# Patient Record
Sex: FEMALE | Race: WHITE | NOT HISPANIC OR LATINO | Employment: FULL TIME | URBAN - METROPOLITAN AREA
[De-identification: names, ages, dates, MRNs, and addresses within clinical notes are randomized per-mention and may not be internally consistent; named-entity substitution may affect disease eponyms.]

---

## 2017-05-06 ENCOUNTER — OFFICE VISIT (OUTPATIENT)
Dept: URGENT CARE | Facility: CLINIC | Age: 42
End: 2017-05-06
Payer: COMMERCIAL

## 2017-05-06 ENCOUNTER — LAB REQUISITION (OUTPATIENT)
Dept: LAB | Facility: HOSPITAL | Age: 42
End: 2017-05-06
Payer: COMMERCIAL

## 2017-05-06 ENCOUNTER — GENERIC CONVERSION - ENCOUNTER (OUTPATIENT)
Dept: OTHER | Facility: OTHER | Age: 42
End: 2017-05-06

## 2017-05-06 ENCOUNTER — TRANSCRIBE ORDERS (OUTPATIENT)
Dept: URGENT CARE | Facility: CLINIC | Age: 42
End: 2017-05-06

## 2017-05-06 ENCOUNTER — APPOINTMENT (OUTPATIENT)
Dept: LAB | Facility: CLINIC | Age: 42
End: 2017-05-06
Payer: COMMERCIAL

## 2017-05-06 ENCOUNTER — HOSPITAL ENCOUNTER (OUTPATIENT)
Dept: RADIOLOGY | Facility: CLINIC | Age: 42
Discharge: HOME | End: 2017-05-06
Admitting: EMERGENCY MEDICINE
Payer: COMMERCIAL

## 2017-05-06 DIAGNOSIS — R35.0 FREQUENCY OF MICTURITION: ICD-10-CM

## 2017-05-06 DIAGNOSIS — R05.9 COUGH: ICD-10-CM

## 2017-05-06 PROCEDURE — 87186 SC STD MICRODIL/AGAR DIL: CPT | Performed by: EMERGENCY MEDICINE

## 2017-05-06 PROCEDURE — 99213 OFFICE O/P EST LOW 20 MIN: CPT

## 2017-05-06 PROCEDURE — 81002 URINALYSIS NONAUTO W/O SCOPE: CPT

## 2017-05-06 PROCEDURE — 71020 HB CHEST X-RAY 2VW FRONTAL&LATL: CPT

## 2017-05-06 PROCEDURE — 87077 CULTURE AEROBIC IDENTIFY: CPT | Performed by: EMERGENCY MEDICINE

## 2017-05-06 PROCEDURE — 87086 URINE CULTURE/COLONY COUNT: CPT | Performed by: EMERGENCY MEDICINE

## 2017-05-08 LAB — BACTERIA UR CULT: NORMAL

## 2017-05-18 ENCOUNTER — OFFICE VISIT (OUTPATIENT)
Dept: URGENT CARE | Facility: CLINIC | Age: 42
End: 2017-05-18
Payer: COMMERCIAL

## 2017-05-18 PROCEDURE — 99213 OFFICE O/P EST LOW 20 MIN: CPT

## 2017-11-07 ENCOUNTER — TRANSCRIBE ORDERS (OUTPATIENT)
Dept: URGENT CARE | Facility: CLINIC | Age: 42
End: 2017-11-07

## 2017-11-07 ENCOUNTER — APPOINTMENT (OUTPATIENT)
Dept: URGENT CARE | Facility: CLINIC | Age: 42
End: 2017-11-07
Payer: COMMERCIAL

## 2017-11-07 ENCOUNTER — OFFICE VISIT (OUTPATIENT)
Dept: URGENT CARE | Facility: CLINIC | Age: 42
End: 2017-11-07
Payer: COMMERCIAL

## 2017-11-07 DIAGNOSIS — N39.0 URINARY TRACT INFECTION: ICD-10-CM

## 2017-11-07 PROCEDURE — 99204 OFFICE O/P NEW MOD 45 MIN: CPT

## 2017-11-07 PROCEDURE — 82948 REAGENT STRIP/BLOOD GLUCOSE: CPT

## 2017-11-07 PROCEDURE — 81002 URINALYSIS NONAUTO W/O SCOPE: CPT

## 2017-11-08 ENCOUNTER — LAB REQUISITION (OUTPATIENT)
Dept: LAB | Facility: HOSPITAL | Age: 42
End: 2017-11-08
Payer: COMMERCIAL

## 2017-11-08 DIAGNOSIS — N39.0 URINARY TRACT INFECTION: ICD-10-CM

## 2017-11-08 LAB — GLUCOSE SERPL-MCNC: 72 MG/DL (ref 65–140)

## 2017-11-08 PROCEDURE — 87186 SC STD MICRODIL/AGAR DIL: CPT | Performed by: PHYSICIAN ASSISTANT

## 2017-11-08 PROCEDURE — 87077 CULTURE AEROBIC IDENTIFY: CPT | Performed by: PHYSICIAN ASSISTANT

## 2017-11-08 PROCEDURE — 87086 URINE CULTURE/COLONY COUNT: CPT | Performed by: PHYSICIAN ASSISTANT

## 2017-11-08 NOTE — PROGRESS NOTES
Assessment  1  Acute bronchitis (466 0) (J20 9)   2  UTI (urinary tract infection) (599 0) (N39 0)    Plan  UTI (urinary tract infection)    · LevoFLOXacin 500 MG Oral Tablet (Levaquin); TAKE 1 TABLET DAILY UNTIL  FINISHED    Discussion/Summary  Discussion Summary:   Blood sugar normal right now in the office  Recommend patient follow up with PCP for further workup of possible hypoglycemia and with her high blood pressure  Start Levaquin and take as directed  Will treat bronchitis and UTI  Urine will be sent for culture  Medication Side Effects Reviewed: Possible side effects of new medications were reviewed with the patient/guardian today  Understands and agrees with treatment plan: The treatment plan was reviewed with the patient/guardian  The patient/guardian understands and agrees with the treatment plan   Follow Up Instructions: Follow Up with your Primary Care Provider in 7 days  If your symptoms worsen, go to the nearest [place] [address] Emergency Department  Chief Complaint  1  Cough  Chief Complaint Free Text Note Form: Pt c/o a productive cough and congestion  She also reports her urine is foul smelling  History of Present Illness  HPI: 68-year-old female here with productive cough and congestion  Patient states that she has had symptoms for the last Two weeks  2 days ago started with foul-smelling urine and is concerned she may have a UTI  Denies any dysuria, urinary frequency  Patient is concerned about her blood sugar  States that she feels weak at times and shaky  When this occurs she eats something it improves her symptoms  Cough: DANUTA BATRES presents with complaints of cough  Associated symptoms include wheezing,-- runny nose-- and-- stuffy nose, but-- no chills-- and-- no fever  Review of Systems  Focused-Female:   Constitutional: no fever,-- not feeling poorly,-- no chills-- and-- not feeling tired  ENT: nasal discharge     Cardiovascular: no complaints of slow or fast heart rate, no chest pain, no palpitations, no leg claudication or lower extremity edema  Respiratory: cough-- and-- wheezing, but-- no shortness of breath  Gastrointestinal: no complaints of abdominal pain, no constipation, no nausea or diarrhea, no vomiting, no bloody stools  Genitourinary: as noted in HPI,-- no dysuria,-- no pelvic pain-- and-- no incontinence  ROS Reviewed:   ROS reviewed  Active Problems  1  Acute bronchitis (466 0) (J20 9)   2  Acute sinusitis (461 9) (J01 90)   3  Cellulitis of foot, right (682 7) (L03 115)   4  Cough (786 2) (R05)   5  Depression (311) (F32 9)   6  Urinary frequency (788 41) (R35 0)   7  UTI (urinary tract infection) (599 0) (N39 0)   8  Wound of skin (782 9) (R23 8)    Past Medical History  1  No pertinent past medical history   2  History of No pertinent past surgical history   3  History of Viral URI with cough (465 9) (J06 9,B97 89)  Active Problems And Past Medical History Reviewed: The active problems and past medical history were reviewed and updated today  Family History  Family History Reviewed: The family history was reviewed and updated today  Social History   · Current every day smoker (305 1) (F17 200)  Social History Reviewed: The social history was reviewed and updated today  The social history was reviewed and is unchanged  Surgical History  1  History of Cholecystectomy   2  Denied: History Of Prior Surgery  Surgical History Reviewed: The surgical history was reviewed and updated today  Current Meds   1  Gabapentin 100 MG TABS; Therapy: (0472 51 11 42) to Recorded   2  LaMICtal TBDP; Therapy: (Recorded:68Tcv9864) to Recorded   3  Ventolin  (90 Base) MCG/ACT Inhalation Aerosol Solution; INHALE 2 PUFFS   EVERY 4 HOURS AS NEEDED FOR COUGH AND WHEEZE;   Therapy: 56LJY5844 to (Last BN:75JVF3423)  Requested for: 08TID2031 Ordered   4  Wellbutrin TABS;    Therapy: (Recorded:48Cqm5946) to Recorded  Medication List Reviewed: The medication list was reviewed and updated today  Allergies  1  No Known Drug Allergies    Vitals  Signs   Recorded: 05FOP9546 03:00PM   Temperature: 97 4 F  Heart Rate: 88  Respiration: 20  Systolic: 746  Diastolic: 85  Height: 5 ft 5 in  Weight: 145 lb 4 51 oz  BMI Calculated: 24 18  BSA Calculated: 1 73  O2 Saturation: 100    Physical Exam    Constitutional   General appearance: No acute distress, well appearing and well nourished  Ears, Nose, Mouth, and Throat   External inspection of ears and nose: Normal     Otoscopic examination: Tympanic membranes translucent with normal light reflex  Canals patent without erythema  Nasal mucosa, septum, and turbinates: Normal without edema or erythema  Oropharynx: Normal with no erythema, edema, exudate or lesions  Pulmonary   Respiratory effort: No increased work of breathing or signs of respiratory distress  Auscultation of lungs: Abnormal   Auscultation of the lungs revealed diffuse wheezing  Cardiovascular   Auscultation of heart: Normal rate and rhythm, normal S1 and S2, without murmurs  Abdomen   Abdomen: Non-tender, no masses         Signatures   Electronically signed by : Paulo Shepard, [facility]; Nov 7 2017  3:29PM EST                       (Author)    Electronically signed by : MICHAEL Sher ; Nov 7 2017  3:57PM EST                       (Co-author)

## 2017-11-10 LAB — BACTERIA UR CULT: ABNORMAL

## 2017-12-15 ENCOUNTER — TRANSCRIBE ORDERS (OUTPATIENT)
Dept: LAB | Facility: CLINIC | Age: 42
End: 2017-12-15

## 2018-01-15 NOTE — RESULT NOTES
Verified Results  Urine Dip Non-Automated- POC 65UMR9975 03:27PM [place]     Test Name Result Flag Reference   Color Yellow     Clarity Cloudy     Leukocytes moderate     Nitrite positive     Blood moderate     Bilirubin small     Urobilinogen 0 2     Protein 30     Ph 6 0     Specific Gravity 1 03     Ketone negative     Glucose negative     Color Yellow     Clarity Cloudy     Leukocytes moderate     Nitrite positive     Blood moderate     Bilirubin small     Urobilinogen 0 2     Protein 30     Ph 6 0     Specific Gravity 1 03     Ketone negative     Glucose negative

## 2018-03-22 ENCOUNTER — OFFICE VISIT (OUTPATIENT)
Dept: URGENT CARE | Facility: CLINIC | Age: 43
End: 2018-03-22
Payer: COMMERCIAL

## 2018-03-22 ENCOUNTER — APPOINTMENT (OUTPATIENT)
Dept: RADIOLOGY | Facility: CLINIC | Age: 43
End: 2018-03-22
Payer: COMMERCIAL

## 2018-03-22 VITALS
HEART RATE: 86 BPM | SYSTOLIC BLOOD PRESSURE: 163 MMHG | TEMPERATURE: 97.4 F | DIASTOLIC BLOOD PRESSURE: 75 MMHG | OXYGEN SATURATION: 99 %

## 2018-03-22 DIAGNOSIS — M54.6 THORACIC BACK PAIN, UNSPECIFIED BACK PAIN LATERALITY, UNSPECIFIED CHRONICITY: ICD-10-CM

## 2018-03-22 DIAGNOSIS — M54.6 THORACIC BACK PAIN, UNSPECIFIED BACK PAIN LATERALITY, UNSPECIFIED CHRONICITY: Primary | ICD-10-CM

## 2018-03-22 PROCEDURE — S9088 SERVICES PROVIDED IN URGENT: HCPCS | Performed by: NURSE PRACTITIONER

## 2018-03-22 PROCEDURE — 99203 OFFICE O/P NEW LOW 30 MIN: CPT | Performed by: NURSE PRACTITIONER

## 2018-03-22 RX ORDER — BUPROPION HYDROCHLORIDE 100 MG/1
TABLET ORAL
COMMUNITY
End: 2019-05-14

## 2018-03-22 NOTE — PATIENT INSTRUCTIONS
Concussion   WHAT YOU NEED TO KNOW:   A concussion is a mild brain injury  It is usually caused by a bump or blow to the head from a fall, a motor vehicle crash, or a sports injury  Sometimes being shaken forcefully may cause a concussion  DISCHARGE INSTRUCTIONS:   Have someone else call 911 for the following:   · Someone tries to wake you and cannot do so  · You have a seizure, increasing confusion, or a change in personality  · Your speech becomes slurred, or you have new vision problems  Return to the emergency department if:   · You have a severe headache that does not go away  · You have arm or leg weakness, numbness, or new problems with coordination  · You have blood or clear fluid coming out of the ears or nose  Contact your healthcare provider if:   · You have nausea or are vomiting  · You feel more sleepy than usual     · Your symptoms get worse  · Your symptoms last longer than 6 weeks after the injury  · You have questions or concerns about your condition or care  Medicines:   · Acetaminophen  helps to decrease pain  It is available without a doctor's order  Ask how much to take and how often to take it  Follow directions  Acetaminophen can cause liver damage if not taken correctly  · NSAIDs , such as ibuprofen, help decrease swelling and pain  NSAIDs can cause stomach bleeding or kidney problems in certain people  If you take blood thinner medicine, always ask your healthcare provider if NSAIDs are safe for you  Always read the medicine label and follow directions  · Take your medicine as directed  Contact your healthcare provider if you think your medicine is not helping or if you have side effects  Tell him or her if you are allergic to any medicine  Keep a list of the medicines, vitamins, and herbs you take  Include the amounts, and when and why you take them  Bring the list or the pill bottles to follow-up visits   Carry your medicine list with you in case of an emergency  Follow up with your healthcare provider as directed:  Write down your questions so you remember to ask them during your visits  Self-care:   · Rest  from physical and mental activities as directed  Mental activities are those that require thinking, concentration, and attention  You will need to rest until your symptoms are gone  Rest will allow you to recover from your concussion  Ask your healthcare provider when you can return to work and other daily activities  · Have someone stay with you for the first 24 hours after your injury  Your healthcare provider should be contacted if your symptoms get worse, or you develop new symptoms  · Do not participate in sports and physical activities until your healthcare provider says it is okay  They could make your symptoms worse or lead to another concussion  Your healthcare provider will tell you when it is okay for you to return to sports or physical activities  Prevent another concussion:   · Wear protective sports equipment that fit properly  Helmets help decrease your risk of a serious brain injury  Talk to your healthcare provider about ways you can decrease your risk for a concussion if you play sports  · Wear your seat belt  every time you travel  This helps to decrease your risk of a head injury if you are in a car accident  © 2017 [facility] Information is for End User's use only and may not be sold, redistributed or otherwise used for commercial purposes  All illustrations and images included in CareNotes® are the copyrighted property of [email or web address] , [employer]  or [employer]  The above information is an  only  It is not intended as medical advice for individual conditions or treatments  Talk to your doctor, nurse or pharmacist before following any medical regimen to see if it is safe and effective for you

## 2018-03-22 NOTE — PROGRESS NOTES
[address] [facility]        NAME: Sedrick Aranda is a 38 y o  female  : 1975    MRN: [id number]  DATE: 2018  TIME: 5:09 PM    Assessment and Plan   Thoracic back pain, unspecified back pain laterality, unspecified chronicity [M54 6]  1  Thoracic back pain, unspecified back pain laterality, unspecified chronicity  XR spine thoracic 3 vw         Patient Instructions       Follow up with PCP in 3-5 days  Proceed to  ER if symptoms worsen  Chief Complaint     Chief Complaint   Patient presents with    Back Pain     slipped on ice yesterday, upper shoulder area, and head  Did not lose consciousness         History of Present Illness       Back Pain   The current episode started yesterday (She slipped and fell on ice  she landed on her upper back  and head  )  The problem is unchanged  The pain is present in the thoracic spine  The pain does not radiate  The pain is at a severity of 7/10  The pain is moderate  The symptoms are aggravated by bending, position and twisting  Associated symptoms include headaches  Pertinent negatives include no weakness  Headache    The current episode started yesterday (during the incident described above  )  The problem occurs constantly  The problem has been unchanged  The pain is located in the occipital region  The pain quality is not similar to prior headaches  The quality of the pain is described as aching  Associated symptoms include back pain, dizziness (mild) and a loss of balance (mild)  Pertinent negatives include no blurred vision, hearing loss, photophobia, scalp tenderness, tinnitus, visual change, vomiting or weakness  Associated symptoms comments: States that she "feels out of it" today          Review of Systems   Review of Systems   Constitutional: Negative  HENT: Negative  Negative for hearing loss and tinnitus  Eyes: Negative for blurred vision and photophobia  Respiratory: Negative  Cardiovascular: Negative      Gastrointestinal: Negative for vomiting  Genitourinary: Negative  Musculoskeletal: Positive for back pain  Neurological: Positive for dizziness (mild), headaches and loss of balance (mild)  Negative for syncope, speech difficulty and weakness  Psychiatric/Behavioral: Positive for decreased concentration  Negative for confusion  Current Medications       Current Outpatient Prescriptions:     buPROPion (WELLBUTRIN) 100 mg tablet, Take by mouth, Disp: , Rfl:     GABAPENTIN PO, Take by mouth, Disp: , Rfl:     lamoTRIgine (LaMICtal) 2 MG CHEW, Chew, Disp: , Rfl:     Current Allergies     Allergies as of 03/22/2018    (No Known Allergies)            The following portions of the patient's history were reviewed and updated as appropriate: allergies, current medications, past family history, past medical history, past social history, past surgical history and problem list      No past medical history on file  No past surgical history on file  No family history on file  Medications have been verified  Objective   /75   Pulse 86   Temp (!) 97 4 °F (36 3 °C)   SpO2 99%        Physical Exam     Physical Exam   Constitutional: She is oriented to person, place, and time  She appears well-developed and well-nourished  No distress  HENT:   Head: Normocephalic and atraumatic  Right Ear: External ear normal    Left Ear: External ear normal    Nose: Nose normal    Mouth/Throat: Oropharynx is clear and moist  No oropharyngeal exudate  Eyes: Conjunctivae and EOM are normal  Pupils are equal, round, and reactive to light  Neck: Normal range of motion  Neck supple  Cardiovascular: Normal rate, regular rhythm and normal heart sounds  Pulmonary/Chest: Effort normal and breath sounds normal    Abdominal: Soft  Bowel sounds are normal    Musculoskeletal:        Right shoulder: She exhibits tenderness (form T2-T6) and spasm  Abnormal pulses: upper thoracic paraspinal spasms     Neurological: She is alert and oriented to person, place, and time  She has normal reflexes  She displays normal reflexes  No cranial nerve deficit  She exhibits normal muscle tone  Coordination normal    She is AAO x 3, but her responses were slow  Skin: Skin is warm and dry  No rash noted  She is not diaphoretic  Psychiatric: She has a normal mood and affect  Nursing note and vitals reviewed  I advised her to go to ER for further evaluation and for possible CT of head  She was reluctant, but agreeable

## 2018-04-09 ENCOUNTER — EVALUATION (OUTPATIENT)
Dept: PHYSICAL THERAPY | Facility: CLINIC | Age: 43
End: 2018-04-09
Payer: COMMERCIAL

## 2018-04-09 DIAGNOSIS — M54.6 PAIN IN THORACIC SPINE: Primary | ICD-10-CM

## 2018-04-09 PROCEDURE — 97014 ELECTRIC STIMULATION THERAPY: CPT | Performed by: PHYSICAL THERAPIST

## 2018-04-09 PROCEDURE — G8991 OTHER PT/OT GOAL STATUS: HCPCS | Performed by: PHYSICAL THERAPIST

## 2018-04-09 PROCEDURE — G8990 OTHER PT/OT CURRENT STATUS: HCPCS | Performed by: PHYSICAL THERAPIST

## 2018-04-09 PROCEDURE — 97162 PT EVAL MOD COMPLEX 30 MIN: CPT | Performed by: PHYSICAL THERAPIST

## 2018-04-09 NOTE — LETTER
2018    Daniel Jenkins DO  [address] [facility] [address]    Patient: Hilda Casper   YOB: 1975   Date of Visit: 2018     Encounter Diagnosis     ICD-10-CM    1  Pain in thoracic spine M54 6        Dear Dr Annabelle Carroll:    Please review the attached Plan of Care from [place] ([facility]) recent visit  Please verify that you agree therapy should continue by signing the attached document and sending it back to our office  If you have any questions or concerns, please don't hesitate to call  Sincerely,    Mariya Hughes      Referring Provider:      I certify that I have read the below Plan of Care and certify the need for these services furnished under this plan of treatment while under my care  Daniel Jenkins DO  [address] [facility] [address]  VIA Facsimile: [phone]          PT Evaluation     Today's date: 2018  Patient name: Hilda Casper  : 1975  MRN: [id number]  Referring provider: Sonny Byrd DO  Dx:   Encounter Diagnosis     ICD-10-CM    1  Pain in thoracic spine M54 6                   Assessment  Impairments: abnormal muscle tone, abnormal or restricted ROM, activity intolerance, impaired physical strength, lacks appropriate home exercise program, pain with function and scapular dyskinesis    Assessment details: Hilda Casper is a 38 y o  female presenting to outpatient physical therapy with diagnosis of pain in thoracic spine  Patient presents with pain, reduced range of motion, reduced strength, reduced postural awareness, subjective reports of increased pain, and reduced functional activity tolerance  Patient to benefit from skilled outpatient physical therapy to reduce pain, maximize pain free range of motion, increase strength and stability, and improve functional mobility/functional activity in order to maximize return to prior level of function with reduced limitations   She was encouraged to continue with follow up with pain management  Thank you for your referral     Understanding of Dx/Px/POC: good   Prognosis: fair  Prognosis details: Chronic pain, chronic poor posture, chronic thoracic kyphosis    Goals  STGs to be achieved in 4 weeks:  1  Pt to demonstrate reduced subjective pain rating "at worst" by at least 2-3 points from Initial Eval in order to allow for reduced pain with ADLs and improved functional activity tolerance  2  Pt to demonstrate increased AROM of bilateral UEs by at least 5-10 degrees in order to allow for greater ease and independence with ADLs and functional mobility  3  Pt to demonstrate full PROM of cervical spine in order to maximize joint mobility and function and allow for progression of exercise program and achievement of goals  4  Pt to demonstrate increased MMT of bilateral UEs and cervical spine by at least 1/2-1 grade in order to improve safety and stability with ADLs and functional mobility  LTGs to be achieved in 6-8 weeks:  1  Pt will be I with HEP in order to continue to improve quality of life and independence and reduce risk for re-injury  2  Pt to demonstrate return to ADLs and IADLs without limitations or restrictions  3  Pt to demonstrate improved function as noted by achieving or exceeding predicted score on FOTO outcomes assessment tool  Plan  Patient would benefit from: skilled PT  Planned modality interventions: TENS, ultrasound and unattended electrical stimulation  Planned therapy interventions: manual therapy, neuromuscular re-education, home exercise program and therapeutic exercise  Frequency: 2x week  Duration in weeks: 4  Treatment plan discussed with: patient        Subjective Evaluation    History of Present Illness  Date of onset: 3/21/2018  Mechanism of injury: trauma  Mechanism of injury: Patient slipped and fell down a hill on ice/snow  She had the impact on the upper thoracic region and then the head    She was able to get up with assist from her daughter, and went to Urgent Care  She was sent to the ED instead because of hitting her head  She was given a muscle relaxer and NSAID  She saw her PCP last week and was given another course of muscle relaxer and that didn't work  She called her PCP to let he know that her meds weren't working and PCP opted to refer for OPPT and pain management  Presents today for ongoing pain  Not a recurrent problem   Quality of life: good    Pain  Current pain ratin  At best pain ratin  At worst pain ratin  Quality: Constant pain  Aggravating factors: sitting  Progression: worsening    Social Support    Employment status: working  Hand dominance: right      Diagnostic Tests  X-ray: normal  Patient Goals  Patient goals for therapy: decreased pain, increased motion, increased strength, independence with ADLs/IADLs, return to sport/leisure activities and return to work          Objective     Special Questions  Positive for disturbed sleep  Negative for dizziness, faints, headaches and nausea/motion sickness    Static Posture     Shoulders  Rounded  Thoracic Spine  Hyperkyphosis  Rib Cage  Elevated  Postural Observations  Seated posture: poor  Standing posture: poor  Correction of posture: makes symptoms worse    Additional Postural Observation Details  Kyphosis, chronic, rigid  Chronic scoliosis    Tenderness   Cervical Spine   Tenderness in the spinous process, left scapula, left transverse process, left ribs, right scapula, right transverse process and right ribs  No tenderness in the sternum  Additional Tenderness Details  TTP from UT, levator, through medial scapular border  TTP along lateral scap border      TTP through bilateral T/S and cervical PVMs  TTP through bilateral anterior triangles  Increased cervical-thoracic kyphosis    History of cervical spine surgery    Neurological Testing     Sensation   Cervical/Thoracic   Left   Intact: light touch    Right Intact: light touch    Active Range of Motion     Additional Active Range of Motion Details  Cervical ROM limited in rotation bilaterally 30%  Cervical flexion WFL, but patient reports extreme pain  Cervical extension WFL, but patient reports extreme pain  Cervical side bending limited 50% with patient reporting extreme pain    Unable to reverse  Thoracic kyphosis actively  Near neutral in supine/prone positioning  Strength/Myotome Testing     Left Shoulder     Planes of Motion   Flexion: 3   Extension: 3   Abduction: 3   Adduction: 3   External rotation at 0°:  3   Internal rotation at 0°:  3     Right Shoulder     Planes of Motion   Flexion: 3   Extension: 3   Abduction: 3   Adduction: 3   External rotation at 0°:  3   Internal rotation at 0°:  3     Left Elbow   Flexion: 3+  Extension: 3+    Right Elbow   Flexion: 3+  Extension: 3+    Left Wrist/Hand   Wrist extension: 4-  Wrist flexion: 4-    Right Wrist/Hand   Wrist extension: 4-  Wrist flexion: 4-    Additional Strength Details  Patient demonstrates full AROM    Reports extreme pain with MMT, unable to maintain against resistance      Flowsheet Rows    Flowsheet Row Most Recent Value   PT/OT G-Codes   FOTO information reviewed  Yes   Assessment Type  Evaluation   G code set  Other PT/OT Primary   Other PT Primary Current Status ()  CL   Other PT Primary Goal Status ()  CK          Precautions: Chronic pain  Re-eval:  5/8/18  Daily Treatment Diary     Manual          upcoming                                       Gentle SOR, STM    Date upcoming       UT stretch  *      Levator stretch  *      Cervical AROM   *      Chin tucks  *      Scap retraction  *      Shoulder raises with cervical stabilization        B ER with cervical stabilization     *   Cervical isometrics         Scap clocks    *     MTP/LTP   *     Bent over rows        Pec Stretch  90 degrees 45/90 degrees 45/90/120 degrees    Scalene Stretch   *     Wall push ups Modalities         Estim 15'       Thermals 15'                 Estim: 15 mins IFC to thoracic spine with MH  Patient positioned for comfort in prone with pillow under abdomen  Skin intact pre and post application with no immediate adverse effects noted

## 2018-04-09 NOTE — PROGRESS NOTES
PT Evaluation     Today's date: 2018  Patient name: Azam Gutierrez  : 1975  MRN: [id number]  Referring provider: Sunil Conroy DO  Dx:   Encounter Diagnosis     ICD-10-CM    1  Pain in thoracic spine M54 6                   Assessment  Impairments: abnormal muscle tone, abnormal or restricted ROM, activity intolerance, impaired physical strength, lacks appropriate home exercise program, pain with function and scapular dyskinesis    Assessment details: Azam Gutierrez is a 38 y o  female presenting to outpatient physical therapy with diagnosis of pain in thoracic spine  Patient presents with pain, reduced range of motion, reduced strength, reduced postural awareness, subjective reports of increased pain, and reduced functional activity tolerance  Patient to benefit from skilled outpatient physical therapy to reduce pain, maximize pain free range of motion, increase strength and stability, and improve functional mobility/functional activity in order to maximize return to prior level of function with reduced limitations  She was encouraged to continue with follow up with pain management  Thank you for your referral     Understanding of Dx/Px/POC: good   Prognosis: fair  Prognosis details: Chronic pain, chronic poor posture, chronic thoracic kyphosis    Goals  STGs to be achieved in 4 weeks:  1  Pt to demonstrate reduced subjective pain rating "at worst" by at least 2-3 points from Initial Eval in order to allow for reduced pain with ADLs and improved functional activity tolerance  2  Pt to demonstrate increased AROM of bilateral UEs by at least 5-10 degrees in order to allow for greater ease and independence with ADLs and functional mobility  3  Pt to demonstrate full PROM of cervical spine in order to maximize joint mobility and function and allow for progression of exercise program and achievement of goals     4  Pt to demonstrate increased MMT of bilateral UEs and cervical spine by at least 1/2-1 grade in order to improve safety and stability with ADLs and functional mobility  LTGs to be achieved in 6-8 weeks:  1  Pt will be I with HEP in order to continue to improve quality of life and independence and reduce risk for re-injury  2  Pt to demonstrate return to ADLs and IADLs without limitations or restrictions  3  Pt to demonstrate improved function as noted by achieving or exceeding predicted score on FOTO outcomes assessment tool  Plan  Patient would benefit from: skilled PT  Planned modality interventions: TENS, ultrasound and unattended electrical stimulation  Planned therapy interventions: manual therapy, neuromuscular re-education, home exercise program and therapeutic exercise  Frequency: 2x week  Duration in weeks: 4  Treatment plan discussed with: patient        Subjective Evaluation    History of Present Illness  Date of onset: 3/21/2018  Mechanism of injury: trauma  Mechanism of injury: Patient slipped and fell down a hill on ice/snow  She had the impact on the upper thoracic region and then the head  She was able to get up with assist from her daughter, and went to Urgent Care  She was sent to the ED instead because of hitting her head  She was given a muscle relaxer and NSAID  She saw her PCP last week and was given another course of muscle relaxer and that didn't work  She called her PCP to let he know that her meds weren't working and PCP opted to refer for OPPT and pain management  Presents today for ongoing pain  Not a recurrent problem   Quality of life: good    Pain  Current pain ratin  At best pain ratin  At worst pain ratin  Quality: Constant pain    Aggravating factors: sitting  Progression: worsening    Social Support    Employment status: working  Hand dominance: right      Diagnostic Tests  X-ray: normal  Patient Goals  Patient goals for therapy: decreased pain, increased motion, increased strength, independence with ADLs/IADLs, return to sport/leisure activities and return to work          Objective     Special Questions  Positive for disturbed sleep  Negative for dizziness, faints, headaches and nausea/motion sickness    Static Posture     Shoulders  Rounded  Thoracic Spine  Hyperkyphosis  Rib Cage  Elevated  Postural Observations  Seated posture: poor  Standing posture: poor  Correction of posture: makes symptoms worse    Additional Postural Observation Details  Kyphosis, chronic, rigid  Chronic scoliosis    Tenderness   Cervical Spine   Tenderness in the spinous process, left scapula, left transverse process, left ribs, right scapula, right transverse process and right ribs  No tenderness in the sternum  Additional Tenderness Details  TTP from UT, levator, through medial scapular border  TTP along lateral scap border  TTP through bilateral T/S and cervical PVMs  TTP through bilateral anterior triangles  Increased cervical-thoracic kyphosis    History of cervical spine surgery    Neurological Testing     Sensation   Cervical/Thoracic   Left   Intact: light touch    Right   Intact: light touch    Active Range of Motion     Additional Active Range of Motion Details  Cervical ROM limited in rotation bilaterally 30%  Cervical flexion WFL, but patient reports extreme pain  Cervical extension WFL, but patient reports extreme pain  Cervical side bending limited 50% with patient reporting extreme pain    Unable to reverse  Thoracic kyphosis actively  Near neutral in supine/prone positioning      Strength/Myotome Testing     Left Shoulder     Planes of Motion   Flexion: 3   Extension: 3   Abduction: 3   Adduction: 3   External rotation at 0°: 3   Internal rotation at 0°: 3     Right Shoulder     Planes of Motion   Flexion: 3   Extension: 3   Abduction: 3   Adduction: 3   External rotation at 0°: 3   Internal rotation at 0°: 3     Left Elbow   Flexion: 3+  Extension: 3+    Right Elbow   Flexion: 3+  Extension: 3+    Left Wrist/Hand Wrist extension: 4-  Wrist flexion: 4-    Right Wrist/Hand   Wrist extension: 4-  Wrist flexion: 4-    Additional Strength Details  Patient demonstrates full AROM  Reports extreme pain with MMT, unable to maintain against resistance      Flowsheet Rows    Flowsheet Row Most Recent Value   PT/OT G-Codes   FOTO information reviewed  Yes   Assessment Type  Evaluation   G code set  Other PT/OT Primary   Other PT Primary Current Status ()  CL   Other PT Primary Goal Status ()  CK          Precautions: Chronic pain  Re-eval:  5/8/18  Daily Treatment Diary     Manual          upcoming                                       Gentle SOR, STM    Date upcoming       UT stretch  *      Levator stretch  *      Cervical AROM   *      Chin tucks  *      Scap retraction  *      Shoulder raises with cervical stabilization        B ER with cervical stabilization     *   Cervical isometrics         Scap clocks    *     MTP/LTP   *     Bent over rows        Pec Stretch  90 degrees 45/90 degrees 45/90/120 degrees    Scalene Stretch   *     Wall push ups                Modalities         Estim 15'       Thermals 15'                 Estim: 15 mins IFC to thoracic spine with   Patient positioned for comfort in prone with pillow under abdomen  Skin intact pre and post application with no immediate adverse effects noted

## 2018-04-11 ENCOUNTER — APPOINTMENT (OUTPATIENT)
Dept: PHYSICAL THERAPY | Facility: CLINIC | Age: 43
End: 2018-04-11
Payer: COMMERCIAL

## 2018-04-11 ENCOUNTER — TRANSCRIBE ORDERS (OUTPATIENT)
Dept: PHYSICAL THERAPY | Facility: CLINIC | Age: 43
End: 2018-04-11

## 2018-04-11 DIAGNOSIS — G89.29 CHRONIC BILATERAL LOW BACK PAIN WITHOUT SCIATICA: Primary | ICD-10-CM

## 2018-04-11 DIAGNOSIS — M54.50 CHRONIC BILATERAL LOW BACK PAIN WITHOUT SCIATICA: Primary | ICD-10-CM

## 2018-04-16 ENCOUNTER — APPOINTMENT (OUTPATIENT)
Dept: PHYSICAL THERAPY | Facility: CLINIC | Age: 43
End: 2018-04-16
Payer: COMMERCIAL

## 2018-04-16 ENCOUNTER — OFFICE VISIT (OUTPATIENT)
Dept: PHYSICAL THERAPY | Facility: CLINIC | Age: 43
End: 2018-04-16
Payer: COMMERCIAL

## 2018-04-16 DIAGNOSIS — M54.6 PAIN IN THORACIC SPINE: Primary | ICD-10-CM

## 2018-04-16 LAB
ALBUMIN SERPL BCP-MCNC: 4.6 G/DL (ref 3.5–5.7)
ALP SERPL-CCNC: 74 IU/L (ref 40–150)
ALT SERPL W P-5'-P-CCNC: 22 IU/L (ref 0–50)
ANION GAP SERPL CALCULATED.3IONS-SCNC: 12.7 MM/L
AST SERPL W P-5'-P-CCNC: 19 U/L (ref 7–26)
BASOPHILS # BLD AUTO: 0 X3/UL (ref 0–0.3)
BASOPHILS # BLD AUTO: 0.5 % (ref 0–2)
BILIRUB SERPL-MCNC: 0.4 MG/DL (ref 0.3–1)
BILIRUBIN DIRECT (HISTORICAL): 0.1 MG/DL (ref 0–0.2)
BUN SERPL-MCNC: 10 MG/DL (ref 7–25)
CALCIUM SERPL-MCNC: 9.1 MG/DL (ref 8.6–10.5)
CHLORIDE SERPL-SCNC: 101 MM/L (ref 98–107)
CHOLEST SERPL-MCNC: 208 MG/DL (ref 0–200)
CO2 SERPL-SCNC: 28 MM/L (ref 21–31)
CREAT SERPL-MCNC: 0.79 MG/DL (ref 0.6–1.2)
DEPRECATED RDW RBC AUTO: 12.4 % (ref 11.5–14.5)
EGFR (HISTORICAL): > 60 GFR
EGFR AFRICAN AMERICAN (HISTORICAL): > 60 GFR
EOSINOPHIL # BLD AUTO: 0.3 X3/UL (ref 0–0.5)
EOSINOPHIL NFR BLD AUTO: 4.2 % (ref 0–5)
GLUCOSE (HISTORICAL): 87 MG/DL (ref 65–99)
HCT VFR BLD AUTO: 38.6 % (ref 37–47)
HDLC SERPL-MCNC: 63 MG/DL (ref 40–60)
HGB BLD-MCNC: 12.8 G/DL (ref 12–16)
LDLC SERPL CALC-MCNC: 115.6 MG/DL (ref 75–193)
LYMPHOCYTES # BLD AUTO: 1.9 X3/UL (ref 1.2–4.2)
LYMPHOCYTES NFR BLD AUTO: 23.5 % (ref 20.5–51.1)
MCH RBC QN AUTO: 32.5 PG (ref 26–34)
MCHC RBC AUTO-ENTMCNC: 33.1 G/DL (ref 31–36)
MCV RBC AUTO: 98.3 FL (ref 81–99)
MONOCYTES # BLD AUTO: 0.7 X3/UL (ref 0–1)
MONOCYTES NFR BLD AUTO: 8.1 % (ref 1.7–12)
NEUTROPHILS # BLD AUTO: 5.3 X3/UL (ref 1.4–6.5)
NEUTS SEG NFR BLD AUTO: 63.7 % (ref 42.2–75.2)
OSMOLALITY, SERUM (HISTORICAL): 274 MOSM (ref 262–291)
PLATELET # BLD AUTO: 263 X3/UL (ref 130–400)
PMV BLD AUTO: 8.1 FL (ref 8.6–11.7)
POTASSIUM SERPL-SCNC: 3.7 MM/L (ref 3.5–5.5)
RBC # BLD AUTO: 3.92 X6/UL (ref 3.9–5.2)
SODIUM SERPL-SCNC: 138 MM/L (ref 134–143)
T4 FREE SERPL-MCNC: 0.61 NG/DL (ref 0.6–1.7)
TOTAL PROTEIN (HISTORICAL): 6.9 G/DL (ref 6.4–8.9)
TRIGL SERPL-MCNC: 145 MG/DL (ref 44–166)
TSH SERPL DL<=0.05 MIU/L-ACNC: 2.17 UIU/M (ref 0.45–5.33)
VLDL CHOLESTEROL (HISTORICAL): 29 MG/DL (ref 5–51)
WBC # BLD AUTO: 8.3 X3/UL (ref 4.8–10.8)

## 2018-04-16 PROCEDURE — 97110 THERAPEUTIC EXERCISES: CPT

## 2018-04-16 PROCEDURE — 97014 ELECTRIC STIMULATION THERAPY: CPT

## 2018-04-16 NOTE — PROGRESS NOTES
Daily Note     Today's date: 2018  Patient name: Kat Schneider  : 1975  MRN: [id number]  Referring provider: Swati Pisano DO  Dx:   Encounter Diagnosis     ICD-10-CM    1  Pain in thoracic spine M54 6        Start Time: 1805  Stop Time: 1900  Total time in clinic (min): 55 minutes  Precautions: Chronic pain  Re-eval:  18    Re-eval Date: 18    Date        Visit Count  2      FOTO        Pain In  8/10      Pain Out  10/10          Subjective: "I am sore "      Objective: See treatment diary below      Assessment: Tolerated treatment fair  Patient initiated ex program this date  Pt guarded with c-spine motion 2* pain into scapula region  Edema noted t-spine region  Pt noted increased pain at end of session  Anticipate progress ex next session if no exacerbation noted next visit  Plan: Continue per plan of care  Progress treatment as tolerated  Daily Treatment Diary     Manual          upcoming                                       Gentle SOR, STM    Date upcoming       UT stretch  3x20" ea      Levator stretch  3x20"      Cervical AROM   10x5"      Chin tucks  10x5"      Scap retraction  10x5"      Shoulder raises with cervical stabilization        B ER with cervical stabilization     *   Cervical isometrics   10x5"      Scap clocks    *     MTP/LTP   *     Bent over rows        Pec Stretch  90 degrees  3x20"  45/90 degrees 45/90/120 degrees    Scalene Stretch   *     Wall push ups        UBE   Retro 5'              Modalities         Estim 15' 15'      Thermals 15' 15'                Estim: 15 mins IFC to thoracic spine with   Patient positioned for comfort in prone with pillow under abdomen  Skin intact pre and post application with no immediate adverse effects noted

## 2018-04-17 LAB
EST. AVERAGE GLUCOSE BLD GHB EST-MCNC: 101 MG/DL
HBA1C MFR BLD HPLC: 5.2 % (ref 4–6.2)

## 2018-04-18 ENCOUNTER — OFFICE VISIT (OUTPATIENT)
Dept: PHYSICAL THERAPY | Facility: CLINIC | Age: 43
End: 2018-04-18
Payer: COMMERCIAL

## 2018-04-18 DIAGNOSIS — M54.6 PAIN IN THORACIC SPINE: Primary | ICD-10-CM

## 2018-04-18 PROCEDURE — 97014 ELECTRIC STIMULATION THERAPY: CPT

## 2018-04-18 PROCEDURE — 97110 THERAPEUTIC EXERCISES: CPT

## 2018-04-18 PROCEDURE — 97140 MANUAL THERAPY 1/> REGIONS: CPT

## 2018-04-18 NOTE — PROGRESS NOTES
Daily Note     Today's date: 2018  Patient name: Cristy Auguste  : 1975  MRN: [id number]  Referring provider: Reji Sevilla DO  Dx: No diagnosis found  Precautions: Chronic pain  Re-eval:  18    Re-eval Date: 18    Date  18     Visit Count  2 3     FOTO        Pain In  8/10 9/10     Pain Out  10/10 9/10         Subjective: I am in a lot of pain  Any activity increase pain between the shoulder blades  Estim feels good  I am worse with pain at the end of my day  Taking motrin for pain  Ran out of mm relaxer medication  Currently dont have a f/u with Md, no apt was made    Objective: See treatment diary below      Assessment: Tolerated treatment fair with pt educated submax to exacerbate sx's  Pt indicated increase pain with exercise after completion but not during  Patient would benefit from continued PT to improve ROM, strength/scap stability and reduce pain/soft tissue impairments as able  Plan: Continue per plan of care  Manual    10'       upcoming                                       Gentle STM to pt tolerance BL upper TS sitting  SOR - upcoming    Date upcoming       UT stretch  3x20" ea 3x20" ea sub max     Levator stretch  3x20" 3x20" ea   Sub max     Cervical AROM   10x5" 10x,5"     Chin tucks  10x5" 10x 5"     Scap retraction  10x5" 10x 5"     Shoulder raises with cervical stabilization        B ER with cervical stabilization     *   Cervical isometrics   10x5" Held pain     Scap clocks    *     MTP/LTP   *     Bent over rows        Pec Stretch  90 degrees  3x20"  45/90 NV degrees 45/90/120 degrees    Scalene Stretch   *     Wall push ups        UBE   Retro 5' resume             Modalities    13'     Estim 15' 15'      Thermals 15' 15'                Estim:  IFC to thoracic spine with   Patient positioned for comfort in supine with roll under knees   Skin intact pre and post application with no immediate adverse effects noted

## 2018-04-19 ENCOUNTER — OFFICE VISIT (OUTPATIENT)
Dept: PHYSICAL THERAPY | Facility: CLINIC | Age: 43
End: 2018-04-19
Payer: COMMERCIAL

## 2018-04-23 ENCOUNTER — OFFICE VISIT (OUTPATIENT)
Dept: PHYSICAL THERAPY | Facility: CLINIC | Age: 43
End: 2018-04-23
Payer: COMMERCIAL

## 2018-04-23 DIAGNOSIS — M54.6 PAIN IN THORACIC SPINE: Primary | ICD-10-CM

## 2018-04-23 PROCEDURE — 97140 MANUAL THERAPY 1/> REGIONS: CPT | Performed by: PHYSICAL MEDICINE & REHABILITATION

## 2018-04-23 PROCEDURE — 97110 THERAPEUTIC EXERCISES: CPT | Performed by: PHYSICAL MEDICINE & REHABILITATION

## 2018-04-23 PROCEDURE — 97014 ELECTRIC STIMULATION THERAPY: CPT | Performed by: PHYSICAL MEDICINE & REHABILITATION

## 2018-04-23 NOTE — PROGRESS NOTES
Daily Note     Today's date: 2018  Patient name: Stephon Vásquez  : 1975  MRN: [id number]  Referring provider: Antoinette Vickers DO  Dx:   Encounter Diagnosis     ICD-10-CM    1  Pain in thoracic spine M54 6                 Precautions: Chronic pain  Re-eval:  18    Re-eval Date: 18    Date  18    Visit Count  2 3 4    FOTO        Pain In  8/10 9/10 9/10    Pain Out  10/10 9/10 8/10      Subjective: Pt notes cont elevated central upper back pain  Notes no improvement to date  No new sx/complaints  Apologizes for being 10' late for appointment as she had to get gas  Objective: See treatment diary below      Assessment: Tolerated treatment fair  Demonstrates limited tolerance for exercise with increase in upper back pain with limited activity  Tolerated MT fair with slight reduced pain/sx; no adverse reaction to tx noted  Reduced mobility noted mid-lower t-spine  Slight reduced pain with MHP/ES  No complaints end of tx  Patient demonstrated fatigue post treatment      Plan: Continue per plan of care  Progress treatment as tolerated         Manual    10'  15'     upcoming                                       Gentle STM to pt tolerance BL upper TS sitting-NP  SOR - upcoming  Gentle grades 1-2 PA mobs t-spine pt prone to tolerance   Gentle cross hand traction t-spine and STM B t-spine pvms in prone to tolerance     Date upcoming       UT stretch  3x20" ea 3x20" ea sub max 3x20" ea sub max    Levator stretch  3x20" 3x20" ea   Sub max 3x20" ea sub max    Cervical AROM   10x5" 10x,5" 10x/10"ea    Chin tucks  10x5" 10x 5" 15x/5"    Scap retraction  10x5" 10x 5" 15x/5"    Shoulder raises with cervical stabilization        B ER with cervical stabilization     *   Cervical isometrics   10x5" Held pain     Scap clocks    *     MTP/LTP   *     Bent over rows        Pec Stretch  90 degrees  3x20"  45/90 NV degrees 90* only  3x20"    Scalene Stretch   *     Wall push ups        UBE Retro 5' resume Fwd/retro  5' ea seated            Modalities    15'     Estim 15' 15'  10'    Thermals 15' 15'                Estim:  IFC to thoracic spine with MH  Patient positioned for comfort in prone with roll under ankles   Skin intact pre and post application with no immediate adverse effects noted

## 2018-04-24 LAB — LITHIUM LEVEL (HISTORICAL): 0.8 MM/L (ref 1–1.2)

## 2018-04-25 ENCOUNTER — OFFICE VISIT (OUTPATIENT)
Dept: PHYSICAL THERAPY | Facility: CLINIC | Age: 43
End: 2018-04-25
Payer: COMMERCIAL

## 2018-04-25 DIAGNOSIS — M54.6 PAIN IN THORACIC SPINE: Primary | ICD-10-CM

## 2018-04-25 LAB
BUN SERPL-MCNC: 13 MG/DL (ref 7–25)
CREAT SERPL-MCNC: 0.95 MG/DL (ref 0.6–1.2)
EGFR (HISTORICAL): > 60 GFR
EGFR AFRICAN AMERICAN (HISTORICAL): > 60 GFR

## 2018-04-25 PROCEDURE — 97140 MANUAL THERAPY 1/> REGIONS: CPT

## 2018-04-25 PROCEDURE — 97110 THERAPEUTIC EXERCISES: CPT

## 2018-04-25 PROCEDURE — 97014 ELECTRIC STIMULATION THERAPY: CPT

## 2018-04-25 NOTE — PROGRESS NOTES
Daily Note     Today's date: 2018  Patient name: Hilda Casper  : 1975  MRN: [id number]  Referring provider: Sonny Byrd DO  Dx:   Encounter Diagnosis     ICD-10-CM    1  Pain in thoracic spine M54 6                 Precautions: Chronic pain  Re-eval:  18    Re-eval Date: 18    Date  18   Visit Count  2 3 4 5   FOTO        Pain In  8/10 9/10 9/10 8/10   Pain Out  10/10 9/10 8/10 7/10       Subjective: I didn't work as much today so pain isnt as bad today  The estim and MT feels ok but the exercises are painful      Objective: See treatment diary below      Assessment: Tolerated treatment fair with pt indicating benefit with MT and modalities  Reported pain/increase discomfort with exercise with pt encourage to complete submax  Demonstrates soft tissue restrictions BL trapezius mm  Patient would benefit from continued PT to improve overall strength/cervical stability reduce soft tissue impairments  Plan: Continue per plan of care        Manual    10'  15' 15'   Gentle STM to pt tolerance BL upper TS prone  SOR   Gentle grades 1-2 PA mobs t-spine pt prone to tolerance  - np  Gentle cross hand traction t-spine and STM B t-spine pvms in prone to tolerance     Date upcoming       UT stretch  3x20" ea 3x20" ea sub max 3x20" ea sub max review   Levator stretch  3x20" 3x20" ea   Sub max 3x20" ea sub max review   Cervical AROM   10x5" 10x,5" 10x/10"ea 10x 10"   submax   Chin tucks  10x5" 10x 5" 15x/5" 15x 5"   Scap retraction  10x5" 10x 5" 15x/5" 15x 5"   Shoulder raises with cervical stabilization        B ER with cervical stabilization     10x 5"   Cervical isometrics   10x5" Held pain     Scap clocks    *     MTP/LTP   *     Bent over rows        Pec Stretch  90 degrees  3x20"  45/90 NV degrees 90* only  3x20" 90* only  3x20" submax   Scalene Stretch   *     Wall push ups        UBE   Retro 5' resume Fwd/retro  5' ea seated Nustep L1 5'           Modalities 15'  13'   Estim 15' 15'  10'    Thermals 13' 15'                Estim:  IFC to thoracic spine with MH  Patient positioned for comfort in prone with roll under ankles   Skin intact pre and post application with no immediate adverse effects noted

## 2018-04-30 ENCOUNTER — OFFICE VISIT (OUTPATIENT)
Dept: PHYSICAL THERAPY | Facility: CLINIC | Age: 43
End: 2018-04-30
Payer: COMMERCIAL

## 2018-04-30 DIAGNOSIS — M54.6 PAIN IN THORACIC SPINE: Primary | ICD-10-CM

## 2018-04-30 PROCEDURE — 97110 THERAPEUTIC EXERCISES: CPT

## 2018-04-30 PROCEDURE — 97140 MANUAL THERAPY 1/> REGIONS: CPT

## 2018-04-30 PROCEDURE — 97014 ELECTRIC STIMULATION THERAPY: CPT

## 2018-04-30 NOTE — PROGRESS NOTES
Daily Note     Today's date: 2018  Patient name: Caryn Solano  : 1975  MRN: [id number]  Referring provider: Mir Pabon DO  Dx:   Encounter Diagnosis     ICD-10-CM    1  Pain in thoracic spine M54 6                 Precautions: Chronic pain  Re-eval:  18    Re-eval Date: 18    Date        Visit Count 6       FOTO Completed       Pain In 8/10       Pain Out 7/10               Subjective: I think I over stretched myself the other day and think I flared myself up   I have new insurance starting tmrw  Will bring in new information NV  Objective: See treatment diary below      Assessment: Tolerated treatment fair with minimum increase discomfort with exercises completed this date, pt encourage sub max  Demonstrates kyphotic FHRS standing/sitting posture  Noted mm tightness/spasms BL UT/Tspine  Patient would benefit from continued PT to improve overall Cervical ROM/postural alignment/scapular stability and reduce soft tissue restrictions as able  Plan: Continue per plan of care  Manual  15'       Gentle STM to pt tolerance BL upper TS prone  GISTM - gentle sweeping/fanning to pt tolerance BL UT/TS seated # 5  SOR   Gentle grades 1-2 PA mobs t-spine pt prone to tolerance  - np  Gentle cross hand traction t-spine and STM B t-spine pvms in prone to tolerance     Exercise Diary        UT stretch 3x 30" to tolerance       Levator stretch 3x 30" to tolerance       Cervical AROM         Chin tucks 10x 5" sub max       Scap retraction 10x 5"       Shoulder raises with cervical stabilization upcoming       B ER with cervical stabilization 10x 5"       Cervical isometrics         Scap clocks         MTP/LTP Pink 15x 5"       Bent over rows        Pec Stretch        Scalene Stretch        Wall push ups        UBE                 Modalities    15'  15'   Estim 15' 15'  10'    Thermals 15' 15'                Estim:  IFC to BL UT/thoracic spine with MH      Patient positioned for comfort seated  Skin intact pre and post application with no immediate adverse effects noted

## 2018-05-02 ENCOUNTER — OFFICE VISIT (OUTPATIENT)
Dept: PHYSICAL THERAPY | Facility: CLINIC | Age: 43
End: 2018-05-02
Payer: COMMERCIAL

## 2018-05-02 DIAGNOSIS — M54.6 PAIN IN THORACIC SPINE: Primary | ICD-10-CM

## 2018-05-02 PROCEDURE — 97014 ELECTRIC STIMULATION THERAPY: CPT

## 2018-05-02 PROCEDURE — 97110 THERAPEUTIC EXERCISES: CPT

## 2018-05-02 PROCEDURE — 97140 MANUAL THERAPY 1/> REGIONS: CPT

## 2018-05-02 NOTE — PROGRESS NOTES
Daily Note     Today's date: 2018  Patient name: Jackie Dubon  : 1975  MRN: [id number]  Referring provider: Reji Montes DO  Dx:   Encounter Diagnosis     ICD-10-CM    1  Pain in thoracic spine M54 6        Start Time: 1800  Stop Time: 1900  Total time in clinic (min): 60 minutes  Precautions: Chronic pain  Re-eval:  18    Re-eval Date: 18    Date       Visit Count 6 7      FOTO Completed       Pain In 8/10 8/10      Pain Out 7/10   6/10          Subjective: "I am the same as always "      Objective: See treatment diary below      Assessment: Tolerated treatment fair  Patient needing cueing for correct form/technique and count  Pt noted mild relief with GT and IFC with MHP  Plan: Continue per plan of care  Progress treatment as tolerated  Manual  15'       Gentle STM to pt tolerance BL upper TS prone  GISTM - gentle sweeping/fanning to pt tolerance BL UT/TS seated # 5  SOR   Gentle grades 1-2 PA mobs t-spine pt prone to tolerance  - np  Gentle cross hand traction t-spine and STM B t-spine pvms in prone to tolerance     Exercise Diary        UT stretch 3x 30" to tolerance 3x 30" to tolerance      Levator stretch 3x 30" to tolerance 3x 30" to tolerance      Cervical AROM         Chin tucks 10x 5" sub max 10x 5" sub max      Scap retraction 10x 5"       Shoulder raises with cervical stabilization upcoming       B ER with cervical stabilization 10x 5" 2x10/5"      Cervical isometrics         Scap clocks         MTP/LTP Pink 15x 5" Pink 20x 5"      Bent over rows        Pec Stretch        Scalene Stretch        Wall push ups        UBE                 Modalities    15'  15'   Estim 15' 15'  10'    Thermals 15' 15'                Estim:  IFC to BL UT/thoracic spine with   Patient positioned for comfort seated  Skin intact pre and post application with no immediate adverse effects noted

## 2018-05-09 ENCOUNTER — OFFICE VISIT (OUTPATIENT)
Dept: PHYSICAL THERAPY | Facility: CLINIC | Age: 43
End: 2018-05-09
Payer: COMMERCIAL

## 2018-05-09 DIAGNOSIS — M54.6 PAIN IN THORACIC SPINE: Primary | ICD-10-CM

## 2018-05-09 PROCEDURE — 97140 MANUAL THERAPY 1/> REGIONS: CPT

## 2018-05-09 PROCEDURE — G0283 ELEC STIM OTHER THAN WOUND: HCPCS

## 2018-05-09 PROCEDURE — 97014 ELECTRIC STIMULATION THERAPY: CPT

## 2018-05-09 PROCEDURE — 97110 THERAPEUTIC EXERCISES: CPT

## 2018-05-09 NOTE — PROGRESS NOTES
Daily Note     Today's date: 2018  Patient name: Jaqueline Britton  : 1975  MRN: [id number]  Referring provider: Kishore Nichole DO  Dx:   Encounter Diagnosis     ICD-10-CM    1  Pain in thoracic spine M54 6                 Precautions: Chronic pain  Re-eval:  18    Re-eval Date: 18    Date      Visit Count 6 7 8     FOTO Completed       Pain In 8/10 8/10 7/10     Pain Out 7/10   6/10 6/10       Subjective: "I think I am feeling a little better "      Objective: See treatment diary below      Assessment: Tolerated treatment fair  Patient able to increase reps without incident  Pt noted relief with GT and IFC and MHP to t-spine  Pt aware of deductible for [employer] and interested in payment plan option  Rep to contact pt and discuss options  Plan: Continue per plan of care  Progress treatment as tolerated  Manual  15'  10'     Gentle STM to pt tolerance BL upper TS prone NP  GISTM - gentle sweeping/fanning to pt tolerance BL UT/TS seated # 5  SOR   Gentle grades 1-2 PA mobs t-spine pt prone to tolerance  - np  Gentle cross hand traction t-spine and STM B t-spine pvms in prone to tolerance  NP    Exercise Diary        UT stretch 3x 30" to tolerance 3x 30" to tolerance 3x 30" to tolerance     Levator stretch 3x 30" to tolerance 3x 30" to tolerance 3x 30" to tolerance     Cervical AROM         Chin tucks 10x 5" sub max 10x 5" sub max 20x 5" sub max     Scap retraction 10x 5"       Shoulder raises with cervical stabilization upcoming       B ER with cervical stabilization 10x 5" 2x10/5" Red 2x10/5"     Cervical isometrics         Scap clocks         MTP/LTP Pink 15x 5" Pink 20x 5" Pink 30x 5"     Bent over rows        Pec Stretch        Scalene Stretch        Wall push ups        UBE    Nu-step L3 10'             Modalities         Estim 15' 15' 10'     Thermals 15' 15' 10'               Estim:  IFC to BL UT/thoracic spine with MH      Patient positioned for comfort seated  Skin intact pre and post application with no immediate adverse effects noted

## 2018-05-16 ENCOUNTER — EVALUATION (OUTPATIENT)
Dept: PHYSICAL THERAPY | Facility: CLINIC | Age: 43
End: 2018-05-16
Payer: COMMERCIAL

## 2018-05-16 DIAGNOSIS — M54.6 PAIN IN THORACIC SPINE: Primary | ICD-10-CM

## 2018-05-16 PROCEDURE — 97014 ELECTRIC STIMULATION THERAPY: CPT

## 2018-05-16 PROCEDURE — G0283 ELEC STIM OTHER THAN WOUND: HCPCS

## 2018-05-16 PROCEDURE — 64550 HB APPLY NEUROSTIMULATOR: CPT

## 2018-05-16 PROCEDURE — 97535 SELF CARE MNGMENT TRAINING: CPT

## 2018-05-16 NOTE — PROGRESS NOTES
Daily Note     Today's date: 2018  Patient name: Aric Agosto  : 1975  MRN: [id number]  Referring provider: Divya Hannon DO  Dx:   Encounter Diagnosis     ICD-10-CM    1  Pain in thoracic spine M54 6                   Precautions: Chronic pain  Re-eval:  18    Re-eval Date: 18    Date     Visit Count 6 7 8 9    FOTO Completed       Pain In 8/10 8/10 7/10 9/10    Pain Out 7/10   6/10 6/10 8/10        Subjective: I am in a lot of pain today, did a lot of tugging/pulling t/o my work day    Objective: See treatment diary below      Assessment: Tolerated treatment fair with modified TE with modalities only  Patient would benefit from continued PT to improve overall ROM of CS, reduce soft tissue derangement and improve overall UE strength/cervical stability to maximize overall QOL    Plan: Continue per plan of care     RA to be completed NV      Manual  15'  10' Held    Gentle STM to pt tolerance BL upper TS prone NP  GISTM - gentle sweeping/fanning to pt tolerance BL UT/TS seated # 5  SOR   Gentle grades 1-2 PA mobs t-spine pt prone to tolerance  - np  Gentle cross hand traction t-spine and STM B t-spine pvms in prone to tolerance  NP    Exercise Diary    Held TE elevated pain and 15' late arrival    UT stretch 3x 30" to tolerance 3x 30" to tolerance 3x 30" to tolerance     Levator stretch 3x 30" to tolerance 3x 30" to tolerance 3x 30" to tolerance     Cervical AROM         Chin tucks 10x 5" sub max 10x 5" sub max 20x 5" sub max     Scap retraction 10x 5"       Shoulder raises with cervical stabilization upcoming       B ER with cervical stabilization 10x 5" 2x10/5" Red 2x10/5"     Cervical isometrics         Scap clocks         MTP/LTP Pink 15x 5" Pink 20x 5" Pink 30x 5"     Bent over rows        Pec Stretch        Scalene Stretch        Wall push ups        UBE    Nu-step L3 10'       *Pt educated proper set up / application of home TENS - pt trial 13' with  end rx session    * Pt educated mm relaxation technique and encourage carryover with HEP verbal/manual cues provided    Modalities     15' TENS with MH to BL UT/scap in supine    Estim 15' 15' 10'     Thermals 15' 15' 10'               Estim:  IFC to BL UT/thoracic spine with MH  Patient positioned for comfort seated  Skin intact pre and post application with no immediate adverse effects noted

## 2018-05-21 ENCOUNTER — APPOINTMENT (OUTPATIENT)
Dept: PHYSICAL THERAPY | Facility: CLINIC | Age: 43
End: 2018-05-21
Payer: COMMERCIAL

## 2018-05-23 ENCOUNTER — APPOINTMENT (OUTPATIENT)
Dept: PHYSICAL THERAPY | Facility: CLINIC | Age: 43
End: 2018-05-23
Payer: COMMERCIAL

## 2018-05-23 NOTE — PROGRESS NOTES
Daily Note     Today's date: 2018  Patient name: Caryn Solano  : 1975  MRN: [id number]  Referring provider: Mir Pabon DO  Dx: No diagnosis found  Precautions: Chronic pain  Re-eval:  18    Re-eval Date: 18    Date     Visit Count 6 7 8 9    FOTO Completed       Pain In 8/10 8/10 7/10 9/10    Pain Out 7/10   6/10 6/10 8/10        Subjective: ***      Objective: See treatment diary below      Assessment: Tolerated treatment {Tolerated treatment :9529376212}  Patient {assessment:6552640342}      Plan: {PLAN:7727254375}      Manual  15'  10' Held    Gentle STM to pt tolerance BL upper TS prone NP  GISTM - gentle sweeping/fanning to pt tolerance BL UT/TS seated # 5  SOR   Gentle grades 1-2 PA mobs t-spine pt prone to tolerance  - np  Gentle cross hand traction t-spine and STM B t-spine pvms in prone to tolerance  NP    Exercise Diary    Held TE elevated pain and 15' late arrival    UT stretch 3x 30" to tolerance 3x 30" to tolerance 3x 30" to tolerance     Levator stretch 3x 30" to tolerance 3x 30" to tolerance 3x 30" to tolerance     Cervical AROM         Chin tucks 10x 5" sub max 10x 5" sub max 20x 5" sub max     Scap retraction 10x 5"       Shoulder raises with cervical stabilization upcoming       B ER with cervical stabilization 10x 5" 2x10/5" Red 2x10/5"     Cervical isometrics         Scap clocks         MTP/LTP Pink 15x 5" Pink 20x 5" Pink 30x 5"     Bent over rows        Pec Stretch        Scalene Stretch        Wall push ups        UBE    Nu-step L3 10'       *Pt educated proper set up / application of home TENS - pt trial 13' with MH end rx session    * Pt educated mm relaxation technique and encourage carryover with HEP verbal/manual cues provided    Modalities     15' TENS with MH to BL UT/scap in supine    Estim 15' 15' 10'     Thermals 15' 15' 10'               Estim:  IFC to BL UT/thoracic spine with       Patient positioned for comfort seated  Skin intact pre and post application with no immediate adverse effects noted

## 2018-05-30 ENCOUNTER — APPOINTMENT (OUTPATIENT)
Dept: PHYSICAL THERAPY | Facility: CLINIC | Age: 43
End: 2018-05-30
Payer: COMMERCIAL

## 2018-06-11 LAB
BUN SERPL-MCNC: 8 MG/DL (ref 7–25)
CREAT SERPL-MCNC: 1 MG/DL (ref 0.6–1.2)
EGFR (HISTORICAL): 60 GFR
EGFR AFRICAN AMERICAN (HISTORICAL): > 60 GFR
LITHIUM LEVEL (HISTORICAL): 0.7 MM/L (ref 1–1.2)

## 2018-07-31 ENCOUNTER — TRANSCRIBE ORDERS (OUTPATIENT)
Dept: PHYSICAL THERAPY | Facility: CLINIC | Age: 43
End: 2018-07-31

## 2018-09-20 ENCOUNTER — OFFICE VISIT (OUTPATIENT)
Dept: URGENT CARE | Facility: CLINIC | Age: 43
End: 2018-09-20
Payer: COMMERCIAL

## 2018-09-20 VITALS
SYSTOLIC BLOOD PRESSURE: 141 MMHG | DIASTOLIC BLOOD PRESSURE: 74 MMHG | OXYGEN SATURATION: 100 % | RESPIRATION RATE: 18 BRPM | TEMPERATURE: 98.1 F | HEART RATE: 82 BPM

## 2018-09-20 DIAGNOSIS — J01.10 ACUTE FRONTAL SINUSITIS, RECURRENCE NOT SPECIFIED: Primary | ICD-10-CM

## 2018-09-20 PROCEDURE — 99213 OFFICE O/P EST LOW 20 MIN: CPT | Performed by: NURSE PRACTITIONER

## 2018-09-20 RX ORDER — AMOXICILLIN 875 MG/1
875 TABLET, COATED ORAL 2 TIMES DAILY
Qty: 20 TABLET | Refills: 0 | Status: SHIPPED | OUTPATIENT
Start: 2018-09-20 | End: 2018-09-30

## 2018-09-20 NOTE — PROGRESS NOTES
[facility]        NAME: Azeem Mena is a 38 y o  female  : 1975    MRN: [id number]  DATE: 2018  TIME: 7:37 PM    Assessment and Plan   Acute frontal sinusitis, recurrence not specified [J01 10]  1  Acute frontal sinusitis, recurrence not specified  amoxicillin (AMOXIL) 875 mg tablet         Patient Instructions     I have prescribed an antibiotic for the infection  Please take the antibiotic as prescribed and finish the entire prescription  I recommend that the patient takes an over the counter probiotic or eats yogurt with live cultures in it [place]) to keep good bacteria in the gut and help prevent diarrhea  Wash hands frequently to prevent the spread of infection  Can use over the counter cough and cold medications to help with symptoms  Ibuprofen and/or tylenol as needed for pain or fever  If not improving over the next 7-10 days, follow up with PCP  Follow up with PCP in 3-5 days  Proceed to  ER if symptoms worsen  Chief Complaint     Chief Complaint   Patient presents with   Emiliano Like Like Symptoms     for the past week has used OTC decongestants         History of Present Illness       25-year-old female presents to urgent care with chief complaint of sinus pressure sinus congestion for the past week has been using over-the-counter Sudafed notes a gradual worsening of symptoms denies any fevers chills chest tightness or shortness of breath      URI    This is a new problem  The current episode started in the past 7 days  The problem has been gradually worsening  There has been no fever  Associated symptoms include congestion, coughing, a plugged ear sensation, rhinorrhea and sinus pain  Pertinent negatives include no abdominal pain, chest pain, diarrhea, dysuria, ear pain, headaches, joint pain, joint swelling, nausea, neck pain, rash, sneezing, sore throat, swollen glands, vomiting or wheezing  She has tried decongestant for the symptoms   The treatment provided no relief  Review of Systems   Review of Systems   Constitutional: Negative  HENT: Positive for congestion, postnasal drip, rhinorrhea, sinus pain and sinus pressure  Negative for dental problem, drooling, ear discharge, ear pain, facial swelling, hearing loss, mouth sores, nosebleeds, sneezing, sore throat, tinnitus, trouble swallowing and voice change  Eyes: Negative  Respiratory: Positive for cough  Negative for apnea, choking, chest tightness, shortness of breath, wheezing and stridor  Cardiovascular: Negative for chest pain, palpitations and leg swelling  Gastrointestinal: Negative  Negative for abdominal distention, abdominal pain, anal bleeding, blood in stool, constipation, diarrhea, nausea, rectal pain and vomiting  Endocrine: Negative  Genitourinary: Negative  Negative for dysuria  Musculoskeletal: Negative  Negative for joint pain and neck pain  Skin: Negative  Negative for rash  Allergic/Immunologic: Negative  Neurological: Negative  Negative for headaches  Hematological: Negative  Psychiatric/Behavioral: Negative  Current Medications       Current Outpatient Prescriptions:     amoxicillin (AMOXIL) 875 mg tablet, Take 1 tablet (875 mg total) by mouth 2 (two) times a day for 10 days, Disp: 20 tablet, Rfl: 0    buPROPion (WELLBUTRIN) 100 mg tablet, Take by mouth, Disp: , Rfl:     GABAPENTIN PO, Take by mouth, Disp: , Rfl:     lamoTRIgine (LaMICtal) 2 MG CHEW, Chew, Disp: , Rfl:     Current Allergies     Allergies as of 09/20/2018    (No Known Allergies)            The following portions of the patient's history were reviewed and updated as appropriate: allergies, current medications, past family history, past medical history, past social history, past surgical history and problem list      No past medical history on file  No past surgical history on file  No family history on file  Medications have been verified          Objective There were no vitals taken for this visit  Physical Exam     Physical Exam   Constitutional: She is oriented to person, place, and time  Vital signs are normal  She appears well-developed and well-nourished  HENT:   Head: Normocephalic and atraumatic  Right Ear: Hearing, tympanic membrane, external ear and ear canal normal    Left Ear: Hearing, tympanic membrane, external ear and ear canal normal    Nose: Rhinorrhea and sinus tenderness present  Right sinus exhibits frontal sinus tenderness  Left sinus exhibits frontal sinus tenderness  Mouth/Throat: Uvula is midline and mucous membranes are normal  Posterior oropharyngeal erythema present  Eyes: Conjunctivae and EOM are normal  Pupils are equal, round, and reactive to light  Neck: Trachea normal, normal range of motion and full passive range of motion without pain  Cardiovascular: Normal rate and regular rhythm  Pulmonary/Chest: Effort normal and breath sounds normal    Abdominal: Soft  Normal appearance  Musculoskeletal: Normal range of motion  Lymphadenopathy:     She has cervical adenopathy  Right cervical: Superficial cervical adenopathy present  Left cervical: Superficial cervical adenopathy present  Neurological: She is alert and oriented to person, place, and time  Nursing note and vitals reviewed

## 2018-10-01 ENCOUNTER — TRANSCRIBE ORDERS (OUTPATIENT)
Dept: ADMINISTRATIVE | Facility: HOSPITAL | Age: 43
End: 2018-10-01

## 2018-10-01 ENCOUNTER — APPOINTMENT (OUTPATIENT)
Dept: LAB | Facility: HOSPITAL | Age: 43
End: 2018-10-01
Attending: PSYCHIATRY & NEUROLOGY
Payer: COMMERCIAL

## 2018-10-01 DIAGNOSIS — Z79.899 DRUG THERAPY: Primary | ICD-10-CM

## 2018-10-01 DIAGNOSIS — Z79.899 DRUG THERAPY: ICD-10-CM

## 2018-10-01 LAB
ALBUMIN SERPL BCP-MCNC: 4.3 G/DL (ref 3.5–5.7)
ALP SERPL-CCNC: 47 U/L (ref 40–150)
ALT SERPL W P-5'-P-CCNC: 14 U/L (ref 7–52)
AST SERPL W P-5'-P-CCNC: 11 U/L (ref 13–39)
BASOPHILS # BLD AUTO: 0.1 THOUSANDS/ΜL (ref 0–0.1)
BASOPHILS NFR BLD AUTO: 1 % (ref 0–2)
BILIRUB DIRECT SERPL-MCNC: 0.1 MG/DL (ref 0–0.2)
BILIRUB SERPL-MCNC: 0.4 MG/DL (ref 0.2–1)
BUN SERPL-MCNC: 11 MG/DL (ref 7–25)
CREAT SERPL-MCNC: 0.98 MG/DL (ref 0.6–1.2)
EOSINOPHIL # BLD AUTO: 0.2 THOUSAND/ΜL (ref 0–0.61)
EOSINOPHIL NFR BLD AUTO: 2 % (ref 0–5)
ERYTHROCYTE [DISTWIDTH] IN BLOOD BY AUTOMATED COUNT: 13.4 % (ref 11.5–14.5)
GFR SERPL CREATININE-BSD FRML MDRD: 70 ML/MIN/1.73SQ M
HCT VFR BLD AUTO: 38.5 % (ref 34.8–46.1)
HGB BLD-MCNC: 12.7 G/DL (ref 12–16)
LITHIUM SERPL-SCNC: 1.4 MMOL/L (ref 1–1.2)
LYMPHOCYTES # BLD AUTO: 1.8 THOUSANDS/ΜL (ref 0.6–4.47)
LYMPHOCYTES NFR BLD AUTO: 16 % (ref 21–51)
MCH RBC QN AUTO: 34.1 PG (ref 26–34)
MCHC RBC AUTO-ENTMCNC: 32.9 G/DL (ref 31–37)
MCV RBC AUTO: 104 FL (ref 81–99)
MONOCYTES # BLD AUTO: 0.7 THOUSAND/ΜL (ref 0.17–1.22)
MONOCYTES NFR BLD AUTO: 6 % (ref 2–12)
NEUTROPHILS # BLD AUTO: 8.8 THOUSANDS/ΜL (ref 1.4–6.5)
NEUTS SEG NFR BLD AUTO: 76 % (ref 42–75)
NRBC BLD AUTO-RTO: 0 /100 WBCS
PLATELET # BLD AUTO: 248 THOUSANDS/UL (ref 149–390)
PMV BLD AUTO: 9.1 FL (ref 8.6–11.7)
PROT SERPL-MCNC: 6.3 G/DL (ref 6.4–8.9)
RBC # BLD AUTO: 3.72 MILLION/UL (ref 3.9–5.2)
TSH SERPL DL<=0.05 MIU/L-ACNC: 9.7 UIU/ML (ref 0.45–5.33)
WBC # BLD AUTO: 11.6 THOUSAND/UL (ref 4.8–10.8)

## 2018-10-01 PROCEDURE — 80076 HEPATIC FUNCTION PANEL: CPT

## 2018-10-01 PROCEDURE — 85025 COMPLETE CBC W/AUTO DIFF WBC: CPT

## 2018-10-01 PROCEDURE — 80178 ASSAY OF LITHIUM: CPT

## 2018-10-01 PROCEDURE — 36415 COLL VENOUS BLD VENIPUNCTURE: CPT

## 2018-10-01 PROCEDURE — 84443 ASSAY THYROID STIM HORMONE: CPT

## 2018-10-01 PROCEDURE — 82565 ASSAY OF CREATININE: CPT

## 2018-10-01 PROCEDURE — 84520 ASSAY OF UREA NITROGEN: CPT

## 2018-10-19 PROBLEM — N39.0 UTI (URINARY TRACT INFECTION): Status: ACTIVE | Noted: 2017-05-06

## 2018-10-19 PROBLEM — J20.9 ACUTE BRONCHITIS: Status: ACTIVE | Noted: 2017-05-06

## 2018-10-19 PROBLEM — R05.9 COUGH: Status: ACTIVE | Noted: 2017-05-06

## 2018-10-19 PROBLEM — R35.0 INCREASED FREQUENCY OF URINATION: Status: ACTIVE | Noted: 2017-05-06

## 2018-10-19 PROBLEM — L03.115 CELLULITIS OF FOOT, RIGHT: Status: ACTIVE | Noted: 2017-05-18

## 2018-10-19 PROBLEM — T14.8XXA WOUND OF SKIN: Status: ACTIVE | Noted: 2017-05-18

## 2018-10-23 ENCOUNTER — OFFICE VISIT (OUTPATIENT)
Dept: FAMILY MEDICINE CLINIC | Facility: CLINIC | Age: 43
End: 2018-10-23
Payer: COMMERCIAL

## 2018-10-23 VITALS
BODY MASS INDEX: 21.69 KG/M2 | TEMPERATURE: 97.7 F | DIASTOLIC BLOOD PRESSURE: 88 MMHG | SYSTOLIC BLOOD PRESSURE: 146 MMHG | HEART RATE: 64 BPM | HEIGHT: 65 IN | OXYGEN SATURATION: 99 % | WEIGHT: 130.2 LBS

## 2018-10-23 DIAGNOSIS — Z13.220 SCREENING FOR HYPERLIPIDEMIA: ICD-10-CM

## 2018-10-23 DIAGNOSIS — G89.29 CHRONIC BILATERAL LOW BACK PAIN WITHOUT SCIATICA: ICD-10-CM

## 2018-10-23 DIAGNOSIS — E03.9 HYPOTHYROIDISM, UNSPECIFIED TYPE: ICD-10-CM

## 2018-10-23 DIAGNOSIS — R53.83 OTHER FATIGUE: ICD-10-CM

## 2018-10-23 DIAGNOSIS — E55.9 VITAMIN D DEFICIENCY: ICD-10-CM

## 2018-10-23 DIAGNOSIS — F31.9 BIPOLAR 1 DISORDER (HCC): ICD-10-CM

## 2018-10-23 DIAGNOSIS — Z12.4 SCREENING FOR CERVICAL CANCER: ICD-10-CM

## 2018-10-23 DIAGNOSIS — G47.00 INSOMNIA, UNSPECIFIED TYPE: ICD-10-CM

## 2018-10-23 DIAGNOSIS — Z13.1 SCREENING FOR DIABETES MELLITUS: ICD-10-CM

## 2018-10-23 DIAGNOSIS — M54.50 CHRONIC BILATERAL LOW BACK PAIN WITHOUT SCIATICA: ICD-10-CM

## 2018-10-23 DIAGNOSIS — I10 ESSENTIAL HYPERTENSION: ICD-10-CM

## 2018-10-23 DIAGNOSIS — F17.200 SMOKER: ICD-10-CM

## 2018-10-23 DIAGNOSIS — Z00.00 ENCOUNTER FOR PREVENTATIVE ADULT HEALTH CARE EXAMINATION: ICD-10-CM

## 2018-10-23 DIAGNOSIS — Z23 INFLUENZA VACCINE NEEDED: ICD-10-CM

## 2018-10-23 DIAGNOSIS — Z30.49 ENCOUNTER FOR SURVEILLANCE OF OTHER CONTRACEPTIVE: ICD-10-CM

## 2018-10-23 DIAGNOSIS — Z12.39 SCREENING FOR BREAST CANCER: ICD-10-CM

## 2018-10-23 PROBLEM — M19.90 ARTHRITIS: Status: ACTIVE | Noted: 2018-10-23

## 2018-10-23 PROCEDURE — 99204 OFFICE O/P NEW MOD 45 MIN: CPT | Performed by: NURSE PRACTITIONER

## 2018-10-23 PROCEDURE — 90471 IMMUNIZATION ADMIN: CPT

## 2018-10-23 PROCEDURE — 90686 IIV4 VACC NO PRSV 0.5 ML IM: CPT

## 2018-10-23 PROCEDURE — 3008F BODY MASS INDEX DOCD: CPT | Performed by: NURSE PRACTITIONER

## 2018-10-23 RX ORDER — ESZOPICLONE 2 MG/1
1 TABLET, FILM COATED ORAL
Qty: 30 TABLET | Refills: 1 | Status: SHIPPED | OUTPATIENT
Start: 2018-10-23 | End: 2018-11-09

## 2018-10-23 RX ORDER — LEVOTHYROXINE SODIUM 0.05 MG/1
50 TABLET ORAL DAILY
Qty: 30 TABLET | Refills: 1 | Status: SHIPPED | OUTPATIENT
Start: 2018-10-23 | End: 2019-04-02 | Stop reason: SDUPTHER

## 2018-10-23 RX ORDER — VENLAFAXINE HYDROCHLORIDE 150 MG/1
CAPSULE, EXTENDED RELEASE ORAL
COMMUNITY
Start: 2018-10-03 | End: 2019-05-31 | Stop reason: SDUPTHER

## 2018-10-23 RX ORDER — LORAZEPAM 0.5 MG/1
TABLET ORAL
COMMUNITY
Start: 2018-10-15 | End: 2019-05-13 | Stop reason: SDUPTHER

## 2018-10-23 RX ORDER — ZOLPIDEM TARTRATE 10 MG/1
TABLET ORAL
COMMUNITY
Start: 2018-10-05 | End: 2018-10-23 | Stop reason: ALTCHOICE

## 2018-10-23 NOTE — PROGRESS NOTES
Assessment/Plan:    No problem-specific Assessment & Plan notes found for this encounter  Diagnoses and all orders for this visit:    Encounter for preventative adult health care examination  Comments:  completed today    Hypothyroidism, unspecified type  Comments:  Rx for Levothyroxine ordered pt has elevated TSH 9 7   Orders:  -     levothyroxine 50 mcg tablet; Take 1 tablet (50 mcg total) by mouth daily    Essential hypertension  Comments:  bp 146/88 pt reports use of antihypertensive agent but can't recall the name, will call us with name of medication    Insomnia, unspecified type  Comments:  Rx for Lunesta provided pt not sleeping with Ambien  Orders:  -     eszopiclone (LUNESTA) 2 mg tablet; Take 0 5 tablets (1 mg total) by mouth daily at bedtime as needed for sleep Take immediately before bedtime    Other fatigue  Comments:  labwork ordered  Orders:  -     Iron; Future  -     Vitamin B12; Future    Vitamin D deficiency  Comments:  labwork ordered  Orders:  -     Vitamin D 25 hydroxy; Future    Screening for breast cancer  Comments:  mammogram ordered  Orders:  -     Mammo diagnostic bilateral w cad; Future    Screening for cervical cancer  Comments:  Pt referred to GYN  Orders:  -     Ambulatory referral to Gynecology; Future    Screening for diabetes mellitus  Comments:  labwork ordered  Orders:  -     Comprehensive metabolic panel    Bipolar 1 disorder ([facility] [address] )  Comments:  Pt follows with Dr Marissa Maldonado for medical management  Orders:  -     lurasidone (LATUDA) 20 mg tablet; Take 1 tablet (20 mg total) by mouth daily with breakfast    Smoker  -     UA w Reflex to Microscopic w Reflex to Culture    Screening for hyperlipidemia  Comments:  labwork ordered  Orders:  -     Lipid panel;  Future    Encounter for surveillance of other contraceptive  Comments:  pt does have IUD in place was following with Dr Moreno Level  Orders:  -     levonorgestrel (MIRENA) IUD 20 mcg/day; 1 Intra Uterine Device by Intrauterine route once     Chronic bilateral low back pain without sciatica  Comments:  Referred to [facility] orthopedics  Orders:  -     XR spine lumbar minimum 4 views non injury; Future  -     Ambulatory referral to Orthopedic Surgery; Future  -     TITUS Scr, IFA W/Refl Titer/Pattern/Lupus Pnl 3; Future  -     Lyme Antibody Profile with reflex to WB; Future  -     RF Screen w/ Reflex to Titer; Future  -     Sedimentation rate, automated; Future  -     Sjogren's Antibodies; Future  -     Anti-DNAse B antibody; Future  -     C-reactive protein; Future    Influenza vaccine needed  -     Cancel: influenza vaccine, [id number], quadrivalent (ccIIV4), derived from cell cultures, subunit, preservative and antibiotic free, 0 5 mL (FLUCELVAX)  -     SYRINGE/SINGLE-DOSE VIAL: influenza vaccine, [id number], quadrivalent, 0 5 mL, preservative-free, for patients 3+ yr (FLUZONE)    Other orders  -     Cancel: CBC and differential; Future  -     Cancel: TSH, 3rd generation with Free T4 reflex; Future  -     LORazepam (ATIVAN) 0 5 mg tablet;   -     venlafaxine (EFFEXOR-XR) 150 mg 24 hr capsule;   -     Discontinue: zolpidem (AMBIEN) 10 mg tablet;           Subjective:      Patient ID: Mary Robledo is a 38 y o  female here to [facility] care    38 yr old female former pt of Claudia Schroeder here to [facility]  Pt has extensive hx of psychiatric management with Dr Eliseo Storm for bipolar disease, anxiety and depression  Pt reports no former history of SI but presents today "very frazzled" with family life  Pt states she has 4 children, 2 of which are special needs autism and ADHD, spouse works all the time and her only support system is her mother  Pt was teary throughout the interview stating she recently lost her job and not cannot afford   Pt is following with Dr Eliseo Storm on a monthly basis       Pt did have labwork ordered by Dr Jeannette Mendosa and was found to have elevated TSH, we discussed starting medication for hypothyroidism as pt reports symptoms of insomnia, fatigue, hair loss and depression of lately  Will repeat TSH in 6 weeks and have her return to the office for f/u at that time  Pt does take antihypertensive agent for which she cannot recall the name  She agreed to call the office with the name of the medication  Pt also reports long standing hx of low back pain since she was a teenager  She reports trying NSAIDs heat and ice but nothing works  I will refer her to [address] [place] [address] and obtain an xray  The following portions of the patient's history were reviewed and updated as appropriate:   She  has a past medical history of Anxiety; Arthritis; Depression; and Known health problems: none  She   Patient Active Problem List    Diagnosis Date Noted    Bipolar 1 disorder ([facility] [address] ) 10/23/2018    Acquired hypothyroidism 10/23/2018    Arthritis 10/23/2018    Cellulitis of foot, right 05/18/2017    Wound of skin 05/18/2017    Acute bronchitis 05/06/2017    Cough 05/06/2017    Increased frequency of urination 05/06/2017    UTI (urinary tract infection) 05/06/2017    Acute sinusitis 07/21/2016    Depression 07/21/2016     She  has a past surgical history that includes Cholecystectomy  Her family history includes Diabetes in her mother; Heart disease in her mother; Hypertension in her father and mother  She  reports that she has been smoking  She has never used smokeless tobacco  She reports that she does not drink alcohol or use drugs    Current Outpatient Prescriptions   Medication Sig Dispense Refill    buPROPion (WELLBUTRIN) 100 mg tablet Take by mouth      GABAPENTIN PO Take by mouth      LORazepam (ATIVAN) 0 5 mg tablet       venlafaxine (EFFEXOR-XR) 150 mg 24 hr capsule       eszopiclone (LUNESTA) 2 mg tablet Take 0 5 tablets (1 mg total) by mouth daily at bedtime as needed for sleep Take immediately before bedtime 30 tablet 1    lamoTRIgine (LaMICtal) 2 MG CHEW Chew      levothyroxine 50 mcg tablet Take 1 tablet (50 mcg total) by mouth daily 30 tablet 1    lurasidone (LATUDA) 20 mg tablet Take 1 tablet (20 mg total) by mouth daily with breakfast 30 tablet 0     Current Facility-Administered Medications   Medication Dose Route Frequency Provider Last Rate Last Dose    levonorgestrel (MIRENA) IUD 20 mcg/day  1 each Intrauterine Once          Current Outpatient Prescriptions on File Prior to Visit   Medication Sig    buPROPion (WELLBUTRIN) 100 mg tablet Take by mouth    GABAPENTIN PO Take by mouth    lamoTRIgine (LaMICtal) 2 MG CHEW Chew     No current facility-administered medications on file prior to visit  She has No Known Allergies       Review of Systems   Constitutional: Positive for activity change and fever  Negative for fatigue  HENT: Negative for congestion, postnasal drip, rhinorrhea, sinus pain, sore throat and trouble swallowing  Eyes: Negative for pain and visual disturbance  Respiratory: Negative for cough, shortness of breath and wheezing  Cardiovascular: Negative for chest pain and palpitations  Gastrointestinal: Negative for constipation, diarrhea, nausea and vomiting  Endocrine: Negative for polydipsia, polyphagia and polyuria  Genitourinary: Negative for difficulty urinating, flank pain, frequency and pelvic pain  Musculoskeletal: Positive for back pain  Negative for arthralgias, joint swelling and myalgias  Skin: Negative  Negative for color change and rash  Neurological: Negative for dizziness, syncope, weakness, light-headedness, numbness and headaches  Hematological: Negative for adenopathy  Does not bruise/bleed easily  Psychiatric/Behavioral: Positive for sleep disturbance  Negative for behavioral problems  The patient is not nervous/anxious           Depression         Objective:      /88   Pulse 64   Temp 97 7 °F (36 5 °C) (Tympanic)   Ht 5' 5" (1 651 m)   Wt 59 1 kg (130 lb 3 2 oz)   SpO2 99%   BMI 21 67 kg/m²          Physical Exam   Constitutional: She is oriented to person, place, and time  She appears well-developed and well-nourished  HENT:   Head: Normocephalic  Eyes: Pupils are equal, round, and reactive to light  Neck: Normal range of motion  Cardiovascular: Normal rate and regular rhythm  Pulmonary/Chest: Effort normal and breath sounds normal    Abdominal: Soft  Bowel sounds are normal    Musculoskeletal:        Lumbar back: She exhibits decreased range of motion, tenderness and pain  Arms:  Neurological: She is alert and oriented to person, place, and time  Skin: Skin is warm and dry  Psychiatric: Her behavior is normal  Judgment and thought content normal  She exhibits a depressed mood  Pt very teary throughout interview   Nursing note and vitals reviewed

## 2018-10-25 ENCOUNTER — TELEPHONE (OUTPATIENT)
Dept: FAMILY MEDICINE CLINIC | Facility: CLINIC | Age: 43
End: 2018-10-25

## 2018-10-26 ENCOUNTER — NURSE TRIAGE (OUTPATIENT)
Dept: PHYSICAL THERAPY | Facility: OTHER | Age: 43
End: 2018-10-26

## 2018-10-26 DIAGNOSIS — G89.29 CHRONIC BILATERAL THORACIC BACK PAIN: Primary | ICD-10-CM

## 2018-10-26 DIAGNOSIS — M54.6 CHRONIC BILATERAL THORACIC BACK PAIN: Primary | ICD-10-CM

## 2018-10-26 NOTE — TELEPHONE ENCOUNTER
Reason for Disposition   Is this a chronic condition? Background - Initial Assessment  Clinical complaint: my posture is really bad, mid to low back pain  Denies radiation into legs  Date of onset: yrs ago  Mechanism of injury: unk    Previous Treatment - Previous Treatment  Previous evaluation: chiropractor, pain management  Current provider: PCP  Diagnostics: XRAYs  Previous treatment: epidural shots, physical therapy    Protocols used: SL AMB COMPREHENSIVE SPINE PROGRAM PROTOCOL    Pt reports that she was told she has to talk to Comp Spine to schedule an appt  States she was told she may have fibromyalgia and arthritis and just started seeing a new PCP    Pt not a good historian throughout the intake questionnaire  Repeatedly kept saying, I don't remember  Pt is declining PTx stating she has done that before and it hasn't worked  Also reports that she did have pain management / injections but unable to recall date or location or when / if films were taken  RN did see in chart that an order was placed for XRAYs of the lumbar spine  Due to pt's location, RN offered [facility]    RN tried to provide address and number to pt but pt responded: "that's ok, I'll wait for them to call "

## 2018-10-31 DIAGNOSIS — Z12.39 SCREENING FOR BREAST CANCER: Primary | ICD-10-CM

## 2018-11-02 ENCOUNTER — TELEPHONE (OUTPATIENT)
Dept: FAMILY MEDICINE CLINIC | Facility: CLINIC | Age: 43
End: 2018-11-02

## 2018-11-02 NOTE — TELEPHONE ENCOUNTER
Patient calling asking is Justin Wills knows any pain management doctors that have evening hours that she knows off hand because everyone she is contacting, does not have the hours she needs   Can you please advise

## 2018-11-09 DIAGNOSIS — F51.01 PRIMARY INSOMNIA: ICD-10-CM

## 2018-11-09 DIAGNOSIS — I10 HYPERTENSION, UNSPECIFIED TYPE: Primary | ICD-10-CM

## 2018-11-09 RX ORDER — LISINOPRIL 20 MG/1
10 TABLET ORAL DAILY
Qty: 30 TABLET | Refills: 5 | Status: SHIPPED | OUTPATIENT
Start: 2018-11-09 | End: 2019-06-15 | Stop reason: SDUPTHER

## 2018-11-09 RX ORDER — ZOLPIDEM TARTRATE 5 MG/1
TABLET ORAL
Qty: 60 TABLET | Refills: 0 | Status: SHIPPED | OUTPATIENT
Start: 2018-11-09 | End: 2019-05-14

## 2018-11-12 ENCOUNTER — TELEPHONE (OUTPATIENT)
Dept: PAIN MEDICINE | Facility: CLINIC | Age: 43
End: 2018-11-12

## 2018-11-12 NOTE — TELEPHONE ENCOUNTER
Received a message from the answering service from 11/11/18 at 3551 from the patient stating she is cancelling her appointment for 11/12/18 at [facility] am

## 2018-12-08 ENCOUNTER — APPOINTMENT (OUTPATIENT)
Dept: LAB | Facility: HOSPITAL | Age: 43
End: 2018-12-08
Payer: COMMERCIAL

## 2018-12-08 ENCOUNTER — HOSPITAL ENCOUNTER (OUTPATIENT)
Dept: RADIOLOGY | Facility: HOSPITAL | Age: 43
Discharge: HOME | End: 2018-12-08
Payer: COMMERCIAL

## 2018-12-08 DIAGNOSIS — R53.83 OTHER FATIGUE: ICD-10-CM

## 2018-12-08 DIAGNOSIS — G89.29 CHRONIC BILATERAL LOW BACK PAIN WITHOUT SCIATICA: ICD-10-CM

## 2018-12-08 DIAGNOSIS — Z13.220 SCREENING FOR HYPERLIPIDEMIA: ICD-10-CM

## 2018-12-08 DIAGNOSIS — E55.9 VITAMIN D DEFICIENCY: ICD-10-CM

## 2018-12-08 DIAGNOSIS — M54.50 CHRONIC BILATERAL LOW BACK PAIN WITHOUT SCIATICA: ICD-10-CM

## 2018-12-08 LAB
25(OH)D3 SERPL-MCNC: 29.2 NG/ML (ref 30–100)
CHOLEST SERPL-MCNC: 213 MG/DL (ref 0–200)
CRP SERPL QL: <3 MG/L
ERYTHROCYTE [SEDIMENTATION RATE] IN BLOOD: 12 MM/HOUR (ref 0–20)
HDLC SERPL-MCNC: 67 MG/DL (ref 40–60)
IRON SERPL-MCNC: 119 UG/DL (ref 50–170)
LDLC SERPL CALC-MCNC: 126 MG/DL (ref 75–193)
NONHDLC SERPL-MCNC: 146 MG/DL
TRIGL SERPL-MCNC: 98 MG/DL (ref 44–166)
VIT B12 SERPL-MCNC: 403 PG/ML (ref 100–900)

## 2018-12-08 PROCEDURE — 82607 VITAMIN B-12: CPT

## 2018-12-08 PROCEDURE — 72110 X-RAY EXAM L-2 SPINE 4/>VWS: CPT

## 2018-12-08 PROCEDURE — 82306 VITAMIN D 25 HYDROXY: CPT

## 2018-12-08 PROCEDURE — 86215 DEOXYRIBONUCLEASE ANTIBODY: CPT

## 2018-12-08 PROCEDURE — 83540 ASSAY OF IRON: CPT

## 2018-12-08 PROCEDURE — 80061 LIPID PANEL: CPT

## 2018-12-08 PROCEDURE — 86038 ANTINUCLEAR ANTIBODIES: CPT

## 2018-12-08 PROCEDURE — 86618 LYME DISEASE ANTIBODY: CPT

## 2018-12-08 PROCEDURE — 36415 COLL VENOUS BLD VENIPUNCTURE: CPT

## 2018-12-08 PROCEDURE — 86140 C-REACTIVE PROTEIN: CPT

## 2018-12-08 PROCEDURE — 86235 NUCLEAR ANTIGEN ANTIBODY: CPT

## 2018-12-08 PROCEDURE — 85652 RBC SED RATE AUTOMATED: CPT

## 2018-12-08 PROCEDURE — 86430 RHEUMATOID FACTOR TEST QUAL: CPT

## 2018-12-09 LAB
B BURGDOR IGG SER IA-ACNC: 0.57
B BURGDOR IGM SER IA-ACNC: 0.53
RHEUMATOID FACT SER QL LA: NEGATIVE

## 2018-12-10 LAB — RYE IGE QN: NEGATIVE

## 2018-12-11 LAB
ENA SS-A AB SER-ACNC: <0.2 AI (ref 0–0.9)
ENA SS-B AB SER-ACNC: <0.2 AI (ref 0–0.9)

## 2018-12-12 LAB — STREP DNASE B SER-ACNC: 102 U/ML (ref 0–120)

## 2018-12-13 ENCOUNTER — HOSPITAL ENCOUNTER (OUTPATIENT)
Dept: MAMMOGRAPHY | Facility: MEDICAL CENTER | Age: 43
Discharge: HOME | End: 2018-12-13
Payer: COMMERCIAL

## 2018-12-13 VITALS — HEIGHT: 65 IN | BODY MASS INDEX: 21.66 KG/M2 | WEIGHT: 130 LBS

## 2018-12-13 DIAGNOSIS — Z12.39 SCREENING FOR BREAST CANCER: ICD-10-CM

## 2018-12-13 PROCEDURE — 77063 BREAST TOMOSYNTHESIS BI: CPT

## 2018-12-13 PROCEDURE — 77067 SCR MAMMO BI INCL CAD: CPT

## 2018-12-15 ENCOUNTER — OFFICE VISIT (OUTPATIENT)
Dept: FAMILY MEDICINE CLINIC | Facility: CLINIC | Age: 43
End: 2018-12-15
Payer: COMMERCIAL

## 2018-12-15 VITALS
OXYGEN SATURATION: 98 % | SYSTOLIC BLOOD PRESSURE: 116 MMHG | TEMPERATURE: 97.4 F | WEIGHT: 131 LBS | HEART RATE: 94 BPM | BODY MASS INDEX: 21.8 KG/M2 | DIASTOLIC BLOOD PRESSURE: 74 MMHG

## 2018-12-15 DIAGNOSIS — R31.9 URINARY TRACT INFECTION WITH HEMATURIA, SITE UNSPECIFIED: Primary | ICD-10-CM

## 2018-12-15 DIAGNOSIS — G89.29 CHRONIC BILATERAL LOW BACK PAIN WITHOUT SCIATICA: ICD-10-CM

## 2018-12-15 DIAGNOSIS — B37.3 VAGINAL CANDIDA: ICD-10-CM

## 2018-12-15 DIAGNOSIS — F33.9 EPISODE OF RECURRENT MAJOR DEPRESSIVE DISORDER, UNSPECIFIED DEPRESSION EPISODE SEVERITY (HCC): ICD-10-CM

## 2018-12-15 DIAGNOSIS — N39.0 URINARY TRACT INFECTION WITH HEMATURIA, SITE UNSPECIFIED: Primary | ICD-10-CM

## 2018-12-15 DIAGNOSIS — E03.9 ACQUIRED HYPOTHYROIDISM: ICD-10-CM

## 2018-12-15 DIAGNOSIS — M54.50 CHRONIC BILATERAL LOW BACK PAIN WITHOUT SCIATICA: ICD-10-CM

## 2018-12-15 DIAGNOSIS — E55.9 VITAMIN D DEFICIENCY: ICD-10-CM

## 2018-12-15 DIAGNOSIS — F31.9 BIPOLAR 1 DISORDER (HCC): ICD-10-CM

## 2018-12-15 LAB
BACTERIA UR QL AUTO: ABNORMAL /HPF
BILIRUB UR QL STRIP: NEGATIVE
CLARITY UR: ABNORMAL
COLOR UR: YELLOW
GLUCOSE UR STRIP-MCNC: NEGATIVE MG/DL
HGB UR QL STRIP.AUTO: NEGATIVE
KETONES UR STRIP-MCNC: NEGATIVE MG/DL
LEUKOCYTE ESTERASE UR QL STRIP: NEGATIVE
NITRITE UR QL STRIP: POSITIVE
NON-SQ EPI CELLS URNS QL MICRO: ABNORMAL /HPF
PH UR STRIP.AUTO: 5.5 [PH] (ref 4.5–8)
PROT UR STRIP-MCNC: NEGATIVE MG/DL
RBC #/AREA URNS AUTO: ABNORMAL /HPF
SL AMB  POCT GLUCOSE, UA: ABNORMAL
SL AMB LEUKOCYTE ESTERASE,UA: ABNORMAL
SL AMB POCT BILIRUBIN,UA: ABNORMAL
SL AMB POCT BLOOD,UA: ABNORMAL
SL AMB POCT CLARITY,UA: ABNORMAL
SL AMB POCT COLOR,UA: ABNORMAL
SL AMB POCT KETONES,UA: ABNORMAL
SL AMB POCT NITRITE,UA: ABNORMAL
SL AMB POCT PH,UA: 7
SL AMB POCT SPECIFIC GRAVITY,UA: 1.03
SL AMB POCT URINE PROTEIN: ABNORMAL
SL AMB POCT UROBILINOGEN: 8
SP GR UR STRIP.AUTO: 1.02 (ref 1–1.03)
UROBILINOGEN UR QL STRIP.AUTO: 0.2 E.U./DL
WBC #/AREA URNS AUTO: ABNORMAL /HPF

## 2018-12-15 PROCEDURE — 81001 URINALYSIS AUTO W/SCOPE: CPT | Performed by: NURSE PRACTITIONER

## 2018-12-15 PROCEDURE — 81002 URINALYSIS NONAUTO W/O SCOPE: CPT | Performed by: NURSE PRACTITIONER

## 2018-12-15 PROCEDURE — 99214 OFFICE O/P EST MOD 30 MIN: CPT | Performed by: NURSE PRACTITIONER

## 2018-12-15 RX ORDER — FLUCONAZOLE 150 MG/1
150 TABLET ORAL ONCE
Qty: 1 TABLET | Refills: 1 | Status: SHIPPED | OUTPATIENT
Start: 2018-12-15 | End: 2018-12-15

## 2018-12-15 RX ORDER — ERGOCALCIFEROL 1.25 MG/1
50000 CAPSULE ORAL WEEKLY
Qty: 12 CAPSULE | Refills: 3 | Status: SHIPPED | OUTPATIENT
Start: 2018-12-15 | End: 2019-11-01 | Stop reason: SDUPTHER

## 2018-12-15 RX ORDER — CIPROFLOXACIN 500 MG/1
500 TABLET, FILM COATED ORAL EVERY 12 HOURS SCHEDULED
Qty: 14 TABLET | Refills: 0 | Status: SHIPPED | OUTPATIENT
Start: 2018-12-15 | End: 2018-12-22

## 2018-12-19 ENCOUNTER — OFFICE VISIT (OUTPATIENT)
Dept: OBGYN CLINIC | Facility: MEDICAL CENTER | Age: 43
End: 2018-12-19
Payer: COMMERCIAL

## 2018-12-19 VITALS
WEIGHT: 132.2 LBS | HEIGHT: 65 IN | SYSTOLIC BLOOD PRESSURE: 126 MMHG | BODY MASS INDEX: 22.02 KG/M2 | DIASTOLIC BLOOD PRESSURE: 78 MMHG

## 2018-12-19 DIAGNOSIS — Z01.419 ENCOUNTER FOR ROUTINE GYNECOLOGICAL EXAMINATION WITH PAPANICOLAOU SMEAR OF CERVIX: Primary | ICD-10-CM

## 2018-12-19 DIAGNOSIS — Z12.31 ENCOUNTER FOR SCREENING MAMMOGRAM FOR MALIGNANT NEOPLASM OF BREAST: ICD-10-CM

## 2018-12-19 PROCEDURE — G0145 SCR C/V CYTO,THINLAYER,RESCR: HCPCS | Performed by: OBSTETRICS & GYNECOLOGY

## 2018-12-19 PROCEDURE — S0610 ANNUAL GYNECOLOGICAL EXAMINA: HCPCS | Performed by: OBSTETRICS & GYNECOLOGY

## 2018-12-19 PROCEDURE — 87624 HPV HI-RISK TYP POOLED RSLT: CPT | Performed by: OBSTETRICS & GYNECOLOGY

## 2018-12-19 NOTE — PROGRESS NOTES
ASSESSMENT & PLAN: Silvestre Benson is a 37 y o  [id number] with normal gynecologic exam     1   Routine well woman exam done today  2  Pap and HPV:  The patient's last pap and hpv was unknown  It was normal     Pap and cotesting was done today  Current ASCCP Guidelines reviewed  3   Mammogram ordered  4  The following were reviewed in today's visit: breast self exam, mammography screening ordered, family planning choices, menopause, exercise, healthy diet and tobacco cessation      CC:  Annual Gynecologic Examination    HPI: Silvestre Benson is a 37 y o  [id number] who presents for annual gynecologic examination  She has the following concerns:  Mirena IUD is due to come out in 2019 and she would like to have it re placed  Message sent to     Health Maintenance:    She wears her seatbelt routinely  She does perform regular monthly self breast exams  She feels safe at home  Patient Active Problem List   Diagnosis    Acute bronchitis    Acute sinusitis    Cellulitis of foot, right    Cough    Depression    Increased frequency of urination    UTI (urinary tract infection)    Wound of skin    Bipolar 1 disorder (HCC)    Acquired hypothyroidism    Arthritis    Chronic bilateral low back pain without sciatica       Past Medical History:   Diagnosis Date    Anxiety     Arthritis     Depression     Known health problems: none        Past Surgical History:   Procedure Laterality Date    CHOLECYSTECTOMY         Past OB/Gyn History:  OB History      Para Term  AB Living    3 3 3     4    SAB TAB Ectopic Multiple Live Births          1             Pt does not have menstrual issues  Does not get periods due to her Mirena IUD  History of sexually transmitted infection: No   History of abnormal pap smears: No      Patient is currently sexually active  heterosexual  The current method of family planning is IUD      Family History   Problem Relation Age of Onset    Diabetes Mother     Hypertension Mother     Heart disease Mother     Hypertension Father     Diabetes Maternal Grandmother     Diabetes Paternal Grandmother        Social History:  Social History     Social History    Marital status: /Civil Union     Spouse name: N/A    Number of children: N/A    Years of education: N/A     Occupational History    Not on file       Social History Main Topics    Smoking status: Current Every Day Smoker     Packs/day: 0 50    Smokeless tobacco: Never Used    Alcohol use No    Drug use: No    Sexual activity: Yes     Partners: Male     Birth control/ protection: IUD     Other Topics Concern    Not on file     Social History Narrative    No narrative on file       No Known Allergies    Current Outpatient Prescriptions:     buPROPion (WELLBUTRIN) 100 mg tablet, Take by mouth, Disp: , Rfl:     ciprofloxacin (CIPRO) 500 mg tablet, Take 1 tablet (500 mg total) by mouth every 12 (twelve) hours for 7 days, Disp: 14 tablet, Rfl: 0    ergocalciferol (VITAMIN D2) 50,000 units, Take 1 capsule (50,000 Units total) by mouth once a week, Disp: 12 capsule, Rfl: 3    GABAPENTIN PO, Take by mouth, Disp: , Rfl:     levothyroxine 50 mcg tablet, Take 1 tablet (50 mcg total) by mouth daily, Disp: 30 tablet, Rfl: 1    lisinopril (ZESTRIL) 20 mg tablet, Take 0 5 tablets (10 mg total) by mouth daily, Disp: 30 tablet, Rfl: 5    LORazepam (ATIVAN) 0 5 mg tablet, , Disp: , Rfl:     lurasidone (LATUDA) 20 mg tablet, Take 1 tablet (20 mg total) by mouth daily with breakfast, Disp: 30 tablet, Rfl: 0    venlafaxine (EFFEXOR-XR) 150 mg 24 hr capsule, , Disp: , Rfl:     zolpidem (AMBIEN) 5 mg tablet, Take 1-2 tablets daily as needed for sleep, Disp: 60 tablet, Rfl: 0    Review of Systems:    Review of Systems  Constitutional :no fever, feels well, no tiredness, no recent weight gain or loss  ENT: no ear ache, no loss of hearing, no nosebleeds or nasal discharge, no sore throat or hoarseness  Cardiovascular: no complaints of slow or fast heart beat, no chest pain, no palpitations, no leg claudication or lower extremity edema  Respiratory: no complaints of shortness of shortness of breath, no SMALL  Breasts:no complaints of breast pain, breast lump, or nipple discharge  Gastrointestinal: no complaints of abdominal pain, constipation, nausea, vomiting, or diarrhea or bloody stools  Genitourinary : no complaints of dysuria, incontinence, pelvic pain, no dysmenorrhea, vaginal discharge or abnormal vaginal bleeding and as noted in HPI  Musculoskeletal: no complaints of arthralgia, no myalgia, no joint swelling or stiffness, no limb pain or swelling  Integumentary: no complaints of skin rash or lesion, itching or dry skin  Neurological: no complaints of headache, no confusion, no numbness or tingling, no dizziness or fainting    Objective      /78 (BP Location: Left arm, Patient Position: Sitting, Cuff Size: Adult)   Ht 5' 5" (1 651 m)   Wt 60 kg (132 lb 3 2 oz)   LMP  (LMP Unknown) Comment: not preg  BMI 22 00 kg/m²     General:   appears stated age, cooperative, alert normal mood and affect   Neck: normal, supple,trachea midline, no masses   Heart: regular rate and rhythm, S1, S2 normal, no murmur, click, rub or gallop   Lungs: clear to auscultation bilaterally   Breasts: normal appearance, no masses or tenderness   Abdomen: soft, non-tender, without masses or organomegaly   Vulva: normal female genitalia   Vagina: normal vagina, no discharge, exudate, lesion, or erythema   Urethra: normal   Cervix: Normal, no discharge  IUD strings visualized   Uterus: normal size, contour, position, consistency, mobility, non-tender   Adnexa: normal adnexa   Lymphatic palpation of lymph nodes in neck, axilla, groin and/or other locations: no lymphadenopathy or masses noted   Skin normal skin turgor and no rashes     Psychiatric orientation to person, place, and time: normal  mood and affect: normal

## 2018-12-21 LAB
HPV HR 12 DNA CVX QL NAA+PROBE: NEGATIVE
HPV16 DNA CVX QL NAA+PROBE: NEGATIVE
HPV18 DNA CVX QL NAA+PROBE: NEGATIVE

## 2018-12-22 ENCOUNTER — TRANSCRIBE ORDERS (OUTPATIENT)
Dept: ADMINISTRATIVE | Facility: HOSPITAL | Age: 43
End: 2018-12-22

## 2018-12-22 ENCOUNTER — APPOINTMENT (OUTPATIENT)
Dept: LAB | Facility: HOSPITAL | Age: 43
End: 2018-12-22
Payer: COMMERCIAL

## 2018-12-22 DIAGNOSIS — E03.9 ACQUIRED HYPOTHYROIDISM: ICD-10-CM

## 2018-12-22 LAB — TSH SERPL DL<=0.05 MIU/L-ACNC: 1 UIU/ML (ref 0.45–5.33)

## 2018-12-22 PROCEDURE — 36415 COLL VENOUS BLD VENIPUNCTURE: CPT

## 2018-12-22 PROCEDURE — 84443 ASSAY THYROID STIM HORMONE: CPT

## 2018-12-24 ENCOUNTER — TELEPHONE (OUTPATIENT)
Dept: FAMILY MEDICINE CLINIC | Facility: CLINIC | Age: 43
End: 2018-12-24

## 2018-12-26 LAB
LAB AP GYN PRIMARY INTERPRETATION: NORMAL
Lab: NORMAL

## 2018-12-31 DIAGNOSIS — E03.9 HYPOTHYROIDISM, UNSPECIFIED TYPE: Primary | ICD-10-CM

## 2019-01-17 ENCOUNTER — TELEPHONE (OUTPATIENT)
Dept: OBGYN CLINIC | Facility: MEDICAL CENTER | Age: 44
End: 2019-01-17

## 2019-01-17 NOTE — TELEPHONE ENCOUNTER
----- Message from Rin Zuniga sent at 1/17/2019 11:58 AM EST -----  Regarding: RE: Darcy Lloyd  I contacted pt's insurance  Pt effective 5/1/18  Pt is covered for removal, reinsertion and device at 100%  No PA needed  Jason Tena  Ref# [id number]    ----- Message -----  From: Yonathan Cook  Sent: 1/16/2019   4:57 PM  To: Rin Zuniga  Subject: Preauth                                          Pt needs to have her mirena removed and reinserted  Appt scheduled for 3/5/19 with IP  Please preauth, Thanks!

## 2019-04-02 DIAGNOSIS — E03.9 HYPOTHYROIDISM, UNSPECIFIED TYPE: ICD-10-CM

## 2019-04-04 DIAGNOSIS — Z72.0 TOBACCO ABUSE: Primary | ICD-10-CM

## 2019-04-04 RX ORDER — LEVOTHYROXINE SODIUM 0.05 MG/1
TABLET ORAL
Qty: 30 TABLET | Refills: 2 | Status: SHIPPED | OUTPATIENT
Start: 2019-04-04 | End: 2019-06-15 | Stop reason: SDUPTHER

## 2019-04-04 RX ORDER — VARENICLINE TARTRATE 25 MG
KIT ORAL
Qty: 53 TABLET | Refills: 0 | Status: SHIPPED | OUTPATIENT
Start: 2019-04-04 | End: 2019-07-31

## 2019-04-23 ENCOUNTER — TELEPHONE (OUTPATIENT)
Dept: OBGYN CLINIC | Facility: MEDICAL CENTER | Age: 44
End: 2019-04-23

## 2019-04-29 ENCOUNTER — TELEPHONE (OUTPATIENT)
Dept: PSYCHOLOGY | Facility: CLINIC | Age: 44
End: 2019-04-29

## 2019-04-30 ENCOUNTER — TELEPHONE (OUTPATIENT)
Dept: FAMILY MEDICINE CLINIC | Facility: CLINIC | Age: 44
End: 2019-04-30

## 2019-05-02 ENCOUNTER — PROCEDURE VISIT (OUTPATIENT)
Dept: OBGYN CLINIC | Facility: MEDICAL CENTER | Age: 44
End: 2019-05-02
Payer: COMMERCIAL

## 2019-05-02 VITALS — BODY MASS INDEX: 24.74 KG/M2 | SYSTOLIC BLOOD PRESSURE: 112 MMHG | WEIGHT: 148.7 LBS | DIASTOLIC BLOOD PRESSURE: 68 MMHG

## 2019-05-02 DIAGNOSIS — Z30.430 ENCOUNTER FOR INSERTION OF MIRENA IUD: Primary | ICD-10-CM

## 2019-05-02 DIAGNOSIS — Z30.432 ENCOUNTER FOR IUD REMOVAL: ICD-10-CM

## 2019-05-03 PROCEDURE — 58300 INSERT INTRAUTERINE DEVICE: CPT | Performed by: OBSTETRICS & GYNECOLOGY

## 2019-05-03 PROCEDURE — 58301 REMOVE INTRAUTERINE DEVICE: CPT | Performed by: OBSTETRICS & GYNECOLOGY

## 2019-05-06 ENCOUNTER — OFFICE VISIT (OUTPATIENT)
Dept: PSYCHOLOGY | Facility: CLINIC | Age: 44
End: 2019-05-06
Payer: COMMERCIAL

## 2019-05-06 DIAGNOSIS — F31.9 BIPOLAR 1 DISORDER (HCC): Primary | ICD-10-CM

## 2019-05-06 PROCEDURE — S0201 PARTIAL HOSPITALIZATION SERV: HCPCS

## 2019-05-06 PROCEDURE — G0410 GRP PSYCH PARTIAL HOSP 45-50: HCPCS

## 2019-05-06 PROCEDURE — G0177 OPPS/PHP; TRAIN & EDUC SERV: HCPCS

## 2019-05-06 PROCEDURE — 90791 PSYCH DIAGNOSTIC EVALUATION: CPT

## 2019-05-07 ENCOUNTER — OFFICE VISIT (OUTPATIENT)
Dept: PSYCHOLOGY | Facility: CLINIC | Age: 44
End: 2019-05-07
Payer: COMMERCIAL

## 2019-05-07 DIAGNOSIS — F31.32 BIPOLAR AFFECTIVE DISORDER, CURRENTLY DEPRESSED, MODERATE (HCC): Primary | ICD-10-CM

## 2019-05-07 DIAGNOSIS — F31.9 BIPOLAR 1 DISORDER (HCC): ICD-10-CM

## 2019-05-07 PROCEDURE — S0201 PARTIAL HOSPITALIZATION SERV: HCPCS

## 2019-05-07 PROCEDURE — G0410 GRP PSYCH PARTIAL HOSP 45-50: HCPCS

## 2019-05-07 PROCEDURE — 90792 PSYCH DIAG EVAL W/MED SRVCS: CPT | Performed by: HOSPITALIST

## 2019-05-07 PROCEDURE — G0177 OPPS/PHP; TRAIN & EDUC SERV: HCPCS

## 2019-05-07 RX ORDER — HYDROXYZINE HYDROCHLORIDE 25 MG/1
25 TABLET, FILM COATED ORAL 2 TIMES DAILY
Qty: 60 TABLET | Refills: 1 | Status: SHIPPED | OUTPATIENT
Start: 2019-05-07 | End: 2019-05-24 | Stop reason: ALTCHOICE

## 2019-05-08 ENCOUNTER — OFFICE VISIT (OUTPATIENT)
Dept: PSYCHOLOGY | Facility: CLINIC | Age: 44
End: 2019-05-08
Payer: COMMERCIAL

## 2019-05-08 DIAGNOSIS — F31.9 BIPOLAR 1 DISORDER (HCC): Primary | ICD-10-CM

## 2019-05-08 PROCEDURE — S0201 PARTIAL HOSPITALIZATION SERV: HCPCS

## 2019-05-08 PROCEDURE — G0176 OPPS/PHP;ACTIVITY THERAPY: HCPCS

## 2019-05-08 PROCEDURE — G0177 OPPS/PHP; TRAIN & EDUC SERV: HCPCS

## 2019-05-08 PROCEDURE — G0410 GRP PSYCH PARTIAL HOSP 45-50: HCPCS

## 2019-05-09 ENCOUNTER — DOCUMENTATION (OUTPATIENT)
Dept: PSYCHOLOGY | Facility: CLINIC | Age: 44
End: 2019-05-09

## 2019-05-09 ENCOUNTER — OFFICE VISIT (OUTPATIENT)
Dept: PSYCHOLOGY | Facility: CLINIC | Age: 44
End: 2019-05-09
Payer: COMMERCIAL

## 2019-05-09 VITALS
BODY MASS INDEX: 25.34 KG/M2 | HEIGHT: 65 IN | SYSTOLIC BLOOD PRESSURE: 115 MMHG | WEIGHT: 152.12 LBS | DIASTOLIC BLOOD PRESSURE: 76 MMHG

## 2019-05-09 DIAGNOSIS — F31.9 BIPOLAR 1 DISORDER (HCC): Primary | ICD-10-CM

## 2019-05-09 PROCEDURE — S0201 PARTIAL HOSPITALIZATION SERV: HCPCS

## 2019-05-09 PROCEDURE — G0410 GRP PSYCH PARTIAL HOSP 45-50: HCPCS

## 2019-05-09 PROCEDURE — G0176 OPPS/PHP;ACTIVITY THERAPY: HCPCS

## 2019-05-09 PROCEDURE — G0177 OPPS/PHP; TRAIN & EDUC SERV: HCPCS

## 2019-05-10 ENCOUNTER — OFFICE VISIT (OUTPATIENT)
Dept: PSYCHOLOGY | Facility: CLINIC | Age: 44
End: 2019-05-10
Payer: COMMERCIAL

## 2019-05-10 DIAGNOSIS — F31.9 BIPOLAR 1 DISORDER (HCC): Primary | ICD-10-CM

## 2019-05-10 PROCEDURE — S0201 PARTIAL HOSPITALIZATION SERV: HCPCS

## 2019-05-10 PROCEDURE — G0410 GRP PSYCH PARTIAL HOSP 45-50: HCPCS

## 2019-05-10 PROCEDURE — G0176 OPPS/PHP;ACTIVITY THERAPY: HCPCS

## 2019-05-10 PROCEDURE — G0177 OPPS/PHP; TRAIN & EDUC SERV: HCPCS

## 2019-05-13 ENCOUNTER — OFFICE VISIT (OUTPATIENT)
Dept: PSYCHOLOGY | Facility: CLINIC | Age: 44
End: 2019-05-13
Payer: COMMERCIAL

## 2019-05-13 DIAGNOSIS — F31.9 BIPOLAR 1 DISORDER (HCC): Primary | ICD-10-CM

## 2019-05-13 PROCEDURE — G0176 OPPS/PHP;ACTIVITY THERAPY: HCPCS

## 2019-05-13 PROCEDURE — S0201 PARTIAL HOSPITALIZATION SERV: HCPCS

## 2019-05-13 PROCEDURE — G0177 OPPS/PHP; TRAIN & EDUC SERV: HCPCS

## 2019-05-13 PROCEDURE — G0410 GRP PSYCH PARTIAL HOSP 45-50: HCPCS

## 2019-05-13 RX ORDER — LORAZEPAM 0.5 MG/1
0.5 TABLET ORAL EVERY 8 HOURS PRN
Qty: 90 TABLET | Refills: 0 | Status: SHIPPED | OUTPATIENT
Start: 2019-05-15 | End: 2019-05-14

## 2019-05-14 ENCOUNTER — OFFICE VISIT (OUTPATIENT)
Dept: PSYCHOLOGY | Facility: CLINIC | Age: 44
End: 2019-05-14
Payer: COMMERCIAL

## 2019-05-14 DIAGNOSIS — F31.9 BIPOLAR 1 DISORDER (HCC): Primary | ICD-10-CM

## 2019-05-14 PROCEDURE — S0201 PARTIAL HOSPITALIZATION SERV: HCPCS

## 2019-05-14 PROCEDURE — G0177 OPPS/PHP; TRAIN & EDUC SERV: HCPCS

## 2019-05-14 PROCEDURE — G0410 GRP PSYCH PARTIAL HOSP 45-50: HCPCS

## 2019-05-15 ENCOUNTER — OFFICE VISIT (OUTPATIENT)
Dept: PSYCHOLOGY | Facility: CLINIC | Age: 44
End: 2019-05-15
Payer: COMMERCIAL

## 2019-05-15 DIAGNOSIS — F31.9 BIPOLAR 1 DISORDER (HCC): Primary | ICD-10-CM

## 2019-05-15 PROCEDURE — G0177 OPPS/PHP; TRAIN & EDUC SERV: HCPCS

## 2019-05-15 PROCEDURE — S0201 PARTIAL HOSPITALIZATION SERV: HCPCS

## 2019-05-15 PROCEDURE — G0410 GRP PSYCH PARTIAL HOSP 45-50: HCPCS

## 2019-05-16 ENCOUNTER — OFFICE VISIT (OUTPATIENT)
Dept: PSYCHOLOGY | Facility: CLINIC | Age: 44
End: 2019-05-16
Payer: COMMERCIAL

## 2019-05-16 DIAGNOSIS — F31.9 BIPOLAR 1 DISORDER (HCC): Primary | ICD-10-CM

## 2019-05-16 PROCEDURE — G0410 GRP PSYCH PARTIAL HOSP 45-50: HCPCS

## 2019-05-16 PROCEDURE — S0201 PARTIAL HOSPITALIZATION SERV: HCPCS

## 2019-05-16 PROCEDURE — G0177 OPPS/PHP; TRAIN & EDUC SERV: HCPCS

## 2019-05-17 ENCOUNTER — OFFICE VISIT (OUTPATIENT)
Dept: PSYCHOLOGY | Facility: CLINIC | Age: 44
End: 2019-05-17
Payer: COMMERCIAL

## 2019-05-17 DIAGNOSIS — F31.9 BIPOLAR 1 DISORDER (HCC): Primary | ICD-10-CM

## 2019-05-17 PROCEDURE — S0201 PARTIAL HOSPITALIZATION SERV: HCPCS

## 2019-05-17 PROCEDURE — G0410 GRP PSYCH PARTIAL HOSP 45-50: HCPCS

## 2019-05-17 PROCEDURE — G0177 OPPS/PHP; TRAIN & EDUC SERV: HCPCS

## 2019-05-20 ENCOUNTER — OFFICE VISIT (OUTPATIENT)
Dept: PSYCHOLOGY | Facility: CLINIC | Age: 44
End: 2019-05-20
Payer: COMMERCIAL

## 2019-05-20 DIAGNOSIS — F31.9 BIPOLAR 1 DISORDER (HCC): Primary | ICD-10-CM

## 2019-05-20 PROCEDURE — S0201 PARTIAL HOSPITALIZATION SERV: HCPCS

## 2019-05-20 PROCEDURE — G0410 GRP PSYCH PARTIAL HOSP 45-50: HCPCS

## 2019-05-20 PROCEDURE — G0177 OPPS/PHP; TRAIN & EDUC SERV: HCPCS

## 2019-05-21 ENCOUNTER — OFFICE VISIT (OUTPATIENT)
Dept: PSYCHOLOGY | Facility: CLINIC | Age: 44
End: 2019-05-21
Payer: COMMERCIAL

## 2019-05-21 DIAGNOSIS — F31.9 BIPOLAR 1 DISORDER (HCC): Primary | ICD-10-CM

## 2019-05-21 PROCEDURE — S0201 PARTIAL HOSPITALIZATION SERV: HCPCS

## 2019-05-21 PROCEDURE — G0410 GRP PSYCH PARTIAL HOSP 45-50: HCPCS

## 2019-05-21 PROCEDURE — G0177 OPPS/PHP; TRAIN & EDUC SERV: HCPCS

## 2019-05-22 ENCOUNTER — OFFICE VISIT (OUTPATIENT)
Dept: PSYCHOLOGY | Facility: CLINIC | Age: 44
End: 2019-05-22
Payer: COMMERCIAL

## 2019-05-22 DIAGNOSIS — F31.9 BIPOLAR 1 DISORDER (HCC): Primary | ICD-10-CM

## 2019-05-22 PROCEDURE — S0201 PARTIAL HOSPITALIZATION SERV: HCPCS

## 2019-05-22 PROCEDURE — G0176 OPPS/PHP;ACTIVITY THERAPY: HCPCS

## 2019-05-22 PROCEDURE — G0410 GRP PSYCH PARTIAL HOSP 45-50: HCPCS

## 2019-05-22 PROCEDURE — G0177 OPPS/PHP; TRAIN & EDUC SERV: HCPCS

## 2019-05-23 ENCOUNTER — OFFICE VISIT (OUTPATIENT)
Dept: PSYCHOLOGY | Facility: CLINIC | Age: 44
End: 2019-05-23
Payer: COMMERCIAL

## 2019-05-23 DIAGNOSIS — F31.9 BIPOLAR 1 DISORDER (HCC): Primary | ICD-10-CM

## 2019-05-23 PROCEDURE — G0177 OPPS/PHP; TRAIN & EDUC SERV: HCPCS

## 2019-05-23 PROCEDURE — S0201 PARTIAL HOSPITALIZATION SERV: HCPCS

## 2019-05-23 PROCEDURE — G0410 GRP PSYCH PARTIAL HOSP 45-50: HCPCS

## 2019-05-23 PROCEDURE — G0176 OPPS/PHP;ACTIVITY THERAPY: HCPCS

## 2019-05-24 ENCOUNTER — OFFICE VISIT (OUTPATIENT)
Dept: PSYCHOLOGY | Facility: CLINIC | Age: 44
End: 2019-05-24
Payer: COMMERCIAL

## 2019-05-24 DIAGNOSIS — F31.9 BIPOLAR 1 DISORDER (HCC): Primary | ICD-10-CM

## 2019-05-28 ENCOUNTER — OFFICE VISIT (OUTPATIENT)
Dept: PSYCHOLOGY | Facility: CLINIC | Age: 44
End: 2019-05-28
Payer: COMMERCIAL

## 2019-05-28 DIAGNOSIS — F31.9 BIPOLAR 1 DISORDER (HCC): Primary | ICD-10-CM

## 2019-05-28 PROCEDURE — G0177 OPPS/PHP; TRAIN & EDUC SERV: HCPCS

## 2019-05-28 PROCEDURE — S0201 PARTIAL HOSPITALIZATION SERV: HCPCS

## 2019-05-28 PROCEDURE — G0176 OPPS/PHP;ACTIVITY THERAPY: HCPCS

## 2019-05-28 PROCEDURE — G0410 GRP PSYCH PARTIAL HOSP 45-50: HCPCS

## 2019-05-29 ENCOUNTER — OFFICE VISIT (OUTPATIENT)
Dept: PSYCHOLOGY | Facility: CLINIC | Age: 44
End: 2019-05-29
Payer: COMMERCIAL

## 2019-05-29 DIAGNOSIS — F31.9 BIPOLAR 1 DISORDER (HCC): Primary | ICD-10-CM

## 2019-05-29 PROCEDURE — S0201 PARTIAL HOSPITALIZATION SERV: HCPCS

## 2019-05-29 PROCEDURE — G0176 OPPS/PHP;ACTIVITY THERAPY: HCPCS

## 2019-05-29 PROCEDURE — G0410 GRP PSYCH PARTIAL HOSP 45-50: HCPCS

## 2019-05-29 PROCEDURE — G0177 OPPS/PHP; TRAIN & EDUC SERV: HCPCS

## 2019-05-30 ENCOUNTER — DOCUMENTATION (OUTPATIENT)
Dept: PSYCHOLOGY | Facility: CLINIC | Age: 44
End: 2019-05-30

## 2019-05-30 ENCOUNTER — APPOINTMENT (OUTPATIENT)
Dept: PSYCHOLOGY | Facility: CLINIC | Age: 44
End: 2019-05-30
Payer: COMMERCIAL

## 2019-05-31 ENCOUNTER — OFFICE VISIT (OUTPATIENT)
Dept: PSYCHOLOGY | Facility: CLINIC | Age: 44
End: 2019-05-31
Payer: COMMERCIAL

## 2019-05-31 DIAGNOSIS — F31.9 BIPOLAR 1 DISORDER (HCC): Primary | ICD-10-CM

## 2019-05-31 PROCEDURE — S0201 PARTIAL HOSPITALIZATION SERV: HCPCS

## 2019-05-31 PROCEDURE — G0177 OPPS/PHP; TRAIN & EDUC SERV: HCPCS

## 2019-05-31 PROCEDURE — G0410 GRP PSYCH PARTIAL HOSP 45-50: HCPCS

## 2019-06-14 ENCOUNTER — APPOINTMENT (OUTPATIENT)
Dept: LAB | Facility: HOSPITAL | Age: 44
End: 2019-06-14
Payer: COMMERCIAL

## 2019-06-14 DIAGNOSIS — E03.9 HYPOTHYROIDISM, UNSPECIFIED TYPE: ICD-10-CM

## 2019-06-14 LAB
ALBUMIN SERPL BCP-MCNC: 4.5 G/DL (ref 3.5–5.7)
ALP SERPL-CCNC: 69 U/L (ref 40–150)
ALT SERPL W P-5'-P-CCNC: 19 U/L (ref 7–52)
ANION GAP SERPL CALCULATED.3IONS-SCNC: 7 MMOL/L (ref 4–13)
AST SERPL W P-5'-P-CCNC: 17 U/L (ref 13–39)
BACTERIA UR QL AUTO: ABNORMAL /HPF
BILIRUB SERPL-MCNC: 0.4 MG/DL (ref 0.2–1)
BILIRUB UR QL STRIP: NEGATIVE
BUN SERPL-MCNC: 14 MG/DL (ref 7–25)
CALCIUM SERPL-MCNC: 9.5 MG/DL (ref 8.6–10.5)
CHLORIDE SERPL-SCNC: 102 MMOL/L (ref 98–107)
CLARITY UR: CLEAR
CO2 SERPL-SCNC: 28 MMOL/L (ref 21–31)
COLOR UR: YELLOW
CREAT SERPL-MCNC: 1.04 MG/DL (ref 0.6–1.2)
GFR SERPL CREATININE-BSD FRML MDRD: 65 ML/MIN/1.73SQ M
GLUCOSE P FAST SERPL-MCNC: 91 MG/DL (ref 65–99)
GLUCOSE UR STRIP-MCNC: NEGATIVE MG/DL
HGB UR QL STRIP.AUTO: ABNORMAL
KETONES UR STRIP-MCNC: NEGATIVE MG/DL
LEUKOCYTE ESTERASE UR QL STRIP: ABNORMAL
NITRITE UR QL STRIP: NEGATIVE
NON-SQ EPI CELLS URNS QL MICRO: ABNORMAL /HPF
PH UR STRIP.AUTO: 6 [PH]
POTASSIUM SERPL-SCNC: 3.7 MMOL/L (ref 3.5–5.5)
PROT SERPL-MCNC: 7.4 G/DL (ref 6.4–8.9)
PROT UR STRIP-MCNC: NEGATIVE MG/DL
RBC #/AREA URNS AUTO: ABNORMAL /HPF
SODIUM SERPL-SCNC: 137 MMOL/L (ref 134–143)
SP GR UR STRIP.AUTO: >=1.03 (ref 1–1.03)
TSH SERPL DL<=0.05 MIU/L-ACNC: 1.13 UIU/ML (ref 0.45–5.33)
UROBILINOGEN UR QL STRIP.AUTO: 0.2 E.U./DL
WBC #/AREA URNS AUTO: ABNORMAL /HPF

## 2019-06-14 PROCEDURE — 81001 URINALYSIS AUTO W/SCOPE: CPT | Performed by: NURSE PRACTITIONER

## 2019-06-14 PROCEDURE — 80053 COMPREHEN METABOLIC PANEL: CPT | Performed by: NURSE PRACTITIONER

## 2019-06-14 PROCEDURE — 84443 ASSAY THYROID STIM HORMONE: CPT

## 2019-06-14 PROCEDURE — 36415 COLL VENOUS BLD VENIPUNCTURE: CPT | Performed by: NURSE PRACTITIONER

## 2019-06-14 PROCEDURE — 87086 URINE CULTURE/COLONY COUNT: CPT | Performed by: NURSE PRACTITIONER

## 2019-06-14 NOTE — PROGRESS NOTES
Assessment/Plan:    No problem-specific Assessment & Plan notes found for this encounter  Diagnoses and all orders for this visit:    Urinary tract infection with hematuria, site unspecified  Comments:  POCT UA pos nitrites and hematuria, Rx for Cipro provided  Orders:  -     POCT urine dip  -     UA w Reflex to Microscopic w Reflex to Culture  -     ciprofloxacin (CIPRO) 500 mg tablet; Take 1 tablet (500 mg total) by mouth every 12 (twelve) hours for 7 days  -     UA w Reflex to Microscopic w Reflex to Culture - Clinic Collect    Acquired hypothyroidism  Comments:  Repeat TSH, missed on labs this month, will call with results, pt currently on Levothyroxine 50mcg  Orders:  -     TSH, 3rd generation with Free T4 reflex; Future    Chronic bilateral low back pain without sciatica  Comments:  Xray results reviewed pt with scoliosis and lordosis    Bipolar 1 disorder ([facility] )  Comments:  Pt follows with Dr Jesus Segura for medical management    Vitamin D deficiency  Comments:  Vitamin D 29 5 Rx for Vitamin D 50,000 IU weekly provided  Orders:  -     ergocalciferol (VITAMIN D2) 50,000 units; Take 1 capsule (50,000 Units total) by mouth once a week    Episode of recurrent major depressive disorder, unspecified depression episode severity ([facility] )  Comments:  Pt follows with Dr Catrina Winter    Vaginal candida  Comments:  Rx for Fluconazole provided  Orders:  -     fluconazole (DIFLUCAN) 150 mg tablet; Take 1 tablet (150 mg total) by mouth once for 1 dose          Subjective:      Patient ID: Herbie Gonzalez is a 37 y o  female  37 yr old female here for lab review  Pt found to have low Vitamin D 29 5, and slightly elevated total cholesterol 219  Pt reports she does eat well, I did [facility] her on making better food choices  Pt is also c/o urinary frequency, burning and foul smelling urine  POCT urine    And I will treat empirically and send for UA and C&S  Pts labwork otherwise is WNL   Pt reports she is suffering from Report given to Neha Mcdowell RN at [facility]. ongoing stress and depression with her children, is currently on 3 psychiatric medications including Latuda, Wellbutrin and Lamictal , Lorazepam and Ambien which are prescribed by Brii Kowalski of Dr Olga Birch office (psychiatry)  Pt reports she is still stressed out despite use of all the medications  Pt c/o ongoing low back pain, xray results reviewed  Pt does use Ibuprofen as needed for pain  The following portions of the patient's history were reviewed and updated as appropriate:   She  has a past medical history of Anxiety; Arthritis; Depression; and Known health problems: none  She   Patient Active Problem List    Diagnosis Date Noted    Chronic bilateral low back pain without sciatica 12/15/2018    Bipolar 1 disorder ([facility] [address] ) 10/23/2018    Acquired hypothyroidism 10/23/2018    Arthritis 10/23/2018    Cellulitis of foot, right 05/18/2017    Wound of skin 05/18/2017    Acute bronchitis 05/06/2017    Cough 05/06/2017    Increased frequency of urination 05/06/2017    UTI (urinary tract infection) 05/06/2017    Acute sinusitis 07/21/2016    Depression 07/21/2016     She  has a past surgical history that includes Cholecystectomy  Her family history includes Diabetes in her mother; Heart disease in her mother; Hypertension in her father and mother  She  reports that she has been smoking  She has never used smokeless tobacco  She reports that she does not drink alcohol or use drugs    Current Outpatient Prescriptions   Medication Sig Dispense Refill    buPROPion (WELLBUTRIN) 100 mg tablet Take by mouth      GABAPENTIN PO Take by mouth      levothyroxine 50 mcg tablet Take 1 tablet (50 mcg total) by mouth daily 30 tablet 1    lisinopril (ZESTRIL) 20 mg tablet Take 0 5 tablets (10 mg total) by mouth daily 30 tablet 5    LORazepam (ATIVAN) 0 5 mg tablet       venlafaxine (EFFEXOR-XR) 150 mg 24 hr capsule       zolpidem (AMBIEN) 5 mg tablet Take 1-2 tablets daily as needed for sleep 60 tablet 0    ciprofloxacin (CIPRO) 500 mg tablet Take 1 tablet (500 mg total) by mouth every 12 (twelve) hours for 7 days 14 tablet 0    ergocalciferol (VITAMIN D2) 50,000 units Take 1 capsule (50,000 Units total) by mouth once a week 12 capsule 3    fluconazole (DIFLUCAN) 150 mg tablet Take 1 tablet (150 mg total) by mouth once for 1 dose 1 tablet 1    lurasidone (LATUDA) 20 mg tablet Take 1 tablet (20 mg total) by mouth daily with breakfast (Patient not taking: Reported on 12/15/2018 ) 30 tablet 0     No current facility-administered medications for this visit  Current Outpatient Prescriptions on File Prior to Visit   Medication Sig    buPROPion (WELLBUTRIN) 100 mg tablet Take by mouth    GABAPENTIN PO Take by mouth    levothyroxine 50 mcg tablet Take 1 tablet (50 mcg total) by mouth daily    lisinopril (ZESTRIL) 20 mg tablet Take 0 5 tablets (10 mg total) by mouth daily    LORazepam (ATIVAN) 0 5 mg tablet     venlafaxine (EFFEXOR-XR) 150 mg 24 hr capsule     zolpidem (AMBIEN) 5 mg tablet Take 1-2 tablets daily as needed for sleep    lurasidone (LATUDA) 20 mg tablet Take 1 tablet (20 mg total) by mouth daily with breakfast (Patient not taking: Reported on 12/15/2018 )    [DISCONTINUED] lamoTRIgine (LaMICtal) 2 MG CHEW Chew     No current facility-administered medications on file prior to visit  She has No Known Allergies       Review of Systems   Constitutional: Negative for activity change, fatigue and fever  HENT: Negative for congestion, postnasal drip, rhinorrhea, sinus pain, sore throat and trouble swallowing  Eyes: Negative for pain and visual disturbance  Respiratory: Negative for cough, shortness of breath and wheezing  Cardiovascular: Negative for chest pain and palpitations  Gastrointestinal: Negative for constipation, diarrhea, nausea and vomiting  Endocrine: Negative for polydipsia, polyphagia and polyuria  Genitourinary: Positive for urgency   Negative for difficulty urinating, flank pain, frequency and pelvic pain  Foul smelling urine   Musculoskeletal: Positive for back pain  Negative for arthralgias, joint swelling and myalgias  Skin: Negative  Negative for color change and rash  Neurological: Negative for dizziness, syncope, weakness, light-headedness, numbness and headaches  Hematological: Negative for adenopathy  Does not bruise/bleed easily  Psychiatric/Behavioral: Positive for sleep disturbance  Negative for behavioral problems  The patient is not nervous/anxious  Depression         Objective:      /74   Pulse 94   Temp (!) 97 4 °F (36 3 °C) (Temporal)   Wt 59 4 kg (131 lb)   LMP  (LMP Unknown) Comment: not preg  SpO2 98%   BMI 21 80 kg/m²          Physical Exam   Constitutional: She is oriented to person, place, and time  She appears well-developed and well-nourished  Pt appears stressed out   HENT:   Head: Normocephalic  Eyes: Pupils are equal, round, and reactive to light  Neck: Normal range of motion  Cardiovascular: Normal rate and regular rhythm  Pulmonary/Chest: Effort normal and breath sounds normal    Abdominal: Soft  Bowel sounds are normal    Musculoskeletal:        Lumbar back: She exhibits decreased range of motion, tenderness and pain  Arms:  Neurological: She is alert and oriented to person, place, and time  Skin: Skin is warm and dry  Psychiatric: Her behavior is normal  Judgment and thought content normal  She exhibits a depressed mood  Pt very teary throughout interview   Nursing note and vitals reviewed

## 2019-06-15 ENCOUNTER — OFFICE VISIT (OUTPATIENT)
Dept: FAMILY MEDICINE CLINIC | Facility: CLINIC | Age: 44
End: 2019-06-15
Payer: COMMERCIAL

## 2019-06-15 VITALS
SYSTOLIC BLOOD PRESSURE: 134 MMHG | TEMPERATURE: 99.9 F | OXYGEN SATURATION: 99 % | BODY MASS INDEX: 26.26 KG/M2 | WEIGHT: 157.6 LBS | HEIGHT: 65 IN | HEART RATE: 86 BPM | DIASTOLIC BLOOD PRESSURE: 82 MMHG

## 2019-06-15 DIAGNOSIS — F41.9 ANXIETY AND DEPRESSION: Primary | ICD-10-CM

## 2019-06-15 DIAGNOSIS — F31.9 BIPOLAR 1 DISORDER (HCC): ICD-10-CM

## 2019-06-15 DIAGNOSIS — I10 HYPERTENSION, UNSPECIFIED TYPE: ICD-10-CM

## 2019-06-15 DIAGNOSIS — E03.9 HYPOTHYROIDISM, UNSPECIFIED TYPE: ICD-10-CM

## 2019-06-15 DIAGNOSIS — F32.A ANXIETY AND DEPRESSION: Primary | ICD-10-CM

## 2019-06-15 PROCEDURE — 3008F BODY MASS INDEX DOCD: CPT | Performed by: NURSE PRACTITIONER

## 2019-06-15 PROCEDURE — 99214 OFFICE O/P EST MOD 30 MIN: CPT | Performed by: NURSE PRACTITIONER

## 2019-06-15 PROCEDURE — 3075F SYST BP GE 130 - 139MM HG: CPT | Performed by: NURSE PRACTITIONER

## 2019-06-15 PROCEDURE — 3079F DIAST BP 80-89 MM HG: CPT | Performed by: NURSE PRACTITIONER

## 2019-06-15 RX ORDER — QUETIAPINE FUMARATE 50 MG/1
50 TABLET, FILM COATED ORAL 2 TIMES DAILY
Qty: 60 TABLET | Refills: 1 | Status: SHIPPED | OUTPATIENT
Start: 2019-06-15 | End: 2019-07-31

## 2019-06-15 RX ORDER — LISINOPRIL 20 MG/1
10 TABLET ORAL DAILY
Qty: 90 TABLET | Refills: 3 | Status: SHIPPED | OUTPATIENT
Start: 2019-06-15 | End: 2019-07-25 | Stop reason: SDUPTHER

## 2019-06-15 RX ORDER — LEVOTHYROXINE SODIUM 0.05 MG/1
50 TABLET ORAL DAILY
Qty: 90 TABLET | Refills: 3 | Status: SHIPPED | OUTPATIENT
Start: 2019-06-15 | End: 2020-02-03 | Stop reason: HOSPADM

## 2019-06-15 RX ORDER — LORAZEPAM 0.5 MG/1
1 TABLET ORAL EVERY 8 HOURS PRN
Qty: 90 TABLET | Refills: 0 | Status: SHIPPED | OUTPATIENT
Start: 2019-06-15 | End: 2019-06-15 | Stop reason: SDUPTHER

## 2019-06-15 RX ORDER — LORAZEPAM 1 MG/1
1 TABLET ORAL EVERY 8 HOURS PRN
Qty: 90 TABLET | Refills: 0 | Status: SHIPPED | OUTPATIENT
Start: 2019-06-15 | End: 2019-07-31

## 2019-06-15 RX ORDER — CLONAZEPAM 0.5 MG/1
0.5 TABLET ORAL 2 TIMES DAILY
COMMUNITY
End: 2019-06-15 | Stop reason: ALTCHOICE

## 2019-06-16 LAB — BACTERIA UR CULT: NORMAL

## 2019-06-17 ENCOUNTER — TELEPHONE (OUTPATIENT)
Dept: FAMILY MEDICINE CLINIC | Facility: CLINIC | Age: 44
End: 2019-06-17

## 2019-06-20 ENCOUNTER — TELEPHONE (OUTPATIENT)
Dept: FAMILY MEDICINE CLINIC | Facility: CLINIC | Age: 44
End: 2019-06-20

## 2019-06-28 ENCOUNTER — OFFICE VISIT (OUTPATIENT)
Dept: OBGYN CLINIC | Facility: MEDICAL CENTER | Age: 44
End: 2019-06-28
Payer: COMMERCIAL

## 2019-06-28 VITALS — BODY MASS INDEX: 25.86 KG/M2 | SYSTOLIC BLOOD PRESSURE: 130 MMHG | WEIGHT: 155.4 LBS | DIASTOLIC BLOOD PRESSURE: 70 MMHG

## 2019-06-28 DIAGNOSIS — Z30.431 IUD CHECK UP: Primary | ICD-10-CM

## 2019-06-28 PROCEDURE — 99213 OFFICE O/P EST LOW 20 MIN: CPT | Performed by: OBSTETRICS & GYNECOLOGY

## 2019-06-28 RX ORDER — LORAZEPAM 0.5 MG/1
TABLET ORAL
Refills: 0 | COMMUNITY
Start: 2019-06-15 | End: 2019-07-25 | Stop reason: ALTCHOICE

## 2019-07-13 ENCOUNTER — OFFICE VISIT (OUTPATIENT)
Dept: URGENT CARE | Facility: HOSPITAL | Age: 44
End: 2019-07-13
Payer: COMMERCIAL

## 2019-07-13 VITALS
TEMPERATURE: 97.2 F | HEIGHT: 65 IN | OXYGEN SATURATION: 99 % | WEIGHT: 150.8 LBS | BODY MASS INDEX: 25.12 KG/M2 | SYSTOLIC BLOOD PRESSURE: 126 MMHG | HEART RATE: 94 BPM | DIASTOLIC BLOOD PRESSURE: 80 MMHG | RESPIRATION RATE: 18 BRPM

## 2019-07-13 DIAGNOSIS — F41.9 ANXIETY: ICD-10-CM

## 2019-07-13 DIAGNOSIS — S09.22XA: Primary | ICD-10-CM

## 2019-07-13 PROBLEM — Z76.0 MEDICATION REFILL: Status: ACTIVE | Noted: 2019-07-13

## 2019-07-13 PROCEDURE — 99203 OFFICE O/P NEW LOW 30 MIN: CPT | Performed by: EMERGENCY MEDICINE

## 2019-07-13 RX ORDER — LORAZEPAM 0.5 MG/1
0.25 TABLET ORAL 2 TIMES DAILY
Qty: 10 TABLET | Refills: 0 | Status: SHIPPED | OUTPATIENT
Start: 2019-07-13 | End: 2019-07-25 | Stop reason: ALTCHOICE

## 2019-07-13 RX ORDER — AMOXICILLIN AND CLAVULANATE POTASSIUM 875; 125 MG/1; MG/1
1 TABLET, FILM COATED ORAL EVERY 12 HOURS SCHEDULED
Qty: 14 TABLET | Refills: 0 | Status: SHIPPED | OUTPATIENT
Start: 2019-07-13 | End: 2019-07-20

## 2019-07-13 NOTE — PATIENT INSTRUCTIONS
Ruptured Eardrum   WHAT YOU NEED TO KNOW:   A ruptured eardrum is a tear or hole in your eardrum  DISCHARGE INSTRUCTIONS:   Medicines:   · Antibiotic ear drops  may be needed to treat or prevent an infection caused by bacteria  · Take your medicine as directed  Contact your healthcare provider if you think your medicine is not helping or if you have side effects  Tell him or her if you are allergic to any medicine  Keep a list of the medicines, vitamins, and herbs you take  Include the amounts, and when and why you take them  Bring the list or the pill bottles to follow-up visits  Carry your medicine list with you in case of an emergency  Follow up with your healthcare provider as directed:  Write down your questions so you remember to ask them during your visits  Self-care:   · Always keep your ear dry  Do not let your ear get wet, such as when bathing or swimming  Water may cause your damaged eardrum to heal more slowly and increase your risk for infection  · Do not put anything in your ear  Never put objects such as cotton swabs in your ear  Pointed objects may damage or worsen the damage to your eardrum  · Try not to blow your nose  The increase in pressure may cause further damage to your eardrum  Contact your healthcare provider if:   · You have a fever  · Your hearing loss gets worse  · You feel increased dizziness, or you are vomiting  · You have worsening ear pain or a new buzzing sound in your ear  · Your symptoms do not improve, even after you take your medicine  · You have questions or concerns about your condition or care  Return to the emergency department if:   · You are bleeding from your ear  · You cannot move or feel areas of your face  © 2017 [address] [employer]  Information is for End User's use only and may not be sold, redistributed or otherwise used for commercial purposes   All illustrations and images included in CareNotes® are the copyrighted property of [employer]  or [employer]  The above information is an  only  It is not intended as medical advice for individual conditions or treatments  Talk to your doctor, nurse or pharmacist before following any medical regimen to see if it is safe and effective for you

## 2019-07-13 NOTE — PROGRESS NOTES
[facility]        NAME: Emi Bowser is a 37 y o  female  : 1975    MRN: [id number]  DATE: 2019  TIME: 2:06 PM    Assessment and Plan   Perforation of tympanic membrane, traumatic, left, initial encounter [S09 [id number]]  1  Perforation of tympanic membrane, traumatic, left, initial encounter  amoxicillin-clavulanate (AUGMENTIN) 875-125 mg per tablet   2  Anxiety  LORazepam (ATIVAN) 0 5 mg tablet         Patient Instructions       Follow up with PCP in 3-5 days  Proceed to  ER if symptoms worsen  Chief Complaint     Chief Complaint   Patient presents with    Earache     Patient c/o ear pain since last night, playing makeup with daughter, cutip inserted into ear         History of Present Illness       This is a 68-year-old female who presents with pain in her left ear with decrease in hearing  She states that her grandchild stuck a Q-tip in her ear yesterday after which she felt immediate pain  She has had no fever, no discharge from the ear and no bleeding from the ear; she has had no previous hearing problems  Review of Systems   Review of Systems   Constitutional: Negative  HENT: Positive for ear pain and hearing loss  Negative for congestion, ear discharge, rhinorrhea, sinus pressure and sinus pain  Respiratory: Negative  Endocrine: Negative  Neurological: Negative  Negative for dizziness, light-headedness and headaches           Current Medications       Current Outpatient Medications:     buPROPion (WELLBUTRIN XL) 300 mg 24 hr tablet, Take 300 mg by mouth daily, Disp: , Rfl: 1    ergocalciferol (VITAMIN D2) 50,000 units, Take 1 capsule (50,000 Units total) by mouth once a week, Disp: 12 capsule, Rfl: 3    gabapentin (NEURONTIN) 600 MG tablet, , Disp: , Rfl:     LATUDA 40 MG tablet, Take 40 mg by mouth daily, Disp: , Rfl: 0    levothyroxine 50 mcg tablet, Take 1 tablet (50 mcg total) by mouth daily for 90 days, Disp: 90 tablet, Rfl: 3    lisinopril (ZESTRIL) 20 mg tablet, Take 0 5 tablets (10 mg total) by mouth daily, Disp: 90 tablet, Rfl: 3    venlafaxine (EFFEXOR-XR) 150 mg 24 hr capsule, Take 2 capsules (300 mg total) by mouth daily for 7 days, Disp: 14 capsule, Rfl: 0    zolpidem (AMBIEN) 10 mg tablet, Take 1 tablet (10 mg total) by mouth daily at bedtime for 14 days, Disp: 14 tablet, Rfl: 0    amoxicillin-clavulanate (AUGMENTIN) 875-125 mg per tablet, Take 1 tablet by mouth every 12 (twelve) hours for 7 days, Disp: 14 tablet, Rfl: 0    LORazepam (ATIVAN) 0 5 mg tablet, take 2 tablets by mouth every 8 hours if needed for anxiety, Disp: , Rfl: 0    LORazepam (ATIVAN) 0 5 mg tablet, Take 0 5 tablets (0 25 mg total) by mouth 2 (two) times a day for 10 days, Disp: 10 tablet, Rfl: 0    LORazepam (ATIVAN) 1 mg tablet, Take 1 tablet (1 mg total) by mouth every 8 (eight) hours as needed for anxiety for up to 30 days (Patient not taking: Reported on 7/13/2019), Disp: 90 tablet, Rfl: 0    QUEtiapine (SEROquel) 50 mg tablet, Take 1 tablet (50 mg total) by mouth 2 (two) times a day for 60 days then 1 tablet (50 mg total) at bedtime (Patient not taking: Reported on 7/13/2019), Disp: 60 tablet, Rfl: 1    varenicline (CHANTIX SEBASTIEN) 0 5 MG X 11 & 1 MG X 42 tablet, Take one 0 5mg tab by mouth 1x daily for 3 days, then increase to one 0 5mg tab 2x daily for 3 days, then increase to one 1mg tab 2x daily (Patient not taking: Reported on 5/2/2019), Disp: 53 tablet, Rfl: 0    Current Allergies     Allergies as of 07/13/2019    (No Known Allergies)            The following portions of the patient's history were reviewed and updated as appropriate: allergies, current medications, past family history, past medical history, past social history, past surgical history and problem list      Past Medical History:   Diagnosis Date    Anxiety     Arthritis     Bipolar 1 disorder ([facility] [address] )     Depression     Known health problems: none     Scoliosis        Past Surgical History: Procedure Laterality Date    CHOLECYSTECTOMY         Family History   Problem Relation Age of Onset    Diabetes Mother     Hypertension Mother     Heart disease Mother     Hypertension Father     Diabetes Maternal Grandmother     Diabetes Paternal Grandmother          Medications have been verified  Objective   /80   Pulse 94   Temp (!) 97 2 °F (36 2 °C)   Resp 18   Ht 5' 5" (1 651 m)   Wt 68 4 kg (150 lb 12 8 oz)   SpO2 99%   BMI 25 09 kg/m²        Physical Exam     Physical Exam   Constitutional: She is oriented to person, place, and time  She appears well-developed and well-nourished  HENT:   Head: Normocephalic and atraumatic  Right Ear: External ear normal    Left Ear: External ear normal    Nose: Nose normal    Mouth/Throat: Oropharynx is clear and moist    Examination of the right TM shows the TM to be intact  Examination of the left TM shows an almost 50 percent perforation superiorly of the TM with minimal bloody drainage and excoriation of the canal just proximal to the tympanic membrane  Patient has significant blunting of her hearing on that side  Eyes: Pupils are equal, round, and reactive to light  Conjunctivae and EOM are normal  Left eye exhibits no discharge  Neck: Normal range of motion  Neck supple  Cardiovascular: Normal rate, regular rhythm and normal heart sounds  Pulmonary/Chest: Effort normal and breath sounds normal    Abdominal: Soft  Bowel sounds are normal    Musculoskeletal: Normal range of motion  Neurological: She is alert and oriented to person, place, and time  Skin: Skin is warm and dry  Psychiatric: She has a normal mood and affect  Patient explains that she is also being treated long term for anxiety disorder and has run out of her Ativan  She normally takes Ativan 0 25 milligrams once daily followed by 0 5 milligrams at [facility] and  She does have an appointment scheduled with her psychiatrist in the upcoming week    She requests a refill on the Ativan as she has run out of her medication for approximately 2 weeks and does feel very anxious  She has no suicidal ideation  Nursing note and vitals reviewed  Pt was referred to ENT for follow up of her hearing and resolution of the perforation

## 2019-07-14 DIAGNOSIS — F31.9 BIPOLAR 1 DISORDER (HCC): ICD-10-CM

## 2019-07-15 RX ORDER — QUETIAPINE FUMARATE 25 MG/1
TABLET, FILM COATED ORAL
Qty: 60 TABLET | Refills: 1 | OUTPATIENT
Start: 2019-07-15

## 2019-07-25 ENCOUNTER — OFFICE VISIT (OUTPATIENT)
Dept: FAMILY MEDICINE CLINIC | Facility: CLINIC | Age: 44
End: 2019-07-25
Payer: COMMERCIAL

## 2019-07-25 VITALS
HEART RATE: 86 BPM | OXYGEN SATURATION: 98 % | HEIGHT: 65 IN | WEIGHT: 151.2 LBS | BODY MASS INDEX: 25.19 KG/M2 | SYSTOLIC BLOOD PRESSURE: 172 MMHG | DIASTOLIC BLOOD PRESSURE: 98 MMHG | TEMPERATURE: 97.3 F

## 2019-07-25 DIAGNOSIS — F31.9 BIPOLAR 1 DISORDER (HCC): Primary | ICD-10-CM

## 2019-07-25 DIAGNOSIS — I10 HYPERTENSION, UNSPECIFIED TYPE: ICD-10-CM

## 2019-07-25 DIAGNOSIS — F41.9 ANXIETY: ICD-10-CM

## 2019-07-25 PROBLEM — H72.92 PERFORATION OF LEFT TYMPANIC MEMBRANE: Status: ACTIVE | Noted: 2019-07-25

## 2019-07-25 PROCEDURE — 99214 OFFICE O/P EST MOD 30 MIN: CPT | Performed by: NURSE PRACTITIONER

## 2019-07-25 RX ORDER — LISINOPRIL 20 MG/1
40 TABLET ORAL DAILY
Qty: 90 TABLET | Refills: 3 | Status: SHIPPED | OUTPATIENT
Start: 2019-07-25 | End: 2020-01-13 | Stop reason: SDUPTHER

## 2019-07-25 RX ORDER — LORAZEPAM 1 MG/1
1 TABLET ORAL EVERY 8 HOURS PRN
Qty: 21 TABLET | Refills: 0 | Status: SHIPPED | OUTPATIENT
Start: 2019-07-25 | End: 2019-08-09 | Stop reason: ALTCHOICE

## 2019-07-25 NOTE — PROGRESS NOTES
Assessment/Plan:    No problem-specific Assessment & Plan notes found for this encounter  Diagnoses and all orders for this visit:    Bipolar 1 disorder ([facility] [place] [address] )  Comments:  Pt referred to Juan C Souza PA-C  Orders:  -     LORazepam (ATIVAN) 1 mg tablet; Take 1 tablet (1 mg total) by mouth every 8 (eight) hours as needed for anxiety    Hypertension, unspecified type  Comments:  bp 176/82  Lisinopril reordered  Orders:  -     lisinopril (ZESTRIL) 20 mg tablet; Take 2 tablets (40 mg total) by mouth daily    Anxiety  Comments:  7 days worth of Ativan reordered  Orders:  -     LORazepam (ATIVAN) 1 mg tablet; Take 1 tablet (1 mg total) by mouth every 8 (eight) hours as needed for anxiety          Subjective:      Patient ID: Jaqueline Britton is a 37 y o  female  Anxiety  Patient is here for evaluation of anxiety  She has the following anxiety symptoms: difficulty concentrating, palpitations,   Onset of symptoms was approximately several years ago  Symptoms have been gradually worsening since that time  She denies current suicidal and homicidal ideation  Family history significant for anxiety and depression  Possible organic causes contributing are: medications, drug abuse  Risk factors: previous episode of depression and personality disorder Previous treatment includes individual therapy, group therapy and medication Ativan and   She complains of the following medication side effects: none  Pt cried throughout the entire assessment again today, I discussed with her that she needs to see a provider that has a psychiatric specialty background if she is not happy with her current provider  I did provide her with 3 referrals and she is going to see Simone Maria on July 31, 2019  I did provide her with enough Ativan to hold her over until that appt  PDMP was reviewed        The following portions of the patient's history were reviewed and updated as appropriate: She  has a past medical history of Anxiety, Arthritis, [facility] ([facility] ), Depression, Hypertension, Hypothyroidism, Known health problems: none, and Scoliosis  She   Patient Active Problem List    Diagnosis Date Noted    Perforation of left tympanic membrane 07/25/2019    Medication refill 07/13/2019    IUD check up 06/28/2019    Chronic bilateral low back pain without sciatica 12/15/2018    Bipolar 1 disorder ([facility] [address] ) 10/23/2018    Acquired hypothyroidism 10/23/2018    Arthritis 10/23/2018    Cellulitis of foot, right 05/18/2017    Wound of skin 05/18/2017    Acute bronchitis 05/06/2017    Cough 05/06/2017    Increased frequency of urination 05/06/2017    UTI (urinary tract infection) 05/06/2017    Acute sinusitis 07/21/2016    Anxiety and depression 07/21/2016     She  has a past surgical history that includes Cholecystectomy  Her family history includes Diabetes in her maternal grandmother, mother, and paternal grandmother; Heart disease in her mother; Hypertension in her father and mother  She  reports that she has been smoking  She has a 1 50 pack-year smoking history  She has never used smokeless tobacco  She reports that she does not drink alcohol or use drugs    Current Outpatient Medications   Medication Sig Dispense Refill    buPROPion (WELLBUTRIN XL) 300 mg 24 hr tablet Take 300 mg by mouth daily  1    ergocalciferol (VITAMIN D2) 50,000 units Take 1 capsule (50,000 Units total) by mouth once a week 12 capsule 3    gabapentin (NEURONTIN) 600 MG tablet       LATUDA 40 MG tablet Take 40 mg by mouth daily  0    levothyroxine 50 mcg tablet Take 1 tablet (50 mcg total) by mouth daily for 90 days 90 tablet 3    lisinopril (ZESTRIL) 20 mg tablet Take 2 tablets (40 mg total) by mouth daily 90 tablet 3    LORazepam (ATIVAN) 1 mg tablet Take 1 tablet (1 mg total) by mouth every 8 (eight) hours as needed for anxiety for up to 30 days 90 tablet 0    venlafaxine (EFFEXOR-XR) 150 mg 24 hr capsule Take 2 capsules (300 mg total) by mouth daily for 7 days 14 capsule 0    zolpidem (AMBIEN) 10 mg tablet Take 1 tablet (10 mg total) by mouth daily at bedtime for 14 days 14 tablet 0    LORazepam (ATIVAN) 1 mg tablet Take 1 tablet (1 mg total) by mouth every 8 (eight) hours as needed for anxiety 21 tablet 0    QUEtiapine (SEROquel) 50 mg tablet Take 1 tablet (50 mg total) by mouth 2 (two) times a day for 60 days then 1 tablet (50 mg total) at bedtime (Patient not taking: Reported on 7/13/2019) 60 tablet 1    varenicline (CHANTIX SEBASTIEN) 0 5 MG X 11 & 1 MG X 42 tablet Take one 0 5mg tab by mouth 1x daily for 3 days, then increase to one 0 5mg tab 2x daily for 3 days, then increase to one 1mg tab 2x daily (Patient not taking: Reported on 5/2/2019) 53 tablet 0     No current facility-administered medications for this visit        Current Outpatient Medications on File Prior to Visit   Medication Sig    buPROPion (WELLBUTRIN XL) 300 mg 24 hr tablet Take 300 mg by mouth daily    ergocalciferol (VITAMIN D2) 50,000 units Take 1 capsule (50,000 Units total) by mouth once a week    gabapentin (NEURONTIN) 600 MG tablet     LATUDA 40 MG tablet Take 40 mg by mouth daily    levothyroxine 50 mcg tablet Take 1 tablet (50 mcg total) by mouth daily for 90 days    LORazepam (ATIVAN) 1 mg tablet Take 1 tablet (1 mg total) by mouth every 8 (eight) hours as needed for anxiety for up to 30 days    venlafaxine (EFFEXOR-XR) 150 mg 24 hr capsule Take 2 capsules (300 mg total) by mouth daily for 7 days    zolpidem (AMBIEN) 10 mg tablet Take 1 tablet (10 mg total) by mouth daily at bedtime for 14 days    [DISCONTINUED] lisinopril (ZESTRIL) 20 mg tablet Take 0 5 tablets (10 mg total) by mouth daily    QUEtiapine (SEROquel) 50 mg tablet Take 1 tablet (50 mg total) by mouth 2 (two) times a day for 60 days then 1 tablet (50 mg total) at bedtime (Patient not taking: Reported on 7/13/2019)    varenicline (CHANTIX SEBASTIEN) 0 5 MG X 11 & 1 MG X 42 tablet Take one 0 5mg tab by mouth 1x daily for 3 days, then increase to one 0 5mg tab 2x daily for 3 days, then increase to one 1mg tab 2x daily (Patient not taking: Reported on 5/2/2019)    [DISCONTINUED] LORazepam (ATIVAN) 0 5 mg tablet take 2 tablets by mouth every 8 hours if needed for anxiety    [DISCONTINUED] LORazepam (ATIVAN) 0 5 mg tablet Take 0 5 tablets (0 25 mg total) by mouth 2 (two) times a day for 10 days     No current facility-administered medications on file prior to visit  She has No Known Allergies       Review of Systems   Constitutional: Negative for activity change, fatigue and fever  HENT: Negative for congestion, postnasal drip, rhinorrhea, sinus pain, sore throat and trouble swallowing  Eyes: Negative for pain and visual disturbance  Respiratory: Negative for cough, shortness of breath and wheezing  Cardiovascular: Negative for chest pain and palpitations  Gastrointestinal: Negative for constipation, diarrhea, nausea and vomiting  Endocrine: Negative for polydipsia, polyphagia and polyuria  Genitourinary: Negative for difficulty urinating, flank pain, frequency, pelvic pain and urgency  Foul smelling urine   Musculoskeletal: Negative for arthralgias, back pain, joint swelling and myalgias  Skin: Negative  Negative for color change and rash  Neurological: Negative for dizziness, syncope, weakness, light-headedness, numbness and headaches  Hematological: Negative for adenopathy  Does not bruise/bleed easily  Psychiatric/Behavioral: Positive for behavioral problems and sleep disturbance  Negative for self-injury  The patient is nervous/anxious  Depression-         Objective:      BP (!) 172/98   Pulse 86   Temp (!) 97 3 °F (36 3 °C) (Tympanic)   Ht 5' 5" (1 651 m)   Wt 68 6 kg (151 lb 3 2 oz)   SpO2 98%   BMI 25 16 kg/m²          Physical Exam   Constitutional: She is oriented to person, place, and time  She appears well-developed and well-nourished     Pt appears stressed out   HENT:   Head: Normocephalic  Eyes: Pupils are equal, round, and reactive to light  Neck: Normal range of motion  Cardiovascular: Normal rate and regular rhythm  Pulmonary/Chest: Effort normal and breath sounds normal    Abdominal: Soft  Bowel sounds are normal    Musculoskeletal:        Lumbar back: She exhibits normal range of motion, no tenderness and no pain  Neurological: She is alert and oriented to person, place, and time  Skin: Skin is warm and dry  Psychiatric: Her behavior is normal  Judgment and thought content normal  Her mood appears anxious  She exhibits a depressed mood  Pt very teary, crying throughout the interview   Nursing note and vitals reviewed          PHQ-9 Depression Screening    PHQ-9:    Frequency of the following problems over the past two weeks:       Little interest or pleasure in doing things:  0 - not at all  Feeling down, depressed, or hopeless:  0 - not at all  PHQ-2 Score:  0

## 2019-07-31 ENCOUNTER — TELEPHONE (OUTPATIENT)
Dept: INTERNAL MEDICINE CLINIC | Facility: CLINIC | Age: 44
End: 2019-07-31

## 2019-07-31 ENCOUNTER — OFFICE VISIT (OUTPATIENT)
Dept: INTERNAL MEDICINE CLINIC | Facility: CLINIC | Age: 44
End: 2019-07-31
Payer: COMMERCIAL

## 2019-07-31 VITALS
DIASTOLIC BLOOD PRESSURE: 78 MMHG | WEIGHT: 151.6 LBS | HEART RATE: 87 BPM | SYSTOLIC BLOOD PRESSURE: 138 MMHG | BODY MASS INDEX: 25.26 KG/M2 | HEIGHT: 65 IN | TEMPERATURE: 98.4 F | RESPIRATION RATE: 18 BRPM | OXYGEN SATURATION: 99 %

## 2019-07-31 DIAGNOSIS — F41.9 ANXIETY: ICD-10-CM

## 2019-07-31 DIAGNOSIS — F31.9 BIPOLAR 1 DISORDER (HCC): Primary | ICD-10-CM

## 2019-07-31 DIAGNOSIS — F31.9 BIPOLAR 1 DISORDER (HCC): ICD-10-CM

## 2019-07-31 DIAGNOSIS — F41.9 ANXIETY AND DEPRESSION: ICD-10-CM

## 2019-07-31 DIAGNOSIS — F32.A ANXIETY AND DEPRESSION: ICD-10-CM

## 2019-07-31 PROCEDURE — 99214 OFFICE O/P EST MOD 30 MIN: CPT | Performed by: PHYSICIAN ASSISTANT

## 2019-07-31 RX ORDER — QUETIAPINE FUMARATE 50 MG/1
TABLET, FILM COATED ORAL
Qty: 60 TABLET | Refills: 1 | OUTPATIENT
Start: 2019-07-31

## 2019-07-31 RX ORDER — LORAZEPAM 1 MG/1
TABLET ORAL
Qty: 21 TABLET | Refills: 0 | OUTPATIENT
Start: 2019-07-31

## 2019-07-31 RX ORDER — BUPROPION HYDROCHLORIDE 150 MG/1
150 TABLET ORAL DAILY
COMMUNITY
End: 2019-07-31

## 2019-08-01 DIAGNOSIS — F41.9 ANXIETY AND DEPRESSION: ICD-10-CM

## 2019-08-01 DIAGNOSIS — M54.50 CHRONIC BILATERAL LOW BACK PAIN WITHOUT SCIATICA: Primary | ICD-10-CM

## 2019-08-01 DIAGNOSIS — F41.9 ANXIETY: ICD-10-CM

## 2019-08-01 DIAGNOSIS — G89.29 CHRONIC BILATERAL LOW BACK PAIN WITHOUT SCIATICA: Primary | ICD-10-CM

## 2019-08-01 DIAGNOSIS — F32.A ANXIETY AND DEPRESSION: ICD-10-CM

## 2019-08-01 RX ORDER — GABAPENTIN 600 MG/1
600 TABLET ORAL 3 TIMES DAILY
Qty: 90 TABLET | Refills: 1 | Status: ON HOLD | OUTPATIENT
Start: 2019-08-01 | End: 2020-02-03 | Stop reason: SDUPTHER

## 2019-08-01 NOTE — ASSESSMENT & PLAN NOTE
Pt and I discussed treatment recommendation at length and she is agreeable to pursue the following: Will taper and discontinue wellbutrin  Will continue with effexor at present dose  Also continue gabapentin at present dose  Will discontinue seroquel and latuda as the combination is not practical  Will replace with Vraylar to improve symptoms, decrease pill burden and improve convenience  Recommend taper off of ativan but pt relucatant and will ask PCP for refill  Continue ambien for insomnia  Recheck 2-4 weeks  I have spent 30 minutes with Patient  today in which greater than 50% of this time was spent in counseling/coordination of care regarding Risks and benefits of tx options, Intructions for management, Patient and family education and Risk factor reductions

## 2019-08-01 NOTE — PROGRESS NOTES
Assessment/Plan:  Problem List Items Addressed This Visit        Other    Anxiety and depression     Pt and I discussed treatment recommendation at length and she is agreeable to pursue the following: Will taper and discontinue wellbutrin  Will continue with effexor at present dose  Also continue gabapentin at present dose  Will discontinue seroquel and latuda as the combination is not practical  Will replace with Vraylar to improve symptoms, decrease pill burden and improve convenience  Recommend taper off of ativan but pt relucatant and will ask PCP for refill  Continue ambien for insomnia  Recheck 2-4 weeks  I have spent 30 minutes with Patient  today in which greater than 50% of this time was spent in counseling/coordination of care regarding Risks and benefits of tx options, Intructions for management, Patient and family education and Risk factor reductions  Bipolar 1 disorder ([facility] ) - Primary    Relevant Medications    cariprazine (VRAYLAR) 1 5 MG capsule           Diagnoses and all orders for this visit:    Bipolar 1 disorder ([facility] [address] )  -     cariprazine (VRAYLAR) 1 5 MG capsule; Take 1 capsule (1 5 mg total) by mouth daily    Anxiety and depression    Other orders  -     Discontinue: buPROPion (WELLBUTRIN XL) 150 mg 24 hr tablet; Take 150 mg by mouth daily        Anxiety and depression  Pt and I discussed treatment recommendation at length and she is agreeable to pursue the following: Will taper and discontinue wellbutrin  Will continue with effexor at present dose  Also continue gabapentin at present dose  Will discontinue seroquel and latuda as the combination is not practical  Will replace with Vraylar to improve symptoms, decrease pill burden and improve convenience  Recommend taper off of ativan but pt relucatant and will ask PCP for refill  Continue ambien for insomnia  Recheck 2-4 weeks       I have spent 30 minutes with Patient  today in which greater than 50% of this time was spent in counseling/coordination of care regarding Risks and benefits of tx options, Intructions for management, Patient and family education and Risk factor reductions  Subjective:      Patient ID: Stephanie Ramirez is a 37 y o  female  Pt presents for psych evaluation as referred by Nanda Heard  She had been following with Dario Nair in psychiatry but has since stopped going  Pt is currently on a complicated regime of seroquel as well as latuda, gabapentin, high dose ativan, high dose wellbutrin, effexor and ambien  She previously tried and failed lithium, celexa, lexapro, paxil, prozac, zoloft, cymbalta, abilify, risperdal, geodon, depakote and tegretol  She feels anxious and panicky  She is overwhelmed, and although she is not feeling well with her current meds, she is nervous about changing them  She feels depressed as well but denies SI/HI  She requests an increase in Ativan  I informed pt these medications are not recommended long term and I would recommend weaning off  She states she will ask Cm Garcia for a refill in that case  We discussed her regime further and the need to get her on less but more effective medication  We discussed that high dose wellbutrin could be worsening her anxiety and panic  We also discussed the duplicate therapy of both latuda and seroquel does not make much sense  The following portions of the patient's history were reviewed and updated as appropriate:   She has a past medical history of Anxiety, Arthritis, Bipolar 1 disorder ([address] ), Depression, Hypertension, Hypothyroidism, Known health problems: none, and Scoliosis  ,  does not have any pertinent problems on file  ,   has a past surgical history that includes Cholecystectomy  ,  family history includes Diabetes in her maternal grandmother, mother, and paternal grandmother; Heart disease in her mother; Hypertension in her father and mother  ,   reports that she has been smoking  She has a 1 50 pack-year smoking history   She has never used smokeless tobacco  She reports that she does not drink alcohol or use drugs  ,  has No Known Allergies     Current Outpatient Medications   Medication Sig Dispense Refill    ergocalciferol (VITAMIN D2) 50,000 units Take 1 capsule (50,000 Units total) by mouth once a week 12 capsule 3    gabapentin (NEURONTIN) 600 MG tablet 600 mg 3 (three) times a day       levothyroxine 50 mcg tablet Take 1 tablet (50 mcg total) by mouth daily for 90 days 90 tablet 3    lisinopril (ZESTRIL) 20 mg tablet Take 2 tablets (40 mg total) by mouth daily 90 tablet 3    LORazepam (ATIVAN) 1 mg tablet Take 1 tablet (1 mg total) by mouth every 8 (eight) hours as needed for anxiety 21 tablet 0    venlafaxine (EFFEXOR-XR) 150 mg 24 hr capsule Take 2 capsules (300 mg total) by mouth daily for 7 days 14 capsule 0    zolpidem (AMBIEN) 10 mg tablet Take 1 tablet (10 mg total) by mouth daily at bedtime for 14 days 14 tablet 0    cariprazine (VRAYLAR) 1 5 MG capsule Take 1 capsule (1 5 mg total) by mouth daily 30 capsule 0     No current facility-administered medications for this visit  Review of Systems   Constitutional: Negative for chills and fever  HENT: Negative for congestion, ear pain, hearing loss, postnasal drip, rhinorrhea, sinus pressure, sinus pain, sore throat and trouble swallowing  Eyes: Negative for pain and visual disturbance  Respiratory: Negative for cough, chest tightness, shortness of breath and wheezing  Cardiovascular: Negative  Negative for chest pain, palpitations and leg swelling  Gastrointestinal: Negative for abdominal pain, blood in stool, constipation, diarrhea, nausea and vomiting  Endocrine: Negative for cold intolerance, heat intolerance, polydipsia, polyphagia and polyuria  Genitourinary: Negative for difficulty urinating, dysuria, flank pain and urgency  Musculoskeletal: Negative for arthralgias, back pain, gait problem and myalgias  Skin: Negative for rash  Allergic/Immunologic: Negative  Neurological: Negative for dizziness, weakness, light-headedness and headaches  Hematological: Negative  Psychiatric/Behavioral: Positive for dysphoric mood and sleep disturbance  Negative for behavioral problems  The patient is nervous/anxious  Objective:  Vitals:    07/31/19 1539   BP: 138/78   BP Location: Left arm   Patient Position: Sitting   Cuff Size: Adult   Pulse: 87   Resp: 18   Temp: 98 4 °F (36 9 °C)   TempSrc: Tympanic   SpO2: 99%   Weight: 68 8 kg (151 lb 9 6 oz)   Height: 5' 5" (1 651 m)     Body mass index is 25 23 kg/m²  Physical Exam   Constitutional: She is oriented to person, place, and time  She appears well-developed and well-nourished  No distress  HENT:   Head: Normocephalic and atraumatic  Right Ear: External ear normal    Left Ear: External ear normal    Nose: Nose normal    Mouth/Throat: Oropharynx is clear and moist  No oropharyngeal exudate  Eyes: Pupils are equal, round, and reactive to light  Conjunctivae and EOM are normal  Right eye exhibits no discharge  Left eye exhibits no discharge  No scleral icterus  Neck: Normal range of motion  Neck supple  No thyromegaly present  Cardiovascular: Normal rate, regular rhythm and normal heart sounds  Exam reveals no gallop and no friction rub  No murmur heard  Pulmonary/Chest: Effort normal and breath sounds normal  No respiratory distress  She has no wheezes  She has no rales  Abdominal: Soft  Bowel sounds are normal  She exhibits no distension  There is no tenderness  Musculoskeletal: Normal range of motion  She exhibits no edema, tenderness or deformity  Neurological: She is alert and oriented to person, place, and time  No cranial nerve deficit  Skin: Skin is warm and dry  She is not diaphoretic  Psychiatric: Her behavior is normal  Judgment and thought content normal  Her mood appears anxious  Her affect is labile  She exhibits a depressed mood

## 2019-08-01 NOTE — TELEPHONE ENCOUNTER
Toi Schilder not refilling this request as I respect your opinion a great deal and I will let the pt know such

## 2019-08-02 DIAGNOSIS — F31.9 BIPOLAR 1 DISORDER (HCC): ICD-10-CM

## 2019-08-02 RX ORDER — VENLAFAXINE HYDROCHLORIDE 150 MG/1
300 CAPSULE, EXTENDED RELEASE ORAL DAILY
Qty: 60 CAPSULE | Refills: 1 | Status: SHIPPED | OUTPATIENT
Start: 2019-08-02 | End: 2021-09-09

## 2019-08-05 DIAGNOSIS — F41.9 ANXIETY AND DEPRESSION: ICD-10-CM

## 2019-08-05 DIAGNOSIS — F32.A ANXIETY AND DEPRESSION: ICD-10-CM

## 2019-08-05 DIAGNOSIS — F31.9 BIPOLAR 1 DISORDER (HCC): ICD-10-CM

## 2019-08-06 RX ORDER — QUETIAPINE FUMARATE 50 MG/1
TABLET, FILM COATED ORAL
Qty: 60 TABLET | Refills: 1 | OUTPATIENT
Start: 2019-08-06

## 2019-08-07 ENCOUNTER — TELEPHONE (OUTPATIENT)
Dept: PSYCHOLOGY | Facility: CLINIC | Age: 44
End: 2019-08-07

## 2019-08-08 ENCOUNTER — TELEPHONE (OUTPATIENT)
Dept: PSYCHOLOGY | Facility: CLINIC | Age: 44
End: 2019-08-08

## 2019-08-08 ENCOUNTER — TELEPHONE (OUTPATIENT)
Dept: INTERNAL MEDICINE CLINIC | Facility: CLINIC | Age: 44
End: 2019-08-08

## 2019-08-08 ENCOUNTER — DOCUMENTATION (OUTPATIENT)
Dept: PSYCHOLOGY | Facility: CLINIC | Age: 44
End: 2019-08-08

## 2019-08-08 NOTE — TELEPHONE ENCOUNTER
Called insurance for priorauthorization on Venlafaxine 150 mg 2 capsules daily spoke with Rodney Murrieta ([phone])      Received call back: APPROVED

## 2019-08-08 NOTE — TELEPHONE ENCOUNTER
Call received from Fredi Nicole stating that she will be at program by 8 tomorrow, 8/9, for initial paperwork  She expressed concern that starting Monday, she may not be able to arrive to program until 8:45 due to needing to bring her daughter to summer school  Call returned, no answer, message left requesting call back

## 2019-08-09 ENCOUNTER — OFFICE VISIT (OUTPATIENT)
Dept: PSYCHOLOGY | Facility: CLINIC | Age: 44
End: 2019-08-09
Payer: COMMERCIAL

## 2019-08-09 VITALS
RESPIRATION RATE: 18 BRPM | WEIGHT: 152 LBS | HEART RATE: 106 BPM | SYSTOLIC BLOOD PRESSURE: 134 MMHG | BODY MASS INDEX: 25.33 KG/M2 | HEIGHT: 65 IN | DIASTOLIC BLOOD PRESSURE: 80 MMHG

## 2019-08-09 DIAGNOSIS — F41.9 ANXIETY DISORDER, UNSPECIFIED TYPE: ICD-10-CM

## 2019-08-09 DIAGNOSIS — F31.81 BIPOLAR 2 DISORDER (HCC): Primary | ICD-10-CM

## 2019-08-09 PROCEDURE — G0410 GRP PSYCH PARTIAL HOSP 45-50: HCPCS

## 2019-08-09 PROCEDURE — G0177 OPPS/PHP; TRAIN & EDUC SERV: HCPCS

## 2019-08-09 PROCEDURE — S0201 PARTIAL HOSPITALIZATION SERV: HCPCS

## 2019-08-09 PROCEDURE — G0176 OPPS/PHP;ACTIVITY THERAPY: HCPCS

## 2019-08-09 PROCEDURE — 90791 PSYCH DIAGNOSTIC EVALUATION: CPT

## 2019-08-09 NOTE — PSYCH
Subjective:     Patient ID: Som Lombardi is a 37 y o  female  [facility] Clinical Progress Notes      Specialized Services Documentation  Therapist must complete separate progress note for each specific clinical activity in which the individual participated during the day  Group Psychotherapy [id number]  Psychotherapy group focused on effective communication skills based on [place] personality type  The group explored the 4 personality types and identified their own  Each member then reflected on the 2-3 people closest in their lives and what their personality type was  Through activity, role play on current communication issues with supports  Chidi Schulte actively participated in activity and group discussion  She identified herself at 'sensitive' personality type through [place] [employer]  She participated in role play identifying her  as dominant personality type and unable to express her needs without feeling put down  The group provided constructive feedback which Chidi Schulte was somewhat resistive towards  Some progress toward goals  Continue psychotherapy group to explore stressors and ways of coping     Tx Plan Objective: 1 4, Therapist:  Gilda Vargas LCSW

## 2019-08-09 NOTE — PSYCH
Subjective:     Patient ID: Azeem Mena is a 37 y o  female  [facility] Clinical Progress Notes      Specialized Services Documentation  Therapist must complete separate progress note for each specific clinical activity in which the individual participated during the day  Group Psychotherapy [id number]  Dereje Wilson was excused from Wellness Group as she was having her initial psychosocial assessment    Therapist:  Chin Betaty RN      Education Therapy   Time:  3164-9822  Previous goal met: 1st day of treatment  Readiness to Learning: Receptive  Barriers to Learning: None  Learning Assessment  Time: 5035-8509  Education Completed: Illness and Wellness Tools  Teaching Method: Verbal and Written  Shared Area of Learning: Yes   [place] [facility] [place] the [place]  Tx Plan Objective: 1 1, Therapist:  Chin Beatty RN
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
## 2019-08-09 NOTE — PSYCH
Assessment/Plan:      Diagnoses and all orders for this visit:    Bipolar 2 disorder ([facility] [address] )    Anxiety disorder, unspecified type          Subjective:     Patient ID: Kat Schneider is a 37 y o  female  [facility] Treatment Plan   AREAS OF NEED: Mood instability as evidenced by hopelessness, helplessness, irritability, crying spells, panic attacks, ruminating thoughts, decrease in concentration and attention, appetite disturbances, restlessness, feelings of guilt, lack of self-care, and lack of motivation related to marital problems, financial struggles, unemployment, ongoing family issues, and inability to visualize a realistic positive future  Date Initiated: 8/9/19    Strengths: Cares about her children, educated, motivated to learn     LONG TERM GOAL:   Date Initiated: 8/9/19  1 0 I will identify 3 signs that my mood has become more stable and that I am more productive in my day to day routine  Target Date: 9/7/19  Completion Date:       SHORT TERM OBJECTIVES:     Date Initiated: 8/9/19  1 1 I will explore a self-esteem packet and identify 3 positive traits I like about myself  Revision Date:   Target Date: 8/20/19  Completion Date:     Date Initiated: 8/9/19  1 2 I will identify 3 mindfulness and/or distress tolerance skills I can utilize to refocus negative or worrisome thoughts  Revision Date:   Target Date: 8/20/19  Completion Date:     Date Initiated: 8/9/19  1 3 I will take medications as prescribed and share questions and concerns if arise  Revision Date:  Target Date: 8/20/19  Completion Date:      Date Initiated: 8/9/19  1 4 I will identify 3 ways my supports can assist in my recovery and agree to staff/support contact as indicated    Revision Date:  Target Date: 8/20/19  Completion Date:             7 DAY REVISION:    Date Initiated:  Revision Date:   Target Date:   Completion Date:    PSYCHIATRY:  Date Initiated: 8/9/19  Medication Management and Education       Revision Date:   The person(s) responsible for carrying out the plan is Gloria Vergara MD    NURSING:   Date Initiated: 8/9/19  1 1,1 2,1 3,1 4 This RN will provide daily wellness group five days weekly to educate [facility] on S/S of her diagnoses and medications used in treatment  Revision Date:  The person(s) responsible for carrying out the plan is Linus Bowie RN    PSYCHOLOGY:   Date Initiated: 8/9/19       1 1, 1 2, 1 4 Provide psychotherapy group 5 times per week to allow opportunity for [facility]  to explore stressors and ways of coping  Revision Date:   The person(s) responsible for carrying out the plan is Zoë Padron LCSW    ALLIED THERAPY:   Date Initiated: 8/9/19  1 1,1 2 Engage [facility] in AT group 5 times daily to encourage development and use of wellness tools to decrease symptoms and promote recovery through meaningful activity  Revision Date:   The person(s) responsible for carrying out the plan is JEREMY Ayon        CASE MANAGEMENT:   Date Initiated: 8/9/19      1 0 This  will meet with [facility]  3-4 times weekly to assess treatment progress, discharge planning, connection to community supports and UR as indicated  Revision Date:   The person(s) responsible for carrying out the plan is JEREMY Lockwood      TREATMENT REVIEW/COMMENTS:     DISCHARGE CRITERIA: Identify 3 signs of progress and complete the relapse prevention plan  DISCHARGE PLAN: Return to OP psychiatry and therapy  Estimated Length of Stay: 10 treatment days  Diagnosis and Treatment Plan explained to Collette Mac relates understanding diagnosis and is agreeable to Treatment Plan     CLIENT COMMENTS / Please share your thoughts, feelings, need and/or experiences regarding your treatment plan: _____________________________________________________________________________________________________________________________________________________________________________________________________________________________________________________________________________________________________________________ Date/Time: ______________     Patient Signature: _________________________________     Date/Time: ______________      Signature: _________________________________     Date/Time: ______________

## 2019-08-09 NOTE — PSYCH
Subjective:     Patient ID: Santa Barraza is a 37 y o  female  [facility] Clinical Progress Notes      Specialized Services Documentation  Therapist must complete separate progress note for each specific clinical activity in which the individual participated during the day  Allied Therapy [id number] Marissa Gunn actively shared in [facility] group focused on interpersonal effectiveness and communication styles  She engaged in instrument improvisations and discussion  Group discussed different communication styles (passive, passive-aggressive, aggressive, and assertive) and traits seen in each  Group explored how those communication styles present themselves in different ways  Group discussed positive impact of communicating assertively with others  Marissa Gunn shared that she tends to communicate in a passive-aggressive style, and her  communicates aggressively  Beginning progress towards goals observed and shared  Continue AT to further practice communicating in a healthy way to promote wellness  Tx Plan Objective: 1 1, 1 2, 1 4 Therapist:  JEREMY Mccray    [address] echoBase request submitted on [facility]  Tracking #[id number]  Case Management Note    JEREMY Mccray    Current suicide risk : Low     [id number] Met with Santa Barraza  Reviewed program and given on call numbers  She completed releases and PCP notified of admission and health care coordination form completed  Completed initial psycho-social evaluation and initial treatment goals discussed  Medications changes/added/denied? Yes, see note by Dr Maria Isabel Basilio session number: 1    Individual Case Management Visit provided today?  Yes     [facility]ations follow up physician's orders: See note by Dr Duglas Selby

## 2019-08-12 ENCOUNTER — OFFICE VISIT (OUTPATIENT)
Dept: PSYCHOLOGY | Facility: CLINIC | Age: 44
End: 2019-08-12
Payer: COMMERCIAL

## 2019-08-12 DIAGNOSIS — F31.81 BIPOLAR 2 DISORDER (HCC): Primary | ICD-10-CM

## 2019-08-12 DIAGNOSIS — F41.9 ANXIETY: ICD-10-CM

## 2019-08-12 PROCEDURE — S0201 PARTIAL HOSPITALIZATION SERV: HCPCS

## 2019-08-12 PROCEDURE — G0176 OPPS/PHP;ACTIVITY THERAPY: HCPCS

## 2019-08-12 PROCEDURE — G0177 OPPS/PHP; TRAIN & EDUC SERV: HCPCS

## 2019-08-12 PROCEDURE — G0410 GRP PSYCH PARTIAL HOSP 45-50: HCPCS

## 2019-08-12 NOTE — PSYCH
Subjective:     Patient ID: Aric Agosto is a 37 y o  female  [facility] Clinical Progress Notes      Specialized Services Documentation  Therapist must complete separate progress note for each specific clinical activity in which the individual participated during the day  Group Psychotherapy [id number]  Ericka attended Wellness Group today which was About my Diagnosis, About my Symptoms  This group educated about diagnosis and about the importance of understanding this drives treatment  Likewise understanding ones symptoms is important to assist individuals in being able to communicate with family in times of need and with providers throughout their treatment  This group underlined the importance of taking responsibility for ones wellness     Tx Plan Objective: 1 1,1 2 Therapist:  Kamar Colunga RN        Education Therapy   Time:  4995-4040  Previous goal met: Yes   Readiness to Learning: Receptive  Barriers to Learning: None  Learning Assessment  Time: 7630-8003  Education Completed: Illness and Wellness Tools  Teaching Method: Verbal , Wellness  Shared Area of Learning: Yes   Goal Set: Spend time with her kids  1 2Tx Plan Objective: 1 1,1 2 Therapist:  Kamar Colunga RN

## 2019-08-12 NOTE — PSYCH
Subjective:     Patient ID: Elizabeth Holley is a 37 y o  female  [facility]ations Clinical Progress Notes      Specialized Services Documentation  Therapist must complete separate progress note for each specific clinical activity in which the individual participated during the day  Group Psychotherapy [id number] Ericka actively shared in psychotherapy group focused on self-esteem  She engaged in self-esteem self-rate, strength selection, and discussion  Group members individually rated their self-esteem based on different statements  Group discussed how low self-esteem effects people differently, and the causes and impacts of it  Group explored a list of strengths and shared with peers  Group discussed their strengths in relationships, in their professional life, and in their personal fulfillment  ΣΑΡΑΝΤΙ shared that she struggles with her self-esteem, particularly now that she is not working  Peers encouraged her in saying that taking care of her children is a job she should be proud of  ΣΑΡΑΝΤΙ shared that she is empathetic, kind, and caring, but needs to make sure those strengths do not lead to her putting others before herself  She shared that her dedication helped her do her past jobs well  Good beginning progress towards goal observed and shared  Continue psychotherapy to further utilize self-esteem techniques to promote wellness  Tx Plan Objective: 1 1 Therapist:  JEREMY Choudhury    Allied Therapy [id number] ΣΑΡΑΝΤΙ actively participated in [facility] group focused on [address] "How" skills  She engaged in music listening and description task, egg toss, and therapist-led relaxation exercises  Group explored the "how" skills of mindfulness; nonjudgmentally, one-mindfully, and effectively  Group discussed need to practice these skills daily in order for them to come more naturally  Group explored diaphragmatic breathing and progressive muscle relaxation   ΣΑΡΑΝΤΙ shared that she needs to work on not multi-tasking  Good beginning progress towards goal observed and shared  Continue AT to further encourage practicing mindfulness daily to promote wellness  Tx Plan Objective: 1 2 Therapist:  Annetta Cowden, MT-BC      Case Management Note    Annetta Cowden, MT-BC    Current suicide risk : Low     [id number] Met with June Vigil  She reported feeling a bit less stressed after spending the weekend away with friends at the [facility]  Discussed jobs she has been applying for, and two that she got hired for, and then let go after two weeks  Discussed ways to prevent getting let go in the future  Treatment plan reviewed and signed, she was given copy of same  She shared that she feels like the Cogentin is helping with side effects a little, and asked about possible increase  She was told the providers would be notified tomorrow morning  Tonight, she plans on going to bed early to take care of her physical needs  Continue PHP to identify strengths, activate wellness tools, and create structure to prevent hospitalization  Medications changes/added/denied? No    Treatment session number: 2    Individual Case Management Visit provided today?  Yes     Innovations follow up physician's orders: None

## 2019-08-13 ENCOUNTER — OFFICE VISIT (OUTPATIENT)
Dept: PSYCHOLOGY | Facility: CLINIC | Age: 44
End: 2019-08-13
Payer: COMMERCIAL

## 2019-08-13 VITALS — RESPIRATION RATE: 18 BRPM | DIASTOLIC BLOOD PRESSURE: 84 MMHG | HEART RATE: 90 BPM | SYSTOLIC BLOOD PRESSURE: 130 MMHG

## 2019-08-13 DIAGNOSIS — F31.81 BIPOLAR 2 DISORDER (HCC): Primary | ICD-10-CM

## 2019-08-13 PROCEDURE — G0410 GRP PSYCH PARTIAL HOSP 45-50: HCPCS

## 2019-08-13 PROCEDURE — S0201 PARTIAL HOSPITALIZATION SERV: HCPCS

## 2019-08-13 PROCEDURE — G0176 OPPS/PHP;ACTIVITY THERAPY: HCPCS

## 2019-08-13 PROCEDURE — G0177 OPPS/PHP; TRAIN & EDUC SERV: HCPCS
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
## 2019-08-13 NOTE — PSYCH
Subjective:     Patient ID: Stephanie Ramirez is a 37 y o  female  [facility] Clinical Progress Notes      Specialized Services Documentation  Therapist must complete separate progress note for each specific clinical activity in which the individual participated during the day  Group Psychotherapy [id number]  Ericka attended Wellness Group Understanding Therapeutic Treatment  This is a group activity the goal of which is to increase understanding of important aspects of the therapeutic treatment process and important terms encountered in the therapeutic treatment process  This group used a word search format to discuss the issues central to treatment; knowledge of symptoms, the understanding of identifying and short circuiting the relapse process, the issue of honesty as an essential part of the collaboration with therapist, psychiatrist  The topic of suicidal ideas was raised and there was a great deal of interest in this related to honesty and the blockages to honesty  ST STACK was very focussed and participated throughout the group related to all the areas discussed  ST STACK wants to make changes in her life and in her management of her symptoms individually and in collaboration with psychiatrist  ST STACK is making progress in wellness group and will continue to attend to support her wellness and recovery goals  Tx Plan Objective: 1 1, Therapist:  Christopher Mcmanus RN    Group Psychotherapy [id number]  ST STACK attended Wellness Group Getting Into the Present Moment  This is a group which aims to expand the vocabulary for mindfulness  There were a number of words or phrases used to  help individuals connect with being calm and present  Example of the phrases : getting in the zone, hitting the pause button, coming back to your senses  The group selected phrases which had some meaning to them and /or used their own phrases that they felt helped them get in the present moment   The challenges or challenging situations the group identified were rising anger or irritation, being in a confrontation or other disturbing interaction  Aleena Raman talked about several of the likely scenarios she encounters in family interactions e g  Interactions with her  or son  Aleena Raman is making progress in wellness group and will continue to attend to support her wellness and recovery goals     Tx Plan Objective: 1 2, Therapist:  Kg Jo RN        Education Therapy   Time:  6660-7450  Previous goal met: Yes   Readiness to Learning: Receptive  Barriers to Learning: None  Learning Assessment  Time: 2293-2457  Education Completed: Illness and Wellness Tools  Teaching Method: Verbal and Written  Shared Area of Learning: Yes   Goal Set: family meeting where she intends to use her mindfulness skills to benefit the interactions  Tx Plan Objective:1 2, Therapist:  Kg Jo RN

## 2019-08-14 ENCOUNTER — DOCUMENTATION (OUTPATIENT)
Dept: PSYCHOLOGY | Facility: CLINIC | Age: 44
End: 2019-08-14

## 2019-08-14 ENCOUNTER — APPOINTMENT (OUTPATIENT)
Dept: PSYCHOLOGY | Facility: CLINIC | Age: 44
End: 2019-08-14
Payer: COMMERCIAL

## 2019-08-14 ENCOUNTER — OFFICE VISIT (OUTPATIENT)
Dept: INTERNAL MEDICINE CLINIC | Facility: CLINIC | Age: 44
End: 2019-08-14
Payer: COMMERCIAL

## 2019-08-14 VITALS
OXYGEN SATURATION: 98 % | RESPIRATION RATE: 18 BRPM | DIASTOLIC BLOOD PRESSURE: 84 MMHG | SYSTOLIC BLOOD PRESSURE: 152 MMHG | BODY MASS INDEX: 25.66 KG/M2 | HEART RATE: 110 BPM | WEIGHT: 154 LBS | HEIGHT: 65 IN | TEMPERATURE: 98.8 F

## 2019-08-14 DIAGNOSIS — F31.9 BIPOLAR 1 DISORDER (HCC): ICD-10-CM

## 2019-08-14 DIAGNOSIS — F31.81 BIPOLAR 2 DISORDER (HCC): Primary | ICD-10-CM

## 2019-08-14 DIAGNOSIS — F41.9 ANXIETY: ICD-10-CM

## 2019-08-14 DIAGNOSIS — F41.1 GAD (GENERALIZED ANXIETY DISORDER): ICD-10-CM

## 2019-08-14 PROBLEM — N39.0 UTI (URINARY TRACT INFECTION): Status: RESOLVED | Noted: 2017-05-06 | Resolved: 2019-08-14

## 2019-08-14 PROBLEM — J20.9 ACUTE BRONCHITIS: Status: RESOLVED | Noted: 2017-05-06 | Resolved: 2019-08-14

## 2019-08-14 PROBLEM — R05.9 COUGH: Status: RESOLVED | Noted: 2017-05-06 | Resolved: 2019-08-14

## 2019-08-14 PROBLEM — T14.8XXA WOUND OF SKIN: Status: RESOLVED | Noted: 2017-05-18 | Resolved: 2019-08-14

## 2019-08-14 PROCEDURE — 99214 OFFICE O/P EST MOD 30 MIN: CPT | Performed by: PHYSICIAN ASSISTANT

## 2019-08-14 RX ORDER — ZOLPIDEM TARTRATE 10 MG/1
10 TABLET ORAL
Qty: 30 TABLET | Refills: 0 | Status: SHIPPED | OUTPATIENT
Start: 2019-08-14 | End: 2019-09-03 | Stop reason: SDUPTHER

## 2019-08-14 RX ORDER — BUSPIRONE HYDROCHLORIDE 10 MG/1
5 TABLET ORAL 3 TIMES DAILY
Qty: 90 TABLET | Refills: 3 | Status: SHIPPED | OUTPATIENT
Start: 2019-08-14 | End: 2019-08-20 | Stop reason: DRUGHIGH

## 2019-08-14 NOTE — PROGRESS NOTES
Assessment/Plan:  Problem List Items Addressed This Visit        Other    Anxiety     Start buspar to help with anxiety  Bipolar 2 disorder ([facility] )     Ongoing but improving  Will refill vraylar at 3mg  Continue all other medications as ordered         Relevant Medications    busPIRone (BUSPAR) 10 mg tablet    cariprazine (VRAYLAR) 3 MG capsule    zolpidem (AMBIEN) 10 mg tablet      Other Visit Diagnoses     CHOLO (generalized anxiety disorder)    -  Primary    Relevant Medications    busPIRone (BUSPAR) 10 mg tablet    cariprazine (VRAYLAR) 3 MG capsule    zolpidem (AMBIEN) 10 mg tablet    Bipolar 1 disorder (HCC)        Relevant Medications    busPIRone (BUSPAR) 10 mg tablet    cariprazine (VRAYLAR) 3 MG capsule    zolpidem (AMBIEN) 10 mg tablet           Diagnoses and all orders for this visit:    CHOLO (generalized anxiety disorder)  -     busPIRone (BUSPAR) 10 mg tablet; Take 0 5 tablets (5 mg total) by mouth 3 (three) times a day    Bipolar 1 disorder (HCC)  -     cariprazine (VRAYLAR) 3 MG capsule; Take 1 capsule (3 mg total) by mouth daily  -     zolpidem (AMBIEN) 10 mg tablet; Take 1 tablet (10 mg total) by mouth daily at bedtime for 30 days    Anxiety    Bipolar 2 disorder (HCC)        Bipolar 2 disorder ([facility] )  Ongoing but improving  Will refill vraylar at 3mg  Continue all other medications as ordered    Anxiety  Start buspar to help with anxiety  Subjective:      Patient ID: Ellena Aschoff is a 37 y o  female  Pt presents for follow up on her depression and anxiety  At the last visit she was taken off of her high dose benzodiazepine  She was also tapered down on wellbutrin with eventual goal to discontinue  Her latuda and seroquel were discontinued and she was started on Vraylar  She is noting improvement with this but has been taking it twice per day instead of once  She tolerates it well  She remains on gabapentin and ambien     She notes ongoing anxiety but depression is somewhat improved  She remains active in the partial hospitalization program        The following portions of the patient's history were reviewed and updated as appropriate:   She has a past medical history of Anxiety, Arthritis, Bipolar 1 disorder ([address] ), Depression, Hypertension, Hypothyroidism, Known health problems: none, and Scoliosis  ,  does not have any pertinent problems on file  ,   has a past surgical history that includes Cholecystectomy  ,  family history includes Diabetes in her maternal grandmother, mother, and paternal grandmother; Heart disease in her mother; Hypertension in her father and mother  ,   reports that she has been smoking  She has a 1 50 pack-year smoking history  She has never used smokeless tobacco  She reports that she does not drink alcohol or use drugs  ,  has No Known Allergies     Current Outpatient Medications   Medication Sig Dispense Refill    benztropine (COGENTIN) 0 5 mg tablet Take 1 tablet (0 5 mg total) by mouth 2 (two) times a day 60 tablet 1    buPROPion (WELLBUTRIN XL) 300 mg 24 hr tablet Take 300 mg by mouth daily      cariprazine (VRAYLAR) 3 MG capsule Take 1 capsule (3 mg total) by mouth daily 30 capsule 2    ergocalciferol (VITAMIN D2) 50,000 units Take 1 capsule (50,000 Units total) by mouth once a week 12 capsule 3    gabapentin (NEURONTIN) 600 MG tablet Take 1 tablet (600 mg total) by mouth 3 (three) times a day 90 tablet 1    levothyroxine 50 mcg tablet Take 1 tablet (50 mcg total) by mouth daily for 90 days 90 tablet 3    lisinopril (ZESTRIL) 20 mg tablet Take 2 tablets (40 mg total) by mouth daily 90 tablet 3    venlafaxine (EFFEXOR-XR) 150 mg 24 hr capsule Take 2 capsules (300 mg total) by mouth daily 60 capsule 1    zolpidem (AMBIEN) 10 mg tablet Take 1 tablet (10 mg total) by mouth daily at bedtime for 30 days 30 tablet 0    busPIRone (BUSPAR) 10 mg tablet Take 0 5 tablets (5 mg total) by mouth 3 (three) times a day 90 tablet 3     No current facility-administered medications for this visit  Review of Systems   Constitutional: Negative for chills and fever  HENT: Negative for congestion, ear pain, hearing loss, postnasal drip, rhinorrhea, sinus pressure, sinus pain, sore throat and trouble swallowing  Eyes: Negative for pain and visual disturbance  Respiratory: Negative for cough, chest tightness, shortness of breath and wheezing  Cardiovascular: Negative  Negative for chest pain, palpitations and leg swelling  Gastrointestinal: Negative for abdominal pain, blood in stool, constipation, diarrhea, nausea and vomiting  Endocrine: Negative for cold intolerance, heat intolerance, polydipsia, polyphagia and polyuria  Genitourinary: Negative for difficulty urinating, dysuria, flank pain and urgency  Musculoskeletal: Negative for arthralgias, back pain, gait problem and myalgias  Skin: Negative for rash  Allergic/Immunologic: Negative  Neurological: Negative for dizziness, weakness, light-headedness and headaches  Hematological: Negative  Psychiatric/Behavioral: Negative for behavioral problems, dysphoric mood and sleep disturbance  The patient is nervous/anxious  Objective:  Vitals:    08/14/19 1036   BP: 152/84   BP Location: Left arm   Patient Position: Sitting   Cuff Size: Large   Pulse: (!) 110   Resp: 18   Temp: 98 8 °F (37 1 °C)   SpO2: 98%   Weight: 69 9 kg (154 lb)   Height: 5' 5" (1 651 m)     Body mass index is 25 63 kg/m²  Physical Exam   Constitutional: She is oriented to person, place, and time  She appears well-developed and well-nourished  No distress  HENT:   Head: Normocephalic and atraumatic  Right Ear: External ear normal    Left Ear: External ear normal    Nose: Nose normal    Mouth/Throat: Oropharynx is clear and moist  No oropharyngeal exudate  Eyes: Pupils are equal, round, and reactive to light  Conjunctivae and EOM are normal  Right eye exhibits no discharge   Left eye exhibits no discharge  No scleral icterus  Neck: Normal range of motion  Neck supple  No thyromegaly present  Cardiovascular: Normal rate, regular rhythm and normal heart sounds  Exam reveals no gallop and no friction rub  No murmur heard  Pulmonary/Chest: Effort normal and breath sounds normal  No respiratory distress  She has no wheezes  She has no rales  Abdominal: Soft  Bowel sounds are normal  She exhibits no distension  There is no tenderness  Musculoskeletal: Normal range of motion  She exhibits no edema, tenderness or deformity  Neurological: She is alert and oriented to person, place, and time  No cranial nerve deficit  Skin: Skin is warm and dry  She is not diaphoretic  Psychiatric: Her behavior is normal  Judgment and thought content normal  Her mood appears anxious  BMI Counseling: Body mass index is 25 63 kg/m²  Discussed the patient's BMI with her  The BMI is above average  BMI counseling and education was provided to the patient  Nutrition recommendations include reducing portion sizes

## 2019-08-14 NOTE — PROGRESS NOTES
Subjective:     Patient ID: Som Lombardi is a 37 y o  female  [facility] Clinical Progress Notes      Specialized Services Documentation  Therapist must complete separate progress note for each specific clinical activity in which the individual participated during the day  Case Management Note    JEREMY Donato    Current suicide risk : Elodia Villalobos was excused from [facility] today for previously scheduled doctor appointment  She has program and [facility] crisis number if needed  She will return to program tomorrow, 8/15  Medications changes/added/denied? No    Treatment session number: N/A    Individual Case Management Visit provided today?  No    [facility] follow up physician's orders: None

## 2019-08-14 NOTE — PATIENT INSTRUCTIONS
Obesity   AMBULATORY CARE:   Obesity  is when your body mass index (BMI) is greater than 30  Your healthcare provider will use your height and weight to measure your BMI  The risks of obesity include  many health problems, such as injuries or physical disability  You may need tests to check for the following:  · Diabetes     · High blood pressure or high cholesterol     · Heart disease     · Gallbladder or liver disease     · Cancer of the colon, breast, prostate, liver, or kidney     · Sleep apnea     · Arthritis or gout  Seek care immediately if:   · You have a severe headache, confusion, or difficulty speaking  · You have weakness on one side of your body  · You have chest pain, sweating, or shortness of breath  Contact your healthcare provider if:   · You have symptoms of gallbladder or liver disease, such as pain in your upper abdomen  · You have knee or hip pain and discomfort while walking  · You have symptoms of diabetes, such as intense hunger and thirst, and frequent urination  · You have symptoms of sleep apnea, such as snoring or daytime sleepiness  · You have questions or concerns about your condition or care  Treatment for obesity  focuses on helping you lose weight to improve your health  Even a small decrease in BMI can reduce the risk for many health problems  Your healthcare provider will help you set a weight-loss goal   · Lifestyle changes  are the first step in treating obesity  These include making healthy food choices and getting regular physical activity  Your healthcare provider may suggest a weight-loss program that involves coaching, education, and therapy  · Medicine  may help you lose weight when it is used with a healthy diet and physical activity  · Surgery  can help you lose weight if you are very obese and have other health problems  There are several types of weight-loss surgery  Ask your healthcare provider for more information    Be successful losing weight:   · Set small, realistic goals  An example of a small goal is to walk for 20 minutes 5 days a week  Anther goal is to lose 5% of your body weight  · Tell friends, family members, and coworkers about your goals  and ask for their support  Ask a friend to lose weight with you, or join a weight-loss support group  · Identify foods or triggers that may cause you to overeat , and find ways to avoid them  Remove tempting high-calorie foods from your home and workplace  Place a bowl of fresh fruit on your kitchen counter  If stress causes you to eat, then find other ways to cope with stress  · Keep a diary to track what you eat and drink  Also write down how many minutes of physical activity you do each day  Weigh yourself once a week and record it in your diary  Eating changes: You will need to eat 500 to 1,000 fewer calories each day than you currently eat to lose 1 to 2 pounds a week  The following changes will help you cut calories:  · Eat smaller portions  Use small plates, no larger than 9 inches in diameter  Fill your plate half full of fruits and vegetables  Measure your food using measuring cups until you know what a serving size looks like  · Eat 3 meals and 1 or 2 snacks each day  Plan your meals in advance  Trinidad Morales and eat at home most of the time  Eat slowly  · Eat fruits and vegetables at every meal   They are low in calories and high in fiber, which makes you feel full  Do not add butter, margarine, or cream sauce to vegetables  Use herbs to season steamed vegetables  · Eat less fat and fewer fried foods  Eat more baked or grilled chicken and fish  These protein sources are lower in calories and fat than red meat  Limit fast food  Dress your salads with olive oil and vinegar instead of bottled dressing  · Limit the amount of sugar you eat  Do not drink sugary beverages  Limit alcohol  Activity changes:  Physical activity is good for your body in many ways   It helps you burn calories and build strong muscles  It decreases stress and depression, and improves your mood  It can also help you sleep better  Talk to your healthcare provider before you begin an exercise program   · Exercise for at least 30 minutes 5 days a week  Start slowly  Set aside time each day for physical activity that you enjoy and that is convenient for you  It is best to do both weight training and an activity that increases your heart rate, such as walking, bicycling, or swimming  · Find ways to be more active  Do yard work and housecleaning  Walk up the stairs instead of using elevators  Spend your leisure time going to events that require walking, such as outdoor festivals or fairs  This extra physical activity can help you lose weight and keep it off  Follow up with your healthcare provider as directed: You may need to meet with a dietitian  Write down your questions so you remember to ask them during your visits  © 2017 [address] [employer] Information is for End User's use only and may not be sold, redistributed or otherwise used for commercial purposes  All illustrations and images included in CareNotes® are the copyrighted property of A [employer] M [employer]  or [employer]  The above information is an  only  It is not intended as medical advice for individual conditions or treatments  Talk to your doctor, nurse or pharmacist before following any medical regimen to see if it is safe and effective for you

## 2019-08-15 ENCOUNTER — OFFICE VISIT (OUTPATIENT)
Dept: PSYCHOLOGY | Facility: CLINIC | Age: 44
End: 2019-08-15
Payer: COMMERCIAL

## 2019-08-15 DIAGNOSIS — F31.81 BIPOLAR 2 DISORDER (HCC): Primary | ICD-10-CM

## 2019-08-15 PROCEDURE — S0201 PARTIAL HOSPITALIZATION SERV: HCPCS

## 2019-08-15 PROCEDURE — G0177 OPPS/PHP; TRAIN & EDUC SERV: HCPCS

## 2019-08-15 PROCEDURE — G0410 GRP PSYCH PARTIAL HOSP 45-50: HCPCS

## 2019-08-15 NOTE — PSYCH
Subjective:     Patient ID: Mila Mills is a 37 y o  female  [facility] Clinical Progress Notes      Specialized Services Documentation  Therapist must complete separate progress note for each specific clinical activity in which the individual participated during the day  Group Psychotherapy [id number]  Psychotherapy group focused on coping mechanisms when feeling overwhelmed or in distress  Group discussed distraction and grounding techniques, emotional release, self love, thought challenge, and accessing your higher self  Tyreearthur Richards actively participated in the group discussion  She identified taking a walk as a distraction technique  She was able to show insight into how using distraction techniques does not solve any underlying issues  She shared how talking to someone and laughing or also emotional releases she can utilize  Some progress toward goals  Continue psychotherapy group to explore stressors and ways of coping     Tx Plan Objective: 1 2, Therapist:  Hakeem Alvarez LCSW    Education Therapy   Time:  3422-1717  Previous goal met: No  Readiness to Learning: Receptive  Barriers to Learning: None  Learning Assessment  Time: 5808-7507  Education Completed: Wellness Tools  Teaching Method: Verbal and Written  Shared Area of Learning: Yes   Goal Set: interview  Tx Plan Objective: 1 1, 1 4, Therapist:  Hakeem Alvarez LCSW

## 2019-08-15 NOTE — PSYCH
Subjective:     Patient ID: Rudolph Perez is a 37 y o  female  [facility] Clinical Progress Notes      Specialized Services Documentation  Therapist must complete separate progress note for each specific clinical activity in which the individual participated during the day  Case Management Note    DOMINIC HuntBC    Current suicide risk : Low     [id number] Met briefly with Rudolph Perez  She expressed feelings of gratitude of CRNP adjusting medications, and feels like her concerns are being heard  She will begin medication increase tonight  No other current concerns  Continue PHP to identify strengths, activate wellness tools, and create structure to prevent hospitalization  Medications changes/added/denied? Yes     Treatment session number: 4    Individual Case Management Visit provided today? Yes     [facility] follow up physician's orders: See note by FIONA Hale

## 2019-08-15 NOTE — PSYCH
Subjective:     Patient ID: Som Lombardi is a 37 y o  female  [facility] Clinical Progress Notes      Specialized Services Documentation  Therapist must complete separate progress note for each specific clinical activity in which the individual participated during the day  Group Psychotherapy [id number]  Ericka attended Wellness Group I Will Like Myself A to Z  This is a group activity which increases self esteem by acknowledging and accepting positive qualities regarding oneself  Chidi Scuhlte participated in this group activity and contributed to the group discussion  She identified this was a difficult task and initially was unsure of how she could describe herself but was able to identify qualities that are health and wellness focussed  Chidi Schulte is making progress in wellness group and will continue to attend to support her recovery goals  Tx Plan Objective: 1 1,1 2 Therapist:  Nicola Esparza RN    Group Psychotherapy  [id number]  Chidi Schulte attended Wellness Group Red Flags  This is a group activity  Which increases individuals awareness of warning signs of relapse  There was also a handout on early warning signs and more serious warning signs  Chidi Schulte contributed to this group with self acknowledgement and disclosure and she was also supportive towards peers  She was able to identify her early warning signs and make decisions about what she will do when/if she experiences these symptoms  Chidi Schulte is making progress in wellness group and will continue to attend to support her wellness, recovery goals     Tx Plan Objective: 1 1,1 2 Therapist:  Nicola Esparza RN

## 2019-08-16 ENCOUNTER — OFFICE VISIT (OUTPATIENT)
Dept: PSYCHOLOGY | Facility: CLINIC | Age: 44
End: 2019-08-16
Payer: COMMERCIAL

## 2019-08-16 DIAGNOSIS — F41.9 ANXIETY: ICD-10-CM

## 2019-08-16 DIAGNOSIS — F31.81 BIPOLAR 2 DISORDER (HCC): Primary | ICD-10-CM

## 2019-08-16 PROCEDURE — G0177 OPPS/PHP; TRAIN & EDUC SERV: HCPCS

## 2019-08-16 PROCEDURE — S0201 PARTIAL HOSPITALIZATION SERV: HCPCS

## 2019-08-16 PROCEDURE — G0176 OPPS/PHP;ACTIVITY THERAPY: HCPCS

## 2019-08-16 PROCEDURE — G0410 GRP PSYCH PARTIAL HOSP 45-50: HCPCS

## 2019-08-16 NOTE — PSYCH
Subjective:     Patient ID: Mila Mills is a 37 y o  female  [facility] Clinical Progress Notes      Specialized Services Documentation  Therapist must complete separate progress note for each specific clinical activity in which the individual participated during the day  Group Psychotherapy [id number]  Tyree Richards attended Wellness Group Weekly Wellness Assessment which is a group aiming to educate individuals in using the 6 dimensions of wellness - physical, emotional, cognitive, social, vocational and spiritual  This tool guides them in self evaluation of the areas of their life they would like to make change in and as an aid to assist them in determining how to move forward  Tyree Richards was verbal and disclosing as well as supportive and encouraging of other group members  She spoke about the social and emotional areas as the areas  she feels she needs to focus on most and where she is raising her awareness about these needs and is using many of the tools she is learning here  Tyree Richards is making progress in wellness group and will continue to attend to support her wellness and recovery goals     Tx Plan Objective: 1 1,1 2 Therapist:  Jass Abdalla RN        Education Therapy   Time:  3250-3062  Previous goal met: Yes   Readiness to Learning: Receptive  Barriers to Learning: None  Learning Assessment  Time: 1591-9101  Education Completed: Illness and Wellness Tools  Teaching Method: Verbal, Written  Shared Area of Learning: Yes   Goal Set: mindfulness during  therapy session saturday  Tx Plan Objective:1 2, Therapist:  Jass Abdalla RN

## 2019-08-16 NOTE — PSYCH
Subjective:     Patient ID: June Vigil is a 37 y o  female  [facility] Clinical Progress Notes      Specialized Services Documentation  Therapist must complete separate progress note for each specific clinical activity in which the individual participated during the day  Group Psychotherapy [id number] Darielolga Alicea actively participated in psychotherapy group focused on the cycle of anxiety  She engaged in discussion and plan development  Group discussed difference between a worry and anxiety, and where obsessive thinking falls in  Group explored the cycle of anxiety including anxiety, avoidance, short-term relief from anxiety, and long-term anxiety growth  Group discussed the four steps of coping with worry, including relaxation, risk assessment, scheduling worry time, and worry behavior prevention  Group discussed four ways to handle a problem: solve it, feel better about it, tolerate it, or stay miserable  Darielolga Alicea shared that she can remove herself from the situation, or remind herself to think about it rationally, in order to break the cycle of anxiety  Good progress towards goal observed and shared  Continue psychotherapy to further encourage utilizing tools to understand and decrease anxiety  Tx Plan Objective: 1 2 Therapist:  Annetta Cowden, MT-BC    Allied Therapy [id number] Pau Alicea actively shared in [facility] group focused on DBT Emotion Regulation  She engaged in discussion and emotion regulation categories game  Group reviewed goals of emotion regulation and why we practice it  Group discussed understanding and naming emotions by knowing what they do for you, factors that make regulating them hard, and using ways to describe them  Group reviewed prompting events, biological changes, expressions and actions, and aftereffects of experiencing different emotions, and practiced naming different aspects in those categories   Pau Alicea shared that she wants to work on changing her immediate actions after an emotion is experienced  Good progress towards goal observed and shared  Continue AT to further encourage understanding and naming emotions to work on emotion regulation  Tx Plan Objective: 1 1, 1 2 Therapist:  JEREMY Guzman        Case Management Note     JEREMY Guzman     Current suicide risk : Low      5426-4254 Met with Azam Gutierrez  She reported feeling like the increased cogentin is helping her side effects  She shared feelings of frustration that her  is withholding money, and she cannot pay all the bills  Discussed need to find employment  She identified looking through [employer] and calling [employer] as productive things to do this weekend  Discussed focusing on one step at a time, in order to not feel overwhelmed  Weekend plans, supports, and county crisis number discussed  Continue PHP to identify strengths, activate wellness tools, and create structure to prevent hospitalization  Medications changes/added/denied? No     Treatment session number: 5     Individual Case Management Visit provided today?  Yes      [facility] follow up physician's orders: None

## 2019-08-17 ENCOUNTER — OFFICE VISIT (OUTPATIENT)
Dept: URGENT CARE | Facility: CLINIC | Age: 44
End: 2019-08-17
Payer: COMMERCIAL

## 2019-08-17 ENCOUNTER — APPOINTMENT (OUTPATIENT)
Dept: RADIOLOGY | Facility: CLINIC | Age: 44
End: 2019-08-17
Payer: COMMERCIAL

## 2019-08-17 VITALS
BODY MASS INDEX: 25.66 KG/M2 | DIASTOLIC BLOOD PRESSURE: 71 MMHG | RESPIRATION RATE: 16 BRPM | OXYGEN SATURATION: 100 % | WEIGHT: 154 LBS | TEMPERATURE: 97.6 F | HEIGHT: 65 IN | HEART RATE: 99 BPM | SYSTOLIC BLOOD PRESSURE: 131 MMHG

## 2019-08-17 DIAGNOSIS — M25.571 ACUTE RIGHT ANKLE PAIN: ICD-10-CM

## 2019-08-17 DIAGNOSIS — S93.401A SPRAIN OF RIGHT ANKLE, UNSPECIFIED LIGAMENT, INITIAL ENCOUNTER: Primary | ICD-10-CM

## 2019-08-17 PROCEDURE — 99213 OFFICE O/P EST LOW 20 MIN: CPT | Performed by: NURSE PRACTITIONER

## 2019-08-17 PROCEDURE — 73610 X-RAY EXAM OF ANKLE: CPT

## 2019-08-17 RX ORDER — NAPROXEN 500 MG/1
500 TABLET ORAL 2 TIMES DAILY WITH MEALS
Qty: 20 TABLET | Refills: 0 | Status: SHIPPED | OUTPATIENT
Start: 2019-08-17 | End: 2019-08-22 | Stop reason: CLARIF

## 2019-08-17 NOTE — PROGRESS NOTES
[facility] [facility]        NAME: Azam Gutierrez is a 37 y o  female  : 1975    MRN: [id number]  DATE: 2019  TIME: 5:54 PM    Assessment and Plan   Sprain of right ankle, unspecified ligament, initial encounter [S93 401A]  1  Sprain of right ankle, unspecified ligament, initial encounter  naproxen (NAPROSYN) 500 mg tablet    Ambulatory referral to Sports Medicine    Crutches   2  Acute right ankle pain  XR ankle 3+ vw right    Ambulatory referral to Sports Medicine    Crutches         Patient Instructions     Patient Instructions     No fractures seen on xray  Radiology does the final read; if they see anything I did not, I will call you  Rest, ice 15-20 min every 3-4 hours, use ACE and/or surgical shoe for support/comfort, elevate when at rest   Follow-up with sports medicine for further evaluation  Either take the naproxen 500 mg every 12 hours OR motrin 600 mg every 6 hours but not both because they are both NSAIDs  Take these with food to prevent GI irritation  Ankle Sprain   AMBULATORY CARE:   An ankle sprain  happens when 1 or more ligaments in your ankle joint stretch or tear  Ligaments are tough tissues that connect bones  Ligaments support your joints and keep your bones in place  Common symptoms include the following:   · Trouble moving your ankle or foot    · Pain when you touch or put weight on your ankle    · Bruised, swollen, or misshapen ankle  Seek care immediately if:   · You have severe pain in your ankle  · Your foot or toes are cold or numb  · Your ankle becomes more weak or unstable (wobbly)  · You are unable to put any weight on your ankle or foot  · Your swelling has increased or returned  Contact your healthcare provider if:   · Your pain does not go away, even after treatment  · You have questions or concerns about your condition or care  Treatment:   · Medicines      ¨ NSAIDs , such as ibuprofen, help decrease swelling, pain, and fever   This medicine is available with or without a doctor's order  NSAIDs can cause stomach bleeding or kidney problems in certain people  If you take blood thinner medicine, always ask your healthcare provider if NSAIDs are safe for you  Always read the medicine label and follow directions  ¨ Acetaminophen  decreases pain  It is available without a doctor's order  Ask how much to take and how often to take it  Follow directions  Acetaminophen can cause liver damage if not taken correctly  ¨ Prescription pain medicine  may be given  Ask how to take this medicine safely  · Surgery  may be needed to repair or replace a torn ligament if your sprain does not heal with other treatments  Your healthcare provider may use screws to attach the bones in your ankle together  The screws may help support your ankle and make it stable  Ask your healthcare provider for more information about surgery to treat your ankle sprain  Self care:   · Use support devices,  such as a brace, cast, or splint, may be needed to limit your movement and protect your joint  You may need to use crutches to decrease your pain as you move around  · Go to physical therapy as directed  A physical therapist teaches you exercises to help improve movement and strength, and to decrease pain  · Rest  your ankle so that it can heal  Return to normal activities as directed  · Apply ice on your ankle for 15 to 20 minutes every hour or as directed  Use an ice pack, or put crushed ice in a plastic bag  Cover it with a towel  Ice helps prevent tissue damage and decreases swelling and pain  · Compress  your ankle  Ask if you should wrap an elastic bandage around your injured ligament  An elastic bandage provides support and helps decrease swelling and movement so your joint can heal  Wear as long as directed  · Elevate  your ankle above the level of your heart as often as you can  This will help decrease swelling and pain   Prop your ankle on pillows or blankets to keep it elevated comfortably  Prevent another ankle sprain:   · Let your ankle heal   Find out how long your ligament needs to heal  Do not do any physical activity until your healthcare provider says it is okay  If you start activity too soon, you may develop a more serious injury  · Always warm up and stretch  before you exercise or play sports  · Use the right equipment  Always wear shoes that fit well and are made for the activity that you are doing  You may also need ankle supports, elbow and knee pads, or braces  Follow up with your healthcare provider as directed:  Write down your questions so you remember to ask them during your visits  © 2017 [address] [employer] Information is for End User's use only and may not be sold, redistributed or otherwise used for commercial purposes  All illustrations and images included in CareNotes® are the copyrighted property of [employer]  or [employer]  The above information is an  only  It is not intended as medical advice for individual conditions or treatments  Talk to your doctor, nurse or pharmacist before following any medical regimen to see if it is safe and effective for you  Follow up with PCP in 3-5 days  Proceed to  ER if symptoms worsen  Chief Complaint     Chief Complaint   Patient presents with    Ankle Pain     right x 2 weeks         History of Present Illness       Patient reports pain on the outside of her ankle for about 2 weeks  She denies any specific mechanism of injury  She notes that there is slight localized edema  Movement and walking make it worse  The patient presents to be seen  Review of Systems   Review of Systems   Musculoskeletal: Positive for arthralgias  All other systems reviewed and are negative          Current Medications       Current Outpatient Medications:     benztropine (COGENTIN) 1 mg tablet, Take 1 tablet (1 mg total) by mouth 2 (two) times a day, Disp: 60 tablet, Rfl: 0    buPROPion (WELLBUTRIN XL) 300 mg 24 hr tablet, Take 300 mg by mouth daily, Disp: , Rfl:     busPIRone (BUSPAR) 10 mg tablet, Take 0 5 tablets (5 mg total) by mouth 3 (three) times a day, Disp: 90 tablet, Rfl: 3    cariprazine (VRAYLAR) 3 MG capsule, Take 1 capsule (3 mg total) by mouth daily, Disp: 30 capsule, Rfl: 2    ergocalciferol (VITAMIN D2) 50,000 units, Take 1 capsule (50,000 Units total) by mouth once a week, Disp: 12 capsule, Rfl: 3    gabapentin (NEURONTIN) 600 MG tablet, Take 1 tablet (600 mg total) by mouth 3 (three) times a day, Disp: 90 tablet, Rfl: 1    levothyroxine 50 mcg tablet, Take 1 tablet (50 mcg total) by mouth daily for 90 days, Disp: 90 tablet, Rfl: 3    lisinopril (ZESTRIL) 20 mg tablet, Take 2 tablets (40 mg total) by mouth daily, Disp: 90 tablet, Rfl: 3    venlafaxine (EFFEXOR-XR) 150 mg 24 hr capsule, Take 2 capsules (300 mg total) by mouth daily, Disp: 60 capsule, Rfl: 1    zolpidem (AMBIEN) 10 mg tablet, Take 1 tablet (10 mg total) by mouth daily at bedtime for 30 days, Disp: 30 tablet, Rfl: 0    naproxen (NAPROSYN) 500 mg tablet, Take 1 tablet (500 mg total) by mouth 2 (two) times a day with meals for 10 days, Disp: 20 tablet, Rfl: 0    Current Allergies     Allergies as of 08/17/2019    (No Known Allergies)            The following portions of the patient's history were reviewed and updated as appropriate: allergies, current medications, past family history, past medical history, past social history, past surgical history and problem list      Past Medical History:   Diagnosis Date    Anxiety     Arthritis     Bipolar 1 disorder ([address] )     Depression     Hypertension     Hypothyroidism     Known health problems: none     Scoliosis        Past Surgical History:   Procedure Laterality Date    CHOLECYSTECTOMY         Family History   Problem Relation Age of Onset    Diabetes Mother     Hypertension Mother     Heart disease Mother     Hypertension Father     Diabetes Maternal Grandmother     Diabetes Paternal Grandmother          Medications have been verified  Objective   /71   Pulse 99   Temp 97 6 °F (36 4 °C)   Resp 16   Ht 5' 5" (1 651 m)   Wt 69 9 kg (154 lb)   SpO2 100%   BMI 25 63 kg/m²        Physical Exam     Physical Exam   Constitutional: She is oriented to person, place, and time  She appears well-developed and well-nourished  No distress  HENT:   Head: Normocephalic and atraumatic  Musculoskeletal: She exhibits edema and tenderness  Right knee: Normal         Right ankle: She exhibits swelling  She exhibits normal range of motion, no ecchymosis, no deformity, no laceration and normal pulse  Tenderness  AITFL tenderness found  Right lower leg: Normal         Right foot: Normal    Neurological: She is alert and oriented to person, place, and time  Skin: Skin is warm and dry  Capillary refill takes less than 2 seconds  She is not diaphoretic  Psychiatric: She has a normal mood and affect  Her behavior is normal  Judgment and thought content normal    Nursing note and vitals reviewed

## 2019-08-17 NOTE — PATIENT INSTRUCTIONS
No fractures seen on xray  Radiology does the final read; if they see anything I did not, I will call you  Rest, ice 15-20 min every 3-4 hours, use ACE and/or surgical shoe for support/comfort, elevate when at rest   Follow-up with sports medicine for further evaluation  Either take the naproxen 500 mg every 12 hours OR motrin 600 mg every 6 hours but not both because they are both NSAIDs  Take these with food to prevent GI irritation  Ankle Sprain   AMBULATORY CARE:   An ankle sprain  happens when 1 or more ligaments in your ankle joint stretch or tear  Ligaments are tough tissues that connect bones  Ligaments support your joints and keep your bones in place  Common symptoms include the following:   · Trouble moving your ankle or foot    · Pain when you touch or put weight on your ankle    · Bruised, swollen, or misshapen ankle  Seek care immediately if:   · You have severe pain in your ankle  · Your foot or toes are cold or numb  · Your ankle becomes more weak or unstable (wobbly)  · You are unable to put any weight on your ankle or foot  · Your swelling has increased or returned  Contact your healthcare provider if:   · Your pain does not go away, even after treatment  · You have questions or concerns about your condition or care  Treatment:   · Medicines      ¨ NSAIDs , such as ibuprofen, help decrease swelling, pain, and fever  This medicine is available with or without a doctor's order  NSAIDs can cause stomach bleeding or kidney problems in certain people  If you take blood thinner medicine, always ask your healthcare provider if NSAIDs are safe for you  Always read the medicine label and follow directions  ¨ Acetaminophen  decreases pain  It is available without a doctor's order  Ask how much to take and how often to take it  Follow directions  Acetaminophen can cause liver damage if not taken correctly  ¨ Prescription pain medicine  may be given   Ask how to take this medicine safely  · Surgery  may be needed to repair or replace a torn ligament if your sprain does not heal with other treatments  Your healthcare provider may use screws to attach the bones in your ankle together  The screws may help support your ankle and make it stable  Ask your healthcare provider for more information about surgery to treat your ankle sprain  Self care:   · Use support devices,  such as a brace, cast, or splint, may be needed to limit your movement and protect your joint  You may need to use crutches to decrease your pain as you move around  · Go to physical therapy as directed  A physical therapist teaches you exercises to help improve movement and strength, and to decrease pain  · Rest  your ankle so that it can heal  Return to normal activities as directed  · Apply ice on your ankle for 15 to 20 minutes every hour or as directed  Use an ice pack, or put crushed ice in a plastic bag  Cover it with a towel  Ice helps prevent tissue damage and decreases swelling and pain  · Compress  your ankle  Ask if you should wrap an elastic bandage around your injured ligament  An elastic bandage provides support and helps decrease swelling and movement so your joint can heal  Wear as long as directed  · Elevate  your ankle above the level of your heart as often as you can  This will help decrease swelling and pain  Prop your ankle on pillows or blankets to keep it elevated comfortably  Prevent another ankle sprain:   · Let your ankle heal   Find out how long your ligament needs to heal  Do not do any physical activity until your healthcare provider says it is okay  If you start activity too soon, you may develop a more serious injury  · Always warm up and stretch  before you exercise or play sports  · Use the right equipment  Always wear shoes that fit well and are made for the activity that you are doing  You may also need ankle supports, elbow and knee pads, or braces    Follow up with your healthcare provider as directed:  Write down your questions so you remember to ask them during your visits  © 2017 [address] [employer] Information is for End User's use only and may not be sold, redistributed or otherwise used for commercial purposes  All illustrations and images included in CareNotes® are the copyrighted property of [employer] Inc  or [employer]  The above information is an  only  It is not intended as medical advice for individual conditions or treatments  Talk to your doctor, nurse or pharmacist before following any medical regimen to see if it is safe and effective for you

## 2019-08-19 ENCOUNTER — OFFICE VISIT (OUTPATIENT)
Dept: PSYCHOLOGY | Facility: CLINIC | Age: 44
End: 2019-08-19
Payer: COMMERCIAL

## 2019-08-19 DIAGNOSIS — F31.81 BIPOLAR 2 DISORDER (HCC): Primary | ICD-10-CM

## 2019-08-19 PROCEDURE — G0410 GRP PSYCH PARTIAL HOSP 45-50: HCPCS

## 2019-08-19 PROCEDURE — G0177 OPPS/PHP; TRAIN & EDUC SERV: HCPCS

## 2019-08-19 PROCEDURE — S0201 PARTIAL HOSPITALIZATION SERV: HCPCS

## 2019-08-19 NOTE — PSYCH
Subjective:     Patient ID: Stephon Vásquez is a 37 y o  female  [facility] Clinical Progress Notes      Specialized Services Documentation  Therapist must complete separate progress note for each specific clinical activity in which the individual participated during the day  Group Psychotherapy [id number]  Ericka attended Wellness Group Gratitude Journal which addressed a morning gratitude, end of the day gratitude and people individuals were grateful for  The handout also asked about obstacles to this process and what the challenges were teaching individuals  There was also some education provided on creating a gratitude journal with journaling prompts, tips  Nicolle Almodovar participated in this group talking about members of her family, the family based services she is having to help with her son and relationships in general and she knows they are making progress in the family which she is deeply grateful for  She is also grateful for the resources she has for herself in therapy and the opportunity to make changes in her own ability to manage some of her symptoms  Nicolle Almodovar is making progress in wellness group and will continue to attend to support her wellness goals  Tx Plan Objective: 1 1,1 2 Therapist:  Chris Reyes RN    Group [id number]   Nicolle Almodovar attended Wellness Group Personal Recovery Inventory  The [facility] definition of recovery and their 10 guiding principles - hope, respect, person driven, peer support, holistic, strengths and responsibility, culture, many pathways, relational, addresses trauma  These principles were used in the group discussion  Ericka grasps the recovery journey is a process which requires her active participation and willingness to make changes and she discussed this in group with her peers and she discussed her willingness to make changes  Nicolle Almodovar is making progress in Wellness Group and will continue to attend to support her recovery goals     Tx Plan Objective:1 1,1 2 Therapist:  Deandre Bey RN    Group Psychotherapy [id number]  Ericka attended Wellness Group Making connections  This group activity aims to increase understanding of ones own strengths and difficulties in communicating with others  Additionally it aims to increase insight into common struggles with communication  Josselyn Guan talked about an obstacle she faces being that she thinks she does not make sense when she speaks and she stops trying to comminicate "I end up not knowing what I mean " She realizes that this is related to a central problem she has with low self esteem and lack of belief "that I'm a good person " Josselyn Guan is making progress in wellness group and will continue to attend to support her wellness goals     Tx Plan Objective: 1 1,1 2 Therapist:  Deandre Bey RN

## 2019-08-19 NOTE — PSYCH
Subjective:     Patient ID: Rc Ellison is a 37 y o  female  [facility] Clinical Progress Notes      Specialized Services Documentation  Therapist must complete separate progress note for each specific clinical activity in which the individual participated during the day  Education Therapy   Time:  5954-9781  Previous goal met: No  Readiness to Learning: Receptive  Barriers to Learning: None  Learning Assessment  Time: 3280-5137  Education Completed: Wellness Tools  Teaching Method: Verbal and Written  Shared Area of Learning: Yes   Goal Set: to be more organized  Tx Plan Objective: 1 2, Therapist:  Yoon Grissom LCSW      Case Management Note    Darleen Gao LCSW    Current suicide risk : Low     [id number]  Met with Rc Ellison  Reports a decrease in anxiety which she feels is related to her trip to the beach over the weekend and her son, Darwin Jordan spending more time at her mother's  Ericka plans to speak to her mother about balancing time between their households for Ericka's overall wellness  Christen Powell shared her son also enjoys being at her mother's as there are more things to do in her area  She continued to express concern of arguments with her  and has difficulty coping  Aftercare briefly explored  Continue PHP to activate wellness tools and create structure to prevent hospitalization  Medications changes/added/denied? No    Treatment session number: 6    Individual Case Management Visit provided today?  Yes     [facility] follow up physician's orders: na

## 2019-08-20 ENCOUNTER — OFFICE VISIT (OUTPATIENT)
Dept: PSYCHOLOGY | Facility: CLINIC | Age: 44
End: 2019-08-20
Payer: COMMERCIAL

## 2019-08-20 DIAGNOSIS — F41.1 GAD (GENERALIZED ANXIETY DISORDER): ICD-10-CM

## 2019-08-20 DIAGNOSIS — F41.1 GAD (GENERALIZED ANXIETY DISORDER): Primary | ICD-10-CM

## 2019-08-20 PROCEDURE — S0201 PARTIAL HOSPITALIZATION SERV: HCPCS

## 2019-08-20 PROCEDURE — G0410 GRP PSYCH PARTIAL HOSP 45-50: HCPCS

## 2019-08-20 PROCEDURE — G0176 OPPS/PHP;ACTIVITY THERAPY: HCPCS

## 2019-08-20 PROCEDURE — G0177 OPPS/PHP; TRAIN & EDUC SERV: HCPCS

## 2019-08-20 RX ORDER — BUSPIRONE HYDROCHLORIDE 10 MG/1
10 TABLET ORAL 3 TIMES DAILY
Qty: 90 TABLET | Refills: 3 | Status: CANCELLED | OUTPATIENT
Start: 2019-08-20

## 2019-08-20 RX ORDER — HYDROXYZINE HYDROCHLORIDE 25 MG/1
25 TABLET, FILM COATED ORAL EVERY 6 HOURS PRN
Qty: 60 TABLET | Refills: 0 | Status: SHIPPED | OUTPATIENT
Start: 2019-08-20 | End: 2019-08-22 | Stop reason: SDDI

## 2019-08-20 RX ORDER — BUSPIRONE HYDROCHLORIDE 10 MG/1
10 TABLET ORAL 3 TIMES DAILY
Qty: 90 TABLET | Refills: 1 | Status: SHIPPED | OUTPATIENT
Start: 2019-08-20 | End: 2019-08-28 | Stop reason: SDUPTHER

## 2019-08-20 RX ORDER — HYDROXYZINE HYDROCHLORIDE 25 MG/1
25 TABLET, FILM COATED ORAL EVERY 6 HOURS PRN
Qty: 60 TABLET | Refills: 0 | Status: CANCELLED | OUTPATIENT
Start: 2019-08-20

## 2019-08-20 NOTE — PSYCH
Patient ID: Richard Smith is a 37 y o  female    [facility] Clinical Progress Notes      Specialized Services Documentation  Therapist must complete separate progress note for each specific clinical activity in which the individual participated during the day  Allied Therapy group ([id number]) Pt moderately participated in Communication skills group  Pt partially engaged in group discussion and presented activity  This group focused on clear communication with support systems, as well as the importance of openly communicating ones needs/thoughts in order to avoid misunderstandings, address conflict, and improve relationships  The group included 2 activities that involved working as a team with a partner, listening, following directions, and interpreting phrases and pictures in order to highlight that different people interpret things differently, and it is not helpful to assume that everyone will understand one thing the same way  Pt provided encouragement to a fellow peer that shared in the group  Pt affect appeared full range, observed laughing at appropriate times  Pt shared that she finds it difficult to communicate with her partner as he is not often receptive to what she shares with him  Pt shared difficulty concentrating at times during completion of activity, however overall stated that it felt that she was focused on the present and not worrying about her stressors  Pt appears to be making some good progress toward established goals  Continue to engage pt in group allied therapy in order to continue to progress toward long-term goal achievement  Tx Plan Objective: 1 4, Therapist: George Tee MS, OTR/L    Psychotherapy group ([id number]) Pt moderately participated in Distress tolerance group  Pt partially engaged in group discussion and presented activity   This group consisted of defining and comparing different types of stress (eustress vs distress vs normal stress responses), identifying main stressors impeding productive engagement in daily routines, identifying physical/cognitive/psychological warning signs that indicate distress, and learning coping strategies to manage distress when symptoms are recognized early  Provided compensatory techniques to facilitate implementation of distress management strategies through developing concise list of top 3-5 and post in area that pt retreats to when stressed  Handouts provided on various distress tolerance techniques  Pt shared that her symptoms of stress are constant and she typically has some physical symptoms of stress at all times  Pt reports having difficulty finding copings strategies that assist her in the management of this stress  Pt appears to be making some good progress toward established goals  Continue to engage pt in group psychotherapy in order to continue to progress toward long-term goal achievement  Tx Plan Objective: 1 2, Therapist: Ciro Osler, MS, OTR/L    Psychotherapy group ([id number]) Pt moderately participated in Mindfulness group  Pt partially engaged in group discussion and presented activity  The objective of this group is to educate pt on the use of mindfulness as a distress tolerance strategy  Session began with a reflection with the theme of staying in the present moment  Pt presented with mindfulness exercise using a "grounding" 5-4-3-2-1 technique to provide opportunity to trial the use of this strategy  Provided pt's with opportunities to explore various tactile, auditory, visual, and olfactory experiences  Educated pt that this mindfulness technique can refocus thoughts on the present moment instead of the detached feeling that distress may cause  At times pt required redirection, observed conversing with a peer multiple times during session when not prompted to  Pt receptive to grounding and sensory technique, reporting willingness to trial at home   Pt appears to be making some good progress toward established goals  Continue to engage pt in group psychotherapy in order to continue to progress toward long-term goal achievement   Tx Plan Objective: 1 2, Therapist: Cami Barron MS, OTR/L

## 2019-08-20 NOTE — PSYCH
Subjective:     Patient ID: Emi Bowser is a 37 y o  female  [facility] Clinical Progress Notes      Specialized Services Documentation  Therapist must complete separate progress note for each specific clinical activity in which the individual participated during the day  Education Therapy   Time:  0417-8281  Previous goal met: Yes   Readiness to Learning: Receptive  Barriers to Learning: None  Learning Assessment  Time: 4254-4391  Education Completed: Illness and Wellness Tools  Teaching Method: Verbal and Written  Shared Area of Learning: Yes   Goal Set: Couples session with therapist  Tx Plan Objective: 1 4, Therapist:  Xenia Victor RN      Case Management Note     Current Suicide Risk - low    Met with patient who is verbalizing hopefulness about the changes she believes are taking place in her son's behavior since family base services started  She is trying to make her marriage work she said and [facility] better [facility]         Medications changed - no    Treatment Session - 7    Individual Case Management provided today - yes    [facility] follow up physician's orders - see Jesus JAIMES's note

## 2019-08-21 ENCOUNTER — OFFICE VISIT (OUTPATIENT)
Dept: PSYCHOLOGY | Facility: CLINIC | Age: 44
End: 2019-08-21
Payer: COMMERCIAL

## 2019-08-21 DIAGNOSIS — F31.81 BIPOLAR 2 DISORDER (HCC): Primary | ICD-10-CM

## 2019-08-21 PROCEDURE — S0201 PARTIAL HOSPITALIZATION SERV: HCPCS

## 2019-08-21 PROCEDURE — G0177 OPPS/PHP; TRAIN & EDUC SERV: HCPCS

## 2019-08-21 PROCEDURE — G0410 GRP PSYCH PARTIAL HOSP 45-50: HCPCS

## 2019-08-21 NOTE — PSYCH
Assessment/Plan:       Diagnoses and all orders for this visit:     Bipolar 2 disorder ([facility] [address] )     Anxiety disorder, unspecified type            Subjective:      Patient ID: Pebbles Elena is a 37 y o  female      [facility] Treatment Plan   AREAS OF NEED: Mood instability as evidenced by hopelessness, helplessness, irritability, crying spells, panic attacks, ruminating thoughts, decrease in concentration and attention, appetite disturbances, restlessness, feelings of guilt, lack of self-care, and lack of motivation related to marital problems, financial struggles, unemployment, ongoing family issues, and inability to visualize a realistic positive future  Date Initiated: 8/9/19     Strengths: Cares about her children, educated, motivated to learn               LONG TERM GOAL:   Date Initiated: 8/9/19  1 0 I will identify 3 signs that my mood has become more stable and that I am more productive in my day to day routine  Target Date: 9/7/19  Completion Date:         SHORT TERM OBJECTIVES:      Date Initiated: 8/9/19  1 1 I will explore a self-esteem packet and identify 3 positive traits I like about myself  Revision Date: 8/21/19 (able to identify 2, kind and caring)  Target Date: 8/20/19  Completion Date:      Date Initiated: 8/9/19  1 2 I will identify 3 mindfulness and/or distress tolerance skills I can utilize to refocus negative or worrisome thoughts  Revision Date:   Target Date: 8/20/19  Completion Date:  8/20/19 (walking, meditation, deep breathing, music)     Date Initiated: 8/9/19  1 3 I will take medications as prescribed and share questions and concerns if arise  Revision Date: 8/20/19  Target Date: 8/20/19  Completion Date:       Date Initiated: 8/9/19  1 4 I will identify 3 ways my supports can assist in my recovery and agree to staff/support contact as indicated    Revision Date:  Target Date: 8/20/19  Completion Date: 8/20/19 (fix it card, reassurance/validation, marriage counseling)          7 DAY REVISION:     Date Initiated: 8/21/19  1 5  I will practice 1 stress reduction skill daily to promote my overall wellness  Share successes and challenges  Revision Date:   Target Date: 8/30/19  Completion Date:     PSYCHIATRY:  Date Initiated: 8/9/19  Medication Management and Education       Revision Date: 8/21/19       Continue medication management  The person(s) responsible for carrying out the plan is Bel Gutierrez MD    NURSING:   Date Initiated: 8/9/19  1 1,1 2,1 3,1 4 This RN will provide daily wellness group five days weekly to educate Julieta Cuadra on S/S of her diagnoses and medications used in treatment  Revision Date: 8/21/19  1 1,1 2,1 3,1 4,1 5 Continue to encourage Julieta Cuadra to participate in wellness groups daily to learn about symptoms, coping strategies and warning signs to promote relapse prevention  The person(s) responsible for carrying out the plan is Luisa Pretty RN    PSYCHOLOGY:   Date Initiated:  8/9/19       1 1, 1 2, 1 4 Provide psychotherapy group 5 times per week to allow opportunity for Julieta Cuadra to explore stressors and ways of coping  Revision Date: 8/21/19  1 1,1 2,1 4,1 5 Continue to provide psychotherapy group daily to Julieta Cuadra and encourage sharing of stressors, skills and positive change  The person(s) responsible for carrying out the plan is [employer], LCSW    ALLIED THERAPY:   Date Initiated: 8/9/19  1 1,1 2 Engage Julieta Cuadra in AT group 5 times daily to encourage development and use of wellness tools to decrease symptoms and promote recovery through meaningful activity  Revision Date: 8/21/19  1 1,1 2,1 5 Continue to engage Julieta Cuadra to participate in AT group to practice wellness tools within program and transfer to home sharing successes and barriers through healthy task involvement     The person(s) responsible for carrying out the plan is JEREMY Nelson             CASE MANAGEMENT:   Date Initiated: 8/9/19      1 0 This  will meet with Elizabeth Holley 3-4 times weekly to assess treatment progress, discharge planning, connection to community supports and UR as indicated  Revision Date: 8/21/19  1 0 Continue to meet with Elizabeth Holley 3-4 times weekly to assess growth, work toward goals, continued treatment needs, dc planning and use of supports  The person(s) responsible for carrying out the plan is JEREMY Choudhury     TREATMENT REVIEW/COMMENTS:      DISCHARGE CRITERIA: Identify 3 signs of progress and complete the relapse prevention plan  DISCHARGE PLAN: Return to OP psychiatry and therapy  Estimated Length of Stay: 10 treatment days  Diagnosis and Treatment Plan explained to David Melina relates understanding diagnosis and is agreeable to Treatment Plan     CLIENT COMMENTS / Please share your thoughts, feelings, need and/or experiences regarding your treatment plan: _____________________________________________________________________________________________________________________________________________________________________________________________________________________________________________________________________________________________________________________ Date/Time: ______________      Patient Signature: _________________________________      Date/Time: ______________       Signature: _________________________________      Date/Time: ______________

## 2019-08-21 NOTE — PSYCH
Subjective:     Patient ID: Som Lombardi is a 37 y o  female  [facility] Clinical Progress Notes      Specialized Services Documentation  Therapist must complete separate progress note for each specific clinical activity in which the individual participated during the day  Group Psychotherapy [id number]  Psychotherapy group focused on mindfulness  Group explored practice of what mindfulness is  Components, benefits, and different practices reviewed  Group participated in progressive muscle relaxation  ΣΑΡΑΝΤΙ found the progressive muscle relaxation helpful and shared it is something he could use outside of program    Otherwise, minimal participation in group discussion  Some progress toward goals  Continue psychotherapy group to explore stressors and ways of coping  Tx Plan Objective: 1 2, Therapist:  Gilda Vargas LCSW    Group Psychotherapy [id number]  Psychotherapy group focused on building self awareness of her own positive traits to benefit wellness  Group members completed an open ended [employer] Type Scale similar to [employer]  ΣΑΡΑΝΤΙ participated in the activity  She identified herself at [address] [place] [address]  She shared she often puts others needs before her own and is learning to be more assertive  She states, "I have a hard time saying no "  She believes this quality came from her mother who is similar  Some progress toward goals  Continue psychotherapy group to explore stressors and ways of coping  Tx Plan Objective: 1 1, 1 2, Therapist:  Gilda Vargas LCSW     Case Management Note    Gilda Vargas LCSW    Current suicide risk : Low     [id number]  Met with Som Lombardi  Treatment plan reviewed, revised, and copy provided  Some progress toward goals  She continues to report poor self esteem stating, "I just don't think I am a good mom "  She is fixated on past events in which she feels she could have done better as a mother     She is able to identify coping mechanisms however reports minimum use outside of program    Continue PHP to activate wellness tools and create structure to prevent rehospitalization  Medications changes/added/denied? No    Treatment session number: 8    Individual Case Management Visit provided today?  Yes     [facility] follow up physician's orders: na

## 2019-08-21 NOTE — PSYCH
Subjective:     Patient ID: Carolina Khanna is a 37 y o  female  [facility] Clinical Progress Notes      Specialized Services Documentation  Therapist must complete separate progress note for each specific clinical activity in which the individual participated during the day  Group Psychotherapy [id number]  Ericka attended [address] [place] [address] which is a group aiming to assist individuals to work with a partner and in a group to communicate effectively  There was discussion about recognizing the dangers of labelling and stereotyping related to mental illness and substance abuse, and ways to avoid those behaviors  Suleiman Willams worked cooperatively in her pair and contributed to and participated in the discussion to problem solve related to stereotyping  Suleiman Willams is making progress in wellness group and will continue to attend to support her wellness and recovery goals  Tx Plan Objective: 1 1,1 2 Therapist:  Jenny Stein RN              Education Therapy   Time:  4146-5526  Previous goal met: Yes   Readiness to Learning: Receptive  Barriers to Learning: None  Learning Assessment  Time: 2708-0395  Education Completed: Illness and Wellness Tools  Teaching Method: Verbal and Written  Shared Area of Learning: Yes   Goal Set: plans to have 15mins this evening to meditate    Tx Plan Objective: 1 2, Therapist:  Jenny Stein RN

## 2019-08-22 ENCOUNTER — OFFICE VISIT (OUTPATIENT)
Dept: OBGYN CLINIC | Facility: CLINIC | Age: 44
End: 2019-08-22
Payer: COMMERCIAL

## 2019-08-22 ENCOUNTER — OFFICE VISIT (OUTPATIENT)
Dept: PSYCHOLOGY | Facility: CLINIC | Age: 44
End: 2019-08-22
Payer: COMMERCIAL

## 2019-08-22 VITALS
HEIGHT: 63 IN | BODY MASS INDEX: 27.46 KG/M2 | DIASTOLIC BLOOD PRESSURE: 83 MMHG | WEIGHT: 155 LBS | SYSTOLIC BLOOD PRESSURE: 126 MMHG | HEART RATE: 96 BPM

## 2019-08-22 DIAGNOSIS — M77.51 TENDINITIS OF RIGHT ANKLE: Primary | ICD-10-CM

## 2019-08-22 DIAGNOSIS — F41.9 ANXIETY: ICD-10-CM

## 2019-08-22 DIAGNOSIS — F31.81 BIPOLAR 2 DISORDER (HCC): Primary | ICD-10-CM

## 2019-08-22 DIAGNOSIS — S93.401A SPRAIN OF RIGHT ANKLE, UNSPECIFIED LIGAMENT, INITIAL ENCOUNTER: ICD-10-CM

## 2019-08-22 DIAGNOSIS — M25.571 ACUTE RIGHT ANKLE PAIN: ICD-10-CM

## 2019-08-22 PROCEDURE — G0410 GRP PSYCH PARTIAL HOSP 45-50: HCPCS

## 2019-08-22 PROCEDURE — S0201 PARTIAL HOSPITALIZATION SERV: HCPCS

## 2019-08-22 PROCEDURE — 99203 OFFICE O/P NEW LOW 30 MIN: CPT | Performed by: ORTHOPAEDIC SURGERY

## 2019-08-22 PROCEDURE — G0177 OPPS/PHP; TRAIN & EDUC SERV: HCPCS

## 2019-08-22 NOTE — PSYCH
Subjective:     Patient ID: Tika Hutchinson is a 37 y o  female  [facility] Clinical Progress Notes      Specialized Services Documentation  Therapist must complete separate progress note for each specific clinical activity in which the individual participated during the day  Group Psychotherapy [id number]  Psychotherapy group focused on how positive thinking can benefit ones wellness  Group members participated in activity to build self esteem and to create a positive atmosphere by saying and hearing positive comments  The group explored the health benefits of positive thinking and shared positive experiences where they displayed specific qualities  The group also was educated on the relapse prevention cycle  Humberto Reddy actively participated in the group discussion  As something positive, she stated she is glad she came back to the program to promote her wellness  She identified with the relapse prevention cycle as well  Some progress toward goals  Continue psychotherapy group to explore stressors and ways of coping  Tx Plan Objective: 1 2, Therapist:  Saud Valverde LCSW    Education Therapy   Time:  0047-3402  Previous goal met: Yes   Readiness to Learning: Receptive  Barriers to Learning: None  Learning Assessment  Time: 5099-8977  Education Completed: Wellness Tools  Teaching Method: Verbal  Shared Area of Learning: Yes   Goal Set: meditate and stay mindful  Tx Plan Objective: 1 2, 1 5, Therapist:  Saud Valverde LCSW      Other Spoke to [address] at [address]  Ericka used 9 of 10 PHP days  Extended to 8/23/19  Auth # remains same at [id number]  Case Management Note    Chanelle Gao LCSW    Current suicide risk : Low     [id number]  Met with Tika Hutchinson  She is focused on anxiety medications today  She was encouraged to continue to practice a relaxation skill daily to promote her wellness     Continue PHP to activate wellness tools and create structure to prevent hospitalization  Medications changes/added/denied? No    Treatment session number: 9    Individual Case Management Visit provided today?  No    [facility] follow up physician's orders: na

## 2019-08-22 NOTE — PSYCH
Subjective:     Patient ID: Alex Sanchez is a 37 y o  female  [facility] Clinical Progress Notes      Specialized Services Documentation  Therapist must complete separate progress note for each specific clinical activity in which the individual participated during the day  Group Psychotherapy [id number]  Ericka attended Wellness Group Medication Management Strategies which is a group activity aiming to educate about the important role medications well managed plays in recovery and relapse prevention  Yonathan Francois participated in the discussion about her visual reminders she uses to ensure her own compliance with medications and she is having success with that  Strategies for ensuring individuals take their meds at the right time were also discussed e g alarms, compliance packs, color coding, visual reminders and Yonathan Francois participated in the group  Yonathan Francois is making progress in wellness group and will continue to attend to support her wellness goals  Tx Plan Objective: 1 3, Therapist:  Henny Osborne RN    Group Psychotherapy [id number]  Ericka attended Wellness Group What is Your Forgiveness IQ which is a group presenting individuals with several statements about forgiveness  They then make decisions about whether they agree or disagree and on a continuum show how strongly they agree or disagree  Mal Maria about the view she has which is that forgiveness is for the individual who forgives to allow them to move on in their life  Yonathan Francois is making progress in group and will continue to attend to support her wellness goals     Tx Plan Objective: 1 2, Therapist:  Henny Osborne RN

## 2019-08-22 NOTE — PROGRESS NOTES
ASSESSMENT/PLAN:    Diagnoses and all orders for this visit:    Tendinitis of right ankle    Sprain of right ankle, unspecified ligament, initial encounter      Plan:  I would recommend a trial of physical therapy  She has had several weeks of symptoms and does not see any significant improvement, perhaps even worsening  Therefore, I think a home exercise program and further observational treatment, would not be of great benefit  She is taking ibuprofen on a routine basis  I discussed the option of taking something that would be a little more convenient, perhaps a twice daily anti-inflammatory  She is willing to do so and a prescription will be sent to her pharmacist   She is to contact me if questions or concerns arise prior to follow-up in 3-4 weeks  She was seen at the urgent care center several days ago, x-rays were obtained and she was referred for orthopedic consultation and treatment  She was provided with an Ace bandage but no other specific treatment recommended     _____________________________________________________  CHIEF COMPLAINT:  Chief Complaint   Patient presents with    Right Ankle - Pain, Swelling, Clicking    Ankle Injury     pt states having severe pain to R ankle x 1 month, pt states pain has sharp pain to lateral aspect of  ankle  SUBJECTIVE:  Yolanda Kessler is a 37y o  year old female who presents for evaluation of right ankle pain that has been present for about 6 weeks  She denies any trauma  She denies any history of ankle problems prior to this onset 6 weeks ago  She localizes pain over the lateral ankle radiating proximally to the mid aspect of the lateral leg and also radiating distally over the dorsal lateral foot and into the dorsal aspect of the ankle and foot  She denies paresthesias  She does believe that there is occasional swelling laterally  She denies any left ankle symptoms    She has no pain at rest, but notes pain with standing, walking and any range of motion of the ankle        PAST MEDICAL HISTORY:  Past Medical History:   Diagnosis Date    Ankle pain, right     Anxiety     Arthritis     Bipolar 1 disorder (HCC)     Depression     Hypertension     Hypothyroidism     Known health problems: none     Scoliosis        PAST SURGICAL HISTORY:  Past Surgical History:   Procedure Laterality Date    CHOLECYSTECTOMY         FAMILY HISTORY:  Family History   Problem Relation Age of Onset    Diabetes Mother     Hypertension Mother     Heart disease Mother     Hypertension Father     Diabetes Maternal Grandmother     Diabetes Paternal Grandmother        SOCIAL HISTORY:  Social History     Tobacco Use    Smoking status: Current Every Day Smoker     Packs/day: 0 50     Years: 3 00     Pack years: 1 50    Smokeless tobacco: Never Used   Substance Use Topics    Alcohol use: No    Drug use: No       MEDICATIONS:    Current Outpatient Medications:     benztropine (COGENTIN) 1 mg tablet, Take 1 tablet (1 mg total) by mouth 2 (two) times a day, Disp: 60 tablet, Rfl: 0    buPROPion (WELLBUTRIN XL) 300 mg 24 hr tablet, Take 300 mg by mouth daily, Disp: , Rfl:     busPIRone (BUSPAR) 10 mg tablet, Take 1 tablet (10 mg total) by mouth 3 (three) times a day, Disp: 90 tablet, Rfl: 1    cariprazine (VRAYLAR) 3 MG capsule, Take 1 capsule (3 mg total) by mouth daily, Disp: 30 capsule, Rfl: 2    ergocalciferol (VITAMIN D2) 50,000 units, Take 1 capsule (50,000 Units total) by mouth once a week, Disp: 12 capsule, Rfl: 3    gabapentin (NEURONTIN) 600 MG tablet, Take 1 tablet (600 mg total) by mouth 3 (three) times a day, Disp: 90 tablet, Rfl: 1    levothyroxine 50 mcg tablet, Take 1 tablet (50 mcg total) by mouth daily for 90 days, Disp: 90 tablet, Rfl: 3    lisinopril (ZESTRIL) 20 mg tablet, Take 2 tablets (40 mg total) by mouth daily, Disp: 90 tablet, Rfl: 3    venlafaxine (EFFEXOR-XR) 150 mg 24 hr capsule, Take 2 capsules (300 mg total) by mouth daily, Disp: 60 capsule, Rfl: 1    zolpidem (AMBIEN) 10 mg tablet, Take 1 tablet (10 mg total) by mouth daily at bedtime for 30 days, Disp: 30 tablet, Rfl: 0    ALLERGIES:  No Known Allergies    Review of systems:   Constitutional: Negative for fatigue, fever or loss of apetite  HENT: Negative  Respiratory: Negative for shortness of breath, dyspnea  Cardiovascular: Negative for chest pain/tightness  Gastrointestinal: Negative for abdominal pain, N/V  Endocrine: Negative for cold/heat intolerance, unexplained weight loss/gain  Genitourinary: Negative for flank pain, dysuria, hematuria  Musculoskeletal:  Positive as in the HPI   Skin: Negative for rash  Neurological:  Negative  Psychiatric/Behavioral: Negative for agitation  _____________________________________________________  PHYSICAL EXAMINATION:    Blood pressure 126/83, pulse 96, height 5' 3" (1 6 m), weight 70 3 kg (155 lb)  General: well developed and well nourished, alert, oriented times 3 and appears comfortable  HEENT: Benign, normocephalic, atraumatic  Cardiovascular:  Regular    Pulmonary: No wheezing or stridor  Abdomen: Soft, Nontender  Skin: No masses, erthema, lacerations, fluctation, ulcerations  Neurovascular: Motor and sensory exams are intact  Pulses are palpable and there is no peripheral edema  MUSCULOSKELETAL EXAMINATION:  Extremities: The right ankle exam demonstrated no significant swelling  She denied pain during passive ankle dorsiflexion and plantar flexion but did complain of pain during active motion  The peroneal tendon sheath and Achilles tendon are tender to palpation  She also has tenderness over the anterolateral ankle, the greatest degree of tenderness located in this location  She complains of pain with inversion eversion stress localized to the peroneal tendon sheath    There is no tenderness to palpation of the metatarsals, including the proximal 5th metatarsal   Subtalar and forefoot motion demonstrates no limitations during passive motion but she does complain of lateral ankle pain during passive motion  There is no ecchymosis, lacerations, abrasions, erythema or rubor  Calf compartments are soft, nontender and Homans sign is negative  The remainder of the lower extremity examination, bilaterally, is benign  _____________________________________________________  STUDIES REVIEWED:  X-rays of the ankle from 08/17/2019 demonstrated no evidence of degenerative disease or acute processes  The report was reviewed          Mallorie Ruiz

## 2019-08-23 ENCOUNTER — OFFICE VISIT (OUTPATIENT)
Dept: PSYCHOLOGY | Facility: CLINIC | Age: 44
End: 2019-08-23
Payer: COMMERCIAL

## 2019-08-23 ENCOUNTER — TELEPHONE (OUTPATIENT)
Dept: FAMILY MEDICINE CLINIC | Facility: CLINIC | Age: 44
End: 2019-08-23

## 2019-08-23 DIAGNOSIS — F31.81 BIPOLAR 2 DISORDER (HCC): Primary | ICD-10-CM

## 2019-08-23 PROCEDURE — G0410 GRP PSYCH PARTIAL HOSP 45-50: HCPCS

## 2019-08-23 PROCEDURE — S0201 PARTIAL HOSPITALIZATION SERV: HCPCS

## 2019-08-23 PROCEDURE — G0176 OPPS/PHP;ACTIVITY THERAPY: HCPCS

## 2019-08-23 PROCEDURE — G0177 OPPS/PHP; TRAIN & EDUC SERV: HCPCS

## 2019-08-23 NOTE — PSYCH
Subjective:     Patient ID: June Vigil is a 37 y o  female  [facility] Clinical Progress Notes      Specialized Services Documentation  Therapist must complete separate progress note for each specific clinical activity in which the individual participated during the day  Group Psychotherapy [id number] Pau Alicea actively participated in group based on DBT Chain Analysis  She engaged in discussion and worksheet on analyzing problem behaviors  Group reviewed different parts of a chain analysis of problem behavior  Group discussed vulnerability factors, prompting event, chain of events, problem behavior, and consequences  Group also discussed using skillful behaviors instead and prevention plans  Pau Alicea shared that a problem behavior she has had recently is being too passive and not standing up for herself; discussed skillful behaviors and a prevention plan that can be used next time  Some good progress towards goal observed and shared  Continue psychotherapy to further encourage reflecting on problem behaviors and causing factors to promote wellness Tx Plan Objective: 1 1, 1 2, 1 5 Therapist:  Annetta Cowden, MT-BC    Allied Therapy [id number] Pau Alicea actively shared in [facility] group focused on DBT Distress Tolerance and Distract skill  She engaged in therapist-led relaxation exercises, jacquelin fill-in-the-blank, and discussion  Group explored breath counting and visualization  Group discussed using "Jenna Closs Mind ACCEPTS" skill to help with distraction, including activities, contributions, counting your blessing, different emotions, pushing away, thoughts, and sensations  Group discussed trying out different options to figure out what best works for you  Pau Alicea shared taking a walk as a distraction she can use next time she needs one  Good progress towards goal observed and shared  Continue AT to further encourage utilizing distress tolerance skills to promote wellness   Tx Plan Objective: 1 2, 1 5 Therapist: Lizett Radha [facility]    Other 1400-Spoke to [place] with [address] at [phone] for concurrent review  10 additional days authorized from 8/26/19-9/9/19  Authorization #[id number]  Case Management Note    Lizett Radha [facility]    Current suicide risk : Low     [id number] Met with Ayesha Cardoza  She reported feeling stressed because she has no money in her account, and is unsure of how she will get gas  She has a job interview scheduled for Monday afternoon, which she is hopeful for  She shared concerns that her children return to school on Monday, and worries that her thoughts will spiral without them to keep her distracted  Discussed setting up a routine that will involve her getting out of the house and staying busy  She continues to remain fixated on medications, was encouraged to focus on coping skills instead  She plans on spending time with a friend tonight, preparing for her Monday interview on Saturday, and taking the kids to the pool [place]  Weekend plans, supports, and county crisis number reviewed  Continue PHP to identify strengths, activate wellness tools, and create structure to prevent hospitalization  Medications changes/added/denied? No    Treatment session number: 10    Individual Case Management Visit provided today?  Yes     [facility] follow up physician's orders: None

## 2019-08-23 NOTE — TELEPHONE ENCOUNTER
Patient called wondering about getting a script for ativan, she is extremely high stressed right now  Did you discontinue this medication?

## 2019-08-23 NOTE — PSYCH
Subjective:     Patient ID: Cristy Auguste is a 37 y o  female  [facility] Clinical Progress Notes      Specialized Services Documentation  Therapist must complete separate progress note for each specific clinical activity in which the individual participated during the day  Group Psychotherapy [id number]  Ericka attended Wellness Group Weekly Wellness Assessment using the 6 dimensions of Wellness - physical, emotional,cognitive,vocational,social and spiritual  This is a evaluation tool to aid individuals in self evaluating what areas of their life they would like to make changes in based on the progress they have made in program  Sujit Drew noted she has made improvement and she has clear ideas about the areas she wants to make changes in    Tx Plan Objective: 1 1,1 2 Therapist:  Dilip Emery RN            Education Therapy   Time:  5297-6074  Previous goal met: Yes   Readiness to Learning: Receptive  Barriers to Learning: None  Learning Assessment  Time: 6971-2032  Education Completed: Illness and Wellness Tools  Teaching Method: Verbal and Written  Shared Area of Learning: Yes   Goal Set: dinner with friend tonight, pool with kids on weekend  Tx Plan Objective: 1 1,1 2 Therapist:  Dilip Emery RN

## 2019-08-26 ENCOUNTER — OFFICE VISIT (OUTPATIENT)
Dept: PSYCHOLOGY | Facility: CLINIC | Age: 44
End: 2019-08-26
Payer: COMMERCIAL

## 2019-08-26 PROCEDURE — S0201 PARTIAL HOSPITALIZATION SERV: HCPCS

## 2019-08-26 PROCEDURE — G0176 OPPS/PHP;ACTIVITY THERAPY: HCPCS

## 2019-08-26 PROCEDURE — G0177 OPPS/PHP; TRAIN & EDUC SERV: HCPCS

## 2019-08-26 PROCEDURE — G0410 GRP PSYCH PARTIAL HOSP 45-50: HCPCS

## 2019-08-26 NOTE — PSYCH
Subjective:     Patient ID: Amber Jacobo is a 37 y o  female  [facility] Clinical Progress Notes      Specialized Services Documentation  Therapist must complete separate progress note for each specific clinical activity in which the individual participated during the day         Education Therapy   Time:  9755-4483  Previous goal met: Yes   Readiness to Learning: Receptive  Barriers to Learning: None  Learning Assessment  Time: 4888-2625  Education Completed: Medication and Wellness Tools  Teaching Method: Verbal and Written  Shared Area of Learning: Yes   Goal Set: job interview  Tx Plan Objective: 1 1, 1 4, Therapist:  Travon Ramirez LCSW

## 2019-08-26 NOTE — PSYCH
Subjective:     Patient ID: Jaqueline Britton is a 37 y o  female  [facility] Clinical Progress Notes      Specialized Services Documentation  Therapist must complete separate progress note for each specific clinical activity in which the individual participated during the day  Group Psychotherapy [id number]  [address] [place] attended Wellness Group Who Am I  This is a group activity to develop awareness of one's self concept and to recognize the importance of one's sense of self  The group discussion focussed on the many aspects that make each individual unique - made up of our family and social roles, beliefs and values, likes and dislikes, talents and skills, physical, mental emotional and social traits  [address] [place] spoke about the various roles she has  She spoke about having low self esteem  She notes her education being a positive achievement but has concerns about not being successful in several of the roles in her life  She was supportive of peers and contributed to the group discussion  [address] [place] is making progress in wellness group and will continue to attend to support her wellness goals  Tx Plan Objective: 1 2, Therapist:  Camilo Schmitt RN     Group Psychotherapy 02 -   [address] was excused to attend a pre arranged appointment    Therapist: Camilo Schmitt RN

## 2019-08-26 NOTE — PSYCH
Patient ID: Alex Sanchez is a 37 y o  female    [facility] Clinical Progress Notes      Specialized Services Documentation  Therapist must complete separate progress note for each specific clinical activity in which the individual participated during the day  Allied Therapy group ([id number]) Pt actively participated in cognitive reframing (CBT) group  Pt engaged  in group discussion and presented activity  This group focused on the identification of unhealthy/negative thought patterns that result in engagement in unhealthy/unproductive routines as well as negative interactions with other people  Additionally, group focused on identifying alternative positive, healthy, and neutral thoughts that can facilitate healthier behaviors and emotions  Pt engaged in presented activity that included identifying your own personal negative thoughts and identifying alternatives  Pt observed to be on her phone at start of session  Pt was respectful of and receptive to redirection to stop using phone  Pt shared with group that she feels a negative thought pattern she has is "I am a bad mom, I have no control over my kids"  Pt was receptive to support provided by a peer, affect brightened  Pt shared about the stress that her situation with her misbehaving son causes her, and she feels that she is unable to fix this issue  Handout provided on positive self-talk, identifying unhealthy thought patterns, and how to know when it needs to be changed  Pt appears to be making some good progress toward established goals  Continue to engage pt in group allied therapy in order to continue to progress toward long-term goal achievement  Tx Plan Objective: 1 2, Therapist: Jacob Andino MS, OTR/L    Case Management Note     Jacob Andino MS, OTR/L     Current suicide risk :  Low     8927-2219 Met with Alex Sanchez  She reported concern that her medication is causing her decreased short term memory recently   She also shared about the chronic stress and concern she feels regarding her son misbehaving and being defiant, causing her to curse at her son and become someone she does not like  Educated pt on the impact that stress has on the body and brain, how this may impact her memory  Pt is convinced that it is her medication  This concern was communicated to [address] [place] [address]  Pt was encouraged to focus on use of tools that she has learned in program including taking 5 seconds before an outburst with her son, in order to better control what she says  Pt states that she feels she can vent to her 15 y o  dtr when she needs to however, she feels that she does not want to put this burden on her dtr  Supports and county crisis number reviewed  Continue PHP to identify strengths, activate wellness tools, and create structure to prevent hospitalization  Medications changes/added/denied? No     Treatment session number: 11     Individual Case Management Visit provided today?  Yes

## 2019-08-27 ENCOUNTER — OFFICE VISIT (OUTPATIENT)
Dept: PSYCHOLOGY | Facility: CLINIC | Age: 44
End: 2019-08-27
Payer: COMMERCIAL

## 2019-08-27 DIAGNOSIS — F31.81 BIPOLAR 2 DISORDER (HCC): Primary | ICD-10-CM

## 2019-08-27 PROCEDURE — G0176 OPPS/PHP;ACTIVITY THERAPY: HCPCS

## 2019-08-27 PROCEDURE — S0201 PARTIAL HOSPITALIZATION SERV: HCPCS

## 2019-08-27 PROCEDURE — G0177 OPPS/PHP; TRAIN & EDUC SERV: HCPCS

## 2019-08-27 PROCEDURE — G0410 GRP PSYCH PARTIAL HOSP 45-50: HCPCS

## 2019-08-27 NOTE — PSYCH
Subjective:     Patient ID: Ayesha Cardoza is a 37 y o  female  [facility] Clinical Progress Notes      Specialized Services Documentation  Therapist must complete separate progress note for each specific clinical activity in which the individual participated during the day  Group Psychotherapy [id number]  Aleena Raman attended [facility]  This group started with the statement "hope springs eternal " There was discussion about hope as a mindset consisting of a positive view of the future for yourself and others  Remaining hopeful, resiliency, bounce back while facing psychological threats and stress  Aleena Raman participated in the discussion and contributed her own experiences, thoughts and feelings related to crisis and losses she has experienced, and keeping on hoping  She talked about hopefulness as something her children help to keep alive in her even when she is under stress  Aleena Raman is making progress in wellness group and will continue to attend to support her wellness goals    Tx Plan Objective: 1 1,1 2 Therapist:  Kg Jo RN        Education Therapy   Time:  9047-2389

## 2019-08-27 NOTE — PSYCH
Subjective:     Patient ID: Elizabeth Holley is a 37 y o  female  [facility] Clinical Progress Notes      Specialized Services Documentation  Therapist must complete separate progress note for each specific clinical activity in which the individual participated during the day  Group Psychotherapy [id number]  Psychotherapy group focused on anxiety  Group explored "what" anxiety is, how too much anxiety can lead to avoidance or unpleasant physical, emotional, and cognitive symptoms, triggers to anxiety and coping skills  Harvey Caraballo identified her son and , interviews, and slow drivers as triggers to anxiety  She identified physical symptoms of tense muscles, high blood pressure, and irritability  She voiced how anxiety causes difficulty with sleep  She shared her Ambien helps  Some progress toward goals  Continue psychotherapy group to explore stressors and ways of coping       Tx Plan Objective: 1 1, 1 3, 1 4, Therapist:  Margaret Cannon LCSW

## 2019-08-27 NOTE — PSYCH
Patient ID: Luna Reyes is a 37 y o  female    [facility] Clinical Progress Notes      Specialized Services Documentation  Therapist must complete separate progress note for each specific clinical activity in which the individual participated during the day  Allied Therapy group ([id number]) Pt actively participated in Activity-based distress tolerance group  Pt engaged  in group discussion and presented activity  This group focused on the engagement in productive and meaningful activity of creating vision boards to allow pt's to self-evaluate what is important and motivating to them  Objective of group is to engage pt's in activity to focus thoughts and energy only on present in order to achieve a therapeutic "flow" state  Emphasized that engaging in productive and healthy activities, like completing a project such as this one, can facilitate recovery and provide them with another healthy coping and distress tolerance strategy  Pt's shared their completed vision boards at end of session and what it will motivate them to accomplish  Pt shared that her vision board highlights that you can't change the past, and she hopes her vision board helps remind her to be positive  Pt reported that the activity made her feel "mindfulness"  Pt appears to be making good progress toward established goals  Continue to engage pt in group allied therapy in order to continue to progress toward long-term goal achievement   Tx Plan Objective: 1 2, Therapist: Cami Barron MS, OTR/L    Education Therapy   Time:  3205-2012 (entered late)  Previous goal met: Yes   Readiness to Learning: Receptive  Barriers to Learning: None  Learning Assessment  Time: 9348-8212  Education Completed: Wellness Tools  Teaching Method: Verbal and Written  Shared Area of Learning: Yes  Goal Set: "take my 4 children to the dentist"  Tx Plan Objective: 1 2, 1 4 Therapist:  Cami Barron MS , OTR/L    Case Management Note     Nirmala Ornelas Fatoumata, MS , OTR/L     Current suicide risk :  Low      9635-8435 Met with Azeem Mena  She shared concerns that her current medications are not as effective as her previous ones, however acknowledged that she spoke with YAIR Mccartney Just today and is more accepting of the fact that her medications will not be getting changed  Reviewed relapse prevention plan handout and provided to pt to complete by Friday to review with   Spoke with pt about setting appointments with psychologist and psychiatrist in order to ensure continued care upon d/c from partial program  Pt stated that she has appointments with her psychologist every Saturday, which have been going on during her time here  Pt made an appointment with her psychiatrist for 9/5/19 at 9:30AM  Discussed goal setting as pt reported that she often sets goals for herself and then does not achieve them, which makes herself feel badly  Encouraged pt to set more realistic achievable goals in order to provide her with gratification upon completion of smaller scale tasks  Pt agreeable  Supports and county crisis number reviewed  Continue PHP to identify strengths, activate wellness tools, and create structure to prevent hospitalization  Medications changes/added/denied? No     Treatment session number: 12     Individual Case Management Visit provided today?  Yes

## 2019-08-28 ENCOUNTER — OFFICE VISIT (OUTPATIENT)
Dept: INTERNAL MEDICINE CLINIC | Facility: CLINIC | Age: 44
End: 2019-08-28
Payer: COMMERCIAL

## 2019-08-28 ENCOUNTER — OFFICE VISIT (OUTPATIENT)
Dept: PSYCHOLOGY | Facility: CLINIC | Age: 44
End: 2019-08-28
Payer: COMMERCIAL

## 2019-08-28 VITALS
WEIGHT: 156 LBS | RESPIRATION RATE: 18 BRPM | OXYGEN SATURATION: 98 % | DIASTOLIC BLOOD PRESSURE: 80 MMHG | BODY MASS INDEX: 27.64 KG/M2 | TEMPERATURE: 98.2 F | HEIGHT: 63 IN | HEART RATE: 103 BPM | SYSTOLIC BLOOD PRESSURE: 124 MMHG

## 2019-08-28 DIAGNOSIS — F31.81 BIPOLAR 2 DISORDER (HCC): Primary | ICD-10-CM

## 2019-08-28 DIAGNOSIS — F41.9 ANXIETY: Primary | ICD-10-CM

## 2019-08-28 DIAGNOSIS — F32.0 MILD MAJOR DEPRESSION, SINGLE EPISODE (HCC): ICD-10-CM

## 2019-08-28 DIAGNOSIS — F31.81 BIPOLAR 2 DISORDER (HCC): ICD-10-CM

## 2019-08-28 DIAGNOSIS — F41.1 GAD (GENERALIZED ANXIETY DISORDER): ICD-10-CM

## 2019-08-28 PROBLEM — M25.571 ACUTE RIGHT ANKLE PAIN: Status: RESOLVED | Noted: 2019-08-22 | Resolved: 2019-08-28

## 2019-08-28 PROBLEM — L03.115 CELLULITIS OF FOOT, RIGHT: Status: RESOLVED | Noted: 2017-05-18 | Resolved: 2019-08-28

## 2019-08-28 PROBLEM — H72.92 PERFORATION OF LEFT TYMPANIC MEMBRANE: Status: RESOLVED | Noted: 2019-07-25 | Resolved: 2019-08-28

## 2019-08-28 PROCEDURE — G0410 GRP PSYCH PARTIAL HOSP 45-50: HCPCS

## 2019-08-28 PROCEDURE — G0177 OPPS/PHP; TRAIN & EDUC SERV: HCPCS

## 2019-08-28 PROCEDURE — S0201 PARTIAL HOSPITALIZATION SERV: HCPCS

## 2019-08-28 PROCEDURE — 99214 OFFICE O/P EST MOD 30 MIN: CPT | Performed by: PHYSICIAN ASSISTANT

## 2019-08-28 PROCEDURE — G0176 OPPS/PHP;ACTIVITY THERAPY: HCPCS

## 2019-08-28 RX ORDER — BUPROPION HYDROCHLORIDE 150 MG/1
300 TABLET ORAL DAILY
Qty: 30 TABLET | Refills: 2 | Status: SHIPPED | OUTPATIENT
Start: 2019-08-28 | End: 2019-12-08

## 2019-08-28 RX ORDER — BUSPIRONE HYDROCHLORIDE 15 MG/1
15 TABLET ORAL 3 TIMES DAILY
Qty: 90 TABLET | Refills: 2 | Status: SHIPPED | OUTPATIENT
Start: 2019-08-28 | End: 2020-02-03 | Stop reason: HOSPADM

## 2019-08-28 NOTE — PATIENT INSTRUCTIONS
Depression   AMBULATORY CARE:   Depression  is a medical condition that causes feelings of sadness or hopelessness that do not go away  Depression may cause you to lose interest in things you used to enjoy  These feelings may interfere with your daily life  Common symptoms include the following:   · Appetite changes, or weight gain or loss    · Trouble going to sleep or staying asleep, or sleeping too much    · Fatigue or lack of energy    · Feeling restless, irritable, or withdrawn    · Feeling worthless, hopeless, discouraged, or guilty    · Trouble concentrating, remembering things, doing daily tasks, or making decisions    · Thoughts about hurting or killing yourself  Call 911 for any of the following:   · You think about harming yourself or someone else  Contact your healthcare provider if:   · Your symptoms do not improve  · You cannot make it to your next appointment  · You have new symptoms  · You have questions or concerns about your condition or care  Treatment for depression  may include medicine to improve or balance your mood  Therapy may also be used to treat your depression  A therapist will help you learn to cope with your thoughts and feelings  Therapy can be done alone or in a group  It may also be done with family members or a significant other  Self-care:   · Get regular physical activity  Try to exercise for 30 minutes, 3 to 5 days a week  Work with your healthcare provider to develop an exercise plan that you enjoy  Physical activity may improve your symptoms  · Get enough sleep  Create a routine to help you relax before bed  You can listen to music, read, or do yoga  Try to go to bed and wake up at the same time every day  Sleep is important for emotional health  · Eat a variety of healthy foods from all of the food groups  A healthy meal plan is low in fat, salt, and added sugar   Ask your healthcare provider for more information about a meal plan that is right for you  · Do not drink alcohol or use drugs  Alcohol and drugs can make your symptoms worse  Follow up with your healthcare provider as directed: Your healthcare provider will monitor your progress at follow-up visits  He or she will also monitor your medicine if you take antidepressants  Your healthcare provider will ask if the medicine is helping  Tell him or her about any side effects or problems you may have with your medicine  The type or amount of medicine may need to be changed  Write down your questions so you remember to ask them during your visits  © 2017 [employer]  Information is for End User's use only and may not be sold, redistributed or otherwise used for commercial purposes  All illustrations and images included in CareNotes® are the copyrighted property of [employer]  or [employer]  The above information is an  only  It is not intended as medical advice for individual conditions or treatments  Talk to your doctor, nurse or pharmacist before following any medical regimen to see if it is safe and effective for you

## 2019-08-28 NOTE — ASSESSMENT & PLAN NOTE
Will further taper wellbutrin to 150mg daily to lessen anxiety  Will increase buspar to 15mg TID  Consider replacing vraylar with seroquel if this first change is not effective  Pt to call in 1 week with an update

## 2019-08-28 NOTE — PSYCH
Subjective:     Patient ID: Ellena Aschoff is a 37 y o  female  [facility] Clinical Progress Notes      Specialized Services Documentation  Therapist must complete separate progress note for each specific clinical activity in which the individual participated during the day  Group Psychotherapy [id number]  Psychotherapy group focused on distress tolerance skills  The group explored skills to use when suffering from distress, panic, and anxiety  DBT Acronym ACCEPT utilized  Jamal Ann completed worksheet and actively participated in group discussion  She identified swimming as a interest that distracts her when feeling emotionally distressed  Jamal Ann had difficulty with comparisons  With assistance from group she was able to identify that she is handling situations with her son who has a disability in a more positive way  Some progress toward goals  Continue psychotherapy group to explore stressors and ways of coping    Tx Plan Objective: 1 1, 1 4, Therapist:  Jj Gong LCSW

## 2019-08-28 NOTE — PSYCH
Patient ID: Mila Mills is a 37 y o  female    [facility] Clinical Progress Notes      Specialized Services Documentation  Therapist must complete separate progress note for each specific clinical activity in which the individual participated during the day  Allied Therapy group ([id number]) Pt actively participated in meaningful routine development group  Pt engaged  in group discussion and presented activity  This group educated pt's on developing meaningful daily routines composed of at least 1 of the following categories: achievement, closeness to others, and enjoyment  Pt's were provided with a handout including a worksheet to fill out a weekly routine, and were instructed to identify 1 activity from at least 1 of the categories in each day  Educated that developing routines to structure time can facilitate effective distress tolerance and development of healthy habits  Pt identified walking and bubble baths as part of enjoyment category, and education as part of her achievement category  Pt stated that 1 thing she plans to incorporate into her routine this week is cleaning her house, to have her feel sense of achievement  Pt was attentive throughout group  Affect appeared full range  Appears to be fixated on the fact that her 4 kids impede her ability to engage in balanced routines that include self-care  Pt appears to be making good progress toward established goals  Continue to engage pt in group allied therapy in order to continue to progress toward long-term goal achievement   Tx Plan Objective: 1 2, Therapist: Braulio Vaz MS, OTR/L

## 2019-08-28 NOTE — PROGRESS NOTES
Assessment/Plan:  Problem List Items Addressed This Visit        Other    Anxiety - Primary     Will further taper wellbutrin to 150mg daily to lessen anxiety  Will increase buspar to 15mg TID  Consider replacing vraylar with seroquel if this first change is not effective  Pt to call in 1 week with an update  Bipolar 2 disorder (HCC)    Relevant Medications    buPROPion (WELLBUTRIN XL) 150 mg 24 hr tablet    busPIRone (BUSPAR) 15 mg tablet      Other Visit Diagnoses     CHOLO (generalized anxiety disorder)        Relevant Medications    buPROPion (WELLBUTRIN XL) 150 mg 24 hr tablet    busPIRone (BUSPAR) 15 mg tablet           Diagnoses and all orders for this visit:    Anxiety    CHOLO (generalized anxiety disorder)  -     buPROPion (WELLBUTRIN XL) 150 mg 24 hr tablet; Take 2 tablets (300 mg total) by mouth daily  -     busPIRone (BUSPAR) 15 mg tablet; Take 1 tablet (15 mg total) by mouth 3 (three) times a day    Bipolar 2 disorder (HCC)        Anxiety  Will further taper wellbutrin to 150mg daily to lessen anxiety  Will increase buspar to 15mg TID  Consider replacing vraylar with seroquel if this first change is not effective  Pt to call in 1 week with an update  Subjective:      Patient ID: Nancy Piña is a 37 y o  female  Pt presents for followup on her depression and anxiety  She is noting improved depression but ongoing anxiety  At the last visit she was started on buspar in place of her ativan  It helps but only slightly  She remains on 300mg of wellbutrin despite our initial plan to taper off of this as it may be negatively activating her and worsening her anxiety  She remains active with the partial hospitalization program  She asks to go back on seroquel but does not want to stop vraylar  We discussed that being duplicate therapy and needing to be on one or the other         The following portions of the patient's history were reviewed and updated as appropriate:   She has a past medical history of Ankle pain, right, Anxiety, Arthritis, Bipolar 1 disorder ([address] ), Depression, Hypertension, Hypothyroidism, Known health problems: none, and Scoliosis  ,  does not have any pertinent problems on file  ,   has a past surgical history that includes Cholecystectomy  ,  family history includes Diabetes in her maternal grandmother, mother, and paternal grandmother; Heart disease in her mother; Hypertension in her father and mother  ,   reports that she has been smoking  She has a 1 50 pack-year smoking history  She has never used smokeless tobacco  She reports that she does not drink alcohol or use drugs  ,  has No Known Allergies     Current Outpatient Medications   Medication Sig Dispense Refill    benztropine (COGENTIN) 1 mg tablet Take 1 tablet (1 mg total) by mouth 2 (two) times a day 60 tablet 0    buPROPion (WELLBUTRIN XL) 150 mg 24 hr tablet Take 2 tablets (300 mg total) by mouth daily 30 tablet 2    busPIRone (BUSPAR) 15 mg tablet Take 1 tablet (15 mg total) by mouth 3 (three) times a day 90 tablet 2    cariprazine (VRAYLAR) 3 MG capsule Take 1 capsule (3 mg total) by mouth daily 30 capsule 2    ergocalciferol (VITAMIN D2) 50,000 units Take 1 capsule (50,000 Units total) by mouth once a week 12 capsule 3    gabapentin (NEURONTIN) 600 MG tablet Take 1 tablet (600 mg total) by mouth 3 (three) times a day 90 tablet 1    levothyroxine 50 mcg tablet Take 1 tablet (50 mcg total) by mouth daily for 90 days 90 tablet 3    lisinopril (ZESTRIL) 20 mg tablet Take 2 tablets (40 mg total) by mouth daily 90 tablet 3    venlafaxine (EFFEXOR-XR) 150 mg 24 hr capsule Take 2 capsules (300 mg total) by mouth daily 60 capsule 1    zolpidem (AMBIEN) 10 mg tablet Take 1 tablet (10 mg total) by mouth daily at bedtime for 30 days 30 tablet 0     No current facility-administered medications for this visit  Review of Systems   Constitutional: Negative for chills and fever     HENT: Negative for congestion, ear pain, hearing loss, postnasal drip, rhinorrhea, sinus pressure, sinus pain, sore throat and trouble swallowing  Eyes: Negative for pain and visual disturbance  Respiratory: Negative for cough, chest tightness, shortness of breath and wheezing  Cardiovascular: Negative  Negative for chest pain, palpitations and leg swelling  Gastrointestinal: Negative for abdominal pain, blood in stool, constipation, diarrhea, nausea and vomiting  Endocrine: Negative for cold intolerance, heat intolerance, polydipsia, polyphagia and polyuria  Genitourinary: Negative for difficulty urinating, dysuria, flank pain and urgency  Musculoskeletal: Negative for arthralgias, back pain, gait problem and myalgias  Skin: Negative for rash  Allergic/Immunologic: Negative  Neurological: Negative for dizziness, weakness, light-headedness and headaches  Hematological: Negative  Psychiatric/Behavioral: Negative for behavioral problems, dysphoric mood and sleep disturbance  The patient is nervous/anxious  PHQ-9 Depression Screening    PHQ-9:    Frequency of the following problems over the past two weeks:       Little interest or pleasure in doing things:  1 - several days  Feeling down, depressed, or hopeless:  2 - more than half the days  Trouble falling or staying asleep, or sleeping too much:  0 - not at all  Feeling tired or having little energy:  1 - several days  Poor appetite or overeatin - not at all  Feeling bad about yourself - or that you are a failure or have let yourself or your family down:  1 - several days  Trouble concentrating on things, such as reading the newspaper or watching television:  3 - nearly every day  Moving or speaking so slowly that other people could have noticed   Or the opposite - being so fidgety or restless that you have been moving around a lot more than usual:  2 - more than half the days  Thoughts that you would be better off dead, or of hurting yourself in some way:  0 - not at all  PHQ-2 Score:  3  PHQ-9 Score:  10          Objective:  Vitals:    08/28/19 1053   BP: 124/80   BP Location: Left arm   Patient Position: Sitting   Cuff Size: Adult   Pulse: 103   Resp: 18   Temp: 98 2 °F (36 8 °C)   TempSrc: Tympanic   SpO2: 98%   Weight: 70 8 kg (156 lb)   Height: 5' 3" (1 6 m)     Body mass index is 27 63 kg/m²  Physical Exam   Constitutional: She is oriented to person, place, and time  She appears well-developed and well-nourished  No distress  HENT:   Head: Normocephalic and atraumatic  Right Ear: External ear normal    Left Ear: External ear normal    Nose: Nose normal    Mouth/Throat: Oropharynx is clear and moist  No oropharyngeal exudate  Eyes: Pupils are equal, round, and reactive to light  Conjunctivae and EOM are normal  Right eye exhibits no discharge  Left eye exhibits no discharge  No scleral icterus  Neck: Normal range of motion  Neck supple  No thyromegaly present  Cardiovascular: Normal rate, regular rhythm and normal heart sounds  Exam reveals no gallop and no friction rub  No murmur heard  Pulmonary/Chest: Effort normal and breath sounds normal  No respiratory distress  She has no wheezes  She has no rales  Abdominal: Soft  Bowel sounds are normal  She exhibits no distension  There is no tenderness  Musculoskeletal: Normal range of motion  She exhibits no edema, tenderness or deformity  Neurological: She is alert and oriented to person, place, and time  No cranial nerve deficit  Skin: Skin is warm and dry  She is not diaphoretic  Psychiatric: Her behavior is normal  Judgment and thought content normal  Her mood appears anxious  Depression Screening Follow-up Plan: Patient's depression screening was positive with a PHQ-2 score of 3  Their PHQ-9 score was 10  Continue regular follow-up with their psychologist/therapist/psychiatrist who is managing their mental health condition(s)

## 2019-08-28 NOTE — PSYCH
Subjective:     Patient ID: Azeem Mena is a 37 y o  female  [facility] Clinical Progress Notes      Specialized Services Documentation  Therapist must complete separate progress note for each specific clinical activity in which the individual participated during the day             Education Therapy   Time:  0413-9525  Previous goal met: Yes   Readiness to Learning: Receptive  Barriers to Learning: None  Learning Assessment  Time: 4992-0366  Education Completed: Illness and Wellness Tools  Teaching Method: Verbal and Written  Shared Area of Learning: Yes   Goal Set: to research and prepare for a job interview she has for tomorrow  Tx Plan Objective: 1 2, Therapist:  Chin Beatty RN

## 2019-08-29 ENCOUNTER — TELEPHONE (OUTPATIENT)
Dept: PSYCHIATRY | Facility: CLINIC | Age: 44
End: 2019-08-29

## 2019-08-29 ENCOUNTER — APPOINTMENT (OUTPATIENT)
Dept: PSYCHOLOGY | Facility: CLINIC | Age: 44
End: 2019-08-29
Payer: COMMERCIAL

## 2019-08-29 ENCOUNTER — DOCUMENTATION (OUTPATIENT)
Dept: PSYCHOLOGY | Facility: CLINIC | Age: 44
End: 2019-08-29

## 2019-08-29 NOTE — PROGRESS NOTES
Subjective:     Patient ID: Stephon Vásquez is a 37 y o  female  [facility] Clinical Progress Notes      Specialized Services Documentation  Therapist must complete separate progress note for each specific clinical activity in which the individual participated during the day  Case Management Note    Rowan Dickinson MS, OTR/L    Current suicide risk : Low     0830 Spoke to pt absent today due to court date and interview  Plans to be back at program tomorrow for d/c  Tx plan note revised due to absence  Pt has program and [facility] crisis number if needed  Medications changes/added/denied?  N/a    Treatment session number: n/a    Individual Case Management Visit provided today? n/a    [facility] follow up physician's orders: n/a

## 2019-08-30 ENCOUNTER — OFFICE VISIT (OUTPATIENT)
Dept: PSYCHOLOGY | Facility: CLINIC | Age: 44
End: 2019-08-30
Payer: COMMERCIAL

## 2019-08-30 DIAGNOSIS — F31.81 BIPOLAR 2 DISORDER (HCC): Primary | ICD-10-CM

## 2019-08-30 DIAGNOSIS — F41.9 ANXIETY: ICD-10-CM

## 2019-08-30 PROCEDURE — S0201 PARTIAL HOSPITALIZATION SERV: HCPCS

## 2019-08-30 PROCEDURE — G0177 OPPS/PHP; TRAIN & EDUC SERV: HCPCS

## 2019-08-30 PROCEDURE — G0410 GRP PSYCH PARTIAL HOSP 45-50: HCPCS

## 2019-08-30 PROCEDURE — G0176 OPPS/PHP;ACTIVITY THERAPY: HCPCS

## 2019-08-30 NOTE — PSYCH
Assessment/Plan:       Diagnoses and all orders for this visit:     Bipolar 2 disorder (HCC)     Anxiety disorder, unspecified type           Subjective:      Patient ID: Ericka Castillo is a 37 y  o  female      [facility] Treatment Plan   AREAS OF NEED: Mood instability as evidenced by hopelessness, helplessness, irritability, crying spells, panic attacks, ruminating thoughts, decrease in concentration and attention, appetite disturbances, restlessness, feelings of guilt, lack of self-care, and lack of motivation related to marital problems, financial struggles, unemployment, ongoing family issues, and inability to visualize a realistic positive future  Date Initiated: 8/9/19     Strengths: Cares about her children, educated, motivated to learn               LONG TERM GOAL:   Date Initiated: 8/9/19  1 0 I will identify 3 signs that my mood has become more stable and that I am more productive in my day to day routine  Target Date: 9/7/19  Completion Date: 8/30/19   (more motivated, setting boundaries, goal oriented, more positive future)        SHORT TERM OBJECTIVES:      Date Initiated: 8/9/19  1 1 I will explore a self-esteem packet and identify 3 positive traits I like about myself  Revision Date: 8/21/19 (able to identify 2, kind and caring)  Target Date: 8/20/19  Completion Date: 8/30/19- identified 3rd trait helping others     Date Initiated: 8/9/19  1 2 I will identify 3 mindfulness and/or distress tolerance skills I can utilize to refocus negative or worrisome thoughts  Revision Date:   Target Date: 8/20/19  Completion Date:  8/20/19 (walking, meditation, deep breathing, music)     Date Initiated: 8/9/19  1 3 I will take medications as prescribed and share questions and concerns if arise    Revision Date: 8/20/19  Target Date: 8/20/19  Completion Date:  8/30/19     Date Initiated: 8/9/19  1 4 I will identify 3 ways my supports can assist in my recovery and agree to staff/support contact as indicated  Revision Date:  Target Date: 8/20/19  Completion Date: 8/20/19 (fix it card, reassurance/validation, marriage counseling)                7 DAY REVISION:     Date Initiated: 8/21/19  1 5  I will practice 1 stress reduction skill daily to promote my overall wellness  Share successes and challenges  Revision Date:   Target Date: 8/30/19  Completion Date: incomplete- "I have no time"     PSYCHIATRY:  Date Initiated: 8/9/19  Medication Management and Education       Revision Date: 8/21/19       Continue medication management  The person(s) responsible for carrying out the plan is Farrah Bal MD     NURSING:   Date Initiated: 8/9/19  1 1,1 2,1 3,1 4 This RN will provide daily wellness group five days weekly to educate Stephon Vásquez on S/S of her diagnoses and medications used in treatment  Revision Date: 8/21/19  1 1,1 2,1 3,1 4,1 5 Continue to encourage Stephon Vásquez to participate in wellness groups daily to learn about symptoms, coping strategies and warning signs to promote relapse prevention  The person(s) responsible for carrying out the plan is Chris Reyes RN     PSYCHOLOGY:   Date Initiated:  8/9/19       1 1, 1 2, 1 4 Provide psychotherapy group 5 times per week to allow opportunity for Stephon Vásquez to explore stressors and ways of coping  Revision Date: 8/21/19  1 1,1 2,1 4,1 5 Continue to provide psychotherapy group daily to Stephon Vásquez and encourage sharing of stressors, skills and positive change  The person(s) responsible for carrying out the plan is Krista Rodriguez LCSW     ALLIED THERAPY:   Date Initiated: 8/9/19  1 1,1 2 Engage Stephon Vásquez in AT group 5 times daily to encourage development and use of wellness tools to decrease symptoms and promote recovery through meaningful activity    Revision Date: 8/21/19  1 1,1 2,1 5 Continue to engage Stephon Vásquez to participate in AT group to practice wellness tools within program and transfer to home sharing successes and barriers through healthy task involvement  The person(s) responsible for carrying out the plan is JEREMY Choudhury              CASE MANAGEMENT:   Date Initiated: 8/9/19      1 0 This  will meet with Elizabeth Holley 3-4 times weekly to assess treatment progress, discharge planning, connection to community supports and UR as indicated  Revision Date: 8/21/19  1 0 Continue to meet with Elizabeth Holley 3-4 times weekly to assess growth, work toward goals, continued treatment needs, dc planning and use of supports  The person(s) responsible for carrying out the plan is JEREMY Choudhury     TREATMENT REVIEW/COMMENTS:      DISCHARGE CRITERIA: Identify 3 signs of progress and complete the relapse prevention plan  DISCHARGE PLAN: Return to OP psychiatry and therapy    Estimated Length of Stay: 10 treatment days    Diagnosis and Treatment Plan explained to Ericka Barnett relates understanding diagnosis and is agreeable to Treatment Plan    CLIENT COMMENTS / Please share your thoughts, feelings, need and/or experiences regarding your treatment plan: _____________________________________________________________________________________________________________________________________________________________________________________________________________________________________________________________________________________________________________________ Date/Time: ______________      Patient Signature: _________________________________      Date/Time: ______________       Signature: _________________________________      Date/Time: ______________

## 2019-08-30 NOTE — PSYCH
Subjective:     Patient ID: Tika Hutchinson is a 37 y o  female  [facility] Clinical Progress Notes      Specialized Services Documentation  Therapist must complete separate progress note for each specific clinical activity in which the individual participated during the day  Group Psychotherapy [id number]  Ericka attended Wellness Group Weekly Wellness Assessment which uses the 6 dimensions of wellness - physical, emotional,cognitive, vocational, social, spiritual  This is an aid to assist individuals in determining how to move forward in their recovery and in helping them to pinpoint an area or areas they plan to make changes in  PIETER talked about emotional and she spoke about it in relation to herself and her development and her relationship with her children  The vocational area is an area she is hopeful she is going to be able to make changes in as she interviewed for a job yesterday  PIETER is making progress in wellness group and will continue to attend to support her wellness goals    Tx Plan Objective: 1 1,1 2, Therapist:  Ernst Chambers RN        Education Therapy   Time:  6278-0206  Previous goal met: Yes   Readiness to Learning: Receptive  Barriers to Learning: None  Learning Assessment  Time: 3746-7094  Education Completed: Illness and Wellness Tools  Teaching Method: Verbal and Written  Shared Area of Learning: Yes   Goal Set: organizing the children's bedrooms  Tx Plan Objective: 1 2, Therapist:  Ernst Chambers RN

## 2019-08-30 NOTE — PSYCH
Subjective:     Patient ID: oSm Lombardi is a 37 y o  female  [facility] Discharge Summary:   Admission Date: 8/9/19  Patient was referred by self  Discharge Date: 8/30/19  Was this a routine discharge? yes   Diagnosis: Axis I:   1  Bipolar 2 disorder ([address] )     2  Anxiety        Treating Physician: Dr Joycelyn Hogan  Treatment Complications: medication focused    Presenting Need:   Per Dr Blankenship Mew is a 37 y o  female with past psychiatric history of Bipolar D/o is admitted to [facility] after pt called stating she was feeling distressed  She is known to this program as a patient from May 2019      Today Som Lombardi reports that she has been struggling with ongoing difficulty at home issues which have changed very little since her discharge from the program here in May  She states she continues to have conflict with her  in terms of marital difficulty  They have financial difficulties and some stressors with her children as well  Patient endorses symptoms of anxiety and excessive worry on a daily basis  These symptoms had been somewhat improved when she was discharged from the partial program in May but have slowly been getting gradually more intense  She reports being tense and unable to cope at times  She admits that she has periods where she is quite angry and yelling at her kids  She admits the stressors are overwhelming to her at this time  Patient was started on Jaimee Bonny are by her outpatient psychiatrist and since that time has felt restless within in her anxiety  Her symptoms are consistent with akathisia  Patient is educated regarding side effects of antipsychotic  Patient does not complain of overt depression  She does have intermittent periods where she is overwhelmed and has decreased energy and motivation  She has some restless nights but continues to take Ambien for insomnia  No change in weight or appetite  She denies feeling hopeless or helpless    No suicidal homicidal ideations  No evidence of psychosis  Psychosocial Stressors include    Per Megan Dickinson, MT-BC  Yonathan Francois is a self referral to PHP due to worsening symptoms  She was last in the program May 2019  Since she was discharged from program on 5/31, she began to decline as her stressors have increased  She struggles with hopelessness, helplessness, irritability, appetite disturbances, difficulty getting out of bed, ruminating thoughts, panic attacks, crying spells, feelings of guilt, lack of self-care, and lack of motivation  She reports stressors as continuing marital problems, financial struggles, inability to picture a realistic positive future, unemployment and inability to get a job, and ongoing family issues  Her  is now controlling all of their finances, and no longer puts money into their joint bank account  He then says he has no money when she tries to pay the bills  She recognizes that she would like to leave him, but cannot do so until she is stable both financially and mentally  She has been applying to jobs but has not heard anything back  She continues to have issues with her son, Beth Garcia, who has significant behavioral issues  Since she was last discharged, family home based services have begun, and she does feel it is helping  Course of treatment includes:    group counseling, medication management, individual case management, allied therapy, psychoeducation and psychiatric evaluation    Treatment Progress: Alex Sanchez attended 14 [facility] days in which objectives focused on self-esteem, mindfulness and distress tolerance skills, assertive communication, and relaxation techniques  She participated in groups but had difficulty implementing skills and wellness tools outside of program  She completed assignments in programs to support awareness of her strengths and needs   She began applying and going to job interviews in the area with goal to establish independence and change her home environment, which she identified as a trigger  Upon d/c pt reported, she is more motivated, goal-oriented, and setting appropriate boundaries  Pt denied SI, HI, and psychosis  Pt remains medication focused  Aftercare recommendations include:   FIONA Wright for medication management   9/5/19 at 9:30AM  [address], [address]  [phone]  Fax: [phone]     [address] for individual therapy  Therapist: [facility]  Appointment every Saturday, next appointment 8/31/19  [address], [address], [address] [facility]  [phone]  Fax: [address], [address]  Appointment as determined by patient  [address], [address]  [phone]  Fax: [phone]     [facility]: Esha Santizo [phone] and Ja [phone]     Crisis Intervention (Emergency) [place] Service: C/M/P: 4-633.131.9703  [place] Crisis Intervention Hotline: 4-846.177.4724    National Suicide Crisis Hotline: 0-574.341.7831       Utilize WRAP, support groups, keep appointments, and take medications as prescribed        Discharge Medications include:  Current Outpatient Medications:     benztropine (COGENTIN) 1 mg tablet, Take 1 tablet (1 mg total) by mouth 2 (two) times a day, Disp: 60 tablet, Rfl: 0    buPROPion (WELLBUTRIN XL) 150 mg 24 hr tablet, Take 2 tablets (300 mg total) by mouth daily, Disp: 30 tablet, Rfl: 2    busPIRone (BUSPAR) 15 mg tablet, Take 1 tablet (15 mg total) by mouth 3 (three) times a day, Disp: 90 tablet, Rfl: 2    cariprazine (VRAYLAR) 3 MG capsule, Take 1 capsule (3 mg total) by mouth daily, Disp: 30 capsule, Rfl: 2    ergocalciferol (VITAMIN D2) 50,000 units, Take 1 capsule (50,000 Units total) by mouth once a week, Disp: 12 capsule, Rfl: 3    gabapentin (NEURONTIN) 600 MG tablet, Take 1 tablet (600 mg total) by mouth 3 (three) times a day, Disp: 90 tablet, Rfl: 1   levothyroxine 50 mcg tablet, Take 1 tablet (50 mcg total) by mouth daily for 90 days, Disp: 90 tablet, Rfl: 3    lisinopril (ZESTRIL) 20 mg tablet, Take 2 tablets (40 mg total) by mouth daily, Disp: 90 tablet, Rfl: 3    venlafaxine (EFFEXOR-XR) 150 mg 24 hr capsule, Take 2 capsules (300 mg total) by mouth daily, Disp: 60 capsule, Rfl: 1    zolpidem (AMBIEN) 10 mg tablet, Take 1 tablet (10 mg total) by mouth daily at bedtime for 30 days, Disp: 30 tablet, Rfl: 0

## 2019-08-30 NOTE — PSYCH
Patient ID: [facility] is a 37 y o  female    [facility] Clinical Progress Notes      Specialized Services Documentation  Therapist must complete separate progress note for each specific clinical activity in which the individual participated during the day  Allied Therapy group ([id number]) Pt actively participated in Self-esteem development  group  Pt engaged  in group discussion and presented activity  This group focused on the importance of nurturing self-esteem to achieve a more stable state of recovery  Group discussion included the definition of self-esteem, the identification of barriers to good self-esteem, as well as the impact self-esteem has in many areas of life (quality of support system, fulfillment of importance life roles, work performance, quality of communication with supports, motivation to engage in healthy routines, stress/anxiety levels, thought patterns, and participation in meaningful activities) which eventually can cause a relapse  Activity required pt's to identify unhealthy thoughts toward self that are interfering with recovery, then to cover up these thoughts with alternative positive statements  Pt's provided with handout "Calendar to Confidence" to encourage pt's to engage in at least 1 activity each day that makes them feel a sense of increased self-esteem  Pt identified 1 negative thought she has: "I'm old, ugly and a bad mom"  Pt identified replacement thought to include "I can learn from my mistakes, changing is a part of life  Have gratitude for the little things  Looks don't matter, you are who you are"  Pt identified strategies to improve self esteem to include "go to work, walk, help others, positive affirmations, commit to goals, and change thinking"  Pt appears to be making good progress toward established goals  Continue to engage pt in group allied therapy in order to continue to progress toward long-term goal achievement   Tx Plan Objective: 1 2, Therapist: Shelli Madrigal, MS, OTR/L      Other  Contacted [facility] Discharge Line at [phone] option 4  Line states PHP does not need discharge review  Discharge information still left on message  Requested return phone call if any questions or concerns  Case Management Note    Shelli Madrigal, MS, OTR/L    Current suicide risk : Low     [id number] Met with Jesusita Leyva  Reviewed readiness for discharge  She completed relapse prevention plan, aftercare plan, and medication list  Pt shared feeling more motivated, goal-oriented, and has developed better boundaries with supports  Pt reports understanding her needs better as sign of improvement  OP providers to receive summary  Pt provided with copy of treatment plan  Pt denies SI, HI, and psychosis  Medications changes/added/denied? No    Treatment session number: 14    Individual Case Management Visit provided today?  Yes     [facility] follow up physician's orders: see discharge order

## 2019-08-30 NOTE — PSYCH
Subjective:     Patient ID: Inder Kang is a 37 y o  female  [facility] Clinical Progress Notes      Specialized Services Documentation  Therapist must complete separate progress note for each specific clinical activity in which the individual participated during the day  Group Psychotherapy [id number]   Psychotherapy group focused on mindfulness  Group explored practice of what mindfulness is  Components, benefits, and different practices reviewed  Group participated in progressive muscle relaxation  Faythe Dates participated in activity  Faythe Dates shared she felt tired after the technique which showed individual was able to relax from wellness tool  Some progress toward goals  Discharge at end of day today     Tx Plan Objective: 1 2, 1 5, Therapist:  Bernie Washburn LCSW

## 2019-08-30 NOTE — PSYCH
[facility] Discharge Instructions:   Disposition: home  Address: [facility] [address]  Diagnosis:   Encounter Diagnoses   Name Primary?  Bipolar 2 disorder (HCC) Yes    Anxiety      Allergies (Drug/Food): No Known Allergies  Activity: no changes in activity  Diet:no recommendations  Smoking Cessation: The best thing you can do to improve your health is to stop using tobacco  Diagnostic/Laboratory Orders: none ordered  Vaccines: If you received a vaccine, please notify your family physician on your next visit  For more information, please call [phone]  Follow-up appointments/Referrals:   FIONA Wright for medication management   9/5/19 at 9:30AM  [address], [address]  [phone]  Fax: [phone]    [address] [facility] individual therapy  Therapist: Rebecca Chang  Appointment every Saturday, next appointment 8/31/19  [address], [facility] [address], [address] [facility]  [phone]  Fax: [address], [address] [facility]  Appointment as determined by patient  [address], [address]  [phone]  Fax: [facility]: Esha Santizo [phone] and Ja [phone]    Crisis Intervention (Emergency) [place] Service: C/M/P: 9-800-728-435-783-0222  National Crisis Intervention Hotline: 0-424.952.8515  National Suicide Crisis Hotline: 1-837.335.6500  Utilize WRAP, support groups, keep appointments, and take medications as prescribed  I, the undersigned, have received and understand the above instructions          Patient/Rep Signature: __________________________________   Date/Time: ______________       Physician Signature: ____________________________________  Date/Time: ______________             Signature: ________________________________   Date/Time: ______________

## 2019-09-02 DIAGNOSIS — F31.9 BIPOLAR 1 DISORDER (HCC): ICD-10-CM

## 2019-09-02 DIAGNOSIS — F41.9 ANXIETY: ICD-10-CM

## 2019-09-02 DIAGNOSIS — F32.A ANXIETY AND DEPRESSION: ICD-10-CM

## 2019-09-02 DIAGNOSIS — F41.9 ANXIETY AND DEPRESSION: ICD-10-CM

## 2019-09-03 DIAGNOSIS — F31.9 BIPOLAR 1 DISORDER (HCC): ICD-10-CM

## 2019-09-03 RX ORDER — LORAZEPAM 1 MG/1
TABLET ORAL
Qty: 21 TABLET | Refills: 0 | OUTPATIENT
Start: 2019-09-03

## 2019-09-03 RX ORDER — QUETIAPINE FUMARATE 50 MG/1
TABLET, FILM COATED ORAL
Qty: 60 TABLET | Refills: 1 | OUTPATIENT
Start: 2019-09-03

## 2019-09-03 RX ORDER — ZOLPIDEM TARTRATE 10 MG/1
TABLET ORAL
Qty: 30 TABLET | Refills: 0 | Status: ON HOLD | OUTPATIENT
Start: 2019-09-03 | End: 2020-02-03 | Stop reason: SDUPTHER

## 2019-09-12 DIAGNOSIS — F31.81 BIPOLAR 2 DISORDER (HCC): ICD-10-CM

## 2019-09-12 RX ORDER — BENZTROPINE MESYLATE 1 MG/1
TABLET ORAL
Qty: 60 TABLET | Refills: 0 | OUTPATIENT
Start: 2019-09-12

## 2019-09-16 DIAGNOSIS — F31.9 BIPOLAR 1 DISORDER (HCC): ICD-10-CM

## 2019-09-16 DIAGNOSIS — F41.9 ANXIETY AND DEPRESSION: ICD-10-CM

## 2019-09-16 DIAGNOSIS — F32.A ANXIETY AND DEPRESSION: ICD-10-CM

## 2019-09-16 RX ORDER — QUETIAPINE FUMARATE 50 MG/1
TABLET, FILM COATED ORAL
Qty: 60 TABLET | Refills: 1 | OUTPATIENT
Start: 2019-09-16

## 2019-09-26 DIAGNOSIS — F31.81 BIPOLAR 2 DISORDER (HCC): ICD-10-CM

## 2019-09-30 RX ORDER — BENZTROPINE MESYLATE 1 MG/1
TABLET ORAL
Qty: 60 TABLET | Refills: 0 | OUTPATIENT
Start: 2019-09-30

## 2019-10-04 DIAGNOSIS — F31.81 BIPOLAR 2 DISORDER (HCC): ICD-10-CM

## 2019-10-04 DIAGNOSIS — F41.1 GAD (GENERALIZED ANXIETY DISORDER): ICD-10-CM

## 2019-10-04 RX ORDER — BUSPIRONE HYDROCHLORIDE 10 MG/1
TABLET ORAL
Qty: 90 TABLET | Refills: 1 | OUTPATIENT
Start: 2019-10-04

## 2019-10-04 RX ORDER — BENZTROPINE MESYLATE 1 MG/1
TABLET ORAL
Qty: 60 TABLET | Refills: 0 | OUTPATIENT
Start: 2019-10-04

## 2019-10-19 ENCOUNTER — OFFICE VISIT (OUTPATIENT)
Dept: FAMILY MEDICINE CLINIC | Facility: CLINIC | Age: 44
End: 2019-10-19
Payer: COMMERCIAL

## 2019-10-19 VITALS
SYSTOLIC BLOOD PRESSURE: 150 MMHG | BODY MASS INDEX: 28.49 KG/M2 | HEIGHT: 63 IN | TEMPERATURE: 97.8 F | DIASTOLIC BLOOD PRESSURE: 92 MMHG | OXYGEN SATURATION: 98 % | WEIGHT: 160.8 LBS | HEART RATE: 102 BPM

## 2019-10-19 DIAGNOSIS — Z13.31 POSITIVE DEPRESSION SCREENING: ICD-10-CM

## 2019-10-19 DIAGNOSIS — Z23 NEED FOR VACCINATION: ICD-10-CM

## 2019-10-19 DIAGNOSIS — F31.81 BIPOLAR 2 DISORDER (HCC): Primary | ICD-10-CM

## 2019-10-19 DIAGNOSIS — E03.9 ACQUIRED HYPOTHYROIDISM: ICD-10-CM

## 2019-10-19 DIAGNOSIS — F41.9 ANXIETY: ICD-10-CM

## 2019-10-19 PROCEDURE — 90686 IIV4 VACC NO PRSV 0.5 ML IM: CPT

## 2019-10-19 PROCEDURE — 99214 OFFICE O/P EST MOD 30 MIN: CPT | Performed by: NURSE PRACTITIONER

## 2019-10-19 PROCEDURE — 3008F BODY MASS INDEX DOCD: CPT | Performed by: NURSE PRACTITIONER

## 2019-10-19 PROCEDURE — 90471 IMMUNIZATION ADMIN: CPT
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
## 2019-10-25 DIAGNOSIS — F41.1 GAD (GENERALIZED ANXIETY DISORDER): ICD-10-CM

## 2019-10-25 RX ORDER — BUSPIRONE HYDROCHLORIDE 10 MG/1
TABLET ORAL
Qty: 90 TABLET | Refills: 1 | OUTPATIENT
Start: 2019-10-25

## 2019-10-29 ENCOUNTER — TRANSCRIBE ORDERS (OUTPATIENT)
Dept: LAB | Facility: HOSPITAL | Age: 44
End: 2019-10-29

## 2019-10-29 ENCOUNTER — APPOINTMENT (OUTPATIENT)
Dept: LAB | Facility: HOSPITAL | Age: 44
End: 2019-10-29
Payer: COMMERCIAL

## 2019-10-29 DIAGNOSIS — F41.9 ANXIETY: ICD-10-CM

## 2019-10-29 DIAGNOSIS — E03.9 ACQUIRED HYPOTHYROIDISM: ICD-10-CM

## 2019-10-29 DIAGNOSIS — Z79.899 ENCOUNTER FOR LONG-TERM (CURRENT) USE OF OTHER MEDICATIONS: ICD-10-CM

## 2019-10-29 DIAGNOSIS — Z79.899 ENCOUNTER FOR LONG-TERM (CURRENT) USE OF OTHER MEDICATIONS: Primary | ICD-10-CM

## 2019-10-29 LAB
ALBUMIN SERPL BCP-MCNC: 4.6 G/DL (ref 3.5–5.7)
ALP SERPL-CCNC: 54 U/L (ref 40–150)
ALT SERPL W P-5'-P-CCNC: 13 U/L (ref 7–52)
ANION GAP SERPL CALCULATED.3IONS-SCNC: 9 MMOL/L (ref 4–13)
AST SERPL W P-5'-P-CCNC: 15 U/L (ref 13–39)
BASOPHILS # BLD AUTO: 0.1 THOUSANDS/ΜL (ref 0–0.1)
BASOPHILS NFR BLD AUTO: 1 % (ref 0–2)
BILIRUB SERPL-MCNC: 0.4 MG/DL (ref 0.2–1)
BUN SERPL-MCNC: 11 MG/DL (ref 7–25)
CALCIUM SERPL-MCNC: 9.2 MG/DL (ref 8.6–10.5)
CHLORIDE SERPL-SCNC: 106 MMOL/L (ref 98–107)
CO2 SERPL-SCNC: 27 MMOL/L (ref 21–31)
CREAT SERPL-MCNC: 0.87 MG/DL (ref 0.6–1.2)
EOSINOPHIL # BLD AUTO: 0.1 THOUSAND/ΜL (ref 0–0.61)
EOSINOPHIL NFR BLD AUTO: 2 % (ref 0–5)
ERYTHROCYTE [DISTWIDTH] IN BLOOD BY AUTOMATED COUNT: 12.9 % (ref 11.5–14.5)
FOLATE SERPL-MCNC: >20 NG/ML (ref 3.1–17.5)
GFR SERPL CREATININE-BSD FRML MDRD: 81 ML/MIN/1.73SQ M
GLUCOSE P FAST SERPL-MCNC: 77 MG/DL (ref 65–99)
HCT VFR BLD AUTO: 38.9 % (ref 42–47)
HGB BLD-MCNC: 13.3 G/DL (ref 12–16)
LYMPHOCYTES # BLD AUTO: 2.5 THOUSANDS/ΜL (ref 0.6–4.47)
LYMPHOCYTES NFR BLD AUTO: 30 % (ref 21–51)
MCH RBC QN AUTO: 33.3 PG (ref 26–34)
MCHC RBC AUTO-ENTMCNC: 34.1 G/DL (ref 31–37)
MCV RBC AUTO: 98 FL (ref 81–99)
MONOCYTES # BLD AUTO: 0.4 THOUSAND/ΜL (ref 0.17–1.22)
MONOCYTES NFR BLD AUTO: 5 % (ref 2–12)
NEUTROPHILS # BLD AUTO: 5.4 THOUSANDS/ΜL (ref 1.4–6.5)
NEUTS SEG NFR BLD AUTO: 63 % (ref 42–75)
PLATELET # BLD AUTO: 259 THOUSANDS/UL (ref 149–390)
PMV BLD AUTO: 8.6 FL (ref 8.6–11.7)
POTASSIUM SERPL-SCNC: 3.1 MMOL/L (ref 3.5–5.5)
PROT SERPL-MCNC: 7.1 G/DL (ref 6.4–8.9)
RBC # BLD AUTO: 3.98 MILLION/UL (ref 3.9–5.2)
SODIUM SERPL-SCNC: 142 MMOL/L (ref 134–143)
T4 FREE SERPL-MCNC: 1.06 NG/DL (ref 0.76–1.46)
T4 FREE SERPL-MCNC: 1.09 NG/DL (ref 0.76–1.46)
TSH SERPL DL<=0.05 MIU/L-ACNC: 0.4 UIU/ML (ref 0.45–5.33)
VIT B12 SERPL-MCNC: 307 PG/ML (ref 100–900)
WBC # BLD AUTO: 8.6 THOUSAND/UL (ref 4.8–10.8)

## 2019-10-29 PROCEDURE — 80053 COMPREHEN METABOLIC PANEL: CPT | Performed by: NURSE PRACTITIONER

## 2019-10-29 PROCEDURE — 82607 VITAMIN B-12: CPT

## 2019-10-29 PROCEDURE — 86618 LYME DISEASE ANTIBODY: CPT

## 2019-10-29 PROCEDURE — 85025 COMPLETE CBC W/AUTO DIFF WBC: CPT

## 2019-10-29 PROCEDURE — 84439 ASSAY OF FREE THYROXINE: CPT

## 2019-10-29 PROCEDURE — 82746 ASSAY OF FOLIC ACID SERUM: CPT

## 2019-10-29 PROCEDURE — 86592 SYPHILIS TEST NON-TREP QUAL: CPT

## 2019-10-29 PROCEDURE — 36415 COLL VENOUS BLD VENIPUNCTURE: CPT | Performed by: NURSE PRACTITIONER

## 2019-10-29 PROCEDURE — 84443 ASSAY THYROID STIM HORMONE: CPT

## 2019-10-30 LAB
B BURGDOR IGG+IGM SER-ACNC: <0.91 ISR (ref 0–0.9)
RPR SER QL: NORMAL

## 2019-11-01 DIAGNOSIS — E55.9 VITAMIN D DEFICIENCY: ICD-10-CM

## 2019-11-04 RX ORDER — ERGOCALCIFEROL 1.25 MG/1
CAPSULE ORAL
Qty: 12 CAPSULE | Refills: 3 | Status: SHIPPED | OUTPATIENT
Start: 2019-11-04 | End: 2020-10-14

## 2019-11-05 ENCOUNTER — TELEPHONE (OUTPATIENT)
Dept: FAMILY MEDICINE CLINIC | Facility: CLINIC | Age: 44
End: 2019-11-05

## 2019-11-05 DIAGNOSIS — R79.89 ELEVATED TSH: Primary | ICD-10-CM

## 2019-11-05 NOTE — TELEPHONE ENCOUNTER
----- Message from Corazon Borrego sent at 11/5/2019  6:51 AM EST -----  Call pt and tell her labs are good no lyme disease  TSH needs repeat in 3 mos  Its a little bit low we can mail her the lab slip  ----- Message -----  From: Lab, Background User  Sent: 10/29/2019   6:35 PM EST  To: FIONA Borrego

## 2019-12-03 ENCOUNTER — OFFICE VISIT (OUTPATIENT)
Dept: FAMILY MEDICINE CLINIC | Facility: CLINIC | Age: 44
End: 2019-12-03
Payer: COMMERCIAL

## 2019-12-03 VITALS
TEMPERATURE: 98 F | HEIGHT: 63 IN | BODY MASS INDEX: 27.14 KG/M2 | WEIGHT: 153.2 LBS | OXYGEN SATURATION: 94 % | SYSTOLIC BLOOD PRESSURE: 138 MMHG | HEART RATE: 95 BPM | DIASTOLIC BLOOD PRESSURE: 86 MMHG

## 2019-12-03 DIAGNOSIS — Z13.31 POSITIVE DEPRESSION SCREENING: ICD-10-CM

## 2019-12-03 DIAGNOSIS — F41.9 ANXIETY: ICD-10-CM

## 2019-12-03 DIAGNOSIS — M25.571 BILATERAL ANKLE PAIN, UNSPECIFIED CHRONICITY: Primary | ICD-10-CM

## 2019-12-03 DIAGNOSIS — M25.572 BILATERAL ANKLE PAIN, UNSPECIFIED CHRONICITY: Primary | ICD-10-CM

## 2019-12-03 DIAGNOSIS — M19.90 ARTHRITIS: ICD-10-CM

## 2019-12-03 PROCEDURE — 99213 OFFICE O/P EST LOW 20 MIN: CPT | Performed by: NURSE PRACTITIONER

## 2019-12-03 PROCEDURE — 3008F BODY MASS INDEX DOCD: CPT | Performed by: NURSE PRACTITIONER

## 2019-12-03 RX ORDER — CELECOXIB 100 MG/1
100 CAPSULE ORAL 2 TIMES DAILY
Qty: 60 CAPSULE | Refills: 1 | Status: SHIPPED | OUTPATIENT
Start: 2019-12-03 | End: 2020-01-15

## 2019-12-03 NOTE — PROGRESS NOTES
Assessment/Plan:    No problem-specific Assessment & Plan notes found for this encounter  Diagnoses and all orders for this visit:    Bilateral ankle pain, unspecified chronicity  Comments:  Referral to P T  and podiatry  Celebrex prescribed  Orders:  -     Ambulatory referral to Podiatry; Future  -     Ambulatory referral to Physical Therapy; Future  -     celecoxib (CeleBREX) 100 mg capsule; Take 1 capsule (100 mg total) by mouth 2 (two) times a day    Arthritis  Comments:  Referral to P T  and podiatry  Orders:  -     Ambulatory referral to Podiatry; Future  -     Ambulatory referral to Physical Therapy; Future  -     celecoxib (CeleBREX) 100 mg capsule; Take 1 capsule (100 mg total) by mouth 2 (two) times a day    Anxiety  Comments:  Continue current regimen    Positive depression screening  Comments:  pt is following with Arpita Souza PA-C           Subjective:      Patient ID: Danae Escobar is a 37 y o  female  Pain in both ankles    37 y o  Presents with pain in bilateral ankles  States she has been using ice and ibuprofen to treat pain  States it is difficult for her to walk  Tested patient strength and patient able to successfully perform AROM against resistance with excellent strength and without any noticeable signs of pain  Referral to P T  And podiatry made for further strength training and possible orthotics for better foot support  Rx for Celebrex provided    In addition, it was noted that pts TSH is low but I am going to repeat the labs in January 2020 and adjust Levothyroxine from those results  The following portions of the patient's history were reviewed and updated as appropriate: She  has a past medical history of Ankle pain, right, Anxiety, Arthritis, Bipolar 1 disorder ([address] ), Depression, Hypertension, Hypothyroidism, Known health problems: none, and Scoliosis    She   Patient Active Problem List    Diagnosis Date Noted    Bilateral ankle pain 12/03/2019    Positive depression screening 12/03/2019    BMI 28 0-28 9,adult 10/19/2019    Tendinitis of right ankle 08/22/2019    Medication refill 07/13/2019    IUD check up 06/28/2019    Chronic bilateral low back pain without sciatica 12/15/2018    Bipolar 2 disorder ([facility] [address] ) 10/23/2018    Acquired hypothyroidism 10/23/2018    Arthritis 10/23/2018    Increased frequency of urination 05/06/2017    Anxiety 07/21/2016     She  has a past surgical history that includes Cholecystectomy  Her family history includes Diabetes in her maternal grandmother, mother, and paternal grandmother; Heart disease in her mother; Hypertension in her father and mother  She  reports that she has been smoking  She has a 1 50 pack-year smoking history  She has never used smokeless tobacco  She reports that she does not drink alcohol or use drugs    Current Outpatient Medications   Medication Sig Dispense Refill    buPROPion (WELLBUTRIN XL) 300 mg 24 hr tablet Take 300 mg by mouth daily  1    busPIRone (BUSPAR) 15 mg tablet Take 1 tablet (15 mg total) by mouth 3 (three) times a day 90 tablet 2    cariprazine (VRAYLAR) 3 MG capsule Take 1 capsule (3 mg total) by mouth daily 30 capsule 2    ergocalciferol (VITAMIN D2) 50,000 units take 1 capsule by mouth every week 12 capsule 3    gabapentin (NEURONTIN) 600 MG tablet Take 1 tablet (600 mg total) by mouth 3 (three) times a day 90 tablet 1    levothyroxine 50 mcg tablet Take 1 tablet (50 mcg total) by mouth daily for 90 days 90 tablet 3    lisinopril (ZESTRIL) 20 mg tablet Take 2 tablets (40 mg total) by mouth daily 90 tablet 3    venlafaxine (EFFEXOR-XR) 150 mg 24 hr capsule Take 2 capsules (300 mg total) by mouth daily 60 capsule 1    zolpidem (AMBIEN) 10 mg tablet TAKE 1 TABLET BY MOUTH DAILY AT BEDTIME 30 tablet 0    buPROPion (WELLBUTRIN XL) 150 mg 24 hr tablet Take 2 tablets (300 mg total) by mouth daily (Patient not taking: Reported on 12/3/2019) 30 tablet 2    busPIRone (BUSPAR) 10 mg tablet Take 10 mg by mouth 3 (three) times a day  0    celecoxib (CeleBREX) 100 mg capsule Take 1 capsule (100 mg total) by mouth 2 (two) times a day 60 capsule 1     No current facility-administered medications for this visit  Current Outpatient Medications on File Prior to Visit   Medication Sig    buPROPion (WELLBUTRIN XL) 300 mg 24 hr tablet Take 300 mg by mouth daily    busPIRone (BUSPAR) 15 mg tablet Take 1 tablet (15 mg total) by mouth 3 (three) times a day    cariprazine (VRAYLAR) 3 MG capsule Take 1 capsule (3 mg total) by mouth daily    ergocalciferol (VITAMIN D2) 50,000 units take 1 capsule by mouth every week    gabapentin (NEURONTIN) 600 MG tablet Take 1 tablet (600 mg total) by mouth 3 (three) times a day    levothyroxine 50 mcg tablet Take 1 tablet (50 mcg total) by mouth daily for 90 days    lisinopril (ZESTRIL) 20 mg tablet Take 2 tablets (40 mg total) by mouth daily    venlafaxine (EFFEXOR-XR) 150 mg 24 hr capsule Take 2 capsules (300 mg total) by mouth daily    zolpidem (AMBIEN) 10 mg tablet TAKE 1 TABLET BY MOUTH DAILY AT BEDTIME    buPROPion (WELLBUTRIN XL) 150 mg 24 hr tablet Take 2 tablets (300 mg total) by mouth daily (Patient not taking: Reported on 12/3/2019)    busPIRone (BUSPAR) 10 mg tablet Take 10 mg by mouth 3 (three) times a day     No current facility-administered medications on file prior to visit  She has No Known Allergies       Review of Systems   Musculoskeletal: Positive for arthralgias  Negative for myalgias  B/L ankles   Psychiatric/Behavioral: The patient is not nervous/anxious               PHQ-9 Depression Screening    PHQ-9:    Frequency of the following problems over the past two weeks:       Little interest or pleasure in doing things:  1 - several days  Feeling down, depressed, or hopeless:  2 - more than half the days  Trouble falling or staying asleep, or sleeping too much:  1 - several days  Feeling tired or having little energy:  1 - several days  Poor appetite or overeatin - several days  Feeling bad about yourself - or that you are a failure or have let yourself or your family down:  1 - several days  Trouble concentrating on things, such as reading the newspaper or watching television:  1 - several days  Moving or speaking so slowly that other people could have noticed  Or the opposite - being so fidgety or restless that you have been moving around a lot more than usual:  0 - not at all  Thoughts that you would be better off dead, or of hurting yourself in some way:  0 - not at all  PHQ-2 Score:  3  PHQ-9 Score:  8        Depression Screening Follow-up Plan: Patient's depression screening was positive with a PHQ-2 score of 3  Their PHQ-9 score was 8  Patient assessed for underlying major depression  They have no active suicidal ideations  Brief counseling provided and recommend additional follow-up/re-evaluation next office visit  Patient advised to follow-up with PCP for further management  Objective:      /86   Pulse 95   Temp 98 °F (36 7 °C) (Tympanic)   Ht 5' 3" (1 6 m)   Wt 69 5 kg (153 lb 3 2 oz)   SpO2 94%   BMI 27 14 kg/m²          Physical Exam   Musculoskeletal:        Right ankle: She exhibits decreased range of motion  Tenderness  Lateral malleolus tenderness found  Left ankle: Tenderness  Lateral malleolus tenderness found  Achilles tendon exhibits pain  Feet:    Psychiatric: Her mood appears anxious  She does not exhibit a depressed mood

## 2019-12-08 ENCOUNTER — APPOINTMENT (EMERGENCY)
Dept: CT IMAGING | Facility: HOSPITAL | Age: 44
End: 2019-12-08
Payer: COMMERCIAL

## 2019-12-08 ENCOUNTER — APPOINTMENT (EMERGENCY)
Dept: RADIOLOGY | Facility: HOSPITAL | Age: 44
End: 2019-12-08
Payer: COMMERCIAL

## 2019-12-08 ENCOUNTER — HOSPITAL ENCOUNTER (EMERGENCY)
Facility: HOSPITAL | Age: 44
Discharge: HOME | End: 2019-12-08
Attending: EMERGENCY MEDICINE | Admitting: EMERGENCY MEDICINE
Payer: COMMERCIAL

## 2019-12-08 VITALS
WEIGHT: 150 LBS | DIASTOLIC BLOOD PRESSURE: 65 MMHG | TEMPERATURE: 98 F | SYSTOLIC BLOOD PRESSURE: 115 MMHG | BODY MASS INDEX: 24.99 KG/M2 | HEART RATE: 87 BPM | OXYGEN SATURATION: 100 % | RESPIRATION RATE: 18 BRPM | HEIGHT: 65 IN

## 2019-12-08 DIAGNOSIS — R10.9 ABDOMINAL PAIN: Primary | ICD-10-CM

## 2019-12-08 DIAGNOSIS — R07.81 RIB PAIN ON LEFT SIDE: ICD-10-CM

## 2019-12-08 DIAGNOSIS — K59.00 CONSTIPATION, UNSPECIFIED CONSTIPATION TYPE: ICD-10-CM

## 2019-12-08 LAB
ANION GAP SERPL CALCULATED.3IONS-SCNC: 9 MMOL/L (ref 4–13)
BASOPHILS # BLD AUTO: 0.1 THOUSANDS/ΜL (ref 0–0.1)
BASOPHILS NFR BLD AUTO: 1 % (ref 0–2)
BUN SERPL-MCNC: 22 MG/DL (ref 7–25)
CALCIUM SERPL-MCNC: 9.5 MG/DL (ref 8.6–10.5)
CHLORIDE SERPL-SCNC: 101 MMOL/L (ref 98–107)
CO2 SERPL-SCNC: 25 MMOL/L (ref 21–31)
CREAT SERPL-MCNC: 1.16 MG/DL (ref 0.6–1.2)
D DIMER PPP FEU-MCNC: 0.18 UG/ML FEU
EOSINOPHIL # BLD AUTO: 0.2 THOUSAND/ΜL (ref 0–0.61)
EOSINOPHIL NFR BLD AUTO: 2 % (ref 0–5)
ERYTHROCYTE [DISTWIDTH] IN BLOOD BY AUTOMATED COUNT: 12.1 % (ref 11.5–14.5)
GFR SERPL CREATININE-BSD FRML MDRD: 57 ML/MIN/1.73SQ M
GLUCOSE SERPL-MCNC: 86 MG/DL (ref 65–99)
HCT VFR BLD AUTO: 42.3 % (ref 42–47)
HGB BLD-MCNC: 14.5 G/DL (ref 12–16)
LYMPHOCYTES # BLD AUTO: 2.5 THOUSANDS/ΜL (ref 0.6–4.47)
LYMPHOCYTES NFR BLD AUTO: 24 % (ref 21–51)
MCH RBC QN AUTO: 33.1 PG (ref 26–34)
MCHC RBC AUTO-ENTMCNC: 34.2 G/DL (ref 31–37)
MCV RBC AUTO: 97 FL (ref 81–99)
MONOCYTES # BLD AUTO: 0.7 THOUSAND/ΜL (ref 0.17–1.22)
MONOCYTES NFR BLD AUTO: 7 % (ref 2–12)
NEUTROPHILS # BLD AUTO: 7.1 THOUSANDS/ΜL (ref 1.4–6.5)
NEUTS SEG NFR BLD AUTO: 67 % (ref 42–75)
PLATELET # BLD AUTO: 274 THOUSANDS/UL (ref 149–390)
PMV BLD AUTO: 8.5 FL (ref 8.6–11.7)
POTASSIUM SERPL-SCNC: 3.8 MMOL/L (ref 3.5–5.5)
RBC # BLD AUTO: 4.37 MILLION/UL (ref 3.9–5.2)
SODIUM SERPL-SCNC: 135 MMOL/L (ref 134–143)
TROPONIN I SERPL-MCNC: <0.03 NG/ML
WBC # BLD AUTO: 10.6 THOUSAND/UL (ref 4.8–10.8)

## 2019-12-08 PROCEDURE — 96376 TX/PRO/DX INJ SAME DRUG ADON: CPT

## 2019-12-08 PROCEDURE — 99283 EMERGENCY DEPT VISIT LOW MDM: CPT | Performed by: FAMILY MEDICINE

## 2019-12-08 PROCEDURE — 84484 ASSAY OF TROPONIN QUANT: CPT | Performed by: FAMILY MEDICINE

## 2019-12-08 PROCEDURE — 36415 COLL VENOUS BLD VENIPUNCTURE: CPT | Performed by: FAMILY MEDICINE

## 2019-12-08 PROCEDURE — 80048 BASIC METABOLIC PNL TOTAL CA: CPT | Performed by: FAMILY MEDICINE

## 2019-12-08 PROCEDURE — 85379 FIBRIN DEGRADATION QUANT: CPT | Performed by: FAMILY MEDICINE

## 2019-12-08 PROCEDURE — 71275 CT ANGIOGRAPHY CHEST: CPT

## 2019-12-08 PROCEDURE — 96375 TX/PRO/DX INJ NEW DRUG ADDON: CPT

## 2019-12-08 PROCEDURE — 93005 ELECTROCARDIOGRAM TRACING: CPT

## 2019-12-08 PROCEDURE — 99285 EMERGENCY DEPT VISIT HI MDM: CPT

## 2019-12-08 PROCEDURE — 71101 X-RAY EXAM UNILAT RIBS/CHEST: CPT

## 2019-12-08 PROCEDURE — 96361 HYDRATE IV INFUSION ADD-ON: CPT

## 2019-12-08 PROCEDURE — 85025 COMPLETE CBC W/AUTO DIFF WBC: CPT | Performed by: FAMILY MEDICINE

## 2019-12-08 PROCEDURE — 74177 CT ABD & PELVIS W/CONTRAST: CPT

## 2019-12-08 PROCEDURE — 96374 THER/PROPH/DIAG INJ IV PUSH: CPT

## 2019-12-08 RX ORDER — ONDANSETRON 2 MG/ML
4 INJECTION INTRAMUSCULAR; INTRAVENOUS ONCE
Status: COMPLETED | OUTPATIENT
Start: 2019-12-08 | End: 2019-12-08

## 2019-12-08 RX ORDER — OXYCODONE HYDROCHLORIDE AND ACETAMINOPHEN 5; 325 MG/1; MG/1
1 TABLET ORAL EVERY 4 HOURS PRN
Qty: 10 TABLET | Refills: 0 | Status: SHIPPED | OUTPATIENT
Start: 2019-12-08 | End: 2019-12-18

## 2019-12-08 RX ORDER — SENNA AND DOCUSATE SODIUM 50; 8.6 MG/1; MG/1
1 TABLET, FILM COATED ORAL DAILY
Qty: 10 TABLET | Refills: 0 | Status: SHIPPED | OUTPATIENT
Start: 2019-12-08 | End: 2020-02-03 | Stop reason: HOSPADM

## 2019-12-08 RX ORDER — MORPHINE SULFATE 4 MG/ML
4 INJECTION, SOLUTION INTRAMUSCULAR; INTRAVENOUS ONCE
Status: COMPLETED | OUTPATIENT
Start: 2019-12-08 | End: 2019-12-08

## 2019-12-08 RX ORDER — KETOROLAC TROMETHAMINE 30 MG/ML
30 INJECTION, SOLUTION INTRAMUSCULAR; INTRAVENOUS ONCE
Status: COMPLETED | OUTPATIENT
Start: 2019-12-08 | End: 2019-12-08

## 2019-12-08 RX ADMIN — MORPHINE SULFATE 4 MG: 4 INJECTION INTRAVENOUS at 20:22

## 2019-12-08 RX ADMIN — SODIUM CHLORIDE 1000 ML: 0.9 INJECTION, SOLUTION INTRAVENOUS at 20:21

## 2019-12-08 RX ADMIN — IOHEXOL 100 ML: 350 INJECTION, SOLUTION INTRAVENOUS at 21:14

## 2019-12-08 RX ADMIN — ONDANSETRON 4 MG: 2 INJECTION INTRAMUSCULAR; INTRAVENOUS at 19:09

## 2019-12-08 RX ADMIN — MORPHINE SULFATE 4 MG: 4 INJECTION INTRAVENOUS at 19:09

## 2019-12-08 RX ADMIN — KETOROLAC TROMETHAMINE 30 MG: 30 INJECTION, SOLUTION INTRAMUSCULAR; INTRAVENOUS at 18:29

## 2019-12-08 NOTE — ED PROVIDER NOTES
History  Chief Complaint   Patient presents with    Abdominal Pain     left sided abdominal pain that began yesterday  denies any N,V,D or urinary difficulties       History provided by:  Patient   used: No    This is a 49-year-old pleasant female presented to ED with complain of left-sided rib pain x2 days  Patient is denying any trauma or injury  Patient is rating her pain 8/10 at this time  She states she has been taking Tylenol alternate ibuprofen without improvement  Patient is denying any shortness of breath however does states that the the pain increases with deep breathing  Patient is denying any chest pain but does complain of shortness of breath when the pain increases  Patient is denying any abdominal pain or nausea vomiting  Patient is denying any other complaint  Prior to Admission Medications   Prescriptions Last Dose Informant Patient Reported? Taking?    buPROPion (WELLBUTRIN XL) 300 mg 24 hr tablet   Yes No   Sig: Take 300 mg by mouth daily   busPIRone (BUSPAR) 10 mg tablet   Yes No   Sig: Take 10 mg by mouth 3 (three) times a day   busPIRone (BUSPAR) 15 mg tablet   No No   Sig: Take 1 tablet (15 mg total) by mouth 3 (three) times a day   cariprazine (VRAYLAR) 3 MG capsule   No No   Sig: Take 1 capsule (3 mg total) by mouth daily   celecoxib (CeleBREX) 100 mg capsule   No No   Sig: Take 1 capsule (100 mg total) by mouth 2 (two) times a day   ergocalciferol (VITAMIN D2) 50,000 units   No No   Sig: take 1 capsule by mouth every week   gabapentin (NEURONTIN) 600 MG tablet   No No   Sig: Take 1 tablet (600 mg total) by mouth 3 (three) times a day   levothyroxine 50 mcg tablet  Self No No   Sig: Take 1 tablet (50 mcg total) by mouth daily for 90 days   lisinopril (ZESTRIL) 20 mg tablet   No No   Sig: Take 2 tablets (40 mg total) by mouth daily   venlafaxine (EFFEXOR-XR) 150 mg 24 hr capsule   No No   Sig: Take 2 capsules (300 mg total) by mouth daily   zolpidem (AMBIEN) 10 mg tablet   No No   Sig: TAKE 1 TABLET BY MOUTH DAILY AT BEDTIME      Facility-Administered Medications: None       Past Medical History:   Diagnosis Date    Ankle pain, right     Anxiety     Arthritis     Bipolar 1 disorder (HCC)     Depression     Hypertension     Hypothyroidism     Known health problems: none     Scoliosis        Past Surgical History:   Procedure Laterality Date    CHOLECYSTECTOMY         Family History   Problem Relation Age of Onset    Diabetes Mother     Hypertension Mother     Heart disease Mother     Hypertension Father     Diabetes Maternal Grandmother     Diabetes Paternal Grandmother      I have reviewed and agree with the history as documented  Social History     Tobacco Use    Smoking status: Current Every Day Smoker     Packs/day: 0 50     Years: 3 00     Pack years: 1 50    Smokeless tobacco: Never Used   Substance Use Topics    Alcohol use: No    Drug use: No        Review of Systems   Constitutional: Negative  HENT: Negative  Respiratory: Positive for shortness of breath  Cardiovascular: Negative  Gastrointestinal:        Left rib pain   Genitourinary: Negative  Neurological: Negative  Psychiatric/Behavioral: Negative  Physical Exam  Physical Exam   Constitutional: She appears well-developed and well-nourished  She does not appear ill  Patient does to be appear uncomfortable secondary to pain  HENT:   Head: Normocephalic and atraumatic  Cardiovascular: Normal rate, regular rhythm and normal heart sounds  Pulmonary/Chest: Effort normal and breath sounds normal  No stridor  Abdominal: Soft  Normal appearance and bowel sounds are normal  There is generalized tenderness  There is no rigidity  Skin: Skin is warm  Nursing note and vitals reviewed        Vital Signs  ED Triage Vitals [12/08/19 1743]   Temperature Pulse Respirations Blood Pressure SpO2   98 6 °F (37 °C) (!) 118 20 109/70 98 %      Temp Source Heart Rate Source Patient Position - Orthostatic VS BP Location FiO2 (%)   Temporal Monitor Sitting Left arm --      Pain Score       Worst Possible Pain           Vitals:    12/08/19 1743   BP: 109/70   Pulse: (!) 118   Patient Position - Orthostatic VS: Sitting         Visual Acuity      ED Medications  Medications   ketorolac (TORADOL) injection 30 mg (30 mg Intravenous Given 12/8/19 1829)       Diagnostic Studies  Results Reviewed     Procedure Component Value Units Date/Time    CBC and differential [[id number]]  (Abnormal) Collected:  12/08/19 1836    Lab Status:  Final result Specimen:  Blood from Arm, Right Updated:  12/08/19 1844     WBC 10 60 Thousand/uL      RBC 4 37 Million/uL      Hemoglobin 14 5 g/dL      Hematocrit 42 3 %      MCV 97 fL      MCH 33 1 pg      MCHC 34 2 g/dL      RDW 12 1 %      MPV 8 5 fL      Platelets 874 Thousands/uL      Neutrophils Relative 67 %      Lymphocytes Relative 24 %      Monocytes Relative 7 %      Eosinophils Relative 2 %      Basophils Relative 1 %      Neutrophils Absolute 7 10 Thousands/µL      Lymphocytes Absolute 2 50 Thousands/µL      Monocytes Absolute 0 70 Thousand/µL      Eosinophils Absolute 0 20 Thousand/µL      Basophils Absolute 0 10 Thousands/µL     Troponin I [[id number]] Collected:  12/08/19 1836    Lab Status: In process Specimen:  Blood from Arm, Right Updated:  12/08/19 9073    Basic metabolic panel [[id number]] Collected:  12/08/19 1836    Lab Status: In process Specimen:  Blood from Arm, Right Updated:  12/08/19 1837    D-Dimer [[id number]] Collected:  12/08/19 1835    Lab Status:   In process Specimen:  Blood from Arm, Right Updated:  12/08/19 1836                 XR ribs left w pa chest min 3 views    (Results Pending)              Procedures  Procedures         ED Course  ED Course as of Dec 08 1852   Everett Calles Dec 08, 2019   1850 Pt care is being transfer to Dr Comer Devoid                                   MDM      Disposition  Final diagnoses:   Abdominal pain   Rib pain on left side     Time reflects when diagnosis was documented in both MDM as applicable and the Disposition within this note     Time User Action Codes Description Comment    12/8/2019  6:51 PM Tomi Beckwith [R10 9] Abdominal pain     12/8/2019  6:52 PM Mckenna Gu [R07 81] Rib pain on left side       ED Disposition     None      Follow-up Information    None         Patient's Medications   Discharge Prescriptions    No medications on file     No discharge procedures on file      ED Provider  Electronically Signed by           Luther Arnold MD  12/08/19 5968

## 2019-12-12 LAB
ATRIAL RATE: 98 BPM
P AXIS: 56 DEGREES
PR INTERVAL: 120 MS
QRS AXIS: 48 DEGREES
QRSD INTERVAL: 82 MS
QT INTERVAL: 330 MS
QTC INTERVAL: 421 MS
T WAVE AXIS: 58 DEGREES
VENTRICULAR RATE: 98 BPM

## 2019-12-12 PROCEDURE — 93010 ELECTROCARDIOGRAM REPORT: CPT | Performed by: INTERNAL MEDICINE

## 2019-12-29 DIAGNOSIS — F32.A ANXIETY AND DEPRESSION: ICD-10-CM

## 2019-12-29 DIAGNOSIS — F41.9 ANXIETY AND DEPRESSION: ICD-10-CM

## 2019-12-29 DIAGNOSIS — F31.9 BIPOLAR 1 DISORDER (HCC): ICD-10-CM

## 2020-01-04 RX ORDER — QUETIAPINE FUMARATE 50 MG/1
TABLET, FILM COATED ORAL
Qty: 60 TABLET | Refills: 0 | OUTPATIENT
Start: 2020-01-04

## 2020-01-13 DIAGNOSIS — I10 HYPERTENSION, UNSPECIFIED TYPE: ICD-10-CM

## 2020-01-13 DIAGNOSIS — I10 HYPERTENSION, UNSPECIFIED TYPE: Primary | ICD-10-CM

## 2020-01-13 RX ORDER — LISINOPRIL 20 MG/1
TABLET ORAL
Qty: 90 TABLET | Refills: 0 | OUTPATIENT
Start: 2020-01-13

## 2020-01-13 RX ORDER — LISINOPRIL 20 MG/1
40 TABLET ORAL DAILY
Qty: 180 TABLET | Refills: 3 | Status: ON HOLD | OUTPATIENT
Start: 2020-01-13 | End: 2020-02-03 | Stop reason: SDUPTHER

## 2020-01-15 DIAGNOSIS — M25.571 BILATERAL ANKLE PAIN, UNSPECIFIED CHRONICITY: ICD-10-CM

## 2020-01-15 DIAGNOSIS — M25.572 BILATERAL ANKLE PAIN, UNSPECIFIED CHRONICITY: ICD-10-CM

## 2020-01-15 DIAGNOSIS — M19.90 ARTHRITIS: ICD-10-CM

## 2020-01-15 RX ORDER — CELECOXIB 100 MG/1
CAPSULE ORAL
Qty: 60 CAPSULE | Refills: 0 | Status: SHIPPED | OUTPATIENT
Start: 2020-01-15 | End: 2020-02-03 | Stop reason: HOSPADM

## 2020-01-24 ENCOUNTER — APPOINTMENT (OUTPATIENT)
Dept: LAB | Facility: HOSPITAL | Age: 45
End: 2020-01-24
Payer: COMMERCIAL

## 2020-01-24 DIAGNOSIS — R79.89 ELEVATED TSH: ICD-10-CM

## 2020-01-24 LAB — TSH SERPL DL<=0.05 MIU/L-ACNC: 1.13 UIU/ML (ref 0.45–5.33)

## 2020-01-24 PROCEDURE — 84443 ASSAY THYROID STIM HORMONE: CPT

## 2020-01-24 PROCEDURE — 36415 COLL VENOUS BLD VENIPUNCTURE: CPT

## 2020-01-28 ENCOUNTER — OFFICE VISIT (OUTPATIENT)
Dept: FAMILY MEDICINE CLINIC | Facility: CLINIC | Age: 45
End: 2020-01-28
Payer: COMMERCIAL

## 2020-01-28 VITALS
WEIGHT: 152 LBS | TEMPERATURE: 97.8 F | SYSTOLIC BLOOD PRESSURE: 120 MMHG | HEIGHT: 65 IN | BODY MASS INDEX: 25.33 KG/M2 | OXYGEN SATURATION: 96 % | HEART RATE: 101 BPM | DIASTOLIC BLOOD PRESSURE: 86 MMHG

## 2020-01-28 DIAGNOSIS — F41.9 ANXIETY: Primary | ICD-10-CM

## 2020-01-28 DIAGNOSIS — E03.9 ACQUIRED HYPOTHYROIDISM: ICD-10-CM

## 2020-01-28 DIAGNOSIS — F31.81 BIPOLAR 2 DISORDER (HCC): ICD-10-CM

## 2020-01-28 DIAGNOSIS — M62.838 MUSCLE SPASM: ICD-10-CM

## 2020-01-28 DIAGNOSIS — G89.29 CHRONIC BILATERAL LOW BACK PAIN WITHOUT SCIATICA: ICD-10-CM

## 2020-01-28 DIAGNOSIS — M54.50 CHRONIC BILATERAL LOW BACK PAIN WITHOUT SCIATICA: ICD-10-CM

## 2020-01-28 DIAGNOSIS — Z13.31 POSITIVE DEPRESSION SCREENING: ICD-10-CM

## 2020-01-28 PROCEDURE — 99214 OFFICE O/P EST MOD 30 MIN: CPT | Performed by: NURSE PRACTITIONER

## 2020-01-28 PROCEDURE — 3008F BODY MASS INDEX DOCD: CPT | Performed by: NURSE PRACTITIONER

## 2020-01-28 NOTE — PROGRESS NOTES
Assessment/Plan:    No problem-specific Assessment & Plan notes found for this encounter  Diagnoses and all orders for this visit:    Anxiety  Comments:  Pt will be following with Dr Emily Hector    Acquired hypothyroidism  Comments:  tsh WNL    Bipolar 2 disorder ([facility] [address] )    Positive depression screening    Chronic bilateral low back pain without sciatica  Comments:  Flexeril reordered    Muscle spasm  Comments:  Flexeril reordered          Subjective:      Patient ID: Nima Cedeño is a 53 y o  female  Patient presents to the office today for follow up of thyroid lab work  Thyroid fucntion improved, labs reviewed  Continue with levothyroxine 75 mcg  Patient demonstrates anxious behavior today in the office  Stating that she followed up with psychiatry 1 week ago who increased her gabapentin  She reports that his has not helped her anxiety and now she is having increased tension and muscle spasms of the neck interrupting her sleep  Will order flexeril  Reports feeling overwhelmed from 4 children, work, and lack of support from spouse  Denies SI/HI  Patient sees counselor weekly, and says that these visits help  The following portions of the patient's history were reviewed and updated as appropriate: She  has a past medical history of Ankle pain, right, Anxiety, Arthritis, Bipolar 1 disorder ([facility] [address] ), Depression, Hypertension, Hypothyroidism, Known health problems: none, and Scoliosis    She   Patient Active Problem List    Diagnosis Date Noted    Bilateral ankle pain 12/03/2019    Positive depression screening 12/03/2019    BMI 28 0-28 9,adult 10/19/2019    Tendinitis of right ankle 08/22/2019    Medication refill 07/13/2019    IUD check up 06/28/2019    Chronic bilateral low back pain without sciatica 12/15/2018    Bipolar 2 disorder ([facility] [address] ) 10/23/2018    Acquired hypothyroidism 10/23/2018    Arthritis 10/23/2018    Increased frequency of urination 05/06/2017    Anxiety 07/21/2016     She  has a past surgical history that includes Cholecystectomy  Her family history includes Diabetes in her maternal grandmother, mother, and paternal grandmother; Heart disease in her mother; Hypertension in her father and mother  She  reports that she has been smoking  She has a 1 50 pack-year smoking history  She has never used smokeless tobacco  She reports that she does not drink alcohol or use drugs  Current Outpatient Medications   Medication Sig Dispense Refill    buPROPion (WELLBUTRIN XL) 300 mg 24 hr tablet Take 300 mg by mouth daily  1    busPIRone (BUSPAR) 15 mg tablet Take 1 tablet (15 mg total) by mouth 3 (three) times a day 90 tablet 2    cariprazine (VRAYLAR) 3 MG capsule Take 1 capsule (3 mg total) by mouth daily 30 capsule 2    celecoxib (CeleBREX) 100 mg capsule TAKE 1 CAPSULE BY MOUTH TWICE DAILY 60 capsule 0    ergocalciferol (VITAMIN D2) 50,000 units take 1 capsule by mouth every week 12 capsule 3    gabapentin (NEURONTIN) 600 MG tablet Take 1 tablet (600 mg total) by mouth 3 (three) times a day 90 tablet 1    levothyroxine 50 mcg tablet Take 1 tablet (50 mcg total) by mouth daily for 90 days 90 tablet 3    lisinopril (ZESTRIL) 20 mg tablet Take 2 tablets (40 mg total) by mouth daily 180 tablet 3    venlafaxine (EFFEXOR-XR) 150 mg 24 hr capsule Take 2 capsules (300 mg total) by mouth daily 60 capsule 1    zolpidem (AMBIEN) 10 mg tablet TAKE 1 TABLET BY MOUTH DAILY AT BEDTIME 30 tablet 0    busPIRone (BUSPAR) 10 mg tablet Take 10 mg by mouth 3 (three) times a day  0    magnesium citrate solution Take 296 mL by mouth once for 1 dose 296 mL 0    senna-docusate sodium (SENOKOT-S) 8 6-50 mg per tablet Take 1 tablet by mouth daily (Patient not taking: Reported on 1/28/2020) 10 tablet 0     No current facility-administered medications for this visit        Current Outpatient Medications on File Prior to Visit   Medication Sig    buPROPion (WELLBUTRIN XL) 300 mg 24 hr tablet Take 300 mg by mouth daily    busPIRone (BUSPAR) 15 mg tablet Take 1 tablet (15 mg total) by mouth 3 (three) times a day    cariprazine (VRAYLAR) 3 MG capsule Take 1 capsule (3 mg total) by mouth daily    celecoxib (CeleBREX) 100 mg capsule TAKE 1 CAPSULE BY MOUTH TWICE DAILY    ergocalciferol (VITAMIN D2) 50,000 units take 1 capsule by mouth every week    gabapentin (NEURONTIN) 600 MG tablet Take 1 tablet (600 mg total) by mouth 3 (three) times a day    levothyroxine 50 mcg tablet Take 1 tablet (50 mcg total) by mouth daily for 90 days    lisinopril (ZESTRIL) 20 mg tablet Take 2 tablets (40 mg total) by mouth daily    venlafaxine (EFFEXOR-XR) 150 mg 24 hr capsule Take 2 capsules (300 mg total) by mouth daily    zolpidem (AMBIEN) 10 mg tablet TAKE 1 TABLET BY MOUTH DAILY AT BEDTIME    busPIRone (BUSPAR) 10 mg tablet Take 10 mg by mouth 3 (three) times a day    magnesium citrate solution Take 296 mL by mouth once for 1 dose    senna-docusate sodium (SENOKOT-S) 8 6-50 mg per tablet Take 1 tablet by mouth daily (Patient not taking: Reported on 1/28/2020)     No current facility-administered medications on file prior to visit  She has No Known Allergies       Review of Systems   Constitutional: Negative  HENT: Negative  Eyes: Negative  Respiratory: Negative  Cardiovascular: Negative  Gastrointestinal: Negative  Endocrine: Negative  Genitourinary: Negative  Musculoskeletal: Positive for arthralgias  Negative for myalgias  B/L ankles   Skin: Negative  Allergic/Immunologic: Negative  Neurological: Negative  Hematological: Negative  Psychiatric/Behavioral: The patient is nervous/anxious and is hyperactive  Objective:      /86   Pulse 101   Temp 97 8 °F (36 6 °C)   Ht 5' 5" (1 651 m)   Wt 68 9 kg (152 lb)   SpO2 96%   BMI 25 29 kg/m²          Physical Exam   Constitutional: She is oriented to person, place, and time   She appears well-developed and well-nourished  HENT:   Head: Normocephalic  Eyes: Pupils are equal, round, and reactive to light  Neck: Normal range of motion  Cardiovascular: Normal rate and regular rhythm  Pulmonary/Chest: Effort normal and breath sounds normal    Abdominal: Soft  Bowel sounds are normal    Musculoskeletal: Normal range of motion  Neurological: She is alert and oriented to person, place, and time  Skin: Skin is warm and dry  Psychiatric: Judgment and thought content normal  Her mood appears anxious  She exhibits a depressed mood  Nursing note and vitals reviewed

## 2020-01-29 DIAGNOSIS — M54.2 NECK PAIN: Primary | ICD-10-CM

## 2020-01-29 RX ORDER — CYCLOBENZAPRINE HCL 10 MG
10 TABLET ORAL 3 TIMES DAILY
Qty: 90 TABLET | Refills: 1 | Status: SHIPPED | OUTPATIENT
Start: 2020-01-29 | End: 2020-02-03 | Stop reason: HOSPADM

## 2020-01-29 NOTE — TELEPHONE ENCOUNTER
Pt stated she was seen by Artemio Vu yesterday and she was to send over a script for Flexiril  And it does not look like it went    [facility] [address]    Please advise    EFRXU:[phone]

## 2020-02-01 RX ORDER — CYCLOBENZAPRINE HCL 10 MG
10 TABLET ORAL 3 TIMES DAILY PRN
Qty: 60 TABLET | Refills: 0 | Status: SHIPPED | OUTPATIENT
Start: 2020-02-01 | End: 2020-02-03 | Stop reason: HOSPADM

## 2020-02-02 ENCOUNTER — APPOINTMENT (EMERGENCY)
Dept: CT IMAGING | Facility: HOSPITAL | Age: 45
End: 2020-02-02
Payer: COMMERCIAL

## 2020-02-02 ENCOUNTER — HOSPITAL ENCOUNTER (OUTPATIENT)
Facility: HOSPITAL | Age: 45
Setting detail: OBSERVATION
Discharge: HOME | End: 2020-02-03
Attending: EMERGENCY MEDICINE | Admitting: INTERNAL MEDICINE
Payer: COMMERCIAL

## 2020-02-02 DIAGNOSIS — E03.9 ACQUIRED HYPOTHYROIDISM: ICD-10-CM

## 2020-02-02 DIAGNOSIS — R10.9 ABDOMINAL PAIN: ICD-10-CM

## 2020-02-02 DIAGNOSIS — F31.9 BIPOLAR 1 DISORDER (HCC): ICD-10-CM

## 2020-02-02 DIAGNOSIS — F31.81 BIPOLAR 2 DISORDER (HCC): ICD-10-CM

## 2020-02-02 DIAGNOSIS — I10 HYPERTENSION, UNSPECIFIED TYPE: ICD-10-CM

## 2020-02-02 DIAGNOSIS — G89.29 CHRONIC BILATERAL LOW BACK PAIN WITHOUT SCIATICA: ICD-10-CM

## 2020-02-02 DIAGNOSIS — N17.9 AKI (ACUTE KIDNEY INJURY) (HCC): Primary | ICD-10-CM

## 2020-02-02 DIAGNOSIS — F41.9 ANXIETY: ICD-10-CM

## 2020-02-02 DIAGNOSIS — N17.9 ACUTE RENAL FAILURE (ARF) (HCC): ICD-10-CM

## 2020-02-02 DIAGNOSIS — M54.50 CHRONIC BILATERAL LOW BACK PAIN WITHOUT SCIATICA: ICD-10-CM

## 2020-02-02 LAB
ALBUMIN SERPL BCP-MCNC: 4.9 G/DL (ref 3.5–5.7)
ALP SERPL-CCNC: 72 U/L (ref 40–150)
ALT SERPL W P-5'-P-CCNC: 10 U/L (ref 7–52)
ANION GAP SERPL CALCULATED.3IONS-SCNC: 8 MMOL/L (ref 4–13)
AST SERPL W P-5'-P-CCNC: 17 U/L (ref 13–39)
B-HCG SERPL-ACNC: 1.12 MIU/ML (ref 0–11.6)
BASOPHILS # BLD AUTO: 0.1 THOUSANDS/ΜL (ref 0–0.1)
BASOPHILS NFR BLD AUTO: 1 % (ref 0–2)
BILIRUB SERPL-MCNC: 0.6 MG/DL (ref 0.2–1)
BUN SERPL-MCNC: 25 MG/DL (ref 7–25)
CALCIUM SERPL-MCNC: 9.9 MG/DL (ref 8.6–10.5)
CHLORIDE SERPL-SCNC: 102 MMOL/L (ref 98–107)
CO2 SERPL-SCNC: 28 MMOL/L (ref 21–31)
CREAT SERPL-MCNC: 2.05 MG/DL (ref 0.6–1.2)
EOSINOPHIL # BLD AUTO: 0.2 THOUSAND/ΜL (ref 0–0.61)
EOSINOPHIL NFR BLD AUTO: 1 % (ref 0–5)
ERYTHROCYTE [DISTWIDTH] IN BLOOD BY AUTOMATED COUNT: 12.9 % (ref 11.5–14.5)
GFR SERPL CREATININE-BSD FRML MDRD: 28 ML/MIN/1.73SQ M
GLUCOSE SERPL-MCNC: 100 MG/DL (ref 65–99)
HCT VFR BLD AUTO: 39.5 % (ref 42–47)
HGB BLD-MCNC: 13.3 G/DL (ref 12–16)
LIPASE SERPL-CCNC: 20 U/L (ref 11–82)
LYMPHOCYTES # BLD AUTO: 1.9 THOUSANDS/ΜL (ref 0.6–4.47)
LYMPHOCYTES NFR BLD AUTO: 16 % (ref 21–51)
MCH RBC QN AUTO: 33.1 PG (ref 26–34)
MCHC RBC AUTO-ENTMCNC: 33.7 G/DL (ref 31–37)
MCV RBC AUTO: 98 FL (ref 81–99)
MONOCYTES # BLD AUTO: 0.8 THOUSAND/ΜL (ref 0.17–1.22)
MONOCYTES NFR BLD AUTO: 7 % (ref 2–12)
NEUTROPHILS # BLD AUTO: 9.1 THOUSANDS/ΜL (ref 1.4–6.5)
NEUTS SEG NFR BLD AUTO: 75 % (ref 42–75)
PLATELET # BLD AUTO: 247 THOUSANDS/UL (ref 149–390)
PMV BLD AUTO: 8.4 FL (ref 8.6–11.7)
POTASSIUM SERPL-SCNC: 3.8 MMOL/L (ref 3.5–5.5)
PROT SERPL-MCNC: 7.2 G/DL (ref 6.4–8.9)
RBC # BLD AUTO: 4.02 MILLION/UL (ref 3.9–5.2)
SODIUM SERPL-SCNC: 138 MMOL/L (ref 134–143)
WBC # BLD AUTO: 12.1 THOUSAND/UL (ref 4.8–10.8)

## 2020-02-02 PROCEDURE — 80053 COMPREHEN METABOLIC PANEL: CPT | Performed by: EMERGENCY MEDICINE

## 2020-02-02 PROCEDURE — 96361 HYDRATE IV INFUSION ADD-ON: CPT

## 2020-02-02 PROCEDURE — 99285 EMERGENCY DEPT VISIT HI MDM: CPT

## 2020-02-02 PROCEDURE — 96376 TX/PRO/DX INJ SAME DRUG ADON: CPT

## 2020-02-02 PROCEDURE — 85025 COMPLETE CBC W/AUTO DIFF WBC: CPT | Performed by: EMERGENCY MEDICINE

## 2020-02-02 PROCEDURE — 83690 ASSAY OF LIPASE: CPT | Performed by: EMERGENCY MEDICINE

## 2020-02-02 PROCEDURE — 74176 CT ABD & PELVIS W/O CONTRAST: CPT

## 2020-02-02 PROCEDURE — 99285 EMERGENCY DEPT VISIT HI MDM: CPT | Performed by: EMERGENCY MEDICINE

## 2020-02-02 PROCEDURE — 36415 COLL VENOUS BLD VENIPUNCTURE: CPT | Performed by: EMERGENCY MEDICINE

## 2020-02-02 PROCEDURE — 84702 CHORIONIC GONADOTROPIN TEST: CPT | Performed by: EMERGENCY MEDICINE

## 2020-02-02 PROCEDURE — 96374 THER/PROPH/DIAG INJ IV PUSH: CPT

## 2020-02-02 RX ORDER — MORPHINE SULFATE 4 MG/ML
4 INJECTION, SOLUTION INTRAMUSCULAR; INTRAVENOUS ONCE
Status: COMPLETED | OUTPATIENT
Start: 2020-02-02 | End: 2020-02-02

## 2020-02-02 RX ORDER — ZOLPIDEM TARTRATE 5 MG/1
10 TABLET ORAL
Status: DISCONTINUED | OUTPATIENT
Start: 2020-02-02 | End: 2020-02-03

## 2020-02-02 RX ORDER — HEPARIN SODIUM 5000 [USP'U]/ML
5000 INJECTION, SOLUTION INTRAVENOUS; SUBCUTANEOUS EVERY 8 HOURS SCHEDULED
Status: DISCONTINUED | OUTPATIENT
Start: 2020-02-02 | End: 2020-02-03 | Stop reason: HOSPADM

## 2020-02-02 RX ORDER — ONDANSETRON 2 MG/ML
4 INJECTION INTRAMUSCULAR; INTRAVENOUS EVERY 6 HOURS PRN
Status: DISCONTINUED | OUTPATIENT
Start: 2020-02-02 | End: 2020-02-03 | Stop reason: HOSPADM

## 2020-02-02 RX ORDER — ACETAMINOPHEN 325 MG/1
650 TABLET ORAL EVERY 6 HOURS PRN
Status: DISCONTINUED | OUTPATIENT
Start: 2020-02-02 | End: 2020-02-03 | Stop reason: HOSPADM

## 2020-02-02 RX ORDER — CYCLOBENZAPRINE HCL 10 MG
10 TABLET ORAL 3 TIMES DAILY PRN
Status: DISCONTINUED | OUTPATIENT
Start: 2020-02-02 | End: 2020-02-03 | Stop reason: HOSPADM

## 2020-02-02 RX ORDER — SODIUM CHLORIDE 9 MG/ML
125 INJECTION, SOLUTION INTRAVENOUS CONTINUOUS
Status: DISCONTINUED | OUTPATIENT
Start: 2020-02-02 | End: 2020-02-03 | Stop reason: HOSPADM

## 2020-02-02 RX ORDER — NICOTINE 21 MG/24HR
1 PATCH, TRANSDERMAL 24 HOURS TRANSDERMAL DAILY
Status: DISCONTINUED | OUTPATIENT
Start: 2020-02-03 | End: 2020-02-03

## 2020-02-02 RX ORDER — VENLAFAXINE HYDROCHLORIDE 150 MG/1
300 CAPSULE, EXTENDED RELEASE ORAL DAILY
Status: DISCONTINUED | OUTPATIENT
Start: 2020-02-02 | End: 2020-02-03 | Stop reason: HOSPADM

## 2020-02-02 RX ORDER — BUPROPION HYDROCHLORIDE 150 MG/1
300 TABLET ORAL DAILY
Status: DISCONTINUED | OUTPATIENT
Start: 2020-02-03 | End: 2020-02-03 | Stop reason: HOSPADM

## 2020-02-02 RX ORDER — LEVOTHYROXINE SODIUM 0.05 MG/1
50 TABLET ORAL
Status: DISCONTINUED | OUTPATIENT
Start: 2020-02-03 | End: 2020-02-03

## 2020-02-02 RX ORDER — GABAPENTIN 600 MG/1
600 TABLET ORAL 3 TIMES DAILY
Status: DISCONTINUED | OUTPATIENT
Start: 2020-02-02 | End: 2020-02-03 | Stop reason: HOSPADM

## 2020-02-02 RX ADMIN — SODIUM CHLORIDE 1000 ML: 0.9 INJECTION, SOLUTION INTRAVENOUS at 19:49

## 2020-02-02 RX ADMIN — ZOLPIDEM TARTRATE 10 MG: 5 TABLET, FILM COATED ORAL at 23:12

## 2020-02-02 RX ADMIN — VENLAFAXINE HYDROCHLORIDE 300 MG: 150 CAPSULE, EXTENDED RELEASE ORAL at 23:12

## 2020-02-02 RX ADMIN — GABAPENTIN 600 MG: 600 TABLET, FILM COATED ORAL at 23:12

## 2020-02-02 RX ADMIN — SODIUM CHLORIDE 1000 ML: 0.9 INJECTION, SOLUTION INTRAVENOUS at 21:11

## 2020-02-02 RX ADMIN — MORPHINE SULFATE 4 MG: 4 INJECTION INTRAVENOUS at 20:46

## 2020-02-02 RX ADMIN — MORPHINE SULFATE 4 MG: 4 INJECTION INTRAVENOUS at 19:49

## 2020-02-03 ENCOUNTER — OFFICE VISIT (OUTPATIENT)
Dept: URGENT CARE | Facility: CLINIC | Age: 45
End: 2020-02-03
Payer: COMMERCIAL

## 2020-02-03 VITALS
RESPIRATION RATE: 20 BRPM | TEMPERATURE: 97 F | HEIGHT: 65 IN | DIASTOLIC BLOOD PRESSURE: 59 MMHG | SYSTOLIC BLOOD PRESSURE: 110 MMHG | HEART RATE: 96 BPM | WEIGHT: 156.09 LBS | BODY MASS INDEX: 26.01 KG/M2 | OXYGEN SATURATION: 94 %

## 2020-02-03 VITALS
SYSTOLIC BLOOD PRESSURE: 139 MMHG | TEMPERATURE: 98.3 F | HEART RATE: 103 BPM | RESPIRATION RATE: 18 BRPM | OXYGEN SATURATION: 97 % | DIASTOLIC BLOOD PRESSURE: 71 MMHG

## 2020-02-03 DIAGNOSIS — J20.9 ACUTE BRONCHITIS, UNSPECIFIED ORGANISM: Primary | ICD-10-CM

## 2020-02-03 PROBLEM — R41.0 DELIRIUM: Status: ACTIVE | Noted: 2020-02-03

## 2020-02-03 PROBLEM — N17.9 ACUTE KIDNEY INJURY (HCC): Status: ACTIVE | Noted: 2020-02-03

## 2020-02-03 PROBLEM — R10.30 LOWER ABDOMINAL PAIN: Status: ACTIVE | Noted: 2020-02-03

## 2020-02-03 LAB
AMPHETAMINES SERPL QL SCN: NEGATIVE
ANION GAP SERPL CALCULATED.3IONS-SCNC: 7 MMOL/L (ref 4–13)
BARBITURATES UR QL: NEGATIVE
BASOPHILS # BLD AUTO: 0 THOUSANDS/ΜL (ref 0–0.1)
BASOPHILS NFR BLD AUTO: 0 % (ref 0–2)
BENZODIAZ UR QL: NEGATIVE
BILIRUB UR QL STRIP: NEGATIVE
BUN SERPL-MCNC: 21 MG/DL (ref 7–25)
CALCIUM SERPL-MCNC: 8.9 MG/DL (ref 8.6–10.5)
CHLORIDE SERPL-SCNC: 106 MMOL/L (ref 98–107)
CLARITY UR: CLEAR
CO2 SERPL-SCNC: 24 MMOL/L (ref 21–31)
COCAINE UR QL: NEGATIVE
COLOR UR: YELLOW
CREAT SERPL-MCNC: 1.4 MG/DL (ref 0.6–1.2)
EOSINOPHIL # BLD AUTO: 0.2 THOUSAND/ΜL (ref 0–0.61)
EOSINOPHIL NFR BLD AUTO: 2 % (ref 0–5)
ERYTHROCYTE [DISTWIDTH] IN BLOOD BY AUTOMATED COUNT: 12.8 % (ref 11.5–14.5)
GFR SERPL CREATININE-BSD FRML MDRD: 45 ML/MIN/1.73SQ M
GLUCOSE SERPL-MCNC: 93 MG/DL (ref 65–99)
GLUCOSE UR STRIP-MCNC: NEGATIVE MG/DL
HCT VFR BLD AUTO: 36.9 % (ref 42–47)
HGB BLD-MCNC: 12.2 G/DL (ref 12–16)
HGB UR QL STRIP.AUTO: NEGATIVE
KETONES UR STRIP-MCNC: NEGATIVE MG/DL
LEUKOCYTE ESTERASE UR QL STRIP: NEGATIVE
LYMPHOCYTES # BLD AUTO: 2.3 THOUSANDS/ΜL (ref 0.6–4.47)
LYMPHOCYTES NFR BLD AUTO: 24 % (ref 21–51)
MCH RBC QN AUTO: 33.1 PG (ref 26–34)
MCHC RBC AUTO-ENTMCNC: 33.1 G/DL (ref 31–37)
MCV RBC AUTO: 100 FL (ref 81–99)
METHADONE UR QL: NEGATIVE
MONOCYTES # BLD AUTO: 0.5 THOUSAND/ΜL (ref 0.17–1.22)
MONOCYTES NFR BLD AUTO: 5 % (ref 2–12)
NEUTROPHILS # BLD AUTO: 6.5 THOUSANDS/ΜL (ref 1.4–6.5)
NEUTS SEG NFR BLD AUTO: 68 % (ref 42–75)
NITRITE UR QL STRIP: NEGATIVE
OPIATES UR QL SCN: POSITIVE
PCP UR QL: NEGATIVE
PH UR STRIP.AUTO: 5.5 [PH]
PLATELET # BLD AUTO: 219 THOUSANDS/UL (ref 149–390)
PMV BLD AUTO: 8.5 FL (ref 8.6–11.7)
POTASSIUM SERPL-SCNC: 4.2 MMOL/L (ref 3.5–5.5)
PROT UR STRIP-MCNC: NEGATIVE MG/DL
RBC # BLD AUTO: 3.69 MILLION/UL (ref 3.9–5.2)
SODIUM SERPL-SCNC: 137 MMOL/L (ref 134–143)
SP GR UR STRIP.AUTO: 1.02 (ref 1–1.03)
THC UR QL: NEGATIVE
UROBILINOGEN UR QL STRIP.AUTO: 0.2 E.U./DL
WBC # BLD AUTO: 9.6 THOUSAND/UL (ref 4.8–10.8)

## 2020-02-03 PROCEDURE — 80307 DRUG TEST PRSMV CHEM ANLYZR: CPT | Performed by: PHYSICIAN ASSISTANT

## 2020-02-03 PROCEDURE — 81003 URINALYSIS AUTO W/O SCOPE: CPT | Performed by: PHYSICIAN ASSISTANT

## 2020-02-03 PROCEDURE — 99244 OFF/OP CNSLTJ NEW/EST MOD 40: CPT | Performed by: INTERNAL MEDICINE

## 2020-02-03 PROCEDURE — 85025 COMPLETE CBC W/AUTO DIFF WBC: CPT | Performed by: PHYSICIAN ASSISTANT

## 2020-02-03 PROCEDURE — 99213 OFFICE O/P EST LOW 20 MIN: CPT | Performed by: NURSE PRACTITIONER

## 2020-02-03 PROCEDURE — 99203 OFFICE O/P NEW LOW 30 MIN: CPT | Performed by: NURSE PRACTITIONER

## 2020-02-03 PROCEDURE — 80048 BASIC METABOLIC PNL TOTAL CA: CPT | Performed by: PHYSICIAN ASSISTANT

## 2020-02-03 PROCEDURE — 99236 HOSP IP/OBS SAME DATE HI 85: CPT | Performed by: INTERNAL MEDICINE

## 2020-02-03 RX ORDER — LEVOTHYROXINE SODIUM 0.07 MG/1
75 TABLET ORAL
Status: DISCONTINUED | OUTPATIENT
Start: 2020-02-03 | End: 2020-02-03 | Stop reason: HOSPADM

## 2020-02-03 RX ORDER — FLUCONAZOLE 150 MG/1
150 TABLET ORAL
Qty: 5 TABLET | Refills: 0 | Status: SHIPPED | OUTPATIENT
Start: 2020-02-03 | End: 2020-02-16

## 2020-02-03 RX ORDER — LEVOTHYROXINE SODIUM 0.07 MG/1
75 TABLET ORAL
Refills: 0
Start: 2020-02-04 | End: 2020-03-09 | Stop reason: SDUPTHER

## 2020-02-03 RX ORDER — ZOLPIDEM TARTRATE 10 MG/1
10 TABLET ORAL
Qty: 10 TABLET | Refills: 0
Start: 2020-02-03 | End: 2022-05-27

## 2020-02-03 RX ORDER — LISINOPRIL 20 MG/1
20 TABLET ORAL DAILY
Qty: 180 TABLET | Refills: 0
Start: 2020-02-04 | End: 2020-05-16 | Stop reason: SDUPTHER

## 2020-02-03 RX ORDER — BENZONATATE 100 MG/1
CAPSULE ORAL
Qty: 30 CAPSULE | Refills: 0 | Status: SHIPPED | OUTPATIENT
Start: 2020-02-03 | End: 2020-03-09

## 2020-02-03 RX ORDER — ALBUTEROL SULFATE 90 UG/1
2 AEROSOL, METERED RESPIRATORY (INHALATION) EVERY 4 HOURS PRN
Qty: 8.5 G | Refills: 0 | Status: SHIPPED | OUTPATIENT
Start: 2020-02-03 | End: 2020-03-04

## 2020-02-03 RX ORDER — AMOXICILLIN AND CLAVULANATE POTASSIUM 875; 125 MG/1; MG/1
1 TABLET, FILM COATED ORAL EVERY 12 HOURS SCHEDULED
Qty: 20 TABLET | Refills: 0 | Status: SHIPPED | OUTPATIENT
Start: 2020-02-03 | End: 2020-02-13

## 2020-02-03 RX ORDER — GABAPENTIN 600 MG/1
300 TABLET ORAL 3 TIMES DAILY
Qty: 90 TABLET | Refills: 0 | Status: SHIPPED | OUTPATIENT
Start: 2020-02-03 | End: 2020-05-16 | Stop reason: SDUPTHER

## 2020-02-03 RX ORDER — PREDNISONE 50 MG/1
50 TABLET ORAL DAILY
Qty: 4 TABLET | Refills: 0 | Status: SHIPPED | OUTPATIENT
Start: 2020-02-03 | End: 2020-02-07

## 2020-02-03 RX ORDER — NICOTINE 21 MG/24HR
1 PATCH, TRANSDERMAL 24 HOURS TRANSDERMAL DAILY
Status: DISCONTINUED | OUTPATIENT
Start: 2020-02-03 | End: 2020-02-03 | Stop reason: HOSPADM

## 2020-02-03 RX ADMIN — SODIUM CHLORIDE 125 ML/HR: 0.9 INJECTION, SOLUTION INTRAVENOUS at 05:48

## 2020-02-03 RX ADMIN — BUPROPION HYDROCHLORIDE 300 MG: 150 TABLET, FILM COATED, EXTENDED RELEASE ORAL at 08:55

## 2020-02-03 RX ADMIN — LEVOTHYROXINE SODIUM 75 MCG: 75 TABLET ORAL at 05:37

## 2020-02-03 RX ADMIN — NICOTINE 1 PATCH: 21 PATCH, EXTENDED RELEASE TRANSDERMAL at 08:55

## 2020-02-03 RX ADMIN — GABAPENTIN 600 MG: 600 TABLET, FILM COATED ORAL at 08:55

## 2020-02-03 RX ADMIN — ACETAMINOPHEN 650 MG: 325 TABLET ORAL at 08:54

## 2020-02-03 NOTE — ASSESSMENT & PLAN NOTE
· Likely secondary to dehydration  · Lisinopril dose reduced  · Creatinine improving  · Nephrology input appreciated  · Repeat labs on Thursday with results sent to PCP and Nephrology

## 2020-02-03 NOTE — ASSESSMENT & PLAN NOTE
Psychiatry input appreciated, continue home medications    Follow-up with psychiatrist as outpatient

## 2020-02-03 NOTE — CONSULTS
Consultation - Behavioral Health     Identification Data: aStinder Banks 53 y o  female MRN: [id number]  Unit/Bed#: -02 Encounter: [id number]    02/03/20  10:58 AM    Consults  Physician Requesting Consult: Estela Mackey MD  Principal Problem:DUSTIN (acute kidney injury) [facility])    Reason for Consult:  anxiety    History of Present Illness     Satinder Banks is a 53 y o  female with a history of Bipolar Disorder type II who was admitted to the medical service on 2/2/2020 due to DUSTIN  Psychiatric consultation was requested due to depression and anxiety  Patient with history of Bipolar II disorder and currently follows up outpatient with Dr My Flores office and has a therapist she sees weekly  She has no previous inpatient psychiatric hospitalizations, has been to our partial program in the past year  She denies any substance abuse issues, she denies she was under the influence of any substance prior to admission  On initial psychiatric evaluation Satinder Banks is awake and alert and oriented x 3  She presents as flat and appears depressed, but denies depressed mood  She has been through episodes of depression in the past, but currently depression is "good"  She reports she is overwhelmed with stressors at home and anxiety is her biggest problem right now  I talked to her about the partial program, but she states she just started a new job and is afraid she will lose the job if she takes time off  She states she had recent medication adjustment with her outpatient provider with minimal effectiveness  She has a follow up appoint next week        Psychiatric Review Of Systems:    sleep changes: no  appetite changes: no  weight changes: yes  energy/anergy: yes, decreased  interest/pleasure/anhedonia: yes, decreased  somatic symptoms: yes  anxiety/panic: worrying daily  shyam: no  guilty/hopeless: no  self injurious behavior/risky behavior: no  Suicidal ideation: no  Homicidal ideation: no  Auditory hallucinations: no  Visual hallucinations: no  Other hallucinations: no  Delusional thinking: no  Eating disorder history: no  Obsessive/compulsive symptoms: no    Historical Information     Past Psychiatric History:     Past Inpatient Psychiatric Treatment:   No history of past inpatient psychiatric admissions  Past Outpatient Psychiatric Treatment:    Currently in outpatient psychiatric treatment with a psychiatrist  Past Suicide Attempts: no  Past Violent Behavior: no  Past Psychiatric Medication Trials: patient does not remember, multiple psychiatric medication trials and "all of them"     Substance Abuse History:    Social History     Tobacco History     Smoking Status  Current Every Day Smoker Smoking Frequency  0 5 packs/day for 3 years (1 5 pk yrs)    Smokeless Tobacco Use  Never Used          Alcohol History     Alcohol Use Status  Never          Drug Use     Drug Use Status  Never          Sexual Activity     Sexually Active  Never Birth Control/Protection  IUD          Activities of Daily Living    Not Asked                 I have assessed this patient for substance use within the past 12 months    Alcohol use: denies use  Recreational drug use:   Cocaine:  denies use  Heroin:  denies use  Marijuana:  denies use  Other drugs: denies use     Longest clean time: not applicable  History of Inpatient/Outpatient rehabilitation program: no  Smoking history: 1/2 pack per day  Use of caffeine: unknown amount    Family Psychiatric History:     Psychiatric Illness:  Daughter - depression, Son - ADHD  Substance Abuse:  patient denies  Suicide Attempts:  no family history of suicide attempts    Social History:    Education: some college  Learning Disabilities: none  Marital History:   Children: 4 children  Living Arrangement: The patient lives in home with  and children  Occupational History: works care coordinator  Functioning Relationships: limited support system  Legal History: none   History: None    Traumatic History:     Abuse: none  Other Traumatic Events:none     Past Medical History:    History of Seizures: no  History of Head injury with loss of consciousness: no    Past Medical History:   Diagnosis Date    Ankle pain, right     Anxiety     Arthritis     Bipolar 1 disorder ([facility] [address] )     Depression     Hypertension     Hypothyroidism     Known health problems: none     Scoliosis      Past Surgical History:   Procedure Laterality Date    CHOLECYSTECTOMY           Medical Review Of Systems:    Pertinent items are noted in HPI  Allergies:    No Known Allergies    Medications: All current active medications have been reviewed      Objective     Vital signs in last 24 hours:    Temp:  [96 6 °F (35 9 °C)-99 8 °F (37 7 °C)] 96 7 °F (35 9 °C)  HR:  [101-114] 107  Resp:  [18-20] 20  BP: (103-121)/(55-70) 109/55  No intake or output data in the 24 hours ending 02/03/20 1058    Mental Status Evaluation:    Appearance:  casually dressed   Behavior:  cooperative   Speech:  normal rate, normal volume   Mood:  depressed   Affect:  flat   Language: naming objects and repeating phrases   Thought Process:  organized   Associations: intact associations   Thought Content:  normal   Perceptual Disturbances: none   Risk Potential: Suicidal ideation - None  Homicidal ideation - None  Potential for aggression - No   Sensorium:  oriented to person, place and time/date   Memory:  recent and remote memory grossly intact   Consciousness:  alert and awake    Attention: attention span and concentration are age appropriate   Intellect: within normal limits   Fund of Knowledge: awareness of current events: yes  past history: yes  vocabulary: yes   Insight:  age appropriate   Judgment: age appropriate   Muscle Strength Muscle Tone: normal  normal   Gait/Station: normal gait/station, normal balance   Motor Activity: no abnormal movements     Laboratory Results: I have personally reviewed all pertinent laboratory/tests results  Imaging Studies: Ct Abdomen Pelvis Wo Contrast    Result Date: 2/2/2020  Narrative: CT ABDOMEN AND PELVIS WITHOUT IV CONTRAST INDICATION:   Abdominal pain, acute, nonlocalized  COMPARISON:  None  TECHNIQUE:  CT examination of the abdomen and pelvis was performed without intravenous contrast   Axial, sagittal, and coronal 2D reformatted images were created from the source data and submitted for interpretation  Radiation dose length product (DLP) for this visit:  306 4 mGy-cm   This examination, like all CT scans performed in the [facility], was performed utilizing techniques to minimize radiation dose exposure, including the use of iterative reconstruction and automated exposure control  Enteric contrast was administered  FINDINGS: ABDOMEN LOWER CHEST:  No clinically significant abnormality identified in the visualized lower chest  LIVER/BILIARY TREE:  Unremarkable  GALLBLADDER:  Gallbladder is surgically absent  SPLEEN:  Unremarkable  PANCREAS:  Unremarkable  ADRENAL GLANDS:  Unremarkable  KIDNEYS/URETERS:  Unremarkable  No hydronephrosis  STOMACH AND BOWEL:  Unremarkable  APPENDIX:  No findings to suggest appendicitis  ABDOMINOPELVIC CAVITY:  No ascites or free intraperitoneal air  No lymphadenopathy  VESSELS:  Unremarkable for patient's age  PELVIS REPRODUCTIVE ORGANS:  Unremarkable for patient's age  IUD is noted  URINARY BLADDER:  Unremarkable  ABDOMINAL WALL/INGUINAL REGIONS:  Unremarkable  OSSEOUS STRUCTURES:  No acute fracture or destructive osseous lesion  Impression: No renal stones  No small bowel obstruction  Normal appendix  Consider contrast exam in the appropriate clinical setting   Workstation performed: [id number]       Code Status: Level 1 - Full Code  Advance Directive and Living Will:       Power of :      Assessment/Plan     Principal Problem:    DUSTIN (acute kidney injury) ([facility] [address] )  Active Problems:    Bipolar 2 disorder ([facility] ) Acquired hypothyroidism    Lower abdominal pain    Delirium      Assessment:    Patient with history of Bipolar II Disorder, currently in outpatient treatment and feels depression is well managed on current medications, but ongoing issues with anxiety due to stress at home  I suggested our partial program, but she declined, has been through the program  She has no suicidal thoughts or passive death wish  She denies any substance misuse  She will follow up with her outpatient provider and therapist         Treatment Plan:     Planned Medication Changes: All current active medications have been reviewed  Encourage group therapy, milieu therapy and occupational therapy  Behavioral Health checks every 7 minutes  Continue current medications:    Current Facility-Administered Medications:  acetaminophen 650 mg Oral Q6H PRN Ralene Expose, PA-C    buPROPion 300 mg Oral Daily Ralene Expose, PA-C    cyclobenzaprine 10 mg Oral TID PRN Ralene Expose, PA-C    gabapentin 600 mg Oral TID Ralene Expose, PA-C    heparin (porcine) 5,000 Units Subcutaneous [facility] Ralene Expose, PA-C    levothyroxine 75 mcg Oral Early Morning Ralene Expose, PA-C    nicotine 1 patch Transdermal Daily Ralene Expose, PA-C    ondansetron 4 mg Intravenous Q6H PRN Ralene Expose, PA-C    pneumococcal 13-valent conjugate vaccine 0 5 mL Intramuscular Prior to discharge Alaina Stewart MD    sodium chloride 125 mL/hr Intravenous Continuous Ralene Expose, PA-C Last Rate: 125 mL/hr (02/03/20 0548)   venlafaxine 300 mg Oral Daily Ralene Expose, PA-C        Risks / Benefits of Treatment:    Risks, benefits, and possible side effects of medications explained to patient and patient verbalizes understanding and agreement for treatment  Counseling / Coordination of Care:    Patient's presentation on admission and proposed treatment plan discussed with treatment team   Diagnosis, medication changes and treatment plan reviewed with patient    Outpatient follow up discussed with patient  Supportive therapy provided to patient      FIONA Higgins 02/03/20

## 2020-02-03 NOTE — DISCHARGE SUMMARY
Discharge- Marek Le 1975, 53 y o  female MRN: [id number]    Unit/Bed#: -02 Encounter: [id number]    Primary Care Provider: FIONA Gay   Date and time admitted to hospital: 2/2/2020  7:00 PM        Delirium  Assessment & Plan  Possibly secondary to acute kidney injury as well as medication side effect  Patient's dose of gabapentin reduced to 300 mg t i d   Creatinine is improving  Patient's mental status appears back to baseline  Urine drug screen result appreciated, positive for opiates which patient received in the ER    Lower abdominal pain  Assessment & Plan  CT scan of the abdomen/pelvis with no acute findings  Patient given outpatient follow-up with GI    Acquired hypothyroidism  Assessment & Plan  Continue levothyroxine    Bipolar 2 disorder [facility])  Assessment & Plan  Psychiatry input appreciated, continue home medications  Follow-up with psychiatrist as outpatient    * DUSTIN (acute kidney injury) [facility])  Assessment & Plan  · Likely secondary to dehydration  · Lisinopril dose reduced  · Creatinine improving  · Nephrology input appreciated  · Repeat labs on Thursday with results sent to PCP and Nephrology    Discharging Physician / Practitioner: Ulysses Milroy, MD  PCP: Cliff Flood, [address] [place] [address]  Admission Date:   Admission Orders (From admission, onward)     Ordered        02/02/20 2144  Place in Observation  Once                   Discharge Date: 02/03/20    Resolved Problems  Date Reviewed: 2/2/2020    None          Consultations During Hospital Stay:  · Nephrology, Psychiatry    Procedures Performed:   · None    Significant Findings / Test Results:   · Acute kidney injury    Incidental Findings:   · None     Test Results Pending at Discharge (will require follow up):    · None     Outpatient Tests Requested:  · BMP to be done on Thursday with results sent to PCP and Nephrology    Complications:     None    Reason for Admission:  Acute kidney injury    Hospital Course:     [facility] is a 53 y o  female patient who originally presented to the hospital on 2/2/2020 due to abdominal pain  CT scan of the abdomen/pelvis did not show any acute findings  Patient was noted to have acute kidney injury and started on IV fluids  Creatinine did improve  Nephrology was consulted  Patient advised to avoid NSAIDs  Patient stated that she did feel depressed with increased stress at home recently  Psychiatry was consulted and recommended outpatient follow-up  Patient denied any suicidal or homicidal ideations  Patient advised to have repeat labs on Thursday  Also advised to return if she had worsening abdominal pain/nausea/vomiting/fever  Please see above list of diagnoses and related plan for additional information  Condition at Discharge: stable     Discharge Day Visit / Exam:     Subjective:  No complaints at this time    Vitals: Blood Pressure: 110/59 (02/03/20 1238)  Pulse: 96 (02/03/20 1238)  Temperature: (!) 97 °F (36 1 °C) (02/03/20 1238)  Temp Source: Tympanic (02/03/20 1238)  Respirations: 20 (02/03/20 1238)  Height: 5' 5" (165 1 cm) (02/02/20 2216)  Weight - Scale: 70 8 kg (156 lb 1 4 oz) (02/02/20 2216)  SpO2: 94 % (02/03/20 1238)     Exam:   Physical Exam   Constitutional: She appears well-developed  No distress  HENT:   Head: Normocephalic and atraumatic  Eyes: Conjunctivae and EOM are normal    Neck: Normal range of motion  Neck supple  Cardiovascular: Normal rate and regular rhythm  Pulmonary/Chest: Effort normal  No respiratory distress  Abdominal: Soft  She exhibits no distension  There is no tenderness  Musculoskeletal: Normal range of motion  She exhibits no edema  Neurological: She is alert  No cranial nerve deficit  Skin: Skin is warm and dry  Psychiatric: She has a normal mood and affect   Her behavior is normal        Discussion with Family:  No family available at bedside during my evaluation    Discharge instructions/Information to patient and family:   See after visit summary for information provided to patient and family  Provisions for Follow-Up Care:  See after visit summary for information related to follow-up care and any pertinent home health orders  Disposition:     Home    For Discharges to [facility] SNF:   · Not Applicable to this Patient - Not Applicable to this Patient    Planned Readmission:    no     Discharge Statement:  I spent 35 minutes discharging the patient  This time was spent on the day of discharge  I had direct contact with the patient on the day of discharge  Greater than 50% of the total time was spent examining patient, answering all patient questions, arranging and discussing plan of care with patient as well as directly providing post-discharge instructions  Additional time then spent on discharge activities  Discharge Medications:  See after visit summary for reconciled discharge medications provided to patient and family        ** Please Note: This note has been constructed using a voice recognition system **

## 2020-02-03 NOTE — ED PROVIDER NOTES
History  Chief Complaint   Patient presents with    Abdominal Cramping     x4 days  denies N/V/D     46-YEAR-OLD FEMALE  PMH:   Hypothyroid    Mode of arrival:  Pt here by private car  Accompanied by  at bedside    Chief complaint:   PATIENT IS HERE FOR epigastric and Suprapubic ABDOMINAL PAIN    STATES THIS STARTED 3 days ago, CAME ON GRADUALLY  IT HAS BEEN COMING AND GOING, BUT IT HAS BEEN CONSTANT FOR THE LAST COUPLE OF HOURS  IT IS NOW  RATED 9/10  DESCRIBED AS SHARP    Pt was here 3 weeks ago, feels the type and location of pain is different then that visit  Pt states, "it feels like the pain I had in labor"      ASSOCIATED SYMPTOMS:  URINARY  SYMPTOMS: None  THERE IS NO DYSURIA, NO HEMATURIA, NO FREQUENCY      She REPORTS NAUSEA, BUT DENIES ANY VOMITING  DENIES FEVERS/CHILLS    Denies LOOSE STOOLS or O DIARRHEA, NO BLOODY STOOLS - NOT BLACK OR BLOODY      NO VAGINAL BLEEDING OR DISCHARGE      ALLEVIATING OR EXACERBATING FACTORS:  UNCERTAIN  PAIN IS NOT WORSE WITH MOVEMENT         INTERVENTIONS:  Motrin tried, 800mg taken at 6am        History provided by:  Patient  Abdominal Cramping   Pain location:  Epigastric and suprapubic  Pain quality: sharp    Pain radiates to:  Does not radiate  Onset quality:  Gradual  Timing:  Constant  Progression:  Worsening  Relieved by:  Nothing  Worsened by:  Nothing  Ineffective treatments:  NSAIDs  Associated symptoms: no chest pain, no chills, no constipation, no cough, no dysuria, no fatigue, no fever, no flatus, no hematemesis, no hematochezia, no hematuria, no nausea, no shortness of breath and no vomiting        Prior to Admission Medications   Prescriptions Last Dose Informant Patient Reported? Taking?    buPROPion (WELLBUTRIN XL) 300 mg 24 hr tablet 2/2/2020 at Unknown time  Yes Yes   Sig: Take 300 mg by mouth daily   busPIRone (BUSPAR) 10 mg tablet Not Taking at Unknown time  Yes No   Sig: Take 10 mg by mouth 3 (three) times a day   busPIRone (BUSPAR) 15 mg tablet 2/3/2020 at Unknown time  No Yes   Sig: Take 1 tablet (15 mg total) by mouth 3 (three) times a day   cariprazine (VRAYLAR) 3 MG capsule 2/2/2020 at Unknown time  No Yes   Sig: Take 1 capsule (3 mg total) by mouth daily   celecoxib (CeleBREX) 100 mg capsule 2/2/2020 at Unknown time  No Yes   Sig: TAKE 1 CAPSULE BY MOUTH TWICE DAILY   cyclobenzaprine (FLEXERIL) 10 mg tablet 2/2/2020 at Unknown time  No Yes   Sig: Take 1 tablet (10 mg total) by mouth 3 (three) times a day as needed for muscle spasms for up to 20 days   cyclobenzaprine (FLEXERIL) 10 mg tablet Not Taking at Unknown time  No No   Sig: Take 1 tablet (10 mg total) by mouth 3 (three) times a day   Patient not taking: Reported on 2/2/2020   ergocalciferol (VITAMIN D2) 50,000 units 2/2/2020 at Unknown time  No Yes   Sig: take 1 capsule by mouth every week   gabapentin (NEURONTIN) 600 MG tablet 2/2/2020 at Unknown time  No Yes   Sig: Take 1 tablet (600 mg total) by mouth 3 (three) times a day   levothyroxine 50 mcg tablet 2/2/2020 at Unknown time Self No Yes   Sig: Take 1 tablet (50 mcg total) by mouth daily for 90 days   lisinopril (ZESTRIL) 20 mg tablet 2/3/2020 at Unknown time  No Yes   Sig: Take 2 tablets (40 mg total) by mouth daily   Patient taking differently: Take 20 mg by mouth 2 (two) times a day    senna-docusate sodium (SENOKOT-S) 8 6-50 mg per tablet Not Taking at Unknown time  No No   Sig: Take 1 tablet by mouth daily   Patient not taking: Reported on 1/28/2020   venlafaxine (EFFEXOR-XR) 150 mg 24 hr capsule 2/1/2020 at Unknown time  No Yes   Sig: Take 2 capsules (300 mg total) by mouth daily   Patient taking differently: Take 300 mg by mouth daily at bedtime    zolpidem (AMBIEN) 10 mg tablet 2/2/2020 at Unknown time  No Yes   Sig: TAKE 1 TABLET BY MOUTH DAILY AT BEDTIME      Facility-Administered Medications: None       Past Medical History:   Diagnosis Date    Ankle pain, right     Anxiety     Arthritis     Bipolar 1 disorder ([facility] )     Depression     Hypertension     Hypothyroidism     Known health problems: none     Scoliosis        Past Surgical History:   Procedure Laterality Date    CHOLECYSTECTOMY         Family History   Problem Relation Age of Onset    Diabetes Mother     Hypertension Mother     Heart disease Mother     Hypertension Father     Diabetes Maternal Grandmother     Diabetes Paternal Grandmother      I have reviewed and agree with the history as documented  Social History     Tobacco Use    Smoking status: Current Every Day Smoker     Packs/day: 0 50     Years: 3 00     Pack years: 1 50    Smokeless tobacco: Never Used   Substance Use Topics    Alcohol use: Never     Frequency: Never    Drug use: Never        Review of Systems   Constitutional: Negative for chills, diaphoresis, fatigue and fever  Respiratory: Negative for cough, shortness of breath, wheezing and stridor  Cardiovascular: Negative for chest pain, palpitations and leg swelling  Gastrointestinal: Positive for abdominal pain  Negative for blood in stool, constipation, flatus, hematemesis, hematochezia, nausea and vomiting  Genitourinary: Negative for difficulty urinating, dysuria, flank pain, frequency and hematuria  Musculoskeletal: Negative for arthralgias, back pain, gait problem, joint swelling, myalgias, neck pain and neck stiffness  Skin: Negative for rash and wound  Neurological: Negative for dizziness, light-headedness and headaches  All other systems reviewed and are negative  Physical Exam  Physical Exam   Constitutional: She is oriented to person, place, and time  She appears well-developed and well-nourished  No distress  HENT:   Head: Normocephalic and atraumatic  Nose: Nose normal    Mouth/Throat: Oropharynx is clear and moist  No oropharyngeal exudate  Eyes: Pupils are equal, round, and reactive to light  Conjunctivae and EOM are normal  Right eye exhibits no discharge   Left eye exhibits no discharge  No scleral icterus  Neck: Normal range of motion  Neck supple  No JVD present  No tracheal deviation present  Cardiovascular: Normal rate, regular rhythm, normal heart sounds and intact distal pulses  Exam reveals no gallop and no friction rub  No murmur heard  Pulmonary/Chest: Effort normal and breath sounds normal  No stridor  No respiratory distress  She has no wheezes  She has no rales  She exhibits no tenderness  Abdominal: Soft  Bowel sounds are normal  She exhibits no distension and no mass  There is no tenderness  There is no rebound and no guarding  No hernia  Musculoskeletal: Normal range of motion  She exhibits no edema, tenderness or deformity  Lymphadenopathy:     She has no cervical adenopathy  Neurological: She is alert and oriented to person, place, and time  No cranial nerve deficit or sensory deficit  She exhibits normal muscle tone  Coordination normal    Skin: Skin is warm  Capillary refill takes less than 2 seconds  No rash noted  She is not diaphoretic  No erythema  No pallor  Psychiatric: She has a normal mood and affect   Her behavior is normal  Judgment and thought content normal        Vital Signs  ED Triage Vitals   Temperature Pulse Respirations Blood Pressure SpO2   02/02/20 1856 02/02/20 1856 02/02/20 1856 02/02/20 1856 02/02/20 1856   99 8 °F (37 7 °C) (!) 108 18 120/61 97 %      Temp Source Heart Rate Source Patient Position - Orthostatic VS BP Location FiO2 (%)   02/02/20 1856 02/02/20 2216 02/02/20 2216 02/02/20 2216 --   Temporal Monitor Sitting Left arm       Pain Score       02/02/20 1949       9           Vitals:    02/02/20 1856 02/02/20 2216 02/03/20 0134   BP: 120/61 121/70 103/64   Pulse: (!) 108 (!) 114 101   Patient Position - Orthostatic VS:  Sitting Sitting         Visual Acuity      ED Medications  Medications   sodium chloride 0 9 % infusion (has no administration in time range)   buPROPion (WELLBUTRIN XL) 24 hr tablet 300 mg (has no administration in time range)   cyclobenzaprine (FLEXERIL) tablet 10 mg (has no administration in time range)   gabapentin (NEURONTIN) tablet 600 mg (600 mg Oral Given 2/2/20 2312)   venlafaxine (EFFEXOR-XR) 24 hr capsule 300 mg (300 mg Oral Given 2/2/20 2312)   ondansetron (ZOFRAN) injection 4 mg (has no administration in time range)   heparin (porcine) subcutaneous injection 5,000 Units (5,000 Units Subcutaneous Not Given 2/2/20 2313)   acetaminophen (TYLENOL) tablet 650 mg (has no administration in time range)   nicotine (NICODERM CQ) 21 mg/24 hr TD 24 hr patch 1 patch (has no administration in time range)   pneumococcal 13-valent conjugate vaccine (PREVNAR-13) IM injection 0 5 mL (has no administration in time range)   influenza vaccine, quadrivalent (FLUARIX) IM injection 0 5 mL (has no administration in time range)   levothyroxine tablet 75 mcg (has no administration in time range)   morphine (PF) 4 mg/mL injection 4 mg (4 mg Intravenous Given 2/2/20 1949)   sodium chloride 0 9 % bolus 1,000 mL (0 mL Intravenous Stopped 2/2/20 2111)   sodium chloride 0 9 % bolus 1,000 mL (1,000 mL Intravenous New Bag 2/2/20 2111)   morphine (PF) 4 mg/mL injection 4 mg (4 mg Intravenous Given 2/2/20 2046)       Diagnostic Studies  Results Reviewed     Procedure Component Value Units Date/Time    UA w Reflex to Microscopic w Reflex to Culture [[id number]]     Lab Status:  No result Specimen:  Urine     Pregnancy, hCG, quantitative [[id number]]  (Normal) Collected:  02/02/20 1925    Lab Status:  Final result Specimen:  Blood from Arm, Right Updated:  02/02/20 1956     HCG, Quant 1 12 mIU/mL     Narrative:        Expected Ranges:     Approximate               Approximate HCG  Gestation age          Concentration ( mIU/mL)  _____________          ______________________   Asaf Churchill                      HCG values  0 2-1                       5-50  1-2                           2-3                         100-5000  3-4 [id number]  4-5                         1000-20199  5-6                         [id number]  6-8                         [id number]  8-12                        [id number]      Lipase [[id number]]  (Normal) Collected:  02/02/20 1925    Lab Status:  Final result Specimen:  Blood from Arm, Right Updated:  02/02/20 1951     Lipase 20 u/L     CMP [[id number]]  (Abnormal) Collected:  02/02/20 1925    Lab Status:  Final result Specimen:  Blood from Arm, Right Updated:  02/02/20 1950     Sodium 138 mmol/L      Potassium 3 8 mmol/L      Chloride 102 mmol/L      CO2 28 mmol/L      ANION GAP 8 mmol/L      BUN 25 mg/dL      Creatinine 2 05 mg/dL      Glucose 100 mg/dL      Calcium 9 9 mg/dL      AST 17 U/L      ALT 10 U/L      Alkaline Phosphatase 72 U/L      Total Protein 7 2 g/dL      Albumin 4 9 g/dL      Total Bilirubin 0 60 mg/dL      eGFR 28 ml/min/1 73sq m     Narrative:       National Kid[employer] Disease Foundation guidelines for Chronic Kidney Disease (CKD):     Stage 1 with normal or high GFR (GFR > 90 mL/min/1 73 square meters)    Stage 2 Mild CKD (GFR = 60-89 mL/min/1 73 square meters)    Stage 3A Moderate CKD (GFR = 45-59 mL/min/1 73 square meters)    Stage 3B Moderate CKD (GFR = 30-44 mL/min/1 73 square meters)    Stage 4 Severe CKD (GFR = 15-29 mL/min/1 73 square meters)    Stage 5 End Stage CKD (GFR <15 mL/min/1 73 square meters)  Note: GFR calculation is accurate only with a steady state creatinine    CBC and differential [[id number]]  (Abnormal) Collected:  02/02/20 1925    Lab Status:  Final result Specimen:  Blood from Arm, Right Updated:  02/02/20 1930     WBC 12 10 Thousand/uL      RBC 4 02 Million/uL      Hemoglobin 13 3 g/dL      Hematocrit 39 5 %      MCV 98 fL      MCH 33 1 pg      MCHC 33 7 g/dL      RDW 12 9 %      MPV 8 4 fL      Platelets 740 Thousands/uL      Neutrophils Relative 75 %      Lymphocytes Relative 16 %      Monocytes Relative 7 %      Eosinophils Relative 1 % Basophils Relative 1 %      Neutrophils Absolute 9 10 Thousands/µL      Lymphocytes Absolute 1 90 Thousands/µL      Monocytes Absolute 0 80 Thousand/µL      Eosinophils Absolute 0 20 Thousand/µL      Basophils Absolute 0 10 Thousands/µL                  CT abdomen pelvis wo contrast   Final Result by Magdaleno John MD (02/02 2112)         No renal stones  No small bowel obstruction  Normal appendix  Consider contrast exam in the appropriate clinical setting  Workstation performed: [id number]         US retroperitoneal complete    (Results Pending)              Procedures  Procedures         ED Course  ED Course as of Feb 03 0506   Edith Mackey Feb 02, 2020 2107 WBC(!): 12 10   2107 Platelet Count: 602   2107 Neutrophils %: 75   2107 Lymphocytes Relative(!): 16   2107 Monocytes Relative: 7   2107 HCG QUANTITATIVE: 1 12   2107 Lipase: 20   2107 Sodium: 138   2107 Potassium: 3 8   2107 Chloride: 102   2107 CO2: 28   2107 Anion Gap: 8   2107 BUN: 25   2107 Creatinine(!): 2 05   2107 Glucose, Random(!): 100   2107 Calcium: 9 9   2107 AST: 17   2107 ALT: 10   2107 Alkaline Phosphatase: 72   2107 Ct pending  Pt w/ ARF  Will admit when CT results are back   Total Protein: 7 2   2123 CT ABDOMEN AND PELVIS WITHOUT IV CONTRAST     INDICATION:   Abdominal pain, acute, nonlocalized      COMPARISON:  None      TECHNIQUE:  CT examination of the abdomen and pelvis was performed without intravenous contrast   Axial, sagittal, and coronal 2D reformatted images were created from the source data and submitted for interpretation       Radiation dose length product (DLP) for this visit:  306 4 mGy-cm     This examination, like all CT scans performed in the [facility], was performed utilizing techniques to minimize radiation dose exposure, including the use of iterative   reconstruction and automated exposure control       Enteric contrast was administered       FINDINGS:     ABDOMEN     LOWER CHEST:  No clinically significant abnormality identified in the visualized lower chest      LIVER/BILIARY TREE:  Unremarkable      GALLBLADDER:  Gallbladder is surgically absent      SPLEEN:  Unremarkable      PANCREAS:  Unremarkable      ADRENAL GLANDS:  Unremarkable      KIDNEYS/URETERS:  Unremarkable  No hydronephrosis      STOMACH AND BOWEL:  Unremarkable      APPENDIX:  No findings to suggest appendicitis      ABDOMINOPELVIC CAVITY:  No ascites or free intraperitoneal air  No lymphadenopathy      VESSELS:  Unremarkable for patient's age      PELVIS     REPRODUCTIVE ORGANS:  Unremarkable for patient's age  IUD is noted      URINARY BLADDER:  Unremarkable      ABDOMINAL WALL/INGUINAL REGIONS:  Unremarkable      OSSEOUS STRUCTURES:  No acute fracture or destructive osseous lesion      IMPRESSION:        No renal stones  No small bowel obstruction  Normal appendix        Consider contrast exam in the appropriate clinical setting  2123 Patient remained stable  She understands results of her workup and indications for admission for acute renal failure      2124 D/w purvi w/ hospitalist  Will review the chart      2140 Emmet Cockayne will accept the pt                                  MDM      Disposition  Final diagnoses:   Abdominal pain   [facility] ([facility]     Time reflects when diagnosis was documented in both MDM as applicable and the Disposition within this note     Time User Action Codes Description Comment    2/2/2020  9:41 PM Gail VELEZ Add [N17 9] DUSTIN (acute kidney injury) ([facility] )     2/2/2020  9:50 PM Kevin Kitchen Add [R10 9] Abdominal pain     2/2/2020  9:51 PM Kevin Kitchen Add [N17 9] Acute renal failure (ARF) [facility])       ED Disposition     ED Disposition Condition Date/Time Comment    Admit Stable Sun Feb 2, 2020  9:50 PM Case was discussed with Emmet Cockayne and the patient's admission status was agreed to be Admission Status: inpatient status to the service of Dr Marie Yang           Follow-up Information None         Current Discharge Medication List      CONTINUE these medications which have NOT CHANGED    Details   buPROPion (WELLBUTRIN XL) 300 mg 24 hr tablet Take 300 mg by mouth daily  Refills: 1      !! busPIRone (BUSPAR) 15 mg tablet Take 1 tablet (15 mg total) by mouth 3 (three) times a day  Qty: 90 tablet, Refills: 2    Associated Diagnoses: CHOLO (generalized anxiety disorder)      cariprazine (VRAYLAR) 3 MG capsule Take 1 capsule (3 mg total) by mouth daily  Qty: 30 capsule, Refills: 2    Associated Diagnoses: Bipolar 1 disorder (HCC)      celecoxib (CeleBREX) 100 mg capsule TAKE 1 CAPSULE BY MOUTH TWICE DAILY  Qty: 60 capsule, Refills: 0    Associated Diagnoses: Bilateral ankle pain, unspecified chronicity; Arthritis      !! cyclobenzaprine (FLEXERIL) 10 mg tablet Take 1 tablet (10 mg total) by mouth 3 (three) times a day as needed for muscle spasms for up to 20 days  Qty: 60 tablet, Refills: 0    Associated Diagnoses: Muscle spasm      ergocalciferol (VITAMIN D2) 50,000 units take 1 capsule by mouth every week  Qty: 12 capsule, Refills: 3    Associated Diagnoses: Vitamin D deficiency      gabapentin (NEURONTIN) 600 MG tablet Take 1 tablet (600 mg total) by mouth 3 (three) times a day  Qty: 90 tablet, Refills: 1    Associated Diagnoses: Chronic bilateral low back pain without sciatica;  Anxiety      levothyroxine 50 mcg tablet Take 1 tablet (50 mcg total) by mouth daily for 90 days  Qty: 90 tablet, Refills: 3    Associated Diagnoses: Hypothyroidism, unspecified type      lisinopril (ZESTRIL) 20 mg tablet Take 2 tablets (40 mg total) by mouth daily  Qty: 180 tablet, Refills: 3    Associated Diagnoses: Hypertension, unspecified type      venlafaxine (EFFEXOR-XR) 150 mg 24 hr capsule Take 2 capsules (300 mg total) by mouth daily  Qty: 60 capsule, Refills: 1    Associated Diagnoses: Bipolar 1 disorder (HCC)      zolpidem (AMBIEN) 10 mg tablet TAKE 1 TABLET BY MOUTH DAILY AT BEDTIME  Qty: 30 tablet, Refills: 0 Associated Diagnoses: Bipolar 1 disorder ([facility] )      ! ! busPIRone (BUSPAR) 10 mg tablet Take 10 mg by mouth 3 (three) times a day  Refills: 0      !! cyclobenzaprine (FLEXERIL) 10 mg tablet Take 1 tablet (10 mg total) by mouth 3 (three) times a day  Qty: 90 tablet, Refills: 1    Associated Diagnoses: Neck pain      senna-docusate sodium (SENOKOT-S) 8 6-50 mg per tablet Take 1 tablet by mouth daily  Qty: 10 tablet, Refills: 0    Associated Diagnoses: Constipation, unspecified constipation type       !! - Potential duplicate medications found  Please discuss with provider  No discharge procedures on file      ED Provider  Electronically Signed by           Rosalba Carpio MD  02/03/20 3911

## 2020-02-03 NOTE — ASSESSMENT & PLAN NOTE
Possibly secondary to acute kidney injury as well as medication side effect  Patient's dose of gabapentin reduced to 300 mg t i d   Creatinine is improving  Patient's mental status appears back to baseline    Urine drug screen result appreciated, positive for opiates which patient received in the ER [As Noted in HPI] : as noted in HPI [Negative] : Heme/Lymph

## 2020-02-03 NOTE — NURSING NOTE
Pt refusing to go to bed, stating that she just wants to eat however she is falling asleep  Assisted pt to bathroom, unable to urinate  Pt currently settled in bed, table across her with sandwich and drink within reach  Bed alarm is active  Call bell is within reach

## 2020-02-03 NOTE — ASSESSMENT & PLAN NOTE
Unclear etiology as patient had a negative CT scan, UA is currently pending, will give pain control p r n

## 2020-02-03 NOTE — UTILIZATION REVIEW
Initial Clinical Review    Admission: Date/Time/Statement:  Admission Orders (From admission, onward)     Ordered        02/02/20 2144  Place in Observation  Once                    Orders Placed This Encounter   Procedures    Place in Observation     Standing Status:   Standing     Number of Occurrences:   1     Order Specific Question:   Admitting Physician     Answer:   Horacio Rodrigez [[id number]]     Order Specific Question:   Level of Care     Answer:   Med Surg [[id number]]     ED Arrival Information     Expected Arrival Acuity Means of Arrival Escorted By Service Admission Type    - 2/2/2020 18:32 Urgent Walk-In Family Member General Medicine Urgent    Arrival Complaint    abd pain        Chief Complaint   Patient presents with    Abdominal Cramping     x4 days  denies N/V/D     Assessment/Plan:   46-YEAR-OLD FEMALE  PMH:   Hypothyroid  Mode of arrival:  Pt here by private car  Accompanied by  at bedside  Chief complaint:   PATIENT IS HERE FOR epigastric and Suprapubic ABDOMINAL PAIN  STATES THIS STARTED 3 days ago, CAME ON GRADUALLY  IT HAS BEEN COMING AND GOING, BUT IT HAS BEEN CONSTANT FOR THE LAST COUPLE OF HOURS  IT IS NOW  RATED 9/10  DESCRIBED AS SHARP  Pt was here 3 weeks ago, feels the type and location of pain is different then that visit  Pt states, "it feels like the pain I had in labor"  Acute kidney injury [facility])  Assessment & Plan  · Placed in observation Medicine  · Hold nephrotoxic medications  · Hydrate with normal saline at 125 cc/hour  · Obtain UA, urine protein creatinine ratio, renal ultrasound and consult Nephrology in a m    · Suspect acute kidney injury is secondary to increased nonsteroidal use secondary to her abdominal pain      Lower abdominal pain  Assessment & Plan  Unclear etiology as patient had a negative CT scan, UA is currently pending, will give pain control p r n      Delirium  Assessment & Plan  Patient appears to be under the influence of unknown substance during exam, history is limited and patient is speaking to people who were not there unclear if she is hallucinating either visually or auditory      Patient does have a psychiatric history and is on multiple medications, will obtain urine drug screen, avoid narcotics the patient did receive morphine in the ER and continue monitor      Acquired hypothyroidism  Assessment & Plan  Continue pre-hospital levothyroxine 75 mcg p o  Daily recently increased as per PCP note dated 01/28/2020     Bipolar 2 disorder ([facility] )  Assessment & Plan  Continue pre-hospital Effexor  mg p o  Daily Wellbutrin  mg p o   Daily  ED Triage Vitals   Temperature Pulse Respirations Blood Pressure SpO2   02/02/20 1856 02/02/20 1856 02/02/20 1856 02/02/20 1856 02/02/20 1856   99 8 °F (37 7 °C) (!) 108 18 120/61 97 %      Temp Source Heart Rate Source Patient Position - Orthostatic VS BP Location FiO2 (%)   02/02/20 1856 02/02/20 2216 02/02/20 2216 02/02/20 2216 --   Temporal Monitor Sitting Left arm       Pain Score       02/02/20 1949       9        Wt Readings from Last 1 Encounters:   02/02/20 70 8 kg (156 lb 1 4 oz)     Additional Vital Signs:   Date/Time  Temp  Pulse  Resp  BP  SpO2  O2 Device  Patient Position - Orthostatic VS   02/03/20 0134  96 6 °F (35 9 °C)Abnormal   101  20  103/64  92 %  None (Room air)  Sitting   02/02/20 2216  97 9 °F (36 6 °C)  114Abnormal   20  121/70  98 %  None (Room air)  Sitting   02/02/20 1856  99 8 °F (37 7 °C)  108Abnormal   18  120/61  97 %  None (Room air)       Pertinent Labs/Diagnostic Test Results:   Results from last 7 days   Lab Units 02/03/20  0541 02/02/20  1925   WBC Thousand/uL 9 60 12 10*   HEMOGLOBIN g/dL 12 2 13 3   HEMATOCRIT % 36 9* 39 5*   PLATELETS Thousands/uL 219 247   NEUTROS ABS Thousands/µL 6 50 9 10*     Results from last 7 days   Lab Units 02/03/20  0541 02/02/20  1925   SODIUM mmol/L 137 138   POTASSIUM mmol/L 4 2 3 8   CHLORIDE mmol/L 106 102   CO2 mmol/L 24 28   ANION GAP mmol/L 7 8 BUN mg/dL 21 25   CREATININE mg/dL 1 40* 2 05*   EGFR ml/min/1 73sq m 45 28   CALCIUM mg/dL 8 9 9 9     Results from last 7 days   Lab Units 02/02/20  1925   AST U/L 17   ALT U/L 10   ALK PHOS U/L 72   TOTAL PROTEIN g/dL 7 2   ALBUMIN g/dL 4 9   TOTAL BILIRUBIN mg/dL 0 60     Results from last 7 days   Lab Units 02/03/20  0541 02/02/20  1925   GLUCOSE RANDOM mg/dL 93 100*     Results from last 7 days   Lab Units 02/02/20  1925   LIPASE u/L 20     2/2  Ct a/p=No renal stones  No small bowel obstruction    Normal appendix  ED Treatment:   Medication Administration from 02/02/2020 1832 to 02/02/2020 2211       Date/Time Order Dose Route     02/02/2020 1949 morphine (PF) 4 mg/mL injection 4 mg 4 mg Intravenous     02/02/2020 1949 sodium chloride 0 9 % bolus 1,000 mL 1,000 mL Intravenous     02/02/2020 2111 sodium chloride 0 9 % bolus 1,000 mL 1,000 mL Intravenous     02/02/2020 2046 morphine (PF) 4 mg/mL injection 4 mg 4 mg Intravenous        Past Medical History:   Diagnosis Date    Ankle pain, right     Anxiety     Arthritis     Bipolar 1 disorder ([place] [address] )     Depression     Hypertension     Hypothyroidism     Known health problems: none     Scoliosis      Present on Admission:   Lower abdominal pain   Bipolar 2 disorder ([facility] )   Acquired hypothyroidism   Delirium  Admitting Diagnosis: Abdominal pain [R10 9]  Acute renal failure (ARF) (HCC) [N17 9]  DUSTIN (acute kidney injury) ([facility] ) [N17 9]  Age/Sex: 53 y o  female  Admission Orders:  Med surg  Consult renal  Scd/foot pumps  Scheduled Medications:  buPROPion 300 mg Oral Daily   gabapentin 600 mg Oral TID   heparin (porcine) 5,000 Units Subcutaneous Q8H [facility]   levothyroxine 75 mcg Oral Early Morning   nicotine 1 patch Transdermal Daily   venlafaxine 300 mg Oral Daily     Continuous IV Infusions:  sodium chloride 125 mL/hr Intravenous Continuous     PRN Meds:  acetaminophen 650 mg Oral Q6H PRN   cyclobenzaprine 10 mg Oral TID PRN   ondansetron 4 mg Intravenous Q6H PRN   pneumococcal 13-valent conjugate vaccine 0 5 mL Intramuscular Prior to discharge     Network Utilization Review Department  [email or web address]  ATTENTION: Please call with any questions or concerns to [phone] and carefully listen to the prompts so that you are directed to the right person  All voicemails are confidential   Chanelleana Espinalricky all requests for admission clinical reviews, approved or denied determinations and any other requests to dedicated fax number below belonging to the campus where the patient is receiving treatment   List of dedicated fax numbers for the Facilities:  [address] DENIALS (Administrative/Medical Necessity) [phone]   [address] (Maternity/NICU/Pediatrics) [phone]   [facility] [phone]   [place] [facility] [phone]   [facility] [phone]   Huey Jordan [phone]   [address] [place] [address] [phone]   [facility]  [phone]   [address] [place] [address], [address]  [address]01 [facility] [address] [facility] [phone]

## 2020-02-03 NOTE — NURSING NOTE
Pt admitted from ER to room 110-2  Alert and oriented, pleasant and cooperative  Heart is regular, no edema noted  Lungs are clear and decreased on room air, denies cough, denies sob  Last bm was 'a few days ago, I don't go everyday'  Reports pain high on abdomen and low, comes and goes, described as crampy  Aware we need a urine sample  Declined to put purse in safe  Refused heparin despite being educated on benefits/risks  Hourly rounding discussed, no needs at this time  Call bell is within reach

## 2020-02-03 NOTE — NURSING NOTE
Notified by Heather MAZARIEGOS pt acting erratic while he was attempting to do admission  Pt is answering questions appropriately however asking 'where is lis?' telling me Mone Garcias is her sister, the next time oMne Garcias was her daughter  2 staff members went thru her purse, with her permission, removed 7 medication bottles  Pt states 'what is in them is not what it says on the bottle'  Bottles put in a safe bag, explained to pt the bag will go into the safe as is, when she is discharged she will get the entire bag back as is  Pt verbalized understanding  Pt attempted to urinate but could not  Pt sitting on a chair, eating a sandwich

## 2020-02-03 NOTE — NURSING NOTE
Pt sleeping soundly, no s/s of pain or distress  Will attempt to obtain urine specimen towards the morning  Call bell is within reach  Alarm is active

## 2020-02-03 NOTE — NURSING NOTE
Patient belongings at bedside, shoes recovered from ED, IV discontinued without incident, AVS reviewed, no questions at this time, verbalizes intent to comply with follow-up care

## 2020-02-03 NOTE — H&P
H&P- [facility] 1975, 53 y o  female MRN: [id number]    Unit/Bed#: -02 Encounter: [id number]    Primary Care Provider: FIONA Auguste   Date and time admitted to hospital: 2/2/2020  7:00 PM        * Acute kidney injury [facility])  Assessment & Plan  · Placed in observation Medicine  · Hold nephrotoxic medications  · Hydrate with normal saline at 125 cc/hour  · Obtain UA, urine protein creatinine ratio, renal ultrasound and consult Nephrology in a m  · Suspect acute kidney injury is secondary to increased nonsteroidal use secondary to her abdominal pain  Lower abdominal pain  Assessment & Plan  Unclear etiology as patient had a negative CT scan, UA is currently pending, will give pain control p r n  Delirium  Assessment & Plan  Patient appears to be under the influence of unknown substance during exam, history is limited and patient is speaking to people who were not there unclear if she is hallucinating either visually or auditory  Patient does have a psychiatric history and is on multiple medications, will obtain urine drug screen, avoid narcotics the patient did receive morphine in the ER and continue monitor  Acquired hypothyroidism  Assessment & Plan  Continue pre-hospital levothyroxine 75 mcg p o  Daily recently increased as per PCP note dated 01/28/2020    Bipolar 2 disorder ([facility] [address] )  Assessment & Plan  Continue pre-hospital Effexor  mg p o  Daily Wellbutrin  mg p o  Daily      VTE Prophylaxis: Heparin  Code Status:  Level 1  POLST: There is no POLST form on file for this patient (pre-hospital)  Discussion with family:  None present at bedside at time of exam    Anticipated Length of Stay:  Patient will be admitted on an Observation basis with an anticipated length of stay of  < 2 midnights     Justification for Hospital Stay:  Acute kidney injury requiring IV fluid resuscitation Nephrology evaluation, abdominal pain requiring IV fluids and pain control    Chief Complaint:   Crampy abdominal pain x4 days    History of Present Illness:    Lisa Castlilo is a 53 y o  female who presents with crampy abdominal pain x4 days  Patient is a somewhat limited historian is as she seems confused and acutely under the influence of unknown substance  Patient presents to the ER for further evaluation treatment of a 4 day history of suprapubic crampy abdominal pain which she rates a 10/10 currently an 8/10 without aggravating or alleviating factors  Patient denies any urinary complaints to include hematuria, dysuria or increased urinary frequency, denies any fever chills, no nausea or vomiting  Patient denies any previous episodes, she has no GI history and has never been scoped  Patient denies any bright red blood per rectum, diarrhea, dark stools  When I went to examine the patient she seems somewhat confused, had gone into the bathroom to give urine sample but when I returned she still had not produce a sample in had gotten dressed in her own clothing but had the IV tubing tangled up within her clothes  Patient then again went to give urine sample was unable to produce one  Patient was talking to and unknown female was not in the room and initially when asked about this said that it was her daughter and then later on stated that it was her friend  Review of Systems:  Review of Systems   Constitutional: Negative for chills and fever  Respiratory: Negative for cough, shortness of breath and wheezing  Cardiovascular: Negative for chest pain and palpitations  Gastrointestinal: Positive for abdominal pain  Negative for constipation, diarrhea, nausea and vomiting  Genitourinary: Negative for dysuria, frequency, hematuria and urgency  Neurological: Negative for dizziness, syncope, weakness, light-headedness and headaches  All other systems reviewed and are negative        Past Medical and Surgical History:   Past Medical History:   Diagnosis Date    Ankle pain, right     Anxiety     Arthritis     Bipolar 1 disorder (HCC)     Depression     Hypertension     Hypothyroidism     Known health problems: none     Scoliosis        Past Surgical History:   Procedure Laterality Date    CHOLECYSTECTOMY         Meds/Allergies:  Prior to Admission medications    Medication Sig Start Date End Date Taking?  Authorizing Provider   buPROPion (WELLBUTRIN XL) 300 mg 24 hr tablet Take 300 mg by mouth daily 8/30/19  Yes Historical Provider, MD   busPIRone (BUSPAR) 15 mg tablet Take 1 tablet (15 mg total) by mouth 3 (three) times a day 8/28/19  Yes Svitlana Ozuna PA-C   cariprazine (VRAYLAR) 3 MG capsule Take 1 capsule (3 mg total) by mouth daily 8/14/19  Yes Svitlana Ozuna PA-C   celecoxib (CeleBREX) 100 mg capsule TAKE 1 CAPSULE BY MOUTH TWICE DAILY 1/15/20  Yes FIONA Coe   cyclobenzaprine (FLEXERIL) 10 mg tablet Take 1 tablet (10 mg total) by mouth 3 (three) times a day as needed for muscle spasms for up to 20 days 2/1/20 2/21/20 Yes FIONA Coe   ergocalciferol (VITAMIN D2) 50,000 units take 1 capsule by mouth every week 11/4/19  Yes FIONA Coe   gabapentin (NEURONTIN) 600 MG tablet Take 1 tablet (600 mg total) by mouth 3 (three) times a day 8/1/19  Yes FIONA Coe   levothyroxine 50 mcg tablet Take 1 tablet (50 mcg total) by mouth daily for 90 days 6/15/19 12/3/20 Yes FIONA Coe   lisinopril (ZESTRIL) 20 mg tablet Take 2 tablets (40 mg total) by mouth daily  Patient taking differently: Take 20 mg by mouth 2 (two) times a day  1/13/20  Yes FIONA Coe   venlafaxine [facility] ) 150 mg 24 hr capsule Take 2 capsules (300 mg total) by mouth daily  Patient taking differently: Take 300 mg by mouth daily at bedtime  8/2/19  Yes Svitlana Ozuna PA-C   zolpidem (AMBIEN) 10 mg tablet TAKE 1 TABLET BY MOUTH DAILY AT BEDTIME 9/3/19  Yes Latasha Astorino, PA-C   busPIRone (BUSPAR) 10 mg tablet Take 10 mg by mouth 3 (three) times a day 8/28/19   Historical Provider, MD   cyclobenzaprine (FLEXERIL) 10 mg tablet Take 1 tablet (10 mg total) by mouth 3 (three) times a day  Patient not taking: Reported on 2/2/2020 1/29/20 2/28/20  FIONA Arroyo   senna-docusate sodium (SENOKOT-S) 8 6-50 mg per tablet Take 1 tablet by mouth daily  Patient not taking: Reported on 1/28/2020 12/8/19   Lilian Mondragon MD     I have reviewed home medications with patient personally  Allergies: No Known Allergies    Social History:  Marital Status: /Civil Union   Occupation:  Works from home  Patient Pre-hospital Living Situation:  Resides at home with  and 4 children  Patient Pre-hospital Level of Mobility:  Full without assist  Patient Pre-hospital Diet Restrictions:  None  Substance Use History:   Social History     Substance and Sexual Activity   Alcohol Use Never    Frequency: Never     Social History     Tobacco Use   Smoking Status Current Every Day Smoker    Packs/day: 0 50    Years: 3 00    Pack years: 1 50   Smokeless Tobacco Never Used     Social History     Substance and Sexual Activity   Drug Use Never       Family History:  I have reviewed the patients family history    Physical Exam:   Vitals:   Blood Pressure: 103/64 (02/03/20 0134)  Pulse: 101 (02/03/20 0134)  Temperature: (!) 96 6 °F (35 9 °C) (02/03/20 0134)  Temp Source: Temporal (02/03/20 0134)  Respirations: 20 (02/03/20 0134)  Height: 5' 5" (165 1 cm) (02/02/20 2216)  Weight - Scale: 70 8 kg (156 lb 1 4 oz) (02/02/20 2216)  SpO2: 92 % (02/03/20 0134)    Physical Exam   Constitutional: She is oriented to person, place, and time  She appears well-developed and well-nourished  HENT:   Head: Normocephalic and atraumatic  Mouth/Throat: No oropharyngeal exudate  Eyes: Pupils are equal, round, and reactive to light  EOM are normal  No scleral icterus  Neck: Normal range of motion  Neck supple  No JVD present     Cardiovascular: Normal rate, regular rhythm and normal heart sounds  No murmur heard  Pulmonary/Chest: Effort normal and breath sounds normal  No respiratory distress  She has no wheezes  She has no rales  Abdominal: Soft  Bowel sounds are normal  There is no tenderness  There is no rebound and no guarding  Musculoskeletal: Normal range of motion  She exhibits no edema  Lymphadenopathy:     She has no cervical adenopathy  Neurological: She is alert and oriented to person, place, and time  Patient seems somewhat confused and appears to be under the influence of unknown substance, she is somewhat unsteady on her feet, is responding to people were not in the room and is inconsistent with history  Skin: Skin is warm and dry  No rash noted  No erythema  Psychiatric: She has a normal mood and affect  Her behavior is normal    Nursing note and vitals reviewed  Additional Data:   Lab Results: I have personally reviewed pertinent reports  Results from last 7 days   Lab Units 02/02/20  1925   WBC Thousand/uL 12 10*   HEMOGLOBIN g/dL 13 3   HEMATOCRIT % 39 5*   PLATELETS Thousands/uL 247   NEUTROS PCT % 75   LYMPHS PCT % 16*   MONOS PCT % 7   EOS PCT % 1     Results from last 7 days   Lab Units 02/02/20  1925   POTASSIUM mmol/L 3 8   CHLORIDE mmol/L 102   CO2 mmol/L 28   BUN mg/dL 25   CREATININE mg/dL 2 05*   CALCIUM mg/dL 9 9   ALK PHOS U/L 72   ALT U/L 10   AST U/L 17                   Imaging: I have personally reviewed pertinent reports  CT abdomen pelvis wo contrast   Final Result by Héctor Cherry MD (02/02 2112)         No renal stones  No small bowel obstruction  Normal appendix  Consider contrast exam in the appropriate clinical setting  Workstation performed: [id number]         US retroperitoneal complete    (Results Pending)       EKG, Pathology, and Other Studies Reviewed on Admission:   · EKG: N/A    NetAccess / Epic Records Reviewed: Yes     ** Please Note: This note has been constructed using a voice recognition system  **

## 2020-02-03 NOTE — CONSULTS
Consultation - Nephrology   Fito Meek 53 y o  female MRN: [id number]  Unit/Bed#: -02 Encounter: [id number]      A/P:  1  Acute kidney injury   Most likely due to volume depletion with concomitant use of nonsteroidal anti-inflammatory medications  Patient's creatinine improved this morning status post volume expansion form of normal saline  Would continue normal saline expansion for the rest of this morning, convert over to 50 mL/hour in the afternoon and as long as the patient is eating and drinking well this can be discontinued shortly afterwards  Thank you for allowing us to participate in the care of your patient  Please feel free to contact us regarding the care of this patient, or any other questions/concerns that may be applicable  Patient Active Problem List   Diagnosis    Anxiety    Increased frequency of urination    Bipolar 2 disorder ([facility] [address] )    Acquired hypothyroidism    Arthritis    Chronic bilateral low back pain without sciatica    IUD check up    Medication refill    Tendinitis of right ankle    BMI 28 0-28 9,adult    Bilateral ankle pain    Positive depression screening    DUSTIN (acute kidney injury) ([facility] [address] )    Lower abdominal pain    Delirium       History of Present Illness   Physician Requesting Consult: Claire Headley MD  Reason for Consult / Principal Problem:  Acute renal failure  Hx and PE limited by:   HPI: Fito Meek is a 53y o  year old female who presented to the emergency department February 2nd with complaints of cramping abdominal pain for the last 3 4 days prior to presentation  Patient noted reduced p o  Intake in general with started this symptoms  Pain control, the patient was taking nonsteroidal anti-inflammatory medications  She denies nausea vomiting or any significant amount of diarrhea  Patient claims that her pain was severe and nothing was helping with the pain, even the nonsteroidal anti inflammatories    Since coming to the hospital, the patient was offered opioid pain relief which has helped somewhat  That being said, unclear if this is due to opioid effect or not, however, patient is somewhat confused, she notes that she is confused and not able to think like she normally can  Patient denies any fevers or chills at this time  From renal standpoint, patient presented the hospital with a creatinine of 2 05 mg/ dL, this is significantly above her baseline creatinine which appears to be between 0 9-1 1 mg/ dL  This morning status post volume expansion patient's creatinine is now down to 1 4 mg/ dL  There are no significant electrolyte abnormalities  History obtained from chart review and the patient       Prior patient charts reviewed during the course this consultation    Review of Systems - Negative except as mentioned above in HPI, more specifics as mentioned below    Review of Systems - General ROS: positive for  - fatigue  Psychological ROS: negative  Ophthalmic ROS: negative  ENT ROS: negative  Allergy and Immunology ROS: negative  Hematological and Lymphatic ROS: negative  Endocrine ROS: negative  Cardiovascular ROS: no chest pain or dyspnea on exertion  Musculoskeletal ROS: negative  Dermatological ROS: negative    Historical Information   Past Medical History:   Diagnosis Date    Ankle pain, right     Anxiety     Arthritis     Bipolar 1 disorder (HCC)     Depression     Hypertension     Hypothyroidism     Known health problems: none     Scoliosis      Past Surgical History:   Procedure Laterality Date    CHOLECYSTECTOMY       Social History   Social History     Substance and Sexual Activity   Alcohol Use Never    Frequency: Never     Social History     Substance and Sexual Activity   Drug Use Never     Social History     Tobacco Use   Smoking Status Current Every Day Smoker    Packs/day: 0 50    Years: 3 00    Pack years: 1 50   Smokeless Tobacco Never Used     Family History   Problem Relation Age of Onset    Diabetes Mother     Hypertension Mother     Heart disease Mother     Hypertension Father     Diabetes Maternal Grandmother     Diabetes Paternal Grandmother        Meds/Allergies   all current active meds have been reviewed, current meds:   Current Facility-Administered Medications   Medication Dose Route Frequency    acetaminophen (TYLENOL) tablet 650 mg  650 mg Oral Q6H PRN    buPROPion (WELLBUTRIN XL) 24 hr tablet 300 mg  300 mg Oral Daily    cyclobenzaprine (FLEXERIL) tablet 10 mg  10 mg Oral TID PRN    gabapentin (NEURONTIN) tablet 600 mg  600 mg Oral TID    heparin (porcine) subcutaneous injection 5,000 Units  5,000 Units Subcutaneous Q8H [address]    levothyroxine tablet 75 mcg  75 mcg Oral Early Morning    nicotine (NICODERM CQ) 21 mg/24 hr TD 24 hr patch 1 patch  1 patch Transdermal Daily    ondansetron (ZOFRAN) injection 4 mg  4 mg Intravenous Q6H PRN    pneumococcal 13-valent conjugate vaccine (PREVNAR-13) IM injection 0 5 mL  0 5 mL Intramuscular Prior to discharge    sodium chloride 0 9 % infusion  125 mL/hr Intravenous Continuous    venlafaxine (EFFEXOR-XR) 24 hr capsule 300 mg  300 mg Oral Daily    and PTA meds:    Medications Prior to Admission   Medication    buPROPion (WELLBUTRIN XL) 300 mg 24 hr tablet    busPIRone (BUSPAR) 15 mg tablet    cariprazine (VRAYLAR) 3 MG capsule    celecoxib (CeleBREX) 100 mg capsule    cyclobenzaprine (FLEXERIL) 10 mg tablet    ergocalciferol (VITAMIN D2) 50,000 units    gabapentin (NEURONTIN) 600 MG tablet    levothyroxine 50 mcg tablet    lisinopril (ZESTRIL) 20 mg tablet    venlafaxine (EFFEXOR-XR) 150 mg 24 hr capsule    zolpidem (AMBIEN) 10 mg tablet    busPIRone (BUSPAR) 10 mg tablet    cyclobenzaprine (FLEXERIL) 10 mg tablet    senna-docusate sodium (SENOKOT-S) 8 6-50 mg per tablet         No Known Allergies    Objective   No intake or output data in the 24 hours ending 02/03/20 0911    Invasive Devices:        Physical Exam      No intake/output data recorded  Vitals:    02/03/20 0844   BP: 109/55   Pulse: (!) 107   Resp: 20   Temp: (!) 96 7 °F (35 9 °C)   SpO2: 93%       Gen: in NAD, Alert/Awake  HEENT: no sclerous icterus, MMM, neck supple  CV: +S1/S2, RRR  Lungs: CTA bilaterally  Abd: +BS, soft NT/ND  Ext: all four extremities are warm  Skin: no rashes noted  Neuro: CN II-XII intact    Current Weight: Weight - Scale: 70 8 kg (156 lb 1 4 oz)  First Weight: Weight - Scale: 68 9 kg (152 lb)    Lab Results:  I have personally reviewed pertinent labs      CBC:   Lab Results   Component Value Date    WBC 9 60 02/03/2020    HGB 12 2 02/03/2020    HCT 36 9 (L) 02/03/2020     (H) 02/03/2020     02/03/2020    MCH 33 1 02/03/2020    MCHC 33 1 02/03/2020    RDW 12 8 02/03/2020    MPV 8 5 (L) 02/03/2020     CMP:   Lab Results   Component Value Date    K 4 2 02/03/2020     02/03/2020    CO2 24 02/03/2020    BUN 21 02/03/2020    CREATININE 1 40 (H) 02/03/2020    CALCIUM 8 9 02/03/2020    AST 17 02/02/2020    ALT 10 02/02/2020    ALKPHOS 72 02/02/2020    EGFR 45 02/03/2020     Phosphorus: No results found for: PHOS  Magnesium: No results found for: MG  Urinalysis: No results found for: Luciana Early, SPECGRAV, PHUR, LEUKOCYTESUR, NITRITE, PROTEINUA, GLUCOSEU, KETONESU, BILIRUBINUR, BLOODU  Ionized Calcium: No results found for: CAION  Coagulation: No results found for: PT, INR, APTT  Troponin: No results found for: TROPONINI  ABG: No results found for: PHART, QTD7TVL, PO2ART, EZY1KBH, Q0ZLPWFY, BEART, SOURCE    Results from last 7 days   Lab Units 02/03/20  0541 02/02/20  1925   POTASSIUM mmol/L 4 2 3 8   CHLORIDE mmol/L 106 102   CO2 mmol/L 24 28   BUN mg/dL 21 25   CREATININE mg/dL 1 40* 2 05*   CALCIUM mg/dL 8 9 9 9   ALK PHOS U/L  --  72   ALT U/L  --  10   AST U/L  --  17       Radiology review:  Procedure: Ct Abdomen Pelvis Wo Contrast    Result Date: 2/2/2020  Narrative: CT ABDOMEN AND PELVIS WITHOUT IV CONTRAST INDICATION:   Abdominal pain, acute, nonlocalized  COMPARISON:  None  TECHNIQUE:  CT examination of the abdomen and pelvis was performed without intravenous contrast   Axial, sagittal, and coronal 2D reformatted images were created from the source data and submitted for interpretation  Radiation dose length product (DLP) for this visit:  306 4 mGy-cm   This examination, like all CT scans performed in the [facility], was performed utilizing techniques to minimize radiation dose exposure, including the use of iterative reconstruction and automated exposure control  Enteric contrast was administered  FINDINGS: ABDOMEN LOWER CHEST:  No clinically significant abnormality identified in the visualized lower chest  LIVER/BILIARY TREE:  Unremarkable  GALLBLADDER:  Gallbladder is surgically absent  SPLEEN:  Unremarkable  PANCREAS:  Unremarkable  ADRENAL GLANDS:  Unremarkable  KIDNEYS/URETERS:  Unremarkable  No hydronephrosis  STOMACH AND BOWEL:  Unremarkable  APPENDIX:  No findings to suggest appendicitis  ABDOMINOPELVIC CAVITY:  No ascites or free intraperitoneal air  No lymphadenopathy  VESSELS:  Unremarkable for patient's age  PELVIS REPRODUCTIVE ORGANS:  Unremarkable for patient's age  IUD is noted  URINARY BLADDER:  Unremarkable  ABDOMINAL WALL/INGUINAL REGIONS:  Unremarkable  OSSEOUS STRUCTURES:  No acute fracture or destructive osseous lesion  Impression: No renal stones  No small bowel obstruction  Normal appendix  Consider contrast exam in the appropriate clinical setting  Workstation performed: [id number]         Haley Finney DO      This consultation note was produced in part using a dictation device which may document imprecise wording from author's original intent

## 2020-02-03 NOTE — LETTER
February 3, 2020     Patient: Katherine Hernandez   YOB: 1975   Date of Visit: 2/3/2020       To Whom It May Concern: It is my medical opinion that Katherine Hernandez may return to work on 2/5 or 2/6, depending on symptoms  Please excuse from work 2/3, 2/4 and possibly 2/5  If you have any questions or concerns, please don't hesitate to call           Sincerely,        FIONA Paiz    CC: No Recipients

## 2020-02-03 NOTE — ASSESSMENT & PLAN NOTE
Patient appears to be under the influence of unknown substance during exam, history is limited and patient is speaking to people who were not there unclear if she is hallucinating either visually or auditory  Patient does have a psychiatric history and is on multiple medications, will obtain urine drug screen, avoid narcotics the patient did receive morphine in the ER and continue monitor

## 2020-02-03 NOTE — SOCIAL WORK
CM met with pt at bedside and explained role  Pt was made aware of observation status and provided with notice and brochure regarding same  Pt reports she lives with her spouse and 4 children in 2 story house; [address]  Pt denies the use of any DME at home  Pt reports she is completely independent with ADLs  She drives  Pt PCP is Alison Massey  She uses [facility] for medications and denies any barterers to obtaining them  Pt denies any history of HHC and STR  She does have a history of Bipolar Disorder but denies any history of inpatient [facility] admissions  Pt denies any history of substance use or treatment  However, per ED documentation pt appeared to be under the influence of a substance when she initially presented  Psychiatry consult is currently pending  Pt denies any other discharge needs at this time  She indcates her spouse will transport her home on discharge  CM to follow  Patient/caregiver received discharge checklist   Content reviewed  Patient/caregiver encouraged to participate in discharge plan of care prior to discharge home  CM reviewed d/c planning process including the following: identifying help at home, patient preference for d/c planning needs, availability of treatment team to discuss questions or concerns patient and/or family may have regarding understanding medications and recognizing signs and symptoms once discharged  CM also encouraged patient to follow up with all recommended appointments after discharge  Patient advised of importance for patient and family to participate in managing patients medical well being

## 2020-02-03 NOTE — ASSESSMENT & PLAN NOTE
Continue pre-hospital levothyroxine 75 mcg p o   Daily recently increased as per PCP note dated 01/28/2020

## 2020-02-03 NOTE — PLAN OF CARE
Problem: Potential for Falls  Goal: Patient will remain free of falls  Description  INTERVENTIONS:  - Assess patient frequently for physical needs  -  Identify cognitive and physical deficits and behaviors that affect risk of falls    -  [place] fall precautions as indicated by assessment   - Educate patient/family on patient safety including physical limitations  - Instruct patient to call for assistance with activity based on assessment  - Modify environment to reduce risk of injury  - Consider OT/PT consult to assist with strengthening/mobility  Outcome: Progressing     Problem: PAIN - ADULT  Goal: Verbalizes/displays adequate comfort level or baseline comfort level  Description  Interventions:  - Encourage patient to monitor pain and request assistance  - Assess pain using appropriate pain scale  - Administer analgesics based on type and severity of pain and evaluate response  - Implement non-pharmacological measures as appropriate and evaluate response  - Consider cultural and social influences on pain and pain management  - Notify physician/advanced practitioner if interventions unsuccessful or patient reports new pain  Outcome: Progressing     Problem: INFECTION - ADULT  Goal: Absence or prevention of progression during hospitalization  Description  INTERVENTIONS:  - Assess and monitor for signs and symptoms of infection  - Monitor lab/diagnostic results  - Monitor all insertion sites, i e  indwelling lines, tubes, and drains  - Monitor endotracheal if appropriate and nasal secretions for changes in amount and color  - [place] appropriate cooling/warming therapies per order  - Administer medications as ordered  - Instruct and encourage patient and family to use good hand hygiene technique  - Identify and instruct in appropriate isolation precautions for identified infection/condition  Outcome: Progressing  Goal: Absence of fever/infection during neutropenic period  Description  INTERVENTIONS:  - Monitor WBC    Outcome: Progressing     Problem: SAFETY ADULT  Goal: Maintain or return to baseline ADL function  Description  INTERVENTIONS:  -  Assess patient's ability to carry out ADLs; assess patient's baseline for ADL function and identify physical deficits which impact ability to perform ADLs (bathing, care of mouth/teeth, toileting, grooming, dressing, etc )  - Assess/evaluate cause of self-care deficits   - Assess range of motion  - Assess patient's mobility; develop plan if impaired  - Assess patient's need for assistive devices and provide as appropriate  - Encourage maximum independence but intervene and supervise when necessary  - Involve family in performance of ADLs  - Assess for home care needs following discharge   - Consider OT consult to assist with ADL evaluation and planning for discharge  - Provide patient education as appropriate  Outcome: Progressing  Goal: Maintain or return mobility status to optimal level  Description  INTERVENTIONS:  - Assess patient's baseline mobility status (ambulation, transfers, stairs, etc )    - Identify cognitive and physical deficits and behaviors that affect mobility  - Identify mobility aids required to assist with transfers and/or ambulation (gait belt, sit-to-stand, lift, walker, cane, etc )  - [place] fall precautions as indicated by assessment  - Record patient progress and toleration of activity level on Mobility SBAR; progress patient to next Phase/Stage  - Instruct patient to call for assistance with activity based on assessment  - Consider rehabilitation consult to assist with strengthening/weightbearing, etc   Outcome: Progressing     Problem: DISCHARGE PLANNING  Goal: Discharge to home or other facility with appropriate resources  Description  INTERVENTIONS:  - Identify barriers to discharge w/patient and caregiver  - Arrange for needed discharge resources and transportation as appropriate  - Identify discharge learning needs (meds, wound care, etc )  - Arrange for interpretive services to assist at discharge as needed  - Refer to Case Management Department for coordinating discharge planning if the patient needs post-hospital services based on physician/advanced practitioner order or complex needs related to functional status, cognitive ability, or social support system  Outcome: Progressing     Problem: Knowledge Deficit  Goal: Patient/family/caregiver demonstrates understanding of disease process, treatment plan, medications, and discharge instructions  Description  Complete learning assessment and assess knowledge base    Interventions:  - Provide teaching at level of understanding  - Provide teaching via preferred learning methods  Outcome: Progressing     Problem: GASTROINTESTINAL - ADULT  Goal: Minimal or absence of nausea and/or vomiting  Description  INTERVENTIONS:  - Administer IV fluids if ordered to ensure adequate hydration  - Maintain NPO status until nausea and vomiting are resolved  - Nasogastric tube if ordered  - Administer ordered antiemetic medications as needed  - Provide nonpharmacologic comfort measures as appropriate  - Advance diet as tolerated, if ordered  - Consider nutrition services referral to assist patient with adequate nutrition and appropriate food choices  Outcome: Progressing  Goal: Maintains or returns to baseline bowel function  Description  INTERVENTIONS:  - Assess bowel function  - Encourage oral fluids to ensure adequate hydration  - Administer IV fluids if ordered to ensure adequate hydration  - Administer ordered medications as needed  - Encourage mobilization and activity  - Consider nutritional services referral to assist patient with adequate nutrition and appropriate food choices  Outcome: Progressing  Goal: Maintains adequate nutritional intake  Description  INTERVENTIONS:  - Monitor percentage of each meal consumed  - Identify factors contributing to decreased intake, treat as appropriate  - Assist with meals as needed  - Monitor I&O, weight, and lab values if indicated  - Obtain nutrition services referral as needed  Outcome: Progressing     Problem: GENITOURINARY - ADULT  Goal: Maintains or returns to baseline urinary function  Description  INTERVENTIONS:  - Assess urinary function  - Encourage oral fluids to ensure adequate hydration if ordered  - Administer IV fluids as ordered to ensure adequate hydration  - Administer ordered medications as needed  - Offer frequent toileting  - Follow urinary retention protocol if ordered  Outcome: Progressing  Goal: Absence of urinary retention  Description  INTERVENTIONS:  - Assess patients ability to void and empty bladder  - Monitor I/O  - Bladder scan as needed  - Discuss with physician/AP medications to alleviate retention as needed  - Discuss catheterization for long term situations as appropriate  Outcome: Progressing     Problem: METABOLIC, FLUID AND ELECTROLYTES - ADULT  Goal: Electrolytes maintained within normal limits  Description  INTERVENTIONS:  - Monitor labs and assess patient for signs and symptoms of electrolyte imbalances  - Administer electrolyte replacement as ordered  - Monitor response to electrolyte replacements, including repeat lab results as appropriate  - Instruct patient on fluid and nutrition as appropriate  Outcome: Progressing  Goal: Fluid balance maintained  Description  INTERVENTIONS:  - Monitor labs   - Monitor I/O and WT  - Instruct patient on fluid and nutrition as appropriate  - Assess for signs & symptoms of volume excess or deficit  Outcome: Progressing  Goal: Glucose maintained within target range  Description  INTERVENTIONS:  - Monitor Blood Glucose as ordered  - Assess for signs and symptoms of hyperglycemia and hypoglycemia  - Administer ordered medications to maintain glucose within target range  - Assess nutritional intake and initiate nutrition service referral as needed  Outcome: Progressing

## 2020-02-04 NOTE — PROGRESS NOTES
[facility]        NAME: Aroldo Najera is a 53 y o  female  : 1975    MRN: [id number]  DATE: 2020  TIME: 3:42 PM    Assessment and Plan   Acute bronchitis, unspecified organism [J20 [id number]]  1  Acute bronchitis, unspecified organism  amoxicillin-clavulanate (AUGMENTIN) 875-125 mg per tablet    fluconazole (DIFLUCAN) 150 mg tablet    albuterol (PROAIR HFA) 90 mcg/act inhaler    benzonatate (TESSALON PERLES) 100 mg capsule    predniSONE 50 mg tablet         Patient Instructions     Patient Instructions     Take the Augmentin (antibiotic) and prednisone (steroid) as ordered until completed; take the diflucan every 3rd day (days 1, 4 , 7, 10; plus 1 spare if you still have symptoms after treatment)  Use the albuterol inhaler every 4-6 hours as needed for shortness of breath, chest tightness, wheezing or persistent cough  Use the Tessalon Perles up to 3x/day as needed for cough  Acute Bronchitis   AMBULATORY CARE:   Acute bronchitis  is swelling and irritation in the air passages of your lungs  This irritation may cause you to cough or have other breathing problems  Acute bronchitis often starts because of another illness, such as a cold or the flu  The illness spreads from your nose and throat to your windpipe and airways  Bronchitis is often called a chest cold  Acute bronchitis lasts about 3 to 6 weeks and is usually not a serious illness  Your cough can last for several weeks  You may have any of the following symptoms:   · A cough with sputum that may be clear, yellow, or green    · Feeling more tired than usual, and body aches    · A fever and chills    · Wheezing when you breathe    · A tight chest or pain when you breathe or cough  Seek care immediately if:   · You cough up blood  · Your lips or fingernails turn blue  · You feel like you are not getting enough air when you breathe  Contact your healthcare provider if:   · You have a fever      · Your breathing problems do not go away or get worse  · Your cough does not get better within 4 weeks  · You have questions or concerns about your condition or care  Self-care:   · Get more rest   Rest helps your body to heal  Slowly start to do more each day  Rest when you feel it is needed  · Avoid irritants in the air  Avoid chemicals, fumes, and dust  Wear a face mask if you must work around dust or fumes  Stay inside on days when air pollution levels are high  If you have allergies, stay inside when pollen counts are high  Do not use aerosol products, such as spray-on deodorant, bug spray, and hair spray  · Do not smoke or be around others who smoke  Nicotine and other chemicals in cigarettes and cigars damages the cilia that move mucus out of your lungs  Ask your healthcare provider for information if you currently smoke and need help to quit  E-cigarettes or smokeless tobacco still contain nicotine  Talk to your healthcare provider before you use these products  · Drink liquids as directed  Liquids help keep your air passages moist and help you cough up mucus  You may need to drink more liquids when you have acute bronchitis  Ask how much liquid to drink each day and which liquids are best for you  · Use a humidifier or vaporizer  Use a cool mist humidifier or a vaporizer to increase air moisture in your home  This may make it easier for you to breathe and help decrease your cough  Prevent acute bronchitis by doing the following:   · Get the vaccinations you need  Ask your healthcare provider if you should get vaccinated against the flu or pneumonia  · Prevent the spread of germs  You can decrease your risk of acute bronchitis and other illnesses by doing the following:     [facility] your hands often with soap and water  Carry germ-killing hand lotion or gel with you  You can use the lotion or gel to clean your hands when soap and water are not available      ¨ Do not touch your eyes, nose, or mouth unless you have washed your hands first     ¨ Always cover your mouth when you cough to prevent the spread of germs  It is best to cough into a tissue or your shirt sleeve instead of into your hand  Ask those around you cover their mouths when they cough  ¨ Try to avoid people who have a cold or the flu  If you are sick, stay away from others as much as possible  Medicines: Your healthcare provider may  give you any of the following:  · Ibuprofen or acetaminophen  are medicines that help lower your fever  They are available without a doctor's order  Ask your healthcare provider which medicine is right for you  Ask how much to take and how often to take it  Follow directions  These medicines can cause stomach bleeding if not taken correctly  Ibuprofen can cause kidney damage  Do not take ibuprofen if you have kidney disease, an ulcer, or allergies to aspirin  Acetaminophen can cause liver damage  Do not take more than 4,000 milligrams in 24 hours  · Decongestants  help loosen mucus in your lungs and make it easier to cough up  This can help you breathe easier  · Cough suppressants  decrease your urge to cough  If your cough produces mucus, do not take a cough suppressant unless your healthcare provider tells you to  Your healthcare provider may suggest that you take a cough suppressant at night so you can rest     · Inhalers  may be given  Your healthcare provider may give you one or more inhalers to help you breathe easier and cough less  An inhaler gives your medicine to open your airways  Ask your healthcare provider to show you how to use your inhaler correctly  Follow up with your healthcare provider as directed:  Write down questions you have so you will remember to ask them during your follow-up visits  © 2017 [address] [facility] Information is for End User's use only and may not be sold, redistributed or otherwise used for commercial purposes   All illustrations and images included in CareNotes® are the copyrighted property of [employer]  or [employer]  The above information is an  only  It is not intended as medical advice for individual conditions or treatments  Talk to your doctor, nurse or pharmacist before following any medical regimen to see if it is safe and effective for you  Follow up with PCP in 3-5 days  Proceed to  ER if symptoms worsen  Chief Complaint     Chief Complaint   Patient presents with    Cough     Pt c/o a cough and congestion for three days  History of Present Illness       Patient reports that she has had worsening congestion and chest tightness over the last 2-3 days, worse today after discharge from the hospital (where she was treated for acute kidney injury and abdominal pain)  Patient reports her abdomen is  but that she was told no NSAIDs  Confirmed no NSAIDs but instructed that tylenol is safe  Directed patient that if her pain becomes worse again that she will need to return to the ER for further assessment and treatment  Patient states that her breathing did not seem as tight when she left the hospital this morning as it does now, which is why she thinks it was not specifically addressed--not the focus and not a problem at that point  She reports a history of bronchitis and is a current smoker  Review of Systems   Review of Systems   HENT: Positive for congestion  Respiratory: Positive for cough, chest tightness and wheezing  All other systems reviewed and are negative          Current Medications       Current Outpatient Medications:     albuterol (PROAIR HFA) 90 mcg/act inhaler, Inhale 2 puffs every 4 (four) hours as needed for wheezing or shortness of breath, Disp: 8 5 g, Rfl: 0    amoxicillin-clavulanate (AUGMENTIN) 875-125 mg per tablet, Take 1 tablet by mouth every 12 (twelve) hours for 10 days, Disp: 20 tablet, Rfl: 0    benzonatate (TESSALON PERLES) 100 mg capsule, 1-2 caps up to 3x/day as needed for cough, Disp: 30 capsule, Rfl: 0    buPROPion (WELLBUTRIN XL) 300 mg 24 hr tablet, Take 300 mg by mouth daily, Disp: , Rfl: 1    busPIRone (BUSPAR) 10 mg tablet, Take 10 mg by mouth 3 (three) times a day, Disp: , Rfl: 0    cariprazine (VRAYLAR) 3 MG capsule, Take 1 capsule (3 mg total) by mouth daily, Disp: 30 capsule, Rfl: 2    ergocalciferol (VITAMIN D2) 50,000 units, take 1 capsule by mouth every week, Disp: 12 capsule, Rfl: 3    fluconazole (DIFLUCAN) 150 mg tablet, Take 1 tablet (150 mg total) by mouth every third day for 5 doses, Disp: 5 tablet, Rfl: 0    gabapentin (NEURONTIN) 600 MG tablet, Take 0 5 tablets (300 mg total) by mouth 3 (three) times a day, Disp: 90 tablet, Rfl: 0    levothyroxine 75 mcg tablet, Take 1 tablet (75 mcg total) by mouth daily in the early morning, Disp: , Rfl: 0    lisinopril (ZESTRIL) 20 mg tablet, Take 1 tablet (20 mg total) by mouth daily, Disp: 180 tablet, Rfl: 0    predniSONE 50 mg tablet, Take 1 tablet (50 mg total) by mouth daily for 4 days, Disp: 4 tablet, Rfl: 0    venlafaxine (EFFEXOR-XR) 150 mg 24 hr capsule, Take 2 capsules (300 mg total) by mouth daily (Patient taking differently: Take 300 mg by mouth daily at bedtime ), Disp: 60 capsule, Rfl: 1    zolpidem (AMBIEN) 10 mg tablet, Take 1 tablet (10 mg total) by mouth daily at bedtime as needed for sleep for up to 10 days, Disp: 10 tablet, Rfl: 0  No current facility-administered medications for this visit       Current Allergies     Allergies as of 02/03/2020    (No Known Allergies)            The following portions of the patient's history were reviewed and updated as appropriate: allergies, current medications, past family history, past medical history, past social history, past surgical history and problem list      Past Medical History:   Diagnosis Date    Ankle pain, right     Anxiety     Arthritis     Bipolar 1 disorder ([facility] [address] )     Depression     Hypertension     Hypothyroidism     Known health problems: none     Scoliosis        Past Surgical History:   Procedure Laterality Date    CHOLECYSTECTOMY         Family History   Problem Relation Age of Onset    Diabetes Mother     Hypertension Mother     Heart disease Mother     Hypertension Father     Diabetes Maternal Grandmother     Diabetes Paternal Grandmother          Medications have been verified  Objective   /71   Pulse 103   Temp 98 3 °F (36 8 °C)   Resp 18   SpO2 97%        Physical Exam     Physical Exam   Constitutional: She is oriented to person, place, and time  She appears well-developed and well-nourished  No distress  HENT:   Head: Normocephalic and atraumatic  Right Ear: Hearing, tympanic membrane, external ear and ear canal normal    Left Ear: Hearing, tympanic membrane, external ear and ear canal normal    Nose: Mucosal edema and sinus tenderness (mild) present  Right sinus exhibits maxillary sinus tenderness  Right sinus exhibits no frontal sinus tenderness  Left sinus exhibits maxillary sinus tenderness  Left sinus exhibits no frontal sinus tenderness  Mouth/Throat: Uvula is midline and mucous membranes are normal  Posterior oropharyngeal erythema (mild) present  No oropharyngeal exudate, posterior oropharyngeal edema or tonsillar abscesses  Tonsils are 1+ on the right  Tonsils are 1+ on the left  No tonsillar exudate  Eyes: Pupils are equal, round, and reactive to light  Neck: Normal range of motion  Neck supple  Cardiovascular: Normal rate, regular rhythm and normal heart sounds  Pulmonary/Chest: Effort normal  No accessory muscle usage or stridor  No apnea, no tachypnea and no bradypnea  No respiratory distress  She has decreased breath sounds  She has wheezes  She has no rhonchi  She has no rales  Lungs slightly tight with mild to moderate expiratory wheezes scattered throughout   Abdominal: Soft  Bowel sounds are normal  She exhibits no distension   There is generalized tenderness  There is no rigidity, no rebound and no guarding  Patient reports some generalized tenderness, comparable to level as of hospital discharge this morning  Understands to return to ER if symptoms change or worsen  Musculoskeletal: Normal range of motion  Neurological: She is alert and oriented to person, place, and time  Skin: Skin is warm and dry  Capillary refill takes less than 2 seconds  She is not diaphoretic  Psychiatric: She has a normal mood and affect  Her behavior is normal  Judgment and thought content normal    Nursing note and vitals reviewed

## 2020-02-04 NOTE — PATIENT INSTRUCTIONS
Take the Augmentin (antibiotic) and prednisone (steroid) as ordered until completed; take the diflucan every 3rd day (days 1, 4 , 7, 10; plus 1 spare if you still have symptoms after treatment)  Use the albuterol inhaler every 4-6 hours as needed for shortness of breath, chest tightness, wheezing or persistent cough  Use the Tessalon Perles up to 3x/day as needed for cough  Acute Bronchitis   AMBULATORY CARE:   Acute bronchitis  is swelling and irritation in the air passages of your lungs  This irritation may cause you to cough or have other breathing problems  Acute bronchitis often starts because of another illness, such as a cold or the flu  The illness spreads from your nose and throat to your windpipe and airways  Bronchitis is often called a chest cold  Acute bronchitis lasts about 3 to 6 weeks and is usually not a serious illness  Your cough can last for several weeks  You may have any of the following symptoms:   · A cough with sputum that may be clear, yellow, or green    · Feeling more tired than usual, and body aches    · A fever and chills    · Wheezing when you breathe    · A tight chest or pain when you breathe or cough  Seek care immediately if:   · You cough up blood  · Your lips or fingernails turn blue  · You feel like you are not getting enough air when you breathe  Contact your healthcare provider if:   · You have a fever  · Your breathing problems do not go away or get worse  · Your cough does not get better within 4 weeks  · You have questions or concerns about your condition or care  Self-care:   · Get more rest   Rest helps your body to heal  Slowly start to do more each day  Rest when you feel it is needed  · Avoid irritants in the air  Avoid chemicals, fumes, and dust  Wear a face mask if you must work around dust or fumes  Stay inside on days when air pollution levels are high  If you have allergies, stay inside when pollen counts are high   Do not use aerosol products, such as spray-on deodorant, bug spray, and hair spray  · Do not smoke or be around others who smoke  Nicotine and other chemicals in cigarettes and cigars damages the cilia that move mucus out of your lungs  Ask your healthcare provider for information if you currently smoke and need help to quit  E-cigarettes or smokeless tobacco still contain nicotine  Talk to your healthcare provider before you use these products  · Drink liquids as directed  Liquids help keep your air passages moist and help you cough up mucus  You may need to drink more liquids when you have acute bronchitis  Ask how much liquid to drink each day and which liquids are best for you  · Use a humidifier or vaporizer  Use a cool mist humidifier or a vaporizer to increase air moisture in your home  This may make it easier for you to breathe and help decrease your cough  Prevent acute bronchitis by doing the following:   · Get the vaccinations you need  Ask your healthcare provider if you should get vaccinated against the flu or pneumonia  · Prevent the spread of germs  You can decrease your risk of acute bronchitis and other illnesses by doing the following:     [facility] your hands often with soap and water  Carry germ-killing hand lotion or gel with you  You can use the lotion or gel to clean your hands when soap and water are not available  ¨ Do not touch your eyes, nose, or mouth unless you have washed your hands first     ¨ Always cover your mouth when you cough to prevent the spread of germs  It is best to cough into a tissue or your shirt sleeve instead of into your hand  Ask those around you cover their mouths when they cough  ¨ Try to avoid people who have a cold or the flu  If you are sick, stay away from others as much as possible  Medicines: Your healthcare provider may  give you any of the following:  · Ibuprofen or acetaminophen  are medicines that help lower your fever   They are available without a doctor's order  Ask your healthcare provider which medicine is right for you  Ask how much to take and how often to take it  Follow directions  These medicines can cause stomach bleeding if not taken correctly  Ibuprofen can cause kidney damage  Do not take ibuprofen if you have kidney disease, an ulcer, or allergies to aspirin  Acetaminophen can cause liver damage  Do not take more than 4,000 milligrams in 24 hours  · Decongestants  help loosen mucus in your lungs and make it easier to cough up  This can help you breathe easier  · Cough suppressants  decrease your urge to cough  If your cough produces mucus, do not take a cough suppressant unless your healthcare provider tells you to  Your healthcare provider may suggest that you take a cough suppressant at night so you can rest     · Inhalers  may be given  Your healthcare provider may give you one or more inhalers to help you breathe easier and cough less  An inhaler gives your medicine to open your airways  Ask your healthcare provider to show you how to use your inhaler correctly  Follow up with your healthcare provider as directed:  Write down questions you have so you will remember to ask them during your follow-up visits  © 2017 [facility] [facility] Information is for End User's use only and may not be sold, redistributed or otherwise used for commercial purposes  All illustrations and images included in CareNotes® are the copyrighted property of [employer]  or [employer]  The above information is an  only  It is not intended as medical advice for individual conditions or treatments  Talk to your doctor, nurse or pharmacist before following any medical regimen to see if it is safe and effective for you

## 2020-02-05 ENCOUNTER — OFFICE VISIT (OUTPATIENT)
Dept: GASTROENTEROLOGY | Facility: CLINIC | Age: 45
End: 2020-02-05
Payer: COMMERCIAL

## 2020-02-05 VITALS
WEIGHT: 154 LBS | DIASTOLIC BLOOD PRESSURE: 77 MMHG | TEMPERATURE: 99 F | HEIGHT: 65 IN | SYSTOLIC BLOOD PRESSURE: 128 MMHG | BODY MASS INDEX: 25.66 KG/M2 | HEART RATE: 106 BPM

## 2020-02-05 DIAGNOSIS — R10.13 EPIGASTRIC ABDOMINAL PAIN: Primary | ICD-10-CM

## 2020-02-05 PROCEDURE — 99244 OFF/OP CNSLTJ NEW/EST MOD 40: CPT | Performed by: INTERNAL MEDICINE

## 2020-02-05 RX ORDER — DICYCLOMINE HCL 20 MG
20 TABLET ORAL EVERY 6 HOURS
Qty: 30 TABLET | Refills: 2 | Status: SHIPPED | OUTPATIENT
Start: 2020-02-05 | End: 2020-03-09

## 2020-02-05 RX ORDER — PANTOPRAZOLE SODIUM 40 MG/1
40 TABLET, DELAYED RELEASE ORAL DAILY
Qty: 30 TABLET | Refills: 3 | Status: SHIPPED | OUTPATIENT
Start: 2020-02-05 | End: 2020-05-16 | Stop reason: SDUPTHER

## 2020-02-05 NOTE — H&P (VIEW-ONLY)
[facility] - Outpatient Consultation  Lena Wilkins 53 y o  female MRN: [id number]  Encounter: [id number]          ASSESSMENT AND PLAN:      1  Epigastric abdominal pain  -she has been taking Motrin for ankle pain and recently started on prednisone for bronchitis  I am concerned about erosive gastritis or peptic ulcer disease  I will start her on pantoprazole 40 mg daily  Advised her to avoid taking NSAIDs  I will give her Bentyl to help with abdominal pain she can take Bentyl before meals  I will schedule her for EGD to assess for peptic ulcer disease  2  Chronic idiopathic constipation -she has been having this for several years  We discussed about increasing her fiber intake  I advised her to start taking Metamucil 1 scoop daily and MiraLax 1 scoop daily  She can titrate the dose of MiraLax as needed  ______________________________________________________________________    HPI:  15-year-old female here for evaluation of epigastric pain  She reports severe epigastric pain after eating  The pain has been going on for few days  She went to emergency room recently and had a CT scan which was unremarkable  Patient reports burning epigastric pain radiating to her back associated with nausea but no vomiting  She denies melena, hematemesis or hematochezia  She has been taking Motrin 3 to 4 times a week for ankle pain  Recently started on prednisone for bronchitis  She denies reflux symptoms  She never had EGD before  She has chronic constipation since her childhood  She has bowel movement 2 to 3 times a week with excessive straining  She is not on any stool softener or laxative  REVIEW OF SYSTEMS:    CONSTITUTIONAL: Denies any fever, chills, rigors, and weight loss  HEENT: No earache or tinnitus  Denies hearing loss or visual disturbances  CARDIOVASCULAR: No chest pain or palpitations     RESPIRATORY: Denies any cough, hemoptysis, shortness of breath or dyspnea on exertion  GASTROINTESTINAL: As noted in the History of Present Illness  GENITOURINARY: No problems with urination  Denies any hematuria or dysuria  NEUROLOGIC: No dizziness or vertigo, denies headaches  MUSCULOSKELETAL: Denies any muscle or joint pain  SKIN: Denies skin rashes or itching  ENDOCRINE: Denies excessive thirst  Denies intolerance to heat or cold  PSYCHOSOCIAL: Denies depression or anxiety  Denies any recent memory loss         Historical Information   Past Medical History:   Diagnosis Date    Ankle pain, right     Anxiety     Arthritis     Bipolar 1 disorder (HCC)     Depression     Hypertension     Hypothyroidism     Known health problems: none     Scoliosis      Past Surgical History:   Procedure Laterality Date    CHOLECYSTECTOMY      COLONOSCOPY       Social History   Social History     Substance and Sexual Activity   Alcohol Use Never    Frequency: Never     Social History     Substance and Sexual Activity   Drug Use Never     Social History     Tobacco Use   Smoking Status Current Every Day Smoker    Packs/day: 0 50    Years: 3 00    Pack years: 1 50   Smokeless Tobacco Never Used     Family History   Problem Relation Age of Onset    Diabetes Mother     Hypertension Mother     Heart disease Mother     Hypertension Father     Diabetes Maternal Grandmother     Diabetes Paternal Grandmother        Meds/Allergies       Current Outpatient Medications:     albuterol (PROAIR HFA) 90 mcg/act inhaler    amoxicillin-clavulanate (AUGMENTIN) 875-125 mg per tablet    benzonatate (TESSALON PERLES) 100 mg capsule    buPROPion (WELLBUTRIN XL) 300 mg 24 hr tablet    busPIRone (BUSPAR) 10 mg tablet    cariprazine (VRAYLAR) 3 MG capsule    ergocalciferol (VITAMIN D2) 50,000 units    fluconazole (DIFLUCAN) 150 mg tablet    gabapentin (NEURONTIN) 600 MG tablet    levothyroxine 75 mcg tablet    lisinopril (ZESTRIL) 20 mg tablet    predniSONE 50 mg tablet    venlafaxine (EFFEXOR-XR) 150 mg 24 hr capsule    zolpidem (AMBIEN) 10 mg tablet    dicyclomine (BENTYL) 20 mg tablet    pantoprazole (PROTONIX) 40 mg tablet    No Known Allergies        Objective     Blood pressure 128/77, pulse (!) 106, temperature 99 °F (37 2 °C), temperature source Tympanic, height 5' 5" (1 651 m), weight 69 9 kg (154 lb)  Body mass index is 25 63 kg/m²  PHYSICAL EXAM:      General Appearance:   Alert, cooperative, no distress   HEENT:   Normocephalic, atraumatic, anicteric      Neck:  Supple, symmetrical, trachea midline   Lungs:   Clear to auscultation bilaterally; no rales, rhonchi or wheezing; respirations unlabored    Heart[de-identified]   Regular rate and rhythm; no murmur, rub, or gallop  Abdomen:   Soft, epigastric tender, non-distended; normal bowel sounds; no masses, no organomegaly    Genitalia:   Deferred    Rectal:   Deferred    Extremities:  No cyanosis, clubbing or edema    Pulses:  2+ and symmetric    Skin:  No jaundice, rashes, or lesions    Lymph nodes:  No palpable cervical lymphadenopathy        Lab Results:   No visits with results within 1 Day(s) from this visit     Latest known visit with results is:   Admission on 02/02/2020, Discharged on 02/03/2020   Component Date Value    WBC 02/02/2020 12 10*    RBC 02/02/2020 4 02     Hemoglobin 02/02/2020 13 3     Hematocrit 02/02/2020 39 5*    MCV 02/02/2020 98     MCH 02/02/2020 33 1     MCHC 02/02/2020 33 7     RDW 02/02/2020 12 9     MPV 02/02/2020 8 4*    Platelets 51/63/1015 247     Neutrophils Relative 02/02/2020 75     Lymphocytes Relative 02/02/2020 16*    Monocytes Relative 02/02/2020 7     Eosinophils Relative 02/02/2020 1     Basophils Relative 02/02/2020 1     Neutrophils Absolute 02/02/2020 9 10*    Lymphocytes Absolute 02/02/2020 1 90     Monocytes Absolute 02/02/2020 0 80     Eosinophils Absolute 02/02/2020 0 20     Basophils Absolute 02/02/2020 0 10     Sodium 02/02/2020 138     Potassium 02/02/2020 3 8     Chloride 02/02/2020 102     CO2 02/02/2020 28     ANION GAP 02/02/2020 8     BUN 02/02/2020 25     Creatinine 02/02/2020 2 05*    Glucose 02/02/2020 100*    Calcium 02/02/2020 9 9     AST 02/02/2020 17     ALT 02/02/2020 10     Alkaline Phosphatase 02/02/2020 72     Total Protein 02/02/2020 7 2     Albumin 02/02/2020 4 9     Total Bilirubin 02/02/2020 0 60     eGFR 02/02/2020 28     Lipase 02/02/2020 20     HCG, Quant 02/02/2020 1 12     Color, UA 02/03/2020 Yellow     Clarity, UA 02/03/2020 Clear     Specific Gravity, UA 02/03/2020 1 020     pH, UA 02/03/2020 5 5     Leukocytes, UA 02/03/2020 Negative     Nitrite, UA 02/03/2020 Negative     Protein, UA 02/03/2020 Negative     Glucose, UA 02/03/2020 Negative     Ketones, UA 02/03/2020 Negative     Urobilinogen, UA 02/03/2020 0 2     Bilirubin, UA 02/03/2020 Negative     Blood, UA 02/03/2020 Negative     Amph/Meth UR 02/03/2020 Negative     Barbiturate Ur 02/03/2020 Negative     Benzodiazepine Urine 02/03/2020 Negative     Cocaine Urine 02/03/2020 Negative     Methadone Urine 02/03/2020 Negative     Opiate Urine 02/03/2020 Positive*    PCP Ur 02/03/2020 Negative     THC Urine 02/03/2020 Negative     Sodium 02/03/2020 137     Potassium 02/03/2020 4 2     Chloride 02/03/2020 106     CO2 02/03/2020 24     ANION GAP 02/03/2020 7     BUN 02/03/2020 21     Creatinine 02/03/2020 1 40*    Glucose 02/03/2020 93     Calcium 02/03/2020 8 9     eGFR 02/03/2020 45     WBC 02/03/2020 9 60     RBC 02/03/2020 3 69*    Hemoglobin 02/03/2020 12 2     Hematocrit 02/03/2020 36 9*    MCV 02/03/2020 100*    MCH 02/03/2020 33 1     MCHC 02/03/2020 33 1     RDW 02/03/2020 12 8     MPV 02/03/2020 8 5*    Platelets 72/04/0783 219     Neutrophils Relative 02/03/2020 68     Lymphocytes Relative 02/03/2020 24     Monocytes Relative 02/03/2020 5     Eosinophils Relative 02/03/2020 2     Basophils Relative 02/03/2020 0     Neutrophils Absolute 02/03/2020 6 50     Lymphocytes Absolute 02/03/2020 2 30     Monocytes Absolute 02/03/2020 0 50     Eosinophils Absolute 02/03/2020 0 20     Basophils Absolute 02/03/2020 0 00          Radiology Results:   Ct Abdomen Pelvis Wo Contrast    Result Date: 2/2/2020  Narrative: CT ABDOMEN AND PELVIS WITHOUT IV CONTRAST INDICATION:   Abdominal pain, acute, nonlocalized  COMPARISON:  None  TECHNIQUE:  CT examination of the abdomen and pelvis was performed without intravenous contrast   Axial, sagittal, and coronal 2D reformatted images were created from the source data and submitted for interpretation  Radiation dose length product (DLP) for this visit:  306 4 mGy-cm   This examination, like all CT scans performed in the [facility], was performed utilizing techniques to minimize radiation dose exposure, including the use of iterative reconstruction and automated exposure control  Enteric contrast was administered  FINDINGS: ABDOMEN LOWER CHEST:  No clinically significant abnormality identified in the visualized lower chest  LIVER/BILIARY TREE:  Unremarkable  GALLBLADDER:  Gallbladder is surgically absent  SPLEEN:  Unremarkable  PANCREAS:  Unremarkable  ADRENAL GLANDS:  Unremarkable  KIDNEYS/URETERS:  Unremarkable  No hydronephrosis  STOMACH AND BOWEL:  Unremarkable  APPENDIX:  No findings to suggest appendicitis  ABDOMINOPELVIC CAVITY:  No ascites or free intraperitoneal air  No lymphadenopathy  VESSELS:  Unremarkable for patient's age  PELVIS REPRODUCTIVE ORGANS:  Unremarkable for patient's age  IUD is noted  URINARY BLADDER:  Unremarkable  ABDOMINAL WALL/INGUINAL REGIONS:  Unremarkable  OSSEOUS STRUCTURES:  No acute fracture or destructive osseous lesion  Impression: No renal stones  No small bowel obstruction  Normal appendix  Consider contrast exam in the appropriate clinical setting   Workstation performed: [id number]

## 2020-02-05 NOTE — LETTER
February 5, 2020     Patient: Michelle Sharma   YOB: 1975   Date of Visit: 2/5/2020       To Whom it May Concern:    Michelle Sharma is under my professional care  She was seen in my office on 2/5/2020  She may return to work on 2/5/2020  If you have any questions or concerns, please don't hesitate to call           Sincerely,          [facility]

## 2020-02-05 NOTE — PROGRESS NOTES
[facility] - Outpatient Consultation  Ozzy Ruiz 53 y o  female MRN: [id number]  Encounter: [id number]          ASSESSMENT AND PLAN:      1  Epigastric abdominal pain  -she has been taking Motrin for ankle pain and recently started on prednisone for bronchitis  I am concerned about erosive gastritis or peptic ulcer disease  I will start her on pantoprazole 40 mg daily  Advised her to avoid taking NSAIDs  I will give her Bentyl to help with abdominal pain she can take Bentyl before meals  I will schedule her for EGD to assess for peptic ulcer disease  2  Chronic idiopathic constipation -she has been having this for several years  We discussed about increasing her fiber intake  I advised her to start taking Metamucil 1 scoop daily and MiraLax 1 scoop daily  She can titrate the dose of MiraLax as needed  ______________________________________________________________________    HPI:  17-year-old female here for evaluation of epigastric pain  She reports severe epigastric pain after eating  The pain has been going on for few days  She went to emergency room recently and had a CT scan which was unremarkable  Patient reports burning epigastric pain radiating to her back associated with nausea but no vomiting  She denies melena, hematemesis or hematochezia  She has been taking Motrin 3 to 4 times a week for ankle pain  Recently started on prednisone for bronchitis  She denies reflux symptoms  She never had EGD before  She has chronic constipation since her childhood  She has bowel movement 2 to 3 times a week with excessive straining  She is not on any stool softener or laxative  REVIEW OF SYSTEMS:    CONSTITUTIONAL: Denies any fever, chills, rigors, and weight loss  HEENT: No earache or tinnitus  Denies hearing loss or visual disturbances  CARDIOVASCULAR: No chest pain or palpitations     RESPIRATORY: Denies any cough, hemoptysis, shortness of breath or dyspnea on exertion  GASTROINTESTINAL: As noted in the History of Present Illness  GENITOURINARY: No problems with urination  Denies any hematuria or dysuria  NEUROLOGIC: No dizziness or vertigo, denies headaches  MUSCULOSKELETAL: Denies any muscle or joint pain  SKIN: Denies skin rashes or itching  ENDOCRINE: Denies excessive thirst  Denies intolerance to heat or cold  PSYCHOSOCIAL: Denies depression or anxiety  Denies any recent memory loss         Historical Information   Past Medical History:   Diagnosis Date    Ankle pain, right     Anxiety     Arthritis     Bipolar 1 disorder (HCC)     Depression     Hypertension     Hypothyroidism     Known health problems: none     Scoliosis      Past Surgical History:   Procedure Laterality Date    CHOLECYSTECTOMY      COLONOSCOPY       Social History   Social History     Substance and Sexual Activity   Alcohol Use Never    Frequency: Never     Social History     Substance and Sexual Activity   Drug Use Never     Social History     Tobacco Use   Smoking Status Current Every Day Smoker    Packs/day: 0 50    Years: 3 00    Pack years: 1 50   Smokeless Tobacco Never Used     Family History   Problem Relation Age of Onset    Diabetes Mother     Hypertension Mother     Heart disease Mother     Hypertension Father     Diabetes Maternal Grandmother     Diabetes Paternal Grandmother        Meds/Allergies       Current Outpatient Medications:     albuterol (PROAIR HFA) 90 mcg/act inhaler    amoxicillin-clavulanate (AUGMENTIN) 875-125 mg per tablet    benzonatate (TESSALON PERLES) 100 mg capsule    buPROPion (WELLBUTRIN XL) 300 mg 24 hr tablet    busPIRone (BUSPAR) 10 mg tablet    cariprazine (VRAYLAR) 3 MG capsule    ergocalciferol (VITAMIN D2) 50,000 units    fluconazole (DIFLUCAN) 150 mg tablet    gabapentin (NEURONTIN) 600 MG tablet    levothyroxine 75 mcg tablet    lisinopril (ZESTRIL) 20 mg tablet    predniSONE 50 mg tablet    venlafaxine (EFFEXOR-XR) 150 mg 24 hr capsule    zolpidem (AMBIEN) 10 mg tablet    dicyclomine (BENTYL) 20 mg tablet    pantoprazole (PROTONIX) 40 mg tablet    No Known Allergies        Objective     Blood pressure 128/77, pulse (!) 106, temperature 99 °F (37 2 °C), temperature source Tympanic, height 5' 5" (1 651 m), weight 69 9 kg (154 lb)  Body mass index is 25 63 kg/m²  PHYSICAL EXAM:      General Appearance:   Alert, cooperative, no distress   HEENT:   Normocephalic, atraumatic, anicteric      Neck:  Supple, symmetrical, trachea midline   Lungs:   Clear to auscultation bilaterally; no rales, rhonchi or wheezing; respirations unlabored    Heart[de-identified]   Regular rate and rhythm; no murmur, rub, or gallop  Abdomen:   Soft, epigastric tender, non-distended; normal bowel sounds; no masses, no organomegaly    Genitalia:   Deferred    Rectal:   Deferred    Extremities:  No cyanosis, clubbing or edema    Pulses:  2+ and symmetric    Skin:  No jaundice, rashes, or lesions    Lymph nodes:  No palpable cervical lymphadenopathy        Lab Results:   No visits with results within 1 Day(s) from this visit     Latest known visit with results is:   Admission on 02/02/2020, Discharged on 02/03/2020   Component Date Value    WBC 02/02/2020 12 10*    RBC 02/02/2020 4 02     Hemoglobin 02/02/2020 13 3     Hematocrit 02/02/2020 39 5*    MCV 02/02/2020 98     MCH 02/02/2020 33 1     MCHC 02/02/2020 33 7     RDW 02/02/2020 12 9     MPV 02/02/2020 8 4*    Platelets 42/10/5546 247     Neutrophils Relative 02/02/2020 75     Lymphocytes Relative 02/02/2020 16*    Monocytes Relative 02/02/2020 7     Eosinophils Relative 02/02/2020 1     Basophils Relative 02/02/2020 1     Neutrophils Absolute 02/02/2020 9 10*    Lymphocytes Absolute 02/02/2020 1 90     Monocytes Absolute 02/02/2020 0 80     Eosinophils Absolute 02/02/2020 0 20     Basophils Absolute 02/02/2020 0 10     Sodium 02/02/2020 138     Potassium 02/02/2020 3 8     Chloride 02/02/2020 102     CO2 02/02/2020 28     ANION GAP 02/02/2020 8     BUN 02/02/2020 25     Creatinine 02/02/2020 2 05*    Glucose 02/02/2020 100*    Calcium 02/02/2020 9 9     AST 02/02/2020 17     ALT 02/02/2020 10     Alkaline Phosphatase 02/02/2020 72     Total Protein 02/02/2020 7 2     Albumin 02/02/2020 4 9     Total Bilirubin 02/02/2020 0 60     eGFR 02/02/2020 28     Lipase 02/02/2020 20     HCG, Quant 02/02/2020 1 12     Color, UA 02/03/2020 Yellow     Clarity, UA 02/03/2020 Clear     Specific Gravity, UA 02/03/2020 1 020     pH, UA 02/03/2020 5 5     Leukocytes, UA 02/03/2020 Negative     Nitrite, UA 02/03/2020 Negative     Protein, UA 02/03/2020 Negative     Glucose, UA 02/03/2020 Negative     Ketones, UA 02/03/2020 Negative     Urobilinogen, UA 02/03/2020 0 2     Bilirubin, UA 02/03/2020 Negative     Blood, UA 02/03/2020 Negative     Amph/Meth UR 02/03/2020 Negative     Barbiturate Ur 02/03/2020 Negative     Benzodiazepine Urine 02/03/2020 Negative     Cocaine Urine 02/03/2020 Negative     Methadone Urine 02/03/2020 Negative     Opiate Urine 02/03/2020 Positive*    PCP Ur 02/03/2020 Negative     THC Urine 02/03/2020 Negative     Sodium 02/03/2020 137     Potassium 02/03/2020 4 2     Chloride 02/03/2020 106     CO2 02/03/2020 24     ANION GAP 02/03/2020 7     BUN 02/03/2020 21     Creatinine 02/03/2020 1 40*    Glucose 02/03/2020 93     Calcium 02/03/2020 8 9     eGFR 02/03/2020 45     WBC 02/03/2020 9 60     RBC 02/03/2020 3 69*    Hemoglobin 02/03/2020 12 2     Hematocrit 02/03/2020 36 9*    MCV 02/03/2020 100*    MCH 02/03/2020 33 1     MCHC 02/03/2020 33 1     RDW 02/03/2020 12 8     MPV 02/03/2020 8 5*    Platelets 68/96/2741 219     Neutrophils Relative 02/03/2020 68     Lymphocytes Relative 02/03/2020 24     Monocytes Relative 02/03/2020 5     Eosinophils Relative 02/03/2020 2     Basophils Relative 02/03/2020 0     Neutrophils Absolute 02/03/2020 6 50     Lymphocytes Absolute 02/03/2020 2 30     Monocytes Absolute 02/03/2020 0 50     Eosinophils Absolute 02/03/2020 0 20     Basophils Absolute 02/03/2020 0 00          Radiology Results:   Ct Abdomen Pelvis Wo Contrast    Result Date: 2/2/2020  Narrative: CT ABDOMEN AND PELVIS WITHOUT IV CONTRAST INDICATION:   Abdominal pain, acute, nonlocalized  COMPARISON:  None  TECHNIQUE:  CT examination of the abdomen and pelvis was performed without intravenous contrast   Axial, sagittal, and coronal 2D reformatted images were created from the source data and submitted for interpretation  Radiation dose length product (DLP) for this visit:  306 4 mGy-cm   This examination, like all CT scans performed in the [facility], was performed utilizing techniques to minimize radiation dose exposure, including the use of iterative reconstruction and automated exposure control  Enteric contrast was administered  FINDINGS: ABDOMEN LOWER CHEST:  No clinically significant abnormality identified in the visualized lower chest  LIVER/BILIARY TREE:  Unremarkable  GALLBLADDER:  Gallbladder is surgically absent  SPLEEN:  Unremarkable  PANCREAS:  Unremarkable  ADRENAL GLANDS:  Unremarkable  KIDNEYS/URETERS:  Unremarkable  No hydronephrosis  STOMACH AND BOWEL:  Unremarkable  APPENDIX:  No findings to suggest appendicitis  ABDOMINOPELVIC CAVITY:  No ascites or free intraperitoneal air  No lymphadenopathy  VESSELS:  Unremarkable for patient's age  PELVIS REPRODUCTIVE ORGANS:  Unremarkable for patient's age  IUD is noted  URINARY BLADDER:  Unremarkable  ABDOMINAL WALL/INGUINAL REGIONS:  Unremarkable  OSSEOUS STRUCTURES:  No acute fracture or destructive osseous lesion  Impression: No renal stones  No small bowel obstruction  Normal appendix  Consider contrast exam in the appropriate clinical setting   Workstation performed: [id number]

## 2020-02-06 DIAGNOSIS — M19.90 ARTHRITIS: ICD-10-CM

## 2020-02-06 DIAGNOSIS — M25.572 BILATERAL ANKLE PAIN, UNSPECIFIED CHRONICITY: ICD-10-CM

## 2020-02-06 DIAGNOSIS — M25.571 BILATERAL ANKLE PAIN, UNSPECIFIED CHRONICITY: ICD-10-CM

## 2020-02-07 ENCOUNTER — ANESTHESIA EVENT (OUTPATIENT)
Dept: GASTROENTEROLOGY | Facility: AMBULARY SURGERY CENTER | Age: 45
End: 2020-02-07

## 2020-02-09 RX ORDER — CELECOXIB 100 MG/1
CAPSULE ORAL
Qty: 60 CAPSULE | Refills: 0 | Status: SHIPPED | OUTPATIENT
Start: 2020-02-09 | End: 2020-03-09 | Stop reason: ALTCHOICE

## 2020-02-10 ENCOUNTER — TRANSCRIBE ORDERS (OUTPATIENT)
Dept: GASTROENTEROLOGY | Facility: CLINIC | Age: 45
End: 2020-02-10

## 2020-02-11 ENCOUNTER — TELEPHONE (OUTPATIENT)
Dept: GASTROENTEROLOGY | Facility: CLINIC | Age: 45
End: 2020-02-11

## 2020-02-11 NOTE — TELEPHONE ENCOUNTER
Pt left VM on my direct line to r/s procedure on 2/20 with Dr Toño Lamb, but she would like to be scheduled on a Friday  I informed the pt that Dr Toño Lamb does not typically scope on Fridays  She wants to keep her appointment, and if she needs transportation, she is going to give me a call on Monday (2/17) and I will set that up with Ariana Owen for her

## 2020-02-19 ENCOUNTER — OFFICE VISIT (OUTPATIENT)
Dept: URGENT CARE | Facility: CLINIC | Age: 45
End: 2020-02-19
Payer: COMMERCIAL

## 2020-02-19 VITALS
SYSTOLIC BLOOD PRESSURE: 114 MMHG | HEART RATE: 91 BPM | DIASTOLIC BLOOD PRESSURE: 65 MMHG | OXYGEN SATURATION: 100 % | TEMPERATURE: 98 F | RESPIRATION RATE: 18 BRPM

## 2020-02-19 DIAGNOSIS — J02.9 SORE THROAT: Primary | ICD-10-CM

## 2020-02-19 DIAGNOSIS — R05.9 COUGH: ICD-10-CM

## 2020-02-19 LAB — S PYO AG THROAT QL: NEGATIVE

## 2020-02-19 PROCEDURE — 87880 STREP A ASSAY W/OPTIC: CPT | Performed by: PHYSICIAN ASSISTANT

## 2020-02-19 PROCEDURE — 99213 OFFICE O/P EST LOW 20 MIN: CPT | Performed by: PHYSICIAN ASSISTANT

## 2020-02-19 RX ORDER — PREDNISONE 10 MG/1
TABLET ORAL
Qty: 18 TABLET | Refills: 0 | Status: SHIPPED | OUTPATIENT
Start: 2020-02-19 | End: 2020-03-09

## 2020-02-19 RX ORDER — AZITHROMYCIN 250 MG/1
TABLET, FILM COATED ORAL
Qty: 6 TABLET | Refills: 0 | Status: SHIPPED | OUTPATIENT
Start: 2020-02-19 | End: 2020-02-23

## 2020-02-19 NOTE — PROGRESS NOTES
[facility]        NAME: Albert Solis is a 53 y o  female  : 1975    MRN: [id number]  DATE: 2020  TIME: 7:08 PM    Assessment and Plan   Sore throat [J02 9]  1  Sore throat  Throat culture    POCT rapid strepA    azithromycin (ZITHROMAX) 250 mg tablet    predniSONE 10 mg tablet   2  Cough           Patient Instructions   Patient Instructions     Lungs are clear  Negative rapid strep but her daughter is here with a positive strep  She has  Erythema in her throat and lymphadenopathy  Prednisone for sore throat and chest congestion though her lungs are clear she can use albuterol  Follow up with PCP in 3-5 days  Proceed to  ER if symptoms worsen  Chief Complaint     Chief Complaint   Patient presents with    Cough     Pt c/o cough and congestion for a week  Pt also c/o a lump on the roof of her mouth  History of Present Illness         22-year-old female presents to the clinic with cough congestion and sore throat for last 2 weeks  She was seen here treated with Augmentin  She had a little bit better with symptoms and not go away  She complains of continued cough and painful swallowing  She feels some pain on the referral mouth  No fever  No medicines currently  She took the Augmentin  She is not using the albuterol  She is not taking the Tessalon  Review of Systems   Review of Systems   Constitutional: Negative for chills, fatigue and fever  HENT: Positive for congestion and sore throat  Eyes: Negative for visual disturbance  Respiratory: Positive for cough  Negative for chest tightness and shortness of breath  Cardiovascular: Negative for chest pain and palpitations  Gastrointestinal: Negative for diarrhea, nausea and vomiting  Neurological: Negative for dizziness           Current Medications       Current Outpatient Medications:     albuterol (PROAIR HFA) 90 mcg/act inhaler, Inhale 2 puffs every 4 (four) hours as needed for wheezing or shortness of breath, Disp: 8 5 g, Rfl: 0    azithromycin (ZITHROMAX) 250 mg tablet, 2 tabs on day 1, then 1 tab daily for 4 days, Disp: 6 tablet, Rfl: 0    benzonatate (TESSALON PERLES) 100 mg capsule, 1-2 caps up to 3x/day as needed for cough, Disp: 30 capsule, Rfl: 0    buPROPion (WELLBUTRIN XL) 300 mg 24 hr tablet, Take 300 mg by mouth daily, Disp: , Rfl: 1    busPIRone (BUSPAR) 10 mg tablet, Take 10 mg by mouth 3 (three) times a day, Disp: , Rfl: 0    cariprazine (VRAYLAR) 3 MG capsule, Take 1 capsule (3 mg total) by mouth daily, Disp: 30 capsule, Rfl: 2    celecoxib (CeleBREX) 100 mg capsule, TAKE 1 CAPSULE BY MOUTH TWICE DAILY, Disp: 60 capsule, Rfl: 0    dicyclomine (BENTYL) 20 mg tablet, Take 1 tablet (20 mg total) by mouth every 6 (six) hours, Disp: 30 tablet, Rfl: 2    ergocalciferol (VITAMIN D2) 50,000 units, take 1 capsule by mouth every week, Disp: 12 capsule, Rfl: 3    gabapentin (NEURONTIN) 600 MG tablet, Take 0 5 tablets (300 mg total) by mouth 3 (three) times a day, Disp: 90 tablet, Rfl: 0    levothyroxine 75 mcg tablet, Take 1 tablet (75 mcg total) by mouth daily in the early morning, Disp: , Rfl: 0    lisinopril (ZESTRIL) 20 mg tablet, Take 1 tablet (20 mg total) by mouth daily, Disp: 180 tablet, Rfl: 0    pantoprazole (PROTONIX) 40 mg tablet, Take 1 tablet (40 mg total) by mouth daily, Disp: 30 tablet, Rfl: 3    predniSONE 10 mg tablet, 3 tabs daily for 3 days, 2 tabs daily for 3 days, 1 tab daily for 3 days, Disp: 18 tablet, Rfl: 0    venlafaxine (EFFEXOR-XR) 150 mg 24 hr capsule, Take 2 capsules (300 mg total) by mouth daily (Patient taking differently: Take 300 mg by mouth daily at bedtime ), Disp: 60 capsule, Rfl: 1    zolpidem (AMBIEN) 10 mg tablet, Take 1 tablet (10 mg total) by mouth daily at bedtime as needed for sleep for up to 10 days, Disp: 10 tablet, Rfl: 0    Current Allergies     Allergies as of 02/19/2020    (No Known Allergies)            The following portions of the patient's history were reviewed and updated as appropriate: allergies, current medications, past family history, past medical history, past social history, past surgical history and problem list      Past Medical History:   Diagnosis Date    Ankle pain, right     Anxiety     Arthritis     Bipolar 1 disorder ([address] )     Depression     Hypertension     Hypothyroidism     Known health problems: none     Scoliosis        Past Surgical History:   Procedure Laterality Date    CHOLECYSTECTOMY      COLONOSCOPY         Family History   Problem Relation Age of Onset    Diabetes Mother     Hypertension Mother     Heart disease Mother     Hypertension Father     Diabetes Maternal Grandmother     Diabetes Paternal Grandmother          Medications have been verified  Objective   /65   Pulse 91   Temp 98 °F (36 7 °C)   Resp 18   SpO2 100%        Physical Exam     Physical Exam   Constitutional: She is oriented to person, place, and time  She appears well-developed and well-nourished  No distress  HENT:   Head: Normocephalic and atraumatic  Right Ear: Tympanic membrane, external ear and ear canal normal  Tympanic membrane is not erythematous, not retracted and not bulging  No middle ear effusion  Left Ear: Tympanic membrane, external ear and ear canal normal  Tympanic membrane is not erythematous, not retracted and not bulging  No middle ear effusion  Nose: Nose normal  No mucosal edema or rhinorrhea  Right sinus exhibits no maxillary sinus tenderness and no frontal sinus tenderness  Left sinus exhibits no maxillary sinus tenderness and no frontal sinus tenderness  Mouth/Throat: Mucous membranes are normal  Posterior oropharyngeal erythema present  No oropharyngeal exudate or posterior oropharyngeal edema  Tonsils are 3+ on the right  Tonsils are 3+ on the left  No tonsillar exudate  Eyes: Pupils are equal, round, and reactive to light   Conjunctivae and EOM are normal  Right eye exhibits no chemosis and no discharge  Left eye exhibits no chemosis and no discharge  Right conjunctiva is not injected  Left conjunctiva is not injected  Neck: Normal range of motion  Neck supple  Cardiovascular: Normal rate, regular rhythm and normal heart sounds  Pulmonary/Chest: Effort normal and breath sounds normal  No respiratory distress  She has no wheezes  She has no rales  Lymphadenopathy:     She has cervical adenopathy  Right cervical: Superficial cervical adenopathy present  Left cervical: Superficial cervical adenopathy present  Neurological: She is alert and oriented to person, place, and time  No cranial nerve deficit  Skin: Skin is warm and dry  No rash noted

## 2020-02-20 ENCOUNTER — ANESTHESIA (OUTPATIENT)
Dept: GASTROENTEROLOGY | Facility: AMBULARY SURGERY CENTER | Age: 45
End: 2020-02-20

## 2020-02-20 ENCOUNTER — HOSPITAL ENCOUNTER (OUTPATIENT)
Dept: GASTROENTEROLOGY | Facility: AMBULARY SURGERY CENTER | Age: 45
Setting detail: OUTPATIENT SURGERY
Discharge: HOME | End: 2020-02-20
Attending: INTERNAL MEDICINE | Admitting: INTERNAL MEDICINE
Payer: COMMERCIAL

## 2020-02-20 VITALS
RESPIRATION RATE: 16 BRPM | BODY MASS INDEX: 24.99 KG/M2 | WEIGHT: 150 LBS | HEART RATE: 104 BPM | OXYGEN SATURATION: 95 % | HEIGHT: 65 IN | DIASTOLIC BLOOD PRESSURE: 54 MMHG | TEMPERATURE: 97.7 F | SYSTOLIC BLOOD PRESSURE: 108 MMHG

## 2020-02-20 DIAGNOSIS — R10.13 EPIGASTRIC ABDOMINAL PAIN: ICD-10-CM

## 2020-02-20 PROCEDURE — 88305 TISSUE EXAM BY PATHOLOGIST: CPT | Performed by: PATHOLOGY

## 2020-02-20 PROCEDURE — 43239 EGD BIOPSY SINGLE/MULTIPLE: CPT | Performed by: INTERNAL MEDICINE

## 2020-02-20 PROCEDURE — 88342 IMHCHEM/IMCYTCHM 1ST ANTB: CPT | Performed by: PATHOLOGY

## 2020-02-20 RX ORDER — ONDANSETRON 2 MG/ML
4 INJECTION INTRAMUSCULAR; INTRAVENOUS ONCE AS NEEDED
Status: CANCELLED | OUTPATIENT
Start: 2020-02-20

## 2020-02-20 RX ORDER — PROPOFOL 10 MG/ML
INJECTION, EMULSION INTRAVENOUS AS NEEDED
Status: DISCONTINUED | OUTPATIENT
Start: 2020-02-20 | End: 2020-02-20 | Stop reason: SURG

## 2020-02-20 RX ORDER — SODIUM CHLORIDE, SODIUM LACTATE, POTASSIUM CHLORIDE, CALCIUM CHLORIDE 600; 310; 30; 20 MG/100ML; MG/100ML; MG/100ML; MG/100ML
125 INJECTION, SOLUTION INTRAVENOUS CONTINUOUS
Status: DISCONTINUED | OUTPATIENT
Start: 2020-02-20 | End: 2020-02-24 | Stop reason: HOSPADM

## 2020-02-20 RX ORDER — SODIUM CHLORIDE, SODIUM LACTATE, POTASSIUM CHLORIDE, CALCIUM CHLORIDE 600; 310; 30; 20 MG/100ML; MG/100ML; MG/100ML; MG/100ML
20 INJECTION, SOLUTION INTRAVENOUS CONTINUOUS
Status: CANCELLED | OUTPATIENT
Start: 2020-02-20

## 2020-02-20 RX ADMIN — PROPOFOL 150 MG: 10 INJECTION, EMULSION INTRAVENOUS at 12:53

## 2020-02-20 RX ADMIN — SODIUM CHLORIDE, SODIUM LACTATE, POTASSIUM CHLORIDE, AND CALCIUM CHLORIDE: .6; .31; .03; .02 INJECTION, SOLUTION INTRAVENOUS at 12:49

## 2020-02-20 RX ADMIN — PROPOFOL 50 MG: 10 INJECTION, EMULSION INTRAVENOUS at 12:56

## 2020-02-20 RX ADMIN — SODIUM CHLORIDE, SODIUM LACTATE, POTASSIUM CHLORIDE, AND CALCIUM CHLORIDE 125 ML/HR: .6; .31; .03; .02 INJECTION, SOLUTION INTRAVENOUS at 12:07

## 2020-02-20 RX ADMIN — PROPOFOL 50 MG: 10 INJECTION, EMULSION INTRAVENOUS at 12:54

## 2020-02-20 NOTE — ANESTHESIA PREPROCEDURE EVALUATION
Review of Systems/Medical History  Patient summary reviewed        Cardiovascular  Hypertension ,    Pulmonary  Smoker cigarette smoker  , Asthma , well controlled/ stable ,        GI/Hepatic  Negative GI/hepatic ROS          Kidney disease ,        Endo/Other  History of thyroid disease , hypothyroidism,      GYN  Negative gynecology ROS Not currently pregnant (pt denies) ,          Hematology  Negative hematology ROS      Musculoskeletal  Back pain , lumbar pain,   Arthritis     Neurology  Negative neurology ROS      Psychology   Anxiety, Depression , bipolar disorder,              Physical Exam    Airway    Mallampati score: III  TM Distance: >3 FB  Neck ROM: full     Dental   Comment: discolored,     Cardiovascular      Pulmonary      Other Findings        Anesthesia Plan  ASA Score- 3     Anesthesia Type- IV sedation with anesthesia with ASA Monitors  Additional Monitors:   Airway Plan:         Plan Factors-    Induction- intravenous  Postoperative Plan-     Informed Consent- Anesthetic plan and risks discussed with patient  I personally reviewed this patient with the CRNA  Discussed and agreed on the Anesthesia Plan with the EMANI Johnson

## 2020-02-20 NOTE — INTERVAL H&P NOTE
H&P reviewed  After examining the patient I find no changes in the patients condition since the H&P had been written      Vitals:    02/20/20 1201   BP: 126/67   Pulse: (!) 108   Resp: 18   Temp: 98 1 °F (36 7 °C)   SpO2: 97%

## 2020-02-20 NOTE — PATIENT INSTRUCTIONS
Lungs are clear  Negative rapid strep but her daughter is here with a positive strep  She has  Erythema in her throat and lymphadenopathy  Prednisone for sore throat and chest congestion though her lungs are clear she can use albuterol

## 2020-02-20 NOTE — DISCHARGE INSTRUCTIONS
Upper Endoscopy   WHAT YOU NEED TO KNOW:   An upper endoscopy is also called an upper gastrointestinal (GI) endoscopy, or an esophagogastroduodenoscopy (EGD)  You may feel bloated, gassy, or have some abdominal discomfort after your procedure  Your throat may be sore for 24 to 36 hours  You may burp or pass gas from air that is still inside your body  DISCHARGE INSTRUCTIONS:   Call 911 if:   · You have sudden chest pain or trouble breathing  Seek care immediately if:   · You feel dizzy or faint  · You have trouble swallowing  · You have severe throat pain  · Your bowel movements are very dark or black  · Your abdomen is hard and firm and you have severe pain  · You vomit blood  Contact your healthcare provider if:   · You feel full or bloated and cannot burp or pass gas  · You have not had a bowel movement for 3 days after your procedure  · You have neck pain  · You have a fever or chills  · You have nausea or are vomiting  · You have a rash or hives  · You have questions or concerns about your endoscopy  Relieve a sore throat:  Suck on throat lozenges or crushed ice  Gargle with a small amount of warm salt water  Mix 1 teaspoon of salt and 1 cup of warm water to make salt water  Relieve gas and discomfort from bloating:  Lie on your right side with a heating pad on your abdomen  Take short walks to help pass gas  Eat small meals until bloating is relieved  Rest after your procedure:  Do not drive or make important decisions until the day after your procedure  Return to your normal activity as directed  You can usually return to work the day after your procedure  Follow up with your healthcare provider as directed:  Write down your questions so you remember to ask them during your visits  © 2017 [employer] Information is for End User's use only and may not be sold, redistributed or otherwise used for commercial purposes   All illustrations and images included in CareNotes® are the copyrighted property of A [employer] Inc  or [employer]  The above information is an  only  It is not intended as medical advice for individual conditions or treatments  Talk to your doctor, nurse or pharmacist before following any medical regimen to see if it is safe and effective for you  Gastritis   WHAT YOU NEED TO KNOW:   Gastritis is inflammation or irritation of the lining of your stomach  DISCHARGE INSTRUCTIONS:   Call 911 for any of the following:   · You develop chest pain or shortness of breath  Seek care immediately if:   · You vomit blood  · You have black or bloody bowel movements  · You have severe stomach or back pain  Contact your healthcare provider if:   · You have a fever  · You have new or worsening symptoms, even after treatment  · You have questions or concerns about your condition or care  Medicines:   · Medicines  may be given to help treat a bacterial infection or decrease stomach acid  · Take your medicine as directed  Contact your healthcare provider if you think your medicine is not helping or if you have side effects  Tell him or her if you are allergic to any medicine  Keep a list of the medicines, vitamins, and herbs you take  Include the amounts, and when and why you take them  Bring the list or the pill bottles to follow-up visits  Carry your medicine list with you in case of an emergency  Manage or prevent gastritis:   · Do not smoke  Nicotine and other chemicals in cigarettes and cigars can make your symptoms worse and cause lung damage  Ask your healthcare provider for information if you currently smoke and need help to quit  E-cigarettes or smokeless tobacco still contain nicotine  Talk to your healthcare provider before you use these products  · Do not drink alcohol  Alcohol can prevent healing and make your gastritis worse   Talk to your healthcare provider if you need help to stop drinking  · Do not take NSAIDs or aspirin unless directed  These and similar medicines can cause irritation  If your healthcare provider says it is okay to take NSAIDs, take them with food  · Do not eat foods that cause irritation  Foods such as oranges and salsa can cause burning or pain  Eat a variety of healthy foods  Examples include fruits (not citrus), vegetables, low-fat dairy products, beans, whole-grain breads, and lean meats and fish  Try to eat small meals, and drink water with your meals  Do not eat for at least 3 hours before you go to bed  · Find ways to relax and decrease stress  Stress can increase stomach acid and make gastritis worse  Activities such as yoga, meditation, or listening to music can help you relax  Spend time with friends, or do things you enjoy  Follow up with your healthcare provider as directed: You may need ongoing tests or treatment, or referral to a gastroenterologist  Write down your questions so you remember to ask them during your visits  © 2017 [address]  Information is for End User's use only and may not be sold, redistributed or otherwise used for commercial purposes  All illustrations and images included in CareNotes® are the copyrighted property of A [employer] M , Inc  or [employer]  The above information is an  only  It is not intended as medical advice for individual conditions or treatments  Talk to your doctor, nurse or pharmacist before following any medical regimen to see if it is safe and effective for you  Helicobacter Pylori   WHAT YOU NEED TO KNOW:   What is Helicobacter pylori (H  pylori)? H  pylori is a type of bacteria that causes an infection in the lining of the stomach and upper intestine  People are usually infected with the bacteria as children, but may not have symptoms until they are adults  What increases my risk for an H  pylori infection?   Experts do not know exactly how H  pylori spread  The following may increase your risk for an infection:  · You eat food that is not washed well or cooked properly  · You drink water that is not clean  · You have contact with bowel movement, vomit, or saliva that is infected with H  pylori  What are the signs and symptoms of an H  pylori infection? Most people who are infected with H  pylori never have symptoms  If you do, your symptoms may come and go and last for minutes or hours  You may feel better for a short time after you eat food or take medicine  You may have any of the following:  · Dull or burning pain in your stomach or throat    · Nausea, vomiting, bloating, or burping    · Loss of appetite or weight loss    · Pain at night or with an empty stomach  How is an H  pylori infection diagnosed? · A urea breath test  may be used to test for H  pylori  You will swallow pudding, liquid, or a capsule that contains a chemical  Then you will breathe into a container  Your breath sample will be tested for a reaction to the chemical that confirms H  pylori infection  · A bowel movement sample  may be sent to a lab to test for infection  · Blood tests  may be used to test for infection  · Endoscopy  is a procedure that uses a scope to see the inside of your stomach  A scope is a soft, flexible tube with a light and tiny camera on the end  It is passed down your throat and into your stomach  Samples of your stomach tissue may be removed and sent to a lab to test for H  pylori infection  This test will be done if your healthcare provider thinks you may have an ulcer  How is an H  pylori infection treated? · Antibiotics  help kill the bacteria  You may need to take this medicine for 10 to 14 days  Your healthcare provider will prescribe at least 2 antibiotics at the same time  · Antiulcer medicines  help decrease the amount of acid that is normally made by the stomach  These help relieve pain and heal or prevent ulcers  · Bismuth  is a liquid or tablet that may be used to decrease heartburn, upset stomach, or diarrhea  It may also decrease swelling in your stomach and help kill the infection if other medicines do not work  It also protects ulcers from stomach acid so they can heal   What are the risks of an H  pylori infection? You may need more than one course of treatment to kill the H  pylori infection  Your symptoms may return after treatment  You are at risk for a peptic ulcer in the lining of your stomach and intestines if you do not receive treatment for your infection  H  pylori may also cause swelling in your stomach  The infection may lead to stomach cancer and lymphoma (cancer of the lymph nodes) if it is not treated  How can I help prevent an H  pylori infection? · Wash your hands well with soap and warm water  Clean your hands before you eat and after you use the bathroom  · Handle food properly  Rinse food well before you cook or eat it  [employer] food all the way through  Proper handling will help kill any bacteria that may be on your food  · Drink clean water from a safe source  Only drink water that has been filtered or purified  · Ask about NSAIDs  NSAIDs can cause stomach bleeding and kidney problems if not taken correctly  Your healthcare provider may tell you to avoid these medicines because they can make your symptoms worse  · Do not smoke  Nicotine and other chemicals in cigarettes and cigars can worsen your symptoms and also cause lung damage  Ask your healthcare provider for information if you currently smoke and need help to quit  E-cigarettes or smokeless tobacco still contain nicotine  Talk to your healthcare provider before you use these products  · Do not drink alcohol  Alcohol may worsen your symptoms of heartburn  When should I seek immediate care? · You have bloody bowel movements, bloody vomit, or vomit that looks like coffee grounds      · You have sudden, sharp stomach pain that does not go away or spreads to your back  When should I contact my healthcare provider? · Your symptoms do not improve with treatment  · You feel full after eating only a small amount of food  · You lose weight without trying  · You have questions or concerns about your condition or care  CARE AGREEMENT:   You have the right to help plan your care  Learn about your health condition and how it may be treated  Discuss treatment options with your caregivers to decide what care you want to receive  You always have the right to refuse treatment  The above information is an  only  It is not intended as medical advice for individual conditions or treatments  Talk to your doctor, nurse or pharmacist before following any medical regimen to see if it is safe and effective for you  © 2017 [address] [employer] Information is for End User's use only and may not be sold, redistributed or otherwise used for commercial purposes  All illustrations and images included in CareNotes® are the copyrighted property of [employer] , [employer]  or Kai Hunter

## 2020-02-20 NOTE — ANESTHESIA POSTPROCEDURE EVALUATION
Post-Op Assessment Note    CV Status:  Stable  Pain Score: 0    Pain management: adequate     Mental Status:  Alert and awake   Hydration Status:  Euvolemic   PONV Controlled:  Controlled   Airway Patency:  Patent   Post Op Vitals Reviewed: Yes      Staff: Anesthesiologist, CRNA           /59 (02/20/20 1300)    Temp 97 7 °F (36 5 °C) (02/20/20 1300)    Pulse 91 (02/20/20 1300)   Resp 18 (02/20/20 1300)    SpO2 96 % (02/20/20 1300)

## 2020-02-26 ENCOUNTER — ANNUAL EXAM (OUTPATIENT)
Dept: OBGYN CLINIC | Facility: MEDICAL CENTER | Age: 45
End: 2020-02-26
Payer: COMMERCIAL

## 2020-02-26 VITALS — DIASTOLIC BLOOD PRESSURE: 70 MMHG | SYSTOLIC BLOOD PRESSURE: 120 MMHG | BODY MASS INDEX: 25.34 KG/M2 | WEIGHT: 152.3 LBS

## 2020-02-26 DIAGNOSIS — Z01.419 ENCOUNTER FOR WELL WOMAN EXAM WITH ROUTINE GYNECOLOGICAL EXAM: Primary | ICD-10-CM

## 2020-02-26 DIAGNOSIS — Z12.31 ENCOUNTER FOR SCREENING MAMMOGRAM FOR MALIGNANT NEOPLASM OF BREAST: ICD-10-CM

## 2020-02-26 PROCEDURE — 3074F SYST BP LT 130 MM HG: CPT | Performed by: OBSTETRICS & GYNECOLOGY

## 2020-02-26 PROCEDURE — G0145 SCR C/V CYTO,THINLAYER,RESCR: HCPCS | Performed by: OBSTETRICS & GYNECOLOGY

## 2020-02-26 PROCEDURE — 3078F DIAST BP <80 MM HG: CPT | Performed by: OBSTETRICS & GYNECOLOGY

## 2020-02-26 PROCEDURE — S0612 ANNUAL GYNECOLOGICAL EXAMINA: HCPCS | Performed by: OBSTETRICS & GYNECOLOGY

## 2020-02-26 NOTE — PROGRESS NOTES
ASSESSMENT & PLAN: Jerome Jaime is a 53 y o  [id number] with normal gynecologic exam     1   Routine well woman exam done today  2  Pap and HPV:  The patient's last pap and hpv was 2018  It was normal     Pap with reflex cotesting was done today  Current ASCCP Guidelines reviewed  Per patient's request  3  Mammogram ordered  4  The following were reviewed in today's visit: breast self exam and mammography screening ordered      CC:  Annual Gynecologic Examination    HPI: Jerome Jaime is a 53 y o  [id number] who presents for annual gynecologic examination  She has the following concerns:  No GYN complaints, doing well; no complaints with Mirena,     Health Maintenance:    She wears her seatbelt routinely  She does perform regular monthly self breast exams  She feels safe at home  Patient Active Problem List   Diagnosis    Anxiety    Increased frequency of urination    Bipolar 2 disorder ([facility] [address] )    Acquired hypothyroidism    Arthritis    Chronic bilateral low back pain without sciatica    IUD check up    Medication refill    Tendinitis of right ankle    BMI 28 0-28 9,adult    Bilateral ankle pain    Positive depression screening    DUSTIN (acute kidney injury) ([facility] [address] )    Lower abdominal pain    Delirium       Past Medical History:   Diagnosis Date    Ankle pain, right     Anxiety     Arthritis     Bipolar 1 disorder (HCC)     Depression     GERD (gastroesophageal reflux disease)     Hypertension     Hypothyroidism     Known health problems: none     Lyme disease     Scoliosis        Past Surgical History:   Procedure Laterality Date    CERVICAL FUSION      CHOLECYSTECTOMY      COLONOSCOPY         Past OB/Gyn History:  OB History        3    Para   3    Term   0       3    AB        Living   4       SAB        TAB        Ectopic        Multiple   1    Live Births                      Pt does not have menstrual issues  Amenorrheic on Mirena    History of sexually transmitted infection: No   History of abnormal pap smears: No      Patient is currently sexually active  heterosexual  The current method of family planning is IUD      Family History   Problem Relation Age of Onset   Street Diabetes Mother     Hypertension Mother     Heart disease Mother     Hypertension Father     Diabetes Maternal Grandmother     Diabetes Paternal Grandmother        Social History:  Social History     Socioeconomic History    Marital status: /Civil Union     Spouse name: Not on file    Number of children: Not on file    Years of education: Not on file    Highest education level: Not on file   Occupational History    Occupation: UNEMPLOYED   Social Needs    Financial resource strain: Not on file    Food insecurity:     Worry: Not on file     Inability: Not on file    Transportation needs:     Medical: Not on file     Non-medical: Not on file   Tobacco Use    Smoking status: Current Every Day Smoker     Packs/day: 0 50     Years: 3 00     Pack years: 1 50    Smokeless tobacco: Never Used   Substance and Sexual Activity    Alcohol use: Not Currently     Frequency: Never    Drug use: Never    Sexual activity: Yes     Partners: Male     Birth control/protection: IUD   Lifestyle    Physical activity:     Days per week: Not on file     Minutes per session: Not on file    Stress: Not on file   Relationships    Social connections:     Talks on phone: Not on file     Gets together: Not on file     Attends Quaker service: Not on file     Active member of club or organization: Not on file     Attends meetings of clubs or organizations: Not on file     Relationship status: Not on file    Intimate partner violence:     Fear of current or ex partner: Not on file     Emotionally abused: Not on file     Physically abused: Not on file     Forced sexual activity: Not on file   Other Topics Concern    Not on file   Social History Narrative        UNEMPLOYED     No Known Allergies    Current Outpatient Medications:     albuterol (PROAIR HFA) 90 mcg/act inhaler, Inhale 2 puffs every 4 (four) hours as needed for wheezing or shortness of breath, Disp: 8 5 g, Rfl: 0    buPROPion (WELLBUTRIN XL) 300 mg 24 hr tablet, Take 300 mg by mouth daily, Disp: , Rfl: 1    busPIRone (BUSPAR) 10 mg tablet, Take 10 mg by mouth 3 (three) times a day, Disp: , Rfl: 0    cariprazine (VRAYLAR) 3 MG capsule, Take 1 capsule (3 mg total) by mouth daily, Disp: 30 capsule, Rfl: 2    celecoxib (CeleBREX) 100 mg capsule, TAKE 1 CAPSULE BY MOUTH TWICE DAILY, Disp: 60 capsule, Rfl: 0    dicyclomine (BENTYL) 20 mg tablet, Take 1 tablet (20 mg total) by mouth every 6 (six) hours, Disp: 30 tablet, Rfl: 2    ergocalciferol (VITAMIN D2) 50,000 units, take 1 capsule by mouth every week, Disp: 12 capsule, Rfl: 3    gabapentin (NEURONTIN) 600 MG tablet, Take 0 5 tablets (300 mg total) by mouth 3 (three) times a day, Disp: 90 tablet, Rfl: 0    levothyroxine 75 mcg tablet, Take 1 tablet (75 mcg total) by mouth daily in the early morning, Disp: , Rfl: 0    lisinopril (ZESTRIL) 20 mg tablet, Take 1 tablet (20 mg total) by mouth daily, Disp: 180 tablet, Rfl: 0    pantoprazole (PROTONIX) 40 mg tablet, Take 1 tablet (40 mg total) by mouth daily, Disp: 30 tablet, Rfl: 3    predniSONE 10 mg tablet, 3 tabs daily for 3 days, 2 tabs daily for 3 days, 1 tab daily for 3 days, Disp: 18 tablet, Rfl: 0    venlafaxine (EFFEXOR-XR) 150 mg 24 hr capsule, Take 2 capsules (300 mg total) by mouth daily (Patient taking differently: Take 300 mg by mouth daily at bedtime ), Disp: 60 capsule, Rfl: 1    benzonatate (TESSALON PERLES) 100 mg capsule, 1-2 caps up to 3x/day as needed for cough, Disp: 30 capsule, Rfl: 0    zolpidem (AMBIEN) 10 mg tablet, Take 1 tablet (10 mg total) by mouth daily at bedtime as needed for sleep for up to 10 days, Disp: 10 tablet, Rfl: 0    Review of Systems:    Review of Systems  Constitutional :no fever, feels well, no tiredness, no recent weight gain or loss  ENT: no ear ache, no loss of hearing, no nosebleeds or nasal discharge, no sore throat or hoarseness  Cardiovascular: no complaints of slow or fast heart beat, no chest pain, no palpitations, no leg claudication or lower extremity edema  Respiratory: no complaints of shortness of shortness of breath, no SMALL  Breasts:no complaints of breast pain, breast lump, or nipple discharge  Gastrointestinal: no complaints of abdominal pain, constipation, nausea, vomiting, or diarrhea or bloody stools  Genitourinary : no complaints of dysuria, incontinence, pelvic pain, no dysmenorrhea, vaginal discharge or abnormal vaginal bleeding and as noted in HPI  Musculoskeletal: no complaints of arthralgia, no myalgia, no joint swelling or stiffness, no limb pain or swelling  Integumentary: no complaints of skin rash or lesion, itching or dry skin  Neurological: no complaints of headache, no confusion, no numbness or tingling, no dizziness or fainting    Objective      /70   Wt 69 1 kg (152 lb 4 8 oz)   LMP  (LMP Unknown)   BMI 25 34 kg/m²     General:   appears stated age, cooperative, alert normal mood and affect   Neck: normal, supple,trachea midline, no masses   Heart: regular rate and rhythm, S1, S2 normal, no murmur, click, rub or gallop   Lungs: clear to auscultation bilaterally   Breasts: normal appearance, no masses or tenderness, Inspection negative, No nipple retraction or dimpling, No nipple discharge or bleeding, No axillary or supraclavicular adenopathy, Normal to palpation without dominant masses   Abdomen: soft, non-tender, without masses or organomegaly   Vulva: normal female genitalia, Bartholin's, Urethra, [place] normal, no lesions, normal female hair distribution   Vagina: normal vagina, no discharge, exudate, lesion, or erythema   Urethra: normal   Cervix: Normal, no discharge  PAP done  IUD strings present     Uterus: normal size, contour, position, consistency, mobility, non-tender   Adnexa: normal adnexa and no mass, fullness, tenderness   Lymphatic palpation of lymph nodes in neck, axilla, groin and/or other locations: no lymphadenopathy or masses noted   Skin normal skin turgor and no rashes     Psychiatric orientation to person, place, and time: normal  mood and affect: normal

## 2020-02-26 NOTE — PATIENT INSTRUCTIONS
Thank you for your confidence in our team    We appreciate you and welcome your feedback  If you receive a survey from us, please take a few moments to let us know how we are doing     Sincerely,   Neel Dial MD

## 2020-03-03 LAB
LAB AP GYN PRIMARY INTERPRETATION: NORMAL
Lab: NORMAL

## 2020-03-05 ENCOUNTER — HOSPITAL ENCOUNTER (EMERGENCY)
Facility: HOSPITAL | Age: 45
Discharge: HOME | End: 2020-03-05
Attending: EMERGENCY MEDICINE | Admitting: EMERGENCY MEDICINE
Payer: COMMERCIAL

## 2020-03-05 ENCOUNTER — APPOINTMENT (EMERGENCY)
Dept: CT IMAGING | Facility: HOSPITAL | Age: 45
End: 2020-03-05
Payer: COMMERCIAL

## 2020-03-05 VITALS
HEART RATE: 88 BPM | TEMPERATURE: 97.7 F | HEIGHT: 65 IN | WEIGHT: 150 LBS | SYSTOLIC BLOOD PRESSURE: 120 MMHG | RESPIRATION RATE: 16 BRPM | DIASTOLIC BLOOD PRESSURE: 72 MMHG | BODY MASS INDEX: 24.99 KG/M2 | OXYGEN SATURATION: 100 %

## 2020-03-05 DIAGNOSIS — S22.32XA LEFT RIB FRACTURE: Primary | ICD-10-CM

## 2020-03-05 DIAGNOSIS — R31.9 HEMATURIA: ICD-10-CM

## 2020-03-05 LAB
ALBUMIN SERPL BCP-MCNC: 4.2 G/DL (ref 3.5–5.7)
ALP SERPL-CCNC: 57 U/L (ref 40–150)
ALT SERPL W P-5'-P-CCNC: 9 U/L (ref 7–52)
ANION GAP SERPL CALCULATED.3IONS-SCNC: 6 MMOL/L (ref 4–13)
AST SERPL W P-5'-P-CCNC: 11 U/L (ref 13–39)
BACTERIA UR QL AUTO: ABNORMAL /HPF
BACTERIA UR QL AUTO: ABNORMAL /HPF
BASOPHILS # BLD AUTO: 0.1 THOUSANDS/ΜL (ref 0–0.1)
BASOPHILS NFR BLD AUTO: 1 % (ref 0–2)
BILIRUB SERPL-MCNC: 0.5 MG/DL (ref 0.2–1)
BILIRUB UR QL STRIP: ABNORMAL
BILIRUB UR QL STRIP: NEGATIVE
BUN SERPL-MCNC: 12 MG/DL (ref 7–25)
CALCIUM SERPL-MCNC: 8.9 MG/DL (ref 8.6–10.5)
CHLORIDE SERPL-SCNC: 107 MMOL/L (ref 98–107)
CLARITY UR: ABNORMAL
CLARITY UR: ABNORMAL
CO2 SERPL-SCNC: 28 MMOL/L (ref 21–31)
COLOR UR: YELLOW
COLOR UR: YELLOW
CREAT SERPL-MCNC: 0.9 MG/DL (ref 0.6–1.2)
EOSINOPHIL # BLD AUTO: 0.1 THOUSAND/ΜL (ref 0–0.61)
EOSINOPHIL NFR BLD AUTO: 1 % (ref 0–5)
ERYTHROCYTE [DISTWIDTH] IN BLOOD BY AUTOMATED COUNT: 12.9 % (ref 11.5–14.5)
GFR SERPL CREATININE-BSD FRML MDRD: 77 ML/MIN/1.73SQ M
GLUCOSE SERPL-MCNC: 88 MG/DL (ref 65–99)
GLUCOSE UR STRIP-MCNC: NEGATIVE MG/DL
GLUCOSE UR STRIP-MCNC: NEGATIVE MG/DL
HCT VFR BLD AUTO: 40.5 % (ref 42–47)
HGB BLD-MCNC: 13.6 G/DL (ref 12–16)
HGB UR QL STRIP.AUTO: ABNORMAL
HGB UR QL STRIP.AUTO: ABNORMAL
KETONES UR STRIP-MCNC: NEGATIVE MG/DL
KETONES UR STRIP-MCNC: NEGATIVE MG/DL
LEUKOCYTE ESTERASE UR QL STRIP: ABNORMAL
LEUKOCYTE ESTERASE UR QL STRIP: NEGATIVE
LIPASE SERPL-CCNC: 11 U/L (ref 11–82)
LYMPHOCYTES # BLD AUTO: 2.2 THOUSANDS/ΜL (ref 0.6–4.47)
LYMPHOCYTES NFR BLD AUTO: 25 % (ref 21–51)
MCH RBC QN AUTO: 33.3 PG (ref 26–34)
MCHC RBC AUTO-ENTMCNC: 33.5 G/DL (ref 31–37)
MCV RBC AUTO: 99 FL (ref 81–99)
MONOCYTES # BLD AUTO: 0.6 THOUSAND/ΜL (ref 0.17–1.22)
MONOCYTES NFR BLD AUTO: 7 % (ref 2–12)
MUCOUS THREADS UR QL AUTO: ABNORMAL
MUCOUS THREADS UR QL AUTO: ABNORMAL
NEUTROPHILS # BLD AUTO: 5.8 THOUSANDS/ΜL (ref 1.4–6.5)
NEUTS SEG NFR BLD AUTO: 66 % (ref 42–75)
NITRITE UR QL STRIP: NEGATIVE
NITRITE UR QL STRIP: NEGATIVE
NON-SQ EPI CELLS URNS QL MICRO: ABNORMAL /HPF
NON-SQ EPI CELLS URNS QL MICRO: ABNORMAL /HPF
PH UR STRIP.AUTO: 5.5 [PH]
PH UR STRIP.AUTO: 5.5 [PH]
PLATELET # BLD AUTO: 243 THOUSANDS/UL (ref 149–390)
PMV BLD AUTO: 8.1 FL (ref 8.6–11.7)
POTASSIUM SERPL-SCNC: 3.6 MMOL/L (ref 3.5–5.5)
PROT SERPL-MCNC: 6.4 G/DL (ref 6.4–8.9)
PROT UR STRIP-MCNC: NEGATIVE MG/DL
PROT UR STRIP-MCNC: NEGATIVE MG/DL
RBC # BLD AUTO: 4.07 MILLION/UL (ref 3.9–5.2)
RBC #/AREA URNS AUTO: ABNORMAL /HPF
RBC #/AREA URNS AUTO: ABNORMAL /HPF
SODIUM SERPL-SCNC: 141 MMOL/L (ref 134–143)
SP GR UR STRIP.AUTO: >=1.03 (ref 1–1.03)
SP GR UR STRIP.AUTO: >=1.03 (ref 1–1.03)
TROPONIN I SERPL-MCNC: <0.03 NG/ML
UROBILINOGEN UR QL STRIP.AUTO: 0.2 E.U./DL
UROBILINOGEN UR QL STRIP.AUTO: 0.2 E.U./DL
WBC # BLD AUTO: 8.7 THOUSAND/UL (ref 4.8–10.8)
WBC #/AREA URNS AUTO: ABNORMAL /HPF
WBC #/AREA URNS AUTO: ABNORMAL /HPF

## 2020-03-05 PROCEDURE — 80053 COMPREHEN METABOLIC PANEL: CPT | Performed by: EMERGENCY MEDICINE

## 2020-03-05 PROCEDURE — 83690 ASSAY OF LIPASE: CPT | Performed by: EMERGENCY MEDICINE

## 2020-03-05 PROCEDURE — 85025 COMPLETE CBC W/AUTO DIFF WBC: CPT | Performed by: EMERGENCY MEDICINE

## 2020-03-05 PROCEDURE — 81003 URINALYSIS AUTO W/O SCOPE: CPT | Performed by: EMERGENCY MEDICINE

## 2020-03-05 PROCEDURE — 74176 CT ABD & PELVIS W/O CONTRAST: CPT

## 2020-03-05 PROCEDURE — 93005 ELECTROCARDIOGRAM TRACING: CPT

## 2020-03-05 PROCEDURE — 96374 THER/PROPH/DIAG INJ IV PUSH: CPT

## 2020-03-05 PROCEDURE — 81001 URINALYSIS AUTO W/SCOPE: CPT | Performed by: EMERGENCY MEDICINE

## 2020-03-05 PROCEDURE — 96361 HYDRATE IV INFUSION ADD-ON: CPT

## 2020-03-05 PROCEDURE — 81003 URINALYSIS AUTO W/O SCOPE: CPT

## 2020-03-05 PROCEDURE — 36415 COLL VENOUS BLD VENIPUNCTURE: CPT | Performed by: EMERGENCY MEDICINE

## 2020-03-05 PROCEDURE — 99284 EMERGENCY DEPT VISIT MOD MDM: CPT | Performed by: EMERGENCY MEDICINE

## 2020-03-05 PROCEDURE — 99285 EMERGENCY DEPT VISIT HI MDM: CPT

## 2020-03-05 PROCEDURE — 81001 URINALYSIS AUTO W/SCOPE: CPT

## 2020-03-05 PROCEDURE — 96375 TX/PRO/DX INJ NEW DRUG ADDON: CPT

## 2020-03-05 PROCEDURE — 84484 ASSAY OF TROPONIN QUANT: CPT | Performed by: EMERGENCY MEDICINE

## 2020-03-05 RX ORDER — FENTANYL CITRATE 50 UG/ML
25 INJECTION, SOLUTION INTRAMUSCULAR; INTRAVENOUS ONCE
Status: COMPLETED | OUTPATIENT
Start: 2020-03-05 | End: 2020-03-05

## 2020-03-05 RX ORDER — KETOROLAC TROMETHAMINE 30 MG/ML
15 INJECTION, SOLUTION INTRAMUSCULAR; INTRAVENOUS ONCE
Status: COMPLETED | OUTPATIENT
Start: 2020-03-05 | End: 2020-03-05

## 2020-03-05 RX ORDER — CYCLOBENZAPRINE HCL 10 MG
10 TABLET ORAL ONCE
Status: COMPLETED | OUTPATIENT
Start: 2020-03-05 | End: 2020-03-05

## 2020-03-05 RX ORDER — CYCLOBENZAPRINE HCL 10 MG
5 TABLET ORAL 2 TIMES DAILY PRN
Qty: 5 TABLET | Refills: 0 | Status: SHIPPED | OUTPATIENT
Start: 2020-03-05 | End: 2020-06-23 | Stop reason: ALTCHOICE

## 2020-03-05 RX ORDER — SODIUM CHLORIDE 9 MG/ML
3 INJECTION INTRAVENOUS AS NEEDED
Status: DISCONTINUED | OUTPATIENT
Start: 2020-03-05 | End: 2020-03-06 | Stop reason: HOSPADM

## 2020-03-05 RX ADMIN — FENTANYL CITRATE 25 MCG: 50 INJECTION INTRAMUSCULAR; INTRAVENOUS at 21:55

## 2020-03-05 RX ADMIN — CYCLOBENZAPRINE HYDROCHLORIDE 10 MG: 10 TABLET, FILM COATED ORAL at 22:55

## 2020-03-05 RX ADMIN — KETOROLAC TROMETHAMINE 15 MG: 30 INJECTION, SOLUTION INTRAMUSCULAR; INTRAVENOUS at 21:55

## 2020-03-05 RX ADMIN — SODIUM CHLORIDE 1000 ML: 0.9 INJECTION, SOLUTION INTRAVENOUS at 19:49

## 2020-03-06 LAB
ATRIAL RATE: 93 BPM
P AXIS: 63 DEGREES
PR INTERVAL: 126 MS
QRS AXIS: 66 DEGREES
QRSD INTERVAL: 86 MS
QT INTERVAL: 346 MS
QTC INTERVAL: 430 MS
T WAVE AXIS: 60 DEGREES
VENTRICULAR RATE: 93 BPM

## 2020-03-06 PROCEDURE — 93010 ELECTROCARDIOGRAM REPORT: CPT | Performed by: INTERNAL MEDICINE

## 2020-03-06 NOTE — ED PROVIDER NOTES
History  Chief Complaint   Patient presents with    Flank Pain     left flank pain x2 days  denies urinary complaints     This is a 40-year-old female chief complaint left flank and back pain  Pain is migrating  Patient has episode of this approximately every 1 month, many of which she has been seen in the emergency department for  States that previously she required multiple doses of morphine to feel better  Nothing else seems to make it feel better  Significantly worse with specific movements or taking a deep breath  Patient states that she has never had a blood clot before but had does have a family history  She says the pain is severe and is causing her to cry  Patient is not having any shortness of breath, minimal cough recently  Patient is not having any syncope  No palpitations  Patient reports that her urination bowel movements have been her baseline recently  Patient was admitted for an [address] recently which she states is not been tested since her discharge  Prior to Admission Medications   Prescriptions Last Dose Informant Patient Reported? Taking?    albuterol (PROAIR HFA) 90 mcg/act inhaler  Self No No   Sig: Inhale 2 puffs every 4 (four) hours as needed for wheezing or shortness of breath   benzonatate (TESSALON PERLES) 100 mg capsule  Self No No   Si-2 caps up to 3x/day as needed for cough   buPROPion (WELLBUTRIN XL) 300 mg 24 hr tablet  Self Yes No   Sig: Take 300 mg by mouth daily   busPIRone (BUSPAR) 10 mg tablet  Self Yes No   Sig: Take 10 mg by mouth 3 (three) times a day   cariprazine (VRAYLAR) 3 MG capsule  Self No No   Sig: Take 1 capsule (3 mg total) by mouth daily   celecoxib (CeleBREX) 100 mg capsule   No No   Sig: TAKE 1 CAPSULE BY MOUTH TWICE DAILY   dicyclomine (BENTYL) 20 mg tablet   No No   Sig: Take 1 tablet (20 mg total) by mouth every 6 (six) hours   ergocalciferol (VITAMIN D2) 50,000 units  Self No No   Sig: take 1 capsule by mouth every week   gabapentin (NEURONTIN) 600 MG tablet  Self No No   Sig: Take 0 5 tablets (300 mg total) by mouth 3 (three) times a day   levothyroxine 75 mcg tablet  Self No No   Sig: Take 1 tablet (75 mcg total) by mouth daily in the early morning   lisinopril (ZESTRIL) 20 mg tablet  Self No No   Sig: Take 1 tablet (20 mg total) by mouth daily   pantoprazole (PROTONIX) 40 mg tablet   No No   Sig: Take 1 tablet (40 mg total) by mouth daily   predniSONE 10 mg tablet   No No   Sig: 3 tabs daily for 3 days, 2 tabs daily for 3 days, 1 tab daily for 3 days   venlafaxine (EFFEXOR-XR) 150 mg 24 hr capsule  Self No No   Sig: Take 2 capsules (300 mg total) by mouth daily   Patient taking differently: Take 300 mg by mouth daily at bedtime    zolpidem (AMBIEN) 10 mg tablet  Self No No   Sig: Take 1 tablet (10 mg total) by mouth daily at bedtime as needed for sleep for up to 10 days      Facility-Administered Medications: None       Past Medical History:   Diagnosis Date    Ankle pain, right     Anxiety     Arthritis     Bipolar 1 disorder (HCC)     Depression     GERD (gastroesophageal reflux disease)     Hypertension     Hypothyroidism     Known health problems: none     Lyme disease     Scoliosis        Past Surgical History:   Procedure Laterality Date    CERVICAL FUSION      CHOLECYSTECTOMY      COLONOSCOPY         Family History   Problem Relation Age of Onset    Diabetes Mother     Hypertension Mother     Heart disease Mother     Hypertension Father     Diabetes Maternal Grandmother     Diabetes Paternal Grandmother      I have reviewed and agree with the history as documented      E-Cigarette/Vaping     E-Cigarette/Vaping Substances     Social History     Tobacco Use    Smoking status: Current Every Day Smoker     Packs/day: 0 50     Years: 3 00     Pack years: 1 50    Smokeless tobacco: Never Used   Substance Use Topics    Alcohol use: Not Currently     Frequency: Never    Drug use: Never       Review of Systems Constitutional: Negative for activity change, chills, fatigue and fever  HENT: Negative for congestion  Eyes: Negative for visual disturbance  Respiratory: Positive for cough  Negative for chest tightness and shortness of breath  Cardiovascular: Negative for chest pain  Gastrointestinal: Negative for abdominal distention, diarrhea and vomiting  Genitourinary: Negative for dysuria  Skin: Negative for rash  Neurological: Negative for dizziness, weakness and numbness  Physical Exam  Physical Exam   Constitutional: She is oriented to person, place, and time  She appears well-developed and well-nourished  HENT:   Head: Normocephalic and atraumatic  Eyes: Pupils are equal, round, and reactive to light  Conjunctivae and EOM are normal    Neck: Normal range of motion  Neck supple  Cardiovascular: Normal rate, regular rhythm and normal heart sounds  Pulmonary/Chest: Effort normal and breath sounds normal  No stridor  No respiratory distress  She has no wheezes  Abdominal: Soft  Bowel sounds are normal  She exhibits no distension  There is no rebound and no guarding  Left CVA tenderness   Musculoskeletal: Normal range of motion  Pain to palpation over inferior thoracic left-sided ribs as well as left flank   Neurological: She is alert and oriented to person, place, and time  Skin: Skin is warm and dry  Capillary refill takes less than 2 seconds  Psychiatric:   Labile but redirectable, extremely tearful and sad  When I leave the room I am able to hear her talking in normal tones regarding pain medication with her  with no crying         Vital Signs  ED Triage Vitals [03/05/20 1912]   Temperature Pulse Respirations Blood Pressure SpO2   97 7 °F (36 5 °C) (!) 107 18 110/68 99 %      Temp Source Heart Rate Source Patient Position - Orthostatic VS BP Location FiO2 (%)   Tympanic -- -- -- --      Pain Score       Worst Possible Pain           Vitals:    03/05/20 2159 03/05/20 2200 03/05/20 2215 03/05/20 2230   BP: 112/68 129/82  120/72   Pulse: 88 89 85 88         Visual Acuity      ED Medications  Medications   sodium chloride 0 9 % bolus 1,000 mL (0 mL Intravenous Stopped 3/5/20 2159)   ketorolac (TORADOL) injection 15 mg (15 mg Intravenous Given 3/5/20 2155)   fentanyl citrate (PF) 100 MCG/2ML 25 mcg (25 mcg Intravenous Given 3/5/20 2155)   cyclobenzaprine (FLEXERIL) tablet 10 mg (10 mg Oral Given 3/5/20 2255)       Diagnostic Studies  Results Reviewed     Procedure Component Value Units Date/Time    Urine Microscopic [[id number]]  (Abnormal) Collected:  03/05/20 2042    Lab Status:  Final result Specimen:  Urine, Clean Catch Updated:  03/05/20 2127     RBC, UA Innumerable /hpf      WBC, UA 1-2 /hpf      Epithelial Cells Occasional /hpf      Bacteria, UA None Seen /hpf      MUCUS THREADS Moderate    UA w Reflex to Microscopic w Reflex to Culture [[id number]]  (Abnormal) Collected:  03/05/20 2042    Lab Status:  Final result Specimen:  Urine, Clean Catch Updated:  03/05/20 2113     Color, UA Yellow     Clarity, UA Cloudy     Specific Gravity, UA >=1 030     pH, UA 5 5     Leukocytes, UA Negative     Nitrite, UA Negative     Protein, UA Negative mg/dl      Glucose, UA Negative mg/dl      Ketones, UA Negative mg/dl      Urobilinogen, UA 0 2 E U /dl      Bilirubin, UA Negative     Blood, UA 3+    Lipase [[id number]]  (Normal) Collected:  03/05/20 2031    Lab Status:  Final result Specimen:  Blood from Arm, Right Updated:  03/05/20 2105     Lipase 11 u/L     CMP [[id number]]  (Abnormal) Collected:  03/05/20 2031    Lab Status:  Final result Specimen:  Blood from Arm, Right Updated:  03/05/20 2104     Sodium 141 mmol/L      Potassium 3 6 mmol/L      Chloride 107 mmol/L      CO2 28 mmol/L      ANION GAP 6 mmol/L      BUN 12 mg/dL      Creatinine 0 90 mg/dL      Glucose 88 mg/dL      Calcium 8 9 mg/dL      AST 11 U/L      ALT 9 U/L      Alkaline Phosphatase 57 U/L      Total Protein 6 4 g/dL Albumin 4 2 g/dL      Total Bilirubin 0 50 mg/dL      eGFR 77 ml/min/1 73sq m     Narrative:       [place] guidelines for Chronic Kidney Disease (CKD):     Stage 1 with normal or high GFR (GFR > 90 mL/min/1 73 square meters)    Stage 2 Mild CKD (GFR = 60-89 mL/min/1 73 square meters)    Stage 3A Moderate CKD (GFR = 45-59 mL/min/1 73 square meters)    Stage 3B Moderate CKD (GFR = 30-44 mL/min/1 73 square meters)    Stage 4 Severe CKD (GFR = 15-29 mL/min/1 73 square meters)    Stage 5 End Stage CKD (GFR <15 mL/min/1 73 square meters)  Note: GFR calculation is accurate only with a steady state creatinine    Troponin I [[id number]]  (Normal) Collected:  03/05/20 1947    Lab Status:  Final result Specimen:  Blood from Arm, Left Updated:  03/05/20 2027     Troponin I <0 03 ng/mL     CBC and differential [[id number]]  (Abnormal) Collected:  03/05/20 1947    Lab Status:  Final result Specimen:  Blood from Arm, Left Updated:  03/05/20 1955     WBC 8 70 Thousand/uL      RBC 4 07 Million/uL      Hemoglobin 13 6 g/dL      Hematocrit 40 5 %      MCV 99 fL      MCH 33 3 pg      MCHC 33 5 g/dL      RDW 12 9 %      MPV 8 1 fL      Platelets 766 Thousands/uL      Neutrophils Relative 66 %      Lymphocytes Relative 25 %      Monocytes Relative 7 %      Eosinophils Relative 1 %      Basophils Relative 1 %      Neutrophils Absolute 5 80 Thousands/µL      Lymphocytes Absolute 2 20 Thousands/µL      Monocytes Absolute 0 60 Thousand/µL      Eosinophils Absolute 0 10 Thousand/µL      Basophils Absolute 0 10 Thousands/µL     Urine Microscopic [[id number]]  (Abnormal) Collected:  03/05/20 1924    Lab Status:  Final result Specimen:  Urine, Clean Catch Updated:  03/05/20 1955     RBC, UA 20-30 /hpf      WBC, UA 4-10 /hpf      Epithelial Cells Innumerable /hpf      Bacteria, UA Moderate /hpf      MUCUS THREADS Occasional    UA (URINE) with reflex to Scope [[id number]]  (Abnormal) Collected:  03/05/20 1924    Lab Status:  Final result Specimen:  Urine, Clean Catch Updated:  03/05/20 1946     Color, UA Yellow     Clarity, UA Cloudy     Specific Gravity, UA >=1 030     pH, UA 5 5     Leukocytes, UA 1+     Nitrite, UA Negative     Protein, UA Negative mg/dl      Glucose, UA Negative mg/dl      Ketones, UA Negative mg/dl      Urobilinogen, UA 0 2 E U /dl      Bilirubin, UA 1+     Blood, UA 3+                 CT abdomen pelvis wo contrast   Final Result by Pavel Piña MD (03/05 2213)      No hydronephrosis or intrarenal calculus  No acute intra-abdominal abnormality  No free air or free fluid  Workstation performed: [id number]                    Procedures  Procedures         ED Course                               MDM  Number of Diagnoses or Management Options  Hematuria: new and requires workup  Left rib fracture: new and requires workup  Diagnosis management comments: This is a 57-year-old female left flank and rib pain  Patient is initially noted to have hematuria  CT scan ordered  Negative for stone but did document a left 10th sided rib fracture which appeared old  Patient given some pain control in the ED  Discussed the benefits of using nonnarcotic therapy  Patient appeared clinically well at the time of discharge  Discussed warning signs and symptoms with the patient as well as when to return to the emergency department versus follow up with [facility]  Patient states understanding and agreement with the plan  Discussed the importance of following up with hematuria for concerns regarding cancer           Amount and/or Complexity of Data Reviewed  Clinical lab tests: ordered and reviewed  Tests in the radiology section of CPT®: reviewed and ordered  Discuss the patient with other providers: yes  Independent visualization of images, tracings, or specimens: yes          Disposition  Final diagnoses:   Left rib fracture   Hematuria     Time reflects when diagnosis was documented in both MDM as applicable and the Disposition within this note     Time User Action Codes Description Comment    3/5/2020 10:35 PM Steve Harmon Add [Y81 34DO] Left rib fracture     3/5/2020 10:35 PM Steve Harmon Add [R31 9] Hematuria       ED Disposition     ED Disposition Condition Date/Time Comment    Discharge Stable u Mar 5, 2020 10:35 PM Idalmis Gaston discharge to home/self care              Follow-up Information     Follow up With Specialties Details Why Contact Info    Liu Joseph, [id number], Nurse Practitioner In 1 day  [address]   [phone]            Discharge Medication List as of 3/5/2020 10:36 PM      START taking these medications    Details   cyclobenzaprine (FLEXERIL) 10 mg tablet Take 0 5 tablets (5 mg total) by mouth 2 (two) times a day as needed for muscle spasms for up to 5 days, Starting u 3/5/2020, Until Tue 3/10/2020, Normal         CONTINUE these medications which have NOT CHANGED    Details   benzonatate (TESSALON PERLES) 100 mg capsule 1-2 caps up to 3x/day as needed for cough, Normal      buPROPion (WELLBUTRIN XL) 300 mg 24 hr tablet Take 300 mg by mouth daily, Starting Fri 8/30/2019, Historical Med      busPIRone (BUSPAR) 10 mg tablet Take 10 mg by mouth 3 (three) times a day, Starting Wed 8/28/2019, Historical Med      cariprazine (VRAYLAR) 3 MG capsule Take 1 capsule (3 mg total) by mouth daily, Starting Wed 8/14/2019, Normal      celecoxib (CeleBREX) 100 mg capsule TAKE 1 CAPSULE BY MOUTH TWICE DAILY, Normal      dicyclomine (BENTYL) 20 mg tablet Take 1 tablet (20 mg total) by mouth every 6 (six) hours, Starting Wed 2/5/2020, Normal      ergocalciferol (VITAMIN D2) 50,000 units take 1 capsule by mouth every week, Normal      gabapentin (NEURONTIN) 600 MG tablet Take 0 5 tablets (300 mg total) by mouth 3 (three) times a day, Starting Mon 2/3/2020, Normal      levothyroxine 75 mcg tablet Take 1 tablet (75 mcg total) by mouth daily in the early morning, Starting Tue 2/4/2020, No Print      lisinopril (ZESTRIL) 20 mg tablet Take 1 tablet (20 mg total) by mouth daily, Starting Tue 2/4/2020, No Print      pantoprazole (PROTONIX) 40 mg tablet Take 1 tablet (40 mg total) by mouth daily, Starting Wed 2/5/2020, Normal      predniSONE 10 mg tablet 3 tabs daily for 3 days, 2 tabs daily for 3 days, 1 tab daily for 3 days, Normal      venlafaxine (EFFEXOR-XR) 150 mg 24 hr capsule Take 2 capsules (300 mg total) by mouth daily, Starting Fri 8/2/2019, Normal      zolpidem (AMBIEN) 10 mg tablet Take 1 tablet (10 mg total) by mouth daily at bedtime as needed for sleep for up to 10 days, Starting Mon 2/3/2020, Until Thu 2/20/2020, No Print         STOP taking these medications       albuterol (PROAIR HFA) 90 mcg/act inhaler Comments:   Reason for Stopping:             No discharge procedures on file      PDMP Review     None          ED Provider  Electronically Signed by           Amrita Foster MD  03/07/20 9628

## 2020-03-09 ENCOUNTER — OFFICE VISIT (OUTPATIENT)
Dept: INTERNAL MEDICINE CLINIC | Facility: CLINIC | Age: 45
End: 2020-03-09
Payer: COMMERCIAL

## 2020-03-09 VITALS
BODY MASS INDEX: 25.49 KG/M2 | SYSTOLIC BLOOD PRESSURE: 110 MMHG | HEART RATE: 112 BPM | TEMPERATURE: 99.2 F | DIASTOLIC BLOOD PRESSURE: 62 MMHG | OXYGEN SATURATION: 98 % | HEIGHT: 65 IN | WEIGHT: 153 LBS | RESPIRATION RATE: 16 BRPM

## 2020-03-09 DIAGNOSIS — R31.9 URINARY TRACT INFECTION WITH HEMATURIA, SITE UNSPECIFIED: Primary | ICD-10-CM

## 2020-03-09 DIAGNOSIS — B37.3 CANDIDIASIS OF VAGINA: ICD-10-CM

## 2020-03-09 DIAGNOSIS — E03.9 ACQUIRED HYPOTHYROIDISM: ICD-10-CM

## 2020-03-09 DIAGNOSIS — S22.32XD CLOSED FRACTURE OF ONE RIB OF LEFT SIDE WITH ROUTINE HEALING: ICD-10-CM

## 2020-03-09 DIAGNOSIS — N39.0 URINARY TRACT INFECTION WITH HEMATURIA, SITE UNSPECIFIED: Primary | ICD-10-CM

## 2020-03-09 PROCEDURE — 99213 OFFICE O/P EST LOW 20 MIN: CPT | Performed by: NURSE PRACTITIONER

## 2020-03-09 PROCEDURE — 3074F SYST BP LT 130 MM HG: CPT | Performed by: NURSE PRACTITIONER

## 2020-03-09 PROCEDURE — 3008F BODY MASS INDEX DOCD: CPT | Performed by: NURSE PRACTITIONER

## 2020-03-09 PROCEDURE — 3078F DIAST BP <80 MM HG: CPT | Performed by: NURSE PRACTITIONER

## 2020-03-09 RX ORDER — LEVOTHYROXINE SODIUM 0.07 MG/1
75 TABLET ORAL
Qty: 90 TABLET | Refills: 1 | Status: SHIPPED | OUTPATIENT
Start: 2020-03-09 | End: 2020-05-16 | Stop reason: SDUPTHER

## 2020-03-09 RX ORDER — FLUCONAZOLE 150 MG/1
TABLET ORAL
Qty: 6 TABLET | Refills: 0 | Status: SHIPPED | OUTPATIENT
Start: 2020-03-09 | End: 2020-03-10

## 2020-03-09 RX ORDER — CELECOXIB 100 MG/1
100 CAPSULE ORAL 2 TIMES DAILY
Qty: 60 CAPSULE | Refills: 1 | Status: CANCELLED | OUTPATIENT
Start: 2020-03-09

## 2020-03-09 RX ORDER — CIPROFLOXACIN 500 MG/1
500 TABLET, FILM COATED ORAL EVERY 12 HOURS SCHEDULED
Qty: 14 TABLET | Refills: 0 | Status: SHIPPED | OUTPATIENT
Start: 2020-03-09 | End: 2020-03-16

## 2020-03-09 RX ORDER — FLUCONAZOLE 150 MG/1
150 TABLET ORAL ONCE
Qty: 1 TABLET | Refills: 0 | Status: SHIPPED | OUTPATIENT
Start: 2020-03-09 | End: 2020-03-09

## 2020-03-09 NOTE — PROGRESS NOTES
Assessment/Plan:    No problem-specific Assessment & Plan notes found for this encounter  Diagnoses and all orders for this visit:    Urinary tract infection with hematuria, site unspecified  Comments:  Cipro ordered  Orders:  -     ciprofloxacin (CIPRO) 500 mg tablet; Take 1 tablet (500 mg total) by mouth every 12 (twelve) hours for 7 days    Candidiasis of vagina  Comments:  fluconazole ordered  Orders:  -     fluconazole (DIFLUCAN) 150 mg tablet; Take 1 tablet (150 mg total) by mouth once for 1 dose  -     fluconazole (DIFLUCAN) 150 mg tablet; Take 1 tab for 2 days    Closed fracture of one rib of left side with routine healing  Comments:  fracture care reivewed, tylenol prn for pain    Acquired hypothyroidism  -     levothyroxine 75 mcg tablet; Take 1 tablet (75 mcg total) by mouth daily in the early morning    Other orders  -     Cancel: celecoxib (CeleBREX) 100 mg capsule; Take 1 capsule (100 mg total) by mouth 2 (two) times a day          Subjective:      Patient ID: Daryl Friend is a 53 y o  female  F/u fx rib , flank pain         49 yr old female here with c/o newly dx left rib fx and left rib pain  Pt enc not to take any further Celebrex as she does have 3 plus blood in her urine  Pt does not get her menses due to Mirena  Pt states that she was seen in the ED and dx with old left rib fracture which I did review care instructions as pt does c/o pain and stiffness in that area  Incidentally pt was found to have the blood in her urine, she denies flank pain or urinary burning, frequency  I did treat her empirically with Cipro due to the blood and cloudy foul smelling urine  Pt was also treated with Fluconazole to prevent yeast infection associated with abx use   Pt enc to drink plenty of fluids      The following portions of the patient's history were reviewed and updated as appropriate: She  has a past medical history of Ankle pain, right, Anxiety, Arthritis, Bipolar 1 disorder ([address] ), Depression, GERD (gastroesophageal reflux disease), Hypertension, Hypothyroidism, Known health problems: none, Lyme disease, and Scoliosis  She   Patient Active Problem List    Diagnosis Date Noted    Closed fracture of one rib of left side with routine healing 03/09/2020    DUSTIN (acute kidney injury) ([facility] ) 02/03/2020    Lower abdominal pain 02/03/2020    Delirium 02/03/2020    Bilateral ankle pain 12/03/2019    Positive depression screening 12/03/2019    BMI 28 0-28 9,adult 10/19/2019    Tendinitis of right ankle 08/22/2019    Medication refill 07/13/2019    IUD check up 06/28/2019    Chronic bilateral low back pain without sciatica 12/15/2018    B[facility] 2 [facility] ) 10/23/2018    Acquired hypothyroidism 10/23/2018    Arthritis 10/23/2018    Increased frequency of urination 05/06/2017    UTI (urinary tract infection) 05/06/2017    Anxiety 07/21/2016     She  has a past surgical history that includes Cholecystectomy; Colonoscopy; and Cervical fusion  Her family history includes Diabetes in her maternal grandmother, mother, and paternal grandmother; Heart disease in her mother; Hypertension in her father and mother  She  reports that she has been smoking  She has a 1 50 pack-year smoking history  She has never used smokeless tobacco  She reports that she drank alcohol  She reports that she does not use drugs    Current Outpatient Medications   Medication Sig Dispense Refill    buPROPion (WELLBUTRIN XL) 300 mg 24 hr tablet Take 300 mg by mouth daily  1    busPIRone (BUSPAR) 10 mg tablet Take 10 mg by mouth 3 (three) times a day  0    cariprazine (VRAYLAR) 3 MG capsule Take 1 capsule (3 mg total) by mouth daily 30 capsule 2    cyclobenzaprine (FLEXERIL) 10 mg tablet Take 0 5 tablets (5 mg total) by mouth 2 (two) times a day as needed for muscle spasms for up to 5 days 5 tablet 0    ergocalciferol (VITAMIN D2) 50,000 units take 1 capsule by mouth every week 12 capsule 3    gabapentin (NEURONTIN) 600 MG tablet Take 0 5 tablets (300 mg total) by mouth 3 (three) times a day 90 tablet 0    levothyroxine 75 mcg tablet Take 1 tablet (75 mcg total) by mouth daily in the early morning 90 tablet 1    lisinopril (ZESTRIL) 20 mg tablet Take 1 tablet (20 mg total) by mouth daily 180 tablet 0    pantoprazole (PROTONIX) 40 mg tablet Take 1 tablet (40 mg total) by mouth daily 30 tablet 3    venlafaxine (EFFEXOR-XR) 150 mg 24 hr capsule Take 2 capsules (300 mg total) by mouth daily (Patient taking differently: Take 300 mg by mouth daily at bedtime ) 60 capsule 1    zolpidem (AMBIEN) 10 mg tablet Take 1 tablet (10 mg total) by mouth daily at bedtime as needed for sleep for up to 10 days 10 tablet 0    ciprofloxacin (CIPRO) 500 mg tablet Take 1 tablet (500 mg total) by mouth every 12 (twelve) hours for 7 days 14 tablet 0    fluconazole (DIFLUCAN) 150 mg tablet Take 1 tablet (150 mg total) by mouth once for 1 dose 1 tablet 0    fluconazole (DIFLUCAN) 150 mg tablet Take 1 tab for 2 days 6 tablet 0     No current facility-administered medications for this visit        Current Outpatient Medications on File Prior to Visit   Medication Sig    buPROPion (WELLBUTRIN XL) 300 mg 24 hr tablet Take 300 mg by mouth daily    busPIRone (BUSPAR) 10 mg tablet Take 10 mg by mouth 3 (three) times a day    cariprazine (VRAYLAR) 3 MG capsule Take 1 capsule (3 mg total) by mouth daily    cyclobenzaprine (FLEXERIL) 10 mg tablet Take 0 5 tablets (5 mg total) by mouth 2 (two) times a day as needed for muscle spasms for up to 5 days    ergocalciferol (VITAMIN D2) 50,000 units take 1 capsule by mouth every week    gabapentin (NEURONTIN) 600 MG tablet Take 0 5 tablets (300 mg total) by mouth 3 (three) times a day    lisinopril (ZESTRIL) 20 mg tablet Take 1 tablet (20 mg total) by mouth daily    pantoprazole (PROTONIX) 40 mg tablet Take 1 tablet (40 mg total) by mouth daily    venlafaxine (EFFEXOR-XR) 150 mg 24 hr capsule Take 2 capsules (300 mg total) by mouth daily (Patient taking differently: Take 300 mg by mouth daily at bedtime )    zolpidem (AMBIEN) 10 mg tablet Take 1 tablet (10 mg total) by mouth daily at bedtime as needed for sleep for up to 10 days    [DISCONTINUED] celecoxib (CeleBREX) 100 mg capsule TAKE 1 CAPSULE BY MOUTH TWICE DAILY    [DISCONTINUED] levothyroxine 75 mcg tablet Take 1 tablet (75 mcg total) by mouth daily in the early morning    [DISCONTINUED] benzonatate (TESSALON PERLES) 100 mg capsule 1-2 caps up to 3x/day as needed for cough (Patient not taking: Reported on 3/9/2020)    [DISCONTINUED] dicyclomine (BENTYL) 20 mg tablet Take 1 tablet (20 mg total) by mouth every 6 (six) hours (Patient not taking: Reported on 3/9/2020)    [DISCONTINUED] predniSONE 10 mg tablet 3 tabs daily for 3 days, 2 tabs daily for 3 days, 1 tab daily for 3 days (Patient not taking: Reported on 3/9/2020)     No current facility-administered medications on file prior to visit  She has No Known Allergies       Review of Systems   Constitutional: Negative  HENT: Negative  Eyes: Negative  Respiratory: Negative  Cardiovascular: Negative  Gastrointestinal: Negative  Endocrine: Negative  Genitourinary: Positive for hematuria  Cloudy urine   Musculoskeletal: Positive for back pain  Left rib area pain   Skin: Negative  Allergic/Immunologic: Negative  Neurological: Negative  Hematological: Negative  Psychiatric/Behavioral: Negative  Objective:      /62   Pulse (!) 112   Temp 99 2 °F (37 3 °C) (Tympanic)   Resp 16   Ht 5' 5" (1 651 m)   Wt 69 4 kg (153 lb)   LMP  (LMP Unknown)   SpO2 98%   BMI 25 46 kg/m²          Physical Exam   Constitutional: She is oriented to person, place, and time  She appears well-developed and well-nourished  HENT:   Head: Normocephalic  Eyes: Pupils are equal, round, and reactive to light  Neck: Normal range of motion  Cardiovascular: Normal rate and regular rhythm  Pulmonary/Chest: Effort normal and breath sounds normal    Abdominal: Soft  Bowel sounds are normal    Musculoskeletal: Normal range of motion  Arms:  Palpable left rib pain 10th rib area  Neurological: She is alert and oriented to person, place, and time  Skin: Skin is warm and dry  Psychiatric: She has a normal mood and affect  Her behavior is normal  Judgment and thought content normal    Nursing note and vitals reviewed

## 2020-03-12 NOTE — RESULT ENCOUNTER NOTE
Biopsy from the stomach showed focal intestinal metaplasia ( change in stomach lining from inflammation)  Plan to repeat EGD in 1 year to monitor  Biopsy from the duodenum is normal  I called her left her VM

## 2020-03-27 ENCOUNTER — TRANSCRIBE ORDERS (OUTPATIENT)
Dept: ADMINISTRATIVE | Facility: HOSPITAL | Age: 45
End: 2020-03-27

## 2020-03-27 DIAGNOSIS — M25.571 BILATERAL ANKLE PAIN, UNSPECIFIED CHRONICITY: Primary | ICD-10-CM

## 2020-03-27 DIAGNOSIS — M25.572 BILATERAL ANKLE PAIN, UNSPECIFIED CHRONICITY: Primary | ICD-10-CM

## 2020-04-10 ENCOUNTER — HOSPITAL ENCOUNTER (OUTPATIENT)
Dept: MRI IMAGING | Facility: HOSPITAL | Age: 45
Discharge: HOME | End: 2020-04-10
Attending: PODIATRIST
Payer: COMMERCIAL

## 2020-04-10 DIAGNOSIS — M25.572 BILATERAL ANKLE PAIN, UNSPECIFIED CHRONICITY: ICD-10-CM

## 2020-04-10 DIAGNOSIS — M25.571 BILATERAL ANKLE PAIN, UNSPECIFIED CHRONICITY: ICD-10-CM

## 2020-04-10 PROCEDURE — 73721 MRI JNT OF LWR EXTRE W/O DYE: CPT

## 2020-04-15 DIAGNOSIS — M25.571 BILATERAL ANKLE PAIN, UNSPECIFIED CHRONICITY: ICD-10-CM

## 2020-04-15 DIAGNOSIS — M25.572 BILATERAL ANKLE PAIN, UNSPECIFIED CHRONICITY: ICD-10-CM

## 2020-04-15 DIAGNOSIS — M19.90 ARTHRITIS: ICD-10-CM

## 2020-04-15 RX ORDER — CELECOXIB 100 MG/1
CAPSULE ORAL
Qty: 60 CAPSULE | Refills: 0 | Status: SHIPPED | OUTPATIENT
Start: 2020-04-15 | End: 2020-05-16 | Stop reason: SDUPTHER

## 2020-05-16 ENCOUNTER — OFFICE VISIT (OUTPATIENT)
Dept: FAMILY MEDICINE CLINIC | Facility: CLINIC | Age: 45
End: 2020-05-16
Payer: COMMERCIAL

## 2020-05-16 VITALS
WEIGHT: 151 LBS | OXYGEN SATURATION: 98 % | SYSTOLIC BLOOD PRESSURE: 148 MMHG | TEMPERATURE: 97.1 F | BODY MASS INDEX: 25.16 KG/M2 | DIASTOLIC BLOOD PRESSURE: 100 MMHG | HEIGHT: 65 IN | HEART RATE: 114 BPM

## 2020-05-16 DIAGNOSIS — M19.90 ARTHRITIS: ICD-10-CM

## 2020-05-16 DIAGNOSIS — M25.519 NECK AND SHOULDER PAIN: ICD-10-CM

## 2020-05-16 DIAGNOSIS — F31.9 BIPOLAR 1 DISORDER (HCC): ICD-10-CM

## 2020-05-16 DIAGNOSIS — M25.572 BILATERAL ANKLE PAIN, UNSPECIFIED CHRONICITY: ICD-10-CM

## 2020-05-16 DIAGNOSIS — R10.13 EPIGASTRIC ABDOMINAL PAIN: ICD-10-CM

## 2020-05-16 DIAGNOSIS — F31.81 BIPOLAR 2 DISORDER (HCC): ICD-10-CM

## 2020-05-16 DIAGNOSIS — M25.571 BILATERAL ANKLE PAIN, UNSPECIFIED CHRONICITY: ICD-10-CM

## 2020-05-16 DIAGNOSIS — M54.50 CHRONIC BILATERAL LOW BACK PAIN WITHOUT SCIATICA: ICD-10-CM

## 2020-05-16 DIAGNOSIS — M54.2 NECK AND SHOULDER PAIN: ICD-10-CM

## 2020-05-16 DIAGNOSIS — G89.29 CHRONIC BILATERAL LOW BACK PAIN WITHOUT SCIATICA: ICD-10-CM

## 2020-05-16 DIAGNOSIS — E03.9 ACQUIRED HYPOTHYROIDISM: ICD-10-CM

## 2020-05-16 DIAGNOSIS — I10 HYPERTENSION, UNSPECIFIED TYPE: ICD-10-CM

## 2020-05-16 DIAGNOSIS — F41.9 ANXIETY: Primary | ICD-10-CM

## 2020-05-16 PROCEDURE — 3008F BODY MASS INDEX DOCD: CPT | Performed by: NURSE PRACTITIONER

## 2020-05-16 PROCEDURE — 99214 OFFICE O/P EST MOD 30 MIN: CPT | Performed by: NURSE PRACTITIONER

## 2020-05-16 PROCEDURE — 3077F SYST BP >= 140 MM HG: CPT | Performed by: NURSE PRACTITIONER

## 2020-05-16 PROCEDURE — 3080F DIAST BP >= 90 MM HG: CPT | Performed by: NURSE PRACTITIONER

## 2020-05-16 RX ORDER — METHOCARBAMOL 750 MG/1
750 TABLET, FILM COATED ORAL DAILY
Qty: 30 TABLET | Refills: 2 | Status: SHIPPED | OUTPATIENT
Start: 2020-05-16 | End: 2020-06-23 | Stop reason: SDUPTHER

## 2020-05-16 RX ORDER — LEVOTHYROXINE SODIUM 0.07 MG/1
75 TABLET ORAL
Qty: 90 TABLET | Refills: 1 | Status: SHIPPED | OUTPATIENT
Start: 2020-05-16 | End: 2020-12-31 | Stop reason: SDUPTHER

## 2020-05-16 RX ORDER — LISINOPRIL AND HYDROCHLOROTHIAZIDE 25; 20 MG/1; MG/1
1 TABLET ORAL DAILY
Qty: 30 TABLET | Refills: 5 | Status: SHIPPED | OUTPATIENT
Start: 2020-05-16 | End: 2020-10-19

## 2020-05-16 RX ORDER — VENLAFAXINE HYDROCHLORIDE 75 MG/1
75 CAPSULE, EXTENDED RELEASE ORAL DAILY
Qty: 30 CAPSULE | Refills: 5 | Status: SHIPPED | COMMUNITY
Start: 2020-05-16 | End: 2020-11-05

## 2020-05-16 RX ORDER — LISINOPRIL 20 MG/1
20 TABLET ORAL DAILY
Qty: 180 TABLET | Refills: 0
Start: 2020-05-16 | End: 2020-06-23 | Stop reason: ALTCHOICE

## 2020-05-16 RX ORDER — CELECOXIB 100 MG/1
100 CAPSULE ORAL 2 TIMES DAILY
Qty: 60 CAPSULE | Refills: 0 | Status: SHIPPED | OUTPATIENT
Start: 2020-05-16 | End: 2020-06-22

## 2020-05-16 RX ORDER — GABAPENTIN 800 MG/1
800 TABLET ORAL 3 TIMES DAILY
Qty: 90 TABLET | Refills: 3 | Status: SHIPPED | COMMUNITY
Start: 2020-05-16

## 2020-05-16 RX ORDER — PANTOPRAZOLE SODIUM 40 MG/1
40 TABLET, DELAYED RELEASE ORAL DAILY
Qty: 30 TABLET | Refills: 3 | Status: SHIPPED | OUTPATIENT
Start: 2020-05-16 | End: 2020-09-08

## 2020-06-19 DIAGNOSIS — M19.90 ARTHRITIS: ICD-10-CM

## 2020-06-22 RX ORDER — CELECOXIB 100 MG/1
CAPSULE ORAL
Qty: 60 CAPSULE | Refills: 0 | Status: SHIPPED | OUTPATIENT
Start: 2020-06-22 | End: 2020-06-23 | Stop reason: SDUPTHER

## 2020-06-23 ENCOUNTER — OFFICE VISIT (OUTPATIENT)
Dept: FAMILY MEDICINE CLINIC | Facility: CLINIC | Age: 45
End: 2020-06-23
Payer: COMMERCIAL

## 2020-06-23 VITALS
HEIGHT: 65 IN | OXYGEN SATURATION: 97 % | WEIGHT: 147.2 LBS | SYSTOLIC BLOOD PRESSURE: 110 MMHG | BODY MASS INDEX: 24.53 KG/M2 | HEART RATE: 97 BPM | TEMPERATURE: 98 F | DIASTOLIC BLOOD PRESSURE: 70 MMHG

## 2020-06-23 DIAGNOSIS — Z11.4 SCREENING FOR HIV (HUMAN IMMUNODEFICIENCY VIRUS): ICD-10-CM

## 2020-06-23 DIAGNOSIS — Z13.220 SCREENING FOR CHOLESTEROL LEVEL: ICD-10-CM

## 2020-06-23 DIAGNOSIS — M19.90 ARTHRITIS: ICD-10-CM

## 2020-06-23 DIAGNOSIS — E03.9 ACQUIRED HYPOTHYROIDISM: ICD-10-CM

## 2020-06-23 DIAGNOSIS — F41.9 ANXIETY: ICD-10-CM

## 2020-06-23 DIAGNOSIS — M54.2 NECK AND SHOULDER PAIN: ICD-10-CM

## 2020-06-23 DIAGNOSIS — Z13.21 ENCOUNTER FOR VITAMIN DEFICIENCY SCREENING: Primary | ICD-10-CM

## 2020-06-23 DIAGNOSIS — M25.572 BILATERAL ANKLE PAIN, UNSPECIFIED CHRONICITY: ICD-10-CM

## 2020-06-23 DIAGNOSIS — M25.571 BILATERAL ANKLE PAIN, UNSPECIFIED CHRONICITY: ICD-10-CM

## 2020-06-23 DIAGNOSIS — M25.519 NECK AND SHOULDER PAIN: ICD-10-CM

## 2020-06-23 DIAGNOSIS — I10 ESSENTIAL HYPERTENSION: Primary | ICD-10-CM

## 2020-06-23 PROCEDURE — 3074F SYST BP LT 130 MM HG: CPT | Performed by: NURSE PRACTITIONER

## 2020-06-23 PROCEDURE — 99214 OFFICE O/P EST MOD 30 MIN: CPT | Performed by: NURSE PRACTITIONER

## 2020-06-23 PROCEDURE — 3078F DIAST BP <80 MM HG: CPT | Performed by: NURSE PRACTITIONER

## 2020-06-23 PROCEDURE — 3008F BODY MASS INDEX DOCD: CPT | Performed by: NURSE PRACTITIONER

## 2020-06-23 RX ORDER — CELECOXIB 100 MG/1
100 CAPSULE ORAL 2 TIMES DAILY
Qty: 60 CAPSULE | Refills: 0 | Status: SHIPPED | OUTPATIENT
Start: 2020-06-23 | End: 2020-09-01

## 2020-06-23 RX ORDER — METHOCARBAMOL 750 MG/1
750 TABLET, FILM COATED ORAL DAILY
Qty: 30 TABLET | Refills: 2 | Status: SHIPPED | OUTPATIENT
Start: 2020-06-23 | End: 2021-04-27

## 2020-06-23 RX ORDER — LORAZEPAM 1 MG/1
1 TABLET ORAL EVERY 8 HOURS PRN
Qty: 90 TABLET | Refills: 0 | Status: SHIPPED | COMMUNITY
Start: 2020-06-23 | End: 2021-09-09

## 2020-07-23 ENCOUNTER — OFFICE VISIT (OUTPATIENT)
Dept: URGENT CARE | Facility: CLINIC | Age: 45
End: 2020-07-23
Payer: COMMERCIAL

## 2020-07-23 VITALS
OXYGEN SATURATION: 98 % | HEIGHT: 65 IN | TEMPERATURE: 97.2 F | RESPIRATION RATE: 16 BRPM | HEART RATE: 90 BPM | BODY MASS INDEX: 24.49 KG/M2 | WEIGHT: 147 LBS

## 2020-07-23 DIAGNOSIS — R05.9 COUGH: Primary | ICD-10-CM

## 2020-07-23 PROCEDURE — 99213 OFFICE O/P EST LOW 20 MIN: CPT | Performed by: PHYSICIAN ASSISTANT

## 2020-07-23 PROCEDURE — U0003 INFECTIOUS AGENT DETECTION BY NUCLEIC ACID (DNA OR RNA); SEVERE ACUTE RESPIRATORY SYNDROME CORONAVIRUS 2 (SARS-COV-2) (CORONAVIRUS DISEASE [COVID-19]), AMPLIFIED PROBE TECHNIQUE, MAKING USE OF HIGH THROUGHPUT TECHNOLOGIES AS DESCRIBED BY CMS-2020-01-R: HCPCS | Performed by: PHYSICIAN ASSISTANT

## 2020-07-23 RX ORDER — BUSPIRONE HYDROCHLORIDE 15 MG/1
TABLET ORAL
COMMUNITY
Start: 2020-07-15 | End: 2021-09-09

## 2020-07-23 RX ORDER — LISINOPRIL 20 MG/1
40 TABLET ORAL DAILY
COMMUNITY
Start: 2020-07-15 | End: 2021-05-11

## 2020-07-23 NOTE — PROGRESS NOTES
[facility]        NAME: Herbie Gonzalez is a 53 y o  female  : 1975    MRN: [id number]  DATE: 2020  TIME: 7:51 PM    Assessment and Plan   Cough [R05]  1  Cough  MISC COVID-19 TEST- Office Collection         Patient Instructions       COVID-19 Home Care Guidelines    Your healthcare provider and/or public health staff have evaluated you and have determined that you do not need to remain in the hospital at this time  At this time you can be isolated at home where you will be monitored by staff from your local or state health department  You should carefully follow the prevention and isolation steps below until a healthcare provider or local or state health department says that you can return to your normal activities  Stay home except to get medical care    People who are mildly ill with COVID-19 are able to isolate at home during their illness  You should restrict activities outside your home, except for getting medical care  Do not go to work, school, or public areas  Avoid using public transportation, ride-sharing, or taxis  Separate yourself from other people and animals in your home    People: As much as possible, you should stay in a specific room and away from other people in your home  Also, you should use a separate bathroom, if available  Animals: You should restrict contact with pets and other animals while you are sick with COVID-19, just like you would around other people  Although there have not been reports of pets or other animals becoming sick with COVID-19, it is still recommended that people sick with COVID-19 limit contact with animals until more information is known about the virus  When possible, have another member of your household care for your animals while you are sick  If you are sick with COVID-19, avoid contact with your pet, including petting, snuggling, being kissed or licked, and sharing food   If you must care for your pet or be around animals while you are sick, wash your hands before and after you interact with pets and wear a facemask  See COVID-19 and Animals for more information  Call ahead before visiting your doctor    If you have a medical appointment, call the healthcare provider and tell them that you have or may have COVID-19  This will help the healthcare providers office take steps to keep other people from getting infected or exposed  Wear a facemask    You should wear a facemask when you are around other people (e g , sharing a room or vehicle) or pets and before you enter a healthcare providers office  If you are not able to wear a facemask (for example, because it causes trouble breathing), then people who live with you should not stay in the same room with you, or they should wear a facemask if they enter your room  Cover your coughs and sneezes    Cover your mouth and nose with a tissue when you cough or sneeze  Throw used tissues in a lined trash can  Immediately wash your hands with soap and water for at least 20 seconds or, if soap and water are not available, clean your hands with an alcohol-based hand  that contains at least 60% alcohol  Clean your hands often    Wash your hands often with soap and water for at least 20 seconds, especially after blowing your nose, coughing, or sneezing; going to the bathroom; and before eating or preparing food  If soap and water are not readily available, use an alcohol-based hand  with at least 60% alcohol, covering all surfaces of your hands and rubbing them together until they feel dry  Soap and water are the best option if hands are visibly dirty  Avoid touching your eyes, nose, and mouth with unwashed hands  Avoid sharing personal household items    You should not share dishes, drinking glasses, cups, eating utensils, towels, or bedding with other people or pets in your home   After using these items, they should be washed thoroughly with soap and water  Clean all high-touch surfaces everyday    High touch surfaces include counters, tabletops, doorknobs, bathroom fixtures, toilets, phones, keyboards, tablets, and bedside tables  Also, clean any surfaces that may have blood, stool, or body fluids on them  Use a household cleaning spray or wipe, according to the label instructions  Labels contain instructions for safe and effective use of the cleaning product including precautions you should take when applying the product, such as wearing gloves and making sure you have good ventilation during use of the product  Monitor your symptoms    Seek prompt medical attention if your illness is worsening (e g , difficulty breathing)  Before seeking care, call your healthcare provider and tell them that you have, or are being evaluated for, COVID-19  Put on a facemask before you enter the facility  These steps will help the healthcare providers office to keep other people in the office or waiting room from getting infected or exposed  Ask your healthcare provider to call the local or [facility] health department  Persons who are placed under active monitoring or facilitated self-monitoring should follow instructions provided by their local health department or occupational health professionals, as appropriate  If you have a medical emergency and need to call 911, notify the dispatch personnel that you have, or are being evaluated for COVID-19  If possible, put on a facemask before emergency medical services arrive      Discontinuing home isolation    Patients with confirmed COVID-19 should remain under home isolation precautions until the following conditions are met:   - They have had no fever for at least 72 hours (that is three full days of no fever without the use medicine that reduces fevers)  AND  - other symptoms have improved (for example, when their cough or shortness of breath have improved)  AND  - at least 10 days have passed since their symptoms first appeared  Patients with confirmed COVID-19 should also notify close contacts (including their workplace) and ask that they self-quarantine  Currently, close contact is defined as being within 6 feet for 10 minutes or more from the period 48 hours before symptom onset to the time at which the patient went into isolation  Close contacts of patients diagnosed with COVID-19 should be instructed by the patient to self-quarantine for 14 days from the last time of their last contact with the patient  Source: [employer]  Proceed to  ER if symptoms worsen  Chief Complaint     Chief Complaint   Patient presents with    Cough     2-3 days    Fever     subjective         History of Present Illness       Cough   This is a new problem  The current episode started in the past 7 days  The problem has been unchanged  The problem occurs every few minutes  The cough is non-productive  Associated symptoms include a fever, headaches and shortness of breath  Pertinent negatives include no chills, ear pain, myalgias, postnasal drip, rash, rhinorrhea, sore throat or wheezing  She has tried nothing for the symptoms  There is no history of asthma  Review of Systems   Review of Systems   Constitutional: Positive for fever  Negative for activity change, appetite change, chills and fatigue  HENT: Positive for congestion  Negative for ear discharge, ear pain, postnasal drip, rhinorrhea, sinus pressure, sinus pain, sneezing, sore throat and trouble swallowing  Respiratory: Positive for cough and shortness of breath  Negative for chest tightness and wheezing  Gastrointestinal: Negative for abdominal pain, diarrhea, nausea and vomiting  Musculoskeletal: Negative for myalgias  Skin: Negative for rash  Neurological: Positive for headaches  Negative for light-headedness           Current Medications       Current Outpatient Medications:     buPROPion (WELLBUTRIN XL) 300 mg 24 hr tablet, Take 300 mg by mouth daily, Disp: , Rfl: 1    busPIRone (BUSPAR) 10 mg tablet, Take 10 mg by mouth 3 (three) times a day, Disp: , Rfl: 0    busPIRone (BUSPAR) 15 mg tablet, TAKE 1 TABLET BY MOUTH IN THE AM AND 2 TABLETS IN THE AFTERNOON AND 1 TABLET AT NIGHT, Disp: , Rfl:     cariprazine (VRAYLAR) 3 MG capsule, Take 1 capsule (3 mg total) by mouth daily, Disp: 30 capsule, Rfl: 2    celecoxib (CeleBREX) 100 mg capsule, Take 1 capsule (100 mg total) by mouth 2 (two) times a day, Disp: 60 capsule, Rfl: 0    ergocalciferol (VITAMIN D2) 50,000 units, take 1 capsule by mouth every week, Disp: 12 capsule, Rfl: 3    gabapentin (NEURONTIN) 800 mg tablet, Take 1 tablet (800 mg total) by mouth 3 (three) times a day, Disp: 90 tablet, Rfl: 3    levothyroxine 75 mcg tablet, Take 1 tablet (75 mcg total) by mouth daily in the early morning, Disp: 90 tablet, Rfl: 1    lisinopril (ZESTRIL) 20 mg tablet, Take 40 mg by mouth daily, Disp: , Rfl:     lisinopril-hydrochlorothiazide (PRINZIDE,ZESTORETIC) 20-25 MG per tablet, Take 1 tablet by mouth daily, Disp: 30 tablet, Rfl: 5    LORazepam (ATIVAN) 1 mg tablet, Take 1 tablet (1 mg total) by mouth every 8 (eight) hours as needed for anxiety, Disp: 90 tablet, Rfl: 0    methocarbamol (ROBAXIN) 750 mg tablet, Take 1 tablet (750 mg total) by mouth daily, Disp: 30 tablet, Rfl: 2    pantoprazole (PROTONIX) 40 mg tablet, Take 1 tablet (40 mg total) by mouth daily, Disp: 30 tablet, Rfl: 3    venlafaxine (EFFEXOR-XR) 150 mg 24 hr capsule, Take 2 capsules (300 mg total) by mouth daily (Patient not taking: Reported on 6/23/2020), Disp: 60 capsule, Rfl: 1    venlafaxine (EFFEXOR-XR) 75 mg 24 hr capsule, Take 1 capsule (75 mg total) by mouth daily, Disp: 30 capsule, Rfl: 5    zolpidem (AMBIEN) 10 mg tablet, Take 1 tablet (10 mg total) by mouth daily at bedtime as needed for sleep for up to 10 days, Disp: 10 tablet, Rfl: 0    Current Allergies Allergies as of 07/23/2020    (No Known Allergies)            The following portions of the patient's history were reviewed and updated as appropriate: allergies, current medications, past family history, past medical history, past social history, past surgical history and problem list      Past Medical History:   Diagnosis Date    Ankle pain, right     Anxiety     Arthritis     Bipolar 1 disorder ([address] )     Depression     GERD (gastroesophageal reflux disease)     Hypertension     Hypothyroidism     Known health problems: none     Lyme disease     Scoliosis        Past Surgical History:   Procedure Laterality Date    CERVICAL FUSION      CHOLECYSTECTOMY      COLONOSCOPY         Family History   Problem Relation Age of Onset    Diabetes Mother     Hypertension Mother     Heart disease Mother     Hypertension Father     Diabetes Maternal Grandmother     Diabetes Paternal Grandmother          Medications have been verified  Objective   Pulse 90   Temp (!) 97 2 °F (36 2 °C)   Resp 16   Ht 5' 5" (1 651 m)   Wt 66 7 kg (147 lb)   SpO2 98%   BMI 24 46 kg/m²        Physical Exam     Physical Exam   Constitutional: She appears well-developed and well-nourished  No distress  Cardiovascular: Normal rate, regular rhythm and normal heart sounds  Exam reveals no gallop and no friction rub  No murmur heard  Pulmonary/Chest: Effort normal and breath sounds normal  No stridor  No respiratory distress  She has no rales  Vitals reviewed

## 2020-07-23 NOTE — PATIENT INSTRUCTIONS

## 2020-07-23 NOTE — LETTER
July 23, 2020     Patient: Eleni Romero   YOB: 1975   Date of Visit: 7/23/2020       To Whom It May Concern: It is my medical opinion that Eleni Romero should remain out of work until cleared by a healthcare provider  If you have any questions or concerns, please don't hesitate to call           Sincerely,        Misael Barboza PA-C    CC: Eleni Search

## 2020-07-26 LAB — SARS-COV-2 RNA SPEC QL NAA+PROBE: NOT DETECTED

## 2020-09-01 DIAGNOSIS — M19.90 ARTHRITIS: ICD-10-CM

## 2020-09-01 RX ORDER — CELECOXIB 100 MG/1
CAPSULE ORAL
Qty: 60 CAPSULE | Refills: 0 | Status: SHIPPED | OUTPATIENT
Start: 2020-09-01 | End: 2020-10-19

## 2020-09-08 DIAGNOSIS — R10.13 EPIGASTRIC ABDOMINAL PAIN: ICD-10-CM

## 2020-09-08 RX ORDER — PANTOPRAZOLE SODIUM 40 MG/1
TABLET, DELAYED RELEASE ORAL
Qty: 90 TABLET | Refills: 0 | Status: SHIPPED | OUTPATIENT
Start: 2020-09-08 | End: 2021-01-15 | Stop reason: SDUPTHER

## 2020-10-14 DIAGNOSIS — E55.9 VITAMIN D DEFICIENCY: ICD-10-CM

## 2020-10-14 RX ORDER — ERGOCALCIFEROL 1.25 MG/1
CAPSULE ORAL
Qty: 12 CAPSULE | Refills: 3 | Status: SHIPPED | OUTPATIENT
Start: 2020-10-14 | End: 2021-09-09

## 2020-10-19 DIAGNOSIS — I10 HYPERTENSION, UNSPECIFIED TYPE: ICD-10-CM

## 2020-10-19 DIAGNOSIS — M19.90 ARTHRITIS: ICD-10-CM

## 2020-10-19 RX ORDER — CELECOXIB 100 MG/1
CAPSULE ORAL
Qty: 60 CAPSULE | Refills: 0 | Status: SHIPPED | OUTPATIENT
Start: 2020-10-19 | End: 2021-05-11

## 2020-10-19 RX ORDER — LISINOPRIL AND HYDROCHLOROTHIAZIDE 25; 20 MG/1; MG/1
TABLET ORAL
Qty: 30 TABLET | Refills: 0 | Status: SHIPPED | OUTPATIENT
Start: 2020-10-19 | End: 2021-06-01 | Stop reason: SDUPTHER

## 2020-11-05 ENCOUNTER — OFFICE VISIT (OUTPATIENT)
Dept: FAMILY MEDICINE CLINIC | Facility: CLINIC | Age: 45
End: 2020-11-05
Payer: COMMERCIAL

## 2020-11-05 VITALS
BODY MASS INDEX: 24.22 KG/M2 | DIASTOLIC BLOOD PRESSURE: 70 MMHG | SYSTOLIC BLOOD PRESSURE: 120 MMHG | OXYGEN SATURATION: 98 % | HEART RATE: 91 BPM | HEIGHT: 65 IN | TEMPERATURE: 98.1 F | WEIGHT: 145.4 LBS

## 2020-11-05 DIAGNOSIS — M77.12 LATERAL EPICONDYLITIS OF LEFT ELBOW: ICD-10-CM

## 2020-11-05 DIAGNOSIS — M25.522 ELBOW PAIN, LEFT: Primary | ICD-10-CM

## 2020-11-05 DIAGNOSIS — Z11.4 SCREENING FOR HIV WITHOUT PRESENCE OF RISK FACTORS: ICD-10-CM

## 2020-11-05 DIAGNOSIS — F32.A DEPRESSION, UNSPECIFIED DEPRESSION TYPE: ICD-10-CM

## 2020-11-05 DIAGNOSIS — Z12.31 ENCOUNTER FOR SCREENING MAMMOGRAM FOR MALIGNANT NEOPLASM OF BREAST: ICD-10-CM

## 2020-11-05 PROCEDURE — 3008F BODY MASS INDEX DOCD: CPT | Performed by: FAMILY MEDICINE

## 2020-11-05 PROCEDURE — 3725F SCREEN DEPRESSION PERFORMED: CPT | Performed by: FAMILY MEDICINE

## 2020-11-05 PROCEDURE — 99214 OFFICE O/P EST MOD 30 MIN: CPT | Performed by: FAMILY MEDICINE

## 2020-11-05 PROCEDURE — 3078F DIAST BP <80 MM HG: CPT | Performed by: FAMILY MEDICINE

## 2020-11-05 PROCEDURE — 3074F SYST BP LT 130 MM HG: CPT | Performed by: FAMILY MEDICINE

## 2020-11-05 RX ORDER — PREDNISONE 10 MG/1
TABLET ORAL
Qty: 30 TABLET | Refills: 0 | Status: SHIPPED | OUTPATIENT
Start: 2020-11-05 | End: 2021-05-11 | Stop reason: ALTCHOICE

## 2020-11-05 RX ORDER — BUPROPION HYDROCHLORIDE 150 MG/1
150 TABLET ORAL EVERY MORNING
COMMUNITY
Start: 2020-10-19 | End: 2021-05-11 | Stop reason: SDUPTHER

## 2020-11-21 ENCOUNTER — TRANSCRIBE ORDERS (OUTPATIENT)
Dept: LAB | Facility: HOSPITAL | Age: 45
End: 2020-11-21

## 2020-11-21 ENCOUNTER — APPOINTMENT (OUTPATIENT)
Dept: LAB | Facility: HOSPITAL | Age: 45
End: 2020-11-21
Payer: COMMERCIAL

## 2020-11-21 ENCOUNTER — HOSPITAL ENCOUNTER (OUTPATIENT)
Dept: MAMMOGRAPHY | Facility: HOSPITAL | Age: 45
Discharge: HOME | End: 2020-11-21
Attending: FAMILY MEDICINE
Payer: COMMERCIAL

## 2020-11-21 VITALS — BODY MASS INDEX: 24.16 KG/M2 | HEIGHT: 65 IN | WEIGHT: 145 LBS

## 2020-11-21 DIAGNOSIS — Z11.4 SCREENING FOR HIV WITHOUT PRESENCE OF RISK FACTORS: ICD-10-CM

## 2020-11-21 DIAGNOSIS — Z11.4 SCREENING FOR HIV (HUMAN IMMUNODEFICIENCY VIRUS): ICD-10-CM

## 2020-11-21 DIAGNOSIS — Z13.21 ENCOUNTER FOR VITAMIN DEFICIENCY SCREENING: ICD-10-CM

## 2020-11-21 DIAGNOSIS — Z12.31 ENCOUNTER FOR SCREENING MAMMOGRAM FOR MALIGNANT NEOPLASM OF BREAST: ICD-10-CM

## 2020-11-21 DIAGNOSIS — E03.9 ACQUIRED HYPOTHYROIDISM: ICD-10-CM

## 2020-11-21 DIAGNOSIS — Z13.220 SCREENING FOR CHOLESTEROL LEVEL: ICD-10-CM

## 2020-11-21 LAB
25(OH)D3 SERPL-MCNC: 53.7 NG/ML (ref 30–100)
ALBUMIN SERPL BCP-MCNC: 4.5 G/DL (ref 3.5–5.7)
ALP SERPL-CCNC: 65 U/L (ref 40–150)
ALT SERPL W P-5'-P-CCNC: 7 U/L (ref 7–52)
ANION GAP SERPL CALCULATED.3IONS-SCNC: 7 MMOL/L (ref 4–13)
AST SERPL W P-5'-P-CCNC: 9 U/L (ref 13–39)
BASOPHILS # BLD AUTO: 0 THOUSANDS/ΜL (ref 0–0.1)
BASOPHILS NFR BLD AUTO: 1 % (ref 0–2)
BILIRUB SERPL-MCNC: 0.3 MG/DL (ref 0.2–1)
BUN SERPL-MCNC: 46 MG/DL (ref 7–25)
CALCIUM SERPL-MCNC: 8.9 MG/DL (ref 8.6–10.5)
CHLORIDE SERPL-SCNC: 106 MMOL/L (ref 98–107)
CHOLEST SERPL-MCNC: 223 MG/DL (ref 0–200)
CO2 SERPL-SCNC: 25 MMOL/L (ref 21–31)
CREAT SERPL-MCNC: 2.67 MG/DL (ref 0.6–1.2)
EOSINOPHIL # BLD AUTO: 0.2 THOUSAND/ΜL (ref 0–0.61)
EOSINOPHIL NFR BLD AUTO: 3 % (ref 0–5)
ERYTHROCYTE [DISTWIDTH] IN BLOOD BY AUTOMATED COUNT: 12.9 % (ref 11.5–14.5)
GFR SERPL CREATININE-BSD FRML MDRD: 21 ML/MIN/1.73SQ M
GLUCOSE P FAST SERPL-MCNC: 100 MG/DL (ref 65–99)
HCT VFR BLD AUTO: 29.7 % (ref 42–47)
HDLC SERPL-MCNC: 43 MG/DL
HGB BLD-MCNC: 10.1 G/DL (ref 12–16)
LDLC SERPL CALC-MCNC: 146 MG/DL (ref 0–100)
LYMPHOCYTES # BLD AUTO: 1.5 THOUSANDS/ΜL (ref 0.6–4.47)
LYMPHOCYTES NFR BLD AUTO: 22 % (ref 21–51)
MCH RBC QN AUTO: 34.3 PG (ref 26–34)
MCHC RBC AUTO-ENTMCNC: 34.2 G/DL (ref 31–37)
MCV RBC AUTO: 100 FL (ref 81–99)
MONOCYTES # BLD AUTO: 0.7 THOUSAND/ΜL (ref 0.17–1.22)
MONOCYTES NFR BLD AUTO: 10 % (ref 2–12)
NEUTROPHILS # BLD AUTO: 4.3 THOUSANDS/ΜL (ref 1.4–6.5)
NEUTS SEG NFR BLD AUTO: 65 % (ref 42–75)
NONHDLC SERPL-MCNC: 180 MG/DL
PLATELET # BLD AUTO: 216 THOUSANDS/UL (ref 149–390)
PMV BLD AUTO: 8.7 FL (ref 8.6–11.7)
POTASSIUM SERPL-SCNC: 4.9 MMOL/L (ref 3.5–5.5)
PROT SERPL-MCNC: 6.9 G/DL (ref 6.4–8.9)
RBC # BLD AUTO: 2.96 MILLION/UL (ref 3.9–5.2)
SODIUM SERPL-SCNC: 138 MMOL/L (ref 134–143)
TRIGL SERPL-MCNC: 170 MG/DL (ref 44–166)
TSH SERPL DL<=0.05 MIU/L-ACNC: 1.02 UIU/ML (ref 0.45–5.33)
WBC # BLD AUTO: 6.7 THOUSAND/UL (ref 4.8–10.8)

## 2020-11-21 PROCEDURE — 84443 ASSAY THYROID STIM HORMONE: CPT

## 2020-11-21 PROCEDURE — 36415 COLL VENOUS BLD VENIPUNCTURE: CPT

## 2020-11-21 PROCEDURE — 77067 SCR MAMMO BI INCL CAD: CPT

## 2020-11-21 PROCEDURE — 80053 COMPREHEN METABOLIC PANEL: CPT

## 2020-11-21 PROCEDURE — 85025 COMPLETE CBC W/AUTO DIFF WBC: CPT

## 2020-11-21 PROCEDURE — 87389 HIV-1 AG W/HIV-1&-2 AB AG IA: CPT

## 2020-11-21 PROCEDURE — 80061 LIPID PANEL: CPT

## 2020-11-21 PROCEDURE — 77063 BREAST TOMOSYNTHESIS BI: CPT

## 2020-11-21 PROCEDURE — 82306 VITAMIN D 25 HYDROXY: CPT

## 2020-11-23 LAB — HIV 1+2 AB+HIV1 P24 AG SERPL QL IA: NORMAL

## 2020-12-02 ENCOUNTER — OFFICE VISIT (OUTPATIENT)
Dept: FAMILY MEDICINE CLINIC | Facility: CLINIC | Age: 45
End: 2020-12-02
Payer: COMMERCIAL

## 2020-12-02 VITALS
HEIGHT: 65 IN | SYSTOLIC BLOOD PRESSURE: 110 MMHG | OXYGEN SATURATION: 98 % | TEMPERATURE: 98.4 F | WEIGHT: 147.4 LBS | HEART RATE: 84 BPM | BODY MASS INDEX: 24.56 KG/M2 | DIASTOLIC BLOOD PRESSURE: 70 MMHG

## 2020-12-02 DIAGNOSIS — R22.0 SWELLING OF GUMS: Primary | ICD-10-CM

## 2020-12-02 PROCEDURE — 3725F SCREEN DEPRESSION PERFORMED: CPT | Performed by: FAMILY MEDICINE

## 2020-12-02 PROCEDURE — 3074F SYST BP LT 130 MM HG: CPT | Performed by: FAMILY MEDICINE

## 2020-12-02 PROCEDURE — 99213 OFFICE O/P EST LOW 20 MIN: CPT | Performed by: FAMILY MEDICINE

## 2020-12-02 PROCEDURE — 3008F BODY MASS INDEX DOCD: CPT | Performed by: FAMILY MEDICINE

## 2020-12-02 PROCEDURE — 3078F DIAST BP <80 MM HG: CPT | Performed by: FAMILY MEDICINE

## 2020-12-02 RX ORDER — AMOXICILLIN AND CLAVULANATE POTASSIUM 875; 125 MG/1; MG/1
1 TABLET, FILM COATED ORAL EVERY 12 HOURS SCHEDULED
Qty: 14 TABLET | Refills: 0 | Status: SHIPPED | OUTPATIENT
Start: 2020-12-02 | End: 2020-12-09

## 2020-12-02 RX ORDER — IBUPROFEN 800 MG/1
800 TABLET ORAL EVERY 8 HOURS PRN
Qty: 30 TABLET | Refills: 2 | Status: SHIPPED | OUTPATIENT
Start: 2020-12-02 | End: 2021-05-11

## 2020-12-09 DIAGNOSIS — E55.9 VITAMIN D DEFICIENCY: ICD-10-CM

## 2020-12-09 DIAGNOSIS — I10 ESSENTIAL HYPERTENSION: Primary | ICD-10-CM

## 2020-12-09 DIAGNOSIS — E03.9 ACQUIRED HYPOTHYROIDISM: ICD-10-CM

## 2020-12-09 DIAGNOSIS — E78.00 HYPERCHOLESTEROLEMIA: ICD-10-CM

## 2020-12-09 DIAGNOSIS — Z11.4 SCREENING FOR HIV WITHOUT PRESENCE OF RISK FACTORS: ICD-10-CM

## 2020-12-26 ENCOUNTER — OFFICE VISIT (OUTPATIENT)
Dept: URGENT CARE | Facility: CLINIC | Age: 45
End: 2020-12-26
Payer: COMMERCIAL

## 2020-12-26 VITALS
TEMPERATURE: 97.8 F | HEART RATE: 88 BPM | HEIGHT: 65 IN | DIASTOLIC BLOOD PRESSURE: 55 MMHG | RESPIRATION RATE: 18 BRPM | WEIGHT: 147 LBS | BODY MASS INDEX: 24.49 KG/M2 | SYSTOLIC BLOOD PRESSURE: 95 MMHG | OXYGEN SATURATION: 100 %

## 2020-12-26 DIAGNOSIS — B34.9 VIRAL ILLNESS: ICD-10-CM

## 2020-12-26 DIAGNOSIS — R05.9 COUGH: Primary | ICD-10-CM

## 2020-12-26 PROCEDURE — 87637 SARSCOV2&INF A&B&RSV AMP PRB: CPT | Performed by: PHYSICIAN ASSISTANT

## 2020-12-26 PROCEDURE — 99213 OFFICE O/P EST LOW 20 MIN: CPT | Performed by: PHYSICIAN ASSISTANT

## 2020-12-26 RX ORDER — CHLORHEXIDINE GLUCONATE 0.12 MG/ML
RINSE ORAL
COMMUNITY
Start: 2020-12-03 | End: 2021-05-11 | Stop reason: ALTCHOICE

## 2020-12-26 RX ORDER — OLANZAPINE 5 MG/1
TABLET ORAL
COMMUNITY
Start: 2020-12-09 | End: 2021-09-09

## 2020-12-26 RX ORDER — ACETAMINOPHEN AND CODEINE PHOSPHATE 300; 15 MG/1; MG/1
TABLET ORAL
COMMUNITY
Start: 2020-12-03 | End: 2021-05-11 | Stop reason: ALTCHOICE

## 2020-12-26 RX ORDER — AMOXICILLIN 500 MG/1
500 TABLET, FILM COATED ORAL 4 TIMES DAILY
COMMUNITY
Start: 2020-12-03 | End: 2021-05-11 | Stop reason: ALTCHOICE

## 2020-12-28 LAB
FLUAV RNA NPH QL NAA+PROBE: NOT DETECTED
FLUBV RNA NPH QL NAA+PROBE: NOT DETECTED
RSV RNA NPH QL NAA+PROBE: NOT DETECTED
SARS-COV-2 RNA NPH QL NAA+PROBE: NOT DETECTED

## 2020-12-31 DIAGNOSIS — E03.9 ACQUIRED HYPOTHYROIDISM: ICD-10-CM

## 2021-01-04 RX ORDER — LEVOTHYROXINE SODIUM 0.07 MG/1
75 TABLET ORAL
Qty: 90 TABLET | Refills: 1 | Status: SHIPPED | OUTPATIENT
Start: 2021-01-04 | End: 2021-06-23 | Stop reason: SDUPTHER

## 2021-01-06 ENCOUNTER — OFFICE VISIT (OUTPATIENT)
Dept: FAMILY MEDICINE CLINIC | Facility: CLINIC | Age: 46
End: 2021-01-06
Payer: COMMERCIAL

## 2021-01-06 VITALS
SYSTOLIC BLOOD PRESSURE: 120 MMHG | HEART RATE: 69 BPM | DIASTOLIC BLOOD PRESSURE: 70 MMHG | TEMPERATURE: 96.9 F | HEIGHT: 65 IN | BODY MASS INDEX: 24.39 KG/M2 | OXYGEN SATURATION: 98 % | WEIGHT: 146.4 LBS

## 2021-01-06 DIAGNOSIS — J01.00 ACUTE NON-RECURRENT MAXILLARY SINUSITIS: Primary | ICD-10-CM

## 2021-01-06 PROCEDURE — 3725F SCREEN DEPRESSION PERFORMED: CPT | Performed by: FAMILY MEDICINE

## 2021-01-06 PROCEDURE — 3078F DIAST BP <80 MM HG: CPT | Performed by: FAMILY MEDICINE

## 2021-01-06 PROCEDURE — 99213 OFFICE O/P EST LOW 20 MIN: CPT | Performed by: FAMILY MEDICINE

## 2021-01-06 PROCEDURE — 3074F SYST BP LT 130 MM HG: CPT | Performed by: FAMILY MEDICINE

## 2021-01-06 PROCEDURE — 3008F BODY MASS INDEX DOCD: CPT | Performed by: FAMILY MEDICINE

## 2021-01-06 RX ORDER — AZITHROMYCIN 250 MG/1
TABLET, FILM COATED ORAL
Qty: 6 TABLET | Refills: 0 | Status: SHIPPED | OUTPATIENT
Start: 2021-01-06 | End: 2021-01-11

## 2021-01-06 NOTE — PROGRESS NOTES
Assessment/Plan:    No problem-specific Assessment & Plan notes found for this encounter  Diagnoses and all orders for this visit:    Acute non-recurrent maxillary sinusitis  -     azithromycin (Zithromax) 250 mg tablet; Take 2 tablets (500 mg total) by mouth daily for 1 day, THEN 1 tablet (250 mg total) daily for 4 days  PHQ-9 Depression Screening    PHQ-9:   Frequency of the following problems over the past two weeks:      Little interest or pleasure in doing things: 1 - several days  Feeling down, depressed, or hopeless: 2 - more than half the days  Trouble falling or staying asleep, or sleeping too much: 0 - not at all  Feeling tired or having little energy: 0 - not at all  Poor appetite or overeatin - several days  Feeling bad about yourself - or that you are a failure or have let yourself or your family down: 1 - several days  Trouble concentrating on things, such as reading the newspaper or watching television: 1 - several days  Moving or speaking so slowly that other people could have noticed  Or the opposite - being so fidgety or restless that you have been moving around a lot more than usual: 0 - not at all  Thoughts that you would be better off dead, or of hurting yourself in some way: 0 - not at all  PHQ-2 Score: 3  PHQ-9 Score: 6            Subjective:      Patient ID: Satinder Banks is a 50 y o  female  Cough  This is a new problem  The current episode started 1 to 4 weeks ago  The problem has been unchanged  The problem occurs constantly  The cough is productive of purulent sputum  Associated symptoms include headaches, nasal congestion and rhinorrhea  Pertinent negatives include no chills, fever, shortness of breath or wheezing  She has tried nothing for the symptoms  The treatment provided no relief         The following portions of the patient's history were reviewed and updated as appropriate: allergies, current medications, past family history, past medical history, past social history, past surgical history and problem list     Review of Systems   Constitutional: Negative for chills and fever  HENT: Positive for rhinorrhea, sinus pressure and sinus pain  Respiratory: Positive for cough  Negative for shortness of breath and wheezing  Neurological: Positive for headaches  Objective:    /70   Pulse 69   Temp (!) 96 9 °F (36 1 °C) (Tympanic)   Ht 5' 5" (1 651 m)   Wt 66 4 kg (146 lb 6 4 oz)   SpO2 98%   BMI 24 36 kg/m²      Physical Exam  Vitals signs and nursing note reviewed  Constitutional:       General: She is not in acute distress  Appearance: She is well-developed  She is not diaphoretic  HENT:      Head: Normocephalic and atraumatic  Right Ear: Tympanic membrane, ear canal and external ear normal       Left Ear: Tympanic membrane, ear canal and external ear normal       Nose: Mucosal edema, congestion and rhinorrhea present  Mouth/Throat:      Mouth: Mucous membranes are moist       Pharynx: No oropharyngeal exudate or posterior oropharyngeal erythema  Eyes:      General: No scleral icterus  Neck:      Musculoskeletal: Normal range of motion and neck supple  Cardiovascular:      Rate and Rhythm: Normal rate and regular rhythm  Heart sounds: Normal heart sounds  No murmur  Pulmonary:      Effort: Pulmonary effort is normal  No respiratory distress  Breath sounds: Normal breath sounds  No stridor  No wheezing or rales  Lymphadenopathy:      Cervical: No cervical adenopathy  Skin:     General: Skin is warm and dry  Findings: No rash  Neurological:      Mental Status: She is alert and oriented to person, place, and time  Psychiatric:         Behavior: Behavior normal          Thought Content:  Thought content normal          Judgment: Judgment normal

## 2021-01-15 DIAGNOSIS — R10.13 EPIGASTRIC ABDOMINAL PAIN: ICD-10-CM

## 2021-01-15 RX ORDER — PANTOPRAZOLE SODIUM 40 MG/1
40 TABLET, DELAYED RELEASE ORAL DAILY
Qty: 90 TABLET | Refills: 0 | Status: SHIPPED | OUTPATIENT
Start: 2021-01-15 | End: 2021-09-09

## 2021-02-16 NOTE — ASSESSMENT & PLAN NOTE
I educated him in the reason for site change and the length of time we keep them in.  Iv flushes well and dressing d/i.  Pt currently refuses iv site change.     · Placed in observation Medicine  · Hold nephrotoxic medications  · Hydrate with normal saline at 125 cc/hour  · Obtain UA, urine protein creatinine ratio, renal ultrasound and consult Nephrology in a m  · Suspect acute kidney injury is secondary to increased nonsteroidal use secondary to her abdominal pain

## 2021-03-16 ENCOUNTER — APPOINTMENT (OUTPATIENT)
Dept: LAB | Facility: HOSPITAL | Age: 46
End: 2021-03-16
Attending: FAMILY MEDICINE
Payer: COMMERCIAL

## 2021-03-16 DIAGNOSIS — Z11.4 SCREENING FOR HIV WITHOUT PRESENCE OF RISK FACTORS: ICD-10-CM

## 2021-03-16 DIAGNOSIS — E03.9 ACQUIRED HYPOTHYROIDISM: ICD-10-CM

## 2021-03-16 DIAGNOSIS — E55.9 VITAMIN D DEFICIENCY: ICD-10-CM

## 2021-03-16 DIAGNOSIS — I10 ESSENTIAL HYPERTENSION: ICD-10-CM

## 2021-03-16 DIAGNOSIS — E78.00 HYPERCHOLESTEROLEMIA: ICD-10-CM

## 2021-03-16 LAB
25(OH)D3 SERPL-MCNC: 127.8 NG/ML (ref 30–100)
ALBUMIN SERPL BCP-MCNC: 4.7 G/DL (ref 3.5–5.7)
ALP SERPL-CCNC: 45 U/L (ref 40–150)
ALT SERPL W P-5'-P-CCNC: 7 U/L (ref 7–52)
ANION GAP SERPL CALCULATED.3IONS-SCNC: 9 MMOL/L (ref 4–13)
AST SERPL W P-5'-P-CCNC: 10 U/L (ref 13–39)
BASOPHILS # BLD AUTO: 0 THOUSANDS/ΜL (ref 0–0.1)
BASOPHILS NFR BLD AUTO: 1 % (ref 0–2)
BILIRUB SERPL-MCNC: 0.3 MG/DL (ref 0.2–1)
BUN SERPL-MCNC: 18 MG/DL (ref 7–25)
CALCIUM SERPL-MCNC: 9.2 MG/DL (ref 8.6–10.5)
CHLORIDE SERPL-SCNC: 106 MMOL/L (ref 98–107)
CHOLEST SERPL-MCNC: 206 MG/DL (ref 0–200)
CO2 SERPL-SCNC: 25 MMOL/L (ref 21–31)
CREAT SERPL-MCNC: 1.45 MG/DL (ref 0.6–1.2)
EOSINOPHIL # BLD AUTO: 0.1 THOUSAND/ΜL (ref 0–0.61)
EOSINOPHIL NFR BLD AUTO: 2 % (ref 0–5)
ERYTHROCYTE [DISTWIDTH] IN BLOOD BY AUTOMATED COUNT: 12.1 % (ref 11.5–14.5)
GFR SERPL CREATININE-BSD FRML MDRD: 43 ML/MIN/1.73SQ M
GLUCOSE P FAST SERPL-MCNC: 87 MG/DL (ref 65–99)
HCT VFR BLD AUTO: 32.7 % (ref 42–47)
HDLC SERPL-MCNC: 53 MG/DL
HGB BLD-MCNC: 10.9 G/DL (ref 12–16)
LDLC SERPL CALC-MCNC: 127 MG/DL (ref 0–100)
LYMPHOCYTES # BLD AUTO: 1.3 THOUSANDS/ΜL (ref 0.6–4.47)
LYMPHOCYTES NFR BLD AUTO: 20 % (ref 21–51)
MCH RBC QN AUTO: 33.3 PG (ref 26–34)
MCHC RBC AUTO-ENTMCNC: 33.4 G/DL (ref 31–37)
MCV RBC AUTO: 100 FL (ref 81–99)
MONOCYTES # BLD AUTO: 0.4 THOUSAND/ΜL (ref 0.17–1.22)
MONOCYTES NFR BLD AUTO: 7 % (ref 2–12)
NEUTROPHILS # BLD AUTO: 4.7 THOUSANDS/ΜL (ref 1.4–6.5)
NEUTS SEG NFR BLD AUTO: 71 % (ref 42–75)
NONHDLC SERPL-MCNC: 153 MG/DL
PLATELET # BLD AUTO: 211 THOUSANDS/UL (ref 149–390)
PMV BLD AUTO: 8.7 FL (ref 8.6–11.7)
POTASSIUM SERPL-SCNC: 3.8 MMOL/L (ref 3.5–5.5)
PROT SERPL-MCNC: 7 G/DL (ref 6.4–8.9)
RBC # BLD AUTO: 3.28 MILLION/UL (ref 3.9–5.2)
SODIUM SERPL-SCNC: 140 MMOL/L (ref 134–143)
T4 FREE SERPL-MCNC: 1.08 NG/DL (ref 0.76–1.46)
TRIGL SERPL-MCNC: 132 MG/DL (ref 44–166)
TSH SERPL DL<=0.05 MIU/L-ACNC: 0.11 UIU/ML (ref 0.45–5.33)
WBC # BLD AUTO: 6.6 THOUSAND/UL (ref 4.8–10.8)

## 2021-03-16 PROCEDURE — 87389 HIV-1 AG W/HIV-1&-2 AB AG IA: CPT

## 2021-03-16 PROCEDURE — 85025 COMPLETE CBC W/AUTO DIFF WBC: CPT

## 2021-03-16 PROCEDURE — 36415 COLL VENOUS BLD VENIPUNCTURE: CPT

## 2021-03-16 PROCEDURE — 84439 ASSAY OF FREE THYROXINE: CPT

## 2021-03-16 PROCEDURE — 82306 VITAMIN D 25 HYDROXY: CPT

## 2021-03-16 PROCEDURE — 84443 ASSAY THYROID STIM HORMONE: CPT

## 2021-03-16 PROCEDURE — 80053 COMPREHEN METABOLIC PANEL: CPT

## 2021-03-16 PROCEDURE — 80061 LIPID PANEL: CPT

## 2021-03-17 LAB — HIV 1+2 AB+HIV1 P24 AG SERPL QL IA: NORMAL

## 2021-03-19 ENCOUNTER — OFFICE VISIT (OUTPATIENT)
Dept: FAMILY MEDICINE CLINIC | Facility: CLINIC | Age: 46
End: 2021-03-19
Payer: COMMERCIAL

## 2021-03-19 VITALS
WEIGHT: 134.2 LBS | TEMPERATURE: 97.9 F | SYSTOLIC BLOOD PRESSURE: 124 MMHG | BODY MASS INDEX: 22.36 KG/M2 | HEIGHT: 65 IN | DIASTOLIC BLOOD PRESSURE: 62 MMHG | HEART RATE: 101 BPM | OXYGEN SATURATION: 99 %

## 2021-03-19 DIAGNOSIS — Z00.00 ANNUAL PHYSICAL EXAM: Primary | ICD-10-CM

## 2021-03-19 DIAGNOSIS — R94.4 DECREASED CALCULATED GFR: ICD-10-CM

## 2021-03-19 DIAGNOSIS — Z01.419 ROUTINE GYNECOLOGICAL EXAMINATION: ICD-10-CM

## 2021-03-19 DIAGNOSIS — M25.50 ARTHRALGIA, UNSPECIFIED JOINT: ICD-10-CM

## 2021-03-19 DIAGNOSIS — R79.89 ELEVATED SERUM CREATININE: ICD-10-CM

## 2021-03-19 DIAGNOSIS — I10 ESSENTIAL HYPERTENSION: ICD-10-CM

## 2021-03-19 DIAGNOSIS — E78.00 HYPERCHOLESTEROLEMIA: ICD-10-CM

## 2021-03-19 PROCEDURE — 3074F SYST BP LT 130 MM HG: CPT | Performed by: FAMILY MEDICINE

## 2021-03-19 PROCEDURE — 99396 PREV VISIT EST AGE 40-64: CPT | Performed by: FAMILY MEDICINE

## 2021-03-19 PROCEDURE — 3008F BODY MASS INDEX DOCD: CPT | Performed by: FAMILY MEDICINE

## 2021-03-19 PROCEDURE — 3725F SCREEN DEPRESSION PERFORMED: CPT | Performed by: FAMILY MEDICINE

## 2021-03-19 PROCEDURE — 3078F DIAST BP <80 MM HG: CPT | Performed by: FAMILY MEDICINE

## 2021-03-19 NOTE — PROGRESS NOTES
Assessment/Plan:    No problem-specific Assessment & Plan notes found for this encounter  Diagnoses and all orders for this visit:    Annual physical exam    Essential hypertension  Comments:  no change in the medication    Decreased calculated GFR  -     Ambulatory referral to Nephrology; Future  -     Comprehensive metabolic panel; Future    Elevated serum creatinine  -     Ambulatory referral to Nephrology; Future  -     Comprehensive metabolic panel; Future    Routine gynecological examination  -     Ambulatory referral to Obstetrics / Gynecology; Future    Hypercholesterolemia  Comments:  pt counseled no diet and exercise    Arthralgia, unspecified joint  -     RF Screen w/ Reflex to Titer; Future  -     Sedimentation rate, automated; Future  -     C-reactive protein; Future          PHQ-9 Depression Screening    PHQ-9:   Frequency of the following problems over the past two weeks:      Little interest or pleasure in doing things: 2 - more than half the days  Feeling down, depressed, or hopeless: 2 - more than half the days  Trouble falling or staying asleep, or sleeping too much: 2 - more than half the days  Feeling tired or having little energy: 2 - more than half the days  Poor appetite or overeatin - not at all  Feeling bad about yourself - or that you are a failure or have let yourself or your family down: 1 - several days  Trouble concentrating on things, such as reading the newspaper or watching television: 0 - not at all  Moving or speaking so slowly that other people could have noticed  Or the opposite - being so fidgety or restless that you have been moving around a lot more than usual: 2 - more than half the days  Thoughts that you would be better off dead, or of hurting yourself in some way: 0 - not at all  PHQ-2 Score: 4  PHQ-9 Score: 11            Subjective:      Patient ID: Jerome Jaime is a 50 y o  female      Pt here for annual physical      The following portions of the patient's history were reviewed and updated as appropriate: allergies, current medications, past family history, past medical history, past social history, past surgical history and problem list     Review of Systems   Constitutional: Positive for fatigue  Negative for chills and fever  HENT: Negative  Eyes: Negative  Respiratory: Negative for shortness of breath and wheezing  Cardiovascular: Negative for chest pain and palpitations  Gastrointestinal: Negative for abdominal pain, blood in stool, constipation, diarrhea, nausea and vomiting  Endocrine: Negative  Genitourinary: Negative for difficulty urinating and dysuria  Musculoskeletal: Positive for arthralgias and myalgias  Skin: Negative  Allergic/Immunologic: Negative  Neurological: Negative for seizures and syncope  Hematological: Negative for adenopathy  Psychiatric/Behavioral: Negative  Objective:    /62   Pulse 101   Temp 97 9 °F (36 6 °C) (Tympanic)   Ht 5' 5" (1 651 m)   Wt 60 9 kg (134 lb 3 2 oz)   SpO2 99%   BMI 22 33 kg/m²      Physical Exam  Vitals signs and nursing note reviewed  Constitutional:       General: She is not in acute distress  Appearance: Normal appearance  She is well-developed  She is not ill-appearing, toxic-appearing or diaphoretic  HENT:      Head: Normocephalic and atraumatic  Right Ear: Tympanic membrane, ear canal and external ear normal  There is no impacted cerumen  Left Ear: Tympanic membrane, ear canal and external ear normal  There is no impacted cerumen  Nose: Nose normal  No congestion or rhinorrhea  Mouth/Throat:      Mouth: Mucous membranes are moist       Pharynx: Oropharynx is clear  No oropharyngeal exudate or posterior oropharyngeal erythema  Eyes:      General:         Right eye: No discharge  Left eye: No discharge  Conjunctiva/sclera: Conjunctivae normal       Pupils: Pupils are equal, round, and reactive to light  Neck:      Musculoskeletal: Normal range of motion and neck supple  No neck rigidity  Cardiovascular:      Rate and Rhythm: Normal rate and regular rhythm  Pulses: Normal pulses  Heart sounds: Normal heart sounds  No murmur  Pulmonary:      Effort: Pulmonary effort is normal  No respiratory distress  Breath sounds: Normal breath sounds  No wheezing, rhonchi or rales  Abdominal:      General: Bowel sounds are normal  There is no distension  Palpations: Abdomen is soft  There is no mass  Tenderness: There is no abdominal tenderness  There is no guarding or rebound  Musculoskeletal: Normal range of motion  Right lower leg: No edema  Left lower leg: No edema  Lymphadenopathy:      Cervical: No cervical adenopathy  Skin:     General: Skin is warm and dry  Findings: No rash  Neurological:      General: No focal deficit present  Mental Status: She is alert and oriented to person, place, and time  Sensory: No sensory deficit  Motor: No weakness or abnormal muscle tone  Coordination: Coordination normal       Gait: Gait normal       Deep Tendon Reflexes: Reflexes are normal and symmetric  Psychiatric:         Mood and Affect: Mood normal          Behavior: Behavior normal          Thought Content:  Thought content normal          Judgment: Judgment normal

## 2021-04-06 ENCOUNTER — CONSULT (OUTPATIENT)
Dept: NEPHROLOGY | Facility: CLINIC | Age: 46
End: 2021-04-06
Payer: COMMERCIAL

## 2021-04-06 VITALS
HEIGHT: 65 IN | SYSTOLIC BLOOD PRESSURE: 120 MMHG | HEART RATE: 84 BPM | WEIGHT: 143 LBS | BODY MASS INDEX: 23.82 KG/M2 | OXYGEN SATURATION: 97 % | DIASTOLIC BLOOD PRESSURE: 78 MMHG | TEMPERATURE: 98.4 F

## 2021-04-06 DIAGNOSIS — K21.9 GASTROESOPHAGEAL REFLUX DISEASE WITHOUT ESOPHAGITIS: ICD-10-CM

## 2021-04-06 DIAGNOSIS — I10 ESSENTIAL HYPERTENSION: ICD-10-CM

## 2021-04-06 DIAGNOSIS — N17.9 AKI (ACUTE KIDNEY INJURY) (HCC): ICD-10-CM

## 2021-04-06 DIAGNOSIS — R79.89 ELEVATED SERUM CREATININE: ICD-10-CM

## 2021-04-06 DIAGNOSIS — E78.00 HYPERCHOLESTEROLEMIA: Primary | ICD-10-CM

## 2021-04-06 DIAGNOSIS — R94.4 DECREASED CALCULATED GFR: ICD-10-CM

## 2021-04-06 PROCEDURE — 99204 OFFICE O/P NEW MOD 45 MIN: CPT | Performed by: INTERNAL MEDICINE

## 2021-04-06 RX ORDER — FAMOTIDINE 20 MG/1
20 TABLET, FILM COATED ORAL 2 TIMES DAILY
Qty: 60 TABLET | Refills: 3 | Status: SHIPPED | OUTPATIENT
Start: 2021-04-06 | End: 2021-07-20 | Stop reason: SDUPTHER

## 2021-04-06 NOTE — ASSESSMENT & PLAN NOTE
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

## 2021-04-06 NOTE — PROGRESS NOTES
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 and lack of water  She will stop pantoprazole  I will give her Pepcid instead   She will stop decrease lisinopril HCT to once a day as she was taking it twice a day  She is not taking Celebrex At this time   will obtain a kidney ultrasound and urine studies and see her back  Will check urine electrolytes to rule out acute tubular necrosis  She needs to drink at least a qt of liquid a day  Pepsi has a high salt content with  Would switch to water  She can addt the flavor of her choice whether be lemon or crystal light   Will recheck labs in 2 months and see her back            Other    Hypercholesterolemia - Primary     Aim for an LDL < 70           Other Visit Diagnoses     Decreased calculated GFR        Elevated serum creatinine                Subjective:      Patient ID: Alexandre Michaud is a 50 y o  female  HPI [facility] presents with an elevated creatinine  since October 2019  In November of 2020 she had a rise in creatinine to 2 67 and now has a creatinine of 1 45  She does take Advil 800mg twice a week  She takes a number of hepatically metabolized psychiatric drugs    She does not use social drugs  No fH of kidney disease  She denies any infections or changes in her health  She has hypertension  She does not have diabetes        She drinks two cups of [employer] a day and that is it - no water or any other beverage  She has a dry mouth but she has to remind herself f to drink    The following portions of the patient's history were reviewed and updated as appropriate: allergies, current medications, past family history, past medical history, past social history, past surgical history and problem list     Review of Systems   Constitutional: Positive for fatigue  HENT: Negative for hearing loss  Dry mouth   Respiratory: Negative for cough and shortness of breath  Cardiovascular: Negative for chest pain and leg swelling  Gastrointestinal: Negative for abdominal pain, blood in stool, constipation and diarrhea  Genitourinary: Positive for decreased urine volume  Negative for difficulty urinating, hematuria and urgency  No nocturia   Musculoskeletal: Negative for arthralgias  Neurological: Negative for dizziness, weakness, light-headedness and headaches  Hematological: Does not bruise/bleed easily  Psychiatric/Behavioral: Negative for dysphoric mood  Social History     Socioeconomic History    Marital status: /Civil Union     Spouse name: None    Number of children: None    Years of education: None    Highest education level: None   Occupational History    Occupation: UNEMPLOYED   Social Needs    Financial resource strain: None    Food insecurity     Worry: None     Inability: None    Transportation needs     Medical: None     Non-medical: None   Tobacco Use    Smoking status: Current Every Day Smoker     Packs/day: 0 50     Years: 3 00     Pack years: 1 50    Smokeless tobacco: Never Used   Substance and Sexual Activity    Alcohol use: Not Currently     Frequency: Never    Drug use: Never    Sexual activity: Yes     Partners: Male     Birth control/protection: I U D     Lifestyle    Physical activity     Days per week: None     Minutes per session: None    Stress: None   Relationships    Social connections Talks on phone: None     Gets together: None     Attends Hindu service: None     Active member of club or organization: None     Attends meetings of clubs or organizations: None     Relationship status: None    Intimate partner violence     Fear of current or ex partner: None     Emotionally abused: None     Physically abused: None     Forced sexual activity: None   Other Topics Concern    None   Social History Narrative        UNEMPLOYED     Past Medical History:   Diagnosis Date    Ankle pain, right     Anxiety     Arthritis     Bipolar 1 disorder ([facility] [address]     Depression     GERD (gastroesophageal reflux disease)     Hypertension     Hypothyroidism     Known health problems: none     Lyme disease     Scoliosis      Past Surgical History:   Procedure Laterality Date    CERVICAL FUSION      CHOLECYSTECTOMY      COLONOSCOPY         Current Outpatient Medications:     buPROPion (WELLBUTRIN XL) 300 mg 24 hr tablet, Take 300 mg by mouth daily, Disp: , Rfl: 1    busPIRone (BUSPAR) 15 mg tablet, TAKE 1 TABLET BY MOUTH IN THE AM AND 2 TABLETS IN THE AFTERNOON AND 1 TABLET AT NIGHT, Disp: , Rfl:     ergocalciferol (VITAMIN D2) 50,000 units, take 1 capsule by mouth weekly, Disp: 12 capsule, Rfl: 3    gabapentin (NEURONTIN) 800 mg tablet, Take 1 tablet (800 mg total) by mouth 3 (three) times a day, Disp: 90 tablet, Rfl: 3    ibuprofen (MOTRIN) 800 mg tablet, Take 1 tablet (800 mg total) by mouth every 8 (eight) hours as needed for mild pain, Disp: 30 tablet, Rfl: 2    levothyroxine 75 mcg tablet, Take 1 tablet (75 mcg total) by mouth daily in the early morning, Disp: 90 tablet, Rfl: 1    lisinopril-hydrochlorothiazide (PRINZIDE,ZESTORETIC) 20-25 MG per tablet, Take 1 tablet by mouth once daily, Disp: 30 tablet, Rfl: 0    pantoprazole (PROTONIX) 40 mg tablet, Take 1 tablet (40 mg total) by mouth daily, Disp: 90 tablet, Rfl: 0    venlafaxine (EFFEXOR-XR) 150 mg 24 hr capsule, Take 2 capsules (300 mg total) by mouth daily, Disp: 60 capsule, Rfl: 1    acetaminophen-codeine (TYLENOL with CODEINE #2) 300-15 MG, TAKE 1 TABLET BY MOUTH 4 TIMES DAILY AS NEEDED FOR PAIN, Disp: , Rfl:     amoxicillin (AMOXIL) 500 MG tablet, Take 500 mg by mouth 4 (four) times a day, Disp: , Rfl:     buPROPion (WELLBUTRIN XL) 150 mg 24 hr tablet, Take 150 mg by mouth every morning, Disp: , Rfl:     busPIRone (BUSPAR) 10 mg tablet, Take 10 mg by mouth 3 (three) times a day, Disp: , Rfl: 0    cariprazine (VRAYLAR) 3 MG capsule, Take 1 capsule (3 mg total) by mouth daily (Patient not taking: Reported on 12/2/2020), Disp: 30 capsule, Rfl: 2    celecoxib (CeleBREX) 100 mg capsule, Take 1 capsule by mouth twice daily (Patient not taking: Reported on 11/5/2020), Disp: 60 capsule, Rfl: 0    chlorhexidine (PERIDEX) 0 12 % solution, SWISH AND SPIT 15 ML IN MOUTH THREE TIMES DAILY FOR 7 DAYS, Disp: , Rfl:     lisinopril (ZESTRIL) 20 mg tablet, Take 40 mg by mouth daily, Disp: , Rfl:     LORazepam (ATIVAN) 1 mg tablet, Take 1 tablet (1 mg total) by mouth every 8 (eight) hours as needed for anxiety (Patient not taking: Reported on 11/5/2020), Disp: 90 tablet, Rfl: 0    methocarbamol (ROBAXIN) 750 mg tablet, Take 1 tablet (750 mg total) by mouth daily (Patient not taking: Reported on 11/5/2020), Disp: 30 tablet, Rfl: 2    OLANZapine (ZyPREXA) 5 mg tablet, TAKE 1 2 (ONE HALF) TABLET BY MOUTH TWICE DAILY, Disp: , Rfl:     predniSONE 10 mg tablet, 4 pills for 3 days, then 3 pills for 3 days, then 2 pills for 3 days, then 1 pills for 3 days, then stop (Patient not taking: Reported on 12/2/2020), Disp: 30 tablet, Rfl: 0    zolpidem (AMBIEN) 10 mg tablet, Take 1 tablet (10 mg total) by mouth daily at bedtime as needed for sleep for up to 10 days, Disp: 10 tablet, Rfl: 0    Lab Results   Component Value Date     04/16/2018    SODIUM 140 03/16/2021    K 3 8 03/16/2021     03/16/2021    CO2 25 03/16/2021    ANIONGAP 12 7 04/16/2018    AGAP 9 03/16/2021    BUN 18 03/16/2021    CREATININE 1 45 (H) 03/16/2021    GLUC 88 03/05/2020    GLUF 87 03/16/2021    CALCIUM 9 2 03/16/2021    AST 10 (L) 03/16/2021    ALT 7 03/16/2021    ALKPHOS 45 03/16/2021    PROT 6 9 04/16/2018    TP 7 0 03/16/2021    BILITOT 0 4 04/16/2018    TBILI 0 30 03/16/2021    EGFR 43 03/16/2021     Lab Results   Component Value Date    WBC 6 60 03/16/2021    HGB 10 9 (L) 03/16/2021    HCT 32 7 (L) 03/16/2021     (H) 03/16/2021     03/16/2021     Lab Results   Component Value Date    CHOLESTEROL 206 (H) 03/16/2021    CHOLESTEROL 223 (H) 11/21/2020    CHOLESTEROL 213 (H) 12/08/2018     Lab Results   Component Value Date    HDL 53 03/16/2021    HDL 43 11/21/2020    HDL 67 (H) 12/08/2018     Lab Results   Component Value Date    LDLCALC 127 (H) 03/16/2021    LDLCALC 146 (H) 11/21/2020    LDLCALC 126 12/08/2018     Lab Results   Component Value Date    TRIG 132 03/16/2021    TRIG 170 (H) 11/21/2020    TRIG 98 12/08/2018     No results found for: CHOLHDL  Lab Results   Component Value Date    [id number] 0 110 (L) 03/16/2021     Lab Results   Component Value Date    CALCIUM 9 2 03/16/2021     No results found for: SPEP, UPEP  No results found for: MICROALBUR, [id number]        Objective:      /78 (BP Location: Left arm, Patient Position: Sitting, Cuff Size: Standard)   Pulse 84   Temp 98 4 °F (36 9 °C) (Temporal)   Ht 5' 5" (1 651 m)   Wt 64 9 kg (143 lb)   SpO2 97%   BMI 23 80 kg/m²          Physical Exam  Constitutional:       General: She is not in acute distress  Appearance: She is normal weight  She is not toxic-appearing  HENT:      Head: Normocephalic and atraumatic  Right Ear: External ear normal       Left Ear: External ear normal    Eyes:      Extraocular Movements: Extraocular movements intact  Pupils: Pupils are equal, round, and reactive to light  Neck:      Musculoskeletal: Normal range of motion and neck supple  Vascular: No carotid bruit  Cardiovascular:      Rate and Rhythm: Normal rate and regular rhythm  Pulmonary:      Effort: Pulmonary effort is normal  No respiratory distress  Breath sounds: Normal breath sounds  No wheezing or rales  Abdominal:      General: Bowel sounds are normal  There is no distension  Palpations: Abdomen is soft  Tenderness: There is no abdominal tenderness  Musculoskeletal: Normal range of motion  Right lower leg: No edema  Left lower leg: No edema  Skin:     General: Skin is warm and dry  Findings: No erythema  Neurological:      Mental Status: She is alert  Mental status is at baseline  Gait: Gait normal    Psychiatric:         Mood and Affect: Mood normal          Behavior: Behavior normal          Thought Content:  Thought content normal          Judgment: Judgment normal

## 2021-04-06 NOTE — PATIENT INSTRUCTIONS
32 ounces of water a day at least  Add the flavor of your choice  Have labs now and in 2 months  Have a kidney ultrasound  No more ibuprofen   Take Tylenol extra strength up to 3000mg a day

## 2021-04-09 ENCOUNTER — APPOINTMENT (OUTPATIENT)
Dept: LAB | Facility: HOSPITAL | Age: 46
End: 2021-04-09
Attending: INTERNAL MEDICINE
Payer: COMMERCIAL

## 2021-04-09 ENCOUNTER — TRANSCRIBE ORDERS (OUTPATIENT)
Dept: LAB | Facility: HOSPITAL | Age: 46
End: 2021-04-09

## 2021-04-09 DIAGNOSIS — N17.9 AKI (ACUTE KIDNEY INJURY) (HCC): ICD-10-CM

## 2021-04-09 DIAGNOSIS — R94.4 DECREASED CALCULATED GFR: ICD-10-CM

## 2021-04-09 LAB
ALBUMIN SERPL BCP-MCNC: 4.4 G/DL (ref 3.5–5.7)
ALP SERPL-CCNC: 66 U/L (ref 40–150)
ALT SERPL W P-5'-P-CCNC: 45 U/L (ref 7–52)
ANION GAP SERPL CALCULATED.3IONS-SCNC: 9 MMOL/L (ref 4–13)
AST SERPL W P-5'-P-CCNC: 34 U/L (ref 13–39)
BACTERIA UR QL AUTO: ABNORMAL /HPF
BILIRUB SERPL-MCNC: 0.2 MG/DL (ref 0.2–1)
BILIRUB UR QL STRIP: NEGATIVE
BUN SERPL-MCNC: 20 MG/DL (ref 7–25)
CALCIUM SERPL-MCNC: 8.9 MG/DL (ref 8.6–10.5)
CHLORIDE SERPL-SCNC: 104 MMOL/L (ref 98–107)
CLARITY UR: CLEAR
CO2 SERPL-SCNC: 26 MMOL/L (ref 21–31)
COLOR UR: YELLOW
CREAT SERPL-MCNC: 1.33 MG/DL (ref 0.6–1.2)
CREAT UR-MCNC: 94.3 MG/DL
CREAT UR-MCNC: 94.3 MG/DL
GFR SERPL CREATININE-BSD FRML MDRD: 48 ML/MIN/1.73SQ M
GLUCOSE P FAST SERPL-MCNC: 82 MG/DL (ref 65–99)
GLUCOSE UR STRIP-MCNC: NEGATIVE MG/DL
HGB UR QL STRIP.AUTO: NEGATIVE
KETONES UR STRIP-MCNC: NEGATIVE MG/DL
LEUKOCYTE ESTERASE UR QL STRIP: NEGATIVE
MICROALBUMIN UR-MCNC: 8.5 MG/L (ref 0–20)
MICROALBUMIN/CREAT 24H UR: 9 MG/G CREATININE (ref 0–30)
NITRITE UR QL STRIP: NEGATIVE
NON-SQ EPI CELLS URNS QL MICRO: ABNORMAL /HPF
PH UR STRIP.AUTO: 5.5 [PH]
POTASSIUM SERPL-SCNC: 4.2 MMOL/L (ref 3.5–5.5)
PROT SERPL-MCNC: 6.8 G/DL (ref 6.4–8.9)
PROT UR STRIP-MCNC: NEGATIVE MG/DL
PROT UR-MCNC: 28 MG/DL
PROT/CREAT UR: 0.3 MG/G{CREAT} (ref 0–0.1)
RBC #/AREA URNS AUTO: ABNORMAL /HPF
SODIUM 24H UR-SCNC: 133 MOL/L
SODIUM SERPL-SCNC: 139 MMOL/L (ref 134–143)
SP GR UR STRIP.AUTO: 1.02 (ref 1–1.03)
URATE SERPL-MCNC: 5.1 MG/DL (ref 2.3–7.6)
UROBILINOGEN UR QL STRIP.AUTO: 0.2 E.U./DL
UUN 24H UR-MCNC: 646 MG/DL
WBC #/AREA URNS AUTO: ABNORMAL /HPF

## 2021-04-09 PROCEDURE — 82043 UR ALBUMIN QUANTITATIVE: CPT

## 2021-04-09 PROCEDURE — 81001 URINALYSIS AUTO W/SCOPE: CPT

## 2021-04-09 PROCEDURE — 36415 COLL VENOUS BLD VENIPUNCTURE: CPT

## 2021-04-09 PROCEDURE — 84550 ASSAY OF BLOOD/URIC ACID: CPT

## 2021-04-09 PROCEDURE — 84540 ASSAY OF URINE/UREA-N: CPT | Performed by: INTERNAL MEDICINE

## 2021-04-09 PROCEDURE — 84156 ASSAY OF PROTEIN URINE: CPT

## 2021-04-09 PROCEDURE — 82570 ASSAY OF URINE CREATININE: CPT

## 2021-04-09 PROCEDURE — 80053 COMPREHEN METABOLIC PANEL: CPT

## 2021-04-09 PROCEDURE — 84300 ASSAY OF URINE SODIUM: CPT

## 2021-04-13 DIAGNOSIS — Z23 ENCOUNTER FOR IMMUNIZATION: ICD-10-CM

## 2021-04-17 ENCOUNTER — HOSPITAL ENCOUNTER (OUTPATIENT)
Dept: ULTRASOUND IMAGING | Facility: HOSPITAL | Age: 46
Discharge: HOME | End: 2021-04-17
Attending: INTERNAL MEDICINE
Payer: COMMERCIAL

## 2021-04-17 DIAGNOSIS — N17.9 AKI (ACUTE KIDNEY INJURY) (HCC): ICD-10-CM

## 2021-04-17 DIAGNOSIS — R94.4 DECREASED CALCULATED GFR: ICD-10-CM

## 2021-04-17 PROCEDURE — 76770 US EXAM ABDO BACK WALL COMP: CPT

## 2021-04-27 ENCOUNTER — OFFICE VISIT (OUTPATIENT)
Dept: FAMILY MEDICINE CLINIC | Facility: CLINIC | Age: 46
End: 2021-04-27
Payer: COMMERCIAL

## 2021-04-27 ENCOUNTER — TELEPHONE (OUTPATIENT)
Dept: GASTROENTEROLOGY | Facility: AMBULARY SURGERY CENTER | Age: 46
End: 2021-04-27

## 2021-04-27 VITALS
WEIGHT: 148.8 LBS | HEART RATE: 81 BPM | SYSTOLIC BLOOD PRESSURE: 126 MMHG | TEMPERATURE: 97.9 F | OXYGEN SATURATION: 99 % | DIASTOLIC BLOOD PRESSURE: 68 MMHG | BODY MASS INDEX: 24.79 KG/M2 | HEIGHT: 65 IN

## 2021-04-27 DIAGNOSIS — M43.22 CERVICAL VERTEBRAL FUSION: ICD-10-CM

## 2021-04-27 DIAGNOSIS — M54.2 CERVICAL PAIN: ICD-10-CM

## 2021-04-27 DIAGNOSIS — M25.50 ARTHRALGIA, UNSPECIFIED JOINT: ICD-10-CM

## 2021-04-27 DIAGNOSIS — R15.9 INCONTINENCE OF FECES, UNSPECIFIED FECAL INCONTINENCE TYPE: Primary | ICD-10-CM

## 2021-04-27 DIAGNOSIS — M54.50 LUMBAR BACK PAIN: ICD-10-CM

## 2021-04-27 PROCEDURE — 3008F BODY MASS INDEX DOCD: CPT | Performed by: INTERNAL MEDICINE

## 2021-04-27 PROCEDURE — 99213 OFFICE O/P EST LOW 20 MIN: CPT | Performed by: NURSE PRACTITIONER

## 2021-04-27 RX ORDER — METHOCARBAMOL 500 MG/1
500 TABLET, FILM COATED ORAL 4 TIMES DAILY
Qty: 30 TABLET | Refills: 0 | Status: SHIPPED | OUTPATIENT
Start: 2021-04-27 | End: 2021-05-11 | Stop reason: SDUPTHER

## 2021-04-27 NOTE — PROGRESS NOTES
Assessment/Plan:    No problem-specific Assessment & Plan notes found for this encounter  Diagnoses and all orders for this visit:    Incontinence of feces, unspecified fecal incontinence type  -     MRI lumbar spine w wo contrast; Future  -     Ambulatory referral to Gastroenterology; Future    Lumbar back pain  -     MRI lumbar spine w wo contrast; Future    Cervical pain  -     methocarbamol (ROBAXIN) 500 mg tablet; Take 1 tablet (500 mg total) by mouth 4 (four) times a day  -     MRI cervical spine w wo contrast; Future    Arthralgia, unspecified joint  -     RF Screen w/ Reflex to Titer; Future  -     Sedimentation rate, automated; Future  -     TITUS Screen w/ Reflex to Titer/Pattern; Future  -     CBC and differential; Future  -     Comprehensive metabolic panel; Future    Cervical vertebral fusion  -     MRI cervical spine w wo contrast; Future          Subjective:      Patient ID: Ronit Bejarano is a 50 y o  female  Patient has neck pain ongoing for the last couple weeks  Mostly in the center of the neck and left side- worse in the morning when she wakes up  Feels stiffness/spams  Now has tingling in her hands b/l for the last week  Worse when she holds her arms up to drive or to do other activities  Denies any change in the color/temperature of the hands  She had a fusion in her neck several years ago  Denies recent injury/trauma to neck or back  Patient also states that she has been having lower back pain some days severe  Chronic worsening over the last few months  However, patient now states that over the last week she has been having fecal incontinence every time she bends or lifts something  She states it is a small amount of brown liquid  Denies saddle anesthesia, bladder retention, numbness or weakenss in her legs/feet  She does not have normal bowel movement  Usually only about 1 bowel movement a week  Reviewed constipation and prevention with patient   Patient also complains of left elbow and right thumb joint pain ongoing for a few months  Back Pain  This is a chronic problem  The problem occurs constantly  The problem has been gradually worsening since onset  The pain is present in the lumbar spine  The quality of the pain is described as stabbing  The symptoms are aggravated by bending and twisting  Associated symptoms include bowel incontinence and paresthesias (hands )  Pertinent negatives include no abdominal pain, bladder incontinence, chest pain, dysuria, fever, headaches, leg pain, numbness, pelvic pain, perianal numbness, tingling, weakness or weight loss  Risk factors include menopause, poor posture and sedentary lifestyle  She has tried NSAIDs for the symptoms  The treatment provided no relief  The following portions of the patient's history were reviewed and updated as appropriate: allergies, current medications, past family history, past medical history, past social history, past surgical history and problem list     Review of Systems   Constitutional: Positive for unexpected weight change (weight gain )  Negative for activity change, appetite change, fatigue, fever and weight loss  Cardiovascular: Negative for chest pain  Gastrointestinal: Positive for bowel incontinence and constipation  Negative for abdominal distention, abdominal pain, anal bleeding, nausea, rectal pain and vomiting  Fecal incontinence    Genitourinary: Negative for bladder incontinence, dysuria and pelvic pain  Musculoskeletal: Positive for arthralgias, back pain, neck pain and neck stiffness  Neurological: Positive for paresthesias (hands )  Negative for tingling, weakness, numbness and headaches  Psychiatric/Behavioral: The patient is nervous/anxious            Objective:      /68 (BP Location: Left arm, Patient Position: Sitting)   Pulse 81   Temp 97 9 °F (36 6 °C) (Tympanic)   Ht 5' 5" (1 651 m)   Wt 67 5 kg (148 lb 12 8 oz)   SpO2 99%   BMI 24 76 kg/m²          Physical Exam  Constitutional:       General: She is not in acute distress  Appearance: Normal appearance  She is not ill-appearing  HENT:      Head: Normocephalic and atraumatic  Right Ear: Tympanic membrane and ear canal normal       Left Ear: Tympanic membrane and ear canal normal    Cardiovascular:      Rate and Rhythm: Normal rate and regular rhythm  Pulses: Normal pulses  Heart sounds: Normal heart sounds  No murmur  No friction rub  Pulmonary:      Effort: Pulmonary effort is normal  No respiratory distress  Breath sounds: Normal breath sounds  No wheezing  Chest:      Chest wall: No tenderness  Abdominal:      General: Abdomen is flat  Bowel sounds are normal       Palpations: Abdomen is soft  There is no mass  Tenderness: There is no abdominal tenderness  There is no guarding or rebound  Musculoskeletal:      Cervical back: She exhibits decreased range of motion, tenderness and bony tenderness  She exhibits no swelling, no laceration and no pain  Lumbar back: She exhibits decreased range of motion, tenderness and bony tenderness  She exhibits no deformity, no laceration, no pain and normal pulse  Skin:     General: Skin is warm and dry  Capillary Refill: Capillary refill takes less than 2 seconds  Coloration: Skin is not jaundiced or pale  Findings: No bruising, erythema or lesion  Neurological:      General: No focal deficit present  Mental Status: She is alert and oriented to person, place, and time  Mental status is at baseline  Psychiatric:         Mood and Affect: Mood normal          Behavior: Behavior normal          Thought Content:  Thought content normal          Judgment: Judgment normal

## 2021-04-27 NOTE — TELEPHONE ENCOUNTER
Pt requesting visit in [place] location for incontinence, last seen 2-2020 ib anabelle savage/ dr Nathan Grimaldo assist for sooner appt      cb # [phone]

## 2021-05-03 ENCOUNTER — ANNUAL EXAM (OUTPATIENT)
Dept: OBGYN CLINIC | Facility: MEDICAL CENTER | Age: 46
End: 2021-05-03
Payer: COMMERCIAL

## 2021-05-03 ENCOUNTER — APPOINTMENT (OUTPATIENT)
Dept: LAB | Facility: HOSPITAL | Age: 46
End: 2021-05-03
Payer: COMMERCIAL

## 2021-05-03 VITALS — WEIGHT: 147 LBS | DIASTOLIC BLOOD PRESSURE: 70 MMHG | BODY MASS INDEX: 24.46 KG/M2 | SYSTOLIC BLOOD PRESSURE: 122 MMHG

## 2021-05-03 DIAGNOSIS — M25.50 ARTHRALGIA, UNSPECIFIED JOINT: ICD-10-CM

## 2021-05-03 DIAGNOSIS — D64.9 ANEMIA, UNSPECIFIED TYPE: ICD-10-CM

## 2021-05-03 DIAGNOSIS — Z12.31 ENCOUNTER FOR SCREENING MAMMOGRAM FOR MALIGNANT NEOPLASM OF BREAST: ICD-10-CM

## 2021-05-03 DIAGNOSIS — D64.9 ANEMIA, UNSPECIFIED TYPE: Primary | ICD-10-CM

## 2021-05-03 DIAGNOSIS — Z97.5 IUD (INTRAUTERINE DEVICE) IN PLACE: ICD-10-CM

## 2021-05-03 DIAGNOSIS — Z01.419 ENCOUNTER FOR WELL WOMAN EXAM WITH ROUTINE GYNECOLOGICAL EXAM: Primary | ICD-10-CM

## 2021-05-03 PROBLEM — Z30.431 IUD CHECK UP: Status: RESOLVED | Noted: 2019-06-28 | Resolved: 2021-05-03

## 2021-05-03 LAB
ALBUMIN SERPL BCP-MCNC: 4.6 G/DL (ref 3.5–5.7)
ALP SERPL-CCNC: 62 U/L (ref 40–150)
ALT SERPL W P-5'-P-CCNC: 16 U/L (ref 7–52)
ANION GAP SERPL CALCULATED.3IONS-SCNC: 11 MMOL/L (ref 4–13)
AST SERPL W P-5'-P-CCNC: 15 U/L (ref 13–39)
BASOPHILS # BLD AUTO: 0.1 THOUSANDS/ΜL (ref 0–0.1)
BASOPHILS NFR BLD AUTO: 1 % (ref 0–2)
BILIRUB SERPL-MCNC: 0.3 MG/DL (ref 0.2–1)
BUN SERPL-MCNC: 19 MG/DL (ref 7–25)
CALCIUM SERPL-MCNC: 9.1 MG/DL (ref 8.6–10.5)
CHLORIDE SERPL-SCNC: 105 MMOL/L (ref 98–107)
CO2 SERPL-SCNC: 22 MMOL/L (ref 21–31)
CREAT SERPL-MCNC: 1.52 MG/DL (ref 0.6–1.2)
CRP SERPL QL: <5 MG/L
EOSINOPHIL # BLD AUTO: 0.2 THOUSAND/ΜL (ref 0–0.61)
EOSINOPHIL NFR BLD AUTO: 2 % (ref 0–5)
ERYTHROCYTE [DISTWIDTH] IN BLOOD BY AUTOMATED COUNT: 12.6 % (ref 11.5–14.5)
ERYTHROCYTE [SEDIMENTATION RATE] IN BLOOD: 8 MM/HOUR (ref 0–19)
GFR SERPL CREATININE-BSD FRML MDRD: 41 ML/MIN/1.73SQ M
GLUCOSE P FAST SERPL-MCNC: 101 MG/DL (ref 65–99)
HCT VFR BLD AUTO: 32.8 % (ref 42–47)
HGB BLD-MCNC: 11.2 G/DL (ref 12–16)
LYMPHOCYTES # BLD AUTO: 1.5 THOUSANDS/ΜL (ref 0.6–4.47)
LYMPHOCYTES NFR BLD AUTO: 20 % (ref 21–51)
MCH RBC QN AUTO: 33.7 PG (ref 26–34)
MCHC RBC AUTO-ENTMCNC: 34 G/DL (ref 31–37)
MCV RBC AUTO: 99 FL (ref 81–99)
MONOCYTES # BLD AUTO: 0.4 THOUSAND/ΜL (ref 0.17–1.22)
MONOCYTES NFR BLD AUTO: 5 % (ref 2–12)
NEUTROPHILS # BLD AUTO: 5.6 THOUSANDS/ΜL (ref 1.4–6.5)
NEUTS SEG NFR BLD AUTO: 72 % (ref 42–75)
PLATELET # BLD AUTO: 250 THOUSANDS/UL (ref 149–390)
PMV BLD AUTO: 8.2 FL (ref 8.6–11.7)
POTASSIUM SERPL-SCNC: 4.1 MMOL/L (ref 3.5–5.5)
PROT SERPL-MCNC: 7.1 G/DL (ref 6.4–8.9)
RBC # BLD AUTO: 3.31 MILLION/UL (ref 3.9–5.2)
RHEUMATOID FACT SER QL LA: NEGATIVE
SODIUM SERPL-SCNC: 138 MMOL/L (ref 134–143)
WBC # BLD AUTO: 7.7 THOUSAND/UL (ref 4.8–10.8)

## 2021-05-03 PROCEDURE — 86430 RHEUMATOID FACTOR TEST QUAL: CPT

## 2021-05-03 PROCEDURE — 86038 ANTINUCLEAR ANTIBODIES: CPT

## 2021-05-03 PROCEDURE — 83540 ASSAY OF IRON: CPT

## 2021-05-03 PROCEDURE — 36415 COLL VENOUS BLD VENIPUNCTURE: CPT

## 2021-05-03 PROCEDURE — 83550 IRON BINDING TEST: CPT

## 2021-05-03 PROCEDURE — 82728 ASSAY OF FERRITIN: CPT

## 2021-05-03 PROCEDURE — 85652 RBC SED RATE AUTOMATED: CPT

## 2021-05-03 PROCEDURE — 80053 COMPREHEN METABOLIC PANEL: CPT

## 2021-05-03 PROCEDURE — G0145 SCR C/V CYTO,THINLAYER,RESCR: HCPCS | Performed by: OBSTETRICS & GYNECOLOGY

## 2021-05-03 PROCEDURE — 85025 COMPLETE CBC W/AUTO DIFF WBC: CPT

## 2021-05-03 PROCEDURE — 86140 C-REACTIVE PROTEIN: CPT

## 2021-05-03 PROCEDURE — S0612 ANNUAL GYNECOLOGICAL EXAMINA: HCPCS | Performed by: OBSTETRICS & GYNECOLOGY

## 2021-05-03 NOTE — PROGRESS NOTES
ASSESSMENT & PLAN: Leyla Veras is a 50 y o  [id number] with normal gynecologic exam     1   Routine well woman exam done today  2  Pap and HPV:  The patient's last pap and hpv was   It was normal   Pap with reflex cotesting was done today  Current ASCCP Guidelines reviewed  Per patient's request  3  Mammogram ordered  4  The following were reviewed in today's visit: breast self exam and mammography screening ordered    CC:  Annual Gynecologic Examination    HPI: Leyla Veras is a 50 y o  [id number] who presents for annual gynecologic examination  She has the following concerns:  No GYN complaints, doing well; no complaints with Mirena, inserted 2019    Health Maintenance:    She wears her seatbelt routinely  She does perform regular monthly self breast exams  She feels safe at home       Patient Active Problem List   Diagnosis    Anxiety    Increased frequency of urination    UTI (urinary tract infection)    Bipolar 2 disorder ([facility] [address] )    Acquired hypothyroidism    Arthritis    Chronic bilateral low back pain without sciatica    Medication refill    Tendinitis of right ankle    BMI 25 0-25 9,adult    Bilateral ankle pain    Positive depression screening    DUSTIN (acute kidney injury) ([facility] [address] )    Lower abdominal pain    Delirium    Closed fracture of one rib of left side with routine healing    Essential hypertension    Depression    Annual physical exam    Hypercholesterolemia    IUD (intrauterine device) in place       Past Medical History:   Diagnosis Date    Ankle pain, right     Anxiety     Arthritis     Bipolar 1 disorder (HCC)     Depression     GERD (gastroesophageal reflux disease)     Hypertension     Hypothyroidism     Known health problems: none     Lyme disease     Scoliosis        Past Surgical History:   Procedure Laterality Date    CERVICAL FUSION      CHOLECYSTECTOMY      COLONOSCOPY         Past OB/Gyn History:  OB History        3    Para 3    Term   0       3    AB        Living   4       SAB        TAB        Ectopic        Multiple   1    Live Births   4                  Pt does not have menstrual issues  Amenorrheic on Mirena  History of sexually transmitted infection: No   History of abnormal pap smears: No      Patient is currently sexually active  heterosexual  The current method of family planning is IUD  Family History   Problem Relation Age of Onset    Diabetes Mother     Hypertension Mother     Heart disease Mother     Hypertension Father     Diabetes Maternal Grandmother     Diabetes Paternal Grandmother     No Known Problems Sister     No Known Problems Daughter     No Known Problems Daughter     No Known Problems Daughter     No Known Problems Maternal Aunt     No Known Problems Maternal Aunt     No Known Problems Maternal Aunt     No Known Problems Paternal Aunt        Social History:  Social History     Socioeconomic History    Marital status: /Civil Union     Spouse name: Not on file    Number of children: Not on file    Years of education: Not on file    Highest education level: Not on file   Occupational History    Occupation: UNEMPLOYED   Social Needs    Financial resource strain: Not on file    Food insecurity     Worry: Not on file     Inability: Not on file   [employer] needs     Medical: Not on file     Non-medical: Not on file   Tobacco Use    Smoking status: Current Every Day Smoker     Packs/day: 0 50     Years: 3 00     Pack years: 1 50    Smokeless tobacco: Never Used   Substance and Sexual Activity    Alcohol use: Not Currently     Frequency: Never    Drug use: Never    Sexual activity: Yes     Partners: Male     Birth control/protection: I U D     Lifestyle    Physical activity     Days per week: Not on file     Minutes per session: Not on file    Stress: Not on file   Relationships    Social connections     Talks on phone: Not on file     Gets together: Not on file Attends Caoda[employer] service: Not on file     Active member of club or organization: Not on file     Attends meetings of clubs or organizations: Not on file     Relationship status: Not on file    Intimate partner violence     Fear of current or ex partner: Not on file     Emotionally abused: Not on file     Physically abused: Not on file     Forced sexual activity: Not on file   Other Topics Concern    Not on file   Social History Narrative        UNEMPLOYED     No Known Allergies    Current Outpatient Medications:     acetaminophen-codeine (TYLENOL with CODEINE #2) 300-15 MG, TAKE 1 TABLET BY MOUTH 4 TIMES DAILY AS NEEDED FOR PAIN, Disp: , Rfl:     buPROPion (WELLBUTRIN XL) 300 mg 24 hr tablet, Take 300 mg by mouth daily, Disp: , Rfl: 1    busPIRone (BUSPAR) 15 mg tablet, TAKE 1 TABLET BY MOUTH IN THE AM AND 2 TABLETS IN THE AFTERNOON AND 1 TABLET AT NIGHT, Disp: , Rfl:     ergocalciferol (VITAMIN D2) 50,000 units, take 1 capsule by mouth weekly, Disp: 12 capsule, Rfl: 3    famotidine (PEPCID) 20 mg tablet, Take 1 tablet (20 mg total) by mouth 2 (two) times a day, Disp: 60 tablet, Rfl: 3    gabapentin (NEURONTIN) 800 mg tablet, Take 1 tablet (800 mg total) by mouth 3 (three) times a day, Disp: 90 tablet, Rfl: 3    ibuprofen (MOTRIN) 800 mg tablet, Take 1 tablet (800 mg total) by mouth every 8 (eight) hours as needed for mild pain, Disp: 30 tablet, Rfl: 2    levonorgestrel (MIRENA) 20 MCG/24HR IUD, 1 each by Intrauterine route once, Disp: , Rfl:     levothyroxine 75 mcg tablet, Take 1 tablet (75 mcg total) by mouth daily in the early morning, Disp: 90 tablet, Rfl: 1    lisinopril-hydrochlorothiazide (PRINZIDE,ZESTORETIC) 20-25 MG per tablet, Take 1 tablet by mouth once daily, Disp: 30 tablet, Rfl: 0    venlafaxine (EFFEXOR-XR) 150 mg 24 hr capsule, Take 2 capsules (300 mg total) by mouth daily, Disp: 60 capsule, Rfl: 1    zolpidem (AMBIEN) 10 mg tablet, Take 1 tablet (10 mg total) by mouth daily at bedtime as needed for sleep for up to 10 days, Disp: 10 tablet, Rfl: 0    amoxicillin (AMOXIL) 500 MG tablet, Take 500 mg by mouth 4 (four) times a day, Disp: , Rfl:     buPROPion (WELLBUTRIN XL) 150 mg 24 hr tablet, Take 150 mg by mouth every morning, Disp: , Rfl:     busPIRone (BUSPAR) 10 mg tablet, Take 10 mg by mouth 3 (three) times a day, Disp: , Rfl: 0    celecoxib (CeleBREX) 100 mg capsule, Take 1 capsule by mouth twice daily (Patient not taking: Reported on 11/5/2020), Disp: 60 capsule, Rfl: 0    chlorhexidine (PERIDEX) 0 12 % solution, SWISH AND SPIT 15 ML IN MOUTH THREE TIMES DAILY FOR 7 DAYS, Disp: , Rfl:     lisinopril (ZESTRIL) 20 mg tablet, Take 40 mg by mouth daily, Disp: , Rfl:     LORazepam (ATIVAN) 1 mg tablet, Take 1 tablet (1 mg total) by mouth every 8 (eight) hours as needed for anxiety (Patient not taking: Reported on 11/5/2020), Disp: 90 tablet, Rfl: 0    methocarbamol (ROBAXIN) 500 mg tablet, Take 1 tablet (500 mg total) by mouth 4 (four) times a day, Disp: 30 tablet, Rfl: 0    OLANZapine (ZyPREXA) 5 mg tablet, TAKE 1 2 (ONE HALF) TABLET BY MOUTH TWICE DAILY, Disp: , Rfl:     pantoprazole (PROTONIX) 40 mg tablet, Take 1 tablet (40 mg total) by mouth daily (Patient not taking: Reported on 5/3/2021), Disp: 90 tablet, Rfl: 0    predniSONE 10 mg tablet, 4 pills for 3 days, then 3 pills for 3 days, then 2 pills for 3 days, then 1 pills for 3 days, then stop (Patient not taking: Reported on 12/2/2020), Disp: 30 tablet, Rfl: 0    Review of Systems:    Review of Systems  Constitutional :no fever, feels well, no tiredness, no recent weight gain or loss  ENT: no ear ache, no loss of hearing, no nosebleeds or nasal discharge, no sore throat or hoarseness  Cardiovascular: no complaints of slow or fast heart beat, no chest pain, no palpitations, no leg claudication or lower extremity edema    Respiratory: no complaints of shortness of shortness of breath, no SMALL  Breasts:no complaints of breast pain, breast lump, or nipple discharge  Gastrointestinal: no complaints of abdominal pain, constipation, nausea, vomiting, or diarrhea or bloody stools  Genitourinary : no complaints of dysuria, incontinence, pelvic pain, no dysmenorrhea, vaginal discharge or abnormal vaginal bleeding and as noted in HPI  Musculoskeletal: no complaints of arthralgia, no myalgia, no joint swelling or stiffness, no limb pain or swelling  Integumentary: no complaints of skin rash or lesion, itching or dry skin  Neurological: no complaints of headache, no confusion, no numbness or tingling, no dizziness or fainting    Objective      /70   Wt 66 7 kg (147 lb)   BMI 24 46 kg/m²     General:   appears stated age, cooperative, alert normal mood and affect   Neck: normal, supple,trachea midline, no masses   Heart: regular rate and rhythm, S1, S2 normal, no murmur, click, rub or gallop   Lungs: clear to auscultation bilaterally   Breasts: normal appearance, no masses or tenderness, Inspection negative, No nipple retraction or dimpling, No nipple discharge or bleeding, No axillary or supraclavicular adenopathy, Normal to palpation without dominant masses   Abdomen: soft, non-tender, without masses or organomegaly   Vulva: normal female genitalia, Bartholin's, Urethra, McCoole normal, no lesions, normal female hair distribution   Vagina: normal vagina, no discharge, exudate, lesion, or erythema   Urethra: normal   Cervix: Normal, no discharge  PAP done  IUD strings present  Uterus: normal size, contour, position, consistency, mobility, non-tender   Adnexa: normal adnexa and no mass, fullness, tenderness   Lymphatic palpation of lymph nodes in neck, axilla, groin and/or other locations: no lymphadenopathy or masses noted   Skin normal skin turgor and no rashes     Psychiatric orientation to person, place, and time: normal  mood and affect: normal

## 2021-05-04 LAB
FERRITIN SERPL-MCNC: 111 NG/ML (ref 8–388)
IRON SATN MFR SERPL: 35 %
IRON SERPL-MCNC: 103 UG/DL (ref 50–170)
TIBC SERPL-MCNC: 295 UG/DL (ref 250–450)

## 2021-05-05 ENCOUNTER — HOSPITAL ENCOUNTER (OUTPATIENT)
Dept: MRI IMAGING | Facility: HOSPITAL | Age: 46
Discharge: HOME | End: 2021-05-05
Payer: COMMERCIAL

## 2021-05-05 DIAGNOSIS — M43.22 CERVICAL VERTEBRAL FUSION: ICD-10-CM

## 2021-05-05 DIAGNOSIS — R15.9 INCONTINENCE OF FECES, UNSPECIFIED FECAL INCONTINENCE TYPE: ICD-10-CM

## 2021-05-05 DIAGNOSIS — M54.2 CERVICAL PAIN: ICD-10-CM

## 2021-05-05 DIAGNOSIS — M54.50 LUMBAR BACK PAIN: ICD-10-CM

## 2021-05-05 LAB — RYE IGE QN: NEGATIVE

## 2021-05-05 PROCEDURE — A9585 GADOBUTROL INJECTION: HCPCS | Performed by: NURSE PRACTITIONER

## 2021-05-05 PROCEDURE — G1004 CDSM NDSC: HCPCS

## 2021-05-05 PROCEDURE — 72156 MRI NECK SPINE W/O & W/DYE: CPT

## 2021-05-05 PROCEDURE — 72158 MRI LUMBAR SPINE W/O & W/DYE: CPT

## 2021-05-05 RX ADMIN — GADOBUTROL 7 ML: 604.72 INJECTION INTRAVENOUS at 16:27

## 2021-05-05 NOTE — TELEPHONE ENCOUNTER
Attempted to call the patient to schedule an appointment  Patient does not have a voice mail set up  Will try to call again at a later date

## 2021-05-10 LAB
LAB AP GYN PRIMARY INTERPRETATION: NORMAL
Lab: NORMAL

## 2021-05-11 ENCOUNTER — TELEPHONE (OUTPATIENT)
Dept: PHYSICAL THERAPY | Facility: OTHER | Age: 46
End: 2021-05-11

## 2021-05-11 ENCOUNTER — APPOINTMENT (OUTPATIENT)
Dept: LAB | Facility: HOSPITAL | Age: 46
End: 2021-05-11
Payer: COMMERCIAL

## 2021-05-11 ENCOUNTER — OFFICE VISIT (OUTPATIENT)
Dept: FAMILY MEDICINE CLINIC | Facility: CLINIC | Age: 46
End: 2021-05-11
Payer: COMMERCIAL

## 2021-05-11 VITALS
DIASTOLIC BLOOD PRESSURE: 68 MMHG | OXYGEN SATURATION: 99 % | HEART RATE: 92 BPM | SYSTOLIC BLOOD PRESSURE: 122 MMHG | HEIGHT: 65 IN | TEMPERATURE: 99.4 F | WEIGHT: 143.25 LBS | BODY MASS INDEX: 23.87 KG/M2

## 2021-05-11 DIAGNOSIS — D64.9 ANEMIA, UNSPECIFIED TYPE: ICD-10-CM

## 2021-05-11 DIAGNOSIS — M48.061 SPINAL STENOSIS OF LUMBAR REGION, UNSPECIFIED WHETHER NEUROGENIC CLAUDICATION PRESENT: Primary | ICD-10-CM

## 2021-05-11 DIAGNOSIS — M19.90 ARTHRITIS: ICD-10-CM

## 2021-05-11 DIAGNOSIS — F31.81 BIPOLAR 2 DISORDER (HCC): ICD-10-CM

## 2021-05-11 DIAGNOSIS — M54.2 CERVICAL PAIN: ICD-10-CM

## 2021-05-11 DIAGNOSIS — Z71.6 ENCOUNTER FOR SMOKING CESSATION COUNSELING: ICD-10-CM

## 2021-05-11 DIAGNOSIS — M77.9 TENDONITIS: ICD-10-CM

## 2021-05-11 DIAGNOSIS — M79.641 PAIN OF RIGHT HAND: ICD-10-CM

## 2021-05-11 LAB — VIT B12 SERPL-MCNC: 249 PG/ML (ref 100–900)

## 2021-05-11 PROCEDURE — 82607 VITAMIN B-12: CPT

## 2021-05-11 PROCEDURE — 99213 OFFICE O/P EST LOW 20 MIN: CPT | Performed by: NURSE PRACTITIONER

## 2021-05-11 PROCEDURE — 36415 COLL VENOUS BLD VENIPUNCTURE: CPT

## 2021-05-11 RX ORDER — METHOCARBAMOL 500 MG/1
500 TABLET, FILM COATED ORAL 4 TIMES DAILY
Qty: 30 TABLET | Refills: 0 | Status: SHIPPED | OUTPATIENT
Start: 2021-05-11 | End: 2021-06-23 | Stop reason: SDUPTHER

## 2021-05-11 RX ORDER — LIDOCAINE 40 MG/G
CREAM TOPICAL AS NEEDED
Qty: 30 G | Refills: 0 | Status: SHIPPED | OUTPATIENT
Start: 2021-05-11 | End: 2021-06-11

## 2021-05-11 RX ORDER — NICOTINE 21 MG/24HR
1 PATCH, TRANSDERMAL 24 HOURS TRANSDERMAL EVERY 24 HOURS
Qty: 28 PATCH | Refills: 0 | Status: SHIPPED | OUTPATIENT
Start: 2021-05-11 | End: 2021-09-09

## 2021-05-11 NOTE — TELEPHONE ENCOUNTER
Call placed to the patient per [facility] referral  And VM left today  Called patient and message states the person you are calling is not available please try again later  No option for VM  This is the 1st attempt to reach the patient  Will defer per protocol

## 2021-05-11 NOTE — PROGRESS NOTES
Assessment/Plan:    No problem-specific Assessment & Plan notes found for this encounter  Diagnoses and all orders for this visit:    Spinal stenosis of lumbar region, unspecified whether neurogenic claudication present  -     Ambulatory Referral to Comprehensive [facility] Program; Future  -     Ambulatory referral to Physical Therapy; Future    Cervical pain  -     methocarbamol (ROBAXIN) 500 mg tablet; Take 1 tablet (500 mg total) by mouth 4 (four) times a day    Arthritis  -     lidocaine (LMX) 4 % cream; Apply topically as needed for mild pain    Tendonitis  -     Ambulatory referral to Orthopedic Surgery; Future    Pain of right hand  -     Ambulatory referral to Orthopedic Surgery; Future    Anemia, unspecified type  -     Vitamin B12; Future    Bipolar 2 disorder (HCC)    Encounter for smoking cessation counseling  -     nicotine (NICODERM CQ) 21 mg/24 hr TD 24 hr patch; Place 1 patch on the skin every 24 hours          Subjective:      Patient ID: Eleni Romero is a 50 y o  female  Patient presents for follow up in MRI cervical and lumbar spine- reviewed in detail with patient results along with results of blood work  CR increased since last time she had it completed in April- recommend patient stop using NSAIDs for pain control and to f/u with Dr Maco Barbour  Referal placed for comprehensive spinal program due to MRI results and chronic pain  Still has intermittent numbness in her hands and legs  Patient also complains of intermittent elbow pain on the medial aspect- had cortisone shots in the past, would like to see ortho again  No recent injury or falls  States it has been worsening over the last few weeks  Also complains of pain in the right thumb, sometimes sharp pain but most of the time just aching pain  She is right handed and pain is worse with repetitive movements  Sometimes she does have a hard time holding onto things due to pain in this hand  Patient is chronically anemia- at baseline   Will check Vit B 12 level  Patient is in the process of changing her psychiatric medications with her psychiatrist due to increase in anxiety and depression- has f/u with him tomorrow  Tobacco Cessation Counseling: Tobacco cessation counseling was provided  The patient is sincerely urged to quit consumption of tobacco  She is ready to quit tobacco  Medication options and side effects of medication discussed  Patient refused medication  Nicotine patch was prescribed  The following portions of the patient's history were reviewed and updated as appropriate: allergies, current medications, past family history, past medical history, past social history, past surgical history and problem list     Review of Systems   Constitutional: Positive for fatigue  Negative for activity change, chills, diaphoresis and unexpected weight change  HENT: Negative for congestion, dental problem, nosebleeds, postnasal drip, rhinorrhea, sinus pain, trouble swallowing and voice change  Eyes: Negative for discharge and itching  Respiratory: Negative for chest tightness, shortness of breath and stridor  Cardiovascular: Negative for chest pain, palpitations and leg swelling  Gastrointestinal: Negative for abdominal distention, constipation, diarrhea and nausea  Endocrine: Negative for cold intolerance, heat intolerance, polydipsia, polyphagia and polyuria  Genitourinary: Negative for difficulty urinating, dysuria, hematuria, menstrual problem, pelvic pain and urgency  Musculoskeletal: Positive for arthralgias, back pain, myalgias and neck pain  Negative for gait problem, joint swelling and neck stiffness  Neurological: Negative for dizziness, tremors, seizures, weakness and numbness  Hematological: Negative for adenopathy  Does not bruise/bleed easily           Objective:      /68 (BP Location: Left arm, Patient Position: Sitting, Cuff Size: Standard)   Pulse 92   Temp 99 4 °F (37 4 °C) (Tympanic)   Ht 5' 5" (1 651 m)   Wt 65 kg (143 lb 4 oz)   SpO2 99%   BMI 23 84 kg/m²          Physical Exam  Constitutional:       General: She is not in acute distress  Appearance: Normal appearance  She is not ill-appearing  HENT:      Head: Normocephalic and atraumatic  Right Ear: Tympanic membrane and ear canal normal       Left Ear: Tympanic membrane and ear canal normal       Nose: Nose normal       Mouth/Throat:      Mouth: Mucous membranes are moist       Pharynx: Oropharynx is clear  Eyes:      Conjunctiva/sclera: Conjunctivae normal       Pupils: Pupils are equal, round, and reactive to light  Neck:      Musculoskeletal: Normal range of motion  No muscular tenderness  Vascular: No carotid bruit  Cardiovascular:      Rate and Rhythm: Normal rate and regular rhythm  Pulses: Normal pulses  Heart sounds: Normal heart sounds  No murmur  No friction rub  Pulmonary:      Effort: Pulmonary effort is normal  No respiratory distress  Breath sounds: Normal breath sounds  No wheezing  Chest:      Chest wall: No tenderness  Abdominal:      General: Abdomen is flat  Bowel sounds are normal       Palpations: Abdomen is soft  There is no mass  Tenderness: There is no abdominal tenderness  There is no guarding or rebound  Musculoskeletal:        Arms:         Hands:    Lymphadenopathy:      Cervical: No cervical adenopathy  Skin:     General: Skin is warm and dry  Coloration: Skin is not jaundiced or pale  Findings: No bruising or erythema  Neurological:      General: No focal deficit present  Mental Status: She is alert and oriented to person, place, and time  Mental status is at baseline  Psychiatric:         Mood and Affect: Mood normal  Affect is tearful  Behavior: Behavior normal          Thought Content:  Thought content normal          Judgment: Judgment normal

## 2021-05-12 ENCOUNTER — NURSE TRIAGE (OUTPATIENT)
Dept: PHYSICAL THERAPY | Facility: OTHER | Age: 46
End: 2021-05-12

## 2021-05-12 DIAGNOSIS — M54.2 NECK PAIN: Primary | ICD-10-CM

## 2021-05-12 NOTE — TELEPHONE ENCOUNTER
Additional Information   Negative: Is this related to an MVA?  Negative: Is this related to a work injury?  Negative: Are you currently recieving homecare services?  Negative: Does the patient have a fever?  Negative: Has the patient had unexplained weight loss?  Negative: Has the patient experienced major trauma? (fall from height, high speed collision, direct blow to spine) and is also experiencing nausea, light-headedness, or loss of consciousness?  Negative: Is the patient experiencing urine retention?  Negative: Is the patient experiencing blood in sputum?  Negative: Is the patient experiencing acute drop foot or paralysis?  Negative: Is this a chronic condition? Background - Initial Assessment  Clinical complaint: Left sided neck pain that radiates to L shoulder  Hand numbness at times- B/L low back pain "for awhile"  Date of onset: Hx of back pain- Neck and hand issues over the past month- progressively getting worse  Hx of low back pain  Frequency of pain: constant  Quality of pain: dull ache- "tight"    Protocols used: [facility] COMPREHENSIVE SPINE PROGRAM PROTOCOL    Patient LM for  CS earlier today  Nurse returned call and reviewed program and triage/referral process  Patient is scheduled for PT 5/20/21  Patient and nurse decided to triage for complaints in order to add PT spine referral  NO RF s/s present  Patient to f/u with [address] [facility] office about her appointment  Nurse also informed the patient a call will be received from the behavioral office d/t score  Agreed and understood  Referral placed in EMR for [facility]  Patient appreciative of CB  Nurse wished her well and referral closed

## 2021-05-20 ENCOUNTER — EVALUATION (OUTPATIENT)
Dept: PHYSICAL THERAPY | Facility: CLINIC | Age: 46
End: 2021-05-20
Payer: COMMERCIAL

## 2021-05-20 DIAGNOSIS — M54.12 CERVICAL RADICULOPATHY: Primary | ICD-10-CM

## 2021-05-20 DIAGNOSIS — M48.061 SPINAL STENOSIS OF LUMBAR REGION, UNSPECIFIED WHETHER NEUROGENIC CLAUDICATION PRESENT: ICD-10-CM

## 2021-05-20 PROCEDURE — 97110 THERAPEUTIC EXERCISES: CPT | Performed by: PHYSICAL MEDICINE & REHABILITATION

## 2021-05-20 PROCEDURE — 97161 PT EVAL LOW COMPLEX 20 MIN: CPT | Performed by: PHYSICAL MEDICINE & REHABILITATION

## 2021-05-20 NOTE — PROGRESS NOTES
PT Evaluation     Today's date: 2021  Patient name: Raquel Del Rosario  : 1975  MRN: [id number]  Referring provider: FIONA John  Dx:   Encounter Diagnosis     ICD-10-CM    1  Cervical radiculopathy  M54 12    2  Spinal stenosis of lumbar region, unspecified whether neurogenic claudication present  M48 061 Ambulatory referral to Physical Therapy                  Assessment  Assessment details: Raquel Del Rosario is a pleasant 50 y o  female who presents with acute onset of n/t in B hands with L > R sided c-spine radicular pain  She demonstrates hx of similar sx in the past, leading to an ACDF several years ago  Pt notes her sx were resolved since this surgery, up until about 1-2 months ago  She also has hx of chronic B upper and lower back pain, anxiety, and depression  Presently, she demonstrates reduced c-spine A/PROM with pain/sx and apprehension/guarding  She demonstrates poor seated posture with increase in forward head/rounded shoulders  She demonstrates radicular n/t in L UE > R with mild weakness of L UE grossly vs R  She demonstrates increased soft tissue tone/tension of the posterior cervical musculature  Restricted upper cervical /OA mobility evident with active chin tucks with pain and tightness  The patient's greatest concerns are the pain she is experiencing and fear of not being able to keep active  No further referral appears necessary at this time based upon examination results  Pt is interested in f/u with Spine and Pain  PT explained goal of comprehensive spine program in conservative management of patient's pain/sx  Pt in agreement to trial of PT at this time  Primary movement impairment diagnosis of cervical closing dysfunction resulting in pathoanatomical symptoms of neck pain and B UE radiculopathy and limiting her ability to carry, drive, lift, perform household chores and reach overhead      Impairments include:  1) cervical hypomobility - addressing with ROM abd mobility exercises (caution hx of ACDF)  2) upper limb neural tension - addressing with neurodynamic mobs and mobility exercises   3) poor cervicothoracic movement coordination - addressing with functional retraining  4) poor scapulohumeral movement coordination - addressing with neuromotor retraining     Etiologic factors include repetitive poor body mechanics, poor ergonomics and poor scapulohumeral movement coordination  Impairments: abnormal coordination, abnormal muscle firing, abnormal muscle tone, abnormal or restricted ROM, abnormal movement, activity intolerance, difficulty understanding, impaired physical strength, lacks appropriate home exercise program and pain with function    Symptom irritability: moderateUnderstanding of Dx/Px/POC: good   Prognosis: good  Prognosis details: Positive prognostic indicators include good understanding of diagnosis and treatment plan options and absence of observed red flags  Negative prognostic indicators include chronicity of symptoms, anxiety, depression, high symptom irritability, lack of centralization with movement, multiple concurrent orthopedic problems and multiple prior failed treatments  Goals  STGs to be achieved in 2-4 weeks:  1  Pt to report reduced max pain intensity to no > 7/10 at worst in order to improve tolerance to ADLs  2  Pt to demonstrate improved active cervical motion all planes by at least 5-10* in order to improve ability for pt to turn her head with ADLs and driving  3  Pt to demonstrate independence with upright/symmetrical postural awareness with minimized forward head t/o PT exercise program without need for postural /positional cues/reminders in order to facilitate improved postural awareness and reduced c-spine strain with ADLs  LTGs to be achieved in 10-12 weeks:  Patient will be independent with home exercise program    Patient will be able to manage symptoms independently     Patient will be able to turn to look over a shoulder to back out of a parking space without limitation due to pain  Patient will be able to look up without limitation due to pain  Patient will be able to look up to change a bulb overhead without limitation due to pain  Plan  Plan details: Prognosis above is given PT services 2x/week tapering to 1x/week over the next 2 months and home program adherence  Patient would benefit from: skilled physical therapy  Planned modality interventions: thermotherapy: hydrocollator packs and cryotherapy  Planned therapy interventions: activity modification, joint mobilization, manual therapy, motor coordination training, neuromuscular re-education, patient education, self care, therapeutic activities, therapeutic exercise, home exercise program, behavior modification, postural training and functional ROM exercises  Frequency: 2x week  Duration in visits: 12  Duration in weeks: 6  Plan of Care beginning date: 5/20/2021  Plan of Care expiration date: 7/1/2021  Treatment plan discussed with: patient        Subjective Evaluation    History of Present Illness  Mechanism of injury: Pt notes insidious onset of B n/t in her hands 1-2 months ago  Chronic hx of B L/S and thoracic spine pain for the past several years  HX of neck pain and n/t in the UEs in the far past; s/p ACDF several years ago which resolved her sx completely following per pt  Pt notes present sx have progressed to increasing n/t in B hands/fingers 1*; is intermittent  Notes increasing constant pain in her L > R c-spine into B UTs to her shoulders  Notes neck pain can cause occasional HA L side of her head/neck vs B  Notes reduced strength in her hands vs PLOF, however notes she is not dropping things  Pain in her fingers/knuckles comes/goes  Chronic pain in R thumb and L elbow; comes/goes; worse with movement, gripping, lifting; seeing Dr Yola Freitas upcoming  Pain is worse with stress, lifting, pushing/pulling, and moving her head/neck to end ranges   Pain is reduced with Advil however has been told to stop taking by MD 2* her "kidneys"  She has trialed anti inflammatories, steroids, and is taking a mm relaxer with no substantial benefit to sx  Notes n/t is worse if she is holding her arms raised for long periods  F/u PCP; imaging completed (see chart); referred to comprehensive spine program at this time  No other tx to date per pt  F/u with her PCP upcoming  Reports increased  Stress in her life right now; unemployed and has 4 children; has a high co pay and is unsure how she will be able to pay for therapy  Pt notes she is more interested in seeing pain management/spine doctor at this time as this is "who she thought she was seeing in the first place"  Not a recurrent problem   Quality of life: poor    Pain  Current pain ratin  At best pain ratin  At worst pain rating: 10  Location: L /R c-spine, UT, shoulder, fingers , chronic mid and lower back pain as well, HA  Quality: dull ache  Progression: worsening    Social Support  Steps to enter house: yes  Stairs in house: no   L[facility] in: [facility]  Lives with: parents (Mother)    Employment status: not working  Hand dominance: right      Diagnostic Tests  MRI studies: abnormal (see reports in chart)  Treatments  Previous treatment: medication  Current treatment: medication and physical therapy  Patient Goals  Patient goals for therapy: decreased pain and increased motion  Patient's goals regarding treatment: reduce the n/t in the arms/hands, "feel better"        Objective     Concurrent Complaints  Positive for disturbed sleep   Negative for bladder dysfunction, bowel dysfunction and saddle (S4) numbness    Additional Special Questions  Denies ataxia/gait change  Denies any progressive UE/LE weakness     Postural Observations  Seated posture: poor  Standing posture: fair  Correction of posture: has no consistent effect    Additional Postural Observation Details  Forward head/rounded shoulders- moderate-severe  Increased thoracic kyphosis - moderate   B scapular depression and protraction with mild winging   Increased PPT in sitting        Tenderness   Cervical Spine   Tenderness in the spinous process       Additional Tenderness Details  Diffuse ttp L > R suboccipitals, c-spine PVMs, UT, levator scapulae mms; increased mm tension/tone noted L > R UT/levator scapulae   Elevated ttp centrally C4/5-C2 SPs ; no radicular sx       Neurological Testing     Sensation   Cervical/Thoracic   Left   Intact: light touch  Diminished: light touch    Right   Intact: light touch    Reflexes   Left   Biceps (C5/C6): normal (2+)  Brachioradialis (C6): normal (2+)  Triceps (C7): brisk (3+)  Pedersen's reflex: positive    Right   Biceps (C5/C6): normal (2+)  Brachioradialis (C6): normal (2+)  Triceps (C7): brisk (3+)  Pedersen's reflex: positive    Additional Neurological Details  Normal-slightly brisk L biceps and brachioradialis reflex vs R  Reports reduced light touch sensation L vs R in C 5/6/7/8 dermatomes   Will monitor     Active Range of Motion   Cervical/Thoracic Spine       Cervical    Flexion: 40 degrees  with pain  Extension: 45 degrees     with pain  Left lateral flexion: 30 degrees     with pain  Right lateral flexion: 35 degrees     with pain  Left rotation: 54 degrees with pain  Right rotation: 58 degrees    with pain    Additional Active Range of Motion Details  Pulling/pain posterior c-spine at end ranges of AROM; no effect on distal/radicular sx or WERNER  Will cont to assess     Joint Play     Comments: Hx ACDF c-spine       Strength/Myotome Testing   Cervical Spine     Left   Interossei strength (t1): 4- and 4    Right   Interossei strength (t1): 4- and 4    Left Shoulder     Planes of Motion   Flexion: 4   Abduction: 4+   External rotation at 0°: 4   Internal rotation at 0°: 4+     Isolated Muscles   Upper trapezius: 4+     Right Shoulder     Planes of Motion   Flexion: 4   Abduction: 4+   External rotation at 0°: 4+   Internal rotation at 0°: 4+     Isolated Muscles   Upper trapezius: 4+     Left Elbow   Flexion: 4  Extension: 4    Right Elbow   Flexion: 4+  Extension: 4+    Left Wrist/Hand   Wrist extension: 4  Wrist flexion: 4  Thumb extension: 4    Right Wrist/Hand   Wrist extension: 4+  Wrist flexion: 4+  Thumb extension: 4- and 4    Additional Strength Details  Denies pain with MMT screening B UEs       Tests   Cervical   Positive lumbar distraction test   Negative vertical compression and Lhermitte's sign  Lumbar   Negative vertical compression  Additional Tests Details  Deferred extensive testing 2* hx ACDF c-spine  No change in sx with vertical compression   Reported reduced pain in neck and UEs with light manual traction sitting   Will cont to assess             Precautions: anxiety, depression, bipolar disorder, chronic low/mid/upper back pain, Hx ACDF c-spine       Re-eval Date: 7/1    Date 5/20       Visit Count 1       FOTO 5/20       Pain In See IE       Pain Out See IE              Manuals 5/20       Trial SOR, UT stretch, levator stretch         Consider trial of GIASTM posterior c-spine/UTs                        Neuro Re-Ed        DNF training with stabilizer                                                         Ther Ex        UT stretch    Levator scap stretch 2x30"      2x30"        Chin tucks      Scapular depression/retraction  5x  Cues     5x cues                                                        Ther Activity                        Gait Training                        Modalities                              5/20 - HEP was issued and reviewed this date for above noted exercises  Also provided pt education regarding postural awareness with avoidance of prolonged periods of static posture, especially with static, prolonged forward head  Pt demonstrated understanding without incident and without questions/concerns  Will continue to update upcoming

## 2021-05-25 ENCOUNTER — OFFICE VISIT (OUTPATIENT)
Dept: OBGYN CLINIC | Facility: CLINIC | Age: 46
End: 2021-05-25
Payer: COMMERCIAL

## 2021-05-25 ENCOUNTER — APPOINTMENT (OUTPATIENT)
Dept: RADIOLOGY | Facility: CLINIC | Age: 46
End: 2021-05-25
Payer: COMMERCIAL

## 2021-05-25 VITALS
DIASTOLIC BLOOD PRESSURE: 71 MMHG | SYSTOLIC BLOOD PRESSURE: 129 MMHG | BODY MASS INDEX: 23.87 KG/M2 | HEART RATE: 91 BPM | WEIGHT: 143.25 LBS | HEIGHT: 65 IN

## 2021-05-25 DIAGNOSIS — M79.641 PAIN OF RIGHT HAND: ICD-10-CM

## 2021-05-25 DIAGNOSIS — M18.11 ARTHRITIS OF CARPOMETACARPAL (CMC) JOINT OF RIGHT THUMB: Primary | ICD-10-CM

## 2021-05-25 PROCEDURE — 99213 OFFICE O/P EST LOW 20 MIN: CPT | Performed by: ORTHOPAEDIC SURGERY

## 2021-05-25 PROCEDURE — 20600 DRAIN/INJ JOINT/BURSA W/O US: CPT | Performed by: ORTHOPAEDIC SURGERY

## 2021-05-25 PROCEDURE — 73140 X-RAY EXAM OF FINGER(S): CPT

## 2021-05-25 RX ORDER — BETAMETHASONE SODIUM PHOSPHATE AND BETAMETHASONE ACETATE 3; 3 MG/ML; MG/ML
3 INJECTION, SUSPENSION INTRA-ARTICULAR; INTRALESIONAL; INTRAMUSCULAR; SOFT TISSUE
Status: COMPLETED | OUTPATIENT
Start: 2021-05-25 | End: 2021-05-25

## 2021-05-25 RX ORDER — BUPIVACAINE HYDROCHLORIDE 2.5 MG/ML
0.5 INJECTION, SOLUTION INFILTRATION; PERINEURAL
Status: COMPLETED | OUTPATIENT
Start: 2021-05-25 | End: 2021-05-25

## 2021-05-25 RX ADMIN — BUPIVACAINE HYDROCHLORIDE 0.5 ML: 2.5 INJECTION, SOLUTION INFILTRATION; PERINEURAL at 15:13

## 2021-05-25 RX ADMIN — BETAMETHASONE SODIUM PHOSPHATE AND BETAMETHASONE ACETATE 3 MG: 3; 3 INJECTION, SUSPENSION INTRA-ARTICULAR; INTRALESIONAL; INTRAMUSCULAR; SOFT TISSUE at 15:13

## 2021-05-25 NOTE — PROGRESS NOTES
ASSESSMENT/PLAN:    Diagnoses and all orders for this visit:    Arthritis of carpometacarpal [place]) joint of right thumb  -     Ambulatory referral to Orthopedic Surgery    Pain of right hand  -     Ambulatory referral to Orthopedic Surgery  -     XR thumb right first digit-min 2v; Future    Other orders  -     Small joint arthrocentesis         x-rays of the patient's right hand are negative for any fractures or dislocations  She does have bone spur located near the [facility] joint with arthritic changes  Possible treatment options were discussed with the patient  She was given a corticosteroid injection in the [facility] joint of her right thumb  She tolerated the injection quite well  She will follow up with our office in 6 weeks  The patient is acceptable to this plan  Return in about 6 weeks (around 7/6/2021)  _____________________________________________________  CHIEF COMPLAINT:  Chief Complaint   Patient presents with    Right Hand - Pain    Left Hand - Pain         SUBJECTIVE:  Checo Hutson is a 50 y o  female who presents to our office complaining of bilateral hand pain worse on the right  The patient states most of her pain is along her right thumb  She states she has had this pain for quite some time, however, it has been worsening recently  She states she does drop things more  She denies any fever or chills  She denies any new falls or trauma      The following portions of the patient's history were reviewed and updated as appropriate: allergies, current medications, past family history, past medical history, past social history, past surgical history and problem list     PAST MEDICAL HISTORY:  Past Medical History:   Diagnosis Date    Ankle pain, right     Anxiety     Arthritis     Bipolar 1 disorder ([facility] [address] )     Depression     GERD (gastroesophageal reflux disease)     Hypertension     Hypothyroidism     Known health problems: none     Lyme disease     Scoliosis        PAST SURGICAL HISTORY:  Past Surgical History:   Procedure Laterality Date    CERVICAL FUSION      CHOLECYSTECTOMY      COLONOSCOPY         FAMILY HISTORY:  Family History   Problem Relation Age of Onset    Diabetes Mother     Hypertension Mother     Heart disease Mother     Hypertension Father     Diabetes Maternal Grandmother     Diabetes Paternal Grandmother     No Known Problems Sister     No Known Problems Daughter     No Known Problems Daughter     No Known Problems Daughter     No Known Problems Maternal Aunt     No Known Problems Maternal Aunt     No Known Problems Maternal Aunt     No Known Problems Paternal Aunt        SOCIAL HISTORY:  Social History     Tobacco Use    Smoking status: Current Every Day Smoker     Packs/day: 0 50     Years: 3 00     Pack years: 1 50    Smokeless tobacco: Never Used   Substance Use Topics    Alcohol use: Not Currently     Frequency: Never    Drug use: Never       MEDICATIONS:    Current Outpatient Medications:     buPROPion (WELLBUTRIN XL) 300 mg 24 hr tablet, Take 300 mg by mouth daily, Disp: , Rfl: 1    busPIRone (BUSPAR) 15 mg tablet, TAKE 1 TABLET BY MOUTH IN THE AM AND 2 TABLETS IN THE AFTERNOON AND 1 TABLET AT NIGHT, Disp: , Rfl:     ergocalciferol (VITAMIN D2) 50,000 units, take 1 capsule by mouth weekly, Disp: 12 capsule, Rfl: 3    famotidine (PEPCID) 20 mg tablet, Take 1 tablet (20 mg total) by mouth 2 (two) times a day, Disp: 60 tablet, Rfl: 3    gabapentin (NEURONTIN) 800 mg tablet, Take 1 tablet (800 mg total) by mouth 3 (three) times a day, Disp: 90 tablet, Rfl: 3    levonorgestrel (MIRENA) 20 MCG/24HR IUD, 1 each by Intrauterine route once, Disp: , Rfl:     levothyroxine 75 mcg tablet, Take 1 tablet (75 mcg total) by mouth daily in the early morning, Disp: 90 tablet, Rfl: 1    lidocaine (LMX) 4 % cream, Apply topically as needed for mild pain, Disp: 30 g, Rfl: 0    lisinopril-hydrochlorothiazide (PRINZIDE,ZESTORETIC) 20-25 MG per tablet, Take 1 tablet by mouth once daily, Disp: 30 tablet, Rfl: 0    LORazepam (ATIVAN) 1 mg tablet, Take 1 tablet (1 mg total) by mouth every 8 (eight) hours as needed for anxiety, Disp: 90 tablet, Rfl: 0    methocarbamol (ROBAXIN) 500 mg tablet, Take 1 tablet (500 mg total) by mouth 4 (four) times a day, Disp: 30 tablet, Rfl: 0    nicotine (NICODERM CQ) 21 mg/24 hr TD 24 hr patch, Place 1 patch on the skin every 24 hours, Disp: 28 patch, Rfl: 0    OLANZapine (ZyPREXA) 5 mg tablet, TAKE 1 2 (ONE HALF) TABLET BY MOUTH TWICE DAILY, Disp: , Rfl:     pantoprazole (PROTONIX) 40 mg tablet, Take 1 tablet (40 mg total) by mouth daily, Disp: 90 tablet, Rfl: 0    venlafaxine (EFFEXOR-XR) 150 mg 24 hr capsule, Take 2 capsules (300 mg total) by mouth daily (Patient taking differently: Take 150 mg by mouth daily ), Disp: 60 capsule, Rfl: 1    zolpidem (AMBIEN) 10 mg tablet, Take 1 tablet (10 mg total) by mouth daily at bedtime as needed for sleep for up to 10 days, Disp: 10 tablet, Rfl: 0    ALLERGIES:  No Known Allergies    ROS:  Review of Systems     Constitutional: Negative for fatigue, fever or loss of appetite  HENT: Negative  Respiratory: Negative for shortness of breath, dyspnea  Cardiovascular: Negative for chest pain/tightness  Gastrointestinal: Negative for abdominal pain, N/V  Endocrine: Negative for cold/heat intolerance, unexplained weight loss/gain  Genitourinary: Negative for flank pain, dysuria, hematuria  Musculoskeletal: Positive for arthralgia   Skin: Negative for rash  Neurological: Negative for numbness or tingling  Psychiatric/Behavioral: Negative for agitation  _____________________________________________________  PHYSICAL EXAMINATION:    Blood pressure 129/71, pulse 91, height 5' 5" (1 651 m), weight 65 kg (143 lb 4 oz)  Constitutional: Oriented to person, place, and time  Appears well-developed and well-nourished  No distress  HENT:   Head: Normocephalic     Eyes: Conjunctivae are normal  Right eye exhibits no discharge  Left eye exhibits no discharge  No scleral icterus  Cardiovascular: Normal rate  Pulmonary/Chest: Effort normal    Neurological: Alert and oriented to person, place, and time  Skin: Skin is warm and dry  No rash noted  Not diaphoretic  No erythema  No pallor  Psychiatric: Normal mood and affect  Behavior is normal  Judgment and thought content normal       MUSCULOSKELETAL EXAMINATION:   Physical Exam  Ortho Exam      Right upper extremity is neurovascularly intact  Fingers are pink and mobile   Compartments are soft   There is grinding as well as pain along the [address] joint of the right thumb   There is tenderness to palpation of that area   Brisk cap refill   Sensation intact   Negative Tinel's    Objective:  BP Readings from Last 1 Encounters:   05/25/21 129/71      Wt Readings from Last 1 Encounters:   05/25/21 65 kg (143 lb 4 oz)        BMI:   Estimated body mass index is 23 84 kg/m² as calculated from the following:    Height as of this encounter: 5' 5" (1 651 m)  Weight as of this encounter: 65 kg (143 lb 4 oz)  Radiographs:  _____________________________________________________  STUDIES REVIEWED:  I have personally reviewed pertinent films and reports in PACS  x-rays of the patient's right hand are negative for any fractures or dislocations  She does have bone spur located near the [address] joint with arthritic changes       PROCEDURES PERFORMED:  Small joint arthrocentesis: R thumb CMC  [place] Protocol:  Consent given by: patient    Supporting Documentation  Indications: pain   Procedure Details  Location: thumb - R thumb CMC  Preparation: Patient was prepped and draped in the usual sterile fashion  Needle size: 27 G  Ultrasound guidance: no  Approach: dorsal  Medications administered: 3 mg betamethasone acetate-betamethasone sodium phosphate 6 (3-3) mg/mL; 0 5 mL bupivacaine 0 25 %    Patient tolerance: patient tolerated the procedure well with no immediate complications  Dressing:  Sterile dressing applied            Ange Tom PA-C

## 2021-05-27 ENCOUNTER — OFFICE VISIT (OUTPATIENT)
Dept: PHYSICAL THERAPY | Facility: CLINIC | Age: 46
End: 2021-05-27
Payer: COMMERCIAL

## 2021-05-27 DIAGNOSIS — M54.12 CERVICAL RADICULOPATHY: Primary | ICD-10-CM

## 2021-05-27 DIAGNOSIS — M48.061 SPINAL STENOSIS OF LUMBAR REGION, UNSPECIFIED WHETHER NEUROGENIC CLAUDICATION PRESENT: ICD-10-CM

## 2021-05-27 DIAGNOSIS — M54.2 NECK PAIN: ICD-10-CM

## 2021-05-27 PROCEDURE — 97110 THERAPEUTIC EXERCISES: CPT | Performed by: PHYSICAL MEDICINE & REHABILITATION

## 2021-05-27 PROCEDURE — 97140 MANUAL THERAPY 1/> REGIONS: CPT | Performed by: PHYSICAL MEDICINE & REHABILITATION

## 2021-05-27 NOTE — PROGRESS NOTES
Daily Note     Today's date: 2021  Patient name: Silvestre Benson  : 1975  MRN: [id number]  Referring provider: FIONA Dodson  Dx:   Encounter Diagnosis     ICD-10-CM    1  Cervical radiculopathy  M54 12    2  Spinal stenosis of lumbar region, unspecified whether neurogenic claudication present  M48 061    3  Neck pain  M54 2                   Subjective: Pt notes no new sx/complaints  F/u with ortho yesterday for her R thumb; was given an injection; no change in sx yet per pt  Cont'd medial L elbow pain without change  Present c/c of pain L UT to L shoulder; no radiating pain into L arm or n/t in L arm/hand  C/o LBP as well  Notes she called the pain management doctor herself and got an appointment for 2-3 weeks from now  Objective: See treatment diary below      Assessment: Tolerated initial treatment well  Required increased verbal, visual, and tactile cues/feedback for seated postural awareness and to activate MTs/LTs with exercises; tendency to over activate UTs noted  Continues with increased rounded shoulder/forward head posture; good return demo with cueing, however inconsistent/limited carryover  Similar difficulty with motor control/movement coordination with DNF training; tendency to over activate neck extensors vs SCMs; fair-good return demo with use of stabilizer and cueing for feedback; fatigues quickly  Increased mm tension and tender vs trigger points noted in L UT region; tolerated MT well  Overall noted less neck pain after tx however noted her LBP was aggravated from laying supine ; will adjust pt position NV  No radicular sx provoked with or after tx  Pt interested in home TENS unit 2* chronic pain; contacted stim rep and will get back to pt  No complaints or questions/concerns after tx  Patient demonstrated fatigue post treatment and would benefit from continued PT      Plan: Continue per plan of care  Progress treatment as tolerated         Precautions: anxiety, depression, bipolar disorder, chronic low/mid/upper back pain, Hx ACDF c-spine       Re-eval Date: 7/1    Date 5/20 5/27      Visit Count 1 2      FOTO 5/20       Pain In See IE 8/10      Pain Out See IE  6-7/10             Manuals 5/20 5/27      Trial SOR, UT stretch, levator stretch   15'   Supine  Gentle SOR c-spine, gentle STM B suboccpitial region and posterior c-spine PVMS and L UT, trigger point release L UT, UT stretch all as tolerated       Consider trial of GIASTM posterior c-spine/UTs                        Neuro Re-Ed        DNF training with stabilizer   Supine   10x 5"  Green  Verbal and tactile cues/feedbacl for activation of DNF and inhibition of SCMs                                                       Ther Ex        UT stretch    Levator scap stretch 2x30"      2x30"  5x20" ea       5x20" ea      Chin tucks          Scapular depression/retraction  5x  Cues         5x cues  Supine 10x/5" cues/feedback     2x10/5"  Cues/feedback       MTPs /LTPs      B ER         Artemas  Seated  2x10/5"                                               Ther Activity                        Gait Training                        Modalities          MHP c-spine supine x 10' after session  Skin intact pre/post tx

## 2021-05-30 DIAGNOSIS — I10 HYPERTENSION, UNSPECIFIED TYPE: ICD-10-CM

## 2021-05-30 RX ORDER — LISINOPRIL AND HYDROCHLOROTHIAZIDE 25; 20 MG/1; MG/1
TABLET ORAL
Qty: 30 TABLET | Refills: 0 | OUTPATIENT
Start: 2021-05-30

## 2021-06-01 DIAGNOSIS — I10 HYPERTENSION, UNSPECIFIED TYPE: ICD-10-CM

## 2021-06-01 RX ORDER — LISINOPRIL AND HYDROCHLOROTHIAZIDE 25; 20 MG/1; MG/1
1 TABLET ORAL DAILY
Qty: 30 TABLET | Refills: 0 | Status: SHIPPED | OUTPATIENT
Start: 2021-06-01 | End: 2021-06-23 | Stop reason: SDUPTHER

## 2021-06-02 ENCOUNTER — OFFICE VISIT (OUTPATIENT)
Dept: PHYSICAL THERAPY | Facility: CLINIC | Age: 46
End: 2021-06-02
Payer: COMMERCIAL

## 2021-06-02 DIAGNOSIS — M54.12 CERVICAL RADICULOPATHY: Primary | ICD-10-CM

## 2021-06-02 DIAGNOSIS — M48.061 SPINAL STENOSIS OF LUMBAR REGION, UNSPECIFIED WHETHER NEUROGENIC CLAUDICATION PRESENT: ICD-10-CM

## 2021-06-02 DIAGNOSIS — M54.2 NECK PAIN: ICD-10-CM

## 2021-06-02 PROCEDURE — 97110 THERAPEUTIC EXERCISES: CPT | Performed by: PHYSICAL MEDICINE & REHABILITATION

## 2021-06-02 PROCEDURE — 97140 MANUAL THERAPY 1/> REGIONS: CPT | Performed by: PHYSICAL MEDICINE & REHABILITATION

## 2021-06-02 NOTE — PROGRESS NOTES
Daily Note     Today's date: 2021  Patient name: Astrid Conley  : 1975  MRN: [id number]  Referring provider: Shelton Degroot DO  Dx:   Encounter Diagnosis     ICD-10-CM    1  Cervical radiculopathy  M54 12    2  Spinal stenosis of lumbar region, unspecified whether neurogenic claudication present  M48 061    3  Neck pain  M54 2                   Subjective: Pt with no complaints after last tx  Notes she had felt "a little better" but some of her muscles were "sore from working them in a new way"  No new sx  Overall not feeling any worse  Notes less n/t in her hands  Reports compliance with HEP  Thumb is better since injection  Objective: See treatment diary below      Assessment: Tolerated treatment well  Added GIASTM this date; pt tolerated well without incident  Continues with increased mm tension/tone L UT mm with ttp noted; reports most of her pain is coming from this region; reported reduced pain/tightness after GIASTM and MT  Relief of sx also reported with SOR in supine; increased soft tissue mobility noted in suboccpitial region with sustained SOR  Weakness of DNF noted; unable to hold/maintain DNF endurance test position with mm weakness and pain c-spine  Improved form/technique with chin tucks this date with less feedback needed  Weakness and quick mm fatigue noted with Tband scapular PREs  No complaints after session  Overall reported reduced pain after tx  Patient demonstrated fatigue post treatment and would benefit from continued PT to address cont'd motor control/planning and strength/endurance deficits of the DNF and periscapular regions contributing to persistent pain/sx  Plan: Continue per plan of care  Progress treatment as tolerated         Precautions: anxiety, depression, bipolar disorder, chronic low/mid/upper back pain, Hx ACDF c-spine       Re-eval Date:     Date      Visit Count 1 2 3     FOTO        Pain In See IE 8/10 7/10 at rest      Pain Out See IE  6-7/10  1-2/10 at rest  6/10 with movement            Manuals 5/20 5/27 6/2     Trial SOR, UT stretch, levator stretch   15'   Supine  Gentle SOR c-spine, gentle STM B suboccpitial region and posterior c-spine PVMS and L UT, trigger point release L UT, UT stretch all as tolerated  15'   Supine  Gentle SOR c-spine, gentle STM/DTM/TFM B suboccpitial region and posterior c-spine PVMS and L UT,  UT stretch all as tolerated      Consider trial of GIASTM posterior c-spine/UTs   5'   GIASTM instrument #4 sweeping/fanning L UT and B posterior c-spine PVMs and sub occipital regions to tolerance pt seated   Pt signed consent form   No adverse reaction to tx                     Neuro Re-Ed        DNF training with stabilizer   Supine   10x 5"  Green  Verbal and tactile cues/feedbacl for activation of DNF and inhibition of SCMs                                                       Ther Ex        UT stretch    Levator scap stretch 2x30"      2x30"  5x20" ea       5x20" ea 4x30" ea       4x30" ea     Chin tucks          Scapular depression/retraction  5x  Cues         5x cues  Supine 10x/5" cues/feedback     2x10/5"  Cues/feedback  Supine 2x10/5" cues/feedback          MTPs /LTPs        B ER             Riley  Seated  2x10/5"  Green 2x10/5" ea cues      Green   2x10/5" cues         DNF endurance   Attempted   Pain                                      Ther Activity                        Gait Training                        Modalities          MHP c-spine supine x 10' after session  Skin intact pre/post tx  MHP c-spine supine x 10' after session  Skin intact pre/post tx

## 2021-06-09 ENCOUNTER — TELEPHONE (OUTPATIENT)
Dept: PAIN MEDICINE | Facility: CLINIC | Age: 46
End: 2021-06-09

## 2021-06-09 NOTE — TELEPHONE ENCOUNTER
CALLED PT TO R/S PROVIDER OUT OF THE OFFICE- PHONE STATED WIRELESS CUSTOMER YOU ARE TRYING TO REACH IS UNAVAILABLE PLEASE TRY YOUR CALL AGAIN LATER AND HANGS UP

## 2021-06-11 ENCOUNTER — OFFICE VISIT (OUTPATIENT)
Dept: FAMILY MEDICINE CLINIC | Facility: CLINIC | Age: 46
End: 2021-06-11
Payer: COMMERCIAL

## 2021-06-11 VITALS
BODY MASS INDEX: 24.91 KG/M2 | DIASTOLIC BLOOD PRESSURE: 82 MMHG | HEIGHT: 65 IN | HEART RATE: 106 BPM | TEMPERATURE: 98.5 F | OXYGEN SATURATION: 98 % | WEIGHT: 149.5 LBS | SYSTOLIC BLOOD PRESSURE: 112 MMHG

## 2021-06-11 DIAGNOSIS — R63.5 WEIGHT GAIN: ICD-10-CM

## 2021-06-11 DIAGNOSIS — F32.A DEPRESSION, UNSPECIFIED DEPRESSION TYPE: ICD-10-CM

## 2021-06-11 DIAGNOSIS — M19.90 ARTHRITIS: Primary | ICD-10-CM

## 2021-06-11 PROCEDURE — 99213 OFFICE O/P EST LOW 20 MIN: CPT | Performed by: NURSE PRACTITIONER

## 2021-06-11 PROCEDURE — 4004F PT TOBACCO SCREEN RCVD TLK: CPT | Performed by: NURSE PRACTITIONER

## 2021-06-11 PROCEDURE — 3725F SCREEN DEPRESSION PERFORMED: CPT | Performed by: NURSE PRACTITIONER

## 2021-06-11 RX ORDER — LIDOCAINE 40 MG/G
CREAM TOPICAL AS NEEDED
Qty: 30 G | Refills: 0 | Status: SHIPPED | OUTPATIENT
Start: 2021-06-11 | End: 2021-10-05

## 2021-06-11 NOTE — PATIENT INSTRUCTIONS
Download my fitness pal jayla- free, to help track your foods, you also can scan barcodes on this jayla  Or track your foods by writing them in a journal      Go to the web site www [employer] for more help with healthy options     Arthritis   AMBULATORY CARE:   Arthritis  is pain or disease in one or more joints  There are many types of arthritis  Types such as rheumatoid arthritis cause inflammation in the joints  Other types wear away the cartilage between joints, such as osteoarthritis  This makes the bones of the joint rub together when you move the joint  An infection from bacteria, a virus, or a fungus can also cause arthritis  Your symptoms may be constant, or symptoms may come and go  Arthritis often gets worse over time and can cause permanent joint damage  Common signs and symptoms of arthritis:   · Pain, swelling, or stiffness in the joint    · Limited range of motion in the joint    · Warmth or redness over the joint    · Tenderness when you touch the joint    · Stiff joints in the morning that loosen with movement    · A creaking or grinding sound when you move the joint    · Fever    Call your doctor or rheumatologist if:   · You have a fever and severe joint pain or swelling  · You cannot move the affected joint  · You have severe joint pain you cannot tolerate  · You have a new or worsening rash  · Your pain or swelling does not get better with treatment  · You have questions or concerns about your condition or care  Treatment  will depend on the type of arthritis you have and if it is severe  You may need any of the following:  · Acetaminophen  decreases pain and fever  It is available without a doctor's order  Ask how much to take and how often to take it  Follow directions  Read the labels of all other medicines you are using to see if they also contain acetaminophen, or ask your doctor or pharmacist  Acetaminophen can cause liver damage if not taken correctly   Do not use more than 4 grams (4,000 milligrams) total of acetaminophen in one day  · NSAIDs , such as ibuprofen, help decrease swelling, pain, and fever  This medicine is available with or without a doctor's order  NSAIDs can cause stomach bleeding or kidney problems in certain people  If you take blood thinner medicine, always ask your healthcare provider if NSAIDs are safe for you  Always read the medicine label and follow directions  · Steroid medicine  helps reduce swelling and pain  · Prescription pain medicine  may be given  Ask your healthcare provider how to take this medicine safely  Some prescription pain medicines contain acetaminophen  Do not take other medicines that contain acetaminophen without talking to your healthcare provider  Too much acetaminophen may cause liver damage  Prescription pain medicine may cause constipation  Ask your healthcare provider how to prevent or treat constipation  · Surgery  may be needed to repair or replace a damaged joint  Manage your symptoms:   · Rest your painful joint so it can heal   Your healthcare provider may recommend crutches or a walker if the affected joint is in a leg  · Apply ice or heat to the joint  Both can help decrease swelling and pain  Ice may also help prevent tissue damage  Use an ice pack, or put crushed ice in a plastic bag  Cover it with a towel and place it on your joint for 15 to 20 minutes every hour or as directed  You can apply heat for 20 minutes every 2 hours  Heat treatment includes hot packs or heat lamps  · Elevate your joint  Elevation helps reduce swelling and pain  Raise your joint above the level of your heart as often as you can  Prop your painful joint on pillows to keep it above your heart comfortably  Manage arthritis:   · Talk to your healthcare providers about your arthritis medicines  Some medicines may only be needed when you have arthritis pain   You may need to take other medicines every day to prevent arthritis from getting worse  Your healthcare providers will help you understand all your medicines and when to take them  It is important to take the medicines as directed, even if you start to feel better  You can continue to have joint damage and inflammation even if you do not feel it  · Eat a variety of healthy foods  Healthy foods include fruits, vegetables, whole-grain breads, low-fat dairy products, beans, lean meats, and fish  Ask if you need to be on a special diet  A diet rich in calcium and vitamin D may decrease your risk of osteoporosis  Foods high in calcium include milk, cheese, broccoli, and tofu  Vitamin D may be found in meat, fish, fortified milk, cereal and bread  Ask if you need calcium or vitamin D supplements  · Go to physical or occupational therapy as directed  A physical therapist can teach you exercises to improve flexibility and range of motion  You may also be shown non-weight-bearing exercises that are safe for your joints, such as swimming  Exercise can help keep your joints flexible and reduce pain  An occupational therapist can help you learn to do your daily activities when your joints are stiff or sore  · Maintain a healthy weight  Extra weight puts increased pressure on your joints  Ask your healthcare provider what you should weigh  If you need to lose weight, he or she can help you create a weight loss program  Weight loss can help reduce pain and increase your ability to do your activities  · Wear flat or low-heeled shoes  This will help decrease pain and reduce pressure on your ankle, knee, and hip joints  · Do not smoke  Nicotine and other chemicals in cigarettes and cigars can damage your bones and joints  Ask your healthcare provider for information if you currently smoke and need help to quit  E-cigarettes or smokeless tobacco still contain nicotine  Talk to your healthcare provider before you use these products      Support devices:   · Orthotic shoes or insoles  help support your feet when you walk  · Crutches, a cane, or a walker  may help decrease your risk for falling  They also decrease stress on affected joints  · Devices to prevent falls  include raised toilet seats and bathtub bars to help you get up from sitting  Handrails can be placed in areas where you need balance and support  · Devices to help with support and rest  include splints to wear on your hands and a firm pillow while you sleep  Use a pillow that is firm enough to support your neck and head  Follow up with your healthcare provider or rheumatologist as directed:  Write down your questions so you remember to ask them during your visits  © Copyright [address] Information is for End User's use only and may not be sold, redistributed or otherwise used for commercial purposes  All illustrations and images included in CareNotes® are the copyrighted property of [employer]  or Sohail Mcn[employer]   The above information is an  only  It is not intended as medical advice for individual conditions or treatments  Talk to your doctor, nurse or pharmacist before following any medical regimen to see if it is safe and effective for you

## 2021-06-11 NOTE — PROGRESS NOTES
Assessment/Plan:    No problem-specific Assessment & Plan notes found for this encounter  Diagnoses and all orders for this visit:    Arthritis  -     lidocaine (LMX) 4 % cream; Apply topically as needed for mild pain    Depression, unspecified depression type    Weight gain          Subjective:      Patient ID: Silvestre Benson is a 50 y o  female  Patient presents for follow up on smoking cessation  States that she  Did not start the patches yet as she does not feel like she is mentally prepared to quite smoking as she has a lot of stressor right now  Patient is being seen by psychiatrist and counselor  She just switched medication for her depression and has been on new medication for two days- will call with the names as she does not remember  So far, she is feeling okay after making the medication switch  Denies SI/HI  She has been having marital issues- her  is not involved in her Childrens like and does not support her financially or emotionally  He is not living with the patient and her children right now  She recently moved with her children to her mothers house  She also has a 13year old son with ADHD and behavioral disorder who is aggressive which causes a lot of stress for the patient  Weight gain- Patient states that she continues to gain weight and is requesting medication to help with weight reduction  Patient counseled at length on diet and exercise  I do not recommend weight loss medication at this time as BMI 24 88, Cr 1 5 and lifestyle modification has not been attempted  Spoke with patient about reduction in carbohydrates, fatty foods, junk foods and increase hydration and exercise  Patient does typically eat carbohydrates like rice a pito with her protein at [address] [place] [address]  She also does drink soda about 3 cups per day and no water  She does eat junk food like candy or ice cream at night time when relaxing  Spoke with patient about increasing hydration with water   Also reviewed increasing vegetables, fruits and lean proteins  Also, recommend patient to start to walk for exercise  Arthritis- patient has an injection in her right thumb by ortho and she states that it has helped with her symptoms  However, patient continues to have generalized hand stiffness and pain  She has not been taking anything for it as Advil used to help but I instructed her not to use NSAID's due to kidney function  She did not get the lidocaine cream as she got OTC Voltaren as the pharmacist told her it was similar  Advise against use of Voltaren due to kidney function  She has secondary insurance now and would like to see if it is cheaper with her secondary insurance  Nicotine Dependence  Presents for follow-up visit  Symptoms are negative for fatigue and sore throat  Her urge triggers include company of smokers  She smokes 1 pack of cigarettes per day  Compliance with prior treatments has been poor  The following portions of the patient's history were reviewed and updated as appropriate: allergies, current medications, past family history, past medical history, past social history, past surgical history and problem list     Review of Systems   Constitutional: Negative for appetite change, fatigue and unexpected weight change  HENT: Negative for congestion, rhinorrhea, sneezing and sore throat  Eyes: Negative for visual disturbance  Respiratory: Negative for cough, chest tightness, shortness of breath and wheezing  Cardiovascular: Negative for chest pain, palpitations and leg swelling  Gastrointestinal: Negative for abdominal pain, blood in stool, constipation, diarrhea, nausea and vomiting  Genitourinary: Negative for difficulty urinating, dysuria and urgency  Musculoskeletal: Positive for arthralgias and back pain  Negative for joint swelling  Skin: Negative for color change and rash  Neurological: Negative for dizziness, weakness and headaches     Hematological: Negative for adenopathy  Does not bruise/bleed easily  Objective:      /82 (BP Location: Left arm, Patient Position: Sitting, Cuff Size: Standard)   Pulse (!) 106   Temp 98 5 °F (36 9 °C) (Tympanic)   Ht 5' 5" (1 651 m)   Wt 67 8 kg (149 lb 8 oz)   SpO2 98%   BMI 24 88 kg/m²          Physical Exam  Vitals signs and nursing note reviewed  Constitutional:       General: She is not in acute distress  Appearance: She is not ill-appearing or toxic-appearing  HENT:      Head: Normocephalic and atraumatic  Neck:      Musculoskeletal: Normal range of motion and neck supple  No neck rigidity or muscular tenderness  Cardiovascular:      Rate and Rhythm: Normal rate and regular rhythm  Pulses: Normal pulses  Heart sounds: Normal heart sounds  No murmur  No friction rub  No gallop  Pulmonary:      Effort: Pulmonary effort is normal  No respiratory distress  Breath sounds: Normal breath sounds  No stridor  No wheezing or rales  Abdominal:      General: Abdomen is flat  Bowel sounds are normal  There is no distension  Palpations: Abdomen is soft  There is no mass  Tenderness: There is no abdominal tenderness  There is no guarding  Musculoskeletal: Normal range of motion  General: Swelling (knuckles b/l ) present  No tenderness, deformity or signs of injury  Lymphadenopathy:      Cervical: No cervical adenopathy  Skin:     General: Skin is warm and dry  Coloration: Skin is not jaundiced or pale  Findings: No bruising or lesion  Neurological:      General: No focal deficit present  Mental Status: She is alert and oriented to person, place, and time  Mental status is at baseline  Cranial Nerves: No cranial nerve deficit  Sensory: No sensory deficit  Motor: No weakness  Gait: Gait normal    Psychiatric:         Mood and Affect: Mood normal          Behavior: Behavior normal          Thought Content:  Thought content normal  Judgment: Judgment normal

## 2021-06-15 ENCOUNTER — OFFICE VISIT (OUTPATIENT)
Dept: OBGYN CLINIC | Facility: CLINIC | Age: 46
End: 2021-06-15
Payer: COMMERCIAL

## 2021-06-15 VITALS
HEART RATE: 103 BPM | SYSTOLIC BLOOD PRESSURE: 119 MMHG | DIASTOLIC BLOOD PRESSURE: 76 MMHG | BODY MASS INDEX: 24.83 KG/M2 | WEIGHT: 149 LBS | HEIGHT: 65 IN

## 2021-06-15 DIAGNOSIS — M77.02 MEDIAL EPICONDYLITIS OF LEFT ELBOW: Primary | ICD-10-CM

## 2021-06-15 DIAGNOSIS — M18.11 ARTHRITIS OF CARPOMETACARPAL (CMC) JOINT OF RIGHT THUMB: ICD-10-CM

## 2021-06-15 PROCEDURE — 99213 OFFICE O/P EST LOW 20 MIN: CPT | Performed by: ORTHOPAEDIC SURGERY

## 2021-06-15 PROCEDURE — 3074F SYST BP LT 130 MM HG: CPT | Performed by: ORTHOPAEDIC SURGERY

## 2021-06-15 PROCEDURE — 4004F PT TOBACCO SCREEN RCVD TLK: CPT | Performed by: ORTHOPAEDIC SURGERY

## 2021-06-15 PROCEDURE — 3008F BODY MASS INDEX DOCD: CPT | Performed by: ORTHOPAEDIC SURGERY

## 2021-06-15 PROCEDURE — 20605 DRAIN/INJ JOINT/BURSA W/O US: CPT | Performed by: ORTHOPAEDIC SURGERY

## 2021-06-15 PROCEDURE — 3078F DIAST BP <80 MM HG: CPT | Performed by: ORTHOPAEDIC SURGERY

## 2021-06-15 RX ORDER — BETAMETHASONE SODIUM PHOSPHATE AND BETAMETHASONE ACETATE 3; 3 MG/ML; MG/ML
6 INJECTION, SUSPENSION INTRA-ARTICULAR; INTRALESIONAL; INTRAMUSCULAR; SOFT TISSUE
Status: COMPLETED | OUTPATIENT
Start: 2021-06-15 | End: 2021-06-15

## 2021-06-15 RX ORDER — VILAZODONE HYDROCHLORIDE 40 MG/1
40 TABLET ORAL DAILY
COMMUNITY
Start: 2021-06-09 | End: 2022-02-24

## 2021-06-15 RX ORDER — CLONAZEPAM 1 MG/1
1 TABLET ORAL 3 TIMES DAILY PRN
COMMUNITY
Start: 2021-06-09

## 2021-06-15 RX ORDER — BUPIVACAINE HYDROCHLORIDE 2.5 MG/ML
1 INJECTION, SOLUTION INFILTRATION; PERINEURAL
Status: COMPLETED | OUTPATIENT
Start: 2021-06-15 | End: 2021-06-15

## 2021-06-15 RX ORDER — MELOXICAM 15 MG/1
15 TABLET ORAL DAILY
Qty: 30 TABLET | Refills: 2 | Status: SHIPPED | OUTPATIENT
Start: 2021-06-15 | End: 2021-10-05

## 2021-06-15 RX ADMIN — BETAMETHASONE SODIUM PHOSPHATE AND BETAMETHASONE ACETATE 6 MG: 3; 3 INJECTION, SUSPENSION INTRA-ARTICULAR; INTRALESIONAL; INTRAMUSCULAR; SOFT TISSUE at 15:18

## 2021-06-15 RX ADMIN — BUPIVACAINE HYDROCHLORIDE 1 ML: 2.5 INJECTION, SOLUTION INFILTRATION; PERINEURAL at 15:18

## 2021-06-15 NOTE — PROGRESS NOTES
Assessment/Plan:  Assessment/Plan   Diagnoses and all orders for this visit:    Medial epicondylitis of left elbow    Arthritis of carpometacarpal (CMC) joint of right thumb    Other orders  -     clonazePAM (KlonoPIN) 1 mg tablet; Take 1 mg by mouth 3 (three) times a day as needed  -     Viibryd 40 MG tablet; Take 40 mg by mouth daily     Discussed with patient that today's physical exam is significant for resolution of right thumb CMC arthritis secondary to cortisone injection  Her exam also demonstrates medial epicondylitis of the left elbow  She was offered, and excepted, Marcaine and betamethasone injection to the left medial epicondyle for relief of pain and inflammation  Patient tolerated treatment well  She can return activity as tolerated without limitations or restrictions  She was seen for follow-up in 6 weeks for re-evaluation  Patient is in agreement with this treatment plan  Most of her patient's complaints today are coming from the medial aspect of her left elbow  Under aseptic technique, the medial epicondyle was injected with Celestone and Marcaine  She tolerated procedure quite well  Return back in 6 weeks for repeat evaluation  As far as her thumb, this is doing much better from the injection from 3 weeks ago  Subjective:   Patient ID: Astrid Conley is a 50 y o  female  HPI      Patient presents today for follow-up evaluation of right thumb pain  She was last seen in regards to this issue on 5/25/2021, at which time she was provided a corticosteroid injection to the right thumb CMC joint  Patient states that she had significant relief following the injection  She has pain-free in the right thumb at time of visit today  She also presents with a new issue, reporting pain in the medial aspect of the left elbow  She denies any specific incident of injury  She states that her pain is exacerbated with heavy gripping motions  She describes her pain is localized and sharp  She denies any associated bruising, swelling, numbness, or tingling  The following portions of the patient's history were reviewed and updated as appropriate: allergies, current medications, past family history, past medical history, past social history, past surgical history and problem list     Past Medical History:   Diagnosis Date    Ankle pain, right     Anxiety     Arthritis     Bipolar 1 disorder ([address] )     Depression     GERD (gastroesophageal reflux disease)     Hypertension     Hypothyroidism     Known health problems: none     Lyme disease     Scoliosis      Past Surgical History:   Procedure Laterality Date    CERVICAL FUSION      CHOLECYSTECTOMY      COLONOSCOPY       Family History   Problem Relation Age of Onset    Diabetes Mother     Hypertension Mother     Heart disease Mother     Hypertension Father     Diabetes Maternal Grandmother     Diabetes Paternal Grandmother     No Known Problems Sister     No Known Problems Daughter     No Known Problems Daughter     No Known Problems Daughter     No Known Problems Maternal Aunt     No Known Problems Maternal Aunt     No Known Problems Maternal Aunt     No Known Problems Paternal Aunt      Social History     Socioeconomic History    Marital status: /Civil Union     Spouse name: Not on file    Number of children: Not on file    Years of education: Not on file    Highest education level: Not on file   Occupational History    Occupation: UNEMPLOYED   Tobacco Use    Smoking status: Current Every Day Smoker     Packs/day: 0 50     Years: 3 00     Pack years: 1 50    Smokeless tobacco: Never Used   Vaping Use    Vaping Use: Never used   Substance and Sexual Activity    Alcohol use: Not Currently    Drug use: Never    Sexual activity: Yes     Partners: Male     Birth control/protection: I U D     Other Topics Concern    Not on file   Social History Narrative        UNEMPLOYED     Social Determinants of Health Financial Resource Strain:     Difficulty of Paying Living Expenses:    Food Insecurity:     Worried About Running Out of Food in the Last Year:     [address] in the Last Year:    Transportation Needs:     Lack of Transportation (Medical):      Lack of Transportation (Non-Medical):    Physical Activity:     Days of Exercise per Week:     Minutes of Exercise per Session:    Stress:     Feeling of Stress :    Social Connections:     Frequency of Communication with Friends and Family:     Frequency of Social Gatherings with Friends and Family:     Attends [employer] Services:     Active Member of Clubs or Organizations:     Attends Club or Organization Meetings:     Marital Status:    Intimate Partner Violence:     Fear of Current or Ex-Partner:     Emotionally Abused:     Physically Abused:     Sexually Abused:        Current Outpatient Medications:     ergocalciferol (VITAMIN D2) 50,000 units, take 1 capsule by mouth weekly, Disp: 12 capsule, Rfl: 3    famotidine (PEPCID) 20 mg tablet, Take 1 tablet (20 mg total) by mouth 2 (two) times a day, Disp: 60 tablet, Rfl: 3    gabapentin (NEURONTIN) 800 mg tablet, Take 1 tablet (800 mg total) by mouth 3 (three) times a day, Disp: 90 tablet, Rfl: 3    levonorgestrel (MIRENA) 20 MCG/24HR IUD, 1 each by Intrauterine route once, Disp: , Rfl:     levothyroxine 75 mcg tablet, Take 1 tablet (75 mcg total) by mouth daily in the early morning, Disp: 90 tablet, Rfl: 1    lidocaine (LMX) 4 % cream, Apply topically as needed for mild pain, Disp: 30 g, Rfl: 0    lisinopril-hydrochlorothiazide (PRINZIDE,ZESTORETIC) 20-25 MG per tablet, Take 1 tablet by mouth daily, Disp: 30 tablet, Rfl: 0    LORazepam (ATIVAN) 1 mg tablet, Take 1 tablet (1 mg total) by mouth every 8 (eight) hours as needed for anxiety, Disp: 90 tablet, Rfl: 0    nicotine (NICODERM CQ) 21 mg/24 hr TD 24 hr patch, Place 1 patch on the skin every 24 hours, Disp: 28 patch, Rfl: 0   OLANZapine (ZyPREXA) 5 mg tablet, TAKE 1 2 (ONE HALF) TABLET BY MOUTH TWICE DAILY, Disp: , Rfl:     pantoprazole (PROTONIX) 40 mg tablet, Take 1 tablet (40 mg total) by mouth daily, Disp: 90 tablet, Rfl: 0    venlafaxine (EFFEXOR-XR) 150 mg 24 hr capsule, Take 2 capsules (300 mg total) by mouth daily, Disp: 60 capsule, Rfl: 1    buPROPion (WELLBUTRIN XL) 300 mg 24 hr tablet, Take 300 mg by mouth daily, Disp: , Rfl: 1    busPIRone (BUSPAR) 15 mg tablet, TAKE 1 TABLET BY MOUTH IN THE AM AND 2 TABLETS IN THE AFTERNOON AND 1 TABLET AT NIGHT, Disp: , Rfl:     clonazePAM (KlonoPIN) 1 mg tablet, Take 1 mg by mouth 3 (three) times a day as needed, Disp: , Rfl:     methocarbamol (ROBAXIN) 500 mg tablet, Take 1 tablet (500 mg total) by mouth 4 (four) times a day (Patient not taking: Reported on 6/15/2021), Disp: 30 tablet, Rfl: 0    Viibryd 40 MG tablet, Take 40 mg by mouth daily, Disp: , Rfl:     zolpidem (AMBIEN) 10 mg tablet, Take 1 tablet (10 mg total) by mouth daily at bedtime as needed for sleep for up to 10 days, Disp: 10 tablet, Rfl: 0    No Known Allergies    Review of Systems   Constitutional: Negative for chills, fever and unexpected weight change  HENT: Negative for hearing loss, nosebleeds and sore throat  Eyes: Negative for pain, redness and visual disturbance  Respiratory: Negative for cough, shortness of breath and wheezing  Cardiovascular: Negative for chest pain, palpitations and leg swelling  Gastrointestinal: Negative for abdominal pain, nausea and vomiting  Endocrine: Negative for polydipsia and polyuria  Genitourinary: Negative for dysuria and hematuria  Musculoskeletal:        As noted in HPI   Skin: Negative for rash and wound  Neurological: Negative for dizziness, numbness and headaches  Psychiatric/Behavioral: Negative for decreased concentration and suicidal ideas  The patient is not nervous/anxious          Objective:  /76   Pulse 103   Ht 5' 5" (1 651 m)   Wt 67 6 kg (149 lb)   BMI 24 79 kg/m²      Ortho Exam   Right thumb -    no obvious anatomical deformity   Skin is warm and dry to touch with no signs of erythema, ecchymosis, infection   no soft tissue swelling or effusion noted   patient is able demonstrate full closed fist   no tenderness to palpation over CMC joint  -  CMC grind   palpable crepitation with circumduction   2+ distal radial pulse with brisk capillary refill to the fingers   Radial, median, and ulnar motor and sensory distributions intact  Sensation light touch intact distally    Left elbow -    no obvious anatomical deformity   Skin is warm and dry to touch with no signs of erythema, ecchymosis, or infection   ROM 0°- 140°   TTP over common flexor tendon at medial epicondyle   elbow is stable to varus and valgus stress    2+ distal radial pulse with brisk capillary refill to the fingers   Radial, median, and ulnar motor and sensory distributions intact  Sensation light touch intact distally    Physical Exam  Constitutional:       Appearance: She is well-developed  HENT:      Right Ear: External ear normal       Left Ear: External ear normal       Nose: Nose normal    Eyes:      Conjunctiva/sclera: Conjunctivae normal       Pupils: Pupils are equal, round, and reactive to light  Pulmonary:      Effort: Pulmonary effort is normal    Musculoskeletal:         General: Normal range of motion  Cervical back: Normal range of motion  Skin:     General: Skin is warm and dry  Neurological:      Mental Status: She is alert and oriented to person, place, and time  Psychiatric:         Behavior: Behavior normal          Thought Content: Thought content normal          Judgment: Judgment normal          Imaging:  No new imaging reviewed this visit    Medium joint arthrocentesis: L elbow  Universal Protocol:  Procedure performed by: Joe NICK, TRE)  Consent: Verbal consent obtained    Consent given by: patient  Timeout called at: 6/15/2021 3:08 PM   Patient understanding: patient states understanding of the procedure being performed  Patient identity confirmed: verbally with patient    Supporting Documentation  Indications: pain   Procedure Details  Location: elbow - L elbow (Medial epicondyle)  Needle size: 22 G  Ultrasound guidance: no  Medications administered: 1 mL bupivacaine 0 25 %; 6 mg betamethasone acetate-betamethasone sodium phosphate 6 (3-3) mg/mL    Patient tolerance: patient tolerated the procedure well with no immediate complications  Dressing:  Sterile dressing applied          Scribe Attestation    I,:  Kyler Santoyo am acting as a scribe while in the presence of the attending physician :       I,:  Karthik Buchanan DO personally performed the services described in this documentation    as scribed in my presence :

## 2021-06-17 ENCOUNTER — OFFICE VISIT (OUTPATIENT)
Dept: PHYSICAL THERAPY | Facility: CLINIC | Age: 46
End: 2021-06-17
Payer: COMMERCIAL

## 2021-06-17 DIAGNOSIS — M48.061 SPINAL STENOSIS OF LUMBAR REGION, UNSPECIFIED WHETHER NEUROGENIC CLAUDICATION PRESENT: ICD-10-CM

## 2021-06-17 DIAGNOSIS — M54.12 CERVICAL RADICULOPATHY: Primary | ICD-10-CM

## 2021-06-17 DIAGNOSIS — M54.2 NECK PAIN: ICD-10-CM

## 2021-06-17 PROCEDURE — 97140 MANUAL THERAPY 1/> REGIONS: CPT | Performed by: PHYSICAL MEDICINE & REHABILITATION

## 2021-06-17 PROCEDURE — 97110 THERAPEUTIC EXERCISES: CPT | Performed by: PHYSICAL MEDICINE & REHABILITATION

## 2021-06-17 NOTE — PROGRESS NOTES
Daily Note     Today's date: 2021  Patient name: Josh Carreon  : 1975  MRN: [id number]  Referring provider: Blessing Ledezma DO  Dx:   Encounter Diagnosis     ICD-10-CM    1  Cervical radiculopathy  M54 12    2  Spinal stenosis of lumbar region, unspecified whether neurogenic claudication present  M48 061    3  Neck pain  M54 2                   Subjective: R thumb is feeling better since injection  L elbow hurts more today since injection  Notes overall her neck feels better and the n/t in her hands seems less  No adverse reaction to tx to date  Notes Dr Cody Cerda gave her an anti-inflammatory that she took today; seems to be helping her sx however notes she probably shouldn't be taking is because of her kidneys; she is going to call her kidney doctor today as she feels the medication may be making her incontinence worse  Denies any other red flag sx at this time  No new sx/complaints  Pain continues to be worse L c-spine/UT region  Objective: See treatment diary below      Assessment: Tolerated treatment well  Encouraged pt to f/u with kidney doctor regarding medications and to cont to monitor sx and report them to MD; understanding noted  Pt was issued home TENs unit; reviewed safety precautions, set up, removal, how to operate device/how to use, and precautions/contraindications, etc with pt; also encouraged pt to review packet that came with TENS unit; pt demonstrated ability to properly set up and start device/stop device  Tolerated initial Tens tx well without incident  No adverse reaction to tx noted  Pt was issued unit; no questions/concerns noted  Demonstrates cont c/c of pain L UT region locally; notes reduced pain with manual tx and GIASTM  Added prone periscapular exercises this date; pt demonstrates weakness and poor motor control on the middle and lower trapezius mms; fatigued with mm soreness after session   Cont'd diminished motor control of the DNF also noted with chin tucks; improved with cues/feedback; pulling R posterior c-spine reported with exercise, resolved afterwards  Pt requested ice on L elbow; applied CP x 10' L elbow after session; no adverse reaction to tx noted  Reviewed HEP  No questions/concerns end of tx  Patient demonstrated fatigue post treatment and would benefit from continued PT to address cont'd strength and neuromotor control deficits of the c-spine and periscapular regions that cont to contribute to persistent pain/sx  Plan: Continue per plan of care  Progress treatment as tolerated         Precautions: anxiety, depression, bipolar disorder, chronic low/mid/upper back pain, Hx ACDF c-spine       Re-eval Date: 7/1    Date 5/20 5/27 6/2 6/17    Visit Count 1 2 3 4    FOTO 5/20       Pain In See IE 8/10 7/10 at rest  5/10    Pain Out See IE  6-7/10  1-2/10 at rest  6/10 with movement  5-6/10 mm soreness           Manuals 5/20 5/27 6/2 6/17    Trial SOR, UT stretch, levator stretch   15'   Supine  Gentle SOR c-spine, gentle STM B suboccpitial region and posterior c-spine PVMS and L UT, trigger point release L UT, UT stretch all as tolerated  15'   Supine  Gentle SOR c-spine, gentle STM/DTM/TFM B suboccpitial region and posterior c-spine PVMS and L UT,  UT stretch all as tolerated  15'   Supine  Gentle SOR c-spine, gentle STM/DTM/TFM B suboccpitial region and posterior c-spine PVMS and L UT,  UT stretch all as tolerated     Consider trial of GIASTM posterior c-spine/UTs   5'   GIASTM instrument #4 sweeping/fanning L UT and B posterior c-spine PVMs and sub occipital regions to tolerance pt seated   Pt signed consent form   No adverse reaction to tx 5'   GIASTM instrument #4 sweeping/fanning L UT and B posterior c-spine PVMs and sub occipital regions to tolerance pt seated   Pt signed consent form   No adverse reaction to tx                    Neuro Re-Ed        DNF training with stabilizer   Supine   10x 5"  Green  Verbal and tactile cues/feedbacl for activation of DNF and inhibition of SCMs                                                       Ther Ex        UT stretch    Levator scap stretch 2x30"      2x30"  5x20" ea       5x20" ea 4x30" ea       4x30" ea Reviewed HEP    Reviewed HEP     Chin tucks          Scapular depression/retraction  5x  Cues         5x cues  Supine 10x/5" cues/feedback     2x10/5"  Cues/feedback  Supine 2x10/5" cues/feedback      Supine 2x10/5" cues/feedback       MTPs /LTPs        B ER             [place]  Seated  2x10/5"  Green 2x10/5" ea cues      Green   2x10/5" cues         DNF endurance   Attempted   Pain          Prone I, T, Ys   0#  1x10/10" with verbal and tactile cues for correct mm activation of the MT and LTs and inhibition of the UTs                            Ther Activity                        Gait Training                        Modalities          MHP c-spine supine x 10' after session  Skin intact pre/post tx  MHP c-spine supine x 10' after session  Skin intact pre/post tx  NP        15' total   TENs set up/instruction  And pt education as noted above

## 2021-06-22 ENCOUNTER — TELEPHONE (OUTPATIENT)
Dept: OTHER | Facility: OTHER | Age: 46
End: 2021-06-22

## 2021-06-22 ENCOUNTER — OFFICE VISIT (OUTPATIENT)
Dept: PHYSICAL THERAPY | Facility: CLINIC | Age: 46
End: 2021-06-22
Payer: COMMERCIAL

## 2021-06-22 DIAGNOSIS — M54.12 CERVICAL RADICULOPATHY: Primary | ICD-10-CM

## 2021-06-22 DIAGNOSIS — M54.2 NECK PAIN: ICD-10-CM

## 2021-06-22 PROCEDURE — 97110 THERAPEUTIC EXERCISES: CPT | Performed by: PHYSICAL MEDICINE & REHABILITATION

## 2021-06-22 PROCEDURE — 97140 MANUAL THERAPY 1/> REGIONS: CPT | Performed by: PHYSICAL MEDICINE & REHABILITATION

## 2021-06-22 NOTE — PROGRESS NOTES
Daily Note     Today's date: 2021  Patient name: Josh Carreon  : 1975  MRN: [id number]  Referring provider: Blessing Ledezma DO  Dx:   Encounter Diagnosis     ICD-10-CM    1  Cervical radiculopathy  M54 12    2  Neck pain  M54 2                   Subjective: Pt notes increased stress in her life which is increasing the pain L side of her neck and upper shoulder  Notes cont'd upper back pain as well  Has been very stressed lately  No new sx/complaints  No adverse reactions to PT to date; notes she is "sore" from some of the exercises in PT, however feels better afterwards  Overall feels her sx are improving; returning to baseline level of neck pain/sx per pt  Notes since [facility], the N/t has resolved in her hands  See pain management soon  Notes since putting ice on her elbow, the pain has been much improved   Has not yet used her TENs unit at home 2* time constraints  Objective: See treatment diary below      Assessment: Tolerated treatment fair  Continues to demonstrate significant weakness of the yaima scapular mms, especially with prone exercises and especially of the LT > MT  Difficulty inhibiting UT mm activation with prone MT/LT exercises; improved from last tx and less feedback required for motor control/proper mm activation vs last tx  Improving motor control of the DNF as well with less cueing/feedback required for proper mm activation  Continues with limited tolerance for ther ex with increase in L UT region discomfort  Increased mm tension/tone noted L UT with manual tx; reduced tone noted after MT/GIASTM  No complaints after tx    Reviewed Hep and home TENs use; no questions/concerns noted  Patient demonstrated fatigue post treatment and would benefit from continued PT      Plan: Continue per plan of care  Progress treatment as tolerated         Precautions: anxiety, depression, bipolar disorder, chronic low/mid/upper back pain, Hx ACDF c-spine       Re-eval Date:     Date  6/2 6/17 6/22   Visit Count 1 2 3 4 5   FOTO 5/20       Pain In See IE 8/10 7/10 at rest  5/10 7/10   Pain Out See IE  6-7/10  1-2/10 at rest  6/10 with movement  5-6/10 mm soreness  7-8/10 L UT          Manuals 5/20 5/27 6/2 6/17 6/22   Trial SOR, UT stretch, levator stretch   15'   Supine  Gentle SOR c-spine, gentle STM B suboccpitial region and posterior c-spine PVMS and L UT, trigger point release L UT, UT stretch all as tolerated  15'   Supine  Gentle SOR c-spine, gentle STM/DTM/TFM B suboccpitial region and posterior c-spine PVMS and L UT,  UT stretch all as tolerated  15'   Supine  Gentle SOR c-spine, gentle STM/DTM/TFM B suboccpitial region and posterior c-spine PVMS and L UT,  UT stretch all as tolerated  15'   Supine  Gentle SOR c-spine, gentle STM/DTM/TFM B suboccpitial region and posterior c-spine PVMS and L UT,  UT stretch all as tolerated    Consider trial of GIASTM posterior c-spine/UTs   5'   GIASTM instrument #4 sweeping/fanning L UT and B posterior c-spine PVMs and sub occipital regions to tolerance pt seated   Pt signed consent form   No adverse reaction to tx 5'   GIASTM instrument #4 sweeping/fanning L UT and B posterior c-spine PVMs and sub occipital regions to tolerance pt seated   Pt signed consent form   No adverse reaction to tx 5'   GIASTM instrument #4 sweeping/fanning L UT and B posterior c-spine PVMs and sub occipital regions to tolerance pt seated   Pt signed consent form   No adverse reaction to tx                   Neuro Re-Ed        DNF training with stabilizer   Supine   10x 5"  Green  Verbal and tactile cues/feedbacl for activation of DNF and inhibition of SCMs                                                       Ther Ex        UT stretch    Levator scap stretch 2x30"      2x30"  5x20" ea       5x20" ea 4x30" ea       4x30" ea Reviewed HEP    Reviewed HEP  4x30" ea       4x30" ea   Chin tucks          Scapular depression/retraction  5x  Cues         5x cues  Supine 10x/5" cues/feedback     2x10/5"  Cues/feedback  Supine 2x10/5" cues/feedback      Supine 2x10/5" cues/feedback    Supine 3x10/5" cues/feedback      MTPs /LTPs        B ER             [place]  Seated  2x10/5"  Green 2x10/5" ea cues      Green   2x10/5" cues         DNF endurance   Attempted   Pain          Prone I, T, Ys   0#  1x10/10" with verbal and tactile cues for correct mm activation of the MT and LTs and inhibition of the UTs Prone I, T, Ys   0#  1x10/10" with verbal and tactile cues for correct mm activation of the MT and LTs and inhibition of the UTs                           Ther Activity                        Gait Training                        Modalities          MHP c-spine supine x 10' after session  Skin intact pre/post tx  MHP c-spine supine x 10' after session  Skin intact pre/post tx  NP NP       15' total   TENs set up/instruction  And pt education as noted above  Verbally reviewed

## 2021-06-23 DIAGNOSIS — I10 HYPERTENSION, UNSPECIFIED TYPE: ICD-10-CM

## 2021-06-23 DIAGNOSIS — E55.9 VITAMIN D DEFICIENCY: ICD-10-CM

## 2021-06-23 DIAGNOSIS — E03.9 ACQUIRED HYPOTHYROIDISM: ICD-10-CM

## 2021-06-23 DIAGNOSIS — M54.2 CERVICAL PAIN: ICD-10-CM

## 2021-06-23 RX ORDER — ERGOCALCIFEROL 1.25 MG/1
50000 CAPSULE ORAL WEEKLY
Qty: 12 CAPSULE | Refills: 3 | OUTPATIENT
Start: 2021-06-23

## 2021-06-23 RX ORDER — LISINOPRIL AND HYDROCHLOROTHIAZIDE 25; 20 MG/1; MG/1
1 TABLET ORAL DAILY
Qty: 30 TABLET | Refills: 0 | Status: SHIPPED | OUTPATIENT
Start: 2021-06-23 | End: 2021-07-20 | Stop reason: SDUPTHER

## 2021-06-23 RX ORDER — METHOCARBAMOL 500 MG/1
500 TABLET, FILM COATED ORAL 4 TIMES DAILY
Qty: 30 TABLET | Refills: 0 | Status: SHIPPED | OUTPATIENT
Start: 2021-06-23 | End: 2021-07-20 | Stop reason: SDUPTHER

## 2021-06-23 RX ORDER — LEVOTHYROXINE SODIUM 0.07 MG/1
75 TABLET ORAL
Qty: 90 TABLET | Refills: 1 | Status: SHIPPED | OUTPATIENT
Start: 2021-06-23 | End: 2021-09-09

## 2021-06-29 ENCOUNTER — TELEPHONE (OUTPATIENT)
Dept: NEPHROLOGY | Facility: CLINIC | Age: 46
End: 2021-06-29

## 2021-06-29 NOTE — TELEPHONE ENCOUNTER
Patient called and left voicemail that she has been having bowel incontinence  She states that she thinks she was in [place] in the past which just made it worse  I don't know if you want her to call her PCP or call GI

## 2021-07-02 ENCOUNTER — EVALUATION (OUTPATIENT)
Dept: PHYSICAL THERAPY | Facility: CLINIC | Age: 46
End: 2021-07-02
Payer: COMMERCIAL

## 2021-07-02 DIAGNOSIS — M54.12 CERVICAL RADICULOPATHY: Primary | ICD-10-CM

## 2021-07-02 DIAGNOSIS — M48.061 SPINAL STENOSIS OF LUMBAR REGION, UNSPECIFIED WHETHER NEUROGENIC CLAUDICATION PRESENT: ICD-10-CM

## 2021-07-02 DIAGNOSIS — M54.2 NECK PAIN: ICD-10-CM

## 2021-07-02 PROCEDURE — 97110 THERAPEUTIC EXERCISES: CPT | Performed by: PHYSICAL MEDICINE & REHABILITATION

## 2021-07-02 PROCEDURE — 97530 THERAPEUTIC ACTIVITIES: CPT | Performed by: PHYSICAL MEDICINE & REHABILITATION

## 2021-07-02 PROCEDURE — 97140 MANUAL THERAPY 1/> REGIONS: CPT | Performed by: PHYSICAL MEDICINE & REHABILITATION

## 2021-07-02 NOTE — PROGRESS NOTES
PT Re-Evaluation     Today's date: 2021  Patient name: Som Lombardi  : 1975  MRN: [id number]  Referring provider: Humera Mendez DO  Dx:   Encounter Diagnosis     ICD-10-CM    1  Cervical radiculopathy  M54 12    2  Neck pain  M54 2    3  Spinal stenosis of lumbar region, unspecified whether neurogenic claudication present  M48 061                   Assessment  Assessment details: Chidi Schulte has been compliant with attending PT and completing home exercise program since initial eval and reports overall 45% improvement in pain/sx and function since start of care   Most notably, UE sx and n/t in her hands has resolved  Chidi Schulte reports and demonstrates some improvements with increased strength, increased ROM, improved flexibility, improved postural awareness and improved overall level of function since initial eval, but is still limited compared to prior level of function  At present, she is most notably effected by ongoing stress and tension leading to increased neck and shoulder/back pain and HA; does continue to see her counselor but admits to limited time and consistency with self care and relaxation techniques  She has an appt with pain management in a few weeks  Chidi Schulte continues with above listed impairments and would benefit from additional skilled PT to address these deficits to return to prior level of function  Pt has been referred to spine and pain; sees them in a few weeks  Would benefit from cont'd visits with her counselor and increased focus on relaxation and self care due to cont'd increased stress and tension and anxiety contributing to increase in physical sx  Primary movement impairment diagnosis of cervical closing dysfunction resulting in pathoanatomical symptoms of neck pain and B UE radiculopathy and limiting her ability to carry, drive, lift, perform household chores and reach overhead      Impairments include:  1) cervical hypomobility - addressing with ROM abd mobility exercises (caution hx of ACDF)  2) upper limb neural tension - addressing with neurodynamic mobs and mobility exercises   3) poor cervicothoracic movement coordination - addressing with functional retraining  4) poor scapulohumeral movement coordination - addressing with neuromotor retraining     Etiologic factors include repetitive poor body mechanics, poor ergonomics and poor scapulohumeral movement coordination  Impairments: abnormal muscle firing, abnormal muscle tone, abnormal or restricted ROM, abnormal movement, activity intolerance, impaired physical strength, pain with function and poor posture     Symptom irritability: moderateUnderstanding of Dx/Px/POC: good   Prognosis: good  Prognosis details: Positive prognostic indicators include good understanding of diagnosis and treatment plan options and absence of observed red flags  Negative prognostic indicators include chronicity of symptoms, anxiety, depression, high symptom irritability, lack of centralization with movement, multiple concurrent orthopedic problems and multiple prior failed treatments  Goals  STGs to be achieved in 2-4 weeks:  1  Pt to report reduced max pain intensity to no > 7/10 at worst in order to improve tolerance to ADLs  - not met, increased stress/tension with increased pain  2  Pt to demonstrate improved active cervical motion all planes by at least 5-10* in order to improve ability for pt to turn her head with ADLs and driving  -met   3  Pt to demonstrate independence with upright/symmetrical postural awareness with minimized forward head t/o PT exercise program without need for postural /positional cues/reminders in order to facilitate improved postural awareness and reduced c-spine strain with ADLs  - progressing     LTGs to be achieved in 10-12 weeks:  Patient will be independent with home exercise program  - progressing   Patient will be able to manage symptoms independently  - progressing   Patient will be able to turn to look over a shoulder to back out of a parking space without limitation due to pain  - progressing   Patient will be able to look up without limitation due to pain  - progressing   Patient will be able to look up to change a bulb overhead without limitation due to pain  - progressing     Plan  Plan details: Prognosis above is given PT services 2x/week tapering to 1x/week over the next 2 months and home program adherence  Patient would benefit from: skilled physical therapy  Planned modality interventions: thermotherapy: hydrocollator packs and cryotherapy  Planned therapy interventions: activity modification, joint mobilization, manual therapy, motor coordination training, neuromuscular re-education, patient education, self care, therapeutic activities, therapeutic exercise, home exercise program, behavior modification, postural training and functional ROM exercises  Frequency: 1x week  Duration in visits: 8  Duration in weeks: 4  Plan of Care beginning date: 7/2/2021  Plan of Care expiration date: 8/1/2021  Treatment plan discussed with: patient        Subjective Evaluation    History of Present Illness  Mechanism of injury: Pt notes overall about 45% improvement in pain/sx since [facility]  Notes her n/t in her hands has resolved since starting PT  Feels she has better neck ROM and better postural awareness  Has been using her home TENS unit at home without incident; feels this helps her sx  Pt notes over the past several days, her neck/spine pain has been worse  No radicular sx  Increase HA with neck pain  Pt notes however she feels this is due to personal stress/tension; increase in stress and anxiety, leading to increase in neck pain and HA  No new sx/complaints overall  Feels she is "a little sore" after PT, but then "later on it feels a lot better"  Has an appt with pain management in a few weeks  Cont c/c of central and L sided neck pain > R  No pain/sx into UEs  Continues to describe tension type HA  Ongoing exacerbation of bowel incontinence; is f/u with GI specialist  No other red flags or new sx/complaints  Notes she does continue to see her counselor regularly (1x/week vs month?) for mental health  Not a recurrent problem   Quality of life: poor (increased life stress)    Pain  Current pain ratin  At best pain ratin  At worst pain ratin  Location: L /R c-spine, UT, shoulder,  chronic mid and lower back pain as well, HA  Quality: dull ache  Aggravating factors: sitting, lifting and overhead activity  Progression: improved (a little )    Social Support  Steps to enter house: yes  Stairs in house: no   Lives in: [facility]  Lives with: parents (Mother)    Employment status: not working  Hand dominance: right      Diagnostic Tests  MRI studies: abnormal (see reports in chart)  Treatments  Previous treatment: medication  Current treatment: medication and physical therapy  Patient Goals  Patient goals for therapy: decreased pain and increased motion  Patient's goals regarding treatment: reduce the n/t in the arms/hands, "feel better"        Objective     Concurrent Complaints  Positive for disturbed sleep and headaches   Negative for dizziness, faints, nausea/motion sickness, tinnitus, trouble swallowing, difficulty breathing, shortness of breath, respiratory pain, visual change, bladder dysfunction, bowel dysfunction, saddle (S4) numbness, history of cancer, history of trauma and infection    Additional Special Questions  Denies ataxia/gait change  Denies any progressive UE/LE weakness     Postural Observations  Seated posture: poor  Standing posture: fair  Correction of posture: has no consistent effect    Additional Postural Observation Details  Forward head/rounded shoulders- moderate-severe  Increased thoracic kyphosis - moderate   B scapular depression and protraction with mild winging   Increased PPT in sitting  Overall posture remains unchanged         Tenderness   Cervical Spine No tenderness in the spinous process       Additional Tenderness Details  Diffuse ttp L > R suboccipitals, c-spine PVMs, UT, levator scapulae mms; increased mm tension/tone noted L > R UT/levator scapulae - continues   Elevated ttp centrally C4/5-C2 SPs - resolved, no ttp over SPs noted today         Neurological Testing     Sensation   Cervical/Thoracic   Left   Intact: light touch    Right   Intact: light touch    Reflexes   Left   Biceps (C5/C6): normal (2+)  Brachioradialis (C6): normal (2+)  Triceps (C7): brisk (3+)  Pedersen's reflex: positive    Right   Biceps (C5/C6): normal (2+)  Brachioradialis (C6): normal (2+)  Triceps (C7): brisk (3+)  Pedersen's reflex: positive    Additional Neurological Details  Normal-slightly brisk L biceps and brachioradialis reflex vs R  Reports reduced light touch sensation L vs R in C 5/6/7/8 dermatomes - not noted at RE   Will monitor     Active Range of Motion   Cervical/Thoracic Spine       Cervical    Flexion: 50 degrees   Extension: 40 (central neck/upper thoracic pain, no radicular sx) degrees     with pain  Left lateral flexion: 35 degrees      Right lateral flexion: 30 degrees      Left rotation: 75 degrees  Right rotation: 70 degrees           Additional Active Range of Motion Details  Pulling/pain posterior c-spine at end ranges of AROM- continues with general c/o mm tension/pulling/soreness at end ranges    Will cont to assess     Joint Play     Comments: Hx ACDF c-spine       Strength/Myotome Testing   Cervical Spine     Left   Interossei strength (t1): 4- and 4    Right   Interossei strength (t1): 4- and 4    Left Shoulder     Planes of Motion   Flexion: 4   Abduction: 4   External rotation at 0°: 4   Internal rotation at 0°: 4+     Isolated Muscles   Upper trapezius: 4+     Right Shoulder     Planes of Motion   Flexion: 4+   Abduction: 4+   External rotation at 0°: 4+   Internal rotation at 0°: 4+     Isolated Muscles   Upper trapezius: 4+     Left Elbow   Flexion: 4+  Extension: 4+    Right Elbow   Flexion: 4+  Extension: 4+    Left Wrist/Hand   Wrist extension: 4+  Wrist flexion: 4+  Thumb extension: 4    Right Wrist/Hand   Wrist extension: 4+  Wrist flexion: 4+  Thumb extension: 4    Additional Strength Details  Denies pain with MMT screening B UEs       Tests   Cervical   Positive lumbar distraction test, craniocervical flexion test and neck flexor muscle endurance test   Negative vertical compression and Lhermitte's sign  Lumbar   Negative vertical compression  Additional Tests Details  Deferred extensive testing 2* hx ACDF c-spine  No change in sx with vertical compression   Continues with motor control and endurance deficits with activation of DNF supine- improving with feedback/cues     Will cont to assess  Neuro Exam:     Headaches   Patient reports headaches: Yes                Precautions: anxiety, depression, bipolar disorder, chronic low/mid/upper back pain, Hx ACDF c-spine       Re-eval Date: 8/1    Date 7/2       Visit Count 6       FOTO NV       Pain In 8/10       Pain Out 6/10             Manuals 7/2       Trial SOR, UT stretch, levator stretch  15'   Supine  Gentle SOR c-spine, gentle STM/DTM/TFM B suboccpitial region and posterior c-spine PVMS and L UT,  UT stretch all as tolerated        Consider trial of GIASTM posterior c-spine/UTs 5'   GIASTM instrument #4 sweeping/fanning L UT and B posterior c-spine PVMs and sub occipital regions to tolerance pt seated   Pt signed consent form   No adverse reaction to tx                       Neuro Re-Ed        DNF training with stabilizer                                                         Ther Ex        UT stretch    Levator scap stretch 4x30"ea      4x30" ea       Chin tucks          Scapular depression/retraction  Supine 3x10/5" cues/feedback       Reviewed        MTPs /LTPs        B ER          DNF endurance   Attempted   Pain         Prone I, T, Ys   0#  1x10/10" with verbal and tactile cues for correct mm activation of the MT and LTs and inhibition of the UTs                               Ther Activity 10' total   Pt education on deep/abdominal breathing techniques, relaxation techniques such as meditation and stretching, use of journaling /writing to reduce stress/anxiety, etc                        Gait Training                        Modalities         MHP c-spine supine x 10' after session  With pt's home TENs unit  Skin intact pre/post tx                        7/2 - HEP was issued and reviewed this date for above noted exercises  Also provided pt education regarding postural awareness with avoidance of prolonged periods of static posture, especially with static, prolonged forward head  Pt demonstrated understanding without incident and without questions/concerns  Will continue to update upcoming

## 2021-07-08 ENCOUNTER — OFFICE VISIT (OUTPATIENT)
Dept: PHYSICAL THERAPY | Facility: CLINIC | Age: 46
End: 2021-07-08
Payer: COMMERCIAL

## 2021-07-08 DIAGNOSIS — M48.061 SPINAL STENOSIS OF LUMBAR REGION, UNSPECIFIED WHETHER NEUROGENIC CLAUDICATION PRESENT: ICD-10-CM

## 2021-07-08 DIAGNOSIS — M54.12 CERVICAL RADICULOPATHY: Primary | ICD-10-CM

## 2021-07-08 DIAGNOSIS — M54.2 NECK PAIN: ICD-10-CM

## 2021-07-08 PROCEDURE — 97140 MANUAL THERAPY 1/> REGIONS: CPT | Performed by: PHYSICAL MEDICINE & REHABILITATION

## 2021-07-08 PROCEDURE — 97110 THERAPEUTIC EXERCISES: CPT | Performed by: PHYSICAL MEDICINE & REHABILITATION

## 2021-07-08 NOTE — PROGRESS NOTES
Daily Note     Today's date: 2021  Patient name: Emi Bowser  : 1975  MRN: [id number]  Referring provider: Evan Cerda DO  Dx:   Encounter Diagnosis     ICD-10-CM    1  Cervical radiculopathy  M54 12    2  Neck pain  M54 2    3  Spinal stenosis of lumbar region, unspecified whether neurogenic claudication present  M48 061                   Subjective: Pt notes she has more neck/upper back pain today  Notes she was cleaning out a fish tank and feels this made her pain worse  Cont'd personal stress/tension as well that increases her pain  No new sx  Feels PT helps afterwards/after tx  Has an appt with pain management upcoming  Reduced pain with home TENs unit  Objective: See treatment diary below      Assessment: Tolerated treatment fair  Continues to respond fairly to passive treatments  Increase in general neck/upper back pain with active exercises; overall pain is no worse  Improving OA mobility with SOR supine  Reduced mm tension noted L UT with GIASTM vs prior treatments  No adverse reaction to tx noted  As noted, limited tolerance for active exercises in PT with c/o muscular cervical and thoracic pain  Continues with forward /rounded posture with increased thoracic kyphosis  Weakness and reduced mm endurance noted of the MT and LTs  Notes reduced pain with heat/home TENs unit  Encouraged cont'd active exercises with HEP; understanding noted  Patient demonstrated fatigue post treatment and would benefit from continued PT  Would also benefit with f/u with pain management 2* cont'd chronic pain/sx and limited tolerance for active exercise and progressions with PT  Plan: Continue per plan of care  Progress treatment as tolerated         Precautions: anxiety, depression, bipolar disorder, chronic low/mid/upper back pain, Hx ACDF c-spine       Re-eval Date:     Date       Visit Count 6 7      FOTO NV       Pain In 8/10 8/10      Pain Out 6/10 6/10            Manuals  Trial SOR, UT stretch, levator stretch  15'   Supine  Gentle SOR c-spine, gentle STM/DTM/TFM B suboccpitial region and posterior c-spine PVMS and L UT,  UT stretch all as tolerated  10'   Supine  Gentle SOR c-spine, gentle STM/DTM/TFM B suboccpitial region and posterior c-spine PVMS and L UT,  UT stretch all as tolerated      Consider trial of GIASTM posterior c-spine/UTs 5'   GIASTM instrument #4 sweeping/fanning L UT and B posterior c-spine PVMs and sub occipital regions to tolerance pt seated   Pt signed consent form   No adverse reaction to tx 10'   GIASTM instrument #4 sweeping/fanning L UT and B posterior c-spine PVMs and sub occipital regions to tolerance pt seated   Pt signed consent form   No adverse reaction to tx                      Neuro Re-Ed        DNF training with stabilizer                                                         Ther Ex        UT stretch    Levator scap stretch 4x30"ea      4x30" ea HEP    HEP       Chin tucks          Scapular depression/retraction  Supine 3x10/5" cues/feedback       Reviewed  Supine 3x10/5" cues/feedback         MTPs /LTPs        B ER   Red  2x10/5" ea cues    Pink 3x10/5"  Cues         DNF endurance   Attempted   Pain         Prone I, T, Ys   0#  1x10/10" with verbal and tactile cues for correct mm activation of the MT and LTs and inhibition of the UTs                               Ther Activity 10' total   Pt education on deep/abdominal breathing techniques, relaxation techniques such as meditation and stretching, use of journaling /writing to reduce stress/anxiety, etc                        Gait Training                        Modalities         MHP c-spine supine x 10' after session  With pt's home TENs unit  Skin intact pre/post tx  MHP c-spine supine x 10' after session  With pt's home TENs unit  Skin intact pre/post tx

## 2021-07-12 ENCOUNTER — APPOINTMENT (OUTPATIENT)
Dept: LAB | Facility: HOSPITAL | Age: 46
End: 2021-07-12
Attending: INTERNAL MEDICINE
Payer: COMMERCIAL

## 2021-07-12 DIAGNOSIS — R94.4 DECREASED GFR: ICD-10-CM

## 2021-07-12 LAB
ALBUMIN SERPL BCP-MCNC: 4.7 G/DL (ref 3.5–5.7)
ALP SERPL-CCNC: 57 U/L (ref 40–150)
ALT SERPL W P-5'-P-CCNC: 8 U/L (ref 7–52)
ANION GAP SERPL CALCULATED.3IONS-SCNC: 9 MMOL/L (ref 4–13)
AST SERPL W P-5'-P-CCNC: 12 U/L (ref 13–39)
BILIRUB SERPL-MCNC: 0.3 MG/DL (ref 0.2–1)
BUN SERPL-MCNC: 36 MG/DL (ref 7–25)
CALCIUM SERPL-MCNC: 9.6 MG/DL (ref 8.6–10.5)
CHLORIDE SERPL-SCNC: 106 MMOL/L (ref 98–107)
CO2 SERPL-SCNC: 26 MMOL/L (ref 21–31)
CREAT SERPL-MCNC: 2.16 MG/DL (ref 0.6–1.2)
GFR SERPL CREATININE-BSD FRML MDRD: 27 ML/MIN/1.73SQ M
GLUCOSE P FAST SERPL-MCNC: 104 MG/DL (ref 65–99)
POTASSIUM SERPL-SCNC: 3.9 MMOL/L (ref 3.5–5.5)
PROT SERPL-MCNC: 7.6 G/DL (ref 6.4–8.9)
SODIUM SERPL-SCNC: 141 MMOL/L (ref 134–143)

## 2021-07-12 PROCEDURE — 80053 COMPREHEN METABOLIC PANEL: CPT

## 2021-07-12 PROCEDURE — 36415 COLL VENOUS BLD VENIPUNCTURE: CPT

## 2021-07-12 NOTE — PROGRESS NOTES
[facility]  Pebbles Elena 50 y o  female MRN: [id number]  Encounter: [id number]          ASSESSMENT AND PLAN:      Mckenzie Bauman is a 49 y/o female who is s/o cholecystectomy with Bipolar 2 disorder, hypothyroidism, HLD, depression/anxiety here for consultation of incontinence of stool  1  Incontinence of stool   2  Alternating bowel habits   Pt says she has longstanding hx of constipation since she was a kid and she was content with this however 1 month ago, she started to have loose stools when she moves her bowels, which is about 2-3 times/week  She says she has also had incontinence of the stool "without feeling" it  Pt denies any injury, manipulation, or surgery to her anal/rectal area  Last colon 10+ years ago  -TSH, celiac ordered  -start daily fiber supplement to bulk up stools; at least 20-25 g/day  -stool studies sent in to rule out infection, although unlikely as she does not move her bowels daily   -use imodium PRN if fiber supplement is not sufficient   -colonoscopy with biopsies ordered; instructions given, risks and benefits explained   -educated pt that if above recs do not help and work-up is WNL, she may benefit from colo-rectal surgery for anal manometry     3  Intestinal metaplasia of stomach  4  GERD  EGD 2020 biopsies showed intestinal metaplasia of stomach  Repeat EGD June 2021 for anemia and also to monitor showed small hiatal hernia but otherwise WNL  Pt is currently on pepcid for her heartburn bc she says her nephrologist took her off her PPI but she says the heartburn is not well-controlled on pepcid  -I explained to pt that there is only an association between kidney disease and PPl there is no statistically significant data that proves a direct causation  Therefore, if he nephrologist is ok with it, we could put her back on protonix     5  Normocytic anemia   Hgb 11 2 5/2021   Pt underwent EGD in June by Malachi robin evaluate this but they did not do a colonoscopy, as well    -colonoscopy ordered for above symptoms, as well  -CBC to monitor Hgb   ______________________________________________________________________    HPI:  Avery Josue is a 49 y/o female who is s/p cholecystectomy with Bipolar 2 disorder, hypothyroidism, HLD, depression/anxiety here for consultation of incontinence of stool  Patient says that for about one month, she has had loose stools with every BM, which is about 2-3 times/week  She has also noticed incontinence of her stool without even "feeling" it  She denies any digital manipulation or manipulation with a foreign object of rectal/anal area, or any injuries/trauma/procedures of that area  She denies any associated abdominal pain, bloody or black stools and denies any: family hx of colo-rectal cancer, unintentional weight loss, fevers, chills  Patient says she has not changed her diet recently, either  Patient also says she was switched from protonix to pepcid by kidney provider but the pepcid is not adequately controlling her heartburn  She denies NSAID use  She is not on blood thinners  Pt does have history of cholecystectomy but says she has never had loose BMs until now, which is "years" after her cholecystectomy was done  REVIEW OF SYSTEMS:    CONSTITUTIONAL: Denies any fever, chills, rigors, and weight loss  HEENT: No earache or tinnitus  Denies hearing loss or visual disturbances  CARDIOVASCULAR: No chest pain or palpitations  RESPIRATORY: Denies any cough, hemoptysis, shortness of breath or dyspnea on exertion  GASTROINTESTINAL: As noted in the History of Present Illness  GENITOURINARY: No problems with urination  Denies any hematuria or dysuria  NEUROLOGIC: No dizziness or vertigo, denies headaches  MUSCULOSKELETAL: Denies any muscle or joint pain  SKIN: Denies skin rashes or itching  ENDOCRINE: Denies excessive thirst  Denies intolerance to heat or cold    PSYCHOSOCIAL: Denies depression or anxiety  Denies any recent memory loss         Historical Information   Past Medical History:   Diagnosis Date    Ankle pain, right     Anxiety     Arthritis     Bipolar 1 disorder (HCC)     Depression     GERD (gastroesophageal reflux disease)     Hypertension     Hypothyroidism     Known health problems: none     Lyme disease     Scoliosis      Past Surgical History:   Procedure Laterality Date    CERVICAL FUSION      CHOLECYSTECTOMY      COLONOSCOPY       Social History   Social History     Substance and Sexual Activity   Alcohol Use Not Currently     Social History     Substance and Sexual Activity   Drug Use Never     Social History     Tobacco Use   Smoking Status Current Every Day Smoker    Packs/day: 0 50    Years: 3 00    Pack years: 1 50   Smokeless Tobacco Never Used     Family History   Problem Relation Age of Onset    Diabetes Mother     Hypertension Mother     Heart disease Mother     Hypertension Father     Diabetes Maternal Grandmother     Diabetes Paternal Grandmother     No Known Problems Sister     No Known Problems Daughter     No Known Problems Daughter     No Known Problems Daughter     No Known Problems Maternal Aunt     No Known Problems Maternal Aunt     No Known Problems Maternal Aunt     No Known Problems Paternal Aunt        Meds/Allergies       Current Outpatient Medications:     buPROPion (WELLBUTRIN XL) 300 mg 24 hr tablet    busPIRone (BUSPAR) 15 mg tablet    clonazePAM (KlonoPIN) 1 mg tablet    ergocalciferol (VITAMIN D2) 50,000 units    famotidine (PEPCID) 20 mg tablet    gabapentin (NEURONTIN) 800 mg tablet    levonorgestrel (MIRENA) 20 MCG/24HR IUD    levothyroxine 75 mcg tablet    lidocaine (LMX) 4 % cream    lisinopril-hydrochlorothiazide (PRINZIDE,ZESTORETIC) 20-25 MG per tablet    LORazepam (ATIVAN) 1 mg tablet    meloxicam (MOBIC) 15 mg tablet    methocarbamol (ROBAXIN) 500 mg tablet    nicotine (NICODERM CQ) 21 mg/24 hr TD 24 hr patch    OLANZapine (ZyPREXA) 5 mg tablet    pantoprazole (PROTONIX) 40 mg tablet    venlafaxine (EFFEXOR-XR) 150 mg 24 hr capsule    Viibryd 40 MG tablet    zolpidem (AMBIEN) 10 mg tablet    No Known Allergies        Objective     There were no vitals taken for this visit  There is no height or weight on file to calculate BMI  PHYSICAL EXAM:      General Appearance:   Alert, cooperative, no distress   HEENT:   Normocephalic, atraumatic, anicteric      Neck:  Supple, symmetrical, trachea midline   Lungs:   Clear to auscultation bilaterally; no rales, rhonchi or wheezing; respirations unlabored    Heart[de-identified]   Regular rate and rhythm; no murmur, rub, or gallop  Abdomen:   Soft, non-tender, non-distended; normal bowel sounds; no masses, no organomegaly    Genitalia:   Deferred    Rectal:   Deferred    Extremities:  No cyanosis, clubbing or edema    Pulses:  2+ and symmetric    Skin:  No jaundice, rashes, or lesions    Lymph nodes:  No palpable cervical lymphadenopathy        Lab Results:   No visits with results within 1 Day(s) from this visit  Latest known visit with results is:   Appointment on 07/12/2021   Component Date Value    Sodium 07/12/2021 141     Potassium 07/12/2021 3 9     Chloride 07/12/2021 106     CO2 07/12/2021 26     ANION GAP 07/12/2021 9     BUN 07/12/2021 36*    Creatinine 07/12/2021 2 16*    Glucose, Fasting 07/12/2021 104*    Calcium 07/12/2021 9 6     AST 07/12/2021 12*    ALT 07/12/2021 8     Alkaline Phosphatase 07/12/2021 57     Total Protein 07/12/2021 7 6     Albumin 07/12/2021 4 7     Total Bilirubin 07/12/2021 0 30     eGFR 07/12/2021 27          Radiology Results:   No results found

## 2021-07-14 ENCOUNTER — CONSULT (OUTPATIENT)
Dept: GASTROENTEROLOGY | Facility: CLINIC | Age: 46
End: 2021-07-14
Payer: COMMERCIAL

## 2021-07-14 ENCOUNTER — OFFICE VISIT (OUTPATIENT)
Dept: PHYSICAL THERAPY | Facility: CLINIC | Age: 46
End: 2021-07-14
Payer: COMMERCIAL

## 2021-07-14 VITALS
SYSTOLIC BLOOD PRESSURE: 88 MMHG | HEIGHT: 65 IN | HEART RATE: 78 BPM | WEIGHT: 145.6 LBS | BODY MASS INDEX: 24.26 KG/M2 | DIASTOLIC BLOOD PRESSURE: 42 MMHG | TEMPERATURE: 98.1 F | RESPIRATION RATE: 18 BRPM

## 2021-07-14 DIAGNOSIS — M54.2 NECK PAIN: ICD-10-CM

## 2021-07-14 DIAGNOSIS — M54.12 CERVICAL RADICULOPATHY: Primary | ICD-10-CM

## 2021-07-14 DIAGNOSIS — R19.7 DIARRHEA, UNSPECIFIED TYPE: Primary | ICD-10-CM

## 2021-07-14 DIAGNOSIS — M48.061 SPINAL STENOSIS OF LUMBAR REGION, UNSPECIFIED WHETHER NEUROGENIC CLAUDICATION PRESENT: ICD-10-CM

## 2021-07-14 PROCEDURE — 99214 OFFICE O/P EST MOD 30 MIN: CPT | Performed by: PHYSICIAN ASSISTANT

## 2021-07-14 PROCEDURE — 97110 THERAPEUTIC EXERCISES: CPT | Performed by: PHYSICAL MEDICINE & REHABILITATION

## 2021-07-14 PROCEDURE — 97140 MANUAL THERAPY 1/> REGIONS: CPT | Performed by: PHYSICAL MEDICINE & REHABILITATION

## 2021-07-14 NOTE — PATIENT INSTRUCTIONS
1  Start fiber supplement (metamucil, over the counter) every morning: at least 20-25 g of fiber/day     High Fiber Diet   WHAT YOU NEED TO KNOW:   A high-fiber diet includes foods that have a high amount of fiber  Fiber is the part of fruits, vegetables, and grains that is not broken down by your body  Fiber keeps your bowel movements regular  Fiber can also help lower your cholesterol level, control blood sugar in people with diabetes, and relieve constipation  Fiber can also help you control your weight because it helps you feel full faster  Most adults should eat 25 to 35 grams of fiber each day  Talk to your dietitian or healthcare provider about the amount of fiber you need  DISCHARGE INSTRUCTIONS:   Good sources of fiber:       · Foods with at least 4 grams of fiber per serving:      ? ? to ½ cup of high-fiber cereal (check the nutrition label on the box)    ? ½ cup of blackberries or raspberries    ? 4 dried prunes    ? 1 cooked artichoke    ? ½ cup of cooked legumes, such as lentils, or red, kidney, and inman beans    · Foods with 1 to 3 grams of fiber per serving:      ? 1 slice of whole-wheat, pumpernickel, or rye bread    ? ½ cup of cooked brown rice    ? 4 whole-wheat crackers    ? 1 cup of oatmeal    ? ½ cup of cereal with 1 to 3 grams of fiber per serving (check the nutrition label on the box)    ? 1 small piece of fruit, such as an apple, banana, pear, kiwi, or orange    ? 3 dates    ? ½ cup of canned apricots, fruit cocktail, peaches, or pears    ? ½ cup of raw or cooked vegetables, such as carrots, cauliflower, cabbage, spinach, squash, or corn  Ways that you can increase fiber in your diet:   · Choose brown or wild rice instead of white rice  · Use whole wheat flour in recipes instead of white or all-purpose flour  · Add beans and peas to casseroles or soups  · Choose fresh fruit and vegetables with peels or skins on instead of juices      Other diet guidelines to follow:   · Add fiber to your diet slowly  You may have abdominal discomfort, bloating, and gas if you add fiber to your diet too quickly  · Drink plenty of liquids as you add fiber to your diet  You may have nausea or develop constipation if you do not drink enough water  Ask how much liquid to drink each day and which liquids are best for you  © Copyright [address] Information is for End User's use only and may not be sold, redistributed or otherwise used for commercial purposes  All illustrations and images included in CareNotes® are the copyrighted property of [employer]  or [address] [place] [id number]  The above information is an  only  It is not intended as medical advice for individual conditions or treatments  Talk to your doctor, nurse or pharmacist before following any medical regimen to see if it is safe and effective for you  Pt is scheduled with dr Tamara Oshea at [facility] (formerly [facility] on 9/13/21 for a seferino salas instructions with pt in room  Patient is aware she will need a  to and from   She will get a call the day before with an exact time for arrival

## 2021-07-14 NOTE — PROGRESS NOTES
Daily Note     Today's date: 2021  Patient name: Yolanda Kessler  : 1975  MRN: [id number]  Referring provider: Catarina Eric DO  Dx:   Encounter Diagnosis     ICD-10-CM    1  Cervical radiculopathy  M54 12    2  Neck pain  M54 2    3  Spinal stenosis of lumbar region, unspecified whether neurogenic claudication present  M48 061                   Subjective: Pt notes she woke up with more  Mid-lower back pain today; unsure why; no known trigger  Notes this pain she has had before; comes/goes; just flared up today  Cont'd pain L c-spine/UT region  No new sx/complaints  No sx into the UEs  Has pain management appt upcoming  Overall feels she gets short term relief of pain after PT for the rest of the day; but by the night time, her pain comes back the same and is no better  Notes cont'd elevated levels of stress/tension in her life which continues to increase her pain as well  No new sx; denies any red flag sx  Objective: See treatment diary below      Assessment: Tolerated treatment fair  Continues with short term reduction in sx with passive treatments such as GIASTM and home TENS unit with heat to c-spine  Limited tolerance for active exercise continues with c/o soreness t/o her c-spine and upper back; limited ability to progress with program; limited tolerate for exercise today 2* neck and back pain  Continues with rounded/flexed posture with weakness and reduced motor control of the DNF and yaima scapular region; limited tolerance for corrective exercise as noted  Discussed with pt referral to pain management and transition to HEP if pain/sx cont to fail to improve; understanding noted  Is using home tens unit still for relief  Patient demonstrated fatigue post treatment and would benefit from continued PT with potential transition to HEP upcoming 2* lack of improvement with PT  Plan: Continue per plan of care  Progress treatment as tolerated         Precautions: anxiety, depression, bipolar disorder, chronic low/mid/upper back pain, Hx ACDF c-spine       Re-eval Date: 8/1    Date 7/2 7/8 7/14     Visit Count 6 7 8     FOTO NV       Pain In 8/10 8/10 8/10     Pain Out 6/10 6/10 6/10           Manuals 7/2 7/8 7/14     Trial SOR, UT stretch, levator stretch  15'   Supine  Gentle SOR c-spine, gentle STM/DTM/TFM B suboccpitial region and posterior c-spine PVMS and L UT,  UT stretch all as tolerated  10'   Supine  Gentle SOR c-spine, gentle STM/DTM/TFM B suboccpitial region and posterior c-spine PVMS and L UT,  UT stretch all as tolerated 5'   Supine  Gentle SOR c-spine, gentle STM/DTM/TFM B suboccpitial region and posterior c-spine PVMS and L UT,  UT stretch all as tolerated     Consider trial of GIASTM posterior c-spine/UTs 5'   GIASTM instrument #4 sweeping/fanning L UT and B posterior c-spine PVMs and sub occipital regions to tolerance pt seated   Pt signed consent form   No adverse reaction to tx 10'   GIASTM instrument #4 sweeping/fanning L UT and B posterior c-spine PVMs and sub occipital regions to tolerance pt seated   Pt signed consent form   No adverse reaction to tx 10'   GIASTM instrument #4 sweeping/fanning L UT and B posterior c-spine PVMs and sub occipital regions to tolerance pt seated   Pt signed consent form   No adverse reaction to tx                      Neuro Re-Ed        DNF training with stabilizer                                                         Ther Ex        UT stretch    Levator scap stretch 4x30"ea      4x30" ea HEP    HEP       Chin tucks          Scapular depression/retraction  Supine 3x10/5" cues/feedback       Reviewed  Supine 3x10/5" cues/feedback    Supine 3x10/5" cues/feedback      MTPs /LTPs        B ER   Red  2x10/5" ea cues    Pink 3x10/5"  Cues   Pt deferred        Pink 3x10/5"  Cues          DNF endurance   Attempted   Pain         Prone I, T, Ys   0#  1x10/10" with verbal and tactile cues for correct mm activation of the MT and LTs and inhibition of the UTs  Prone I, T, Ys   0#  1x10/10"ea with verbal and tactile cues for correct mm activation of the MT and LTs and inhibition of the UTs                             Ther Activity 10' total   Pt education on deep/abdominal breathing techniques, relaxation techniques such as meditation and stretching, use of journaling /writing to reduce stress/anxiety, etc                        Gait Training                        Modalities         MHP c-spine supine x 10' after session  With pt's home TENs unit  Skin intact pre/post tx  MHP c-spine supine x 10' after session  With pt's home TENs unit  Skin intact pre/post tx  MHP c-spine supine x 10' after session  With pt's home TENs unit  Skin intact pre/post tx

## 2021-07-15 ENCOUNTER — APPOINTMENT (OUTPATIENT)
Dept: LAB | Facility: HOSPITAL | Age: 46
End: 2021-07-15
Payer: COMMERCIAL

## 2021-07-15 DIAGNOSIS — R19.7 DIARRHEA, UNSPECIFIED TYPE: ICD-10-CM

## 2021-07-15 LAB
ALBUMIN SERPL BCP-MCNC: 4.4 G/DL (ref 3.5–5.7)
ALP SERPL-CCNC: 53 U/L (ref 40–150)
ALT SERPL W P-5'-P-CCNC: 8 U/L (ref 7–52)
ANION GAP SERPL CALCULATED.3IONS-SCNC: 8 MMOL/L (ref 4–13)
AST SERPL W P-5'-P-CCNC: 12 U/L (ref 13–39)
BASOPHILS # BLD AUTO: 0 THOUSANDS/ΜL (ref 0–0.1)
BASOPHILS NFR BLD AUTO: 0 % (ref 0–2)
BILIRUB SERPL-MCNC: 0.4 MG/DL (ref 0.2–1)
BUN SERPL-MCNC: 26 MG/DL (ref 7–25)
CALCIUM SERPL-MCNC: 9.3 MG/DL (ref 8.6–10.5)
CHLORIDE SERPL-SCNC: 106 MMOL/L (ref 98–107)
CO2 SERPL-SCNC: 26 MMOL/L (ref 21–31)
CREAT SERPL-MCNC: 1.86 MG/DL (ref 0.6–1.2)
EOSINOPHIL # BLD AUTO: 0.1 THOUSAND/ΜL (ref 0–0.61)
EOSINOPHIL NFR BLD AUTO: 2 % (ref 0–5)
ERYTHROCYTE [DISTWIDTH] IN BLOOD BY AUTOMATED COUNT: 12.1 % (ref 11.5–14.5)
GFR SERPL CREATININE-BSD FRML MDRD: 32 ML/MIN/1.73SQ M
GLUCOSE P FAST SERPL-MCNC: 93 MG/DL (ref 65–99)
HCT VFR BLD AUTO: 32.8 % (ref 42–47)
HGB BLD-MCNC: 11.4 G/DL (ref 12–16)
LYMPHOCYTES # BLD AUTO: 1.4 THOUSANDS/ΜL (ref 0.6–4.47)
LYMPHOCYTES NFR BLD AUTO: 18 % (ref 21–51)
MCH RBC QN AUTO: 33.9 PG (ref 26–34)
MCHC RBC AUTO-ENTMCNC: 34.7 G/DL (ref 31–37)
MCV RBC AUTO: 98 FL (ref 81–99)
MONOCYTES # BLD AUTO: 0.4 THOUSAND/ΜL (ref 0.17–1.22)
MONOCYTES NFR BLD AUTO: 5 % (ref 2–12)
NEUTROPHILS # BLD AUTO: 5.8 THOUSANDS/ΜL (ref 1.4–6.5)
NEUTS SEG NFR BLD AUTO: 75 % (ref 42–75)
PLATELET # BLD AUTO: 216 THOUSANDS/UL (ref 149–390)
PMV BLD AUTO: 9.2 FL (ref 8.6–11.7)
POTASSIUM SERPL-SCNC: 3.9 MMOL/L (ref 3.5–5.5)
PROT SERPL-MCNC: 7.1 G/DL (ref 6.4–8.9)
RBC # BLD AUTO: 3.37 MILLION/UL (ref 3.9–5.2)
SODIUM SERPL-SCNC: 140 MMOL/L (ref 134–143)
TSH SERPL DL<=0.05 MIU/L-ACNC: 0.12 UIU/ML (ref 0.45–5.33)
WBC # BLD AUTO: 7.8 THOUSAND/UL (ref 4.8–10.8)

## 2021-07-15 PROCEDURE — 83516 IMMUNOASSAY NONANTIBODY: CPT

## 2021-07-15 PROCEDURE — 80053 COMPREHEN METABOLIC PANEL: CPT

## 2021-07-15 PROCEDURE — 87505 NFCT AGENT DETECTION GI: CPT

## 2021-07-15 PROCEDURE — 82784 ASSAY IGA/IGD/IGG/IGM EACH: CPT

## 2021-07-15 PROCEDURE — 86255 FLUORESCENT ANTIBODY SCREEN: CPT

## 2021-07-15 PROCEDURE — 85025 COMPLETE CBC W/AUTO DIFF WBC: CPT

## 2021-07-15 PROCEDURE — 36415 COLL VENOUS BLD VENIPUNCTURE: CPT

## 2021-07-15 PROCEDURE — 84439 ASSAY OF FREE THYROXINE: CPT

## 2021-07-15 PROCEDURE — 82656 EL-1 FECAL QUAL/SEMIQ: CPT

## 2021-07-15 PROCEDURE — 84443 ASSAY THYROID STIM HORMONE: CPT

## 2021-07-16 ENCOUNTER — OFFICE VISIT (OUTPATIENT)
Dept: NEPHROLOGY | Facility: CLINIC | Age: 46
End: 2021-07-16
Payer: COMMERCIAL

## 2021-07-16 VITALS
SYSTOLIC BLOOD PRESSURE: 112 MMHG | OXYGEN SATURATION: 94 % | DIASTOLIC BLOOD PRESSURE: 80 MMHG | BODY MASS INDEX: 24.16 KG/M2 | HEART RATE: 92 BPM | WEIGHT: 145 LBS | HEIGHT: 65 IN

## 2021-07-16 DIAGNOSIS — N18.32 STAGE 3B CHRONIC KIDNEY DISEASE (HCC): Primary | ICD-10-CM

## 2021-07-16 DIAGNOSIS — I10 ESSENTIAL HYPERTENSION: ICD-10-CM

## 2021-07-16 DIAGNOSIS — G89.29 ACUTE EXACERBATION OF CHRONIC LOW BACK PAIN: ICD-10-CM

## 2021-07-16 DIAGNOSIS — R19.7 DIARRHEA, UNSPECIFIED TYPE: Primary | ICD-10-CM

## 2021-07-16 DIAGNOSIS — M54.50 ACUTE EXACERBATION OF CHRONIC LOW BACK PAIN: ICD-10-CM

## 2021-07-16 DIAGNOSIS — N17.9 AKI (ACUTE KIDNEY INJURY) (HCC): ICD-10-CM

## 2021-07-16 DIAGNOSIS — R10.13 EPIGASTRIC ABDOMINAL PAIN: ICD-10-CM

## 2021-07-16 LAB
C DIFF TOX B TCDB STL QL NAA+PROBE: NEGATIVE
CAMPYLOBACTER DNA SPEC NAA+PROBE: NORMAL
ENDOMYSIUM IGA SER QL: NEGATIVE
G LAMBLIA AG STL QL IA: NEGATIVE
GLIADIN PEPTIDE IGA SER-ACNC: 3 UNITS (ref 0–19)
GLIADIN PEPTIDE IGG SER-ACNC: 4 UNITS (ref 0–19)
IGA SERPL-MCNC: 164 MG/DL (ref 87–352)
SALMONELLA DNA SPEC QL NAA+PROBE: NORMAL
SHIGA TOXIN STX GENE SPEC NAA+PROBE: NORMAL
SHIGELLA DNA SPEC QL NAA+PROBE: NORMAL
T4 FREE SERPL-MCNC: 1.22 NG/DL (ref 0.76–1.46)
TTG IGA SER-ACNC: <2 U/ML (ref 0–3)
TTG IGG SER-ACNC: 7 U/ML (ref 0–5)

## 2021-07-16 PROCEDURE — 3008F BODY MASS INDEX DOCD: CPT | Performed by: ORTHOPAEDIC SURGERY

## 2021-07-16 PROCEDURE — 99214 OFFICE O/P EST MOD 30 MIN: CPT | Performed by: INTERNAL MEDICINE

## 2021-07-16 RX ORDER — TRAMADOL HYDROCHLORIDE 50 MG/1
50 TABLET ORAL 2 TIMES DAILY
Qty: 14 TABLET | Refills: 1 | Status: SHIPPED | OUTPATIENT
Start: 2021-07-16 | End: 2021-07-20 | Stop reason: SDUPTHER

## 2021-07-16 NOTE — PROGRESS NOTES
[address] [facility], [place]    Name: Violeta Banda  YOB: 1975      Assessment/Plan:           Problem List Items Addressed This Visit        Cardiovascular and Mediastinum    Essential hypertension       Genitourinary    [facility]) ([facility] )     Kidney function is variable Continue lisinopril HCT for BP control         Stage 3b chronic kidney disease ([facility] ) - Primary     Lab Results   Component Value Date    EGFR 32 07/15/2021    EGFR 27 07/12/2021    EGFR 41 05/03/2021    CREATININE 1 86 (H) 07/15/2021    CREATININE 2 16 (H) 07/12/2021    CREATININE 1 52 (H) 05/03/2021   She has chronic tubulointerstitial nephritis due to long term Nagel -2 inhibitor and NSAID therapy for back pain  She would like to try tramadol with extra strength tylenol for pain  Will give her a 7 day supply and Dr Julian Jim will continue therapy  No indication for biopsy due to lack of albuminuria  But has tubular proteinuria  BP control necessary                   Subjective:      Patient ID: Violeta Banda is a 50 y o  female  HPI Was seen April 6, 2021 with acute kidney injury with a creatinine rise to 1 54 mg/dL  Last November her creatinine was 2 67 mGy g per dL  Multiple medications were decreased  She decrease lisinopril HCT to 1 a day  She stop Celebrex and pantoprazole  She increased her liquid intake     she is taking meloxicam 15 mg daily as well as Advil 600-800 into [place] for back pain  She needs to see an orthopedist however she is starting a new job and cannot take the time off to see somebody     her kidney ultrasound was unremarkable in April     urine protein studies demonstrate tubular protein teen excretion but no albumin excretion  creatinine on 07/12/2021 was 2 16, however, she had repeat lab work on 07/15 which demonstrated creatinine of 1 86 mg/dL       This represents an increase in her GFR from 27-32 mils per m    4/9 creat creat 1 33  3/18 creat 1 45  11/2020 creat 2 67  3/5/20 creat 0 9  (WNL)  The following portions of the patient's history were reviewed and updated as appropriate: allergies, current medications, past family history, past medical history, past social history, past surgical history and problem list     Review of Systems   Constitutional: Negative for fatigue  Eyes: Negative for visual disturbance  Respiratory: Negative for cough and shortness of breath  Cardiovascular: Negative for chest pain, palpitations and leg swelling  Gastrointestinal: Negative for abdominal pain, blood in stool and diarrhea  Genitourinary: Negative for difficulty urinating, dysuria and hematuria  Musculoskeletal: Positive for arthralgias, back pain and myalgias  Neurological: Negative for dizziness and weakness  Hematological: Does not bruise/bleed easily  Psychiatric/Behavioral: Negative for dysphoric mood  Social History     Socioeconomic History    Marital status: /Civil Union     Spouse name: None    Number of children: None    Years of education: None    Highest education level: None   Occupational History    Occupation: UNEMPLOYED   Tobacco Use    Smoking status: Current Every Day Smoker     Packs/day: 0 50     Years: 3 00     Pack years: 1 50    Smokeless tobacco: Never Used   Vaping Use    Vaping Use: Never used   Substance and Sexual Activity    Alcohol use: Not Currently    Drug use: Never    Sexual activity: Yes     Partners: Male     Birth control/protection: I U D  Other Topics Concern    None   Social History Narrative        UNEMPLOYED     Social Determinants of Health     Financial Resource Strain:     Difficulty of Paying Living Expenses:    Food Insecurity:     Worried About Running Out of Food in the Last Year:     [address] in the Last Year:    Transportation Needs:     Lack of Transportation (Medical):      Lack of Transportation (Non-Medical):    Physical Activity:     Days of Exercise per Week:     Minutes of Exercise per Session:    Stress:     Feeling of Stress :    Social Connections:     Frequency of Communication with Friends and Family:     Frequency of Social Gatherings with Friends and Family:     Attends Alevism Services:     Active Member of Clubs or Organizations:     Attends Club or Organization Meetings:     Marital Status:    Intimate Partner Violence:     Fear of Current or Ex-Partner:     Emotionally Abused:     Physically Abused:     Sexually Abused:      Past Medical History:   Diagnosis Date    Ankle pain, right     Anxiety     Arthritis     Bipolar 1 disorder ([facility] )     Depression     GERD (gastroesophageal reflux disease)     Hypertension     Hypothyroidism     Known health problems: none     Lyme disease     Scoliosis      Past Surgical History:   Procedure Laterality Date    CERVICAL FUSION      CHOLECYSTECTOMY      COLONOSCOPY         Current Outpatient Medications:     clonazePAM (KlonoPIN) 1 mg tablet, Take 1 mg by mouth 3 (three) times a day as needed, Disp: , Rfl:     ergocalciferol (VITAMIN D2) 50,000 units, take 1 capsule by mouth weekly, Disp: 12 capsule, Rfl: 3    famotidine (PEPCID) 20 mg tablet, Take 1 tablet (20 mg total) by mouth 2 (two) times a day, Disp: 60 tablet, Rfl: 3    gabapentin (NEURONTIN) 800 mg tablet, Take 1 tablet (800 mg total) by mouth 3 (three) times a day, Disp: 90 tablet, Rfl: 3    levonorgestrel (MIRENA) 20 MCG/24HR IUD, 1 each by Intrauterine route once, Disp: , Rfl:     levothyroxine 75 mcg tablet, Take 1 tablet (75 mcg total) by mouth daily in the early morning, Disp: 90 tablet, Rfl: 1    lidocaine (LMX) 4 % cream, Apply topically as needed for mild pain, Disp: 30 g, Rfl: 0    lisinopril-hydrochlorothiazide (PRINZIDE,ZESTORETIC) 20-25 MG per tablet, Take 1 tablet by mouth daily, Disp: 30 tablet, Rfl: 0    meloxicam (MOBIC) 15 mg tablet, Take 1 tablet (15 mg total) by mouth daily, Disp: 30 tablet, Rfl: 2    methocarbamol (ROBAXIN) 500 mg tablet, Take 1 tablet (500 mg total) by mouth 4 (four) times a day, Disp: 30 tablet, Rfl: 0    nicotine (NICODERM CQ) 21 mg/24 hr TD 24 hr patch, Place 1 patch on the skin every 24 hours, Disp: 28 patch, Rfl: 0    Viibryd 40 MG tablet, Take 40 mg by mouth daily, Disp: , Rfl:     buPROPion (WELLBUTRIN XL) 300 mg 24 hr tablet, Take 300 mg by mouth daily (Patient not taking: Reported on 7/14/2021), Disp: , Rfl: 1    busPIRone (BUSPAR) 15 mg tablet, TAKE 1 TABLET BY MOUTH IN THE AM AND 2 TABLETS IN THE AFTERNOON AND 1 TABLET AT NIGHT (Patient not taking: Reported on 7/14/2021), Disp: , Rfl:     LORazepam (ATIVAN) 1 mg tablet, Take 1 tablet (1 mg total) by mouth every 8 (eight) hours as needed for anxiety (Patient not taking: Reported on 7/14/2021), Disp: 90 tablet, Rfl: 0    OLANZapine (ZyPREXA) 5 mg tablet, TAKE 1 2 (ONE HALF) TABLET BY MOUTH TWICE DAILY (Patient not taking: Reported on 7/14/2021), Disp: , Rfl:     pantoprazole (PROTONIX) 40 mg tablet, Take 1 tablet (40 mg total) by mouth daily (Patient not taking: Reported on 7/14/2021), Disp: 90 tablet, Rfl: 0    venlafaxine (EFFEXOR-XR) 150 mg 24 hr capsule, Take 2 capsules (300 mg total) by mouth daily (Patient not taking: Reported on 7/14/2021), Disp: 60 capsule, Rfl: 1    zolpidem (AMBIEN) 10 mg tablet, Take 1 tablet (10 mg total) by mouth daily at bedtime as needed for sleep for up to 10 days, Disp: 10 tablet, Rfl: 0    Lab Results   Component Value Date     04/16/2018    SODIUM 140 07/15/2021    K 3 9 07/15/2021     07/15/2021    CO2 26 07/15/2021    ANIONGAP 12 7 04/16/2018    AGAP 8 07/15/2021    BUN 26 (H) 07/15/2021    CREATININE 1 86 (H) 07/15/2021    GLUC 88 03/05/2020    GLUF 93 07/15/2021    CALCIUM 9 3 07/15/2021    AST 12 (L) 07/15/2021    ALT 8 07/15/2021    ALKPHOS 53 07/15/2021    PROT 6 9 04/16/2018    TP 7 1 07/15/2021    BILITOT 0 4 04/16/2018    TBILI 0 40 07/15/2021    EGFR 32 07/15/2021     Lab Results   Component Value Date    WBC 7 80 07/15/2021    HGB 11 4 (L) 07/15/2021    HCT 32 8 (L) 07/15/2021    MCV 98 07/15/2021     07/15/2021     Lab Results   Component Value Date    CHOLESTEROL 206 (H) 03/16/2021    CHOLESTEROL 223 (H) 11/21/2020    CHOLESTEROL 213 (H) 12/08/2018     Lab Results   Component Value Date    HDL 53 03/16/2021    HDL 43 11/21/2020    HDL 67 (H) 12/08/2018     Lab Results   Component Value Date    LDLCALC 127 (H) 03/16/2021    LDLCALC 146 (H) 11/21/2020    LDLCALC 126 12/08/2018     Lab Results   Component Value Date    TRIG 132 03/16/2021    TRIG 170 (H) 11/21/2020    TRIG 98 12/08/2018     No results found for: CHOLHDL  Lab Results   Component Value Date    [id number] 0 120 (L) 07/15/2021     Lab Results   Component Value Date    CALCIUM 9 3 07/15/2021     No results found for: SPEP, UPEP  No results found for: MICROALBUR, [id number]        Objective:      /80 (BP Location: Left arm, Patient Position: Sitting, Cuff Size: Standard)   Pulse 92   Ht 5' 5" (1 651 m)   Wt 65 8 kg (145 lb)   SpO2 94%   BMI 24 13 kg/m²          Physical Exam  Constitutional:       General: She is not in acute distress  Appearance: She is normal weight  She is not toxic-appearing  HENT:      Head: Normocephalic and atraumatic  Right Ear: External ear normal       Left Ear: External ear normal    Eyes:      Extraocular Movements: Extraocular movements intact  Pupils: Pupils are equal, round, and reactive to light  Neck:      Vascular: No carotid bruit  Cardiovascular:      Rate and Rhythm: Normal rate and regular rhythm  Heart sounds: No murmur heard  Pulmonary:      Effort: Pulmonary effort is normal  No respiratory distress  Breath sounds: Normal breath sounds  No wheezing or rales  Abdominal:      General: Bowel sounds are normal  There is no distension  Palpations: Abdomen is soft        Tenderness: There is no abdominal tenderness  Musculoskeletal:         General: Normal range of motion  Cervical back: Normal range of motion and neck supple  No rigidity  Right lower leg: No edema  Left lower leg: No edema  Lymphadenopathy:      Cervical: No cervical adenopathy  Skin:     General: Skin is warm and dry  Neurological:      General: No focal deficit present  Mental Status: She is alert and oriented to person, place, and time  Mental status is at baseline  Gait: Gait normal    Psychiatric:         Mood and Affect: Mood normal          Behavior: Behavior normal          Thought Content:  Thought content normal          Judgment: Judgment normal

## 2021-07-16 NOTE — PATIENT INSTRUCTIONS
No meloxicam or Advil  Take tramadol 1 tablet with 2 extra-strength Tylenol twice a day if needed for pain      Ask Dr William Pollack for refills if they do help you

## 2021-07-16 NOTE — ASSESSMENT & PLAN NOTE
Lab Results   Component Value Date    EGFR 32 07/15/2021    EGFR 27 07/12/2021    EGFR 41 05/03/2021    CREATININE 1 86 (H) 07/15/2021    CREATININE 2 16 (H) 07/12/2021    CREATININE 1 52 (H) 05/03/2021   She has chronic tubulointerstitial nephritis due to long term Nagel -2 inhibitor and NSAID therapy for back pain  She would like to try tramadol with extra strength tylenol for pain  Will give her a 7 day supply and Dr Cristofer Restrepo will continue therapy  No indication for biopsy due to lack of albuminuria  But has tubular proteinuria  BP control necessary

## 2021-07-19 ENCOUNTER — TELEPHONE (OUTPATIENT)
Dept: FAMILY MEDICINE CLINIC | Facility: CLINIC | Age: 46
End: 2021-07-19

## 2021-07-19 LAB — ELASTASE PANC STL-MCNT: 311 UG ELAST./G

## 2021-07-19 NOTE — TELEPHONE ENCOUNTER
I would like to ensure that the tramadol is actually effective before prescribing it and she should also sign a pain agreement if we are going to continue  See if she wants to schedule an appointment   Thanks

## 2021-07-20 ENCOUNTER — OFFICE VISIT (OUTPATIENT)
Dept: FAMILY MEDICINE CLINIC | Facility: CLINIC | Age: 46
End: 2021-07-20
Payer: COMMERCIAL

## 2021-07-20 VITALS
HEIGHT: 65 IN | DIASTOLIC BLOOD PRESSURE: 60 MMHG | WEIGHT: 145 LBS | BODY MASS INDEX: 24.16 KG/M2 | TEMPERATURE: 98.1 F | SYSTOLIC BLOOD PRESSURE: 100 MMHG | HEART RATE: 67 BPM | OXYGEN SATURATION: 99 %

## 2021-07-20 DIAGNOSIS — Z79.891 OPIOID CONTRACT EXISTS: ICD-10-CM

## 2021-07-20 DIAGNOSIS — M54.50 ACUTE EXACERBATION OF CHRONIC LOW BACK PAIN: Primary | ICD-10-CM

## 2021-07-20 DIAGNOSIS — E03.9 HYPOTHYROIDISM, UNSPECIFIED TYPE: ICD-10-CM

## 2021-07-20 DIAGNOSIS — M54.2 CERVICAL PAIN: ICD-10-CM

## 2021-07-20 DIAGNOSIS — G89.29 ACUTE EXACERBATION OF CHRONIC LOW BACK PAIN: Primary | ICD-10-CM

## 2021-07-20 DIAGNOSIS — I10 HYPERTENSION, UNSPECIFIED TYPE: ICD-10-CM

## 2021-07-20 DIAGNOSIS — K21.9 GASTROESOPHAGEAL REFLUX DISEASE WITHOUT ESOPHAGITIS: ICD-10-CM

## 2021-07-20 PROCEDURE — 3725F SCREEN DEPRESSION PERFORMED: CPT | Performed by: NURSE PRACTITIONER

## 2021-07-20 PROCEDURE — 3008F BODY MASS INDEX DOCD: CPT | Performed by: NURSE PRACTITIONER

## 2021-07-20 PROCEDURE — 4004F PT TOBACCO SCREEN RCVD TLK: CPT | Performed by: NURSE PRACTITIONER

## 2021-07-20 PROCEDURE — 99213 OFFICE O/P EST LOW 20 MIN: CPT | Performed by: NURSE PRACTITIONER

## 2021-07-20 RX ORDER — LISINOPRIL AND HYDROCHLOROTHIAZIDE 25; 20 MG/1; MG/1
1 TABLET ORAL DAILY
Qty: 30 TABLET | Refills: 0 | Status: SHIPPED | OUTPATIENT
Start: 2021-07-20 | End: 2021-08-04

## 2021-07-20 RX ORDER — METHOCARBAMOL 500 MG/1
500 TABLET, FILM COATED ORAL 4 TIMES DAILY
Qty: 30 TABLET | Refills: 0 | Status: SHIPPED | OUTPATIENT
Start: 2021-07-20 | End: 2021-08-08

## 2021-07-20 RX ORDER — NALOXONE HYDROCHLORIDE 4 MG/.1ML
SPRAY NASAL
Qty: 1 EACH | Refills: 1 | Status: SHIPPED | OUTPATIENT
Start: 2021-07-20 | End: 2021-09-09

## 2021-07-20 RX ORDER — LEVOTHYROXINE SODIUM 0.05 MG/1
50 TABLET ORAL DAILY
Qty: 30 TABLET | Refills: 3 | Status: SHIPPED | OUTPATIENT
Start: 2021-07-20 | End: 2021-10-15 | Stop reason: SDUPTHER

## 2021-07-20 RX ORDER — FAMOTIDINE 20 MG/1
20 TABLET, FILM COATED ORAL 2 TIMES DAILY
Qty: 60 TABLET | Refills: 3 | Status: SHIPPED | OUTPATIENT
Start: 2021-07-20

## 2021-07-20 RX ORDER — TRAMADOL HYDROCHLORIDE 50 MG/1
50 TABLET ORAL 2 TIMES DAILY
Qty: 14 TABLET | Refills: 1 | Status: SHIPPED | OUTPATIENT
Start: 2021-07-20 | End: 2021-10-05

## 2021-07-20 NOTE — PROGRESS NOTES
Assessment/Plan:    No problem-specific Assessment & Plan notes found for this encounter  Diagnoses and all orders for this visit:    Acute exacerbation of chronic low back pain  Comments:  continue tramadol prn for pain along with tylenol   Orders:  -     traMADol (ULTRAM) 50 mg tablet; Take 1 tablet (50 mg total) by mouth 2 (two) times a day With 2 extra strength tylenol    Hypothyroidism, unspecified type  Comments:  decrease levothyroxine from 75 mcg to 50 mcg   Orders:  -     levothyroxine 50 mcg tablet; Take 1 tablet (50 mcg total) by mouth daily  -     TSH, 3rd generation with Free T4 reflex; Future  -     T4, free; Future    Hypertension, unspecified type  -     lisinopril-hydrochlorothiazide (PRINZIDE,ZESTORETIC) 20-25 MG per tablet; Take 1 tablet by mouth daily    Cervical pain  -     methocarbamol (ROBAXIN) 500 mg tablet; Take 1 tablet (500 mg total) by mouth 4 (four) times a day    Gastroesophageal reflux disease without esophagitis  -     famotidine (PEPCID) 20 mg tablet; Take 1 tablet (20 mg total) by mouth 2 (two) times a day    Opioid contract exists  -     naloxone (NARCAN) 4 mg/0 1 mL nasal spray; Administer 1 spray into a nostril  If no response after 2-3 minutes, give another dose in the other nostril using a new spray  Subjective:      Patient ID: Elizabeth Holley is a 50 y o  female  Patient presents for follow up after visit with nephrology for continues pain management  Patient was taking aleve for back pain and was instructed to stop due to kidney function  She was then started on Tramadol by Dr Pernell Yost and instructed to f/u here for continued prescription of the tramadol  She states that since she started it she had moderate relief but the NSAIDs did work better  She will take Tramadol 50 mg and Tylenol 1,000 mg bid for pain  Pain will decrease from 9/10 to around 2/10  Spoke with patient at length regarding opoid use  Risks versus benefit reviewed   Patient made aware that long term management should be through pain management and other options like injections should be explored  She understands that I will prescribe this for her short term until she is able to establish care with pain management  Pain agreement reviewed with patient and signed at this visit  Reviewed labs ordered by other provider- TSH was low, decreased dose of levothyroxine to 50 mcg repeat labs in 6 weeks- patient does have diarrhea  The following portions of the patient's history were reviewed and updated as appropriate: allergies, current medications, past family history, past medical history, past social history, past surgical history and problem list     Review of Systems   Constitutional: Negative for activity change, appetite change, fatigue and unexpected weight change  HENT: Negative for congestion, rhinorrhea, sneezing and sore throat  Eyes: Negative for visual disturbance  Respiratory: Negative for cough, chest tightness, shortness of breath and wheezing  Cardiovascular: Negative for chest pain, palpitations and leg swelling  Genitourinary: Negative for difficulty urinating, dysuria and urgency  Musculoskeletal: Positive for arthralgias and back pain  Negative for joint swelling, myalgias, neck pain and neck stiffness  Skin: Negative for color change and rash  Neurological: Negative for dizziness, weakness and headaches  Hematological: Negative for adenopathy  Does not bruise/bleed easily  Objective:      /60 (BP Location: Left arm, Patient Position: Sitting, Cuff Size: Standard)   Pulse 67   Temp 98 1 °F (36 7 °C) (Tympanic)   Ht 5' 5" (1 651 m)   Wt 65 8 kg (145 lb)   SpO2 99%   BMI 24 13 kg/m²          Physical Exam  Vitals and nursing note reviewed  Constitutional:       General: She is not in acute distress  Appearance: Normal appearance  She is normal weight  She is not ill-appearing, toxic-appearing or diaphoretic     Cardiovascular: Rate and Rhythm: Normal rate and regular rhythm  Pulses: Normal pulses  Heart sounds: Normal heart sounds  No murmur heard  No friction rub  No gallop  Pulmonary:      Effort: Pulmonary effort is normal       Breath sounds: Normal breath sounds  No stridor  No wheezing or rales  Musculoskeletal:      Lumbar back: Tenderness present  Skin:     Capillary Refill: Capillary refill takes less than 2 seconds  Neurological:      General: No focal deficit present  Mental Status: She is alert and oriented to person, place, and time  Mental status is at baseline  Cranial Nerves: No cranial nerve deficit  Motor: No weakness  Gait: Gait normal    Psychiatric:         Mood and Affect: Mood normal          Behavior: Behavior normal          Thought Content:  Thought content normal          Judgment: Judgment normal

## 2021-07-21 ENCOUNTER — APPOINTMENT (OUTPATIENT)
Dept: PHYSICAL THERAPY | Facility: CLINIC | Age: 46
End: 2021-07-21
Payer: COMMERCIAL

## 2021-07-21 ENCOUNTER — OFFICE VISIT (OUTPATIENT)
Dept: URGENT CARE | Facility: CLINIC | Age: 46
End: 2021-07-21
Payer: COMMERCIAL

## 2021-07-21 VITALS
TEMPERATURE: 97.8 F | BODY MASS INDEX: 23.99 KG/M2 | WEIGHT: 144 LBS | SYSTOLIC BLOOD PRESSURE: 108 MMHG | RESPIRATION RATE: 18 BRPM | HEART RATE: 88 BPM | HEIGHT: 65 IN | DIASTOLIC BLOOD PRESSURE: 57 MMHG | OXYGEN SATURATION: 99 %

## 2021-07-21 DIAGNOSIS — L08.9 FINGER INFECTION: Primary | ICD-10-CM

## 2021-07-21 PROCEDURE — 99213 OFFICE O/P EST LOW 20 MIN: CPT | Performed by: PHYSICIAN ASSISTANT

## 2021-07-21 RX ORDER — CEPHALEXIN 500 MG/1
500 CAPSULE ORAL EVERY 8 HOURS SCHEDULED
Qty: 21 CAPSULE | Refills: 0 | Status: SHIPPED | OUTPATIENT
Start: 2021-07-21 | End: 2021-07-21 | Stop reason: SDUPTHER

## 2021-07-21 RX ORDER — CEPHALEXIN 500 MG/1
500 CAPSULE ORAL EVERY 8 HOURS SCHEDULED
Qty: 21 CAPSULE | Refills: 0 | Status: SHIPPED | OUTPATIENT
Start: 2021-07-21 | End: 2021-07-28

## 2021-07-21 NOTE — PROGRESS NOTES
[address] [facility]          NAME: Meghan Faulkner is a 50 y o  female  : 1975    MRN: [id number]  DATE: 2021  TIME: 9:15 AM    Assessment and Plan   Finger infection [L08 9]  1  Finger infection  cephalexin (KEFLEX) 500 mg capsule       Patient Instructions   Keflex as prescribed with probiotics  Soaks as recommended  Follow up with PCP in 3-5 days  To present to the ER if symptoms worsen  Chief Complaint     Chief Complaint   Patient presents with    Hand Pain     c/o R ring finger pain and redness x 3 days         History of Present Illness   Meghan Faulkner presents to the clinic with daughter c/o    Hand Pain   The incident occurred 3 to 5 days ago  The incident occurred at home  There was no injury mechanism  Pain location: right pointer finger  The quality of the pain is described as aching  The pain does not radiate  The pain is mild  The pain has been constant since the incident  Pertinent negatives include no chest pain, muscle weakness, numbness or tingling  Nothing aggravates the symptoms  She has tried nothing for the symptoms  The treatment provided no relief  Review of Systems   Review of Systems   Constitutional: Negative for chills, diaphoresis, fatigue and fever  HENT: Negative for congestion, ear discharge, ear pain and facial swelling  Eyes: Negative for photophobia, pain, discharge, redness, itching and visual disturbance  Respiratory: Negative for apnea, cough, chest tightness, shortness of breath and wheezing  Cardiovascular: Negative for chest pain and palpitations  Gastrointestinal: Negative for abdominal pain  Skin: Positive for color change  Negative for rash and wound  Neurological: Negative for dizziness, tingling, numbness and headaches  Hematological: Negative for adenopathy           Current Medications     Long-Term Medications   Medication Sig Dispense Refill    ergocalciferol (VITAMIN D2) 50,000 units take 1 capsule by mouth weekly 12 capsule 3    famotidine (PEPCID) 20 mg tablet Take 1 tablet (20 mg total) by mouth 2 (two) times a day 60 tablet 3    gabapentin (NEURONTIN) 800 mg tablet Take 1 tablet (800 mg total) by mouth 3 (three) times a day 90 tablet 3    levothyroxine 50 mcg tablet Take 1 tablet (50 mcg total) by mouth daily 30 tablet 3    levothyroxine 75 mcg tablet Take 1 tablet (75 mcg total) by mouth daily in the early morning 90 tablet 1    lidocaine (LMX) 4 % cream Apply topically as needed for mild pain 30 g 0    lisinopril-hydrochlorothiazide (PRINZIDE,ZESTORETIC) 20-25 MG per tablet Take 1 tablet by mouth daily 30 tablet 0    methocarbamol (ROBAXIN) 500 mg tablet Take 1 tablet (500 mg total) by mouth 4 (four) times a day 30 tablet 0    Viibryd 40 MG tablet Take 40 mg by mouth daily      bisacodyl (DULCOLAX) 5 mg EC tablet Use as directed by office staff (Patient not taking: Reported on 7/20/2021) 4 tablet 0    buPROPion (WELLBUTRIN XL) 300 mg 24 hr tablet Take 300 mg by mouth daily (Patient not taking: Reported on 7/14/2021)  1    busPIRone (BUSPAR) 15 mg tablet TAKE 1 TABLET BY MOUTH IN THE AM AND 2 TABLETS IN THE AFTERNOON AND 1 TABLET AT NIGHT (Patient not taking: Reported on 7/14/2021)      LORazepam (ATIVAN) 1 mg tablet Take 1 tablet (1 mg total) by mouth every 8 (eight) hours as needed for anxiety (Patient not taking: Reported on 7/14/2021) 90 tablet 0    meloxicam (MOBIC) 15 mg tablet Take 1 tablet (15 mg total) by mouth daily (Patient not taking: Reported on 7/20/2021) 30 tablet 2    naloxone (NARCAN) 4 mg/0 1 mL nasal spray Administer 1 spray into a nostril  If no response after 2-3 minutes, give another dose in the other nostril using a new spray   1 each 1    nicotine (NICODERM CQ) 21 mg/24 hr TD 24 hr patch Place 1 patch on the skin every 24 hours (Patient not taking: Reported on 7/20/2021) 28 patch 0    OLANZapine (ZyPREXA) 5 mg tablet TAKE 1 2 (ONE HALF) TABLET BY MOUTH TWICE DAILY (Patient not taking: Reported on 7/14/2021)      pantoprazole (PROTONIX) 40 mg tablet Take 1 tablet (40 mg total) by mouth daily (Patient not taking: Reported on 7/14/2021) 90 tablet 0    polyethylene glycol (GOLYTELY) 4000 mL solution Use as directed by office staff (Patient not taking: Reported on 7/20/2021) 4000 mL 0    venlafaxine (EFFEXOR-XR) 150 mg 24 hr capsule Take 2 capsules (300 mg total) by mouth daily (Patient not taking: Reported on 7/14/2021) 60 capsule 1    zolpidem (AMBIEN) 10 mg tablet Take 1 tablet (10 mg total) by mouth daily at bedtime as needed for sleep for up to 10 days 10 tablet 0       Current Allergies     Allergies as of 07/21/2021    (No Known Allergies)            The following portions of the patient's history were reviewed and updated as appropriate: allergies, current medications, past family history, past medical history, past social history, past surgical history and problem list   Past Medical History:   Diagnosis Date    Ankle pain, right     Anxiety     Arthritis     Bipolar 1 disorder ([facility] [address] )     Depression     GERD (gastroesophageal reflux disease)     Hypertension     Hypothyroidism     Known health problems: none     Lyme disease     Scoliosis      Past Surgical History:   Procedure Laterality Date    CERVICAL FUSION      CHOLECYSTECTOMY      COLONOSCOPY       Social History     Socioeconomic History    Marital status: /Civil Union     Spouse name: Not on file    Number of children: Not on file    Years of education: Not on file    Highest education level: Not on file   Occupational History    Occupation: UNEMPLOYED   Tobacco Use    Smoking status: Current Every Day Smoker     Packs/day: 0 50     Years: 3 00     Pack years: 1 50    Smokeless tobacco: Never Used   Vaping Use    Vaping Use: Never used   Substance and Sexual Activity    Alcohol use: Not Currently    Drug use: Never    Sexual activity: Yes     Partners: Male     Birth control/protection: I U D  Other Topics Concern    Not on file   Social History Narrative        UNEMPLOYED     Social Determinants of Health     Financial Resource Strain:     Difficulty of Paying Living Expenses:    Food Insecurity:     Worried About Running Out of Food in the Last Year:     [address] in the Last Year:    Transportation Needs:     Lack of Transportation (Medical):  Lack of Transportation (Non-Medical):    Physical Activity:     Days of Exercise per Week:     Minutes of Exercise per Session:    Stress:     Feeling of Stress :    Social Connections:     Frequency of Communication with Friends and Family:     Frequency of Social Gatherings with Friends and Family:     Attends [employer] Services:     Active Member of Clubs or Organizations:     Attends Club or Organization Meetings:     Marital Status:    Intimate Partner Violence:     Fear of Current or Ex-Partner:     Emotionally Abused:     Physically Abused:     Sexually Abused:        Objective   /57   Pulse 88   Temp 97 8 °F (36 6 °C)   Resp 18   Ht 5' 5" (1 651 m)   Wt 65 3 kg (144 lb)   SpO2 99%   BMI 23 96 kg/m²      Physical Exam     Physical Exam  Vitals and nursing note reviewed  Constitutional:       General: She is not in acute distress  Appearance: She is well-developed  She is not diaphoretic  HENT:      Head: Normocephalic and atraumatic  Right Ear: External ear normal       Left Ear: External ear normal       Nose: Nose normal    Eyes:      General: No scleral icterus  Right eye: No discharge  Left eye: No discharge  Conjunctiva/sclera: Conjunctivae normal    Cardiovascular:      Rate and Rhythm: Normal rate and regular rhythm  Heart sounds: Normal heart sounds  No murmur heard  No friction rub  No gallop  Pulmonary:      Effort: Pulmonary effort is normal  No respiratory distress  Breath sounds: Normal breath sounds   No decreased breath sounds, wheezing, rhonchi or rales  Musculoskeletal:        Hands:    Skin:     General: Skin is warm and dry  Coloration: Skin is not pale  Findings: No erythema or rash  Neurological:      Mental Status: She is alert and oriented to person, place, and time  Psychiatric:         Behavior: Behavior normal          Thought Content:  Thought content normal          Judgment: Judgment normal          Melba Ayon PA-C

## 2021-07-22 ENCOUNTER — HOSPITAL ENCOUNTER (EMERGENCY)
Facility: HOSPITAL | Age: 46
Discharge: HOME | End: 2021-07-22
Attending: EMERGENCY MEDICINE
Payer: COMMERCIAL

## 2021-07-22 VITALS
HEART RATE: 74 BPM | OXYGEN SATURATION: 100 % | WEIGHT: 145 LBS | DIASTOLIC BLOOD PRESSURE: 62 MMHG | BODY MASS INDEX: 24.16 KG/M2 | HEIGHT: 65 IN | TEMPERATURE: 97.8 F | SYSTOLIC BLOOD PRESSURE: 125 MMHG | RESPIRATION RATE: 18 BRPM

## 2021-07-22 DIAGNOSIS — L03.019 PARONYCHIA OF FINGER: Primary | ICD-10-CM

## 2021-07-22 PROCEDURE — 99283 EMERGENCY DEPT VISIT LOW MDM: CPT

## 2021-07-22 PROCEDURE — 26010 DRAINAGE OF FINGER ABSCESS: CPT | Performed by: PHYSICIAN ASSISTANT

## 2021-07-22 PROCEDURE — 99284 EMERGENCY DEPT VISIT MOD MDM: CPT | Performed by: PHYSICIAN ASSISTANT

## 2021-07-22 NOTE — ED PROVIDER NOTES
History  Chief Complaint   Patient presents with    Finger Pain     right ring finger pain and swelling started 2 days ago  saw care now yesterday and started keflex  57-year-old female presents emerged department seeing evaluation for infection of the right ring finger  She states this started swelling 2 days ago with some redness and tenderness to the area  She was seen yesterday at an urgent care was started on Keflex  She states that the pain has gotten somewhat worse and she states that she called the urgent care requesting a stronger antibiotic and was advised to come to the emergency department for evaluation  Allergies reviewed          Prior to Admission Medications   Prescriptions Last Dose Informant Patient Reported? Taking?    LORazepam (ATIVAN) 1 mg tablet  Self No No   Sig: Take 1 tablet (1 mg total) by mouth every 8 (eight) hours as needed for anxiety   Patient not taking: Reported on 7/14/2021   OLANZapine (ZyPREXA) 5 mg tablet  Self Yes No   Sig: TAKE 1 2 (ONE HALF) TABLET BY MOUTH TWICE DAILY   Patient not taking: Reported on 7/14/2021   Viibryd 40 MG tablet  Self Yes No   Sig: Take 40 mg by mouth daily   bisacodyl (DULCOLAX) 5 mg EC tablet  Self No No   Sig: Use as directed by office staff   Patient not taking: Reported on 7/20/2021   buPROPion (WELLBUTRIN XL) 300 mg 24 hr tablet  Self Yes No   Sig: Take 300 mg by mouth daily   Patient not taking: Reported on 7/14/2021   busPIRone (BUSPAR) 15 mg tablet  Self Yes No   Sig: TAKE 1 TABLET BY MOUTH IN THE AM AND 2 TABLETS IN THE AFTERNOON AND 1 TABLET AT NIGHT   Patient not taking: Reported on 7/14/2021   cephalexin (KEFLEX) 500 mg capsule   No No   Sig: Take 1 capsule (500 mg total) by mouth every 8 (eight) hours for 7 days   clonazePAM (KlonoPIN) 1 mg tablet  Self Yes No   Sig: Take 1 mg by mouth 3 (three) times a day as needed   ergocalciferol (VITAMIN D2) 50,000 units  Self No No   Sig: take 1 capsule by mouth weekly   famotidine (PEPCID) 20 mg tablet   No No   Sig: Take 1 tablet (20 mg total) by mouth 2 (two) times a day   gabapentin (NEURONTIN) 800 mg tablet  Self No No   Sig: Take 1 tablet (800 mg total) by mouth 3 (three) times a day   levonorgestrel (MIRENA) 20 MCG/24HR IUD  Self Yes No   Si each by Intrauterine route once   levothyroxine 50 mcg tablet   No No   Sig: Take 1 tablet (50 mcg total) by mouth daily   levothyroxine 75 mcg tablet  Self No No   Sig: Take 1 tablet (75 mcg total) by mouth daily in the early morning   lidocaine (LMX) 4 % cream  Self No No   Sig: Apply topically as needed for mild pain   lisinopril-hydrochlorothiazide (PRINZIDE,ZESTORETIC) 20-25 MG per tablet   No No   Sig: Take 1 tablet by mouth daily   meloxicam (MOBIC) 15 mg tablet  Self No No   Sig: Take 1 tablet (15 mg total) by mouth daily   Patient not taking: Reported on 2021   methocarbamol (ROBAXIN) 500 mg tablet   No No   Sig: Take 1 tablet (500 mg total) by mouth 4 (four) times a day   naloxone (NARCAN) 4 mg/0 1 mL nasal spray   No No   Sig: Administer 1 spray into a nostril  If no response after 2-3 minutes, give another dose in the other nostril using a new spray     nicotine (NICODERM CQ) 21 mg/24 hr TD 24 hr patch  Self No No   Sig: Place 1 patch on the skin every 24 hours   Patient not taking: Reported on 2021   pantoprazole (PROTONIX) 40 mg tablet  Self No No   Sig: Take 1 tablet (40 mg total) by mouth daily   Patient not taking: Reported on 2021   polyethylene glycol (GOLYTELY) 4000 mL solution  Self No No   Sig: Use as directed by office staff   Patient not taking: Reported on 2021   traMADol (ULTRAM) 50 mg tablet   No No   Sig: Take 1 tablet (50 mg total) by mouth 2 (two) times a day With 2 extra strength tylenol   venlafaxine (EFFEXOR-XR) 150 mg 24 hr capsule  Self No No   Sig: Take 2 capsules (300 mg total) by mouth daily   Patient not taking: Reported on 2021   zolpidem (AMBIEN) 10 mg tablet  Self No No   Sig: Take 1 tablet (10 mg total) by mouth daily at bedtime as needed for sleep for up to 10 days      Facility-Administered Medications: None       Past Medical History:   Diagnosis Date    Ankle pain, right     Anxiety     Arthritis     Bipolar 1 disorder ([facility] [address]     Depression     GERD (gastroesophageal reflux disease)     Hypertension     Hypothyroidism     Known health problems: none     Lyme disease     Scoliosis        Past Surgical History:   Procedure Laterality Date    CERVICAL FUSION      CHOLECYSTECTOMY      COLONOSCOPY         Family History   Problem Relation Age of Onset    Diabetes Mother     Hypertension Mother     Heart disease Mother     Hypertension Father     Diabetes Maternal Grandmother     Diabetes Paternal Grandmother     No Known Problems Sister     No Known Problems Daughter     No Known Problems Daughter     No Known Problems Daughter     No Known Problems Maternal Aunt     No Known Problems Maternal Aunt     No Known Problems Maternal Aunt     No Known Problems Paternal Aunt      I have reviewed and agree with the history as documented  E-Cigarette/Vaping    E-Cigarette Use Never User      E-Cigarette/Vaping Substances    Nicotine No     THC No     CBD No      Social History     Tobacco Use    Smoking status: Current Every Day Smoker     Packs/day: 0 50     Years: 3 00     Pack years: 1 50    Smokeless tobacco: Never Used   Vaping Use    Vaping Use: Never used   Substance Use Topics    Alcohol use: Not Currently    Drug use: Never       Review of Systems   Constitutional: Negative for chills and fever  Respiratory: Negative for chest tightness and shortness of breath  Cardiovascular: Negative for chest pain  Gastrointestinal: Negative for abdominal pain  Musculoskeletal: Positive for arthralgias (Finger)  Neurological: Negative for weakness and numbness  All other systems reviewed and are negative        Physical Exam  Physical Exam  Vitals and nursing note reviewed  Constitutional:       General: She is not in acute distress  Appearance: She is well-developed and well-groomed  She is not diaphoretic  HENT:      Head: Normocephalic and atraumatic  Right Ear: External ear normal       Left Ear: External ear normal       Nose: Nose normal    Eyes:      Conjunctiva/sclera: Conjunctivae normal       Pupils: Pupils are equal, round, and reactive to light  Cardiovascular:      Rate and Rhythm: Normal rate and regular rhythm  Heart sounds: Normal heart sounds  No murmur heard  No friction rub  No gallop  Pulmonary:      Effort: Pulmonary effort is normal  No respiratory distress  Breath sounds: Normal breath sounds  No stridor  No wheezing or rales  Abdominal:      General: Bowel sounds are normal  There is no distension  Palpations: Abdomen is soft  Tenderness: There is no abdominal tenderness  There is no guarding  Musculoskeletal:         General: No tenderness  Normal range of motion  Right hand: Swelling present  Left hand: Normal       Cervical back: Normal range of motion  Comments: Paronychia of right ring finger  Skin:     General: Skin is warm  Capillary Refill: Capillary refill takes less than 2 seconds  Neurological:      Mental Status: She is alert and oriented to person, place, and time  Psychiatric:         Behavior: Behavior is cooperative           Vital Signs  ED Triage Vitals [07/22/21 0931]   Temperature Pulse Respirations Blood Pressure SpO2   97 8 °F (36 6 °C) 74 18 125/62 100 %      Temp Source Heart Rate Source Patient Position - Orthostatic VS BP Location FiO2 (%)   Oral Monitor Sitting Left arm --      Pain Score       Worst Possible Pain           Vitals:    07/22/21 0931   BP: 125/62   Pulse: 74   Patient Position - Orthostatic VS: Sitting         Visual Acuity      ED Medications  Medications - No data to display    Diagnostic Studies  Results Reviewed     None No orders to display              Procedures  Incision and drain    Date/Time: 7/22/2021 10:30 AM  Performed by: Sujata Bojorquez PA-C  Authorized by: Sujata Bojorquez PA-C   Universal Protocol:  Consent: Verbal consent obtained  Risks and benefits: risks, benefits and alternatives were discussed    Patient location:  ED  Location:     Type:  Abscess    Size:  0 5    Location:  Upper extremity    Upper extremity location:  R ring finger  Pre-procedure details:     Skin preparation:  Betadine  Anesthesia (see MAR for exact dosages): Anesthesia method:  Topical application and local infiltration  Procedure details:     Needle aspiration: yes      Needle size:  22 G    Aspiration type: puncture aspiration      Incision depth:  Subcutaneous    Drainage amount: Moderate  Post-procedure details:     Patient tolerance of procedure: Tolerated well, no immediate complications             ED Course                                           MDM  Number of Diagnoses or Management Options  Paronychia of finger  Diagnosis management comments: Needle aspiration of paronychia  Moderate amount of purulent material was expressed  Patient educated regarding paronychia  Patient educated regarding Keflex use  Recommend warm soaks and continue use of Keflex  I educated the patient Keflex is the appropriate antibiotic to be on and that it needs more time to be effective  She verbalized understanding  Patient educated regarding their diagnosis and given return and follow-up instructions  Patient was advised to returned to the ED with worsening symptoms or concerns  Patient is understanding of and in agreement with the treatment plan  There are no questions at the time of discharge      Risk of Complications, Morbidity, and/or Mortality  Presenting problems: low  Diagnostic procedures: low  Management options: low    Patient Progress  Patient progress: stable      Disposition  Final diagnoses:   Paronychia of finger     Time reflects when diagnosis was documented in both MDM as applicable and the Disposition within this note     Time User Action Codes Description Comment    7/22/2021  9:42 AM Naina Camillebarrett Add [R26 [id number]] Paronychia of finger       ED Disposition     ED Disposition Condition Date/Time Comment    Discharge Stable Thu Jul 22, 2021  9:42 AM Caryn Solano discharge to home/self care  Follow-up Information     Follow up With Specialties Details Why [address] [facility], [address] [facility], Nurse Practitioner   [address] [facility]    [address] [place] [address]  [phone]            Discharge Medication List as of 7/22/2021  9:43 AM      CONTINUE these medications which have NOT CHANGED    Details   bisacodyl (DULCOLAX) 5 mg EC tablet Use as directed by office staff, Normal      buPROPion (WELLBUTRIN XL) 300 mg 24 hr tablet Take 300 mg by mouth daily, Starting Fri 8/30/2019, Historical Med      busPIRone (BUSPAR) 15 mg tablet TAKE 1 TABLET BY MOUTH IN THE AM AND 2 TABLETS IN THE AFTERNOON AND 1 TABLET AT NIGHT, Historical Med      cephalexin (KEFLEX) 500 mg capsule Take 1 capsule (500 mg total) by mouth every 8 (eight) hours for 7 days, Starting Wed 7/21/2021, Until Wed 7/28/2021, Normal      clonazePAM (KlonoPIN) 1 mg tablet Take 1 mg by mouth 3 (three) times a day as needed, Starting Wed 6/9/2021, Historical Med      ergocalciferol (VITAMIN D2) 50,000 units take 1 capsule by mouth weekly, Normal      famotidine (PEPCID) 20 mg tablet Take 1 tablet (20 mg total) by mouth 2 (two) times a day, Starting Tue 7/20/2021, Normal      gabapentin (NEURONTIN) 800 mg tablet Take 1 tablet (800 mg total) by mouth 3 (three) times a day, Starting Sat 5/16/2020, Sample      levonorgestrel (MIRENA) 20 MCG/24HR IUD 1 each by Intrauterine route once, Starting Fri 5/3/2019, Historical Med      !! levothyroxine 50 mcg tablet Take 1 tablet (50 mcg total) by mouth daily, Starting Tue 7/20/2021, Normal      !! levothyroxine 75 mcg tablet Take 1 tablet (75 mcg total) by mouth daily in the early morning, Starting Wed 6/23/2021, Normal      lidocaine (LMX) 4 % cream Apply topically as needed for mild pain, Starting Fri 6/11/2021, Normal      lisinopril-hydrochlorothiazide (PRINZIDE,ZESTORETIC) 20-25 MG per tablet Take 1 tablet by mouth daily, Starting Tue 7/20/2021, Normal      LORazepam (ATIVAN) 1 mg tablet Take 1 tablet (1 mg total) by mouth every 8 (eight) hours as needed for anxiety, Starting Tue 6/23/2020, Sample      meloxicam (MOBIC) 15 mg tablet Take 1 tablet (15 mg total) by mouth daily, Starting Tue 6/15/2021, Normal      methocarbamol (ROBAXIN) 500 mg tablet Take 1 tablet (500 mg total) by mouth 4 (four) times a day, Starting Tue 7/20/2021, Normal      naloxone (NARCAN) 4 mg/0 1 mL nasal spray Administer 1 spray into a nostril   If no response after 2-3 minutes, give another dose in the other nostril using a new spray , Normal      nicotine (NICODERM CQ) 21 mg/24 hr TD 24 hr patch Place 1 patch on the skin every 24 hours, Starting Tue 5/11/2021, Normal      OLANZapine (ZyPREXA) 5 mg tablet TAKE 1 2 (ONE HALF) TABLET BY MOUTH TWICE DAILY, Historical Med      pantoprazole (PROTONIX) 40 mg tablet Take 1 tablet (40 mg total) by mouth daily, Starting Fri 1/15/2021, Normal      polyethylene glycol (GOLYTELY) 4000 mL solution Use as directed by office staff, Normal      traMADol (ULTRAM) 50 mg tablet Take 1 tablet (50 mg total) by mouth 2 (two) times a day With 2 extra strength tylenol, Starting Tue 7/20/2021, Normal      venlafaxine (EFFEXOR-XR) 150 mg 24 hr capsule Take 2 capsules (300 mg total) by mouth daily, Starting Fri 8/2/2019, Normal      Viibryd 40 MG tablet Take 40 mg by mouth daily, Starting Wed 6/9/2021, Historical Med      zolpidem (AMBIEN) 10 mg tablet Take 1 tablet (10 mg total) by mouth daily at bedtime as needed for sleep for up to 10 days, Starting Mon 2/3/2020, Until Tue 7/20/2021 at 2359, No Print !! - Potential duplicate medications found  Please discuss with provider  No discharge procedures on file      PDMP Review       Value Time User    PDMP Reviewed  Yes 7/20/2021 11:11 AM Christell Lefort, [address] [facility]          ED Provider  Electronically Signed by           Keven Juan PA-C  07/22/21 1053

## 2021-07-23 ENCOUNTER — HOSPITAL ENCOUNTER (EMERGENCY)
Facility: HOSPITAL | Age: 46
Discharge: HOME | End: 2021-07-23
Attending: EMERGENCY MEDICINE | Admitting: EMERGENCY MEDICINE
Payer: COMMERCIAL

## 2021-07-23 VITALS
HEIGHT: 65 IN | SYSTOLIC BLOOD PRESSURE: 108 MMHG | TEMPERATURE: 96.9 F | RESPIRATION RATE: 18 BRPM | BODY MASS INDEX: 24.16 KG/M2 | HEART RATE: 79 BPM | OXYGEN SATURATION: 98 % | DIASTOLIC BLOOD PRESSURE: 61 MMHG | WEIGHT: 145 LBS

## 2021-07-23 DIAGNOSIS — L03.011 PARONYCHIA OF FINGER OF RIGHT HAND: Primary | ICD-10-CM

## 2021-07-23 PROCEDURE — 99284 EMERGENCY DEPT VISIT MOD MDM: CPT | Performed by: EMERGENCY MEDICINE

## 2021-07-23 PROCEDURE — 99283 EMERGENCY DEPT VISIT LOW MDM: CPT

## 2021-07-23 RX ORDER — BUPIVACAINE HYDROCHLORIDE 5 MG/ML
5 INJECTION, SOLUTION EPIDURAL; INTRACAUDAL ONCE
Status: COMPLETED | OUTPATIENT
Start: 2021-07-23 | End: 2021-07-23

## 2021-07-23 RX ORDER — LIDOCAINE HYDROCHLORIDE 10 MG/ML
5 INJECTION, SOLUTION EPIDURAL; INFILTRATION; INTRACAUDAL; PERINEURAL ONCE
Status: COMPLETED | OUTPATIENT
Start: 2021-07-23 | End: 2021-07-23

## 2021-07-23 RX ADMIN — LIDOCAINE HYDROCHLORIDE 5 ML: 10 INJECTION, SOLUTION EPIDURAL; INFILTRATION; INTRACAUDAL; PERINEURAL at 13:08

## 2021-07-23 RX ADMIN — BUPIVACAINE HYDROCHLORIDE 5 ML: 5 INJECTION, SOLUTION EPIDURAL; INTRACAUDAL at 13:08

## 2021-07-23 NOTE — ED PROVIDER NOTES
History  Chief Complaint   Patient presents with    Finger Pain     right 4th finger pain and swelling  on Keflex started by urgent care, came to ED yesterday for worsening pain, had it drained  tried soaks at home wants "stronger antibiotic"     68-year-old female presents to the emergency department complaining of swelling and pain to the ring finger on her right hand  Patient is right-hand dominant  Patient notes she was diagnosed with a paronychia and placed on antibiotics from urgent care and has been on medications for 3 days  She has also been in the emergency department yesterday and had a needle aspiration for some of the pus  The patient notes that the area is still swollen and tender and is concerned because she is supposed to start a new job soon, and needs to be able to type  Denies fever or other worsening injury  Prior to Admission Medications   Prescriptions Last Dose Informant Patient Reported? Taking?    LORazepam (ATIVAN) 1 mg tablet  Self No No   Sig: Take 1 tablet (1 mg total) by mouth every 8 (eight) hours as needed for anxiety   Patient not taking: Reported on 7/14/2021   OLANZapine (ZyPREXA) 5 mg tablet  Self Yes No   Sig: TAKE 1 2 (ONE HALF) TABLET BY MOUTH TWICE DAILY   Patient not taking: Reported on 7/14/2021   Viibryd 40 MG tablet  Self Yes No   Sig: Take 40 mg by mouth daily   bisacodyl (DULCOLAX) 5 mg EC tablet  Self No No   Sig: Use as directed by office staff   Patient not taking: Reported on 7/20/2021   buPROPion (WELLBUTRIN XL) 300 mg 24 hr tablet  Self Yes No   Sig: Take 300 mg by mouth daily   Patient not taking: Reported on 7/14/2021   busPIRone (BUSPAR) 15 mg tablet  Self Yes No   Sig: TAKE 1 TABLET BY MOUTH IN THE AM AND 2 TABLETS IN THE AFTERNOON AND 1 TABLET AT NIGHT   Patient not taking: Reported on 7/14/2021   cephalexin (KEFLEX) 500 mg capsule   No No   Sig: Take 1 capsule (500 mg total) by mouth every 8 (eight) hours for 7 days   clonazePAM (KlonoPIN) 1 mg tablet  Self Yes No   Sig: Take 1 mg by mouth 3 (three) times a day as needed   ergocalciferol (VITAMIN D2) 50,000 units  Self No No   Sig: take 1 capsule by mouth weekly   famotidine (PEPCID) 20 mg tablet   No No   Sig: Take 1 tablet (20 mg total) by mouth 2 (two) times a day   gabapentin (NEURONTIN) 800 mg tablet  Self No No   Sig: Take 1 tablet (800 mg total) by mouth 3 (three) times a day   levonorgestrel (MIRENA) 20 MCG/24HR IUD  Self Yes No   Si each by Intrauterine route once   levothyroxine 50 mcg tablet   No No   Sig: Take 1 tablet (50 mcg total) by mouth daily   levothyroxine 75 mcg tablet  Self No No   Sig: Take 1 tablet (75 mcg total) by mouth daily in the early morning   lidocaine (LMX) 4 % cream  Self No No   Sig: Apply topically as needed for mild pain   lisinopril-hydrochlorothiazide (PRINZIDE,ZESTORETIC) 20-25 MG per tablet   No No   Sig: Take 1 tablet by mouth daily   meloxicam (MOBIC) 15 mg tablet  Self No No   Sig: Take 1 tablet (15 mg total) by mouth daily   Patient not taking: Reported on 2021   methocarbamol (ROBAXIN) 500 mg tablet   No No   Sig: Take 1 tablet (500 mg total) by mouth 4 (four) times a day   naloxone (NARCAN) 4 mg/0 1 mL nasal spray   No No   Sig: Administer 1 spray into a nostril  If no response after 2-3 minutes, give another dose in the other nostril using a new spray     nicotine (NICODERM CQ) 21 mg/24 hr TD 24 hr patch  Self No No   Sig: Place 1 patch on the skin every 24 hours   Patient not taking: Reported on 2021   pantoprazole (PROTONIX) 40 mg tablet  Self No No   Sig: Take 1 tablet (40 mg total) by mouth daily   Patient not taking: Reported on 2021   polyethylene glycol (GOLYTELY) 4000 mL solution  Self No No   Sig: Use as directed by office staff   Patient not taking: Reported on 2021   traMADol (ULTRAM) 50 mg tablet   No No   Sig: Take 1 tablet (50 mg total) by mouth 2 (two) times a day With 2 extra strength tylenol   venlafaxine (EFFEXOR-XR) 150 mg 24 hr capsule  Self No No   Sig: Take 2 capsules (300 mg total) by mouth daily   Patient not taking: Reported on 7/14/2021   zolpidem (AMBIEN) 10 mg tablet  Self No No   Sig: Take 1 tablet (10 mg total) by mouth daily at bedtime as needed for sleep for up to 10 days      Facility-Administered Medications: None       Past Medical History:   Diagnosis Date    Ankle pain, right     Anxiety     Arthritis     Bipolar 1 disorder ([address] )     Depression     GERD (gastroesophageal reflux disease)     Hypertension     Hypothyroidism     Known health problems: none     Lyme disease     Scoliosis        Past Surgical History:   Procedure Laterality Date    CERVICAL FUSION      CHOLECYSTECTOMY      COLONOSCOPY         Family History   Problem Relation Age of Onset    Diabetes Mother     Hypertension Mother     Heart disease Mother     Hypertension Father     Diabetes Maternal Grandmother     Diabetes Paternal Grandmother     No Known Problems Sister     No Known Problems Daughter     No Known Problems Daughter     No Known Problems Daughter     No Known Problems Maternal Aunt     No Known Problems Maternal Aunt     No Known Problems Maternal Aunt     No Known Problems Paternal Aunt      I have reviewed and agree with the history as documented  E-Cigarette/Vaping    E-Cigarette Use Never User      E-Cigarette/Vaping Substances    Nicotine No     THC No     CBD No      Social History     Tobacco Use    Smoking status: Current Every Day Smoker     Packs/day: 0 50     Years: 3 00     Pack years: 1 50    Smokeless tobacco: Never Used   Vaping Use    Vaping Use: Never used   Substance Use Topics    Alcohol use: Not Currently    Drug use: Never       Review of Systems   Constitutional: Negative for chills and fever  HENT: Negative for ear pain and sore throat  Eyes: Negative for pain and visual disturbance  Genitourinary: Negative for dysuria and hematuria     Musculoskeletal: Positive for myalgias  Skin: Positive for color change  Negative for rash  Neurological: Negative for seizures and syncope  All other systems reviewed and are negative  Physical Exam  Physical Exam  Vitals and nursing note reviewed  Constitutional:       General: She is not in acute distress  Appearance: She is well-developed  HENT:      Head: Normocephalic and atraumatic  Eyes:      Conjunctiva/sclera: Conjunctivae normal    Cardiovascular:      Rate and Rhythm: Normal rate and regular rhythm  Heart sounds: No murmur heard  Pulmonary:      Effort: Pulmonary effort is normal  No respiratory distress  Breath sounds: Normal breath sounds  Abdominal:      Palpations: Abdomen is soft  Tenderness: There is no abdominal tenderness  Musculoskeletal:      Cervical back: Neck supple  Skin:     General: Skin is warm and dry  Capillary Refill: Capillary refill takes less than 2 seconds  Findings: Erythema and lesion present  Comments: Paronychia seen over right ring finger  No evidence of lymphangitis  Normal range of motion but with tenderness to the paronychial area  Neurological:      General: No focal deficit present  Mental Status: She is alert           Vital Signs  ED Triage Vitals [07/23/21 1255]   Temperature Pulse Respirations Blood Pressure SpO2   (!) 96 9 °F (36 1 °C) 79 18 108/61 98 %      Temp Source Heart Rate Source Patient Position - Orthostatic VS BP Location FiO2 (%)   Tympanic Monitor Sitting Left arm --      Pain Score       Worst Possible Pain           Vitals:    07/23/21 1255   BP: 108/61   Pulse: 79   Patient Position - Orthostatic VS: Sitting         Visual Acuity      ED Medications  Medications   bupivacaine (PF) (MARCAINE) 0 5 % injection 5 mL (5 mL Infiltration Given 7/23/21 1308)   lidocaine (PF) (XYLOCAINE-MPF) 1 % injection 5 mL (5 mL Infiltration Given 7/23/21 1308)       Diagnostic Studies  Results Reviewed     None No orders to display              Procedures  Incision and drain    Date/Time: 7/23/2021 1:15 AM  Performed by: Lara Bruno DO  Authorized by: Lara Bruno DO   [place] Protocol:  Procedure performed by:  Consent: Verbal consent obtained  Risks and benefits: risks, benefits and alternatives were discussed  Consent given by: patient  Patient understanding: patient states understanding of the procedure being performed  Patient consent: the patient's understanding of the procedure matches consent given  Patient identity confirmed: verbally with patient and arm band      Patient location:  ED  Location:     Type:  Abscess    Size:  Small    Location:  Upper extremity    Upper extremity location:  R ring finger  Pre-procedure details:     Skin preparation:  Betadine  Anesthesia (see MAR for exact dosages): Anesthesia method:  Nerve block    Block anesthetic:  Bupivacaine 0 5% w/o epi and lidocaine 1% w/o epi    Block injection procedure:  Introduced needle  Procedure details:     Complexity:  Simple    Needle aspiration: no      Incision types:  Stab incision    Scalpel blade:  11    Approach:  Open    Incision depth:  Subcutaneous    Wound management:  Extensive cleaning    Irrigation with saline:  Irrigated with 250 cc of saline and peroxide 50 50 mixture    Drainage:  Purulent    Drainage amount: Moderate    Wound treatment:  Wound left open    Packing materials:  None  Post-procedure details:     Patient tolerance of procedure:   Tolerated well, no immediate complications             ED Course                                           MDM    Disposition  Final diagnoses:   Paronychia of finger of right hand     Time reflects when diagnosis was documented in both MDM as applicable and the Disposition within this note     Time User Action Codes Description Comment    7/23/2021  1:26 PM Derian Selby Add [L03 [id number]] Paronychia of finger of right hand       ED Disposition     ED Disposition Condition Date/Time Comment    Discharge Stable Fri Jul 23, 2021  1:27 PM Som Lombardi discharge to home/self care  Follow-up Information     Follow up With Specialties Details Why [id number] [facility], [address], Nurse Practitioner On 7/27/2021  [address]    [address] [facility] [address]  [phone]            Discharge Medication List as of 7/23/2021  1:27 PM      CONTINUE these medications which have NOT CHANGED    Details   bisacodyl (DULCOLAX) 5 mg EC tablet Use as directed by office staff, Normal      buPROPion (WELLBUTRIN XL) 300 mg 24 hr tablet Take 300 mg by mouth daily, Starting Fri 8/30/2019, Historical Med      busPIRone (BUSPAR) 15 mg tablet TAKE 1 TABLET BY MOUTH IN THE AM AND 2 TABLETS IN THE AFTERNOON AND 1 TABLET AT NIGHT, Historical Med      cephalexin (KEFLEX) 500 mg capsule Take 1 capsule (500 mg total) by mouth every 8 (eight) hours for 7 days, Starting Wed 7/21/2021, Until Wed 7/28/2021, Normal      clonazePAM (KlonoPIN) 1 mg tablet Take 1 mg by mouth 3 (three) times a day as needed, Starting Wed 6/9/2021, Historical Med      ergocalciferol (VITAMIN D2) 50,000 units take 1 capsule by mouth weekly, Normal      famotidine (PEPCID) 20 mg tablet Take 1 tablet (20 mg total) by mouth 2 (two) times a day, Starting Tue 7/20/2021, Normal      gabapentin (NEURONTIN) 800 mg tablet Take 1 tablet (800 mg total) by mouth 3 (three) times a day, Starting Sat 5/16/2020, Sample      levonorgestrel (MIRENA) 20 MCG/24HR IUD 1 each by Intrauterine route once, Starting Fri 5/3/2019, Historical Med      !! levothyroxine 50 mcg tablet Take 1 tablet (50 mcg total) by mouth daily, Starting Tue 7/20/2021, Normal      !! levothyroxine 75 mcg tablet Take 1 tablet (75 mcg total) by mouth daily in the early morning, Starting Wed 6/23/2021, Normal      lidocaine (LMX) 4 % cream Apply topically as needed for mild pain, Starting Fri 6/11/2021, Normal lisinopril-hydrochlorothiazide (PRINZIDE,ZESTORETIC) 20-25 MG per tablet Take 1 tablet by mouth daily, Starting Tue 7/20/2021, Normal      LORazepam (ATIVAN) 1 mg tablet Take 1 tablet (1 mg total) by mouth every 8 (eight) hours as needed for anxiety, Starting Tue 6/23/2020, Sample      meloxicam (MOBIC) 15 mg tablet Take 1 tablet (15 mg total) by mouth daily, Starting Tue 6/15/2021, Normal      methocarbamol (ROBAXIN) 500 mg tablet Take 1 tablet (500 mg total) by mouth 4 (four) times a day, Starting Tue 7/20/2021, Normal      naloxone (NARCAN) 4 mg/0 1 mL nasal spray Administer 1 spray into a nostril  If no response after 2-3 minutes, give another dose in the other nostril using a new spray , Normal      nicotine (NICODERM CQ) 21 mg/24 hr TD 24 hr patch Place 1 patch on the skin every 24 hours, Starting Tue 5/11/2021, Normal      OLANZapine (ZyPREXA) 5 mg tablet TAKE 1 2 (ONE HALF) TABLET BY MOUTH TWICE DAILY, Historical Med      pantoprazole (PROTONIX) 40 mg tablet Take 1 tablet (40 mg total) by mouth daily, Starting Fri 1/15/2021, Normal      polyethylene glycol (GOLYTELY) 4000 mL solution Use as directed by office staff, Normal      traMADol (ULTRAM) 50 mg tablet Take 1 tablet (50 mg total) by mouth 2 (two) times a day With 2 extra strength tylenol, Starting Tue 7/20/2021, Normal      venlafaxine (EFFEXOR-XR) 150 mg 24 hr capsule Take 2 capsules (300 mg total) by mouth daily, Starting Fri 8/2/2019, Normal      Viibryd 40 MG tablet Take 40 mg by mouth daily, Starting Wed 6/9/2021, Historical Med      zolpidem (AMBIEN) 10 mg tablet Take 1 tablet (10 mg total) by mouth daily at bedtime as needed for sleep for up to 10 days, Starting Mon 2/3/2020, Until Tue 7/20/2021 at 2359, No Print       !! - Potential duplicate medications found  Please discuss with provider  No discharge procedures on file      PDMP Review       Value Time User    PDMP Reviewed  Yes 7/20/2021 11:11 AM Bere Huggins, [facility]          ED Provider  Electronically Signed by           Sophia Hernandez DO  07/23/21 1548

## 2021-07-23 NOTE — DISCHARGE INSTRUCTIONS
Use Motrin or Tylenol as needed for pain  I recommend soaking the finger in a mixture of peroxide and water each morning and evening for the next 5 days  Continue current antibiotics  Return to the ER for any new, concerning, worsening issues

## 2021-07-30 NOTE — PROGRESS NOTES
PT Discharge    Today's date: 2021  Patient name: Ellena Aschoff  : 1975  MRN: [id number]  Referring provider: Bj Ferraro DO  Dx:   Encounter Diagnosis     ICD-10-CM    1  Cervical radiculopathy  M54 12    2  Neck pain  M54 2    3  Spinal stenosis of lumbar region, unspecified whether neurogenic claudication present  M48 061        Start Time: 1530  Stop Time: 1620  Total time in clinic (min): 50 minutes    Assessment  Assessment details: **Update - Pt called after last attended session on 21 to notify PT her pain management appointment has been pushed back  It was discussed with pt that as pt is not experiencing any significant  lasting benefit in pain/sx with PT tx to date, she will be d/c until she can f/u with pain management  She notes I with HEP and use of home TENs unit  Pt in agreement with plan  Pt will be d/c to Hep  Azaleaevelyn Ann has been compliant with attending PT and completing home exercise program since initial eval and reports overall 45% improvement in pain/sx and function since start of care   Most notably, UE sx and n/t in her hands has resolved  Azaleaevelyn Lindsayores reports and demonstrates some improvements with increased strength, increased ROM, improved flexibility, improved postural awareness and improved overall level of function since initial eval, but is still limited compared to prior level of function  At present, she is most notably effected by ongoing stress and tension leading to increased neck and shoulder/back pain and HA; does continue to see her counselor but admits to limited time and consistency with self care and relaxation techniques  She has an appt with pain management in a few weeks  Jamal Ann continues with above listed impairments and would benefit from additional skilled PT to address these deficits to return to prior level of function  Pt has been referred to spine and pain; sees them in a few weeks   Would benefit from cont'd visits with her counselor and increased focus on relaxation and self care due to cont'd increased stress and tension and anxiety contributing to increase in physical sx  Primary movement impairment diagnosis of cervical closing dysfunction resulting in pathoanatomical symptoms of neck pain and B UE radiculopathy and limiting her ability to carry, drive, lift, perform household chores and reach overhead  Impairments include:  1) cervical hypomobility - addressing with ROM abd mobility exercises (caution hx of ACDF)  2) upper limb neural tension - addressing with neurodynamic mobs and mobility exercises   3) poor cervicothoracic movement coordination - addressing with functional retraining  4) poor scapulohumeral movement coordination - addressing with neuromotor retraining     Etiologic factors include repetitive poor body mechanics, poor ergonomics and poor scapulohumeral movement coordination  Impairments: abnormal muscle firing, abnormal muscle tone, abnormal or restricted ROM, abnormal movement, activity intolerance, impaired physical strength, pain with function and poor posture     Symptom irritability: moderateUnderstanding of Dx/Px/POC: good   Prognosis: good  Prognosis details: Positive prognostic indicators include good understanding of diagnosis and treatment plan options and absence of observed red flags  Negative prognostic indicators include chronicity of symptoms, anxiety, depression, high symptom irritability, lack of centralization with movement, multiple concurrent orthopedic problems and multiple prior failed treatments  Goals  STGs to be achieved in 2-4 weeks:  1  Pt to report reduced max pain intensity to no > 7/10 at worst in order to improve tolerance to ADLs  - not met, increased stress/tension with increased pain  2  Pt to demonstrate improved active cervical motion all planes by at least 5-10* in order to improve ability for pt to turn her head with ADLs and driving  -met   3   Pt to demonstrate independence with upright/symmetrical postural awareness with minimized forward head t/o PT exercise program without need for postural /positional cues/reminders in order to facilitate improved postural awareness and reduced c-spine strain with ADLs  - progressing     LTGs to be achieved in 10-12 weeks:  Patient will be independent with home exercise program  - progressing   Patient will be able to manage symptoms independently  - progressing   Patient will be able to turn to look over a shoulder to back out of a parking space without limitation due to pain  - progressing   Patient will be able to look up without limitation due to pain  - progressing   Patient will be able to look up to change a bulb overhead without limitation due to pain  - progressing     Plan  Planned therapy interventions: home exercise program  Treatment plan discussed with: patient        Subjective Evaluation    History of Present Illness  Mechanism of injury: Pt notes overall about 45% improvement in pain/sx since [facility]  Notes her n/t in her hands has resolved since starting PT  Feels she has better neck ROM and better postural awareness  Has been using her home TENS unit at home without incident; feels this helps her sx  Pt notes over the past several days, her neck/spine pain has been worse  No radicular sx  Increase HA with neck pain  Pt notes however she feels this is due to personal stress/tension; increase in stress and anxiety, leading to increase in neck pain and HA  No new sx/complaints overall  Feels she is "a little sore" after PT, but then "later on it feels a lot better"  Has an appt with pain management in a few weeks  Cont c/c of central and L sided neck pain > R  No pain/sx into UEs  Continues to describe tension type HA  Ongoing exacerbation of bowel incontinence; is f/u with GI specialist  No other red flags or new sx/complaints  Notes she does continue to see her counselor regularly (1x/week vs month?) for mental health  Not a recurrent problem   Quality of life: poor (increased life stress)    Pain  Current pain ratin  At best pain ratin  At worst pain ratin  Location: L /R c-spine, UT, shoulder,  chronic mid and lower back pain as well, HA  Quality: dull ache  Aggravating factors: sitting, lifting and overhead activity  Progression: improved (a little )    Social Support  Steps to enter house: yes  Stairs in house: no   Lives in: [facility]  Lives with: parents (Mother)    Employment status: not working  Hand dominance: right      Diagnostic Tests  MRI studies: abnormal (see reports in chart)  Treatments  Previous treatment: medication  Current treatment: medication and physical therapy  Patient Goals  Patient goals for therapy: decreased pain and increased motion  Patient's goals regarding treatment: reduce the n/t in the arms/hands, "feel better"        Objective     Concurrent Complaints  Positive for disturbed sleep and headaches  Negative for dizziness, faints, nausea/motion sickness, tinnitus, trouble swallowing, difficulty breathing, shortness of breath, respiratory pain, visual change, bladder dysfunction, bowel dysfunction, saddle (S4) numbness, history of cancer, history of trauma and infection    Additional Special Questions  Denies ataxia/gait change  Denies any progressive UE/LE weakness     Postural Observations  Seated posture: poor  Standing posture: fair  Correction of posture: has no consistent effect    Additional Postural Observation Details  Forward head/rounded shoulders- moderate-severe  Increased thoracic kyphosis - moderate   B scapular depression and protraction with mild winging   Increased PPT in sitting  Overall posture remains unchanged         Tenderness   Cervical Spine   No tenderness in the spinous process       Additional Tenderness Details  Diffuse ttp L > R suboccipitals, c-spine PVMs, UT, levator scapulae mms; increased mm tension/tone noted L > R UT/levator scapulae - continues   Elevated ttp centrally C4/5-C2 SPs - resolved, no ttp over SPs noted today         Neurological Testing     Sensation   Cervical/Thoracic   Left   Intact: light touch    Right   Intact: light touch    Reflexes   Left   Biceps (C5/C6): normal (2+)  Brachioradialis (C6): normal (2+)  Triceps (C7): brisk (3+)  Pedersen's reflex: positive    Right   Biceps (C5/C6): normal (2+)  Brachioradialis (C6): normal (2+)  Triceps (C7): brisk (3+)  Pedersen's reflex: positive    Additional Neurological Details  Normal-slightly brisk L biceps and brachioradialis reflex vs R  Reports reduced light touch sensation L vs R in C 5/6/7/8 dermatomes - not noted at RE   Will monitor     Active Range of Motion   Cervical/Thoracic Spine       Cervical    Flexion: 50 degrees   Extension: 40 (central neck/upper thoracic pain, no radicular sx) degrees     with pain  Left lateral flexion: 35 degrees      Right lateral flexion: 30 degrees      Left rotation: 75 degrees  Right rotation: 70 degrees           Additional Active Range of Motion Details  Pulling/pain posterior c-spine at end ranges of AROM- continues with general c/o mm tension/pulling/soreness at end ranges    Will cont to assess     Joint Play     Comments: Hx ACDF c-spine       Strength/Myotome Testing   Cervical Spine     Left   Interossei strength (t1): 4- and 4    Right   Interossei strength (t1): 4- and 4    Left Shoulder     Planes of Motion   Flexion: 4   Abduction: 4   External rotation at 0°: 4   Internal rotation at 0°: 4+     Isolated Muscles   Upper trapezius: 4+     Right Shoulder     Planes of Motion   Flexion: 4+   Abduction: 4+   External rotation at 0°: 4+   Internal rotation at 0°: 4+     Isolated Muscles   Upper trapezius: 4+     Left Elbow   Flexion: 4+  Extension: 4+    Right Elbow   Flexion: 4+  Extension: 4+    Left Wrist/Hand   Wrist extension: 4+  Wrist flexion: 4+  Thumb extension: 4    Right Wrist/Hand   Wrist extension: 4+  Wrist flexion: 4+  Thumb extension: 4    Additional Strength Details  Denies pain with MMT screening B UEs       Tests   Cervical   Positive lumbar distraction test, craniocervical flexion test and neck flexor muscle endurance test   Negative vertical compression and Lhermitte's sign  Lumbar   Negative vertical compression  Additional Tests Details  Deferred extensive testing 2* hx ACDF c-spine  No change in sx with vertical compression   Continues with motor control and endurance deficits with activation of DNF supine- improving with feedback/cues     Will cont to assess  Neuro Exam:     Headaches   Patient reports headaches: Yes         Flowsheet Rows      Most Recent Value   PT/OT G-Codes   Current Score  59   Projected Score  58             Precautions: anxiety, depression, bipolar disorder, chronic low/mid/upper back pain, Hx ACDF c-spine       Re-eval Date: 8/1    Date 7/2       Visit Count 6       FOTO NV       Pain In 8/10       Pain Out 6/10             Manuals 7/2       Trial SOR, UT stretch, levator stretch  15'   Supine  Gentle SOR c-spine, gentle STM/DTM/TFM B suboccpitial region and posterior c-spine PVMS and L UT,  UT stretch all as tolerated        Consider trial of GIASTM posterior c-spine/UTs 5'   GIASTM instrument #4 sweeping/fanning L UT and B posterior c-spine PVMs and sub occipital regions to tolerance pt seated   Pt signed consent form   No adverse reaction to tx                       Neuro Re-Ed        DNF training with stabilizer                                                         Ther Ex        UT stretch    Levator scap stretch 4x30"ea      4x30" ea       Chin tucks          Scapular depression/retraction  Supine 3x10/5" cues/feedback       Reviewed        MTPs /LTPs        B ER          DNF endurance   Attempted   Pain         Prone I, T, Ys   0#  1x10/10" with verbal and tactile cues for correct mm activation of the MT and LTs and inhibition of the UTs                               Ther Activity 10' total   Pt education on deep/abdominal breathing techniques, relaxation techniques such as meditation and stretching, use of journaling /writing to reduce stress/anxiety, etc                        Gait Training                        Modalities         MHP c-spine supine x 10' after session  With pt's home TENs unit  Skin intact pre/post tx                        7/2 - HEP was issued and reviewed this date for above noted exercises  Also provided pt education regarding postural awareness with avoidance of prolonged periods of static posture, especially with static, prolonged forward head  Pt demonstrated understanding without incident and without questions/concerns  Will continue to update upcoming

## 2021-08-03 DIAGNOSIS — I10 HYPERTENSION, UNSPECIFIED TYPE: ICD-10-CM

## 2021-08-04 ENCOUNTER — TELEPHONE (OUTPATIENT)
Dept: OTHER | Facility: OTHER | Age: 46
End: 2021-08-04

## 2021-08-04 RX ORDER — LISINOPRIL AND HYDROCHLOROTHIAZIDE 25; 20 MG/1; MG/1
TABLET ORAL
Qty: 30 TABLET | Refills: 0 | Status: SHIPPED | OUTPATIENT
Start: 2021-08-04 | End: 2021-09-09

## 2021-08-04 NOTE — TELEPHONE ENCOUNTER
Oumar Villa is calling because her Lisinopril is not at the [facility] and she only has 2 left

## 2021-08-07 DIAGNOSIS — M54.2 CERVICAL PAIN: ICD-10-CM

## 2021-08-08 RX ORDER — METHOCARBAMOL 500 MG/1
TABLET, FILM COATED ORAL
Qty: 30 TABLET | Refills: 0 | Status: SHIPPED | OUTPATIENT
Start: 2021-08-08 | End: 2021-09-14

## 2021-08-23 ENCOUNTER — APPOINTMENT (EMERGENCY)
Dept: RADIOLOGY | Facility: HOSPITAL | Age: 46
DRG: 957 | End: 2021-08-23
Payer: COMMERCIAL

## 2021-08-23 ENCOUNTER — HOSPITAL ENCOUNTER (INPATIENT)
Facility: HOSPITAL | Age: 46
LOS: 8 days | Discharge: HOME | DRG: 957 | End: 2021-08-31
Attending: SURGERY | Admitting: SURGERY
Payer: COMMERCIAL

## 2021-08-23 ENCOUNTER — APPOINTMENT (INPATIENT)
Dept: RADIOLOGY | Facility: HOSPITAL | Age: 46
DRG: 957 | End: 2021-08-23
Payer: COMMERCIAL

## 2021-08-23 ENCOUNTER — ANESTHESIA (EMERGENCY)
Dept: PERIOP | Facility: HOSPITAL | Age: 46
DRG: 957 | End: 2021-08-23
Payer: COMMERCIAL

## 2021-08-23 ENCOUNTER — ANESTHESIA EVENT (EMERGENCY)
Dept: PERIOP | Facility: HOSPITAL | Age: 46
DRG: 957 | End: 2021-08-23
Payer: COMMERCIAL

## 2021-08-23 DIAGNOSIS — T07.XXXA MULTIPLE LACERATIONS: ICD-10-CM

## 2021-08-23 DIAGNOSIS — S36.116A LIVER LACERATION, GRADE III, WITHOUT OPEN WOUND INTO CAVITY, INITIAL ENCOUNTER: Primary | ICD-10-CM

## 2021-08-23 DIAGNOSIS — S01.81XA FACIAL LACERATION, INITIAL ENCOUNTER: ICD-10-CM

## 2021-08-23 DIAGNOSIS — S81.012A KNEE LACERATION, LEFT, INITIAL ENCOUNTER: ICD-10-CM

## 2021-08-23 DIAGNOSIS — S22.41XA CLOSED FRACTURE OF MULTIPLE RIBS OF RIGHT SIDE, INITIAL ENCOUNTER: ICD-10-CM

## 2021-08-23 DIAGNOSIS — N18.32 STAGE 3B CHRONIC KIDNEY DISEASE (HCC): ICD-10-CM

## 2021-08-23 LAB
ABO GROUP BLD BPU: NORMAL
ABO GROUP BLD: NORMAL
ABO GROUP BLD: NORMAL
ALBUMIN SERPL BCP-MCNC: 3.5 G/DL (ref 3.5–5)
ALP SERPL-CCNC: 64 U/L (ref 46–116)
ALT SERPL W P-5'-P-CCNC: 103 U/L (ref 12–78)
ANION GAP SERPL CALCULATED.3IONS-SCNC: 7 MMOL/L (ref 4–13)
ANION GAP SERPL CALCULATED.3IONS-SCNC: 9 MMOL/L (ref 4–13)
AST SERPL W P-5'-P-CCNC: 115 U/L (ref 5–45)
BASE EXCESS BLDA CALC-SCNC: -12 MMOL/L
BASE EXCESS BLDA CALC-SCNC: -6 MMOL/L (ref -2–3)
BASE EXCESS BLDA CALC-SCNC: -7 MMOL/L (ref -2–3)
BASOPHILS # BLD AUTO: 0.02 THOUSANDS/ΜL (ref 0–0.1)
BASOPHILS # BLD AUTO: 0.03 THOUSANDS/ΜL (ref 0–0.1)
BASOPHILS NFR BLD AUTO: 0 % (ref 0–1)
BASOPHILS NFR BLD AUTO: 0 % (ref 0–1)
BILIRUB SERPL-MCNC: 0.33 MG/DL (ref 0.2–1)
BLD GP AB SCN SERPL QL: NEGATIVE
BODY TEMPERATURE: 96.4 DEGREES FEHRENHEIT
BPU ID: NORMAL
BUN SERPL-MCNC: 32 MG/DL (ref 5–25)
BUN SERPL-MCNC: 35 MG/DL (ref 5–25)
CA-I BLD-SCNC: 1.05 MMOL/L (ref 1.12–1.32)
CA-I BLD-SCNC: 1.06 MMOL/L (ref 1.12–1.32)
CALCIUM SERPL-MCNC: 7 MG/DL (ref 8.3–10.1)
CALCIUM SERPL-MCNC: 7.1 MG/DL (ref 8.3–10.1)
CHLORIDE SERPL-SCNC: 107 MMOL/L (ref 100–108)
CHLORIDE SERPL-SCNC: 115 MMOL/L (ref 100–108)
CO2 SERPL-SCNC: 17 MMOL/L (ref 21–32)
CO2 SERPL-SCNC: 20 MMOL/L (ref 21–32)
CREAT SERPL-MCNC: 4.93 MG/DL (ref 0.6–1.3)
CREAT SERPL-MCNC: 5.57 MG/DL (ref 0.6–1.3)
EOSINOPHIL # BLD AUTO: 0.12 THOUSAND/ΜL (ref 0–0.61)
EOSINOPHIL # BLD AUTO: 0.15 THOUSAND/ΜL (ref 0–0.61)
EOSINOPHIL NFR BLD AUTO: 1 % (ref 0–6)
EOSINOPHIL NFR BLD AUTO: 1 % (ref 0–6)
ERYTHROCYTE [DISTWIDTH] IN BLOOD BY AUTOMATED COUNT: 15.1 % (ref 11.6–15.1)
ERYTHROCYTE [DISTWIDTH] IN BLOOD BY AUTOMATED COUNT: 15.1 % (ref 11.6–15.1)
GFR SERPL CREATININE-BSD FRML MDRD: 10 ML/MIN/1.73SQ M
GFR SERPL CREATININE-BSD FRML MDRD: 8 ML/MIN/1.73SQ M
GLUCOSE SERPL-MCNC: 107 MG/DL (ref 65–140)
GLUCOSE SERPL-MCNC: 109 MG/DL (ref 65–140)
GLUCOSE SERPL-MCNC: 112 MG/DL (ref 65–140)
GLUCOSE SERPL-MCNC: 93 MG/DL (ref 65–140)
HCO3 BLDA-SCNC: 15.8 MMOL/L (ref 22–28)
HCO3 BLDA-SCNC: 19 MMOL/L (ref 22–28)
HCO3 BLDA-SCNC: 20 MMOL/L (ref 24–30)
HCT VFR BLD AUTO: 30.3 % (ref 34.8–46.1)
HCT VFR BLD AUTO: 36.6 % (ref 34.8–46.1)
HCT VFR BLD CALC: 30 % (ref 34.8–46.1)
HCT VFR BLD CALC: 32 % (ref 34.8–46.1)
HGB BLD-MCNC: 10.1 G/DL (ref 11.5–15.4)
HGB BLD-MCNC: 12.2 G/DL (ref 11.5–15.4)
HGB BLDA-MCNC: 10.2 G/DL (ref 11.5–15.4)
HGB BLDA-MCNC: 10.9 G/DL (ref 11.5–15.4)
HOLD SPECIMEN: NORMAL
IMM GRANULOCYTES # BLD AUTO: 0.04 THOUSAND/UL (ref 0–0.2)
IMM GRANULOCYTES # BLD AUTO: 0.1 THOUSAND/UL (ref 0–0.2)
IMM GRANULOCYTES NFR BLD AUTO: 0 % (ref 0–2)
IMM GRANULOCYTES NFR BLD AUTO: 1 % (ref 0–2)
INR PPP: 1.1 (ref 0.84–1.19)
LACTATE SERPL-SCNC: 0.6 MMOL/L (ref 0.5–2)
LACTATE SERPL-SCNC: 0.8 MMOL/L (ref 0.5–2)
LYMPHOCYTES # BLD AUTO: 1 THOUSANDS/ΜL (ref 0.6–4.47)
LYMPHOCYTES # BLD AUTO: 1.04 THOUSANDS/ΜL (ref 0.6–4.47)
LYMPHOCYTES NFR BLD AUTO: 8 % (ref 14–44)
LYMPHOCYTES NFR BLD AUTO: 8 % (ref 14–44)
MAGNESIUM SERPL-MCNC: 2.1 MG/DL (ref 1.6–2.6)
MCH RBC QN AUTO: 31.4 PG (ref 26.8–34.3)
MCH RBC QN AUTO: 31.9 PG (ref 26.8–34.3)
MCHC RBC AUTO-ENTMCNC: 33.3 G/DL (ref 31.4–37.4)
MCHC RBC AUTO-ENTMCNC: 33.3 G/DL (ref 31.4–37.4)
MCV RBC AUTO: 94 FL (ref 82–98)
MCV RBC AUTO: 96 FL (ref 82–98)
MONOCYTES # BLD AUTO: 0.51 THOUSAND/ΜL (ref 0.17–1.22)
MONOCYTES # BLD AUTO: 0.52 THOUSAND/ΜL (ref 0.17–1.22)
MONOCYTES NFR BLD AUTO: 4 % (ref 4–12)
MONOCYTES NFR BLD AUTO: 4 % (ref 4–12)
NASAL CANNULA: ABNORMAL
NEUTROPHILS # BLD AUTO: 10.78 THOUSANDS/ΜL (ref 1.85–7.62)
NEUTROPHILS # BLD AUTO: 10.79 THOUSANDS/ΜL (ref 1.85–7.62)
NEUTS SEG NFR BLD AUTO: 86 % (ref 43–75)
NEUTS SEG NFR BLD AUTO: 87 % (ref 43–75)
NRBC BLD AUTO-RTO: 0 /100 WBCS
NRBC BLD AUTO-RTO: 0 /100 WBCS
O2 CT BLDA-SCNC: 16.8 ML/DL (ref 16–23)
OXYHGB MFR BLDA: 92.4 % (ref 94–97)
PCO2 BLD: 20 MMOL/L (ref 21–32)
PCO2 BLD: 21 MMOL/L (ref 21–32)
PCO2 BLD: 38.2 MM HG (ref 36–44)
PCO2 BLD: 40.7 MM HG (ref 42–50)
PCO2 BLDA: 42.7 MM HG (ref 36–44)
PH BLD: 7.3 [PH] (ref 7.3–7.4)
PH BLD: 7.31 [PH] (ref 7.35–7.45)
PH BLDA: 7.19 [PH] (ref 7.35–7.45)
PHOSPHATE SERPL-MCNC: 4.1 MG/DL (ref 2.7–4.5)
PLATELET # BLD AUTO: 129 THOUSANDS/UL (ref 149–390)
PLATELET # BLD AUTO: 139 THOUSANDS/UL (ref 149–390)
PMV BLD AUTO: 11.4 FL (ref 8.9–12.7)
PMV BLD AUTO: 11.6 FL (ref 8.9–12.7)
PO2 BLD: 26 MM HG (ref 35–45)
PO2 BLD: >400 MM HG (ref 75–129)
PO2 BLDA: 78.3 MM HG (ref 75–129)
POTASSIUM BLD-SCNC: 3.7 MMOL/L (ref 3.5–5.3)
POTASSIUM BLD-SCNC: 4.3 MMOL/L (ref 3.5–5.3)
POTASSIUM SERPL-SCNC: 3.9 MMOL/L (ref 3.5–5.3)
POTASSIUM SERPL-SCNC: 4 MMOL/L (ref 3.5–5.3)
PROT SERPL-MCNC: 6.4 G/DL (ref 6.4–8.2)
PROTHROMBIN TIME: 14.2 SECONDS (ref 11.6–14.5)
RBC # BLD AUTO: 3.17 MILLION/UL (ref 3.81–5.12)
RBC # BLD AUTO: 3.88 MILLION/UL (ref 3.81–5.12)
RH BLD: POSITIVE
RH BLD: POSITIVE
SAO2 % BLD FROM PO2: 42 % (ref 60–85)
SODIUM BLD-SCNC: 140 MMOL/L (ref 136–145)
SODIUM BLD-SCNC: 143 MMOL/L (ref 136–145)
SODIUM SERPL-SCNC: 136 MMOL/L (ref 136–145)
SODIUM SERPL-SCNC: 139 MMOL/L (ref 136–145)
SPECIMEN EXPIRATION DATE: NORMAL
SPECIMEN SOURCE: ABNORMAL
UNIT DISPENSE STATUS: NORMAL
UNIT PRODUCT CODE: NORMAL
UNIT PRODUCT VOLUME: 487 ML
UNIT RH: NORMAL
WBC # BLD AUTO: 12.54 THOUSAND/UL (ref 4.31–10.16)
WBC # BLD AUTO: 12.56 THOUSAND/UL (ref 4.31–10.16)

## 2021-08-23 PROCEDURE — 37242 VASC EMBOLIZE/OCCLUDE ARTERY: CPT | Performed by: RADIOLOGY

## 2021-08-23 PROCEDURE — NC001 PR NO CHARGE: Performed by: RADIOLOGY

## 2021-08-23 PROCEDURE — 36247 INS CATH ABD/L-EXT ART 3RD: CPT | Performed by: RADIOLOGY

## 2021-08-23 PROCEDURE — 90715 TDAP VACCINE 7 YRS/> IM: CPT | Performed by: EMERGENCY MEDICINE

## 2021-08-23 PROCEDURE — 86920 COMPATIBILITY TEST SPIN: CPT

## 2021-08-23 PROCEDURE — 76937 US GUIDE VASCULAR ACCESS: CPT

## 2021-08-23 PROCEDURE — C1874 STENT, COATED/COV W/DEL SYS: HCPCS

## 2021-08-23 PROCEDURE — 85610 PROTHROMBIN TIME: CPT | Performed by: SURGERY

## 2021-08-23 PROCEDURE — 99292 CRITICAL CARE ADDL 30 MIN: CPT | Performed by: SURGERY

## 2021-08-23 PROCEDURE — 80048 BASIC METABOLIC PNL TOTAL CA: CPT | Performed by: SURGERY

## 2021-08-23 PROCEDURE — 85384 FIBRINOGEN ACTIVITY: CPT | Performed by: SURGERY

## 2021-08-23 PROCEDURE — 85397 CLOTTING FUNCT ACTIVITY: CPT | Performed by: SURGERY

## 2021-08-23 PROCEDURE — 36248 INS CATH ABD/L-EXT ART ADDL: CPT | Performed by: RADIOLOGY

## 2021-08-23 PROCEDURE — 85347 COAGULATION TIME ACTIVATED: CPT | Performed by: SURGERY

## 2021-08-23 PROCEDURE — 74177 CT ABD & PELVIS W/CONTRAST: CPT

## 2021-08-23 PROCEDURE — NC001 PR NO CHARGE: Performed by: EMERGENCY MEDICINE

## 2021-08-23 PROCEDURE — 71260 CT THORAX DX C+: CPT

## 2021-08-23 PROCEDURE — 82803 BLOOD GASES ANY COMBINATION: CPT

## 2021-08-23 PROCEDURE — NC001 PR NO CHARGE: Performed by: SURGERY

## 2021-08-23 PROCEDURE — 76937 US GUIDE VASCULAR ACCESS: CPT | Performed by: RADIOLOGY

## 2021-08-23 PROCEDURE — 99291 CRITICAL CARE FIRST HOUR: CPT | Performed by: SURGERY

## 2021-08-23 PROCEDURE — 84100 ASSAY OF PHOSPHORUS: CPT | Performed by: SURGERY

## 2021-08-23 PROCEDURE — 82330 ASSAY OF CALCIUM: CPT | Performed by: SURGERY

## 2021-08-23 PROCEDURE — 85576 BLOOD PLATELET AGGREGATION: CPT | Performed by: SURGERY

## 2021-08-23 PROCEDURE — 73560 X-RAY EXAM OF KNEE 1 OR 2: CPT

## 2021-08-23 PROCEDURE — 84295 ASSAY OF SERUM SODIUM: CPT

## 2021-08-23 PROCEDURE — 82330 ASSAY OF CALCIUM: CPT

## 2021-08-23 PROCEDURE — 75726 ARTERY X-RAYS ABDOMEN: CPT | Performed by: RADIOLOGY

## 2021-08-23 PROCEDURE — 85025 COMPLETE CBC W/AUTO DIFF WBC: CPT | Performed by: SURGERY

## 2021-08-23 PROCEDURE — 36415 COLL VENOUS BLD VENIPUNCTURE: CPT

## 2021-08-23 PROCEDURE — 86850 RBC ANTIBODY SCREEN: CPT | Performed by: SURGERY

## 2021-08-23 PROCEDURE — P9016 RBC LEUKOCYTES REDUCED: HCPCS

## 2021-08-23 PROCEDURE — 04L33DZ OCCLUSION OF HEPATIC ARTERY WITH INTRALUMINAL DEVICE, PERCUTANEOUS APPROACH: ICD-10-PCS | Performed by: RADIOLOGY

## 2021-08-23 PROCEDURE — 82805 BLOOD GASES W/O2 SATURATION: CPT | Performed by: SURGERY

## 2021-08-23 PROCEDURE — 80053 COMPREHEN METABOLIC PANEL: CPT | Performed by: SURGERY

## 2021-08-23 PROCEDURE — 76705 ECHO EXAM OF ABDOMEN: CPT | Performed by: SURGERY

## 2021-08-23 PROCEDURE — 83735 ASSAY OF MAGNESIUM: CPT | Performed by: SURGERY

## 2021-08-23 PROCEDURE — C1760 CLOSURE DEV, VASC: HCPCS | Performed by: RADIOLOGY

## 2021-08-23 PROCEDURE — 72125 CT NECK SPINE W/O DYE: CPT

## 2021-08-23 PROCEDURE — C1894 INTRO/SHEATH, NON-LASER: HCPCS | Performed by: RADIOLOGY

## 2021-08-23 PROCEDURE — 83605 ASSAY OF LACTIC ACID: CPT | Performed by: SURGERY

## 2021-08-23 PROCEDURE — 86900 BLOOD TYPING SEROLOGIC ABO: CPT | Performed by: SURGERY

## 2021-08-23 PROCEDURE — 82947 ASSAY GLUCOSE BLOOD QUANT: CPT

## 2021-08-23 PROCEDURE — 99285 EMERGENCY DEPT VISIT HI MDM: CPT

## 2021-08-23 PROCEDURE — 85014 HEMATOCRIT: CPT

## 2021-08-23 PROCEDURE — 84132 ASSAY OF SERUM POTASSIUM: CPT

## 2021-08-23 PROCEDURE — 70450 CT HEAD/BRAIN W/O DYE: CPT

## 2021-08-23 PROCEDURE — 93308 TTE F-UP OR LMTD: CPT | Performed by: SURGERY

## 2021-08-23 PROCEDURE — C1769 GUIDE WIRE: HCPCS | Performed by: RADIOLOGY

## 2021-08-23 PROCEDURE — 75774 ARTERY X-RAY EACH VESSEL: CPT | Performed by: RADIOLOGY

## 2021-08-23 PROCEDURE — 86901 BLOOD TYPING SEROLOGIC RH(D): CPT | Performed by: SURGERY

## 2021-08-23 RX ORDER — EPHEDRINE SULFATE 50 MG/ML
INJECTION INTRAVENOUS AS NEEDED
Status: DISCONTINUED | OUTPATIENT
Start: 2021-08-23 | End: 2021-08-23

## 2021-08-23 RX ORDER — ACETAMINOPHEN 325 MG/1
975 TABLET ORAL EVERY 6 HOURS PRN
Status: DISCONTINUED | OUTPATIENT
Start: 2021-08-23 | End: 2021-08-31 | Stop reason: HOSPADM

## 2021-08-23 RX ORDER — MIDAZOLAM HYDROCHLORIDE 2 MG/2ML
INJECTION, SOLUTION INTRAMUSCULAR; INTRAVENOUS AS NEEDED
Status: DISCONTINUED | OUTPATIENT
Start: 2021-08-23 | End: 2021-08-23

## 2021-08-23 RX ORDER — PROPOFOL 10 MG/ML
INJECTION, EMULSION INTRAVENOUS AS NEEDED
Status: DISCONTINUED | OUTPATIENT
Start: 2021-08-23 | End: 2021-08-23

## 2021-08-23 RX ORDER — VENLAFAXINE HYDROCHLORIDE 150 MG/1
300 CAPSULE, EXTENDED RELEASE ORAL DAILY
Status: DISCONTINUED | OUTPATIENT
Start: 2021-08-24 | End: 2021-08-24

## 2021-08-23 RX ORDER — LEVOTHYROXINE SODIUM 0.05 MG/1
50 TABLET ORAL
Status: DISCONTINUED | OUTPATIENT
Start: 2021-08-24 | End: 2021-08-31 | Stop reason: HOSPADM

## 2021-08-23 RX ORDER — PANTOPRAZOLE SODIUM 40 MG/1
40 TABLET, DELAYED RELEASE ORAL
Status: DISCONTINUED | OUTPATIENT
Start: 2021-08-24 | End: 2021-08-24

## 2021-08-23 RX ORDER — OLANZAPINE 2.5 MG/1
2.5 TABLET ORAL 2 TIMES DAILY
Status: DISCONTINUED | OUTPATIENT
Start: 2021-08-23 | End: 2021-08-31 | Stop reason: HOSPADM

## 2021-08-23 RX ORDER — ALBUMIN, HUMAN INJ 5% 5 %
SOLUTION INTRAVENOUS CONTINUOUS PRN
Status: DISCONTINUED | OUTPATIENT
Start: 2021-08-23 | End: 2021-08-23

## 2021-08-23 RX ORDER — CEFAZOLIN SODIUM 2 G/50ML
SOLUTION INTRAVENOUS AS NEEDED
Status: DISCONTINUED | OUTPATIENT
Start: 2021-08-23 | End: 2021-08-23

## 2021-08-23 RX ORDER — OXYCODONE HYDROCHLORIDE 5 MG/1
10 TABLET ORAL EVERY 4 HOURS PRN
Status: DISCONTINUED | OUTPATIENT
Start: 2021-08-23 | End: 2021-08-24

## 2021-08-23 RX ORDER — LORAZEPAM 1 MG/1
1 TABLET ORAL EVERY 8 HOURS PRN
Status: DISCONTINUED | OUTPATIENT
Start: 2021-08-23 | End: 2021-08-31 | Stop reason: HOSPADM

## 2021-08-23 RX ORDER — VILAZODONE HYDROCHLORIDE 40 MG/1
40 TABLET ORAL
Status: DISCONTINUED | OUTPATIENT
Start: 2021-08-24 | End: 2021-08-31 | Stop reason: HOSPADM

## 2021-08-23 RX ORDER — LANOLIN ALCOHOL/MO/W.PET/CERES
6 CREAM (GRAM) TOPICAL
Status: DISCONTINUED | OUTPATIENT
Start: 2021-08-23 | End: 2021-08-31 | Stop reason: HOSPADM

## 2021-08-23 RX ORDER — SUCCINYLCHOLINE/SOD CL,ISO/PF 100 MG/5ML
SYRINGE (ML) INTRAVENOUS AS NEEDED
Status: DISCONTINUED | OUTPATIENT
Start: 2021-08-23 | End: 2021-08-23

## 2021-08-23 RX ORDER — GABAPENTIN 400 MG/1
800 CAPSULE ORAL 3 TIMES DAILY
Status: DISCONTINUED | OUTPATIENT
Start: 2021-08-23 | End: 2021-08-24

## 2021-08-23 RX ORDER — ONDANSETRON 2 MG/ML
INJECTION INTRAMUSCULAR; INTRAVENOUS AS NEEDED
Status: DISCONTINUED | OUTPATIENT
Start: 2021-08-23 | End: 2021-08-23

## 2021-08-23 RX ORDER — LIDOCAINE 50 MG/G
2 PATCH TOPICAL DAILY
Status: DISCONTINUED | OUTPATIENT
Start: 2021-08-24 | End: 2021-08-31 | Stop reason: HOSPADM

## 2021-08-23 RX ORDER — LIDOCAINE HYDROCHLORIDE 10 MG/ML
INJECTION, SOLUTION EPIDURAL; INFILTRATION; INTRACAUDAL; PERINEURAL AS NEEDED
Status: DISCONTINUED | OUTPATIENT
Start: 2021-08-23 | End: 2021-08-23 | Stop reason: HOSPADM

## 2021-08-23 RX ORDER — SODIUM CHLORIDE, SODIUM GLUCONATE, SODIUM ACETATE, POTASSIUM CHLORIDE, MAGNESIUM CHLORIDE, SODIUM PHOSPHATE, DIBASIC, AND POTASSIUM PHOSPHATE .53; .5; .37; .037; .03; .012; .00082 G/100ML; G/100ML; G/100ML; G/100ML; G/100ML; G/100ML; G/100ML
1000 INJECTION, SOLUTION INTRAVENOUS ONCE
Status: COMPLETED | OUTPATIENT
Start: 2021-08-23 | End: 2021-08-24

## 2021-08-23 RX ORDER — ROCURONIUM BROMIDE 10 MG/ML
INJECTION, SOLUTION INTRAVENOUS AS NEEDED
Status: DISCONTINUED | OUTPATIENT
Start: 2021-08-23 | End: 2021-08-23

## 2021-08-23 RX ORDER — CALCIUM GLUCONATE 20 MG/ML
2 INJECTION, SOLUTION INTRAVENOUS ONCE
Status: COMPLETED | OUTPATIENT
Start: 2021-08-23 | End: 2021-08-24

## 2021-08-23 RX ORDER — POLYETHYLENE GLYCOL 3350 17 G/17G
17 POWDER, FOR SOLUTION ORAL 2 TIMES DAILY PRN
Status: DISCONTINUED | OUTPATIENT
Start: 2021-08-23 | End: 2021-08-29

## 2021-08-23 RX ORDER — LIDOCAINE HYDROCHLORIDE 10 MG/ML
20 INJECTION, SOLUTION EPIDURAL; INFILTRATION; INTRACAUDAL; PERINEURAL ONCE
Status: COMPLETED | OUTPATIENT
Start: 2021-08-23 | End: 2021-08-23

## 2021-08-23 RX ORDER — AMOXICILLIN 250 MG
1 CAPSULE ORAL
Status: DISCONTINUED | OUTPATIENT
Start: 2021-08-23 | End: 2021-08-31 | Stop reason: HOSPADM

## 2021-08-23 RX ORDER — ZOLPIDEM TARTRATE 5 MG/1
10 TABLET ORAL
Status: DISCONTINUED | OUTPATIENT
Start: 2021-08-23 | End: 2021-08-24

## 2021-08-23 RX ORDER — SODIUM CHLORIDE 9 MG/ML
125 INJECTION, SOLUTION INTRAVENOUS CONTINUOUS
Status: DISCONTINUED | OUTPATIENT
Start: 2021-08-23 | End: 2021-08-24

## 2021-08-23 RX ORDER — SODIUM CHLORIDE 9 MG/ML
INJECTION, SOLUTION INTRAVENOUS CONTINUOUS PRN
Status: DISCONTINUED | OUTPATIENT
Start: 2021-08-23 | End: 2021-08-23

## 2021-08-23 RX ORDER — FENTANYL CITRATE 50 UG/ML
INJECTION, SOLUTION INTRAMUSCULAR; INTRAVENOUS AS NEEDED
Status: DISCONTINUED | OUTPATIENT
Start: 2021-08-23 | End: 2021-08-23

## 2021-08-23 RX ORDER — LIDOCAINE HYDROCHLORIDE AND EPINEPHRINE 10; 10 MG/ML; UG/ML
20 INJECTION, SOLUTION INFILTRATION; PERINEURAL ONCE AS NEEDED
Status: COMPLETED | OUTPATIENT
Start: 2021-08-23 | End: 2021-08-23

## 2021-08-23 RX ORDER — OXYCODONE HYDROCHLORIDE 5 MG/1
5 TABLET ORAL EVERY 4 HOURS PRN
Status: DISCONTINUED | OUTPATIENT
Start: 2021-08-23 | End: 2021-08-24

## 2021-08-23 RX ORDER — HYDROMORPHONE HCL/PF 1 MG/ML
0.5 SYRINGE (ML) INJECTION
Status: DISCONTINUED | OUTPATIENT
Start: 2021-08-23 | End: 2021-08-24

## 2021-08-23 RX ORDER — BUPROPION HYDROCHLORIDE 150 MG/1
300 TABLET ORAL DAILY
Status: DISCONTINUED | OUTPATIENT
Start: 2021-08-24 | End: 2021-08-24

## 2021-08-23 RX ORDER — METHOCARBAMOL 500 MG/1
500 TABLET, FILM COATED ORAL EVERY 6 HOURS SCHEDULED
Status: DISCONTINUED | OUTPATIENT
Start: 2021-08-24 | End: 2021-08-31 | Stop reason: HOSPADM

## 2021-08-23 RX ADMIN — PROPOFOL 150 MG: 10 INJECTION, EMULSION INTRAVENOUS at 21:03

## 2021-08-23 RX ADMIN — MELATONIN 6 MG: at 22:49

## 2021-08-23 RX ADMIN — MIDAZOLAM 2 MG: 1 INJECTION INTRAMUSCULAR; INTRAVENOUS at 20:57

## 2021-08-23 RX ADMIN — EPHEDRINE SULFATE 5 MG: 50 INJECTION, SOLUTION INTRAVENOUS at 21:42

## 2021-08-23 RX ADMIN — TETANUS TOXOID, REDUCED DIPHTHERIA TOXOID AND ACELLULAR PERTUSSIS VACCINE, ADSORBED 0.5 ML: 5; 2.5; 8; 8; 2.5 SUSPENSION INTRAMUSCULAR at 23:54

## 2021-08-23 RX ADMIN — METHOCARBAMOL 500 MG: 500 TABLET, FILM COATED ORAL at 23:11

## 2021-08-23 RX ADMIN — SODIUM CHLORIDE, SODIUM GLUCONATE, SODIUM ACETATE, POTASSIUM CHLORIDE, MAGNESIUM CHLORIDE, SODIUM PHOSPHATE, DIBASIC, AND POTASSIUM PHOSPHATE 1000 ML: .53; .5; .37; .037; .03; .012; .00082 INJECTION, SOLUTION INTRAVENOUS at 23:11

## 2021-08-23 RX ADMIN — FENTANYL CITRATE 25 MCG: 50 INJECTION INTRAMUSCULAR; INTRAVENOUS at 21:57

## 2021-08-23 RX ADMIN — SODIUM CHLORIDE: 0.9 INJECTION, SOLUTION INTRAVENOUS at 21:34

## 2021-08-23 RX ADMIN — ACETAMINOPHEN 975 MG: 325 TABLET, FILM COATED ORAL at 23:11

## 2021-08-23 RX ADMIN — Medication 100 MG: at 21:03

## 2021-08-23 RX ADMIN — LIDOCAINE HYDROCHLORIDE 20 ML: 10 INJECTION, SOLUTION EPIDURAL; INFILTRATION; INTRACAUDAL; PERINEURAL at 23:52

## 2021-08-23 RX ADMIN — CEFAZOLIN SODIUM 2000 MG: 2 SOLUTION INTRAVENOUS at 21:09

## 2021-08-23 RX ADMIN — ALBUMIN (HUMAN): 12.5 INJECTION, SOLUTION INTRAVENOUS at 21:30

## 2021-08-23 RX ADMIN — OXYCODONE HYDROCHLORIDE 10 MG: 5 TABLET ORAL at 22:38

## 2021-08-23 RX ADMIN — FENTANYL CITRATE 25 MCG: 50 INJECTION INTRAMUSCULAR; INTRAVENOUS at 22:01

## 2021-08-23 RX ADMIN — ALBUMIN (HUMAN): 12.5 INJECTION, SOLUTION INTRAVENOUS at 21:13

## 2021-08-23 RX ADMIN — IOHEXOL 100 ML: 350 INJECTION, SOLUTION INTRAVENOUS at 20:19

## 2021-08-23 RX ADMIN — SENNOSIDES AND DOCUSATE SODIUM 1 TABLET: 8.6; 5 TABLET ORAL at 22:48

## 2021-08-23 RX ADMIN — SODIUM CHLORIDE 75 ML/HR: 0.9 INJECTION, SOLUTION INTRAVENOUS at 23:00

## 2021-08-23 RX ADMIN — SUGAMMADEX 254 MG: 100 INJECTION, SOLUTION INTRAVENOUS at 21:52

## 2021-08-23 RX ADMIN — SODIUM CHLORIDE: 0.9 INJECTION, SOLUTION INTRAVENOUS at 21:02

## 2021-08-23 RX ADMIN — ROCURONIUM BROMIDE 50 MG: 50 INJECTION, SOLUTION INTRAVENOUS at 21:16

## 2021-08-23 RX ADMIN — FENTANYL CITRATE 50 MCG: 50 INJECTION INTRAMUSCULAR; INTRAVENOUS at 21:03

## 2021-08-23 RX ADMIN — OLANZAPINE 2.5 MG: 2.5 TABLET, FILM COATED ORAL at 23:53

## 2021-08-23 RX ADMIN — LIDOCAINE HYDROCHLORIDE,EPINEPHRINE BITARTRATE 20 ML: 10; .01 INJECTION, SOLUTION INFILTRATION; PERINEURAL at 23:52

## 2021-08-23 RX ADMIN — ONDANSETRON 4 MG: 2 INJECTION INTRAMUSCULAR; INTRAVENOUS at 21:54

## 2021-08-23 NOTE — LETTER
[address] [facility] [address]  [address] [address]  Dept: [phone]    August 31, 2021     Patient: Jaqueline Britton   YOB: 1975   Date of Visit: 8/23/2021       To Whom it May Concern:    Jaqueline Britton is under my professional care  She was seen in the hospital from 8/23/2021   to 08/31/21  She may return to work on 9/14/2021 without limitations  If you have any questions or concerns, please don't hesitate to call           Sincerely,          FIONA Jacob

## 2021-08-24 ENCOUNTER — APPOINTMENT (INPATIENT)
Dept: RADIOLOGY | Facility: HOSPITAL | Age: 46
DRG: 957 | End: 2021-08-24
Payer: COMMERCIAL

## 2021-08-24 PROBLEM — S01.81XA FACE LACERATIONS: Status: ACTIVE | Noted: 2021-08-24

## 2021-08-24 PROBLEM — N18.30 CKD (CHRONIC KIDNEY DISEASE) STAGE 3, GFR 30-59 ML/MIN (HCC): Status: ACTIVE | Noted: 2021-08-24

## 2021-08-24 LAB
ABO GROUP BLD BPU: NORMAL
ABO GROUP BLD BPU: NORMAL
ALBUMIN SERPL BCP-MCNC: 3.3 G/DL (ref 3.5–5)
ALP SERPL-CCNC: 62 U/L (ref 46–116)
ALT SERPL W P-5'-P-CCNC: 90 U/L (ref 12–78)
ANION GAP SERPL CALCULATED.3IONS-SCNC: 6 MMOL/L (ref 4–13)
ANION GAP SERPL CALCULATED.3IONS-SCNC: 7 MMOL/L (ref 4–13)
AST SERPL W P-5'-P-CCNC: 101 U/L (ref 5–45)
BASE EXCESS BLDA CALC-SCNC: -10 MMOL/L
BASE EXCESS BLDA CALC-SCNC: -5.2 MMOL/L
BASE EXCESS BLDA CALC-SCNC: -8.2 MMOL/L
BASOPHILS # BLD AUTO: 0.04 THOUSANDS/ΜL (ref 0–0.1)
BASOPHILS NFR BLD AUTO: 1 % (ref 0–1)
BILIRUB SERPL-MCNC: 0.39 MG/DL (ref 0.2–1)
BPU ID: NORMAL
BPU ID: NORMAL
BUN SERPL-MCNC: 25 MG/DL (ref 5–25)
BUN SERPL-MCNC: 28 MG/DL (ref 5–25)
CA-I BLD-SCNC: 1.07 MMOL/L (ref 1.12–1.32)
CALCIUM ALBUM COR SERPL-MCNC: 8.1 MG/DL (ref 8.3–10.1)
CALCIUM SERPL-MCNC: 7.5 MG/DL (ref 8.3–10.1)
CALCIUM SERPL-MCNC: 8.4 MG/DL (ref 8.3–10.1)
CFFMA (FUNCTIONAL FIBRINOGEN MAX AMPLITUDE): 19 MM (ref 15–32)
CHLORIDE SERPL-SCNC: 114 MMOL/L (ref 100–108)
CHLORIDE SERPL-SCNC: 114 MMOL/L (ref 100–108)
CKLY30: 0.9 % (ref 0–2.6)
CKR(REACTION TIME): 6.1 MIN (ref 4.6–9.1)
CO2 SERPL-SCNC: 20 MMOL/L (ref 21–32)
CO2 SERPL-SCNC: 22 MMOL/L (ref 21–32)
CORTIS SERPL-MCNC: 7.6 UG/DL
CREAT SERPL-MCNC: 3.86 MG/DL (ref 0.6–1.3)
CREAT SERPL-MCNC: 4.43 MG/DL (ref 0.6–1.3)
CROSSMATCH: NORMAL
CROSSMATCH: NORMAL
CRTMA(RAPIDTEG MAX AMPLITUDE): 59 MM (ref 52–70)
EOSINOPHIL # BLD AUTO: 0.08 THOUSAND/ΜL (ref 0–0.61)
EOSINOPHIL NFR BLD AUTO: 1 % (ref 0–6)
ERYTHROCYTE [DISTWIDTH] IN BLOOD BY AUTOMATED COUNT: 16.3 % (ref 11.6–15.1)
GFR SERPL CREATININE-BSD FRML MDRD: 11 ML/MIN/1.73SQ M
GFR SERPL CREATININE-BSD FRML MDRD: 13 ML/MIN/1.73SQ M
GLUCOSE SERPL-MCNC: 102 MG/DL (ref 65–140)
GLUCOSE SERPL-MCNC: 74 MG/DL (ref 65–140)
HCO3 BLDA-SCNC: 17.3 MMOL/L (ref 22–28)
HCO3 BLDA-SCNC: 19.5 MMOL/L (ref 22–28)
HCO3 BLDA-SCNC: 20.9 MMOL/L (ref 22–28)
HCT VFR BLD AUTO: 31.6 % (ref 34.8–46.1)
HCT VFR BLD AUTO: 35.7 % (ref 34.8–46.1)
HCT VFR BLD AUTO: 35.8 % (ref 34.8–46.1)
HGB BLD-MCNC: 10.7 G/DL (ref 11.5–15.4)
HGB BLD-MCNC: 12.1 G/DL (ref 11.5–15.4)
IMM GRANULOCYTES # BLD AUTO: 0.04 THOUSAND/UL (ref 0–0.2)
IMM GRANULOCYTES NFR BLD AUTO: 1 % (ref 0–2)
INR PPP: 1.05 (ref 0.84–1.19)
LYMPHOCYTES # BLD AUTO: 1.52 THOUSANDS/ΜL (ref 0.6–4.47)
LYMPHOCYTES NFR BLD AUTO: 19 % (ref 14–44)
MAGNESIUM SERPL-MCNC: 2.1 MG/DL (ref 1.6–2.6)
MCH RBC QN AUTO: 31.8 PG (ref 26.8–34.3)
MCHC RBC AUTO-ENTMCNC: 33.8 G/DL (ref 31.4–37.4)
MCV RBC AUTO: 94 FL (ref 82–98)
MONOCYTES # BLD AUTO: 0.31 THOUSAND/ΜL (ref 0.17–1.22)
MONOCYTES NFR BLD AUTO: 4 % (ref 4–12)
NASAL CANNULA: 3
NEUTROPHILS # BLD AUTO: 6.14 THOUSANDS/ΜL (ref 1.85–7.62)
NEUTS SEG NFR BLD AUTO: 74 % (ref 43–75)
NRBC BLD AUTO-RTO: 0 /100 WBCS
O2 CT BLDA-SCNC: 15.7 ML/DL (ref 16–23)
O2 CT BLDA-SCNC: 17 ML/DL (ref 16–23)
O2 CT BLDA-SCNC: 17.8 ML/DL (ref 16–23)
OXYHGB MFR BLDA: 93.6 % (ref 94–97)
OXYHGB MFR BLDA: 96.1 % (ref 94–97)
OXYHGB MFR BLDA: 97.3 % (ref 94–97)
PCO2 BLDA: 42.8 MM HG (ref 36–44)
PCO2 BLDA: 43.6 MM HG (ref 36–44)
PCO2 BLDA: 48.7 MM HG (ref 36–44)
PH BLDA: 7.22 [PH] (ref 7.35–7.45)
PH BLDA: 7.22 [PH] (ref 7.35–7.45)
PH BLDA: 7.31 [PH] (ref 7.35–7.45)
PHOSPHATE SERPL-MCNC: 4.6 MG/DL (ref 2.7–4.5)
PLATELET # BLD AUTO: 128 THOUSANDS/UL (ref 149–390)
PMV BLD AUTO: 11.6 FL (ref 8.9–12.7)
PO2 BLDA: 113.9 MM HG (ref 75–129)
PO2 BLDA: 116.3 MM HG (ref 75–129)
PO2 BLDA: 77.2 MM HG (ref 75–129)
POTASSIUM SERPL-SCNC: 3.7 MMOL/L (ref 3.5–5.3)
POTASSIUM SERPL-SCNC: 4.1 MMOL/L (ref 3.5–5.3)
PROT SERPL-MCNC: 6.1 G/DL (ref 6.4–8.2)
PROTHROMBIN TIME: 13.7 SECONDS (ref 11.6–14.5)
RBC # BLD AUTO: 3.81 MILLION/UL (ref 3.81–5.12)
SODIUM SERPL-SCNC: 141 MMOL/L (ref 136–145)
SODIUM SERPL-SCNC: 142 MMOL/L (ref 136–145)
SPECIMEN SOURCE: ABNORMAL
SPECIMEN SOURCE: ABNORMAL
TSH SERPL DL<=0.05 MIU/L-ACNC: 1.27 UIU/ML (ref 0.36–3.74)
UNIT DISPENSE STATUS: NORMAL
UNIT DISPENSE STATUS: NORMAL
UNIT PRODUCT CODE: NORMAL
UNIT PRODUCT CODE: NORMAL
UNIT PRODUCT VOLUME: 350 ML
UNIT PRODUCT VOLUME: 350 ML
UNIT RH: NORMAL
UNIT RH: NORMAL
WBC # BLD AUTO: 8.13 THOUSAND/UL (ref 4.31–10.16)

## 2021-08-24 PROCEDURE — NC001 PR NO CHARGE: Performed by: SURGERY

## 2021-08-24 PROCEDURE — 99232 SBSQ HOSP IP/OBS MODERATE 35: CPT | Performed by: NURSE PRACTITIONER

## 2021-08-24 PROCEDURE — 73552 X-RAY EXAM OF FEMUR 2/>: CPT

## 2021-08-24 PROCEDURE — 82948 REAGENT STRIP/BLOOD GLUCOSE: CPT

## 2021-08-24 PROCEDURE — 82533 TOTAL CORTISOL: CPT | Performed by: REGISTERED NURSE

## 2021-08-24 PROCEDURE — 94760 N-INVAS EAR/PLS OXIMETRY 1: CPT | Performed by: SOCIAL WORKER

## 2021-08-24 PROCEDURE — 85018 HEMOGLOBIN: CPT | Performed by: SURGERY

## 2021-08-24 PROCEDURE — 0HQ1XZZ REPAIR FACE SKIN, EXTERNAL APPROACH: ICD-10-PCS | Performed by: SURGERY

## 2021-08-24 PROCEDURE — 0HQLXZZ REPAIR LEFT LOWER LEG SKIN, EXTERNAL APPROACH: ICD-10-PCS | Performed by: SURGERY

## 2021-08-24 PROCEDURE — 82805 BLOOD GASES W/O2 SATURATION: CPT | Performed by: PHYSICIAN ASSISTANT

## 2021-08-24 PROCEDURE — 12002 RPR S/N/AX/GEN/TRNK2.6-7.5CM: CPT | Performed by: SURGERY

## 2021-08-24 PROCEDURE — 99221 1ST HOSP IP/OBS SF/LOW 40: CPT | Performed by: INTERNAL MEDICINE

## 2021-08-24 PROCEDURE — 85014 HEMATOCRIT: CPT | Performed by: SURGERY

## 2021-08-24 PROCEDURE — 80048 BASIC METABOLIC PNL TOTAL CA: CPT | Performed by: PHYSICIAN ASSISTANT

## 2021-08-24 PROCEDURE — 84100 ASSAY OF PHOSPHORUS: CPT | Performed by: REGISTERED NURSE

## 2021-08-24 PROCEDURE — 83735 ASSAY OF MAGNESIUM: CPT | Performed by: REGISTERED NURSE

## 2021-08-24 PROCEDURE — 12051 INTMD RPR FACE/MM 2.5 CM/<: CPT | Performed by: SURGERY

## 2021-08-24 PROCEDURE — 85025 COMPLETE CBC W/AUTO DIFF WBC: CPT | Performed by: REGISTERED NURSE

## 2021-08-24 PROCEDURE — 82805 BLOOD GASES W/O2 SATURATION: CPT | Performed by: SURGERY

## 2021-08-24 PROCEDURE — 80053 COMPREHEN METABOLIC PANEL: CPT | Performed by: REGISTERED NURSE

## 2021-08-24 PROCEDURE — 85018 HEMOGLOBIN: CPT | Performed by: PHYSICIAN ASSISTANT

## 2021-08-24 PROCEDURE — 82330 ASSAY OF CALCIUM: CPT | Performed by: REGISTERED NURSE

## 2021-08-24 PROCEDURE — 84443 ASSAY THYROID STIM HORMONE: CPT | Performed by: REGISTERED NURSE

## 2021-08-24 PROCEDURE — 99291 CRITICAL CARE FIRST HOUR: CPT | Performed by: SURGERY

## 2021-08-24 PROCEDURE — 82805 BLOOD GASES W/O2 SATURATION: CPT | Performed by: REGISTERED NURSE

## 2021-08-24 PROCEDURE — 85610 PROTHROMBIN TIME: CPT | Performed by: REGISTERED NURSE

## 2021-08-24 RX ORDER — SODIUM CHLORIDE, SODIUM GLUCONATE, SODIUM ACETATE, POTASSIUM CHLORIDE, MAGNESIUM CHLORIDE, SODIUM PHOSPHATE, DIBASIC, AND POTASSIUM PHOSPHATE .53; .5; .37; .037; .03; .012; .00082 G/100ML; G/100ML; G/100ML; G/100ML; G/100ML; G/100ML; G/100ML
500 INJECTION, SOLUTION INTRAVENOUS ONCE
Status: COMPLETED | OUTPATIENT
Start: 2021-08-24 | End: 2021-08-24

## 2021-08-24 RX ORDER — CALCIUM GLUCONATE 20 MG/ML
2 INJECTION, SOLUTION INTRAVENOUS ONCE
Status: COMPLETED | OUTPATIENT
Start: 2021-08-24 | End: 2021-08-24

## 2021-08-24 RX ORDER — HYDRALAZINE HYDROCHLORIDE 20 MG/ML
10 INJECTION INTRAMUSCULAR; INTRAVENOUS EVERY 6 HOURS PRN
Status: DISCONTINUED | OUTPATIENT
Start: 2021-08-24 | End: 2021-08-31 | Stop reason: HOSPADM

## 2021-08-24 RX ORDER — POTASSIUM CHLORIDE 20 MEQ/1
20 TABLET, EXTENDED RELEASE ORAL ONCE
Status: COMPLETED | OUTPATIENT
Start: 2021-08-24 | End: 2021-08-24

## 2021-08-24 RX ORDER — OXYCODONE HYDROCHLORIDE 5 MG/1
5 TABLET ORAL EVERY 4 HOURS PRN
Status: DISCONTINUED | OUTPATIENT
Start: 2021-08-24 | End: 2021-08-31 | Stop reason: HOSPADM

## 2021-08-24 RX ORDER — FAMOTIDINE 20 MG/1
10 TABLET, FILM COATED ORAL DAILY
Status: DISCONTINUED | OUTPATIENT
Start: 2021-08-25 | End: 2021-08-31 | Stop reason: HOSPADM

## 2021-08-24 RX ORDER — GINSENG 100 MG
1 CAPSULE ORAL ONCE
Status: COMPLETED | OUTPATIENT
Start: 2021-08-24 | End: 2021-08-24

## 2021-08-24 RX ORDER — OXYCODONE HYDROCHLORIDE 5 MG/1
2.5 TABLET ORAL EVERY 4 HOURS PRN
Status: DISCONTINUED | OUTPATIENT
Start: 2021-08-24 | End: 2021-08-31 | Stop reason: HOSPADM

## 2021-08-24 RX ORDER — SODIUM CHLORIDE, SODIUM GLUCONATE, SODIUM ACETATE, POTASSIUM CHLORIDE, MAGNESIUM CHLORIDE, SODIUM PHOSPHATE, DIBASIC, AND POTASSIUM PHOSPHATE .53; .5; .37; .037; .03; .012; .00082 G/100ML; G/100ML; G/100ML; G/100ML; G/100ML; G/100ML; G/100ML
75 INJECTION, SOLUTION INTRAVENOUS CONTINUOUS
Status: DISCONTINUED | OUTPATIENT
Start: 2021-08-24 | End: 2021-08-26

## 2021-08-24 RX ORDER — HEPARIN SODIUM 5000 [USP'U]/ML
5000 INJECTION, SOLUTION INTRAVENOUS; SUBCUTANEOUS EVERY 8 HOURS SCHEDULED
Status: DISCONTINUED | OUTPATIENT
Start: 2021-08-24 | End: 2021-08-31 | Stop reason: HOSPADM

## 2021-08-24 RX ORDER — GINSENG 100 MG
1 CAPSULE ORAL 2 TIMES DAILY
Status: DISCONTINUED | OUTPATIENT
Start: 2021-08-24 | End: 2021-08-31 | Stop reason: HOSPADM

## 2021-08-24 RX ORDER — HYDROMORPHONE HCL IN WATER/PF 6 MG/30 ML
0.2 PATIENT CONTROLLED ANALGESIA SYRINGE INTRAVENOUS
Status: DISCONTINUED | OUTPATIENT
Start: 2021-08-24 | End: 2021-08-29

## 2021-08-24 RX ORDER — BUPROPION HYDROCHLORIDE 150 MG/1
150 TABLET ORAL DAILY
Status: DISCONTINUED | OUTPATIENT
Start: 2021-08-25 | End: 2021-08-31 | Stop reason: HOSPADM

## 2021-08-24 RX ORDER — VENLAFAXINE HYDROCHLORIDE 150 MG/1
150 CAPSULE, EXTENDED RELEASE ORAL DAILY
Status: DISCONTINUED | OUTPATIENT
Start: 2021-08-25 | End: 2021-08-31 | Stop reason: HOSPADM

## 2021-08-24 RX ADMIN — SODIUM BICARBONATE 50 MEQ: 84 INJECTION INTRAVENOUS at 00:37

## 2021-08-24 RX ADMIN — CALCIUM GLUCONATE 2 G: 20 INJECTION, SOLUTION INTRAVENOUS at 10:50

## 2021-08-24 RX ADMIN — HEPARIN SODIUM 5000 UNITS: 5000 INJECTION INTRAVENOUS; SUBCUTANEOUS at 10:11

## 2021-08-24 RX ADMIN — VILAZODONE HYDROCHLORIDE 40 MG: 40 TABLET ORAL at 08:35

## 2021-08-24 RX ADMIN — HEPARIN SODIUM 5000 UNITS: 5000 INJECTION INTRAVENOUS; SUBCUTANEOUS at 21:03

## 2021-08-24 RX ADMIN — BACITRACIN ZINC 1 LARGE APPLICATION: 500 OINTMENT TOPICAL at 10:01

## 2021-08-24 RX ADMIN — HYDROMORPHONE HYDROCHLORIDE 0.5 MG: 1 INJECTION, SOLUTION INTRAMUSCULAR; INTRAVENOUS; SUBCUTANEOUS at 00:28

## 2021-08-24 RX ADMIN — ACETAMINOPHEN 975 MG: 325 TABLET, FILM COATED ORAL at 17:51

## 2021-08-24 RX ADMIN — BACITRACIN ZINC 1 LARGE APPLICATION: 500 OINTMENT TOPICAL at 17:55

## 2021-08-24 RX ADMIN — METHOCARBAMOL 500 MG: 500 TABLET, FILM COATED ORAL at 05:34

## 2021-08-24 RX ADMIN — LIDOCAINE 2 PATCH: 50 PATCH TOPICAL at 08:38

## 2021-08-24 RX ADMIN — SODIUM CHLORIDE, SODIUM GLUCONATE, SODIUM ACETATE, POTASSIUM CHLORIDE, MAGNESIUM CHLORIDE, SODIUM PHOSPHATE, DIBASIC, AND POTASSIUM PHOSPHATE 500 ML: .53; .5; .37; .037; .03; .012; .00082 INJECTION, SOLUTION INTRAVENOUS at 01:27

## 2021-08-24 RX ADMIN — PANTOPRAZOLE SODIUM 40 MG: 40 TABLET, DELAYED RELEASE ORAL at 08:34

## 2021-08-24 RX ADMIN — METHOCARBAMOL 500 MG: 500 TABLET, FILM COATED ORAL at 14:11

## 2021-08-24 RX ADMIN — LEVOTHYROXINE SODIUM 50 MCG: 50 TABLET ORAL at 05:34

## 2021-08-24 RX ADMIN — BACITRACIN 1 LARGE APPLICATION: 500 OINTMENT TOPICAL at 03:00

## 2021-08-24 RX ADMIN — SODIUM CHLORIDE, SODIUM GLUCONATE, SODIUM ACETATE, POTASSIUM CHLORIDE, MAGNESIUM CHLORIDE, SODIUM PHOSPHATE, DIBASIC, AND POTASSIUM PHOSPHATE 125 ML/HR: .53; .5; .37; .037; .03; .012; .00082 INJECTION, SOLUTION INTRAVENOUS at 09:58

## 2021-08-24 RX ADMIN — HEPARIN SODIUM 5000 UNITS: 5000 INJECTION INTRAVENOUS; SUBCUTANEOUS at 14:11

## 2021-08-24 RX ADMIN — SENNOSIDES AND DOCUSATE SODIUM 1 TABLET: 8.6; 5 TABLET ORAL at 21:03

## 2021-08-24 RX ADMIN — CALCIUM GLUCONATE 2 G: 20 INJECTION, SOLUTION INTRAVENOUS at 00:42

## 2021-08-24 RX ADMIN — POTASSIUM CHLORIDE 20 MEQ: 1500 TABLET, EXTENDED RELEASE ORAL at 17:50

## 2021-08-24 RX ADMIN — OLANZAPINE 2.5 MG: 2.5 TABLET, FILM COATED ORAL at 14:11

## 2021-08-24 NOTE — BRIEF OP NOTE (RAD/CATH)
INTERVENTIONAL RADIOLOGY PROCEDURE NOTE    Date: 8/23/2021    Procedure: Mesenteric angiogram with hepatic artery embolizaation    Preoperative diagnosis:   1  Liver laceration, grade III, without open wound into cavity, initial encounter         Postoperative diagnosis: Same  Surgeon: Dario Deluna MD     Assistant: None  No qualified resident was available  Blood loss: 10 mL    Specimens: None     Findings:   1  Selective hepatic angiography did not reveal actively bleeding artery  2  Given the significant findings on CT, empiric embolization performed in 2nd order branches supplying inferior segments of right liver  Complications: None immediate      Anesthesia: local and general

## 2021-08-24 NOTE — OCCUPATIONAL THERAPY NOTE
OT CANCEL NOTE    OT orders received  Chart reviewed  Pt is pending a R femur xray  Will hold initial OT evaluation  Will continue to follow pt on caseload and see pt when medically stable and as clinically appropriate         08/24/21 4101   Note Type   Cancel Reasons Medical status     Marietta Jackson MS, OTR/L

## 2021-08-24 NOTE — PLAN OF CARE
Problem: MOBILITY - ADULT  Goal: Maintain or return to baseline ADL function  Description: INTERVENTIONS:  -  Assess patient's ability to carry out ADLs; assess patient's baseline for ADL function and identify physical deficits which impact ability to perform ADLs (bathing, care of mouth/teeth, toileting, grooming, dressing, etc )  - Assess/evaluate cause of self-care deficits   - Assess range of motion  - Assess patient's mobility; develop plan if impaired  - Assess patient's need for assistive devices and provide as appropriate  - Encourage maximum independence but intervene and supervise when necessary  - Involve family in performance of ADLs  - Assess for home care needs following discharge   - Consider OT consult to assist with ADL evaluation and planning for discharge  - Provide patient education as appropriate  Outcome: Progressing  Goal: Maintains/Returns to pre admission functional level  Description: INTERVENTIONS:  - Perform BMAT or MOVE assessment daily    - Set and communicate daily mobility goal to care team and patient/family/caregiver       Problem: CARDIOVASCULAR - ADULT  Goal: Maintains optimal cardiac output and hemodynamic stability  Description: INTERVENTIONS:  - Monitor I/O, vital signs and rhythm  - Monitor for S/S and trends of decreased cardiac output  - Administer and titrate ordered vasoactive medications to optimize hemodynamic stability  - Assess quality of pulses, skin color and temperature  - Assess for signs of decreased coronary artery perfusion  - Instruct patient to report change in severity of symptoms  Outcome: Progressing  Goal: Absence of cardiac dysrhythmias or at baseline rhythm  Description: INTERVENTIONS:  - Continuous cardiac monitoring, vital signs, obtain 12 lead EKG if ordered  - Administer antiarrhythmic and heart rate control medications as ordered  - Monitor electrolytes and administer replacement therapy as ordered  Outcome: Progressing     Problem: RESPIRATORY - ADULT  Goal: Achieves optimal ventilation and oxygenation  Description: INTERVENTIONS:  - Assess for changes in respiratory status  - Assess for changes in mentation and behavior  - Position to facilitate oxygenation and minimize respiratory effort  - Oxygen administered by appropriate delivery if ordered  - Initiate smoking cessation education as indicated  - Encourage broncho-pulmonary hygiene including cough, deep breathe, Incentive Spirometry  - Assess the need for suctioning and aspirate as needed  - Assess and instruct to report SOB or any respiratory difficulty  - Respiratory Therapy support as indicated  Outcome: Progressing     Problem: GASTROINTESTINAL - ADULT  Goal: Minimal or absence of nausea and/or vomiting  Description: INTERVENTIONS:  - Administer IV fluids if ordered to ensure adequate hydration  - Maintain NPO status until nausea and vomiting are resolved  - Nasogastric tube if ordered  - Administer ordered antiemetic medications as needed  - Provide nonpharmacologic comfort measures as appropriate  - Advance diet as tolerated, if ordered  - Consider nutrition services referral to assist patient with adequate nutrition and appropriate food choices  Outcome: Progressing  Goal: Maintains or returns to baseline bowel function  Description: INTERVENTIONS:  - Assess bowel function  - Encourage oral fluids to ensure adequate hydration  - Administer IV fluids if ordered to ensure adequate hydration  - Administer ordered medications as needed  - Encourage mobilization and activity  - Consider nutritional services referral to assist patient with adequate nutrition and appropriate food choices  Outcome: Progressing  Goal: Maintains adequate nutritional intake  Description: INTERVENTIONS:  - Monitor percentage of each meal consumed  - Identify factors contributing to decreased intake, treat as appropriate  - Assist with meals as needed  - Monitor I&O, weight, and lab values if indicated  - Obtain nutrition services referral as needed  Outcome: Progressing  Goal: Establish and maintain optimal ostomy function  Description: INTERVENTIONS:  - Assess bowel function  - Encourage oral fluids to ensure adequate hydration  - Administer IV fluids if ordered to ensure adequate hydration   - Administer ordered medications as needed  - Encourage mobilization and activity  - Nutrition services referral to assist patient with appropriate food choices  - Assess stoma site  - Consider wound care consult   Outcome: Progressing  Goal: Oral mucous membranes remain intact  Description: INTERVENTIONS  - Assess oral mucosa and hygiene practices  - Implement preventative oral hygiene regimen  - Implement oral medicated treatments as ordered  - Initiate Nutrition services referral as needed  Outcome: Progressing     Problem: METABOLIC, FLUID AND ELECTROLYTES - ADULT  Goal: Electrolytes maintained within normal limits  Description: INTERVENTIONS:  - Monitor labs and assess patient for signs and symptoms of electrolyte imbalances  - Administer electrolyte replacement as ordered  - Monitor response to electrolyte replacements, including repeat lab results as appropriate  - Instruct patient on fluid and nutrition as appropriate  Outcome: Progressing  Goal: Fluid balance maintained  Description: INTERVENTIONS:  - Monitor labs   - Monitor I/O and WT  - Instruct patient on fluid and nutrition as appropriate  - Assess for signs & symptoms of volume excess or deficit  Outcome: Progressing  Goal: Glucose maintained within target range  Description: INTERVENTIONS:  - Monitor Blood Glucose as ordered  - Assess for signs and symptoms of hyperglycemia and hypoglycemia  - Administer ordered medications to maintain glucose within target range  - Assess nutritional intake and initiate nutrition service referral as needed  Outcome: Progressing     Problem: HEMATOLOGIC - ADULT  Goal: Maintains hematologic stability  Description: INTERVENTIONS  - Assess for signs and symptoms of bleeding or hemorrhage  - Monitor labs  - Administer supportive blood products/factors as ordered and appropriate  Outcome: Progressing     Problem: MUSCULOSKELETAL - ADULT  Goal: Maintain or return mobility to safest level of function  Description: INTERVENTIONS:  - Assess patient's ability to carry out ADLs; assess patient's baseline for ADL function and identify physical deficits which impact ability to perform ADLs (bathing, care of mouth/teeth, toileting, grooming, dressing, etc )  - Assess/evaluate cause of self-care deficits   - Assess range of motion  - Assess patient's mobility  - Assess patient's need for assistive devices and provide as appropriate  - Encourage maximum independence but intervene and supervise when necessary  - Involve family in performance of ADLs  - Assess for home care needs following discharge   - Consider OT consult to assist with ADL evaluation and planning for discharge  - Provide patient education as appropriate  Outcome: Progressing  Goal: Maintain proper alignment of affected body part  Description: INTERVENTIONS:  - Support, maintain and protect limb and body alignment  - Provide patient/ family with appropriate education  Outcome: Progressing     - Record patient progress and toleration of activity level   Outcome: Progressing

## 2021-08-24 NOTE — PROCEDURES
Laceration repair    Date/Time: 8/24/2021 2:24 AM  Performed by: Lavon Whiting MD  Authorized by: Lavon Whiting MD   Consent: Verbal consent obtained  Risks and benefits: risks, benefits and alternatives were discussed  Consent given by: patient  Patient understanding: patient states understanding of the procedure being performed  Patient identity confirmed: verbally with patient  Time out: Immediately prior to procedure a "time out" was called to verify the correct patient, procedure, equipment, support staff and site/side marked as required  Location: R lateral periorbital area, L anterior knee  Wound length (cm): R periorbital 1cm complex laceration/avulsion, L knee 3cm laceration  Foreign bodies: no foreign bodies  Tendon involvement: none  Nerve involvement: none  Vascular damage: no  Anesthesia: local infiltration    Anesthesia:  Local anesthetic: R periorbital 1cc 1% plain lidocaine  L knee 7cc 1% lidocaine with epinephrine  Anesthetic total: 8 mL    Sedation:  Patient sedated: no      Wound Dehiscence:  Superficial Wound Dehiscence: simple closure  Secondary closure or dehiscence: complex    Procedure Details:  Preparation: Patient was prepped and draped in the usual sterile fashion  Irrigation solution: saline  Irrigation method: syringe  Amount of cleaning: standard  Debridement: none  Wound skin closure material used: R periorbital 5x 5-0 chromic fast, L knee 3x 2-0 nylon  Number of sutures: R periorbital 5x, L knee 3x  Technique: simple  Approximation: close  Approximation difficulty: R periorbital complex including avulsion suture down, L knee simple  Dressing: R periorbital bacitracin only, L knee bacitracin/xeroform/gauze

## 2021-08-24 NOTE — PROGRESS NOTES
Patient report was called the [address] [place] [address] 8 Nursing unit earlier  The patient is now being transported to the Radiology department to have a Femur X-Ray, and THEN the patient will be transported to Room 819 as a Medical-Surgical status patient  The patient's family is aware of the transfer plans

## 2021-08-24 NOTE — ASSESSMENT & PLAN NOTE
· R periorbital laceration  ? S/p bedside repair with 5x 5-0 chromic fast early morning of 8/24  ? BID bacitracin  · L knee laceration  ? S/p bedside repair with 3x 2-0 nylon early morning of 8/24  ?  Xeroform dressing applied, to be taken down evening of 8/24 or morning of 8/25

## 2021-08-24 NOTE — ANESTHESIA POSTPROCEDURE EVALUATION
Post-Op Assessment Note    CV Status:  Stable  Pain Score: 0    Pain management: adequate     Mental Status:  Alert and awake   Hydration Status:  Euvolemic   PONV Controlled:  Controlled   Airway Patency:  Patent      Post Op Vitals Reviewed: Yes      Staff: Anesthesiologist, CRNA   Comments: patient transported to ICU VSS, alert and oriented, report given to ICU team        No complications documented      BP   125/54   Temp   96 8   Pulse  85   Resp   16   SpO2   98

## 2021-08-24 NOTE — CASE MANAGEMENT
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[employer]", and reports being fully independent for all ADL/IADLs  Pt's had 0 falls in the last 6 months  Pt enjoys watching TV  Pt has access to, but doesn't use, a rolling walker and BSC  Pt doesn't own a living will  Pt's pharmacy is [facility] in [address] [facility]  Pt reports a hx of Anxiety, Depression, and Bi-Polar disorders without IP rehab  Pt reports no hx of substance abuse  Pt reports no hx of IP rehab or VNA  Pt's auto insurance is through [employer]  Pt aware that either she or her mother would need to file the claim  CM will appreciate therapy recommendations when appropriate  CM reviewed d/c planning process including the following: identifying help at home, patient preference for d/c planning needs, Discharge Lounge, [facility] Meds to Bed program, availability of treatment team to discuss questions or concerns patient and/or family may have regarding understanding medications and recognizing signs and symptoms once discharged  CM also encouraged patient to follow up with all recommended appointments after discharge  Patient advised of importance for patient and family to participate in managing patients medical well being

## 2021-08-24 NOTE — ASSESSMENT & PLAN NOTE
? Hypotension presumed secondary to blood loss from liver laceration  ? S/p 3x pRBC upon arrival  ? S/p IR embolization  ? SQH held due to liver laceration  ?  Will continue to monitor

## 2021-08-24 NOTE — RESPIRATORY THERAPY NOTE
RT Protocol Note  "[employer] 50 y o  female MRN: [id number]  Unit/Bed#: ICU 03 Encounter: [id number]    Assessment    Principal Problem:    Liver laceration, grade III, without open wound into cavity  Active Problems:    Closed fracture of multiple ribs of right side      Home Pulmonary Medications:  None per chart  No past medical history on file  Social History     Socioeconomic History    Marital status: /Civil Union     Spouse name: Not on file    Number of children: Not on file    Years of education: Not on file    Highest education level: Not on file   Occupational History    Not on file   Tobacco Use    Smoking status: Not on file   Substance and Sexual Activity    Alcohol use: Not on file    Drug use: Not on file    Sexual activity: Not on file   Other Topics Concern    Not on file   Social History Narrative    Not on file     Social Determinants of Health     Financial Resource Strain:     Difficulty of Paying Living Expenses:    Food Insecurity:     Worried About Running Out of Food in the Last Year:     [address] in the Last Year:    Transportation Needs:     Lack of Transportation (Medical):      Lack of Transportation (Non-Medical):    Physical Activity:     Days of Exercise per Week:     Minutes of Exercise per Session:    Stress:     Feeling of Stress :    Social Connections:     Frequency of Communication with Friends and Family:     Frequency of Social Gatherings with Friends and Family:     Attends Latter day Services:     Active Member of Clubs or Organizations:     Attends Club or Organization Meetings:     Marital Status:    Intimate Partner Violence:     Fear of Current or Ex-Partner:     Emotionally Abused:     Physically Abused:     Sexually Abused:        Subjective         Objective    Physical Exam:   Assessment Type: Assess only  Bilateral Breath Sounds: Clear  Cough: None    Vitals:  Blood pressure 119/75, pulse 74, temperature 97 8 °F (36 6 °C), temperature source Tympanic, resp  rate 16, weight 63 5 kg (140 lb), SpO2 98 %  Results from last 7 days   Lab Units 08/23/21  2229   PH ART  7 186*   PCO2 ART mm Hg 42 7   PO2 ART mm Hg 78 3   HCO3 ART mmol/L 15 8*   BASE EXC ART mmol/L -12 0   O2 CONTENT ART mL/dL 16 8   O2 HGB, ARTERIAL % 92 4*   ABG SOURCE  Line, Arterial       Imaging and other studies:           Plan       Airway Clearance Plan: Incentive Spirometer     Resp Comments: Pt  evaluated for respiratory protocol  BS=clear and well aerated  Pt  in no distress on 5lpm nasal cannula  SpO2=99%  Pt  was instructed on the use of IS secondary to rib fxs  Pt  was able to perform as instructed  No further respiratory intervention required at this time  Will continue to monitor and titrate care accordingly

## 2021-08-24 NOTE — ANESTHESIA PREPROCEDURE EVALUATION
Procedure:  ARTERIOGRAM WITH EMBOLIZATION LIVER LACERATION (Abdomen)    Relevant Problems   GI/HEPATIC   (+) Liver laceration, grade III, without open wound into cavity      /RENAL   (+) CKD (chronic kidney disease) stage 3, GFR 30-59 ml/min ([facility])        Physical Exam    Airway    Mallampati score: II  TM Distance: >3 FB  Neck ROM: full     Dental       Cardiovascular  Cardiovascular exam normal    Pulmonary  Pulmonary exam normal     Other Findings        Anesthesia Plan  ASA Score- 4 Emergent    Anesthesia Type- general with ASA Monitors  Additional Monitors: arterial line  Airway Plan:     Comment: c collar cleared by trauma    Plan Factors-Exercise tolerance (METS): >4 METS  Chart reviewed  EKG reviewed  Imaging results reviewed  Existing labs reviewed  Patient summary reviewed  Induction- intravenous  Postoperative Plan- Plan for postoperative opioid use  Planned trial extubation    Informed Consent- Anesthetic plan and risks discussed with patient  I personally reviewed this patient with the CRNA  Discussed and agreed on the Anesthesia Plan with the CRNA  Franny Amin
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
## 2021-08-24 NOTE — PHYSICAL THERAPY NOTE
PT orders received  Chart reviewed  Pt pending R femur x ray will hold   Will continue to follow  Boubacar Sparks, PT, DPT       08/24/21 1545   PT Last Visit   PT Visit Date 08/24/21   Note Type   Note type Evaluation   Cancel Reasons Other
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
## 2021-08-24 NOTE — PROGRESS NOTES
08/23/21 2000   Clinical Encounter Type   Visited With Patient; Health care provider   Routine Visit Introduction   Crisis Visit Trauma   Referral From    Referral To [address]

## 2021-08-24 NOTE — ANESTHESIA PROCEDURE NOTES
Arterial Line Insertion  Performed by: Cameron Espinal CRNA  Authorized by: Joseph Ingram MD   Consent given by: patient  Patient identity confirmed: arm band  Preparation: Patient was prepped and draped in the usual sterile fashion    Indications: multiple ABGs and hemodynamic monitoring  Orientation:  Right  Location: radial artery  Procedure Details:  Needle gauge: 20  Seldinger technique: Seldinger technique used  Number of attempts: 1    Post-procedure:  Post-procedure: dressing applied  Waveform: good waveform  Post-procedure CNS: normal  Patient tolerance: patient tolerated the procedure well with no immediate complications

## 2021-08-24 NOTE — CASE MANAGEMENT
CM contacted pt's employer, [employer], [phone] to inform them that the pt is at the hospital    Pt's boss is Jocelin De Anda   Cm left a voicemail for him
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
## 2021-08-24 NOTE — PROGRESS NOTES
Progress Note - Surgical Critical Care   Aric Agosto 50 y o  female MRN: [id number]  Unit/Bed#: ICU 03 Encounter: [id number]    Chief Complaint: R flank pain    HPI/24hr events:  IR embolization of liver lac yesterday, MAPs steady around 65 overnight    Assessment: Stable s/p IR embolization of liver laceration    Plan:     Neuro:   CAM:    GCS: 15   Sedation: None   Anxiolysis: Home olanzapine, ambien, ativan, wellbutrin, Viibryd, venlafaxine   Analgesia: PTA gabapentin 800 TID held  Standing acetaminophen, robaxin   PRN oxy & dilaudid  o Avoiding NSAIDs due to DUSTIN on CKD   Delirium precautions: Sleep hygiene, daily CAM-ICU assessment      CV:   Last lactate: 0 8   Hypotension  o Presumed secondary to blood loss from liver laceration  o S/p 3x pRBC upon arrival  o S/p IR embolization  o Will continue to monitor     Pulm:   Fractures of  8th-11th ribs on R  o Rib fracture protocol     GI:   Stress ulcer prophylaxis: Not indicated   Bowel regimen: Senokot S daily   Liver laceration  o S/p IR embolization     FEN:   F: NS @ 125, switch to isolyte   E: Replete PRN   N: Renal diet to start today   Monitor daily BMP     :   Lowery in place from OR  o Discontinue    ID:   No active issues   Trend fever curve   Monitor WBC     Heme:   Blood loss from liver laceration  o Monitor hgb level, anticipate that it will drop as patient equilibriates   DVT prophylaxis: SCD  o SQH held due to liver laceration   Monitor daily CBC    Endo:   No active issues    Msk/Skin:   R periorbital laceration  o S/p bedside repair with 5x 5-0 chromic fast early morning of 8/24  o BID bacitracin   L knee laceration  o S/p bedside repair with 3x 2-0 nylon early morning of 8/24  o Xeroform dressing applied, to be taken down evening of 8/24 or morning of 8/25    LDA:  A-line, keep for now  Sebastián, d/c    Disposition:  SICU tonight        Vitals:    08/24/21 0425 08/24/21 0430 08/24/21 0500 08/24/21 0600   BP: 106/66 103/72 108/69 119/77   BP Location:       Pulse: 78 78 78 86   Resp: (!) 10 (!) 10 (!) 11 16   Temp:       TempSrc:       SpO2: 98% 98%     Weight:       Height:         Arterial Line BP: 92/54  Arterial Line MAP (mmHg): 72 mmHg    Temperature:   Temp (24hrs), Av 4 °F (36 3 °C), Min:96 4 °F (35 8 °C), Max:97 8 °F (36 6 °C)    Current: Temperature: 97 5 °F (36 4 °C)    Non-Invasive/Invasive Ventilation Settings:  Respiratory    Lab Data (Last 4 hours)    None         O2/Vent Data (Last 4 hours)    None                Intake and Outputs:       Diet Orders   (From admission, onward)             Start     Ordered    21  Diet NPO; Sips with meds  Diet effective now     Question Answer Comment   Diet Type NPO    NPO Except: Sips with meds    RD to adjust diet per protocol? Yes        21                I/O        07 -  07 -  0700    I V  (mL/kg)  1000 (15 7)    Blood  200    IV Piggyback  500    Total Intake(mL/kg)  1700 (26 8)    Net  +1700                I/O last 24 hours:   In: 4694 2 [I V :3794 2; Blood:200; IV [id number]:256]  Out: 500 [Urine:500]       Labs:   Results from last 7 days   Lab Units 21   WBC Thousand/uL  --  12 54*  --  12 56*   HEMOGLOBIN g/dL 10 7* 12 2  --  10 1*   I STAT HEMOGLOBIN g/dl  --   --  10 9*  --    PLATELETS Thousands/uL  --  139*  --  129*   NEUTROS PCT %  --  86*  --  87*      Results from last 7 days   Lab Units 21   SODIUM mmol/L 139  --  136  --    POTASSIUM mmol/L 4 0  --  3 9  --    CHLORIDE mmol/L 115*  --  107  --    CO2 mmol/L 17*  --  20*  --    CO2, I-STAT mmol/L  --  20*  --  21   BUN mg/dL 32*  --  35*  --    CREATININE mg/dL 4 93*  --  5 57*  --    CALCIUM mg/dL 7 1*  --  7 0*  --    ALK PHOS U/L  --   --  64  --    ALT U/L  --   --  103*  --    AST U/L  --   --  115*  --    GLUCOSE, ISTAT mg/dl  --  109  -- 112     Results from last 7 days   Lab Units 08/23/21 2050   MAGNESIUM mg/dL 2 1     Results from last 7 days   Lab Units 08/23/21 2050   PHOSPHORUS mg/dL 4 1      Results from last 7 days   Lab Units 08/23/21 2226 08/23/21 2111 08/23/21 2050 08/23/21 2003   GLUCOSE RANDOM mg/dL 93  --  107  --    GLUCOSE, ISTAT mg/dl  --  109  --  112       Lab Results   Component Value Date    PHART 7 217 (LL) 08/24/2021    INK7CPA 43 6 08/24/2021    PO2ART 113 9 08/24/2021    WSW5QBQ 17 3 (L) 08/24/2021    BEART -10 0 08/24/2021    SOURCE Line, Arterial 08/23/2021     Results from last 7 days   Lab Units 08/23/21 2042   INR  1 10     Results from last 7 days   Lab Units 08/23/21 2226 08/23/21 2045   LACTIC ACID mmol/L 0 6 0 8           Micro:        Imaging:  I have personally reviewed pertinent reports  TRAUMA - CT head wo contrast    Result Date: 8/23/2021  Impression: No acute intracranial abnormality  Workstation performed: [id number]     TRAUMA - CT spine cervical wo contrast    Result Date: 8/23/2021  Impression: No cervical spine fracture or traumatic malalignment  Postoperative and degenerative changes as above  Workstation performed: [id number]     TRAUMA - CT chest abdomen pelvis w contrast    Result Date: 8/23/2021  Impression: 1  Grade 3 right hepatic lobe laceration  No definite active bleeding  Small amount of perihepatic hematoma and hemoperitoneum  2  Fractures of the right 8th through 11th ribs as above with trace right pneumothorax  I personally discussed this study as well as results of CT head and cervical spine with Dr June Ga on 8/23/2021 at 8:49 PM   Workstation performed: [id number]     XR trauma multiple    Result Date: 8/23/2021  Impression: Displaced right lower rib fractures  Please see report of CT chest, abdomen and pelvis also on this date   Workstation performed: [id number]       Physical Exam:  Physical Exam  Constitutional:       General: She is not in acute distress  Appearance: Normal appearance  HENT:      Head: Normocephalic and atraumatic  Right Ear: External ear normal       Left Ear: External ear normal       Nose: Nose normal       Mouth/Throat:      Mouth: Mucous membranes are moist       Pharynx: Oropharynx is clear  Eyes:      General:         Right eye: No discharge  Left eye: No discharge  Extraocular Movements: Extraocular movements intact  Conjunctiva/sclera: Conjunctivae normal       Pupils: Pupils are equal, round, and reactive to light  Cardiovascular:      Rate and Rhythm: Normal rate  Pulses: Normal pulses  Heart sounds: Normal heart sounds  Comments: R groin puncture CDI, no swelling/hematoma  DP +2 bilaterally  Pulmonary:      Effort: Pulmonary effort is normal  No respiratory distress  Abdominal:      General: Abdomen is flat  There is no distension  Tenderness: There is abdominal tenderness (Mild RLQ/RUQ)  There is right CVA tenderness  There is no guarding or rebound  Musculoskeletal:         General: No swelling, tenderness or signs of injury  Cervical back: Normal range of motion and neck supple  No rigidity or tenderness  Comments: L knee dressing intact without ST  R periorbital laceration CDI   Skin:     Coloration: Skin is not jaundiced  Findings: No lesion  Neurological:      General: No focal deficit present  Mental Status: She is alert and oriented to person, place, and time  Mental status is at baseline     Psychiatric:         Mood and Affect: Mood normal          Behavior: Behavior normal          ---------------------------------------------------------------------------  ICU CORE MEASURES    Prophylaxis   VTE Pharmacologic Prophylaxis: Pharmacologic VTE Prophylaxis contraindicated due to liver laceration  VTE Mechanical Prophylaxis: sequential compression device  Stress Ulcer Prophylaxis: Prophylaxis Not Indicated     ABCDE Protocol (if indicated)  Plan to perform spontaneous awakening trial today? Not applicable  Plan to perform spontaneous breathing trial today? Not applicable  CAM-ICU: Negative  Obvious barriers to extubation? no    Invasive Devices Review  Invasive Devices     Peripheral Intravenous Line            Peripheral IV 08/23/21 Right 1 day    Peripheral IV 08/23/21 Left Antecubital <1 day    Peripheral IV 08/23/21 Left Hand <1 day          Arterial Line            Arterial Line 08/23/21 Right Radial <1 day          Drain            Urethral Catheter Non-latex 16 Fr  <1 day          Airway            ETT  Cuffed; Hi-Lo 7 mm <1 day                 Can any invasive devices be discontinued today? Yes  ---------------------------------------------------------------------------      Weights:   IBW (Ideal Body Weight): 57 kg    Body mass index is 25 31 kg/m²    Weight (last 2 days)     Date/Time   Weight    08/24/21 0000   69 (152 12)    08/23/21 20:54:47   63 5 (140)              Allergies: No Known Allergies    Medications:   Scheduled Meds:   Current Facility-Administered Medications   Medication Dose Route Frequency Provider Last Rate    acetaminophen  975 mg Oral Q6H PRN Lucretia Casey MD      bacitracin topical ointment  1 large application Topical BID Lucretia Casey MD      buPROPion  300 mg Oral Daily Lucretia Casey MD      HYDROmorphone  0 2 mg Intravenous Q3H PRN FIONA Garner      iohexol  200 mL Other Once in imaging Timothy Paige MD      levothyroxine  50 mcg Oral Early Morning Lucretia Casey MD      lidocaine  2 patch Topical Daily Lucretia Casey MD      LORazepam  1 mg Oral Q8H PRN Lucretia Casey MD      melatonin  6 mg Oral HS Lucretia Casey MD      methocarbamol  500 mg Oral Q6H [facility] Lucretia Casey MD      OLANZapine  2 5 mg Oral BID Lucretia Casey MD      oxyCODONE  2 5 mg Oral Q4H PRN Kami Armendariz Dire, FIONA      oxyCODONE  5 mg Oral Q4H PRN Kami Wade, FIONA      pantoprazole  40 mg Oral Daily Before Breakfast Gilda Lemos MD      polyethylene glycol  17 g Oral BID PRN Gilda Lemos MD      senna-docusate sodium  1 tablet Oral HS Gilda Lemos MD      sodium chloride  125 mL/hr Intravenous Continuous FIONA Doan Cea 125 mL/hr (08/24/21 0037)    venlafaxine  300 mg Oral Daily Gilda Lemos MD      vilazodone  40 mg Oral Daily With Breakfast Gilda Lemos MD       Continuous Infusions: sodium chloride, 125 mL/hr, Last Rate: 125 mL/hr (08/24/21 0037)      PRN Meds: acetaminophen, 975 mg, Q6H PRN  HYDROmorphone, 0 2 mg, Q3H PRN  iohexol, 200 mL, Once in imaging  LORazepam, 1 mg, Q8H PRN  oxyCODONE, 2 5 mg, Q4H PRN  oxyCODONE, 5 mg, Q4H PRN  polyethylene glycol, 17 g, BID PRN        Counseling / Coordination of Care  Total time spent today 25 minutes  Greater than 50% of total time was spent with the patient and / or family counseling and / or coordination of care  A description of the counseling / coordination of care: Chart review, examining the patient, discussing care plan with the ICU team and consultants, coordinating with nurses/staff     Code Status: Level 1 - Full Code     Portions of the record may have been created with voice recognition software  Occasional wrong word or "sound a like" substitutions may have occurred due to the inherent limitations of voice recognition software  Read the chart carefully and recognize, using context, where substitutions have occurred       Gilda Lemos MD
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
## 2021-08-24 NOTE — ASSESSMENT & PLAN NOTE
Lab Results   Component Value Date    EGFR 13 08/24/2021    EGFR 11 08/24/2021    EGFR 10 08/23/2021    CREATININE 3 86 (H) 08/24/2021    CREATININE 4 43 (H) 08/24/2021    CREATININE 4 93 (H) 08/23/2021     ? Avoiding NSAIDs due to DUSTIN on CKD  ?  Nephro recommendations appreciated

## 2021-08-25 LAB
ABO GROUP BLD BPU: NORMAL
ABO GROUP BLD BPU: NORMAL
ANION GAP SERPL CALCULATED.3IONS-SCNC: 5 MMOL/L (ref 4–13)
BACTERIA UR QL AUTO: ABNORMAL /HPF
BASOPHILS # BLD AUTO: 0.05 THOUSANDS/ΜL (ref 0–0.1)
BASOPHILS NFR BLD AUTO: 1 % (ref 0–1)
BILIRUB UR QL STRIP: NEGATIVE
BPU ID: NORMAL
BPU ID: NORMAL
BUN SERPL-MCNC: 21 MG/DL (ref 5–25)
CA-I BLD-SCNC: 1.13 MMOL/L (ref 1.12–1.32)
CALCIUM SERPL-MCNC: 8.5 MG/DL (ref 8.3–10.1)
CHLORIDE SERPL-SCNC: 113 MMOL/L (ref 100–108)
CK MB SERPL-MCNC: 3.2 NG/ML (ref 0–5)
CK MB SERPL-MCNC: <1 % (ref 0–2.5)
CK SERPL-CCNC: 548 U/L (ref 26–192)
CLARITY UR: CLEAR
CO2 SERPL-SCNC: 23 MMOL/L (ref 21–32)
COLOR UR: YELLOW
CREAT SERPL-MCNC: 2.97 MG/DL (ref 0.6–1.3)
EOSINOPHIL # BLD AUTO: 0.13 THOUSAND/ΜL (ref 0–0.61)
EOSINOPHIL NFR BLD AUTO: 1 % (ref 0–6)
ERYTHROCYTE [DISTWIDTH] IN BLOOD BY AUTOMATED COUNT: 16.2 % (ref 11.6–15.1)
GFR SERPL CREATININE-BSD FRML MDRD: 18 ML/MIN/1.73SQ M
GLUCOSE SERPL-MCNC: 87 MG/DL (ref 65–140)
GLUCOSE SERPL-MCNC: 92 MG/DL (ref 65–140)
GLUCOSE UR STRIP-MCNC: NEGATIVE MG/DL
HCT VFR BLD AUTO: 38.9 % (ref 34.8–46.1)
HGB BLD-MCNC: 12.9 G/DL (ref 11.5–15.4)
HGB UR QL STRIP.AUTO: ABNORMAL
IMM GRANULOCYTES # BLD AUTO: 0.08 THOUSAND/UL (ref 0–0.2)
IMM GRANULOCYTES NFR BLD AUTO: 1 % (ref 0–2)
KETONES UR STRIP-MCNC: NEGATIVE MG/DL
LEUKOCYTE ESTERASE UR QL STRIP: NEGATIVE
LYMPHOCYTES # BLD AUTO: 0.83 THOUSANDS/ΜL (ref 0.6–4.47)
LYMPHOCYTES NFR BLD AUTO: 8 % (ref 14–44)
MAGNESIUM SERPL-MCNC: 1.8 MG/DL (ref 1.6–2.6)
MCH RBC QN AUTO: 31.2 PG (ref 26.8–34.3)
MCHC RBC AUTO-ENTMCNC: 33.2 G/DL (ref 31.4–37.4)
MCV RBC AUTO: 94 FL (ref 82–98)
MONOCYTES # BLD AUTO: 0.58 THOUSAND/ΜL (ref 0.17–1.22)
MONOCYTES NFR BLD AUTO: 5 % (ref 4–12)
NEUTROPHILS # BLD AUTO: 9.15 THOUSANDS/ΜL (ref 1.85–7.62)
NEUTS SEG NFR BLD AUTO: 84 % (ref 43–75)
NITRITE UR QL STRIP: NEGATIVE
NON-SQ EPI CELLS URNS QL MICRO: ABNORMAL /HPF
NRBC BLD AUTO-RTO: 0 /100 WBCS
PH UR STRIP.AUTO: 5.5 [PH]
PHOSPHATE SERPL-MCNC: 3.3 MG/DL (ref 2.7–4.5)
PLATELET # BLD AUTO: 142 THOUSANDS/UL (ref 149–390)
PMV BLD AUTO: 12.3 FL (ref 8.9–12.7)
POTASSIUM SERPL-SCNC: 3.5 MMOL/L (ref 3.5–5.3)
PROT UR STRIP-MCNC: NEGATIVE MG/DL
RBC # BLD AUTO: 4.14 MILLION/UL (ref 3.81–5.12)
RBC #/AREA URNS AUTO: ABNORMAL /HPF
SODIUM SERPL-SCNC: 141 MMOL/L (ref 136–145)
SP GR UR STRIP.AUTO: 1.02 (ref 1–1.03)
UNIT DISPENSE STATUS: NORMAL
UNIT DISPENSE STATUS: NORMAL
UNIT PRODUCT CODE: NORMAL
UNIT PRODUCT CODE: NORMAL
UNIT PRODUCT VOLUME: 280 ML
UNIT PRODUCT VOLUME: 280 ML
UNIT RH: NORMAL
UNIT RH: NORMAL
UROBILINOGEN UR QL STRIP.AUTO: 0.2 E.U./DL
WBC # BLD AUTO: 10.82 THOUSAND/UL (ref 4.31–10.16)
WBC #/AREA URNS AUTO: ABNORMAL /HPF

## 2021-08-25 PROCEDURE — 84100 ASSAY OF PHOSPHORUS: CPT

## 2021-08-25 PROCEDURE — 80048 BASIC METABOLIC PNL TOTAL CA: CPT

## 2021-08-25 PROCEDURE — 97163 PT EVAL HIGH COMPLEX 45 MIN: CPT

## 2021-08-25 PROCEDURE — 97166 OT EVAL MOD COMPLEX 45 MIN: CPT

## 2021-08-25 PROCEDURE — 82553 CREATINE MB FRACTION: CPT | Performed by: EMERGENCY MEDICINE

## 2021-08-25 PROCEDURE — 99232 SBSQ HOSP IP/OBS MODERATE 35: CPT | Performed by: INTERNAL MEDICINE

## 2021-08-25 PROCEDURE — 83735 ASSAY OF MAGNESIUM: CPT

## 2021-08-25 PROCEDURE — 82550 ASSAY OF CK (CPK): CPT | Performed by: EMERGENCY MEDICINE

## 2021-08-25 PROCEDURE — 82330 ASSAY OF CALCIUM: CPT

## 2021-08-25 PROCEDURE — 81001 URINALYSIS AUTO W/SCOPE: CPT | Performed by: INTERNAL MEDICINE

## 2021-08-25 PROCEDURE — 85025 COMPLETE CBC W/AUTO DIFF WBC: CPT

## 2021-08-25 RX ADMIN — HEPARIN SODIUM 5000 UNITS: 5000 INJECTION INTRAVENOUS; SUBCUTANEOUS at 21:49

## 2021-08-25 RX ADMIN — MELATONIN 6 MG: at 21:48

## 2021-08-25 RX ADMIN — BUPROPION HYDROCHLORIDE 150 MG: 150 TABLET, FILM COATED, EXTENDED RELEASE ORAL at 08:16

## 2021-08-25 RX ADMIN — METHOCARBAMOL 500 MG: 500 TABLET, FILM COATED ORAL at 11:15

## 2021-08-25 RX ADMIN — METHOCARBAMOL 500 MG: 500 TABLET, FILM COATED ORAL at 00:03

## 2021-08-25 RX ADMIN — BACITRACIN ZINC 1 LARGE APPLICATION: 500 OINTMENT TOPICAL at 08:16

## 2021-08-25 RX ADMIN — HEPARIN SODIUM 5000 UNITS: 5000 INJECTION INTRAVENOUS; SUBCUTANEOUS at 13:49

## 2021-08-25 RX ADMIN — LIDOCAINE 2 PATCH: 50 PATCH TOPICAL at 08:11

## 2021-08-25 RX ADMIN — FAMOTIDINE 10 MG: 20 TABLET ORAL at 08:17

## 2021-08-25 RX ADMIN — OXYCODONE HYDROCHLORIDE 5 MG: 5 TABLET ORAL at 15:33

## 2021-08-25 RX ADMIN — VILAZODONE HYDROCHLORIDE 40 MG: 40 TABLET ORAL at 10:18

## 2021-08-25 RX ADMIN — OLANZAPINE 2.5 MG: 2.5 TABLET, FILM COATED ORAL at 18:32

## 2021-08-25 RX ADMIN — OXYCODONE HYDROCHLORIDE 5 MG: 5 TABLET ORAL at 08:16

## 2021-08-25 RX ADMIN — HYDROMORPHONE HYDROCHLORIDE 0.2 MG: 0.2 INJECTION, SOLUTION INTRAMUSCULAR; INTRAVENOUS; SUBCUTANEOUS at 10:18

## 2021-08-25 RX ADMIN — BACITRACIN ZINC 1 LARGE APPLICATION: 500 OINTMENT TOPICAL at 18:32

## 2021-08-25 RX ADMIN — METHOCARBAMOL 500 MG: 500 TABLET, FILM COATED ORAL at 18:32

## 2021-08-25 RX ADMIN — LEVOTHYROXINE SODIUM 50 MCG: 50 TABLET ORAL at 06:09

## 2021-08-25 RX ADMIN — VENLAFAXINE HYDROCHLORIDE 150 MG: 150 CAPSULE, EXTENDED RELEASE ORAL at 10:53

## 2021-08-25 RX ADMIN — HYDROMORPHONE HYDROCHLORIDE 0.2 MG: 0.2 INJECTION, SOLUTION INTRAMUSCULAR; INTRAVENOUS; SUBCUTANEOUS at 02:37

## 2021-08-25 RX ADMIN — SENNOSIDES AND DOCUSATE SODIUM 1 TABLET: 8.6; 5 TABLET ORAL at 21:48

## 2021-08-25 RX ADMIN — METHOCARBAMOL 500 MG: 500 TABLET, FILM COATED ORAL at 06:09

## 2021-08-25 RX ADMIN — OXYCODONE HYDROCHLORIDE 5 MG: 5 TABLET ORAL at 00:03

## 2021-08-25 RX ADMIN — OXYCODONE HYDROCHLORIDE 5 MG: 5 TABLET ORAL at 21:48

## 2021-08-25 RX ADMIN — ACETAMINOPHEN 975 MG: 325 TABLET, FILM COATED ORAL at 13:49

## 2021-08-25 RX ADMIN — SODIUM CHLORIDE, SODIUM GLUCONATE, SODIUM ACETATE, POTASSIUM CHLORIDE, MAGNESIUM CHLORIDE, SODIUM PHOSPHATE, DIBASIC, AND POTASSIUM PHOSPHATE 75 ML/HR: .53; .5; .37; .037; .03; .012; .00082 INJECTION, SOLUTION INTRAVENOUS at 07:26

## 2021-08-25 RX ADMIN — HEPARIN SODIUM 5000 UNITS: 5000 INJECTION INTRAVENOUS; SUBCUTANEOUS at 06:09

## 2021-08-25 RX ADMIN — OLANZAPINE 2.5 MG: 2.5 TABLET, FILM COATED ORAL at 08:20

## 2021-08-25 NOTE — ASSESSMENT & PLAN NOTE
· R periorbital laceration  ? S/p bedside repair with 5x 5-0 chromic fast early morning of 8/24  ? BID bacitracin  · L knee laceration  ?  S/p bedside repair with 3x 2-0 nylon early morning of 8/24

## 2021-08-25 NOTE — PLAN OF CARE
Problem: MOBILITY - ADULT  Goal: Maintain or return to baseline ADL function  Description: INTERVENTIONS:  -  Assess patient's ability to carry out ADLs; assess patient's baseline for ADL function and identify physical deficits which impact ability to perform ADLs (bathing, care of mouth/teeth, toileting, grooming, dressing, etc )  - Assess/evaluate cause of self-care deficits   - Assess range of motion  - Assess patient's mobility; develop plan if impaired  - Assess patient's need for assistive devices and provide as appropriate  - Encourage maximum independence but intervene and supervise when necessary  - Involve family in performance of ADLs  - Assess for home care needs following discharge   - Consider OT consult to assist with ADL evaluation and planning for discharge  - Provide patient education as appropriate  8/25/2021 0027 by Tyree Wong RN  Outcome: Progressing  8/25/2021 0026 by Tyree Wong RN  Outcome: Not Progressing  Goal: Maintains/Returns to pre admission functional level  Description: INTERVENTIONS:  - Perform BMAT or MOVE assessment daily    - Set and communicate daily mobility goal to care team and patient/family/caregiver  - Collaborate with rehabilitation services on mobility goals if consulted  - Perform Range of Motion 3 times a day  - Reposition patient every 2 hours    - Dangle patient 2 times a day  - Stand patient 2 times a day  - Ambulate patient 3 times a day  - Out of bed to chair 3 times a day   - Out of bed for meals 3 times a day  - Out of bed for toileting  - Record patient progress and toleration of activity level   8/25/2021 0027 by Tyree Wong RN  Outcome: Progressing  8/25/2021 0026 by Tyree Wong RN  Outcome: Not Progressing     Problem: RESPIRATORY - ADULT  Goal: Achieves optimal ventilation and oxygenation  Description: INTERVENTIONS:  - Assess for changes in respiratory status  - Assess for changes in mentation and behavior  - Position to facilitate oxygenation and minimize respiratory effort  - Oxygen administered by appropriate delivery if ordered  - Initiate smoking cessation education as indicated  - Encourage broncho-pulmonary hygiene including cough, deep breathe, Incentive Spirometry  - Assess the need for suctioning and aspirate as needed  - Assess and instruct to report SOB or any respiratory difficulty  - Respiratory Therapy support as indicated  8/25/2021 0027 by Raul Capps RN  Outcome: Progressing  8/25/2021 0026 by Raul Capps RN  Outcome: Not Progressing     Problem: SKIN/TISSUE INTEGRITY - ADULT  Goal: Skin Integrity remains intact(Skin Breakdown Prevention)  Description: Assess:  -Perform Marek assessment every   -Clean and moisturize skin every   -Inspect skin when repositioning, toileting, and assisting with ADLS  -Assess under medical devices such as every   -Assess extremities for adequate circulation and sensation     Bed Management:  -Have minimal linens on bed & keep smooth, unwrinkled  -Change linens as needed when moist or perspiring  -Avoid sitting or lying in one position for more than hours while in bed  -Keep HOB at degrees     Toileting:  -Offer bedside commode  -Assess for incontinence every   -Use incontinent care products after each incontinent episode such as     Activity:  -Mobilize patient times a day  -Encourage activity and walks on unit  -Encourage or provide ROM exercises   -Turn and reposition patient every Hours  -Use appropriate equipment to lift or move patient in bed  -Instruct/ Assist with weight shifting every when out of bed in chair  -Consider limitation of chair time hour intervals    Skin Care:  -Avoid use of baby powder, tape, friction and shearing, hot water or constrictive clothing  -Relieve pressure over bony prominences using   -Do not massage red bony areas    Next Steps:  -Teach patient strategies to minimize risks such as   -Consider consults to  interdisciplinary teams such as   8/25/2021 0027 by Dontae Quintana RN  Outcome: Progressing  8/25/2021 0026 by Dontae Quintana RN  Outcome: Not Progressing  Goal: Incision(s), wounds(s) or drain site(s) healing without S/S of infection  Description: INTERVENTIONS  - Assess and document dressing, incision, wound bed, drain sites and surrounding tissue  - Provide patient and family education  - Perform skin care/dressing changes every   8/25/2021 0027 by Dontae Quintana RN  Outcome: Progressing  8/25/2021 0026 by Dontae Quintana RN  Outcome: Not Progressing  Goal: Pressure injury heals and does not worsen  Description: Interventions:  - Implement low air loss mattress or specialty surface (Criteria met)  - Apply silicone foam dressing  - Instruct/assist with weight shifting every minutes when in chair   - Limit chair time to hour intervals  - Use special pressure reducing interventions such as when in chair   - Apply fecal or urinary incontinence containment device   - Perform passive or active ROM every   - Turn and reposition patient & offload bony prominences every hours   - Utilize friction reducing device or surface for transfers   - Consider consults to  interdisciplinary teams such as   - Use incontinent care products after each incontinent episode such as   - Consider nutrition services referral as needed  8/25/2021 0027 by Dontae Quintana RN  Outcome: Progressing  8/25/2021 0026 by Dontae Quintana RN  Outcome: Not Progressing     Problem: HEMATOLOGIC - ADULT  Goal: Maintains hematologic stability  Description: INTERVENTIONS  - Assess for signs and symptoms of bleeding or hemorrhage  - Monitor labs  - Administer supportive blood products/factors as ordered and appropriate  8/25/2021 0027 by Dontae Quintana RN  Outcome: Progressing  8/25/2021 0026 by Dontae Quintana RN  Outcome: Not Progressing     Problem: MUSCULOSKELETAL - ADULT  Goal: Maintain or return mobility to safest level of function  Description: INTERVENTIONS:  - Assess patient's ability to carry out ADLs; assess patient's baseline for ADL function and identify physical deficits which impact ability to perform ADLs (bathing, care of mouth/teeth, toileting, grooming, dressing, etc )  - Assess/evaluate cause of self-care deficits   - Assess range of motion  - Assess patient's mobility  - Assess patient's need for assistive devices and provide as appropriate  - Encourage maximum independence but intervene and supervise when necessary  - Involve family in performance of ADLs  - Assess for home care needs following discharge   - Consider OT consult to assist with ADL evaluation and planning for discharge  - Provide patient education as appropriate  8/25/2021 0027 by Carlos Waldrop RN  Outcome: Progressing  8/25/2021 0026 by Carlos Waldrop RN  Outcome: Not Progressing  Goal: Maintain proper alignment of affected body part  Description: INTERVENTIONS:  - Support, maintain and protect limb and body alignment  - Provide patient/ family with appropriate education  8/25/2021 0027 by Carlos Waldrop RN  Outcome: Progressing  8/25/2021 0026 by Carlos Waldrop RN  Outcome: Not Progressing     Problem: Potential for Falls  Goal: Patient will remain free of falls  Description: INTERVENTIONS:  - Educate patient/family on patient safety including physical limitations  - Instruct patient to call for assistance with activity   - Consult OT/PT to assist with strengthening/mobility   - Keep Call bell within reach  - Keep bed low and locked with side rails adjusted as appropriate  - Keep care items and personal belongings within reach  - Initiate and maintain comfort rounds  - Make Fall Risk Sign visible to staff  - Offer Toileting every 3 Hours, in advance of need  - Initiate/Maintain alarm  - Obtain necessary fall risk management equipment:   - Apply yellow socks and bracelet for high fall risk patients  - Consider moving patient to room near nurses station  8/25/2021 0027 by Tyree Wong RN  Outcome: Progressing  8/25/2021 0026 by Tyree Wong RN  Outcome: Not Progressing     Problem: Prexisting or High Potential for Compromised Skin Integrity  Goal: Skin integrity is maintained or improved  Description: INTERVENTIONS:  - Identify patients at risk for skin breakdown  - Assess and monitor skin integrity  - Assess and monitor nutrition and hydration status  - Monitor labs   - Assess for incontinence   - Turn and reposition patient  - Assist with mobility/ambulation  - Relieve pressure over bony prominences  - Avoid friction and shearing  - Provide appropriate hygiene as needed including keeping skin clean and dry  - Evaluate need for skin moisturizer/barrier cream  - Collaborate with interdisciplinary team   - Patient/family teaching  - Consider wound care consult   8/25/2021 0027 by Tyree Wong RN  Outcome: Progressing  8/25/2021 0026 by Tyree Wong RN  Outcome: Not Progressing     Problem: Nutrition/Hydration-ADULT  Goal: Nutrient/Hydration intake appropriate for improving, restoring or maintaining nutritional needs  Description: Monitor and assess patient's nutrition/hydration status for malnutrition  Collaborate with interdisciplinary team and initiate plan and interventions as ordered  Monitor patient's weight and dietary intake as ordered or per policy  Utilize nutrition screening tool and intervene as necessary  Determine patient's food preferences and provide high-protein, high-caloric foods as appropriate       INTERVENTIONS:  - Monitor oral intake, urinary output, labs, and treatment plans  - Assess nutrition and hydration status and recommend course of action  - Evaluate amount of meals eaten  - Assist patient with eating if necessary   - Allow adequate time for meals  - Recommend/ encourage appropriate diets, oral nutritional supplements, and vitamin/mineral supplements  - Order, calculate, and assess calorie counts as needed  - Recommend, monitor, and adjust tube feedings and TPN/PPN based on assessed needs  - Assess need for intravenous fluids  - Provide specific nutrition/hydration education as appropriate  - Include patient/family/caregiver in decisions related to nutrition  8/25/2021 0027 by Di Gale RN  Outcome: Progressing  8/25/2021 0026 by Di Gale RN  Outcome: Not Progressing     Problem: CARDIOVASCULAR - ADULT  Goal: Maintains optimal cardiac output and hemodynamic stability  Description: INTERVENTIONS:  - Monitor I/O, vital signs and rhythm  - Monitor for S/S and trends of decreased cardiac output  - Administer and titrate ordered vasoactive medications to optimize hemodynamic stability  - Assess quality of pulses, skin color and temperature  - Assess for signs of decreased coronary artery perfusion  - Instruct patient to report change in severity of symptoms  Outcome: Completed  Goal: Absence of cardiac dysrhythmias or at baseline rhythm  Description: INTERVENTIONS:  - Continuous cardiac monitoring, vital signs, obtain 12 lead EKG if ordered  - Administer antiarrhythmic and heart rate control medications as ordered  - Monitor electrolytes and administer replacement therapy as ordered  Outcome: Completed     Problem: GASTROINTESTINAL - ADULT  Goal: Minimal or absence of nausea and/or vomiting  Description: INTERVENTIONS:  - Administer IV fluids if ordered to ensure adequate hydration  - Maintain NPO status until nausea and vomiting are resolved  - Nasogastric tube if ordered  - Administer ordered antiemetic medications as needed  - Provide nonpharmacologic comfort measures as appropriate  - Advance diet as tolerated, if ordered  - Consider nutrition services referral to assist patient with adequate nutrition and appropriate food choices  Outcome: Completed  Goal: Maintains or returns to baseline bowel function  Description: INTERVENTIONS:  - Assess bowel function  - Encourage oral fluids to ensure adequate hydration  - Administer IV fluids if ordered to ensure adequate hydration  - Administer ordered medications as needed  - Encourage mobilization and activity  - Consider nutritional services referral to assist patient with adequate nutrition and appropriate food choices  Outcome: Completed  Goal: Maintains adequate nutritional intake  Description: INTERVENTIONS:  - Monitor percentage of each meal consumed  - Identify factors contributing to decreased intake, treat as appropriate  - Assist with meals as needed  - Monitor I&O, weight, and lab values if indicated  - Obtain nutrition services referral as needed  Outcome: Completed  Goal: Establish and maintain optimal ostomy function  Description: INTERVENTIONS:  - Assess bowel function  - Encourage oral fluids to ensure adequate hydration  - Administer IV fluids if ordered to ensure adequate hydration   - Administer ordered medications as needed  - Encourage mobilization and activity  - Nutrition services referral to assist patient with appropriate food choices  - Assess stoma site  - Consider wound care consult   Outcome: Completed  Goal: Oral mucous membranes remain intact  Description: INTERVENTIONS  - Assess oral mucosa and hygiene practices  - Implement preventative oral hygiene regimen  - Implement oral medicated treatments as ordered  - Initiate Nutrition services referral as needed  Outcome: Completed     Problem: METABOLIC, FLUID AND ELECTROLYTES - ADULT  Goal: Electrolytes maintained within normal limits  Description: INTERVENTIONS:  - Monitor labs and assess patient for signs and symptoms of electrolyte imbalances  - Administer electrolyte replacement as ordered  - Monitor response to electrolyte replacements, including repeat lab results as appropriate  - Instruct patient on fluid and nutrition as appropriate  Outcome: Completed  Goal: Fluid balance maintained  Description: INTERVENTIONS:  - Monitor labs   - Monitor I/O and WT  - Instruct patient on fluid and nutrition as appropriate  - Assess for signs & symptoms of volume excess or deficit  8/25/2021 0027 by Darci Argueta RN  Outcome: Completed  8/25/2021 0026 by Darci Argueta RN  Outcome: Not Progressing  Goal: Glucose maintained within target range  Description: INTERVENTIONS:  - Monitor Blood Glucose as ordered  - Assess for signs and symptoms of hyperglycemia and hypoglycemia  - Administer ordered medications to maintain glucose within target range  - Assess nutritional intake and initiate nutrition service referral as needed  8/25/2021 0027 by Darci Argueta RN  Outcome: Completed  8/25/2021 0026 by Darci Argueta RN  Outcome: Not Progressing
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
## 2021-08-25 NOTE — NURSING NOTE
Dr Simuel Merlin tiger texted and made aware that pt has been extremely drowsy since arrival from ICU, pt awakens when spoken to, but falls back asleep quickly  This was reported by the ICU RN as well and meds were adjusted  Dr made aware that sleeping/ sedation meds were held  Ok given  Will continue to monitor
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
## 2021-08-25 NOTE — ASSESSMENT & PLAN NOTE
-Patient with increased pain this morning at the right chest wall  -Rib frx protocol  -Continues to do well on RA   -Low dose pain regimen due to increased sedation, noted as recently as last night
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
## 2021-08-25 NOTE — ASSESSMENT & PLAN NOTE
Lab Results   Component Value Date    EGFR 18 08/25/2021    EGFR 13 08/24/2021    EGFR 11 08/24/2021    CREATININE 2 97 (H) 08/25/2021    CREATININE 3 86 (H) 08/24/2021    CREATININE 4 43 (H) 08/24/2021     -2160 of urine over past 24 hours  -Nephro consulted:  - continue isolyte infusion at 75 cc/hour as creatinine is down trending appropriately  - obtain daily BMP  - obtain urinalysis  - measure CK level  - continue to hold nephrotoxic medications (particularly outpatient ACE-inhibitor/HCTZ)

## 2021-08-26 ENCOUNTER — APPOINTMENT (INPATIENT)
Dept: RADIOLOGY | Facility: HOSPITAL | Age: 46
DRG: 957 | End: 2021-08-26
Payer: COMMERCIAL

## 2021-08-26 LAB
ANION GAP SERPL CALCULATED.3IONS-SCNC: 6 MMOL/L (ref 4–13)
BUN SERPL-MCNC: 18 MG/DL (ref 5–25)
CALCIUM SERPL-MCNC: 8.3 MG/DL (ref 8.3–10.1)
CHLORIDE SERPL-SCNC: 109 MMOL/L (ref 100–108)
CK MB SERPL-MCNC: <1 % (ref 0–2.5)
CK MB SERPL-MCNC: <1 NG/ML (ref 0–5)
CK SERPL-CCNC: 334 U/L (ref 26–192)
CO2 SERPL-SCNC: 25 MMOL/L (ref 21–32)
CREAT SERPL-MCNC: 1.94 MG/DL (ref 0.6–1.3)
GFR SERPL CREATININE-BSD FRML MDRD: 30 ML/MIN/1.73SQ M
GLUCOSE SERPL-MCNC: 92 MG/DL (ref 65–140)
POTASSIUM SERPL-SCNC: 3.5 MMOL/L (ref 3.5–5.3)
SODIUM SERPL-SCNC: 140 MMOL/L (ref 136–145)

## 2021-08-26 PROCEDURE — 71045 X-RAY EXAM CHEST 1 VIEW: CPT

## 2021-08-26 PROCEDURE — 97129 THER IVNTJ 1ST 15 MIN: CPT

## 2021-08-26 PROCEDURE — 82550 ASSAY OF CK (CPK): CPT | Performed by: INTERNAL MEDICINE

## 2021-08-26 PROCEDURE — 97535 SELF CARE MNGMENT TRAINING: CPT

## 2021-08-26 PROCEDURE — 97130 THER IVNTJ EA ADDL 15 MIN: CPT

## 2021-08-26 PROCEDURE — 82553 CREATINE MB FRACTION: CPT | Performed by: INTERNAL MEDICINE

## 2021-08-26 PROCEDURE — 80048 BASIC METABOLIC PNL TOTAL CA: CPT | Performed by: INTERNAL MEDICINE

## 2021-08-26 PROCEDURE — 99232 SBSQ HOSP IP/OBS MODERATE 35: CPT | Performed by: INTERNAL MEDICINE

## 2021-08-26 RX ADMIN — BACITRACIN ZINC 1 LARGE APPLICATION: 500 OINTMENT TOPICAL at 08:16

## 2021-08-26 RX ADMIN — SENNOSIDES AND DOCUSATE SODIUM 1 TABLET: 8.6; 5 TABLET ORAL at 23:21

## 2021-08-26 RX ADMIN — HEPARIN SODIUM 5000 UNITS: 5000 INJECTION INTRAVENOUS; SUBCUTANEOUS at 13:42

## 2021-08-26 RX ADMIN — VENLAFAXINE HYDROCHLORIDE 150 MG: 150 CAPSULE, EXTENDED RELEASE ORAL at 08:16

## 2021-08-26 RX ADMIN — ACETAMINOPHEN 975 MG: 325 TABLET, FILM COATED ORAL at 13:42

## 2021-08-26 RX ADMIN — FAMOTIDINE 10 MG: 20 TABLET ORAL at 08:12

## 2021-08-26 RX ADMIN — METHOCARBAMOL 500 MG: 500 TABLET, FILM COATED ORAL at 00:20

## 2021-08-26 RX ADMIN — METHOCARBAMOL 500 MG: 500 TABLET, FILM COATED ORAL at 11:57

## 2021-08-26 RX ADMIN — OLANZAPINE 2.5 MG: 2.5 TABLET, FILM COATED ORAL at 19:40

## 2021-08-26 RX ADMIN — MELATONIN 6 MG: at 23:21

## 2021-08-26 RX ADMIN — BACITRACIN ZINC 1 LARGE APPLICATION: 500 OINTMENT TOPICAL at 19:40

## 2021-08-26 RX ADMIN — OXYCODONE HYDROCHLORIDE 5 MG: 5 TABLET ORAL at 15:52

## 2021-08-26 RX ADMIN — VILAZODONE HYDROCHLORIDE 40 MG: 40 TABLET ORAL at 08:16

## 2021-08-26 RX ADMIN — METHOCARBAMOL 500 MG: 500 TABLET, FILM COATED ORAL at 05:00

## 2021-08-26 RX ADMIN — METHOCARBAMOL 500 MG: 500 TABLET, FILM COATED ORAL at 19:40

## 2021-08-26 RX ADMIN — METHOCARBAMOL 500 MG: 500 TABLET, FILM COATED ORAL at 23:21

## 2021-08-26 RX ADMIN — HYDROMORPHONE HYDROCHLORIDE 0.2 MG: 0.2 INJECTION, SOLUTION INTRAMUSCULAR; INTRAVENOUS; SUBCUTANEOUS at 00:20

## 2021-08-26 RX ADMIN — LEVOTHYROXINE SODIUM 50 MCG: 50 TABLET ORAL at 05:00

## 2021-08-26 RX ADMIN — HEPARIN SODIUM 5000 UNITS: 5000 INJECTION INTRAVENOUS; SUBCUTANEOUS at 23:21

## 2021-08-26 RX ADMIN — LIDOCAINE 2 PATCH: 50 PATCH TOPICAL at 08:17

## 2021-08-26 RX ADMIN — HEPARIN SODIUM 5000 UNITS: 5000 INJECTION INTRAVENOUS; SUBCUTANEOUS at 05:00

## 2021-08-26 RX ADMIN — OLANZAPINE 2.5 MG: 2.5 TABLET, FILM COATED ORAL at 08:12

## 2021-08-26 RX ADMIN — OXYCODONE HYDROCHLORIDE 5 MG: 5 TABLET ORAL at 20:17

## 2021-08-26 RX ADMIN — BUPROPION HYDROCHLORIDE 150 MG: 150 TABLET, FILM COATED, EXTENDED RELEASE ORAL at 08:12

## 2021-08-26 RX ADMIN — SODIUM CHLORIDE, SODIUM GLUCONATE, SODIUM ACETATE, POTASSIUM CHLORIDE, MAGNESIUM CHLORIDE, SODIUM PHOSPHATE, DIBASIC, AND POTASSIUM PHOSPHATE 75 ML/HR: .53; .5; .37; .037; .03; .012; .00082 INJECTION, SOLUTION INTRAVENOUS at 08:24

## 2021-08-26 RX ADMIN — OXYCODONE HYDROCHLORIDE 5 MG: 5 TABLET ORAL at 05:00

## 2021-08-26 NOTE — ASSESSMENT & PLAN NOTE
-Patient with increased pain this morning at the right chest wall  -Rib frx protocol  -Continues to do well on RA   -Low dose pain regimen due to increased sedation
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
## 2021-08-26 NOTE — ASSESSMENT & PLAN NOTE
Lab Results   Component Value Date    EGFR 18 08/25/2021    EGFR 13 08/24/2021    EGFR 11 08/24/2021    CREATININE 2 97 (H) 08/25/2021    CREATININE 3 86 (H) 08/24/2021    CREATININE 4 43 (H) 08/24/2021     -2160 of urine over past 24 hours  -Nephro consulted:  - continue isolyte infusion at 75 cc/hour as creatinine is down trending appropriately  - obtain daily BMP  - obtain urinalysis--no signs of infection  - measure CK level  - continue to hold nephrotoxic medications (particularly outpatient ACE-inhibitor/HCTZ)
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
## 2021-08-26 NOTE — ASSESSMENT & PLAN NOTE
? Hypotension presumed secondary to blood loss from liver laceration  ? S/p 3x pRBC upon arrival  ? S/p IR embolization  ?  Will continue to monitor    -Hgb stable this morning  -No abdominal tenderness on exam  -Continue to monitor for signs of intraabdominal bleeding  -SQH now initiated

## 2021-08-27 ENCOUNTER — APPOINTMENT (INPATIENT)
Dept: RADIOLOGY | Facility: HOSPITAL | Age: 46
DRG: 957 | End: 2021-08-27
Payer: COMMERCIAL

## 2021-08-27 LAB
ANION GAP SERPL CALCULATED.3IONS-SCNC: 5 MMOL/L (ref 4–13)
BUN SERPL-MCNC: 14 MG/DL (ref 5–25)
CALCIUM SERPL-MCNC: 8.3 MG/DL (ref 8.3–10.1)
CHLORIDE SERPL-SCNC: 109 MMOL/L (ref 100–108)
CO2 SERPL-SCNC: 27 MMOL/L (ref 21–32)
CREAT SERPL-MCNC: 1.67 MG/DL (ref 0.6–1.3)
GFR SERPL CREATININE-BSD FRML MDRD: 36 ML/MIN/1.73SQ M
GLUCOSE SERPL-MCNC: 92 MG/DL (ref 65–140)
POTASSIUM SERPL-SCNC: 3.2 MMOL/L (ref 3.5–5.3)
SODIUM SERPL-SCNC: 141 MMOL/L (ref 136–145)

## 2021-08-27 PROCEDURE — 97530 THERAPEUTIC ACTIVITIES: CPT

## 2021-08-27 PROCEDURE — 97116 GAIT TRAINING THERAPY: CPT

## 2021-08-27 PROCEDURE — 71045 X-RAY EXAM CHEST 1 VIEW: CPT

## 2021-08-27 PROCEDURE — 99232 SBSQ HOSP IP/OBS MODERATE 35: CPT | Performed by: INTERNAL MEDICINE

## 2021-08-27 PROCEDURE — 80048 BASIC METABOLIC PNL TOTAL CA: CPT | Performed by: INTERNAL MEDICINE

## 2021-08-27 RX ORDER — POTASSIUM CHLORIDE 20 MEQ/1
40 TABLET, EXTENDED RELEASE ORAL ONCE
Status: COMPLETED | OUTPATIENT
Start: 2021-08-27 | End: 2021-08-27

## 2021-08-27 RX ADMIN — OXYCODONE HYDROCHLORIDE 5 MG: 5 TABLET ORAL at 21:00

## 2021-08-27 RX ADMIN — MELATONIN 6 MG: at 21:00

## 2021-08-27 RX ADMIN — BACITRACIN ZINC 1 LARGE APPLICATION: 500 OINTMENT TOPICAL at 08:53

## 2021-08-27 RX ADMIN — POTASSIUM CHLORIDE 40 MEQ: 1500 TABLET, EXTENDED RELEASE ORAL at 08:53

## 2021-08-27 RX ADMIN — HEPARIN SODIUM 5000 UNITS: 5000 INJECTION INTRAVENOUS; SUBCUTANEOUS at 05:48

## 2021-08-27 RX ADMIN — OLANZAPINE 2.5 MG: 2.5 TABLET, FILM COATED ORAL at 08:54

## 2021-08-27 RX ADMIN — OXYCODONE HYDROCHLORIDE 5 MG: 5 TABLET ORAL at 11:57

## 2021-08-27 RX ADMIN — METHOCARBAMOL 500 MG: 500 TABLET, FILM COATED ORAL at 05:47

## 2021-08-27 RX ADMIN — OXYCODONE HYDROCHLORIDE 5 MG: 5 TABLET ORAL at 03:28

## 2021-08-27 RX ADMIN — BUPROPION HYDROCHLORIDE 150 MG: 150 TABLET, FILM COATED, EXTENDED RELEASE ORAL at 08:54

## 2021-08-27 RX ADMIN — FAMOTIDINE 10 MG: 20 TABLET ORAL at 08:53

## 2021-08-27 RX ADMIN — METHOCARBAMOL 500 MG: 500 TABLET, FILM COATED ORAL at 17:39

## 2021-08-27 RX ADMIN — HEPARIN SODIUM 5000 UNITS: 5000 INJECTION INTRAVENOUS; SUBCUTANEOUS at 15:46

## 2021-08-27 RX ADMIN — HYDROMORPHONE HYDROCHLORIDE 0.2 MG: 0.2 INJECTION, SOLUTION INTRAMUSCULAR; INTRAVENOUS; SUBCUTANEOUS at 13:39

## 2021-08-27 RX ADMIN — LIDOCAINE 2 PATCH: 50 PATCH TOPICAL at 08:54

## 2021-08-27 RX ADMIN — SENNOSIDES AND DOCUSATE SODIUM 1 TABLET: 8.6; 5 TABLET ORAL at 21:00

## 2021-08-27 RX ADMIN — VENLAFAXINE HYDROCHLORIDE 150 MG: 150 CAPSULE, EXTENDED RELEASE ORAL at 08:54

## 2021-08-27 RX ADMIN — LEVOTHYROXINE SODIUM 50 MCG: 50 TABLET ORAL at 05:47

## 2021-08-27 RX ADMIN — HEPARIN SODIUM 5000 UNITS: 5000 INJECTION INTRAVENOUS; SUBCUTANEOUS at 21:00

## 2021-08-27 RX ADMIN — METHOCARBAMOL 500 MG: 500 TABLET, FILM COATED ORAL at 11:57

## 2021-08-27 RX ADMIN — OXYCODONE HYDROCHLORIDE 5 MG: 5 TABLET ORAL at 15:57

## 2021-08-27 RX ADMIN — BACITRACIN ZINC 1 LARGE APPLICATION: 500 OINTMENT TOPICAL at 17:39

## 2021-08-27 RX ADMIN — VILAZODONE HYDROCHLORIDE 40 MG: 40 TABLET ORAL at 08:55

## 2021-08-27 RX ADMIN — HYDROMORPHONE HYDROCHLORIDE 0.2 MG: 0.2 INJECTION, SOLUTION INTRAMUSCULAR; INTRAVENOUS; SUBCUTANEOUS at 22:26

## 2021-08-27 RX ADMIN — OLANZAPINE 2.5 MG: 2.5 TABLET, FILM COATED ORAL at 17:39

## 2021-08-27 NOTE — PHYSICAL THERAPY NOTE
Physical Therapy Progress Note     08/27/21 1302   PT Last Visit   PT Visit Date 08/27/21   Pain Assessment   Pain Assessment Tool 0-10   Pain Score 4   Pain Location/Orientation Orientation: Right;Location: Rib Cage   Restrictions/Precautions   Weight Bearing Precautions Per Order No   Other Precautions Pain; Fall Risk   General   Chart Reviewed Yes   Response to Previous Treatment Patient with no complaints from previous session  Family/Caregiver Present No   Cognition   Overall Cognitive Status WFL   Arousal/Participation Alert; Responsive; Cooperative   Subjective   Subjective Patient seated in chair, agreeable and motivated for physical therapy  Cleared by RN for session  Transfers   Sit to Stand 4  Minimal assistance  (cg)   Additional items Assist x 1; Increased time required   Stand to Sit 4  Minimal assistance  (cg)   Additional items Assist x 1; Increased time required   Ambulation/Elevation   Gait pattern Excessively slow; Step to;Short stride;Decreased foot clearance; Improper Weight shift   Gait Assistance 4  Minimal assist  (cg)   Additional items Assist x 1   Assistive Device Rolling walker   Distance 300ft    Stair Management Assistance 4  Minimal assist   Additional items Assist x 1;Verbal cues   Stair Management Technique Two rails; Step to pattern; Foreward   Number of Stairs 4   Balance   Static Sitting Fair +   Dynamic Sitting Fair   Static Standing Fair   Dynamic Standing Fair -   Ambulatory Fair -   Endurance Deficit   Endurance Deficit Yes   Endurance Deficit Description pain    Activity Tolerance   Activity Tolerance Patient tolerated treatment well;Patient limited by fatigue;Patient limited by pain   Nurse Made Aware appropriate to see   Assessment   Prognosis Good   Problem List Decreased strength;Decreased endurance; Impaired balance;Decreased mobility;Pain   Assessment Patient was CG for all sit to stand transfers from chair this session and was able to ambulate increased distance with use of RW, minimal assist changing to CG with increased duration  Patient required no seated rest breaks however ambulates with a slow gricelda  Trialed ascending/descending stairs this session with patient demonstrating overall good safety and technique, minimal assist required, patient reporting confidence in steps  Patient limited this session by pain and would continue to benefit from physical therapy to improve functional mobility until medically cleared  Goals   Patient Goals to get better and go home   STG Expiration Date 09/08/21   Short Term Goal #1 In 14 days pt will be able to: 1  Demonstrate ability to perform all aspects of bed mobility independently to increase functional independence  2  Perform functional transfers with LRAD independently to facilitate safe return to previous living environment  3   Ambulate 150 ft with LRAD independently with stable vitals to improve safety with household distances and reduce fall risk  4  Improve LE strength grades by 1 to increase ease of functional mobility with transfers and gait  5  Pt will demonstrate improved balance by one grade in order to decrease risk of falls  6  Climb 3-4 steps with supervision and 1 HR to simulate entrance to home   PT Treatment Day 1   Plan   Treatment/Interventions Functional transfer training;LE strengthening/ROM; Elevations; Endurance training;Patient/family training;Gait training;Spoke to nursing   Progress Progressing toward goals   PT Frequency Other (Comment)  (3-6x/wk)   Recommendation   PT Discharge Recommendation Home with outpatient rehabilitation   Equipment Recommended [facility] Recommended Wheeled walker   Change/add to [employer]? No   PT - OK to Discharge Yes  (once medically cleared)     An AM-PAC Basic Mobility standardized score less than 42 9 suggests the patient may benefit from discharge to post-acute rehab services      Jesenia Bundy PTA

## 2021-08-27 NOTE — PLAN OF CARE
Problem: MOBILITY - ADULT  Goal: Maintain or return to baseline ADL function  Description: INTERVENTIONS:  -  Assess patient's ability to carry out ADLs; assess patient's baseline for ADL function and identify physical deficits which impact ability to perform ADLs (bathing, care of mouth/teeth, toileting, grooming, dressing, etc )  - Assess/evaluate cause of self-care deficits   - Assess range of motion  - Assess patient's mobility; develop plan if impaired  - Assess patient's need for assistive devices and provide as appropriate  - Encourage maximum independence but intervene and supervise when necessary  - Involve family in performance of ADLs  - Assess for home care needs following discharge   - Consider OT consult to assist with ADL evaluation and planning for discharge  - Provide patient education as appropriate  Outcome: Progressing  Goal: Maintains/Returns to pre admission functional level  Description: INTERVENTIONS:  - Perform BMAT or MOVE assessment daily    - Set and communicate daily mobility goal to care team and patient/family/caregiver     - Collaborate with rehabilitation services on mobility goals if consulted  - Out of bed for toileting  - Record patient progress and toleration of activity level   Outcome: Progressing     Problem: RESPIRATORY - ADULT  Goal: Achieves optimal ventilation and oxygenation  Description: INTERVENTIONS:  - Assess for changes in respiratory status  - Assess for changes in mentation and behavior  - Position to facilitate oxygenation and minimize respiratory effort  - Oxygen administered by appropriate delivery if ordered  - Initiate smoking cessation education as indicated  - Encourage broncho-pulmonary hygiene including cough, deep breathe, Incentive Spirometry  - Assess the need for suctioning and aspirate as needed  - Assess and instruct to report SOB or any respiratory difficulty  - Respiratory Therapy support as indicated  Outcome: Progressing     Problem: SKIN/TISSUE INTEGRITY - ADULT  Goal: Skin Integrity remains intact(Skin Breakdown Prevention)  Description: Assess:  -Assess extremities for adequate circulation and sensation     Bed Management:  -Have minimal linens on bed & keep smooth, unwrinkled  -Change linens as needed when moist or perspiring    Toileting:  -Offer bedside commode      Skin Care:  -Avoid use of baby powder, tape, friction and shearing, hot water or constrictive clothing    -Do not massage red bony areas    Outcome: Progressing  Goal: Incision(s), wounds(s) or drain site(s) healing without S/S of infection  Description: INTERVENTIONS  - Assess and document dressing, incision, wound bed, drain sites and surrounding tissue  - Provide patient and family education  Outcome: Progressing  Goal: Pressure injury heals and does not worsen  Description: Interventions:  - Implement low air loss mattress or specialty surface (Criteria met)  - Apply silicone foam dressing  - Consider nutrition services referral as needed  Outcome: Progressing     Problem: HEMATOLOGIC - ADULT  Goal: Maintains hematologic stability  Description: INTERVENTIONS  - Assess for signs and symptoms of bleeding or hemorrhage  - Monitor labs  - Administer supportive blood products/factors as ordered and appropriate  Outcome: Progressing     Problem: MUSCULOSKELETAL - ADULT  Goal: Maintain or return mobility to safest level of function  Description: INTERVENTIONS:  - Assess patient's ability to carry out ADLs; assess patient's baseline for ADL function and identify physical deficits which impact ability to perform ADLs (bathing, care of mouth/teeth, toileting, grooming, dressing, etc )  - Assess/evaluate cause of self-care deficits   - Assess range of motion  - Assess patient's mobility  - Assess patient's need for assistive devices and provide as appropriate  - Encourage maximum independence but intervene and supervise when necessary  - Involve family in performance of ADLs  - Assess for home care needs following discharge   - Consider OT consult to assist with ADL evaluation and planning for discharge  - Provide patient education as appropriate  Outcome: Progressing  Goal: Maintain proper alignment of affected body part  Description: INTERVENTIONS:  - Support, maintain and protect limb and body alignment  - Provide patient/ family with appropriate education  Outcome: Progressing     Problem: Potential for Falls  Goal: Patient will remain free of falls  Description: INTERVENTIONS:  - Educate patient/family on patient safety including physical limitations  - Instruct patient to call for assistance with activity   - Consult OT/PT to assist with strengthening/mobility   - Keep Call bell within reach  - Keep bed low and locked with side rails adjusted as appropriate  - Keep care items and personal belongings within reach  - Initiate and maintain comfort rounds  - Make Fall Risk Sign visible to staff  - Apply yellow socks and bracelet for high fall risk patients  - Consider moving patient to room near nurses station  Outcome: Progressing     Problem: Prexisting or High Potential for Compromised Skin Integrity  Goal: Skin integrity is maintained or improved  Description: INTERVENTIONS:  - Identify patients at risk for skin breakdown  - Assess and monitor skin integrity  - Assess and monitor nutrition and hydration status  - Monitor labs   - Assess for incontinence   - Turn and reposition patient  - Assist with mobility/ambulation  - Relieve pressure over bony prominences  - Avoid friction and shearing  - Provide appropriate hygiene as needed including keeping skin clean and dry  - Evaluate need for skin moisturizer/barrier cream  - Collaborate with interdisciplinary team   - Patient/family teaching  - Consider wound care consult   Outcome: Progressing     Problem: Nutrition/Hydration-ADULT  Goal: Nutrient/Hydration intake appropriate for improving, restoring or maintaining nutritional needs  Description: Monitor and assess patient's nutrition/hydration status for malnutrition  Collaborate with interdisciplinary team and initiate plan and interventions as ordered  Monitor patient's weight and dietary intake as ordered or per policy  Utilize nutrition screening tool and intervene as necessary  Determine patient's food preferences and provide high-protein, high-caloric foods as appropriate       INTERVENTIONS:  - Monitor oral intake, urinary output, labs, and treatment plans  - Assess nutrition and hydration status and recommend course of action  - Evaluate amount of meals eaten  - Assist patient with eating if necessary   - Allow adequate time for meals  - Recommend/ encourage appropriate diets, oral nutritional supplements, and vitamin/mineral supplements  - Order, calculate, and assess calorie counts as needed  - Recommend, monitor, and adjust tube feedings and TPN/PPN based on assessed needs  - Assess need for intravenous fluids  - Provide specific nutrition/hydration education as appropriate  - Include patient/family/caregiver in decisions related to nutrition  Outcome: Progressing

## 2021-08-27 NOTE — PLAN OF CARE
Problem: PHYSICAL THERAPY ADULT  Goal: Performs mobility at highest level of function for planned discharge setting  See evaluation for individualized goals  Description: Treatment/Interventions: Functional transfer training, LE strengthening/ROM, Elevations, Therapeutic exercise, Endurance training, Patient/family training, Equipment eval/education, Bed mobility, Gait training  Equipment Recommended: David Letters       See flowsheet documentation for full assessment, interventions and recommendations  Outcome: Progressing  Note: Prognosis: Good  Problem List: Decreased strength, Decreased endurance, Impaired balance, Decreased mobility, Pain  Assessment: Patient was CG for all sit to stand transfers from chair this session and was able to ambulate increased distance with use of RW, minimal assist changing to CG with increased duration  Patient required no seated rest breaks however ambulates with a slow gricelda  Trialed ascending/descending stairs this session with patient demonstrating overall good safety and technique, minimal assist required, patient reporting confidence in steps  Patient limited this session by pain and would continue to benefit from physical therapy to improve functional mobility until medically cleared  PT Discharge Recommendation: Home with outpatient rehabilitation     PT - OK to Discharge: (S) Yes (once medically cleared)    See flowsheet documentation for full assessment

## 2021-08-27 NOTE — PROGRESS NOTES
[facility]  Progress Note - Meghan Coldjevon 1975, 50 y o  female MRN: [id number]  Unit/Bed#: [facility] 819-01 Encounter: [id number]  Primary Care Provider: No primary care provider on file  Date and time admitted to hospital: 8/23/2021  7:53 PM    CKD (chronic kidney disease) stage 3, GFR 30-59 ml/min [facility])  Assessment & Plan  Lab Results   Component Value Date    EGFR 36 08/27/2021    EGFR 30 08/26/2021    EGFR 18 08/25/2021    CREATININE 1 67 (H) 08/27/2021    CREATININE 1 94 (H) 08/26/2021    CREATININE 2 97 (H) 08/25/2021     -Creatinine steadily downtrending    -Nephro consulted, most recent plan as below:   DUSTIN on CKD3b  - Continue isolyte gtt 75cc/hr for now   - Monitor with daily BMP  - Patient ok to leave per primary team from nephro standpoint     Elevated CK  - Continue isolyte gtt 75cc/hr  - Monitor trend     Hypertension  - Continue to hold ACEi/ARB and HCTZ   - Please do not discharge on ACEi/ARB and HCTZ  - Monitor vital signs  - Maintain adequate pain control     Microscopic hematuria  - monitor urine for gross hematuria  - no further workup indicated at this time  - may address during outpatient Nephrology follow-up  - continue isolyte infusion at 75 cc/hour as creatinine is down trending appropriately  - obtain daily BMP  - obtain urinalysis--no signs of infection  - measure CK level  - continue to hold nephrotoxic medications (particularly outpatient ACE-inhibitor/HCTZ)      Closed fracture of multiple ribs of right side  Assessment & Plan  -Patient with increased pain this morning at the right chest wall  -Rib frx protocol  -Continues to do well on RA   -Low dose pain regimen due to increased sedation which has since improved     * Liver laceration, grade III, without open wound into cavity  Assessment & Plan  ? Hypotension presumed secondary to blood loss from liver laceration  ? S/p 3x pRBC upon arrival  ? S/p IR embolization  ?  Will continue to monitor    -Hgb stable this morning  -No abdominal tenderness on exam  -Continue to monitor for signs of intraabdominal bleeding  -SQH for dvt proph  -Will require CTA abd/pelv w/delayed imaging to r/o pseudoaneurysm prior to discharge        Disposition: continue inpatient care      SUBJECTIVE:  Chief Complaint:  "My right side still hurts"    Subjective:  Continues to complain of right-sided chest wall pain due to rib fractures  This has been stable  She has been pulling approximately 1700 on her IS  Reports that taking a deep breath in does hurt but she is able to bear throughout  No other new complaints today  She has no lightheadedness or dizziness, no chest pain otherwise, no actual difficulty breathing        OBJECTIVE:     Meds/Allergies     Current Facility-Administered Medications:     acetaminophen (TYLENOL) tablet 975 mg, 975 mg, Oral, Q6H PRN, Delmi Echevarria MD, 975 mg at 08/26/21 1342    bacitracin topical ointment 1 large application, 1 large application, Topical, BID, Delmi Echevarria MD, 1 large application at 04/68/27 0853    buPROPion (WELLBUTRIN XL) 24 hr tablet 150 mg, 150 mg, Oral, Daily, Delmi Echevarria MD, 150 mg at 08/27/21 0854    famotidine (PEPCID) tablet 10 mg, 10 mg, Oral, Daily, Delmi Echevarria MD, 10 mg at 08/27/21 0853    heparin (porcine) subcutaneous injection 5,000 Units, 5,000 Units, Subcutaneous, Q8H [address], Delmi Echevarria MD, 5,000 Units at 08/27/21 0548    hydrALAZINE (APRESOLINE) injection 10 mg, 10 mg, Intravenous, Q6H PRN, Delmi Echevarria MD    HYDROmorphone HCl (DILAUDID) injection 0 2 mg, 0 2 mg, Intravenous, Q3H PRN, Delmi Echevarria MD, 0 2 mg at 08/26/21 0020    levothyroxine tablet 50 mcg, 50 mcg, Oral, Early Morning, Amber Jonn Simmonds, MD, 50 mcg at 08/27/21 0547    lidocaine (LIDODERM) 5 % patch 2 patch, 2 patch, Topical, Daily, Delmi Echevarria MD, 2 patch at 08/27/21 0854    LORazepam (ATIVAN) tablet 1 mg, 1 mg, Oral, Q8H PRN, Ghulam Gomez MD    melatonin tablet 6 mg, 6 mg, Oral, HS, Ghulam Gomez MD, 6 mg at 08/26/21 2321    methocarbamol (ROBAXIN) tablet 500 mg, 500 mg, Oral, Q6H [address], Colleen Holly Hayse MD, 500 mg at 08/27/21 0547    OLANZapine (ZyPREXA) tablet 2 5 mg, 2 5 mg, Oral, BID, Ghulam Gomez MD, 2 5 mg at 08/27/21 0854    oxyCODONE (ROXICODONE) IR tablet 2 5 mg, 2 5 mg, Oral, Q4H PRN, Ghulam Gomez MD    oxyCODONE (ROXICODONE) IR tablet 5 mg, 5 mg, Oral, Q4H PRN, Ghulam Gomez MD, 5 mg at 08/27/21 0328    polyethylene glycol (MIRALAX) packet 17 g, 17 g, Oral, BID PRN, Ghulam Gomez MD    senna-docusate sodium (SENOKOT S) 8 6-50 mg per tablet 1 tablet, 1 tablet, Oral, HS, Ghulam Gomez MD, 1 tablet at 08/26/21 2321    venlafaxine (EFFEXOR-XR) 24 hr capsule 150 mg, 150 mg, Oral, Daily, Ghulam Gomez MD, 150 mg at 08/27/21 0854    vilazodone (VIIBRYD) tablet 40 mg, 40 mg, Oral, Daily With Breakfast, Ghulam Gomez MD, 40 mg at 08/27/21 0855     Vitals:   Vitals:    08/27/21 0724   BP: 132/79   Pulse: 69   Resp: 20   Temp: 98 2 °F (36 8 °C)   SpO2: 94%       Intake/Output:  I/O       08/25 0701 - 08/26 0700 08/26 0701 - 08/27 0700    P  O  300 320    I V  (mL/kg) 232 1 (3 2) 1000 (13 9)    Total Intake(mL/kg) 532 1 (7 4) 1320 (18 4)    Urine (mL/kg/hr) 1525 (0 9) 1000 (0 6)    Total Output 1525 1000    Net -992 9 +320                 Nutrition/GI Proph/Bowel Reg:  Renal diet   P r n  MiraLax  Physical Exam:   GENERAL APPEARANCE:  Well appearing  Sitting up in bed  NEURO:  Awake alert, oriented x3  HEENT:  Pupils equal and reactive  CV:  Regular rate and rhythm, no murmurs  LUNGS:  CTAB  GI:  Soft, nontender, nondistended  :  Deferred  MSK:  No obvious deformities    Left knee laceration with 3 sutures in place, well healing  SKIN:  Warm, dry  Invasive Devices     Peripheral Intravenous Line            Peripheral IV 08/24/21 Proximal;Right;Upper;Ventral (anterior) Arm 2 days                 Lab Results:   BMP/CMP:   Lab Results   Component Value Date    SODIUM 141 08/27/2021    K 3 2 (L) 08/27/2021     (H) 08/27/2021    CO2 27 08/27/2021    BUN 14 08/27/2021    CREATININE 1 67 (H) 08/27/2021    CALCIUM 8 3 08/27/2021    EGFR 36 08/27/2021     Imaging/EKG Studies: Results: I have personally reviewed pertinent reports        VTE Prophylaxis: Heparin
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
## 2021-08-27 NOTE — ASSESSMENT & PLAN NOTE
? Hypotension presumed secondary to blood loss from liver laceration  ? S/p 3x pRBC upon arrival  ? S/p IR embolization  ?  Will continue to monitor    -Hgb stable this morning  -No abdominal tenderness on exam  -Continue to monitor for signs of intraabdominal bleeding  -SQH for dvt proph  -Will require CTA abd/pelv w/delayed imaging to r/o pseudoaneurysm prior to discharge

## 2021-08-27 NOTE — ASSESSMENT & PLAN NOTE
Lab Results   Component Value Date    EGFR 36 08/27/2021    EGFR 30 08/26/2021    EGFR 18 08/25/2021    CREATININE 1 67 (H) 08/27/2021    CREATININE 1 94 (H) 08/26/2021    CREATININE 2 97 (H) 08/25/2021     -Creatinine steadily downtrending    -Nephro consulted, most recent plan as below:   DUSTIN on CKD3b  - Continue isolyte gtt 75cc/hr for now   - Monitor with daily BMP  - Patient ok to leave per primary team from nephro standpoint     Elevated CK  - Continue isolyte gtt 75cc/hr  - Monitor trend     Hypertension  - Continue to hold ACEi/ARB and HCTZ   - Please do not discharge on ACEi/ARB and HCTZ  - Monitor vital signs  - Maintain adequate pain control     Microscopic hematuria  - monitor urine for gross hematuria  - no further workup indicated at this time  - may address during outpatient Nephrology follow-up  - continue isolyte infusion at 75 cc/hour as creatinine is down trending appropriately  - obtain daily BMP  - obtain urinalysis--no signs of infection  - measure CK level  - continue to hold nephrotoxic medications (particularly outpatient ACE-inhibitor/HCTZ)

## 2021-08-27 NOTE — ASSESSMENT & PLAN NOTE
-Patient with increased pain this morning at the right chest wall  -Rib frx protocol  -Continues to do well on RA   -Low dose pain regimen due to increased sedation which has since improved

## 2021-08-28 LAB
ANION GAP SERPL CALCULATED.3IONS-SCNC: 4 MMOL/L (ref 4–13)
BUN SERPL-MCNC: 13 MG/DL (ref 5–25)
CALCIUM SERPL-MCNC: 8.4 MG/DL (ref 8.3–10.1)
CHLORIDE SERPL-SCNC: 110 MMOL/L (ref 100–108)
CO2 SERPL-SCNC: 27 MMOL/L (ref 21–32)
CREAT SERPL-MCNC: 1.39 MG/DL (ref 0.6–1.3)
GFR SERPL CREATININE-BSD FRML MDRD: 45 ML/MIN/1.73SQ M
GLUCOSE SERPL-MCNC: 86 MG/DL (ref 65–140)
POTASSIUM SERPL-SCNC: 3.6 MMOL/L (ref 3.5–5.3)
SODIUM SERPL-SCNC: 141 MMOL/L (ref 136–145)

## 2021-08-28 PROCEDURE — 99232 SBSQ HOSP IP/OBS MODERATE 35: CPT | Performed by: SURGERY

## 2021-08-28 PROCEDURE — 99232 SBSQ HOSP IP/OBS MODERATE 35: CPT | Performed by: INTERNAL MEDICINE

## 2021-08-28 PROCEDURE — 80048 BASIC METABOLIC PNL TOTAL CA: CPT | Performed by: EMERGENCY MEDICINE

## 2021-08-28 RX ORDER — GUAIFENESIN 600 MG
600 TABLET, EXTENDED RELEASE 12 HR ORAL EVERY 12 HOURS SCHEDULED
Status: DISCONTINUED | OUTPATIENT
Start: 2021-08-28 | End: 2021-08-31 | Stop reason: HOSPADM

## 2021-08-28 RX ADMIN — OXYCODONE HYDROCHLORIDE 5 MG: 5 TABLET ORAL at 05:57

## 2021-08-28 RX ADMIN — METHOCARBAMOL 500 MG: 500 TABLET, FILM COATED ORAL at 11:05

## 2021-08-28 RX ADMIN — METHOCARBAMOL 500 MG: 500 TABLET, FILM COATED ORAL at 01:00

## 2021-08-28 RX ADMIN — VENLAFAXINE HYDROCHLORIDE 150 MG: 150 CAPSULE, EXTENDED RELEASE ORAL at 10:06

## 2021-08-28 RX ADMIN — BACITRACIN ZINC 1 LARGE APPLICATION: 500 OINTMENT TOPICAL at 10:05

## 2021-08-28 RX ADMIN — BUPROPION HYDROCHLORIDE 150 MG: 150 TABLET, FILM COATED, EXTENDED RELEASE ORAL at 10:07

## 2021-08-28 RX ADMIN — OXYCODONE HYDROCHLORIDE 5 MG: 5 TABLET ORAL at 13:49

## 2021-08-28 RX ADMIN — OLANZAPINE 2.5 MG: 2.5 TABLET, FILM COATED ORAL at 10:05

## 2021-08-28 RX ADMIN — OXYCODONE HYDROCHLORIDE 5 MG: 5 TABLET ORAL at 17:42

## 2021-08-28 RX ADMIN — GUAIFENESIN 600 MG: 600 TABLET, EXTENDED RELEASE ORAL at 05:56

## 2021-08-28 RX ADMIN — MELATONIN 6 MG: at 22:01

## 2021-08-28 RX ADMIN — HEPARIN SODIUM 5000 UNITS: 5000 INJECTION INTRAVENOUS; SUBCUTANEOUS at 22:02

## 2021-08-28 RX ADMIN — HYDROMORPHONE HYDROCHLORIDE 0.2 MG: 0.2 INJECTION, SOLUTION INTRAMUSCULAR; INTRAVENOUS; SUBCUTANEOUS at 23:19

## 2021-08-28 RX ADMIN — OXYCODONE HYDROCHLORIDE 5 MG: 5 TABLET ORAL at 01:01

## 2021-08-28 RX ADMIN — LEVOTHYROXINE SODIUM 50 MCG: 50 TABLET ORAL at 05:57

## 2021-08-28 RX ADMIN — METHOCARBAMOL 500 MG: 500 TABLET, FILM COATED ORAL at 17:42

## 2021-08-28 RX ADMIN — SENNOSIDES AND DOCUSATE SODIUM 1 TABLET: 8.6; 5 TABLET ORAL at 21:59

## 2021-08-28 RX ADMIN — HYDROMORPHONE HYDROCHLORIDE 0.2 MG: 0.2 INJECTION, SOLUTION INTRAMUSCULAR; INTRAVENOUS; SUBCUTANEOUS at 04:18

## 2021-08-28 RX ADMIN — GUAIFENESIN 600 MG: 600 TABLET, EXTENDED RELEASE ORAL at 22:01

## 2021-08-28 RX ADMIN — FAMOTIDINE 10 MG: 20 TABLET ORAL at 10:05

## 2021-08-28 RX ADMIN — HEPARIN SODIUM 5000 UNITS: 5000 INJECTION INTRAVENOUS; SUBCUTANEOUS at 14:05

## 2021-08-28 RX ADMIN — VILAZODONE HYDROCHLORIDE 40 MG: 40 TABLET ORAL at 10:08

## 2021-08-28 RX ADMIN — HEPARIN SODIUM 5000 UNITS: 5000 INJECTION INTRAVENOUS; SUBCUTANEOUS at 05:57

## 2021-08-28 RX ADMIN — METHOCARBAMOL 500 MG: 500 TABLET, FILM COATED ORAL at 23:19

## 2021-08-28 RX ADMIN — OXYCODONE HYDROCHLORIDE 5 MG: 5 TABLET ORAL at 10:05

## 2021-08-28 RX ADMIN — OXYCODONE HYDROCHLORIDE 5 MG: 5 TABLET ORAL at 22:01

## 2021-08-28 RX ADMIN — LIDOCAINE 2 PATCH: 50 PATCH TOPICAL at 10:05

## 2021-08-28 RX ADMIN — OLANZAPINE 2.5 MG: 2.5 TABLET, FILM COATED ORAL at 17:42

## 2021-08-28 RX ADMIN — METHOCARBAMOL 500 MG: 500 TABLET, FILM COATED ORAL at 05:57
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
## 2021-08-28 NOTE — PLAN OF CARE
Problem: MOBILITY - ADULT  Goal: Maintain or return to baseline ADL function  Description: INTERVENTIONS:  -  Assess patient's ability to carry out ADLs; assess patient's baseline for ADL function and identify physical deficits which impact ability to perform ADLs (bathing, care of mouth/teeth, toileting, grooming, dressing, etc )  - Assess/evaluate cause of self-care deficits   - Assess range of motion  - Assess patient's mobility; develop plan if impaired  - Assess patient's need for assistive devices and provide as appropriate  - Encourage maximum independence but intervene and supervise when necessary  - Involve family in performance of ADLs  - Assess for home care needs following discharge   - Consider OT consult to assist with ADL evaluation and planning for discharge  - Provide patient education as appropriate  Outcome: Progressing  Goal: Maintains/Returns to pre admission functional level  Description: INTERVENTIONS:  - Perform BMAT or MOVE assessment daily    - Set and communicate daily mobility goal to care team and patient/family/caregiver     - Collaborate with rehabilitation services on mobility goals if consulted  - Out of bed for toileting  - Record patient progress and toleration of activity level   Outcome: Progressing     Problem: RESPIRATORY - ADULT  Goal: Achieves optimal ventilation and oxygenation  Description: INTERVENTIONS:  - Assess for changes in respiratory status  - Assess for changes in mentation and behavior  - Position to facilitate oxygenation and minimize respiratory effort  - Oxygen administered by appropriate delivery if ordered  - Initiate smoking cessation education as indicated  - Encourage broncho-pulmonary hygiene including cough, deep breathe, Incentive Spirometry  - Assess the need for suctioning and aspirate as needed  - Assess and instruct to report SOB or any respiratory difficulty  - Respiratory Therapy support as indicated  Outcome: Progressing     Problem: SKIN/TISSUE INTEGRITY - ADULT  Goal: Skin Integrity remains intact(Skin Breakdown Prevention)  Description: Assess:  -Inspect skin when repositioning, toileting, and assisting with ADLS  -Assess extremities for adequate circulation and sensation     Bed Management:  -Have minimal linens on bed & keep smooth, unwrinkled  -Change linens as needed when moist or perspiring    Toileting:  -Offer bedside commode    Activity:  -Encourage activity and walks on unit  -Encourage or provide ROM exercises   -Use appropriate equipment to lift or move patient in bed      Skin Care:  -Avoid use of baby powder, tape, friction and shearing, hot water or constrictive clothing  -Do not massage red bony areas    Next Steps:  Outcome: Progressing  Goal: Incision(s), wounds(s) or drain site(s) healing without S/S of infection  Description: INTERVENTIONS  - Assess and document dressing, incision, wound bed, drain sites and surrounding tissue  - Provide patient and family education  Outcome: Progressing  Goal: Pressure injury heals and does not worsen  Description: Interventions:  - Implement low air loss mattress or specialty surface (Criteria met)  - Apply silicone foam dressing  - Consider nutrition services referral as needed  Outcome: Progressing     Problem: HEMATOLOGIC - ADULT  Goal: Maintains hematologic stability  Description: INTERVENTIONS  - Assess for signs and symptoms of bleeding or hemorrhage  - Monitor labs  - Administer supportive blood products/factors as ordered and appropriate  Outcome: Progressing     Problem: MUSCULOSKELETAL - ADULT  Goal: Maintain or return mobility to safest level of function  Description: INTERVENTIONS:  - Assess patient's ability to carry out ADLs; assess patient's baseline for ADL function and identify physical deficits which impact ability to perform ADLs (bathing, care of mouth/teeth, toileting, grooming, dressing, etc )  - Assess/evaluate cause of self-care deficits   - Assess range of motion  - Assess patient's mobility  - Assess patient's need for assistive devices and provide as appropriate  - Encourage maximum independence but intervene and supervise when necessary  - Involve family in performance of ADLs  - Assess for home care needs following discharge   - Consider OT consult to assist with ADL evaluation and planning for discharge  - Provide patient education as appropriate  Outcome: Progressing  Goal: Maintain proper alignment of affected body part  Description: INTERVENTIONS:  - Support, maintain and protect limb and body alignment  - Provide patient/ family with appropriate education  Outcome: Progressing     Problem: Potential for Falls  Goal: Patient will remain free of falls  Description: INTERVENTIONS:  - Educate patient/family on patient safety including physical limitations  - Instruct patient to call for assistance with activity   - Consult OT/PT to assist with strengthening/mobility   - Keep Call bell within reach  - Keep bed low and locked with side rails adjusted as appropriate  - Keep care items and personal belongings within reach  - Initiate and maintain comfort rounds  - Make Fall Risk Sign visible to staff  - Apply yellow socks and bracelet for high fall risk patients  - Consider moving patient to room near nurses station  Outcome: Progressing     Problem: Prexisting or High Potential for Compromised Skin Integrity  Goal: Skin integrity is maintained or improved  Description: INTERVENTIONS:  - Identify patients at risk for skin breakdown  - Assess and monitor skin integrity  - Assess and monitor nutrition and hydration status  - Monitor labs   - Assess for incontinence   - Turn and reposition patient  - Assist with mobility/ambulation  - Relieve pressure over bony prominences  - Avoid friction and shearing  - Provide appropriate hygiene as needed including keeping skin clean and dry  - Evaluate need for skin moisturizer/barrier cream  - Collaborate with interdisciplinary team   - Patient/family teaching  - Consider wound care consult   Outcome: Progressing     Problem: Nutrition/Hydration-ADULT  Goal: Nutrient/Hydration intake appropriate for improving, restoring or maintaining nutritional needs  Description: Monitor and assess patient's nutrition/hydration status for malnutrition  Collaborate with interdisciplinary team and initiate plan and interventions as ordered  Monitor patient's weight and dietary intake as ordered or per policy  Utilize nutrition screening tool and intervene as necessary  Determine patient's food preferences and provide high-protein, high-caloric foods as appropriate       INTERVENTIONS:  - Monitor oral intake, urinary output, labs, and treatment plans  - Assess nutrition and hydration status and recommend course of action  - Evaluate amount of meals eaten  - Assist patient with eating if necessary   - Allow adequate time for meals  - Recommend/ encourage appropriate diets, oral nutritional supplements, and vitamin/mineral supplements  - Order, calculate, and assess calorie counts as needed  - Recommend, monitor, and adjust tube feedings and TPN/PPN based on assessed needs  - Assess need for intravenous fluids  - Provide specific nutrition/hydration education as appropriate  - Include patient/family/caregiver in decisions related to nutrition  Outcome: Progressing

## 2021-08-28 NOTE — ASSESSMENT & PLAN NOTE
? Hypotension presumed secondary to blood loss from liver laceration  ? S/p 3x pRBC upon arrival  ? S/p IR embolization  ?  Will continue to monitor    -No abdominal tenderness on exam  -Continue to monitor for signs of intraabdominal bleeding  -SQH for dvt proph  -Will require CTA abd/pelv w/delayed imaging to r/o pseudoaneurysm prior to discharge (awaiting GFR to improve prior to giving contrast load, will recheck GFR today)

## 2021-08-29 LAB
ANION GAP SERPL CALCULATED.3IONS-SCNC: 1 MMOL/L (ref 4–13)
BUN SERPL-MCNC: 15 MG/DL (ref 5–25)
CALCIUM SERPL-MCNC: 8.5 MG/DL (ref 8.3–10.1)
CHLORIDE SERPL-SCNC: 111 MMOL/L (ref 100–108)
CO2 SERPL-SCNC: 28 MMOL/L (ref 21–32)
CREAT SERPL-MCNC: 1.41 MG/DL (ref 0.6–1.3)
GFR SERPL CREATININE-BSD FRML MDRD: 44 ML/MIN/1.73SQ M
GLUCOSE SERPL-MCNC: 85 MG/DL (ref 65–140)
POTASSIUM SERPL-SCNC: 3.8 MMOL/L (ref 3.5–5.3)
SODIUM SERPL-SCNC: 140 MMOL/L (ref 136–145)

## 2021-08-29 PROCEDURE — 80048 BASIC METABOLIC PNL TOTAL CA: CPT | Performed by: EMERGENCY MEDICINE

## 2021-08-29 PROCEDURE — 99232 SBSQ HOSP IP/OBS MODERATE 35: CPT | Performed by: SURGERY

## 2021-08-29 PROCEDURE — 99232 SBSQ HOSP IP/OBS MODERATE 35: CPT | Performed by: INTERNAL MEDICINE

## 2021-08-29 RX ORDER — POLYETHYLENE GLYCOL 3350 17 G/17G
17 POWDER, FOR SOLUTION ORAL
Status: DISCONTINUED | OUTPATIENT
Start: 2021-08-29 | End: 2021-08-31 | Stop reason: HOSPADM

## 2021-08-29 RX ORDER — HYDROMORPHONE HCL IN WATER/PF 6 MG/30 ML
0.2 PATIENT CONTROLLED ANALGESIA SYRINGE INTRAVENOUS EVERY 6 HOURS PRN
Status: DISCONTINUED | OUTPATIENT
Start: 2021-08-29 | End: 2021-08-30

## 2021-08-29 RX ADMIN — HEPARIN SODIUM 5000 UNITS: 5000 INJECTION INTRAVENOUS; SUBCUTANEOUS at 21:04

## 2021-08-29 RX ADMIN — OXYCODONE HYDROCHLORIDE 5 MG: 5 TABLET ORAL at 02:46

## 2021-08-29 RX ADMIN — METHOCARBAMOL 500 MG: 500 TABLET, FILM COATED ORAL at 11:34

## 2021-08-29 RX ADMIN — BUPROPION HYDROCHLORIDE 150 MG: 150 TABLET, FILM COATED, EXTENDED RELEASE ORAL at 07:47

## 2021-08-29 RX ADMIN — OLANZAPINE 2.5 MG: 2.5 TABLET, FILM COATED ORAL at 07:47

## 2021-08-29 RX ADMIN — OXYCODONE HYDROCHLORIDE 5 MG: 5 TABLET ORAL at 07:47

## 2021-08-29 RX ADMIN — VILAZODONE HYDROCHLORIDE 40 MG: 40 TABLET ORAL at 07:47

## 2021-08-29 RX ADMIN — POLYETHYLENE GLYCOL 3350 17 G: 17 POWDER, FOR SOLUTION ORAL at 16:41

## 2021-08-29 RX ADMIN — LEVOTHYROXINE SODIUM 50 MCG: 50 TABLET ORAL at 06:15

## 2021-08-29 RX ADMIN — OXYCODONE HYDROCHLORIDE 5 MG: 5 TABLET ORAL at 21:04

## 2021-08-29 RX ADMIN — HYDROMORPHONE HYDROCHLORIDE 0.2 MG: 0.2 INJECTION, SOLUTION INTRAMUSCULAR; INTRAVENOUS; SUBCUTANEOUS at 13:32

## 2021-08-29 RX ADMIN — OLANZAPINE 2.5 MG: 2.5 TABLET, FILM COATED ORAL at 16:42

## 2021-08-29 RX ADMIN — OXYCODONE HYDROCHLORIDE 5 MG: 5 TABLET ORAL at 11:34

## 2021-08-29 RX ADMIN — METHOCARBAMOL 500 MG: 500 TABLET, FILM COATED ORAL at 16:42

## 2021-08-29 RX ADMIN — OXYCODONE HYDROCHLORIDE 5 MG: 5 TABLET ORAL at 16:42

## 2021-08-29 RX ADMIN — SENNOSIDES AND DOCUSATE SODIUM 1 TABLET: 8.6; 5 TABLET ORAL at 21:05

## 2021-08-29 RX ADMIN — HEPARIN SODIUM 5000 UNITS: 5000 INJECTION INTRAVENOUS; SUBCUTANEOUS at 13:32

## 2021-08-29 RX ADMIN — POLYETHYLENE GLYCOL 3350 17 G: 17 POWDER, FOR SOLUTION ORAL at 11:34

## 2021-08-29 RX ADMIN — GUAIFENESIN 600 MG: 600 TABLET, EXTENDED RELEASE ORAL at 07:47

## 2021-08-29 RX ADMIN — HEPARIN SODIUM 5000 UNITS: 5000 INJECTION INTRAVENOUS; SUBCUTANEOUS at 06:15

## 2021-08-29 RX ADMIN — METHOCARBAMOL 500 MG: 500 TABLET, FILM COATED ORAL at 06:15

## 2021-08-29 RX ADMIN — GUAIFENESIN 600 MG: 600 TABLET, EXTENDED RELEASE ORAL at 21:05

## 2021-08-29 RX ADMIN — FAMOTIDINE 10 MG: 20 TABLET ORAL at 07:47

## 2021-08-29 RX ADMIN — MELATONIN 6 MG: at 21:04

## 2021-08-29 RX ADMIN — LIDOCAINE 2 PATCH: 50 PATCH TOPICAL at 07:47

## 2021-08-29 NOTE — PLAN OF CARE
Problem: RESPIRATORY - ADULT  Goal: Achieves optimal ventilation and oxygenation  Description: INTERVENTIONS:  - Assess for changes in respiratory status  - Assess for changes in mentation and behavior  - Position to facilitate oxygenation and minimize respiratory effort  - Oxygen administered by appropriate delivery if ordered  - Initiate smoking cessation education as indicated  - Encourage broncho-pulmonary hygiene including cough, deep breathe, Incentive Spirometry  - Assess the need for suctioning and aspirate as needed  - Assess and instruct to report SOB or any respiratory difficulty  - Respiratory Therapy support as indicated  Outcome: Progressing    Problem: HEMATOLOGIC - ADULT  Goal: Maintains hematologic stability  Description: INTERVENTIONS  - Assess for signs and symptoms of bleeding or hemorrhage  - Monitor labs  - Administer supportive blood products/factors as ordered and appropriate  Outcome: Progressing     Problem: MUSCULOSKELETAL - ADULT  Goal: Maintain or return mobility to safest level of function  Description: INTERVENTIONS:  - Assess patient's ability to carry out ADLs; assess patient's baseline for ADL function and identify physical deficits which impact ability to perform ADLs (bathing, care of mouth/teeth, toileting, grooming, dressing, etc )  - Assess/evaluate cause of self-care deficits   - Assess range of motion  - Assess patient's mobility  - Assess patient's need for assistive devices and provide as appropriate  - Encourage maximum independence but intervene and supervise when necessary  - Involve family in performance of ADLs  - Assess for home care needs following discharge   - Consider OT consult to assist with ADL evaluation and planning for discharge  - Provide patient education as appropriate  Outcome: Progressing  Goal: Maintain proper alignment of affected body part  Description: INTERVENTIONS:  - Support, maintain and protect limb and body alignment  - Provide patient/ family with appropriate education  Outcome: Progressing     Problem: Prexisting or High Potential for Compromised Skin Integrity  Goal: Skin integrity is maintained or improved  Description: INTERVENTIONS:  - Identify patients at risk for skin breakdown  - Assess and monitor skin integrity  - Assess and monitor nutrition and hydration status  - Monitor labs   - Assess for incontinence   - Turn and reposition patient  - Assist with mobility/ambulation  - Relieve pressure over bony prominences  - Avoid friction and shearing  - Provide appropriate hygiene as needed including keeping skin clean and dry  - Evaluate need for skin moisturizer/barrier cream  - Collaborate with interdisciplinary team   - Patient/family teaching  - Consider wound care consult   Outcome: Progressing     Problem: Nutrition/Hydration-ADULT  Goal: Nutrient/Hydration intake appropriate for improving, restoring or maintaining nutritional needs  Description: Monitor and assess patient's nutrition/hydration status for malnutrition  Collaborate with interdisciplinary team and initiate plan and interventions as ordered  Monitor patient's weight and dietary intake as ordered or per policy  Utilize nutrition screening tool and intervene as necessary  Determine patient's food preferences and provide high-protein, high-caloric foods as appropriate       INTERVENTIONS:  - Monitor oral intake, urinary output, labs, and treatment plans  - Assess nutrition and hydration status and recommend course of action  - Evaluate amount of meals eaten   - Allow adequate time for meals  - Recommend/ encourage appropriate diets, oral nutritional supplements, and vitamin/mineral supplements  - Assess need for intravenous fluids  - Provide specific nutrition/hydration education as appropriate  - Include patient/family/caregiver in decisions related to nutrition  Outcome: Progressing

## 2021-08-29 NOTE — CASE MANAGEMENT
CM contacted pt's work rep Daryl Hoganmadhavi at [phone]  CM left her a voicemail as the pt was requesting CM call her about FMLA paperwork
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
## 2021-08-29 NOTE — PLAN OF CARE
Problem: MOBILITY - ADULT  Goal: Maintain or return to baseline ADL function  Description: INTERVENTIONS:  -  Assess patient's ability to carry out ADLs; assess patient's baseline for ADL function and identify physical deficits which impact ability to perform ADLs (bathing, care of mouth/teeth, toileting, grooming, dressing, etc )  - Assess/evaluate cause of self-care deficits   - Assess range of motion  - Assess patient's mobility; develop plan if impaired  - Assess patient's need for assistive devices and provide as appropriate  - Encourage maximum independence but intervene and supervise when necessary  - Involve family in performance of ADLs  - Assess for home care needs following discharge   - Consider OT consult to assist with ADL evaluation and planning for discharge  - Provide patient education as appropriate  Outcome: Progressing  Goal: Maintains/Returns to pre admission functional level  Description: INTERVENTIONS:  - Perform BMAT or MOVE assessment daily    - Set and communicate daily mobility goal to care team and patient/family/caregiver     - Collaborate with rehabilitation services on mobility goals if consulted  - Out of bed for toileting  - Record patient progress and toleration of activity level   Outcome: Progressing     Problem: RESPIRATORY - ADULT  Goal: Achieves optimal ventilation and oxygenation  Description: INTERVENTIONS:  - Assess for changes in respiratory status  - Assess for changes in mentation and behavior  - Position to facilitate oxygenation and minimize respiratory effort  - Oxygen administered by appropriate delivery if ordered  - Initiate smoking cessation education as indicated  - Encourage broncho-pulmonary hygiene including cough, deep breathe, Incentive Spirometry  - Assess the need for suctioning and aspirate as needed  - Assess and instruct to report SOB or any respiratory difficulty  - Respiratory Therapy support as indicated  Outcome: Progressing     Problem: SKIN/TISSUE INTEGRITY - ADULT  Goal: Skin Integrity remains intact(Skin Breakdown Prevention)  Description: Assess:  -Inspect skin when repositioning, toileting, and assisting with ADLS  -Assess extremities for adequate circulation and sensation     Bed Management:  -Have minimal linens on bed & keep smooth, unwrinkled  -Change linens as needed when moist or perspiring    Toileting:  -Offer bedside commode    Activity:  -Encourage activity and walks on unit  -Encourage or provide ROM exercises     Skin Care:  -Avoid use of baby powder, tape, friction and shearing, hot water or constrictive clothing  -Do not massage red bony areas    Next Steps:  Outcome: Progressing  Goal: Incision(s), wounds(s) or drain site(s) healing without S/S of infection  Description: INTERVENTIONS  - Assess and document dressing, incision, wound bed, drain sites and surrounding tissue  - Provide patient and family education  Outcome: Progressing  Goal: Pressure injury heals and does not worsen  Description: Interventions:  - Implement low air loss mattress or specialty surface (Criteria met)  - Apply silicone foam dressing  - Consider nutrition services referral as needed  Outcome: Progressing     Problem: HEMATOLOGIC - ADULT  Goal: Maintains hematologic stability  Description: INTERVENTIONS  - Assess for signs and symptoms of bleeding or hemorrhage  - Monitor labs  - Administer supportive blood products/factors as ordered and appropriate  Outcome: Progressing     Problem: MUSCULOSKELETAL - ADULT  Goal: Maintain or return mobility to safest level of function  Description: INTERVENTIONS:  - Assess patient's ability to carry out ADLs; assess patient's baseline for ADL function and identify physical deficits which impact ability to perform ADLs (bathing, care of mouth/teeth, toileting, grooming, dressing, etc )  - Assess/evaluate cause of self-care deficits   - Assess range of motion  - Assess patient's mobility  - Assess patient's need for assistive devices and provide as appropriate  - Encourage maximum independence but intervene and supervise when necessary  - Involve family in performance of ADLs  - Assess for home care needs following discharge   - Consider OT consult to assist with ADL evaluation and planning for discharge  - Provide patient education as appropriate  Outcome: Progressing  Goal: Maintain proper alignment of affected body part  Description: INTERVENTIONS:  - Support, maintain and protect limb and body alignment  - Provide patient/ family with appropriate education  Outcome: Progressing     Problem: Potential for Falls  Goal: Patient will remain free of falls  Description: INTERVENTIONS:  - Educate patient/family on patient safety including physical limitations  - Instruct patient to call for assistance with activity   - Consult OT/PT to assist with strengthening/mobility   - Keep Call bell within reach  - Keep bed low and locked with side rails adjusted as appropriate  - Keep care items and personal belongings within reach  - Initiate and maintain comfort rounds  - Make Fall Risk Sign visible to staff  - Apply yellow socks and bracelet for high fall risk patients  - Consider moving patient to room near nurses station  Outcome: Progressing     Problem: Prexisting or High Potential for Compromised Skin Integrity  Goal: Skin integrity is maintained or improved  Description: INTERVENTIONS:  - Identify patients at risk for skin breakdown  - Assess and monitor skin integrity  - Assess and monitor nutrition and hydration status  - Monitor labs   - Assess for incontinence   - Turn and reposition patient  - Assist with mobility/ambulation  - Relieve pressure over bony prominences  - Avoid friction and shearing  - Provide appropriate hygiene as needed including keeping skin clean and dry  - Evaluate need for skin moisturizer/barrier cream  - Collaborate with interdisciplinary team   - Patient/family teaching  - Consider wound care consult   Outcome: Progressing     Problem: Nutrition/Hydration-ADULT  Goal: Nutrient/Hydration intake appropriate for improving, restoring or maintaining nutritional needs  Description: Monitor and assess patient's nutrition/hydration status for malnutrition  Collaborate with interdisciplinary team and initiate plan and interventions as ordered  Monitor patient's weight and dietary intake as ordered or per policy  Utilize nutrition screening tool and intervene as necessary  Determine patient's food preferences and provide high-protein, high-caloric foods as appropriate       INTERVENTIONS:  - Monitor oral intake, urinary output, labs, and treatment plans  - Assess nutrition and hydration status and recommend course of action  - Evaluate amount of meals eaten  - Assist patient with eating if necessary   - Allow adequate time for meals  - Recommend/ encourage appropriate diets, oral nutritional supplements, and vitamin/mineral supplements  - Order, calculate, and assess calorie counts as needed  - Recommend, monitor, and adjust tube feedings and TPN/PPN based on assessed needs  - Assess need for intravenous fluids  - Provide specific nutrition/hydration education as appropriate  - Include patient/family/caregiver in decisions related to nutrition  Outcome: Progressing

## 2021-08-29 NOTE — ASSESSMENT & PLAN NOTE
? Hypotension presumed secondary to blood loss from liver laceration  ? S/p 3x pRBC upon arrival  ? S/p IR embolization  ?  Will continue to monitor    -No abdominal tenderness on exam  -Continue to monitor for signs of intraabdominal bleeding  -SQH for dvt proph  -Will require CTA abd/pelv w/delayed imaging to r/o pseudoaneurysm prior to discharge (awaiting GFR to improve prior to giving contrast load, will recheck GFR today), now considering holding off because GFR is not improving
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
## 2021-08-30 ENCOUNTER — APPOINTMENT (INPATIENT)
Dept: RADIOLOGY | Facility: HOSPITAL | Age: 46
DRG: 957 | End: 2021-08-30
Payer: COMMERCIAL

## 2021-08-30 LAB
DME PARACHUTE DELIVERY DATE ACTUAL: NORMAL
DME PARACHUTE DELIVERY DATE ACTUAL: NORMAL
DME PARACHUTE DELIVERY DATE REQUESTED: NORMAL
DME PARACHUTE DELIVERY DATE REQUESTED: NORMAL
DME PARACHUTE ITEM DESCRIPTION: NORMAL
DME PARACHUTE ITEM DESCRIPTION: NORMAL
DME PARACHUTE ORDER STATUS: NORMAL
DME PARACHUTE ORDER STATUS: NORMAL
DME PARACHUTE SUPPLIER NAME: NORMAL
DME PARACHUTE SUPPLIER NAME: NORMAL
DME PARACHUTE SUPPLIER PHONE: NORMAL
DME PARACHUTE SUPPLIER PHONE: NORMAL

## 2021-08-30 PROCEDURE — 74174 CTA ABD&PLVS W/CONTRAST: CPT

## 2021-08-30 PROCEDURE — 99232 SBSQ HOSP IP/OBS MODERATE 35: CPT | Performed by: INTERNAL MEDICINE

## 2021-08-30 PROCEDURE — G1004 CDSM NDSC: HCPCS

## 2021-08-30 PROCEDURE — 99232 SBSQ HOSP IP/OBS MODERATE 35: CPT | Performed by: SURGERY

## 2021-08-30 RX ORDER — SODIUM CHLORIDE, SODIUM GLUCONATE, SODIUM ACETATE, POTASSIUM CHLORIDE, MAGNESIUM CHLORIDE, SODIUM PHOSPHATE, DIBASIC, AND POTASSIUM PHOSPHATE .53; .5; .37; .037; .03; .012; .00082 G/100ML; G/100ML; G/100ML; G/100ML; G/100ML; G/100ML; G/100ML
75 INJECTION, SOLUTION INTRAVENOUS ONCE
Status: COMPLETED | OUTPATIENT
Start: 2021-08-30 | End: 2021-08-30

## 2021-08-30 RX ORDER — SODIUM CHLORIDE, SODIUM GLUCONATE, SODIUM ACETATE, POTASSIUM CHLORIDE, MAGNESIUM CHLORIDE, SODIUM PHOSPHATE, DIBASIC, AND POTASSIUM PHOSPHATE .53; .5; .37; .037; .03; .012; .00082 G/100ML; G/100ML; G/100ML; G/100ML; G/100ML; G/100ML; G/100ML
75 INJECTION, SOLUTION INTRAVENOUS ONCE
Status: COMPLETED | OUTPATIENT
Start: 2021-08-30 | End: 2021-08-31

## 2021-08-30 RX ADMIN — METHOCARBAMOL 500 MG: 500 TABLET, FILM COATED ORAL at 06:34

## 2021-08-30 RX ADMIN — OXYCODONE HYDROCHLORIDE 5 MG: 5 TABLET ORAL at 13:27

## 2021-08-30 RX ADMIN — OLANZAPINE 2.5 MG: 2.5 TABLET, FILM COATED ORAL at 17:36

## 2021-08-30 RX ADMIN — HEPARIN SODIUM 5000 UNITS: 5000 INJECTION INTRAVENOUS; SUBCUTANEOUS at 22:00

## 2021-08-30 RX ADMIN — GUAIFENESIN 600 MG: 600 TABLET, EXTENDED RELEASE ORAL at 22:00

## 2021-08-30 RX ADMIN — METHOCARBAMOL 500 MG: 500 TABLET, FILM COATED ORAL at 17:36

## 2021-08-30 RX ADMIN — LIDOCAINE 2 PATCH: 50 PATCH TOPICAL at 08:57

## 2021-08-30 RX ADMIN — HEPARIN SODIUM 5000 UNITS: 5000 INJECTION INTRAVENOUS; SUBCUTANEOUS at 06:34

## 2021-08-30 RX ADMIN — OXYCODONE HYDROCHLORIDE 5 MG: 5 TABLET ORAL at 17:36

## 2021-08-30 RX ADMIN — IOHEXOL 100 ML: 350 INJECTION, SOLUTION INTRAVENOUS at 15:23

## 2021-08-30 RX ADMIN — VILAZODONE HYDROCHLORIDE 40 MG: 40 TABLET ORAL at 07:59

## 2021-08-30 RX ADMIN — SODIUM CHLORIDE, SODIUM GLUCONATE, SODIUM ACETATE, POTASSIUM CHLORIDE, MAGNESIUM CHLORIDE, SODIUM PHOSPHATE, DIBASIC, AND POTASSIUM PHOSPHATE 75 ML/HR: .53; .5; .37; .037; .03; .012; .00082 INJECTION, SOLUTION INTRAVENOUS at 14:15

## 2021-08-30 RX ADMIN — OXYCODONE HYDROCHLORIDE 5 MG: 5 TABLET ORAL at 04:22

## 2021-08-30 RX ADMIN — SODIUM CHLORIDE, SODIUM GLUCONATE, SODIUM ACETATE, POTASSIUM CHLORIDE, MAGNESIUM CHLORIDE, SODIUM PHOSPHATE, DIBASIC, AND POTASSIUM PHOSPHATE 75 ML/HR: .53; .5; .37; .037; .03; .012; .00082 INJECTION, SOLUTION INTRAVENOUS at 12:30

## 2021-08-30 RX ADMIN — HEPARIN SODIUM 5000 UNITS: 5000 INJECTION INTRAVENOUS; SUBCUTANEOUS at 13:27

## 2021-08-30 RX ADMIN — LEVOTHYROXINE SODIUM 50 MCG: 50 TABLET ORAL at 06:34

## 2021-08-30 RX ADMIN — MELATONIN 6 MG: at 21:59

## 2021-08-30 RX ADMIN — OLANZAPINE 2.5 MG: 2.5 TABLET, FILM COATED ORAL at 08:57

## 2021-08-30 RX ADMIN — FAMOTIDINE 10 MG: 20 TABLET ORAL at 08:57

## 2021-08-30 RX ADMIN — METHOCARBAMOL 500 MG: 500 TABLET, FILM COATED ORAL at 23:01

## 2021-08-30 RX ADMIN — METHOCARBAMOL 500 MG: 500 TABLET, FILM COATED ORAL at 11:39

## 2021-08-30 RX ADMIN — BUPROPION HYDROCHLORIDE 150 MG: 150 TABLET, FILM COATED, EXTENDED RELEASE ORAL at 08:57

## 2021-08-30 RX ADMIN — OXYCODONE HYDROCHLORIDE 5 MG: 5 TABLET ORAL at 08:57

## 2021-08-30 NOTE — PLAN OF CARE
Problem: MOBILITY - ADULT  Goal: Maintain or return to baseline ADL function  Description: INTERVENTIONS:  -  Assess patient's ability to carry out ADLs; assess patient's baseline for ADL function and identify physical deficits which impact ability to perform ADLs (bathing, care of mouth/teeth, toileting, grooming, dressing, etc )  - Assess/evaluate cause of self-care deficits   - Assess range of motion  - Assess patient's mobility; develop plan if impaired  - Assess patient's need for assistive devices and provide as appropriate  - Encourage maximum independence but intervene and supervise when necessary  - Involve family in performance of ADLs  - Assess for home care needs following discharge   - Consider OT consult to assist with ADL evaluation and planning for discharge  - Provide patient education as appropriate  Outcome: Progressing  Goal: Maintains/Returns to pre admission functional level  Description: INTERVENTIONS:  - Perform BMAT or MOVE assessment daily    - Set and communicate daily mobility goal to care team and patient/family/caregiver  - Collaborate with rehabilitation services on mobility goals if consulted  - Perform Range of Motion 3 times a day  - Reposition patient every 2 hours    - Dangle patient 3 times a day  - Stand patient 3 times a day  - Ambulate patient 3 times a day  - Out of bed to chair 3 times a day   - Out of bed for meals 3  Problem: RESPIRATORY - ADULT  Goal: Achieves optimal ventilation and oxygenation  Description: INTERVENTIONS:  - Assess for changes in respiratory status  - Assess for changes in mentation and behavior  - Position to facilitate oxygenation and minimize respiratory effort  - Oxygen administered by appropriate delivery if ordered  - Initiate smoking cessation education as indicated  - Encourage broncho-pulmonary hygiene including cough, deep breathe, Incentive Spirometry  - Assess the need for suctioning and aspirate as needed  - Assess and instruct to report SOB or any respiratory difficulty  - Respiratory Therapy support as indicated  Outcome: Progressing     Problem: HEMATOLOGIC - ADULT  Goal: Maintains hematologic stability  Description: INTERVENTIONS  - Assess for signs and symptoms of bleeding or hemorrhage  - Monitor labs  - Administer supportive blood products/factors as ordered and appropriate  Outcome: Progressing     Problem: MUSCULOSKELETAL - ADULT  Goal: Maintain or return mobility to safest level of function  Description: INTERVENTIONS:  - Assess patient's ability to carry out ADLs; assess patient's baseline for ADL function and identify physical deficits which impact ability to perform ADLs (bathing, care of mouth/teeth, toileting, grooming, dressing, etc )  - Assess/evaluate cause of self-care deficits   - Assess range of motion  - Assess patient's mobility  - Assess patient's need for assistive devices and provide as appropriate  - Encourage maximum independence but intervene and supervise when necessary  - Involve family in performance of ADLs  - Assess for home care needs following discharge   - Consider OT consult to assist with ADL evaluation and planning for discharge  - Provide patient education as appropriate  Outcome: Progressing  Goal: Maintain proper alignment of affected body part  Description: INTERVENTIONS:  - Support, maintain and protect limb and body alignment  - Provide patient/ family with appropriate education  Outcome: Progressing     Problem: Prexisting or High Potential for Compromised Skin Integrity  Goal: Skin integrity is maintained or improved  Description: INTERVENTIONS:  - Identify patients at risk for skin breakdown  - Assess and monitor skin integrity  - Assess and monitor nutrition and hydration status  - Monitor labs   - Assess for incontinence   - Turn and reposition patient  - Assist with mobility/ambulation  - Relieve pressure over bony prominences  - Avoid friction and shearing  - Provide appropriate hygiene as needed including keeping skin clean and dry  - Evaluate need for skin moisturizer/barrier cream  - Collaborate with interdisciplinary team   - Patient/family teaching  - Consider wound care consult   Outcome: Progressing     Problem: Nutrition/Hydration-ADULT  Goal: Nutrient/Hydration intake appropriate for improving, restoring or maintaining nutritional needs  Description: Monitor and assess patient's nutrition/hydration status for malnutrition  Collaborate with interdisciplinary team and initiate plan and interventions as ordered  Monitor patient's weight and dietary intake as ordered or per policy  Utilize nutrition screening tool and intervene as necessary  Determine patient's food preferences and provide high-protein, high-caloric foods as appropriate       INTERVENTIONS:  - Monitor oral intake, urinary output, labs, and treatment plans  - Assess nutrition and hydration status and recommend course of action  - Evaluate amount of meals eaten  - Assist patient with eating if necessary   - Allow adequate time for meals  - Recommend/ encourage appropriate diets, oral nutritional supplements, and vitamin/mineral supplements  - Assess need for intravenous fluids  - Provide specific nutrition/hydration education as appropriate  - Include patient/family/caregiver in decisions related to nutrition  Outcome: Progressing    times a day  - Out of bed for toileting  - Record patient progress and toleration of activity level   Outcome: Progressing
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
## 2021-08-30 NOTE — PHYSICAL THERAPY NOTE
Physical Therapy Cancellation Note    Patient's Name: Emi Bowser    Chart reviewed, cleared with nursing  Pt currently off floor for CT  Will follow for PT treatment as medically appropriate  Thank you       Beatris Solorzano, PT, DPT

## 2021-08-31 VITALS
DIASTOLIC BLOOD PRESSURE: 84 MMHG | SYSTOLIC BLOOD PRESSURE: 149 MMHG | WEIGHT: 159 LBS | HEART RATE: 76 BPM | RESPIRATION RATE: 20 BRPM | HEIGHT: 65 IN | TEMPERATURE: 98.4 F | BODY MASS INDEX: 26.49 KG/M2 | OXYGEN SATURATION: 95 %

## 2021-08-31 LAB
ANION GAP SERPL CALCULATED.3IONS-SCNC: 8 MMOL/L (ref 4–13)
BASOPHILS # BLD AUTO: 0.03 THOUSANDS/ΜL (ref 0–0.1)
BASOPHILS NFR BLD AUTO: 0 % (ref 0–1)
BUN SERPL-MCNC: 17 MG/DL (ref 5–25)
CALCIUM SERPL-MCNC: 9.1 MG/DL (ref 8.3–10.1)
CHLORIDE SERPL-SCNC: 108 MMOL/L (ref 100–108)
CO2 SERPL-SCNC: 24 MMOL/L (ref 21–32)
CREAT SERPL-MCNC: 1.48 MG/DL (ref 0.6–1.3)
EOSINOPHIL # BLD AUTO: 0.21 THOUSAND/ΜL (ref 0–0.61)
EOSINOPHIL NFR BLD AUTO: 3 % (ref 0–6)
ERYTHROCYTE [DISTWIDTH] IN BLOOD BY AUTOMATED COUNT: 15.5 % (ref 11.6–15.1)
GFR SERPL CREATININE-BSD FRML MDRD: 42 ML/MIN/1.73SQ M
GLUCOSE SERPL-MCNC: 87 MG/DL (ref 65–140)
HCT VFR BLD AUTO: 34.5 % (ref 34.8–46.1)
HGB BLD-MCNC: 11.4 G/DL (ref 11.5–15.4)
IMM GRANULOCYTES # BLD AUTO: 0.04 THOUSAND/UL (ref 0–0.2)
IMM GRANULOCYTES NFR BLD AUTO: 1 % (ref 0–2)
LYMPHOCYTES # BLD AUTO: 0.96 THOUSANDS/ΜL (ref 0.6–4.47)
LYMPHOCYTES NFR BLD AUTO: 12 % (ref 14–44)
MCH RBC QN AUTO: 31.8 PG (ref 26.8–34.3)
MCHC RBC AUTO-ENTMCNC: 33 G/DL (ref 31.4–37.4)
MCV RBC AUTO: 96 FL (ref 82–98)
MONOCYTES # BLD AUTO: 0.67 THOUSAND/ΜL (ref 0.17–1.22)
MONOCYTES NFR BLD AUTO: 8 % (ref 4–12)
NEUTROPHILS # BLD AUTO: 6.31 THOUSANDS/ΜL (ref 1.85–7.62)
NEUTS SEG NFR BLD AUTO: 76 % (ref 43–75)
NRBC BLD AUTO-RTO: 0 /100 WBCS
PLATELET # BLD AUTO: 199 THOUSANDS/UL (ref 149–390)
PMV BLD AUTO: 10.9 FL (ref 8.9–12.7)
POTASSIUM SERPL-SCNC: 4.2 MMOL/L (ref 3.5–5.3)
RBC # BLD AUTO: 3.59 MILLION/UL (ref 3.81–5.12)
SODIUM SERPL-SCNC: 140 MMOL/L (ref 136–145)
WBC # BLD AUTO: 8.22 THOUSAND/UL (ref 4.31–10.16)

## 2021-08-31 PROCEDURE — 85025 COMPLETE CBC W/AUTO DIFF WBC: CPT | Performed by: PHYSICIAN ASSISTANT

## 2021-08-31 PROCEDURE — 99024 POSTOP FOLLOW-UP VISIT: CPT | Performed by: NURSE PRACTITIONER

## 2021-08-31 PROCEDURE — 80048 BASIC METABOLIC PNL TOTAL CA: CPT | Performed by: EMERGENCY MEDICINE

## 2021-08-31 PROCEDURE — 99238 HOSP IP/OBS DSCHRG MGMT 30/<: CPT | Performed by: NURSE PRACTITIONER

## 2021-08-31 PROCEDURE — 99232 SBSQ HOSP IP/OBS MODERATE 35: CPT | Performed by: INTERNAL MEDICINE

## 2021-08-31 PROCEDURE — 97116 GAIT TRAINING THERAPY: CPT

## 2021-08-31 RX ORDER — POLYETHYLENE GLYCOL 3350 17 G/17G
17 POWDER, FOR SOLUTION ORAL DAILY
Qty: 119 G | Refills: 0 | Status: SHIPPED | OUTPATIENT
Start: 2021-08-31 | End: 2021-10-05

## 2021-08-31 RX ORDER — OXYCODONE HYDROCHLORIDE 5 MG/1
TABLET ORAL
Qty: 30 TABLET | Refills: 0 | Status: SHIPPED | OUTPATIENT
Start: 2021-08-31 | End: 2021-09-14

## 2021-08-31 RX ORDER — LIDOCAINE 50 MG/G
1 PATCH TOPICAL DAILY
Qty: 5 PATCH | Refills: 0 | Status: SHIPPED | OUTPATIENT
Start: 2021-09-01 | End: 2021-10-05

## 2021-08-31 RX ORDER — ACETAMINOPHEN 325 MG/1
975 TABLET ORAL EVERY 6 HOURS PRN
Refills: 0
Start: 2021-08-31 | End: 2021-10-15

## 2021-08-31 RX ORDER — METHOCARBAMOL 500 MG/1
500 TABLET, FILM COATED ORAL EVERY 6 HOURS PRN
Qty: 30 TABLET | Refills: 0 | Status: SHIPPED | OUTPATIENT
Start: 2021-08-31 | End: 2021-10-15 | Stop reason: SDUPTHER

## 2021-08-31 RX ORDER — GINSENG 100 MG
1 CAPSULE ORAL 2 TIMES DAILY
Qty: 28 G | Refills: 0 | Status: SHIPPED | OUTPATIENT
Start: 2021-08-31 | End: 2021-10-05

## 2021-08-31 RX ORDER — AMOXICILLIN 250 MG
1 CAPSULE ORAL
Qty: 14 TABLET | Refills: 0 | Status: SHIPPED | OUTPATIENT
Start: 2021-08-31 | End: 2021-10-05

## 2021-08-31 RX ADMIN — METHOCARBAMOL 500 MG: 500 TABLET, FILM COATED ORAL at 12:56

## 2021-08-31 RX ADMIN — OXYCODONE HYDROCHLORIDE 5 MG: 5 TABLET ORAL at 05:59

## 2021-08-31 RX ADMIN — LEVOTHYROXINE SODIUM 50 MCG: 50 TABLET ORAL at 05:59

## 2021-08-31 RX ADMIN — METHOCARBAMOL 500 MG: 500 TABLET, FILM COATED ORAL at 05:59

## 2021-08-31 RX ADMIN — FAMOTIDINE 10 MG: 20 TABLET ORAL at 08:40

## 2021-08-31 RX ADMIN — HEPARIN SODIUM 5000 UNITS: 5000 INJECTION INTRAVENOUS; SUBCUTANEOUS at 06:00

## 2021-08-31 RX ADMIN — LIDOCAINE 2 PATCH: 50 PATCH TOPICAL at 08:40

## 2021-08-31 RX ADMIN — OXYCODONE HYDROCHLORIDE 5 MG: 5 TABLET ORAL at 14:13

## 2021-08-31 RX ADMIN — BUPROPION HYDROCHLORIDE 150 MG: 150 TABLET, FILM COATED, EXTENDED RELEASE ORAL at 08:40

## 2021-08-31 RX ADMIN — OXYCODONE HYDROCHLORIDE 5 MG: 5 TABLET ORAL at 10:50

## 2021-08-31 RX ADMIN — VILAZODONE HYDROCHLORIDE 40 MG: 40 TABLET ORAL at 07:55

## 2021-08-31 RX ADMIN — OLANZAPINE 2.5 MG: 2.5 TABLET, FILM COATED ORAL at 08:40

## 2021-08-31 NOTE — ASSESSMENT & PLAN NOTE
-Patient pain improved somewhat today  -Rib frx protocol  -Continues to do well on RA   -Low dose pain regimen due to increased sedation which has since improved

## 2021-08-31 NOTE — ASSESSMENT & PLAN NOTE
Lab Results   Component Value Date    EGFR 44 08/29/2021    EGFR 45 08/28/2021    EGFR 36 08/27/2021    CREATININE 1 41 (H) 08/29/2021    CREATININE 1 39 (H) 08/28/2021    CREATININE 1 67 (H) 08/27/2021     - creatinine improved compared to arrival   - Nephro consulted, appreciate recommendations  - had stopped IV fluids, however restarted isolate 75 mL/hr due to planned contrast for CTA abdomen/pelvis with delays to evaluate for possible pseudoaneurysm
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
## 2021-08-31 NOTE — PHYSICAL THERAPY NOTE
Physical Therapy Treatment Note    Patient's Name: Richard Smith  : 21 1025   PT Last Visit   PT Visit Date 21   Note Type   Note Type Treatment   Pain Assessment   Pain Assessment Tool 0-10   Pain Score 8   Pain Location/Orientation Orientation: Right;Location: Abdomen   Hospital Pain Intervention(s) Repositioned; Ambulation/increased activity   Restrictions/Precautions   Weight Bearing Precautions Per Order No   Other Precautions Pain   General   Chart Reviewed Yes   Family/Caregiver Present No   Cognition   Overall Cognitive Status WFL   Orientation Level Oriented X4   Comments Pt pleasant and cooperative, motivated to return home   Bed Mobility   Additional Comments Pt sitting EOB upon PT arrival   Transfers   Sit to Stand 6  Modified independent   Stand to Sit 6  Modified independent   Additional Comments with RW   Ambulation/Elevation   Gait pattern Excessively slow; Short stride; Forward Flexion;Decreased foot clearance   Gait Assistance 6  Modified independent   Assistive Device Rolling walker   Distance 200 ft   Stair Management Assistance 5  Supervision   Additional items Verbal cues   Stair Management Technique One rail R;Reciprocal   Number of Stairs 7   Balance   Static Sitting Normal   Dynamic Sitting Good   Static Standing Fair   Dynamic Standing Fair   Ambulatory Fair -   Activity Tolerance   Activity Tolerance Patient tolerated treatment well   Nurse Made Aware RN and PA updated   Assessment   Assessment Pt with improved safety awareness and activity tolerance today  Pt limited by anxiety regarding discharge plan, PA updated  Pt educated on home safety, stair climbing, OPPT follow-up  Pt with no questions/concerns regarding potential discharge home today  No further acute PT needs at this time  Recommend home with OPPT upon hospital D/C      Goals   Patient Goals to go home   PT Treatment Day 2   Plan Progress Discontinue PT   Recommendation   PT Discharge Recommendation Home with outpatient rehabilitation   Equipment Recommended [address] [facility] Recommended Wheeled walker   Change/add to [employer]?  No   PT - OK to Discharge Yes   AM-PAC Basic Mobility Inpatient   Turning in Bed Without Bedrails 4   Lying on Back to Sitting on Edge of Flat Bed 4   Moving Bed to Chair 4   Standing Up From Chair 4   Walk in Room 4   Climb 3-5 Stairs 3   Basic Mobility Inpatient Raw Score 23   Basic Mobility Standardized Score 50 88       Sudeep Gray, PT, DPT

## 2021-08-31 NOTE — ASSESSMENT & PLAN NOTE
· R periorbital laceration  ? S/p bedside repair with 5x 5-0 chromic fast early morning of 8/24  ? BID bacitracin  · L knee laceration  ? S/p bedside repair with 3x 2-0 nylon early morning of 8/24  ?  To be removed prior to DC

## 2021-08-31 NOTE — ASSESSMENT & PLAN NOTE
· Patient was hypotensive on arrival, presumed secondary to loss of blood due to liver laceration  · S/p 3x PRBC upon arrival  · S/p embolization  · Patient continues to deny any abdominal tenderness  Hemoglobin remained stable  VSS  · Repeat CTA shows no pseudoaneurysm  · Follow-up with trauma in 2 weeks  Outpatient CBC/BMP ordered to be completed in 1 week, to be reviewed by PCP/trauma team in clinic during follow-up

## 2021-08-31 NOTE — ASSESSMENT & PLAN NOTE
- patient was found to have right 8-11 rib fractures  - Rib frx protocol was initiated while patient was inpatient   IS 1 5 L today  - pain continues to be under control with current multimodal pain regimen, which she will be discharged on   - Follow-up with trauma team in clinic in 2 weeks
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
## 2021-08-31 NOTE — ASSESSMENT & PLAN NOTE
Lab Results   Component Value Date    EGFR 42 08/31/2021    EGFR 44 08/29/2021    EGFR 45 08/28/2021    CREATININE 1 48 (H) 08/31/2021    CREATININE 1 41 (H) 08/29/2021    CREATININE 1 39 (H) 08/28/2021     - creatinine continues to improve  Was elevated on arrival   Baseline appears to be 1 5-2   - Nephro consulted, appreciate recommendations  - per Nephrology's recommendations, outpatient lab work was placed for follow-up with PCP  - follow-up with nephrology as an outpatient    Ambulatory referral placed by nephrology team

## 2021-08-31 NOTE — DISCHARGE INSTRUCTIONS
Seek medical attention if you develop worsening chest pain or shortness of breath, dizziness/lightheadness, fevers/chills or sweats  No heavy lifting, pushing or pulling >10 pounds and no strenuous physical activity until cleared by trauma  No work or driving while taking narcotic pain medications and until cleared by trauma  Use your incentive spirometer at least hourly while awake  Rib Fracture   WHAT YOU NEED TO KNOW:   A rib fracture is a crack or break in a rib bone  Rib fractures usually heal within 6 weeks  You should be able to return to your usual activities before that time  DISCHARGE INSTRUCTIONS:   Call your local emergency number (911 in the [address] [facility]) if:   · You have trouble breathing  · You have new or increased pain  Return to the emergency department if:   · Your pain does not get better, even after treatment  · You have a fever or a cough  Call your doctor if:   · You have questions or concerns about your condition or care  Medicines: You may need any of the following:  · NSAIDs , such as ibuprofen, help decrease swelling, pain, and fever  This medicine is available with or without a doctor's order  NSAIDs can cause stomach bleeding or kidney problems in certain people  If you take blood thinner medicine, always ask your healthcare provider if NSAIDs are safe for you  Always read the medicine label and follow directions  · Prescription pain medicine  may be given  Ask your healthcare provider how to take this medicine safely  Some prescription pain medicines contain acetaminophen  Do not take other medicines that contain acetaminophen without talking to your healthcare provider  Too much acetaminophen may cause liver damage  Prescription pain medicine may cause constipation  Ask your healthcare provider how to prevent or treat constipation  · Take your medicine as directed    Contact your healthcare provider if you think your medicine is not helping or if you have side effects  Tell him or her if you are allergic to any medicine  Keep a list of the medicines, vitamins, and herbs you take  Include the amounts, and when and why you take them  Bring the list or the pill bottles to follow-up visits  Carry your medicine list with you in case of an emergency  Self-care:   · Take deep breaths and cough 10 times each hour  This will decrease your risk for a lung infection  Hug a pillow on your injured side to decrease pain while you take deep breaths  Take a deep breath and hold it for as long as you can  Let the air out and then cough  Deep breaths help open your airway  You may be given an incentive spirometer to help you take deep breaths  Put the plastic piece in your mouth and take a slow, deep breath, then let the air out and cough  Repeat these steps 10 times every hour  · Rest and limit activity as directed  Do not pull, push, or lift objects  Start to do more as your pain decreases  Ask your healthcare provider how much activity you can do  · Apply ice  on your chest near your fractured rib for 15 to 20 minutes every hour or as directed  Use an ice pack, or put crushed ice in a plastic bag  Cover it with a towel  Ice helps prevent tissue damage and decreases swelling and pain  Follow up with your doctor as directed:  Write down your questions so you remember to ask them during your visits  © Copyright [employer] 2021 Information is for End User's use only and may not be sold, redistributed or otherwise used for commercial purposes  All illustrations and images included in CareNotes® are the copyrighted property of A D A M , Inc  or [facility] Lew Mcnair   The above information is an  only  It is not intended as medical advice for individual conditions or treatments  Talk to your doctor, nurse or pharmacist before following any medical regimen to see if it is safe and effective for you        Liver or Spleen Laceration   WHAT YOU NEED TO KNOW: A liver or spleen laceration is a cut, tear, or puncture in your liver or spleen  These injuries may or may not happen at the same time  DISCHARGE INSTRUCTIONS:   Call your local emergency number (911 in the [address]) for any of the following:   · You feel lightheaded, short of breath, and have chest pain  · You cough up blood  · You have trouble breathing  Seek care immediately if:   · Your arm or leg feels warm, tender, and painful  It may look swollen and red  · Your skin or eyes are yellow  · Your abdomen is larger than normal, firm, and painful  · You look pale or feel weak, dizzy, or faint  · You have new or worsening pain  · Blood soaks through your bandage  · Your wound comes apart  · You are vomiting blood or material that looks like coffee grounds  · You have blood in your bowel movements  · You have pain in your left shoulder  Call your doctor if:   · You have a fever  · Your wound is red, swollen, or draining pus  · Your pain does not get better after you take your pain medicine  · You have nausea or are vomiting  · You have questions or concerns about your condition or care  Medicines: You may need any of the following:  · Prescription pain medicine  may be given  Ask your healthcare provider how to take this medicine safely  Some prescription pain medicines contain acetaminophen  Do not take other medicines that contain acetaminophen without talking to your healthcare provider  Too much acetaminophen may cause liver damage  Prescription pain medicine may cause constipation  Ask your healthcare provider how to prevent or treat constipation  · Antibiotics  help treat or prevent a bacterial infection  · Take your medicine as directed  Contact your healthcare provider if you think your medicine is not helping or if you have side effects  Tell him or her if you are allergic to any medicine   Keep a list of the medicines, vitamins, and herbs you take  Include the amounts, and when and why you take them  Bring the list or the pill bottles to follow-up visits  Carry your medicine list with you in case of an emergency  Activity:  Take a short walk 2 to 3 times each day  This may prevent blood clots and help you heal faster  Do not play contact sports such as football or soccer  These activities can increase your risk for bleeding from your liver or spleen  Do not drive until your healthcare provider says it is okay  Ask your healthcare provider when you can return to your regular activities and work or school  Do not take aspirin or NSAIDs:  These medicines may increase your risk for bleeding  Care for your wound as directed:  Do not remove your bandage for 24 hours or as directed  When your healthcare provider says you can shower, carefully wash around the wound with soap and water  It is okay to let soap and water gently run over your wound  Do not scrub your wound  Dry the area and put on new, clean bandages as directed  Change your bandages when they get wet or dirty  Check your wound every day for signs of infection, such as redness, swelling, or pus  Do not take a bath or swim until your healthcare provider says it is okay  These actions may cause an infection  Follow up with your doctor as directed:  Write down your questions so you remember to ask them during your visits  © Copyright [employer] 2021 Information is for End User's use only and may not be sold, redistributed or otherwise used for commercial purposes  All illustrations and images included in CareNotes® are the copyrighted property of A [employer] A M , Inc  or [facility] Lew Mcnair   The above information is an  only  It is not intended as medical advice for individual conditions or treatments  Talk to your doctor, nurse or pharmacist before following any medical regimen to see if it is safe and effective for you
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
## 2021-08-31 NOTE — PROCEDURES
Suture removal    Date/Time: 8/31/2021 2:07 PM  Performed by: FIONA Nelson  Authorized by: FIONA Nelson   Universal Protocol:  Consent given by: patient  Time out: Immediately prior to procedure a "time out" was called to verify the correct patient, procedure, equipment, support staff and site/side marked as required  Timeout called at: 8/31/2021 1:28 PM   Required items: required blood products, implants, devices, and special equipment available  Patient identity confirmed: verbally with patient and arm band        Patient location:  ED  Location:     Laterality:  Left    Location:  Lower extremity    Lower extremity location:  Knee    Knee location:  L knee  Procedure details: Tools used:  Suture removal kit    Wound appearance:  No sign(s) of infection and clean    Number of sutures removed:  3  Post-procedure details:     Post-removal:  No dressing applied    Patient tolerance of procedure:   Tolerated well, no immediate complications

## 2021-08-31 NOTE — PLAN OF CARE
Problem: PHYSICAL THERAPY ADULT  Goal: Performs mobility at highest level of function for planned discharge setting  See evaluation for individualized goals  Description: Treatment/Interventions: Functional transfer training, LE strengthening/ROM, Elevations, Therapeutic exercise, Endurance training, Patient/family training, Equipment eval/education, Bed mobility, Gait training  Equipment Recommended: Areli Rahman       See flowsheet documentation for full assessment, interventions and recommendations  Outcome: Completed  Note: Prognosis: Good  Problem List: Decreased strength, Decreased endurance, Impaired balance, Decreased mobility, Pain  Assessment: Pt with improved safety awareness and activity tolerance today  Pt limited by anxiety regarding discharge plan, PA updated  Pt educated on home safety, stair climbing, OPPT follow-up  Pt with no questions/concerns regarding potential discharge home today  No further acute PT needs at this time  Recommend home with OPPT upon hospital D/C  PT Discharge Recommendation: Home with outpatient rehabilitation     PT - OK to Discharge: Yes    See flowsheet documentation for full assessment

## 2021-09-01 ENCOUNTER — TELEPHONE (OUTPATIENT)
Dept: NEPHROLOGY | Facility: CLINIC | Age: 46
End: 2021-09-01
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
## 2021-09-07 ENCOUNTER — OFFICE VISIT (OUTPATIENT)
Dept: URGENT CARE | Facility: CLINIC | Age: 46
End: 2021-09-07
Payer: COMMERCIAL

## 2021-09-07 ENCOUNTER — TELEPHONE (OUTPATIENT)
Dept: GASTROENTEROLOGY | Facility: CLINIC | Age: 46
End: 2021-09-07

## 2021-09-07 VITALS
SYSTOLIC BLOOD PRESSURE: 135 MMHG | DIASTOLIC BLOOD PRESSURE: 87 MMHG | OXYGEN SATURATION: 99 % | TEMPERATURE: 98.2 F | HEART RATE: 79 BPM | RESPIRATION RATE: 18 BRPM

## 2021-09-07 DIAGNOSIS — R10.84 GENERALIZED ABDOMINAL PAIN: ICD-10-CM

## 2021-09-07 DIAGNOSIS — H60.502 ACUTE OTITIS EXTERNA OF LEFT EAR, UNSPECIFIED TYPE: Primary | ICD-10-CM

## 2021-09-07 PROCEDURE — 99213 OFFICE O/P EST LOW 20 MIN: CPT | Performed by: PHYSICIAN ASSISTANT
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
## 2021-09-07 NOTE — PATIENT INSTRUCTIONS
Drops as directed  Try taking stool softener every other day  If pain worsens or is not improving, go to the emergency room

## 2021-09-08 ENCOUNTER — APPOINTMENT (OUTPATIENT)
Dept: LAB | Facility: HOSPITAL | Age: 46
End: 2021-09-08
Payer: COMMERCIAL

## 2021-09-08 DIAGNOSIS — E55.9 VITAMIN D DEFICIENCY: ICD-10-CM

## 2021-09-08 DIAGNOSIS — S36.116A LIVER LACERATION, GRADE III, WITHOUT OPEN WOUND INTO CAVITY, INITIAL ENCOUNTER: ICD-10-CM

## 2021-09-08 DIAGNOSIS — E03.9 HYPOTHYROIDISM, UNSPECIFIED TYPE: Primary | ICD-10-CM

## 2021-09-08 DIAGNOSIS — N18.32 STAGE 3B CHRONIC KIDNEY DISEASE (HCC): ICD-10-CM

## 2021-09-08 LAB
25(OH)D3 SERPL-MCNC: 121 NG/ML (ref 30–100)
ALBUMIN SERPL BCP-MCNC: 5.1 G/DL (ref 3.5–5.7)
ALP SERPL-CCNC: 110 U/L (ref 40–150)
ALT SERPL W P-5'-P-CCNC: 13 U/L (ref 7–52)
ANION GAP SERPL CALCULATED.3IONS-SCNC: 11 MMOL/L (ref 4–13)
AST SERPL W P-5'-P-CCNC: 12 U/L (ref 13–39)
BACTERIA UR QL AUTO: ABNORMAL /HPF
BASOPHILS # BLD AUTO: 0.1 THOUSANDS/ΜL (ref 0–0.1)
BASOPHILS NFR BLD AUTO: 1 % (ref 0–2)
BILIRUB SERPL-MCNC: 0.6 MG/DL (ref 0.2–1)
BILIRUB UR QL STRIP: NEGATIVE
BUN SERPL-MCNC: 19 MG/DL (ref 7–25)
CALCIUM SERPL-MCNC: 10.1 MG/DL (ref 8.6–10.5)
CHLORIDE SERPL-SCNC: 105 MMOL/L (ref 98–107)
CLARITY UR: CLEAR
CO2 SERPL-SCNC: 22 MMOL/L (ref 21–31)
COLOR UR: YELLOW
CREAT SERPL-MCNC: 1.56 MG/DL (ref 0.6–1.2)
CREAT UR-MCNC: 122 MG/DL
CREAT UR-MCNC: 122 MG/DL
EOSINOPHIL # BLD AUTO: 0.3 THOUSAND/ΜL (ref 0–0.61)
EOSINOPHIL NFR BLD AUTO: 3 % (ref 0–5)
ERYTHROCYTE [DISTWIDTH] IN BLOOD BY AUTOMATED COUNT: 16.2 % (ref 11.5–14.5)
GFR SERPL CREATININE-BSD FRML MDRD: 39 ML/MIN/1.73SQ M
GLUCOSE P FAST SERPL-MCNC: 89 MG/DL (ref 65–99)
GLUCOSE UR STRIP-MCNC: NEGATIVE MG/DL
HCT VFR BLD AUTO: 38.9 % (ref 42–47)
HGB BLD-MCNC: 13.3 G/DL (ref 12–16)
HGB UR QL STRIP.AUTO: NEGATIVE
KETONES UR STRIP-MCNC: NEGATIVE MG/DL
LEUKOCYTE ESTERASE UR QL STRIP: NEGATIVE
LYMPHOCYTES # BLD AUTO: 1.2 THOUSANDS/ΜL (ref 0.6–4.47)
LYMPHOCYTES NFR BLD AUTO: 11 % (ref 21–51)
MCH RBC QN AUTO: 32.5 PG (ref 26–34)
MCHC RBC AUTO-ENTMCNC: 34.2 G/DL (ref 31–37)
MCV RBC AUTO: 95 FL (ref 81–99)
MICROALBUMIN UR-MCNC: 54.4 MG/L (ref 0–20)
MICROALBUMIN/CREAT 24H UR: 45 MG/G CREATININE (ref 0–30)
MONOCYTES # BLD AUTO: 0.5 THOUSAND/ΜL (ref 0.17–1.22)
MONOCYTES NFR BLD AUTO: 4 % (ref 2–12)
NEUTROPHILS # BLD AUTO: 8.8 THOUSANDS/ΜL (ref 1.4–6.5)
NEUTS SEG NFR BLD AUTO: 81 % (ref 42–75)
NITRITE UR QL STRIP: NEGATIVE
NON-SQ EPI CELLS URNS QL MICRO: ABNORMAL /HPF
PH UR STRIP.AUTO: 5.5 [PH]
PLATELET # BLD AUTO: 371 THOUSANDS/UL (ref 149–390)
PMV BLD AUTO: 8.3 FL (ref 8.6–11.7)
POTASSIUM SERPL-SCNC: 3.9 MMOL/L (ref 3.5–5.5)
PROT SERPL-MCNC: 8 G/DL (ref 6.4–8.9)
PROT UR STRIP-MCNC: NEGATIVE MG/DL
PROT UR-MCNC: 41 MG/DL
PROT/CREAT UR: 0.34 MG/G{CREAT} (ref 0–0.1)
RBC # BLD AUTO: 4.1 MILLION/UL (ref 3.9–5.2)
RBC #/AREA URNS AUTO: ABNORMAL /HPF
SODIUM SERPL-SCNC: 138 MMOL/L (ref 134–143)
SP GR UR STRIP.AUTO: 1.02 (ref 1–1.03)
T4 FREE SERPL-MCNC: 0.96 NG/DL (ref 0.76–1.46)
TSH SERPL DL<=0.05 MIU/L-ACNC: 4.01 UIU/ML (ref 0.45–5.33)
UROBILINOGEN UR QL STRIP.AUTO: 0.2 E.U./DL
WBC # BLD AUTO: 10.8 THOUSAND/UL (ref 4.8–10.8)
WBC #/AREA URNS AUTO: ABNORMAL /HPF

## 2021-09-08 PROCEDURE — 82306 VITAMIN D 25 HYDROXY: CPT

## 2021-09-08 PROCEDURE — 84443 ASSAY THYROID STIM HORMONE: CPT

## 2021-09-08 PROCEDURE — 84439 ASSAY OF FREE THYROXINE: CPT

## 2021-09-08 PROCEDURE — 82570 ASSAY OF URINE CREATININE: CPT

## 2021-09-08 PROCEDURE — 82043 UR ALBUMIN QUANTITATIVE: CPT

## 2021-09-08 PROCEDURE — 80053 COMPREHEN METABOLIC PANEL: CPT

## 2021-09-08 PROCEDURE — 85025 COMPLETE CBC W/AUTO DIFF WBC: CPT

## 2021-09-08 PROCEDURE — 81001 URINALYSIS AUTO W/SCOPE: CPT

## 2021-09-08 PROCEDURE — 84156 ASSAY OF PROTEIN URINE: CPT

## 2021-09-08 PROCEDURE — 36415 COLL VENOUS BLD VENIPUNCTURE: CPT

## 2021-09-09 ENCOUNTER — OFFICE VISIT (OUTPATIENT)
Dept: NEPHROLOGY | Facility: CLINIC | Age: 46
End: 2021-09-09
Payer: COMMERCIAL

## 2021-09-09 VITALS
OXYGEN SATURATION: 99 % | WEIGHT: 131.8 LBS | HEART RATE: 70 BPM | DIASTOLIC BLOOD PRESSURE: 88 MMHG | BODY MASS INDEX: 21.96 KG/M2 | SYSTOLIC BLOOD PRESSURE: 150 MMHG | HEIGHT: 65 IN

## 2021-09-09 DIAGNOSIS — E55.9 VITAMIN D DEFICIENCY: ICD-10-CM

## 2021-09-09 DIAGNOSIS — E67.3 HIGH VITAMIN D LEVEL: ICD-10-CM

## 2021-09-09 DIAGNOSIS — R80.9 GRADE A2 ALBUMINURIA: ICD-10-CM

## 2021-09-09 DIAGNOSIS — N11.9 CHRONIC TUBULOINTERSTITIAL NEPHRITIS: ICD-10-CM

## 2021-09-09 DIAGNOSIS — I12.9 BENIGN HYPERTENSION WITH CKD (CHRONIC KIDNEY DISEASE) STAGE III (HCC): ICD-10-CM

## 2021-09-09 DIAGNOSIS — N18.30 BENIGN HYPERTENSION WITH CKD (CHRONIC KIDNEY DISEASE) STAGE III (HCC): ICD-10-CM

## 2021-09-09 DIAGNOSIS — N18.32 STAGE 3B CHRONIC KIDNEY DISEASE (HCC): ICD-10-CM

## 2021-09-09 DIAGNOSIS — R19.7 DIARRHEA, UNSPECIFIED TYPE: ICD-10-CM

## 2021-09-09 DIAGNOSIS — N17.9 AKI (ACUTE KIDNEY INJURY) (HCC): Primary | ICD-10-CM

## 2021-09-09 PROCEDURE — 99214 OFFICE O/P EST MOD 30 MIN: CPT | Performed by: NURSE PRACTITIONER

## 2021-09-09 PROCEDURE — 1036F TOBACCO NON-USER: CPT | Performed by: NURSE PRACTITIONER

## 2021-09-09 PROCEDURE — 1111F DSCHRG MED/CURRENT MED MERGE: CPT | Performed by: NURSE PRACTITIONER

## 2021-09-09 RX ORDER — LISINOPRIL 10 MG/1
10 TABLET ORAL DAILY
Qty: 90 TABLET | Refills: 2 | Status: SHIPPED | OUTPATIENT
Start: 2021-09-09 | End: 2022-04-20 | Stop reason: SDUPTHER

## 2021-09-09 RX ORDER — ERGOCALCIFEROL 1.25 MG/1
50000 CAPSULE ORAL
Qty: 4 CAPSULE | Refills: 3 | Status: SHIPPED | OUTPATIENT
Start: 2021-09-09 | End: 2021-09-10

## 2021-09-09 RX ORDER — SACCHAROMYCES BOULARDII 250 MG
250 CAPSULE ORAL 2 TIMES DAILY
Qty: 30 CAPSULE | Refills: 1 | Status: SHIPPED | OUTPATIENT
Start: 2021-09-09 | End: 2021-10-05
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
## 2021-09-09 NOTE — PATIENT INSTRUCTIONS
Try florastor twice a day for diarrhea  Restart lisinopril 10 mg a day   Repeat blood work 2 weeks  Decrease vitamin D supplement to monthly  Cough and deep breathe to exercise lungs  Can try abdominal binder for rib pain but not too tight

## 2021-09-10 ENCOUNTER — TRANSITIONAL CARE MANAGEMENT (OUTPATIENT)
Dept: FAMILY MEDICINE CLINIC | Facility: CLINIC | Age: 46
End: 2021-09-10

## 2021-09-10 ENCOUNTER — TELEPHONE (OUTPATIENT)
Dept: NEPHROLOGY | Facility: CLINIC | Age: 46
End: 2021-09-10

## 2021-09-10 ENCOUNTER — OFFICE VISIT (OUTPATIENT)
Dept: FAMILY MEDICINE CLINIC | Facility: CLINIC | Age: 46
End: 2021-09-10
Payer: COMMERCIAL

## 2021-09-10 VITALS
SYSTOLIC BLOOD PRESSURE: 138 MMHG | TEMPERATURE: 98.6 F | OXYGEN SATURATION: 99 % | BODY MASS INDEX: 21.83 KG/M2 | WEIGHT: 131 LBS | HEART RATE: 91 BPM | HEIGHT: 65 IN | DIASTOLIC BLOOD PRESSURE: 72 MMHG

## 2021-09-10 DIAGNOSIS — S36.116S: ICD-10-CM

## 2021-09-10 DIAGNOSIS — E67.3 HIGH VITAMIN D LEVEL: ICD-10-CM

## 2021-09-10 DIAGNOSIS — N18.32 STAGE 3B CHRONIC KIDNEY DISEASE (HCC): ICD-10-CM

## 2021-09-10 DIAGNOSIS — Z76.89 ENCOUNTER FOR SUPPORT AND COORDINATION OF TRANSITION OF CARE: Primary | ICD-10-CM

## 2021-09-10 PROCEDURE — 99495 TRANSJ CARE MGMT MOD F2F 14D: CPT | Performed by: NURSE PRACTITIONER

## 2021-09-10 RX ORDER — MONTELUKAST SODIUM 4 MG/1
TABLET, CHEWABLE ORAL
COMMUNITY
Start: 2021-09-10 | End: 2021-10-05
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
## 2021-09-10 NOTE — TELEPHONE ENCOUNTER
Spoke with Dinah Peralta from radiology reading room and she sent the Dr  An E-mail  She said he works night shift and she is not sure if he will be in tonight  But she said it will def be done by Monday

## 2021-09-10 NOTE — TELEPHONE ENCOUNTER
----- Message from Leroy Mike, [address] sent at 9/9/2021 11:39 AM EDT -----  Please ask radiology to comment on mesenteric arteries and bowel on enhanced CT scan done 8/30 as patient has post pradial pain/diarrhea  Thank you

## 2021-09-13 NOTE — TELEPHONE ENCOUNTER
Called [facility] reading room ( [phone] Opt 3)  And spoke with Leno Maciel  She said the Dr who reads the report has to be the Dr who makes an addendum and he is out of the office until Monday 9/20  She was going to see if another Dr could make the addendum and call me back

## 2021-09-14 ENCOUNTER — OFFICE VISIT (OUTPATIENT)
Dept: SURGERY | Facility: CLINIC | Age: 46
End: 2021-09-14
Payer: COMMERCIAL

## 2021-09-14 VITALS — BODY MASS INDEX: 21.66 KG/M2 | WEIGHT: 130 LBS | HEIGHT: 65 IN | TEMPERATURE: 97.6 F

## 2021-09-14 DIAGNOSIS — F43.10 PTSD (POST-TRAUMATIC STRESS DISORDER): ICD-10-CM

## 2021-09-14 DIAGNOSIS — G44.209 TENSION-TYPE HEADACHE, NOT INTRACTABLE, UNSPECIFIED CHRONICITY PATTERN: ICD-10-CM

## 2021-09-14 DIAGNOSIS — N18.30 STAGE 3 CHRONIC KIDNEY DISEASE, UNSPECIFIED WHETHER STAGE 3A OR 3B CKD (HCC): ICD-10-CM

## 2021-09-14 DIAGNOSIS — S36.116S: Primary | ICD-10-CM

## 2021-09-14 DIAGNOSIS — S22.41XA CLOSED FRACTURE OF MULTIPLE RIBS OF RIGHT SIDE, INITIAL ENCOUNTER: ICD-10-CM

## 2021-09-14 DIAGNOSIS — R19.8 IRREGULAR BOWEL HABITS: ICD-10-CM

## 2021-09-14 PROCEDURE — 99213 OFFICE O/P EST LOW 20 MIN: CPT | Performed by: NURSE PRACTITIONER

## 2021-09-14 RX ORDER — METHOCARBAMOL 750 MG/1
750 TABLET, FILM COATED ORAL EVERY 6 HOURS PRN
Qty: 30 TABLET | Refills: 0 | Status: SHIPPED | OUTPATIENT
Start: 2021-09-14 | End: 2021-10-05

## 2021-09-14 RX ORDER — OXYCODONE HYDROCHLORIDE 5 MG/1
TABLET ORAL
Qty: 10 TABLET | Refills: 0 | Status: SHIPPED | OUTPATIENT
Start: 2021-09-14 | End: 2021-10-15
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
## 2021-09-14 NOTE — ASSESSMENT & PLAN NOTE
· Patient describes periods of constipation/diarrhea  · Patient associates diarrhea with stool softener, but since stopping stool softener she has been constipated and taking oxycodone  · Patient was educated on the relation between oxycodone in the stool softener  · Patient has followed up with GI who placed her on probiotic  · Educated on fibrous diet  · Patient states that today she had a normal bowel movement  Patient will continue to monitor  · Follow-up with GI as needed  Otherwise, follow-up with PCP

## 2021-09-14 NOTE — ASSESSMENT & PLAN NOTE
Lab Results   Component Value Date    EGFR 39 09/08/2021    EGFR 42 08/31/2021    EGFR 44 08/29/2021    CREATININE 1 56 (H) 09/08/2021    CREATININE 1 48 (H) 08/31/2021    CREATININE 1 41 (H) 08/29/2021   ·   · Patient had DUSTIN while admitted  · DUSTIN resolved while inpatient  Patient has since followed up with Nephrology, repeat labs were completed which shows patient's kidney functioning remains at baseline  · Continue to follow-up with PCP/Nephrology

## 2021-09-14 NOTE — ASSESSMENT & PLAN NOTE
· Patient describes a tension type headache that is intermittent starting within the past week  She describes a tight band that wraps around her head  Headaches are typically associated with increased stress  Idiopathic resolution  · She has been taking Tylenol with minimal response  · Discussed taking Tylenol and Robaxin to assist with tension type headache  Discussed other headache remedies, including rest, caffeine, relaxation exercise  · Patient is to follow-up with PCP if these continue she may require Neurology referral if this continues

## 2021-09-14 NOTE — ASSESSMENT & PLAN NOTE
· Patient continues to have right-sided rib pain  Multimodal pain regimen use has decreased  Patient states that she is only taking oxycodone and Robaxin at night  · Pulling 2 L on IS  No respiratory symptoms  · Patient describes being unable to work due to pain, will will hold patient out for 2 more weeks, during that time she is to follow-up with PT as previously ordered  · Represcribed Robaxin and oxycodone  No refills    · Follow-up with PCP

## 2021-09-18 ENCOUNTER — APPOINTMENT (EMERGENCY)
Dept: CT IMAGING | Facility: HOSPITAL | Age: 46
End: 2021-09-18
Payer: COMMERCIAL

## 2021-09-18 ENCOUNTER — HOSPITAL ENCOUNTER (EMERGENCY)
Facility: HOSPITAL | Age: 46
Discharge: HOME | End: 2021-09-18
Attending: FAMILY MEDICINE
Payer: COMMERCIAL

## 2021-09-18 VITALS
TEMPERATURE: 97.3 F | HEART RATE: 80 BPM | BODY MASS INDEX: 21.63 KG/M2 | SYSTOLIC BLOOD PRESSURE: 127 MMHG | DIASTOLIC BLOOD PRESSURE: 75 MMHG | OXYGEN SATURATION: 98 % | WEIGHT: 130 LBS | RESPIRATION RATE: 18 BRPM

## 2021-09-18 DIAGNOSIS — R19.7 DIARRHEA: Primary | ICD-10-CM

## 2021-09-18 DIAGNOSIS — E86.0 DEHYDRATION: ICD-10-CM

## 2021-09-18 LAB
ABO GROUP BLD: NORMAL
ALBUMIN SERPL BCP-MCNC: 5.4 G/DL (ref 3.5–5.7)
ALP SERPL-CCNC: 101 U/L (ref 40–150)
ALT SERPL W P-5'-P-CCNC: 8 U/L (ref 7–52)
ANION GAP SERPL CALCULATED.3IONS-SCNC: 11 MMOL/L (ref 4–13)
AST SERPL W P-5'-P-CCNC: 12 U/L (ref 13–39)
BACTERIA UR QL AUTO: NORMAL /HPF
BASOPHILS # BLD AUTO: 0.1 THOUSANDS/ΜL (ref 0–0.1)
BASOPHILS NFR BLD AUTO: 1 % (ref 0–2)
BILIRUB SERPL-MCNC: 0.6 MG/DL (ref 0.2–1)
BILIRUB UR QL STRIP: NEGATIVE
BLD GP AB SCN SERPL QL: NEGATIVE
BUN SERPL-MCNC: 20 MG/DL (ref 7–25)
CALCIUM SERPL-MCNC: 10.7 MG/DL (ref 8.6–10.5)
CHLORIDE SERPL-SCNC: 105 MMOL/L (ref 98–107)
CLARITY UR: CLEAR
CO2 SERPL-SCNC: 22 MMOL/L (ref 21–31)
COLOR UR: ABNORMAL
CREAT SERPL-MCNC: 1.94 MG/DL (ref 0.6–1.2)
EOSINOPHIL # BLD AUTO: 0.1 THOUSAND/ΜL (ref 0–0.61)
EOSINOPHIL NFR BLD AUTO: 1 % (ref 0–5)
ERYTHROCYTE [DISTWIDTH] IN BLOOD BY AUTOMATED COUNT: 16 % (ref 11.5–14.5)
GFR SERPL CREATININE-BSD FRML MDRD: 30 ML/MIN/1.73SQ M
GLUCOSE SERPL-MCNC: 70 MG/DL (ref 65–99)
GLUCOSE UR STRIP-MCNC: NEGATIVE MG/DL
HCT VFR BLD AUTO: 41.5 % (ref 42–47)
HGB BLD-MCNC: 14.1 G/DL (ref 12–16)
HGB UR QL STRIP.AUTO: NEGATIVE
KETONES UR STRIP-MCNC: NEGATIVE MG/DL
LEUKOCYTE ESTERASE UR QL STRIP: ABNORMAL
LIPASE SERPL-CCNC: 50 U/L (ref 11–82)
LYMPHOCYTES # BLD AUTO: 1.6 THOUSANDS/ΜL (ref 0.6–4.47)
LYMPHOCYTES NFR BLD AUTO: 15 % (ref 21–51)
MAGNESIUM SERPL-MCNC: 1.9 MG/DL (ref 1.9–2.7)
MCH RBC QN AUTO: 31.9 PG (ref 26–34)
MCHC RBC AUTO-ENTMCNC: 33.9 G/DL (ref 31–37)
MCV RBC AUTO: 94 FL (ref 81–99)
MONOCYTES # BLD AUTO: 0.5 THOUSAND/ΜL (ref 0.17–1.22)
MONOCYTES NFR BLD AUTO: 4 % (ref 2–12)
NEUTROPHILS # BLD AUTO: 9 THOUSANDS/ΜL (ref 1.4–6.5)
NEUTS SEG NFR BLD AUTO: 80 % (ref 42–75)
NITRITE UR QL STRIP: NEGATIVE
NON-SQ EPI CELLS URNS QL MICRO: NORMAL /HPF
PH UR STRIP.AUTO: 5.5 [PH]
PLATELET # BLD AUTO: 269 THOUSANDS/UL (ref 149–390)
PMV BLD AUTO: 9.3 FL (ref 8.6–11.7)
POTASSIUM SERPL-SCNC: 4.4 MMOL/L (ref 3.5–5.5)
PROT SERPL-MCNC: 8.3 G/DL (ref 6.4–8.9)
PROT UR STRIP-MCNC: NEGATIVE MG/DL
RBC # BLD AUTO: 4.42 MILLION/UL (ref 3.9–5.2)
RBC #/AREA URNS AUTO: NORMAL /HPF
RH BLD: POSITIVE
SODIUM SERPL-SCNC: 138 MMOL/L (ref 134–143)
SP GR UR STRIP.AUTO: 1.01 (ref 1–1.03)
SPECIMEN EXPIRATION DATE: NORMAL
UROBILINOGEN UR QL STRIP.AUTO: 0.2 E.U./DL
WBC # BLD AUTO: 11.2 THOUSAND/UL (ref 4.8–10.8)
WBC #/AREA URNS AUTO: NORMAL /HPF

## 2021-09-18 PROCEDURE — 96365 THER/PROPH/DIAG IV INF INIT: CPT

## 2021-09-18 PROCEDURE — 36415 COLL VENOUS BLD VENIPUNCTURE: CPT | Performed by: PHYSICIAN ASSISTANT

## 2021-09-18 PROCEDURE — 86850 RBC ANTIBODY SCREEN: CPT | Performed by: PHYSICIAN ASSISTANT

## 2021-09-18 PROCEDURE — 96375 TX/PRO/DX INJ NEW DRUG ADDON: CPT

## 2021-09-18 PROCEDURE — 96376 TX/PRO/DX INJ SAME DRUG ADON: CPT

## 2021-09-18 PROCEDURE — 80053 COMPREHEN METABOLIC PANEL: CPT | Performed by: PHYSICIAN ASSISTANT

## 2021-09-18 PROCEDURE — 85025 COMPLETE CBC W/AUTO DIFF WBC: CPT | Performed by: PHYSICIAN ASSISTANT

## 2021-09-18 PROCEDURE — 83690 ASSAY OF LIPASE: CPT | Performed by: PHYSICIAN ASSISTANT

## 2021-09-18 PROCEDURE — 86900 BLOOD TYPING SEROLOGIC ABO: CPT | Performed by: PHYSICIAN ASSISTANT

## 2021-09-18 PROCEDURE — 86901 BLOOD TYPING SEROLOGIC RH(D): CPT | Performed by: PHYSICIAN ASSISTANT

## 2021-09-18 PROCEDURE — 83735 ASSAY OF MAGNESIUM: CPT | Performed by: PHYSICIAN ASSISTANT

## 2021-09-18 PROCEDURE — 99285 EMERGENCY DEPT VISIT HI MDM: CPT | Performed by: PHYSICIAN ASSISTANT

## 2021-09-18 PROCEDURE — 99284 EMERGENCY DEPT VISIT MOD MDM: CPT

## 2021-09-18 PROCEDURE — 81001 URINALYSIS AUTO W/SCOPE: CPT | Performed by: PHYSICIAN ASSISTANT

## 2021-09-18 PROCEDURE — 74176 CT ABD & PELVIS W/O CONTRAST: CPT

## 2021-09-18 RX ORDER — SODIUM CHLORIDE, SODIUM GLUCONATE, SODIUM ACETATE, POTASSIUM CHLORIDE, MAGNESIUM CHLORIDE, SODIUM PHOSPHATE, DIBASIC, AND POTASSIUM PHOSPHATE .53; .5; .37; .037; .03; .012; .00082 G/100ML; G/100ML; G/100ML; G/100ML; G/100ML; G/100ML; G/100ML
2000 INJECTION, SOLUTION INTRAVENOUS ONCE
Status: COMPLETED | OUTPATIENT
Start: 2021-09-18 | End: 2021-09-18

## 2021-09-18 RX ORDER — MORPHINE SULFATE 4 MG/ML
4 INJECTION, SOLUTION INTRAMUSCULAR; INTRAVENOUS ONCE
Status: COMPLETED | OUTPATIENT
Start: 2021-09-18 | End: 2021-09-18

## 2021-09-18 RX ADMIN — SODIUM CHLORIDE, SODIUM GLUCONATE, SODIUM ACETATE, POTASSIUM CHLORIDE, MAGNESIUM CHLORIDE, SODIUM PHOSPHATE, DIBASIC, AND POTASSIUM PHOSPHATE 2000 ML: .53; .5; .37; .037; .03; .012; .00082 INJECTION, SOLUTION INTRAVENOUS at 12:44

## 2021-09-18 RX ADMIN — MORPHINE SULFATE 4 MG: 4 INJECTION INTRAVENOUS at 12:52

## 2021-09-18 RX ADMIN — MORPHINE SULFATE 4 MG: 4 INJECTION INTRAVENOUS at 15:37
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
## 2021-09-20 ENCOUNTER — HOSPITAL ENCOUNTER (EMERGENCY)
Facility: HOSPITAL | Age: 46
Discharge: HOME | End: 2021-09-20
Attending: EMERGENCY MEDICINE
Payer: COMMERCIAL

## 2021-09-20 VITALS
SYSTOLIC BLOOD PRESSURE: 113 MMHG | TEMPERATURE: 98.4 F | RESPIRATION RATE: 18 BRPM | OXYGEN SATURATION: 99 % | HEIGHT: 65 IN | DIASTOLIC BLOOD PRESSURE: 66 MMHG | WEIGHT: 130 LBS | BODY MASS INDEX: 21.66 KG/M2 | HEART RATE: 92 BPM

## 2021-09-20 DIAGNOSIS — R19.7 DIARRHEA: Primary | ICD-10-CM

## 2021-09-20 LAB
ALBUMIN SERPL BCP-MCNC: 4.7 G/DL (ref 3.5–5.7)
ALP SERPL-CCNC: 81 U/L (ref 40–150)
ALT SERPL W P-5'-P-CCNC: 6 U/L (ref 7–52)
ANION GAP SERPL CALCULATED.3IONS-SCNC: 8 MMOL/L (ref 4–13)
AST SERPL W P-5'-P-CCNC: 11 U/L (ref 13–39)
BACTERIA UR QL AUTO: ABNORMAL /HPF
BASOPHILS # BLD AUTO: 0.1 THOUSANDS/ΜL (ref 0–0.1)
BASOPHILS NFR BLD AUTO: 1 % (ref 0–2)
BILIRUB SERPL-MCNC: 0.4 MG/DL (ref 0.2–1)
BILIRUB UR QL STRIP: NEGATIVE
BUN SERPL-MCNC: 25 MG/DL (ref 7–25)
CALCIUM SERPL-MCNC: 9.9 MG/DL (ref 8.6–10.5)
CHLORIDE SERPL-SCNC: 104 MMOL/L (ref 98–107)
CLARITY UR: ABNORMAL
CO2 SERPL-SCNC: 28 MMOL/L (ref 21–31)
COLOR UR: YELLOW
CREAT SERPL-MCNC: 1.72 MG/DL (ref 0.6–1.2)
EOSINOPHIL # BLD AUTO: 0 THOUSAND/ΜL (ref 0–0.61)
EOSINOPHIL NFR BLD AUTO: 0 % (ref 0–5)
ERYTHROCYTE [DISTWIDTH] IN BLOOD BY AUTOMATED COUNT: 15.8 % (ref 11.5–14.5)
EXT PREG TEST URINE: NORMAL
EXT. CONTROL ED NAV: NORMAL
GFR SERPL CREATININE-BSD FRML MDRD: 35 ML/MIN/1.73SQ M
GLUCOSE SERPL-MCNC: 101 MG/DL (ref 65–99)
GLUCOSE UR STRIP-MCNC: NEGATIVE MG/DL
HCT VFR BLD AUTO: 34.7 % (ref 42–47)
HGB BLD-MCNC: 12 G/DL (ref 12–16)
HGB UR QL STRIP.AUTO: NEGATIVE
KETONES UR STRIP-MCNC: NEGATIVE MG/DL
LEUKOCYTE ESTERASE UR QL STRIP: ABNORMAL
LIPASE SERPL-CCNC: 47 U/L (ref 11–82)
LYMPHOCYTES # BLD AUTO: 1.2 THOUSANDS/ΜL (ref 0.6–4.47)
LYMPHOCYTES NFR BLD AUTO: 11 % (ref 21–51)
MCH RBC QN AUTO: 32.3 PG (ref 26–34)
MCHC RBC AUTO-ENTMCNC: 34.6 G/DL (ref 31–37)
MCV RBC AUTO: 93 FL (ref 81–99)
MONOCYTES # BLD AUTO: 0.4 THOUSAND/ΜL (ref 0.17–1.22)
MONOCYTES NFR BLD AUTO: 4 % (ref 2–12)
MUCOUS THREADS UR QL AUTO: ABNORMAL
NEUTROPHILS # BLD AUTO: 8.7 THOUSANDS/ΜL (ref 1.4–6.5)
NEUTS SEG NFR BLD AUTO: 84 % (ref 42–75)
NITRITE UR QL STRIP: NEGATIVE
NON-SQ EPI CELLS URNS QL MICRO: ABNORMAL /HPF
PH UR STRIP.AUTO: 5.5 [PH]
PLATELET # BLD AUTO: 214 THOUSANDS/UL (ref 149–390)
PMV BLD AUTO: 9.3 FL (ref 8.6–11.7)
POTASSIUM SERPL-SCNC: 3.9 MMOL/L (ref 3.5–5.5)
PROT SERPL-MCNC: 7.2 G/DL (ref 6.4–8.9)
PROT UR STRIP-MCNC: NEGATIVE MG/DL
RBC # BLD AUTO: 3.72 MILLION/UL (ref 3.9–5.2)
RBC #/AREA URNS AUTO: ABNORMAL /HPF
SODIUM SERPL-SCNC: 140 MMOL/L (ref 134–143)
SP GR UR STRIP.AUTO: 1.02 (ref 1–1.03)
UROBILINOGEN UR QL STRIP.AUTO: 0.2 E.U./DL
WBC # BLD AUTO: 10.3 THOUSAND/UL (ref 4.8–10.8)
WBC #/AREA URNS AUTO: ABNORMAL /HPF

## 2021-09-20 PROCEDURE — 99284 EMERGENCY DEPT VISIT MOD MDM: CPT | Performed by: PHYSICIAN ASSISTANT

## 2021-09-20 PROCEDURE — 83690 ASSAY OF LIPASE: CPT | Performed by: PHYSICIAN ASSISTANT

## 2021-09-20 PROCEDURE — 96374 THER/PROPH/DIAG INJ IV PUSH: CPT

## 2021-09-20 PROCEDURE — 85025 COMPLETE CBC W/AUTO DIFF WBC: CPT | Performed by: PHYSICIAN ASSISTANT

## 2021-09-20 PROCEDURE — 99284 EMERGENCY DEPT VISIT MOD MDM: CPT

## 2021-09-20 PROCEDURE — 96361 HYDRATE IV INFUSION ADD-ON: CPT

## 2021-09-20 PROCEDURE — 81001 URINALYSIS AUTO W/SCOPE: CPT | Performed by: PHYSICIAN ASSISTANT

## 2021-09-20 PROCEDURE — 80053 COMPREHEN METABOLIC PANEL: CPT | Performed by: PHYSICIAN ASSISTANT

## 2021-09-20 PROCEDURE — 36415 COLL VENOUS BLD VENIPUNCTURE: CPT | Performed by: PHYSICIAN ASSISTANT

## 2021-09-20 PROCEDURE — 81025 URINE PREGNANCY TEST: CPT | Performed by: PHYSICIAN ASSISTANT

## 2021-09-20 RX ORDER — DICYCLOMINE HCL 20 MG
20 TABLET ORAL ONCE
Status: COMPLETED | OUTPATIENT
Start: 2021-09-20 | End: 2021-09-20

## 2021-09-20 RX ORDER — ONDANSETRON 4 MG/1
4 TABLET, ORALLY DISINTEGRATING ORAL EVERY 6 HOURS PRN
Qty: 20 TABLET | Refills: 0 | Status: SHIPPED | OUTPATIENT
Start: 2021-09-20

## 2021-09-20 RX ORDER — ONDANSETRON 2 MG/ML
4 INJECTION INTRAMUSCULAR; INTRAVENOUS ONCE
Status: COMPLETED | OUTPATIENT
Start: 2021-09-20 | End: 2021-09-20

## 2021-09-20 RX ORDER — DICYCLOMINE HCL 20 MG
20 TABLET ORAL 2 TIMES DAILY
Qty: 20 TABLET | Refills: 0 | Status: SHIPPED | OUTPATIENT
Start: 2021-09-20 | End: 2021-10-05 | Stop reason: SDUPTHER

## 2021-09-20 RX ADMIN — DICYCLOMINE HYDROCHLORIDE 20 MG: 20 TABLET ORAL at 12:11

## 2021-09-20 RX ADMIN — SODIUM CHLORIDE 1000 ML: 0.9 INJECTION, SOLUTION INTRAVENOUS at 11:58

## 2021-09-20 RX ADMIN — ONDANSETRON 4 MG: 2 INJECTION INTRAMUSCULAR; INTRAVENOUS at 12:12
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
## 2021-09-20 NOTE — DISCHARGE INSTRUCTIONS
Return a sample of your stool on ice to an outpatient lab center  Take Bentyl twice daily  Use Zofran as needed for nausea  Take small sips of water frequently throughout the day  Discussed your kidney function with your nephrologist   Follow-up with your GI doctor  Discuss with your family doctor in regards to routine health maintenance

## 2021-09-21 ENCOUNTER — TELEPHONE (OUTPATIENT)
Dept: FAMILY MEDICINE CLINIC | Facility: CLINIC | Age: 46
End: 2021-09-21

## 2021-09-21 NOTE — UTILIZATION REVIEW
Notification of Discharge   This is a Notification of Discharge from our facility [address]  Please be advised that this patient has been discharge from our facility  Below you will find the admission and discharge date and time including the patients disposition  UTILIZATION REVIEW CONTACT:  Daron Strickland  Utilization   Network Utilization Review Department  Phone: [phone] x carefully listen to the prompts  All voicemails are confidential   Email: [email or web address]     PHYSICIAN ADVISORY SERVICES:  FOR [id number] REVIEW - MEDICAL NECESSITY DENIAL  Phone: [phone]  Fax: [phone]  Email: [email or web address]     PRESENTATION DATE: 8/23/2021  7:53 PM  OBERVATION ADMISSION DATE:  INPATIENT ADMISSION DATE: 8/23/21  9:21 PM   DISCHARGE DATE: 8/31/2021  2:36 PM  DISPOSITION: Home/Self Care Home/Self Care      IMPORTANT INFORMATION:  Send all requests for admission clinical reviews, approved or denied determinations and any other requests to dedicated fax number below belonging to the campus where the patient is receiving treatment   List of dedicated fax numbers:  [address] DENIALS (Administrative/Medical Necessity) [phone]   [address] [place] [address] (Maternity/NICU/Pediatrics) [phone]   Rinda Purple [phone]   [facility] [phone]   [facility] [phone]   [address] [phone]   [facility]  [phone]   [address] [place] [address], [address]  [address] [facility] 1000 [facility] [phone]

## 2021-09-21 NOTE — TELEPHONE ENCOUNTER
Spoke with Kendell Cheung regarding patient advised to follow GI instruction and will further evaluate symptoms of dry mouth and white bubbles in mouth on appointment moved up to Thursday, patient also advised to drink plenty water and they can try peppermint to help relieve gastro symtoms
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
## 2021-09-23 ENCOUNTER — OFFICE VISIT (OUTPATIENT)
Dept: FAMILY MEDICINE CLINIC | Facility: CLINIC | Age: 46
End: 2021-09-23
Payer: COMMERCIAL

## 2021-09-23 VITALS
TEMPERATURE: 97.8 F | DIASTOLIC BLOOD PRESSURE: 78 MMHG | OXYGEN SATURATION: 98 % | SYSTOLIC BLOOD PRESSURE: 124 MMHG | WEIGHT: 129.38 LBS | BODY MASS INDEX: 21.56 KG/M2 | HEIGHT: 65 IN | HEART RATE: 103 BPM

## 2021-09-23 DIAGNOSIS — S22.41XS CLOSED FRACTURE OF MULTIPLE RIBS OF RIGHT SIDE, SEQUELA: ICD-10-CM

## 2021-09-23 DIAGNOSIS — F31.81 BIPOLAR 2 DISORDER (HCC): ICD-10-CM

## 2021-09-23 DIAGNOSIS — F41.9 ANXIETY: ICD-10-CM

## 2021-09-23 DIAGNOSIS — S36.116S: Primary | ICD-10-CM

## 2021-09-23 PROCEDURE — 99214 OFFICE O/P EST MOD 30 MIN: CPT | Performed by: NURSE PRACTITIONER

## 2021-09-23 PROCEDURE — 3008F BODY MASS INDEX DOCD: CPT | Performed by: NURSE PRACTITIONER

## 2021-09-23 RX ORDER — PANTOPRAZOLE SODIUM 40 MG/1
40 TABLET, DELAYED RELEASE ORAL DAILY
COMMUNITY
Start: 2021-09-22 | End: 2021-10-05

## 2021-09-23 NOTE — LETTER
September 23, 2021     Patient: Carolina Khanna   YOB: 1975   Date of Visit: 9/23/2021       To Whom it May Concern:    Carolina Khanna is under my professional care  She was seen in my office on 9/23/2021  She may return to work on 10/12/2021 pending improvement of symptoms has follow up appointment on 10/11/21   If you have any questions or concerns, please don't hesitate to call           Sincerely,          FIONA Mendosa        CC: No Recipients

## 2021-09-23 NOTE — PATIENT INSTRUCTIONS
Please call your GI to see if they will do an endoscopy with the colonoscopy  Please call your psychiatrist to see about starting some thing on a daily basis for anxiety

## 2021-09-24 ENCOUNTER — TELEPHONE (OUTPATIENT)
Dept: FAMILY MEDICINE CLINIC | Facility: CLINIC | Age: 46
End: 2021-09-24

## 2021-09-24 NOTE — TELEPHONE ENCOUNTER
Starting an FMLA short term leave form,asking for most recent notes sent to the claims person  Contacting through email to get a fax number  [email or web address]  Intake number: [id number]

## 2021-09-27 ENCOUNTER — ANESTHESIA EVENT (OUTPATIENT)
Dept: ANESTHESIOLOGY | Facility: HOSPITAL | Age: 46
End: 2021-09-27

## 2021-09-27 ENCOUNTER — HOSPITAL ENCOUNTER (OUTPATIENT)
Dept: PERIOP | Facility: HOSPITAL | Age: 46
Setting detail: OUTPATIENT SURGERY
Discharge: HOME | End: 2021-09-27
Attending: INTERNAL MEDICINE

## 2021-09-27 ENCOUNTER — ANESTHESIA (OUTPATIENT)
Dept: ANESTHESIOLOGY | Facility: HOSPITAL | Age: 46
End: 2021-09-27

## 2021-09-27 VITALS
HEIGHT: 65 IN | WEIGHT: 129 LBS | HEART RATE: 78 BPM | BODY MASS INDEX: 21.49 KG/M2 | TEMPERATURE: 97.2 F | RESPIRATION RATE: 20 BRPM | DIASTOLIC BLOOD PRESSURE: 68 MMHG | OXYGEN SATURATION: 99 % | SYSTOLIC BLOOD PRESSURE: 124 MMHG

## 2021-09-27 DIAGNOSIS — Z12.11 SCREEN FOR COLON CANCER: ICD-10-CM

## 2021-09-27 RX ORDER — SODIUM CHLORIDE, SODIUM LACTATE, POTASSIUM CHLORIDE, CALCIUM CHLORIDE 600; 310; 30; 20 MG/100ML; MG/100ML; MG/100ML; MG/100ML
125 INJECTION, SOLUTION INTRAVENOUS CONTINUOUS
Status: DISCONTINUED | OUTPATIENT
Start: 2021-09-27 | End: 2021-10-01 | Stop reason: HOSPADM
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
## 2021-10-01 ENCOUNTER — APPOINTMENT (OUTPATIENT)
Dept: LAB | Facility: HOSPITAL | Age: 46
End: 2021-10-01
Payer: COMMERCIAL

## 2021-10-01 DIAGNOSIS — R19.7 DIARRHEA: ICD-10-CM

## 2021-10-01 PROCEDURE — 87505 NFCT AGENT DETECTION GI: CPT

## 2021-10-02 LAB
CAMPYLOBACTER DNA SPEC NAA+PROBE: NORMAL
SALMONELLA DNA SPEC QL NAA+PROBE: NORMAL
SHIGA TOXIN STX GENE SPEC NAA+PROBE: NORMAL
SHIGELLA DNA SPEC QL NAA+PROBE: NORMAL

## 2021-10-05 ENCOUNTER — OFFICE VISIT (OUTPATIENT)
Dept: GASTROENTEROLOGY | Facility: CLINIC | Age: 46
End: 2021-10-05
Payer: COMMERCIAL

## 2021-10-05 VITALS
BODY MASS INDEX: 20.89 KG/M2 | HEART RATE: 100 BPM | SYSTOLIC BLOOD PRESSURE: 100 MMHG | HEIGHT: 65 IN | TEMPERATURE: 98.6 F | DIASTOLIC BLOOD PRESSURE: 60 MMHG | WEIGHT: 125.4 LBS | RESPIRATION RATE: 18 BRPM

## 2021-10-05 DIAGNOSIS — R19.7 DIARRHEA: ICD-10-CM

## 2021-10-05 DIAGNOSIS — R10.13 EPIGASTRIC PAIN: Primary | ICD-10-CM

## 2021-10-05 PROBLEM — K31.A0 INTESTINAL METAPLASIA OF STOMACH: Status: ACTIVE | Noted: 2021-10-05

## 2021-10-05 PROCEDURE — 99214 OFFICE O/P EST MOD 30 MIN: CPT | Performed by: STUDENT IN AN ORGANIZED HEALTH CARE EDUCATION/TRAINING PROGRAM

## 2021-10-05 RX ORDER — CHLORHEXIDINE GLUCONATE 0.12 MG/ML
RINSE ORAL
COMMUNITY
Start: 2021-10-04 | End: 2021-10-05

## 2021-10-05 RX ORDER — SUCRALFATE ORAL 1 G/10ML
1 SUSPENSION ORAL 4 TIMES DAILY
Qty: 420 ML | Refills: 3 | Status: SHIPPED | OUTPATIENT
Start: 2021-10-05 | End: 2021-10-28

## 2021-10-05 RX ORDER — LANSOPRAZOLE 30 MG/1
30 CAPSULE, DELAYED RELEASE ORAL 2 TIMES DAILY
COMMUNITY
Start: 2021-10-04

## 2021-10-05 RX ORDER — LACTOBACIL 2/BIFIDO 1/S.THERMO 450B CELL
PACKET (EA) ORAL
COMMUNITY
Start: 2021-10-04 | End: 2022-02-24

## 2021-10-06 RX ORDER — DICYCLOMINE HCL 20 MG
20 TABLET ORAL 2 TIMES DAILY
Qty: 60 TABLET | Refills: 5 | Status: SHIPPED | OUTPATIENT
Start: 2021-10-06 | End: 2022-04-24

## 2021-10-08 ENCOUNTER — OFFICE VISIT (OUTPATIENT)
Dept: FAMILY MEDICINE CLINIC | Facility: CLINIC | Age: 46
End: 2021-10-08
Payer: COMMERCIAL

## 2021-10-08 ENCOUNTER — HOSPITAL ENCOUNTER (OUTPATIENT)
Dept: RADIOLOGY | Facility: HOSPITAL | Age: 46
Discharge: HOME | End: 2021-10-08
Payer: COMMERCIAL

## 2021-10-08 VITALS
WEIGHT: 124.6 LBS | SYSTOLIC BLOOD PRESSURE: 138 MMHG | BODY MASS INDEX: 20.76 KG/M2 | TEMPERATURE: 96.9 F | DIASTOLIC BLOOD PRESSURE: 74 MMHG | OXYGEN SATURATION: 96 % | HEART RATE: 65 BPM | HEIGHT: 65 IN

## 2021-10-08 DIAGNOSIS — F31.81 BIPOLAR 2 DISORDER (HCC): ICD-10-CM

## 2021-10-08 DIAGNOSIS — T30.4 ACID BURN: ICD-10-CM

## 2021-10-08 DIAGNOSIS — E03.9 ACQUIRED HYPOTHYROIDISM: ICD-10-CM

## 2021-10-08 DIAGNOSIS — D64.9 ANEMIA, UNSPECIFIED TYPE: Primary | ICD-10-CM

## 2021-10-08 DIAGNOSIS — M54.89 OTHER BACK PAIN, UNSPECIFIED CHRONICITY: ICD-10-CM

## 2021-10-08 DIAGNOSIS — Z23 ENCOUNTER FOR IMMUNIZATION: ICD-10-CM

## 2021-10-08 DIAGNOSIS — R63.4 WEIGHT LOSS: ICD-10-CM

## 2021-10-08 DIAGNOSIS — N18.32 STAGE 3B CHRONIC KIDNEY DISEASE (HCC): ICD-10-CM

## 2021-10-08 DIAGNOSIS — T54.2X1A ACID BURN: ICD-10-CM

## 2021-10-08 DIAGNOSIS — S22.41XS CLOSED FRACTURE OF MULTIPLE RIBS OF RIGHT SIDE, SEQUELA: ICD-10-CM

## 2021-10-08 PROCEDURE — 90471 IMMUNIZATION ADMIN: CPT

## 2021-10-08 PROCEDURE — 99214 OFFICE O/P EST MOD 30 MIN: CPT | Performed by: NURSE PRACTITIONER

## 2021-10-08 PROCEDURE — 3725F SCREEN DEPRESSION PERFORMED: CPT | Performed by: NURSE PRACTITIONER

## 2021-10-08 PROCEDURE — 74240 X-RAY XM UPR GI TRC 1CNTRST: CPT

## 2021-10-08 PROCEDURE — 90688 IIV4 VACCINE SPLT 0.5 ML IM: CPT

## 2021-10-09 ENCOUNTER — HOSPITAL ENCOUNTER (OUTPATIENT)
Dept: ULTRASOUND IMAGING | Facility: HOSPITAL | Age: 46
Discharge: HOME | End: 2021-10-09
Payer: COMMERCIAL

## 2021-10-09 ENCOUNTER — HOSPITAL ENCOUNTER (OUTPATIENT)
Dept: RADIOLOGY | Facility: HOSPITAL | Age: 46
Discharge: HOME | End: 2021-10-09

## 2021-10-09 DIAGNOSIS — E03.9 ACQUIRED HYPOTHYROIDISM: ICD-10-CM

## 2021-10-09 DIAGNOSIS — M54.89 OTHER BACK PAIN, UNSPECIFIED CHRONICITY: ICD-10-CM

## 2021-10-09 DIAGNOSIS — R63.4 WEIGHT LOSS: ICD-10-CM

## 2021-10-09 PROCEDURE — 76536 US EXAM OF HEAD AND NECK: CPT

## 2021-10-12 DIAGNOSIS — R63.2 INCREASED APPETITE: Primary | ICD-10-CM

## 2021-10-13 ENCOUNTER — HOSPITAL ENCOUNTER (OUTPATIENT)
Dept: RADIOLOGY | Facility: HOSPITAL | Age: 46
Discharge: HOME | End: 2021-10-13
Payer: COMMERCIAL

## 2021-10-13 ENCOUNTER — APPOINTMENT (OUTPATIENT)
Dept: LAB | Facility: HOSPITAL | Age: 46
End: 2021-10-13
Payer: COMMERCIAL

## 2021-10-13 ENCOUNTER — APPOINTMENT (OUTPATIENT)
Dept: LAB | Facility: HOSPITAL | Age: 46
End: 2021-10-13
Attending: INTERNAL MEDICINE
Payer: COMMERCIAL

## 2021-10-13 DIAGNOSIS — D64.9 ANEMIA, UNSPECIFIED TYPE: ICD-10-CM

## 2021-10-13 DIAGNOSIS — M54.89 OTHER BACK PAIN, UNSPECIFIED CHRONICITY: ICD-10-CM

## 2021-10-13 DIAGNOSIS — E67.3 HIGH VITAMIN D LEVEL: ICD-10-CM

## 2021-10-13 DIAGNOSIS — R19.7 DIARRHEA, UNSPECIFIED TYPE: ICD-10-CM

## 2021-10-13 DIAGNOSIS — E03.9 ACQUIRED HYPOTHYROIDISM: ICD-10-CM

## 2021-10-13 DIAGNOSIS — N18.32 STAGE 3B CHRONIC KIDNEY DISEASE (HCC): ICD-10-CM

## 2021-10-13 LAB
25(OH)D3 SERPL-MCNC: 103.8 NG/ML (ref 30–100)
ALBUMIN SERPL BCP-MCNC: 4.8 G/DL (ref 3.5–5.7)
ALP SERPL-CCNC: 65 U/L (ref 40–150)
ALT SERPL W P-5'-P-CCNC: 12 U/L (ref 7–52)
ANION GAP SERPL CALCULATED.3IONS-SCNC: 9 MMOL/L (ref 4–13)
AST SERPL W P-5'-P-CCNC: 12 U/L (ref 13–39)
BASOPHILS # BLD AUTO: 0 THOUSANDS/ΜL (ref 0–0.1)
BASOPHILS NFR BLD AUTO: 0 % (ref 0–2)
BILIRUB SERPL-MCNC: 0.4 MG/DL (ref 0.2–1)
BUN SERPL-MCNC: 33 MG/DL (ref 7–25)
CALCIUM SERPL-MCNC: 9.5 MG/DL (ref 8.6–10.5)
CHLORIDE SERPL-SCNC: 101 MMOL/L (ref 98–107)
CO2 SERPL-SCNC: 29 MMOL/L (ref 21–31)
CREAT SERPL-MCNC: 1.32 MG/DL (ref 0.6–1.2)
EOSINOPHIL # BLD AUTO: 0.1 THOUSAND/ΜL (ref 0–0.61)
EOSINOPHIL NFR BLD AUTO: 2 % (ref 0–5)
ERYTHROCYTE [DISTWIDTH] IN BLOOD BY AUTOMATED COUNT: 16 % (ref 11.5–14.5)
FERRITIN SERPL-MCNC: 384 NG/ML (ref 8–388)
GFR SERPL CREATININE-BSD FRML MDRD: 48 ML/MIN/1.73SQ M
GLUCOSE P FAST SERPL-MCNC: 87 MG/DL (ref 65–99)
HCT VFR BLD AUTO: 35.6 % (ref 42–47)
HGB BLD-MCNC: 12 G/DL (ref 12–16)
IRON SATN MFR SERPL: 50 % (ref 15–50)
IRON SERPL-MCNC: 125 UG/DL (ref 50–170)
LYMPHOCYTES # BLD AUTO: 1.3 THOUSANDS/ΜL (ref 0.6–4.47)
LYMPHOCYTES NFR BLD AUTO: 17 % (ref 21–51)
MCH RBC QN AUTO: 32.5 PG (ref 26–34)
MCHC RBC AUTO-ENTMCNC: 33.8 G/DL (ref 31–37)
MCV RBC AUTO: 96 FL (ref 81–99)
MONOCYTES # BLD AUTO: 0.4 THOUSAND/ΜL (ref 0.17–1.22)
MONOCYTES NFR BLD AUTO: 5 % (ref 2–12)
NEUTROPHILS # BLD AUTO: 5.6 THOUSANDS/ΜL (ref 1.4–6.5)
NEUTS SEG NFR BLD AUTO: 76 % (ref 42–75)
PLATELET # BLD AUTO: 266 THOUSANDS/UL (ref 149–390)
PMV BLD AUTO: 8.7 FL (ref 8.6–11.7)
POTASSIUM SERPL-SCNC: 4.1 MMOL/L (ref 3.5–5.5)
PROT SERPL-MCNC: 7.4 G/DL (ref 6.4–8.9)
RBC # BLD AUTO: 3.69 MILLION/UL (ref 3.9–5.2)
SODIUM SERPL-SCNC: 139 MMOL/L (ref 134–143)
T4 FREE SERPL-MCNC: 0.82 NG/DL (ref 0.76–1.46)
TIBC SERPL-MCNC: 251 UG/DL (ref 250–450)
TSH SERPL DL<=0.05 MIU/L-ACNC: 1.33 UIU/ML (ref 0.45–5.33)
VIT B12 SERPL-MCNC: 623 PG/ML (ref 100–900)
WBC # BLD AUTO: 7.4 THOUSAND/UL (ref 4.8–10.8)

## 2021-10-13 PROCEDURE — 82306 VITAMIN D 25 HYDROXY: CPT

## 2021-10-13 PROCEDURE — 72110 X-RAY EXAM L-2 SPINE 4/>VWS: CPT

## 2021-10-13 PROCEDURE — 83540 ASSAY OF IRON: CPT

## 2021-10-13 PROCEDURE — 87505 NFCT AGENT DETECTION GI: CPT

## 2021-10-13 PROCEDURE — 80053 COMPREHEN METABOLIC PANEL: CPT

## 2021-10-13 PROCEDURE — 72070 X-RAY EXAM THORAC SPINE 2VWS: CPT

## 2021-10-13 PROCEDURE — 85025 COMPLETE CBC W/AUTO DIFF WBC: CPT

## 2021-10-13 PROCEDURE — 83550 IRON BINDING TEST: CPT

## 2021-10-13 PROCEDURE — 36415 COLL VENOUS BLD VENIPUNCTURE: CPT

## 2021-10-13 PROCEDURE — 84439 ASSAY OF FREE THYROXINE: CPT

## 2021-10-13 PROCEDURE — 82607 VITAMIN B-12: CPT

## 2021-10-13 PROCEDURE — 84443 ASSAY THYROID STIM HORMONE: CPT

## 2021-10-13 PROCEDURE — 82728 ASSAY OF FERRITIN: CPT

## 2021-10-15 ENCOUNTER — DOCUMENTATION (OUTPATIENT)
Dept: FAMILY MEDICINE CLINIC | Facility: CLINIC | Age: 46
End: 2021-10-15

## 2021-10-15 ENCOUNTER — OFFICE VISIT (OUTPATIENT)
Dept: FAMILY MEDICINE CLINIC | Facility: CLINIC | Age: 46
End: 2021-10-15
Payer: COMMERCIAL

## 2021-10-15 VITALS
TEMPERATURE: 99.4 F | WEIGHT: 130 LBS | SYSTOLIC BLOOD PRESSURE: 106 MMHG | DIASTOLIC BLOOD PRESSURE: 62 MMHG | HEIGHT: 65 IN | HEART RATE: 64 BPM | BODY MASS INDEX: 21.66 KG/M2 | OXYGEN SATURATION: 100 %

## 2021-10-15 DIAGNOSIS — E03.9 HYPOTHYROIDISM, UNSPECIFIED TYPE: ICD-10-CM

## 2021-10-15 DIAGNOSIS — S22.41XA CLOSED FRACTURE OF MULTIPLE RIBS OF RIGHT SIDE, INITIAL ENCOUNTER: ICD-10-CM

## 2021-10-15 DIAGNOSIS — G89.29 CHRONIC BILATERAL LOW BACK PAIN WITHOUT SCIATICA: Primary | ICD-10-CM

## 2021-10-15 DIAGNOSIS — M54.50 CHRONIC BILATERAL LOW BACK PAIN WITHOUT SCIATICA: Primary | ICD-10-CM

## 2021-10-15 PROCEDURE — 99214 OFFICE O/P EST MOD 30 MIN: CPT | Performed by: NURSE PRACTITIONER

## 2021-10-15 PROCEDURE — 1036F TOBACCO NON-USER: CPT | Performed by: NURSE PRACTITIONER

## 2021-10-15 RX ORDER — METHOCARBAMOL 500 MG/1
500 TABLET, FILM COATED ORAL EVERY 6 HOURS PRN
Qty: 30 TABLET | Refills: 0 | Status: SHIPPED | OUTPATIENT
Start: 2021-10-15 | End: 2022-02-24

## 2021-10-15 RX ORDER — TRAMADOL HYDROCHLORIDE 100 MG/1
100 TABLET, COATED ORAL EVERY 8 HOURS PRN
Qty: 30 TABLET | Refills: 0 | Status: SHIPPED | OUTPATIENT
Start: 2021-10-15 | End: 2022-02-24

## 2021-10-15 RX ORDER — QUETIAPINE FUMARATE 50 MG/1
TABLET, FILM COATED ORAL
COMMUNITY
Start: 2021-10-11 | End: 2021-10-28

## 2021-10-15 RX ORDER — LEVOTHYROXINE SODIUM 0.05 MG/1
50 TABLET ORAL DAILY
Qty: 30 TABLET | Refills: 3 | Status: SHIPPED | OUTPATIENT
Start: 2021-10-15 | End: 2022-03-21

## 2021-10-15 RX ORDER — CYPROHEPTADINE HYDROCHLORIDE 4 MG/1
4 TABLET ORAL 2 TIMES DAILY PRN
Qty: 30 TABLET | Refills: 0 | OUTPATIENT
Start: 2021-10-15

## 2021-10-25 ENCOUNTER — TELEPHONE (OUTPATIENT)
Dept: NEPHROLOGY | Facility: CLINIC | Age: 46
End: 2021-10-25

## 2021-10-27 ENCOUNTER — TELEPHONE (OUTPATIENT)
Dept: SURGERY | Facility: HOSPITAL | Age: 46
End: 2021-10-27

## 2021-10-28 ENCOUNTER — HOSPITAL ENCOUNTER (OUTPATIENT)
Dept: GASTROENTEROLOGY | Facility: HOSPITAL | Age: 46
Setting detail: OUTPATIENT SURGERY
Discharge: HOME | End: 2021-10-28
Attending: INTERNAL MEDICINE
Payer: COMMERCIAL

## 2021-10-28 ENCOUNTER — TELEPHONE (OUTPATIENT)
Dept: PREADMISSION TESTING | Facility: HOSPITAL | Age: 46
End: 2021-10-28

## 2021-10-28 ENCOUNTER — APPOINTMENT (OUTPATIENT)
Dept: LAB | Facility: HOSPITAL | Age: 46
End: 2021-10-28
Payer: COMMERCIAL

## 2021-10-28 ENCOUNTER — ANESTHESIA EVENT (OUTPATIENT)
Dept: GASTROENTEROLOGY | Facility: HOSPITAL | Age: 46
End: 2021-10-28

## 2021-10-28 ENCOUNTER — ANESTHESIA (OUTPATIENT)
Dept: GASTROENTEROLOGY | Facility: HOSPITAL | Age: 46
End: 2021-10-28

## 2021-10-28 VITALS
TEMPERATURE: 97 F | HEART RATE: 77 BPM | SYSTOLIC BLOOD PRESSURE: 106 MMHG | RESPIRATION RATE: 20 BRPM | DIASTOLIC BLOOD PRESSURE: 57 MMHG | OXYGEN SATURATION: 100 %

## 2021-10-28 DIAGNOSIS — R63.4 ABNORMAL WEIGHT LOSS: ICD-10-CM

## 2021-10-28 DIAGNOSIS — R19.7 DIARRHEA, UNSPECIFIED: ICD-10-CM

## 2021-10-28 DIAGNOSIS — B95.2 ENTEROCOCCUS FAECALIS INFECTION: ICD-10-CM

## 2021-10-28 DIAGNOSIS — R10.9 STOMACH ACHE: ICD-10-CM

## 2021-10-28 DIAGNOSIS — D64.9 ANEMIA, UNSPECIFIED: ICD-10-CM

## 2021-10-28 DIAGNOSIS — K21.00 GASTROESOPHAGEAL REFLUX DISEASE WITH ESOPHAGITIS, UNSPECIFIED WHETHER HEMORRHAGE: ICD-10-CM

## 2021-10-28 DIAGNOSIS — R19.5 ABNORMAL FECES: ICD-10-CM

## 2021-10-28 PROCEDURE — 88305 TISSUE EXAM BY PATHOLOGIST: CPT | Performed by: PATHOLOGY

## 2021-10-28 PROCEDURE — 82784 ASSAY IGA/IGD/IGG/IGM EACH: CPT

## 2021-10-28 PROCEDURE — 83516 IMMUNOASSAY NONANTIBODY: CPT

## 2021-10-28 PROCEDURE — 36415 COLL VENOUS BLD VENIPUNCTURE: CPT

## 2021-10-28 PROCEDURE — 86255 FLUORESCENT ANTIBODY SCREEN: CPT

## 2021-10-28 RX ORDER — SODIUM CHLORIDE, SODIUM LACTATE, POTASSIUM CHLORIDE, CALCIUM CHLORIDE 600; 310; 30; 20 MG/100ML; MG/100ML; MG/100ML; MG/100ML
75 INJECTION, SOLUTION INTRAVENOUS CONTINUOUS
Status: DISCONTINUED | OUTPATIENT
Start: 2021-10-28 | End: 2021-11-01 | Stop reason: HOSPADM

## 2021-10-28 RX ORDER — KETAMINE HYDROCHLORIDE 50 MG/ML
INJECTION, SOLUTION, CONCENTRATE INTRAMUSCULAR; INTRAVENOUS AS NEEDED
Status: DISCONTINUED | OUTPATIENT
Start: 2021-10-28 | End: 2021-10-28

## 2021-10-28 RX ORDER — PROPOFOL 10 MG/ML
INJECTION, EMULSION INTRAVENOUS AS NEEDED
Status: DISCONTINUED | OUTPATIENT
Start: 2021-10-28 | End: 2021-10-28

## 2021-10-28 RX ORDER — LIDOCAINE HYDROCHLORIDE 10 MG/ML
INJECTION, SOLUTION EPIDURAL; INFILTRATION; INTRACAUDAL; PERINEURAL AS NEEDED
Status: DISCONTINUED | OUTPATIENT
Start: 2021-10-28 | End: 2021-10-28

## 2021-10-28 RX ORDER — SODIUM CHLORIDE, SODIUM LACTATE, POTASSIUM CHLORIDE, CALCIUM CHLORIDE 600; 310; 30; 20 MG/100ML; MG/100ML; MG/100ML; MG/100ML
125 INJECTION, SOLUTION INTRAVENOUS CONTINUOUS
Status: DISCONTINUED | OUTPATIENT
Start: 2021-10-28 | End: 2021-11-01 | Stop reason: HOSPADM

## 2021-10-28 RX ORDER — MIDAZOLAM HYDROCHLORIDE 2 MG/2ML
INJECTION, SOLUTION INTRAMUSCULAR; INTRAVENOUS AS NEEDED
Status: DISCONTINUED | OUTPATIENT
Start: 2021-10-28 | End: 2021-10-28

## 2021-10-28 RX ADMIN — PROPOFOL 100 MG: 10 INJECTION, EMULSION INTRAVENOUS at 11:29

## 2021-10-28 RX ADMIN — PROPOFOL 50 MG: 10 INJECTION, EMULSION INTRAVENOUS at 11:42

## 2021-10-28 RX ADMIN — LIDOCAINE HYDROCHLORIDE 50 MG: 10 INJECTION, SOLUTION EPIDURAL; INFILTRATION; INTRACAUDAL; PERINEURAL at 11:30

## 2021-10-28 RX ADMIN — MIDAZOLAM HYDROCHLORIDE 2 MG: 1 INJECTION, SOLUTION INTRAMUSCULAR; INTRAVENOUS at 11:29

## 2021-10-28 RX ADMIN — PROPOFOL 50 MG: 10 INJECTION, EMULSION INTRAVENOUS at 11:36

## 2021-10-28 RX ADMIN — PROPOFOL 50 MG: 10 INJECTION, EMULSION INTRAVENOUS at 11:55

## 2021-10-28 RX ADMIN — KETAMINE HYDROCHLORIDE 20 MG: 50 INJECTION, SOLUTION INTRAMUSCULAR; INTRAVENOUS at 11:29

## 2021-10-28 RX ADMIN — SODIUM CHLORIDE, SODIUM LACTATE, POTASSIUM CHLORIDE, AND CALCIUM CHLORIDE: .6; .31; .03; .02 INJECTION, SOLUTION INTRAVENOUS at 11:16

## 2021-10-28 RX ADMIN — PROPOFOL 50 MG: 10 INJECTION, EMULSION INTRAVENOUS at 11:49

## 2021-10-28 RX ADMIN — SODIUM CHLORIDE, SODIUM LACTATE, POTASSIUM CHLORIDE, AND CALCIUM CHLORIDE 125 ML/HR: .6; .31; .03; .02 INJECTION, SOLUTION INTRAVENOUS at 10:02

## 2021-10-29 ENCOUNTER — OFFICE VISIT (OUTPATIENT)
Dept: FAMILY MEDICINE CLINIC | Facility: CLINIC | Age: 46
End: 2021-10-29
Payer: COMMERCIAL

## 2021-10-29 VITALS
DIASTOLIC BLOOD PRESSURE: 68 MMHG | WEIGHT: 127.25 LBS | OXYGEN SATURATION: 99 % | SYSTOLIC BLOOD PRESSURE: 102 MMHG | HEART RATE: 99 BPM | BODY MASS INDEX: 21.2 KG/M2 | HEIGHT: 65 IN | TEMPERATURE: 98.4 F

## 2021-10-29 DIAGNOSIS — M54.50 CHRONIC BILATERAL LOW BACK PAIN WITHOUT SCIATICA: ICD-10-CM

## 2021-10-29 DIAGNOSIS — F32.A DEPRESSION, UNSPECIFIED DEPRESSION TYPE: Primary | ICD-10-CM

## 2021-10-29 DIAGNOSIS — G89.29 CHRONIC BILATERAL LOW BACK PAIN WITHOUT SCIATICA: ICD-10-CM

## 2021-10-29 DIAGNOSIS — R19.8 IRREGULAR BOWEL HABITS: ICD-10-CM

## 2021-10-29 LAB
ENDOMYSIUM IGA SER QL: NEGATIVE
GLIADIN PEPTIDE IGA SER-ACNC: 3 UNITS (ref 0–19)
GLIADIN PEPTIDE IGG SER-ACNC: 3 UNITS (ref 0–19)
IGA SERPL-MCNC: 178 MG/DL (ref 87–352)
TTG IGA SER-ACNC: <2 U/ML (ref 0–3)
TTG IGG SER-ACNC: 5 U/ML (ref 0–5)

## 2021-10-29 PROCEDURE — 99213 OFFICE O/P EST LOW 20 MIN: CPT | Performed by: NURSE PRACTITIONER

## 2021-10-29 PROCEDURE — 3008F BODY MASS INDEX DOCD: CPT | Performed by: NURSE PRACTITIONER

## 2021-10-29 RX ORDER — ILOPERIDONE 4 MG/1
TABLET ORAL
COMMUNITY
Start: 2021-10-21 | End: 2021-11-30

## 2021-11-03 ENCOUNTER — HOSPITAL ENCOUNTER (OUTPATIENT)
Dept: MRI IMAGING | Facility: HOSPITAL | Age: 46
Discharge: HOME | End: 2021-11-03
Payer: COMMERCIAL

## 2021-11-03 ENCOUNTER — TELEPHONE (OUTPATIENT)
Dept: FAMILY MEDICINE CLINIC | Facility: CLINIC | Age: 46
End: 2021-11-03

## 2021-11-03 DIAGNOSIS — M54.6 PAIN IN THORACIC SPINE: ICD-10-CM

## 2021-11-03 PROCEDURE — G1004 CDSM NDSC: HCPCS

## 2021-11-03 PROCEDURE — 72146 MRI CHEST SPINE W/O DYE: CPT

## 2021-11-10 ENCOUNTER — TELEPHONE (OUTPATIENT)
Dept: FAMILY MEDICINE CLINIC | Facility: CLINIC | Age: 46
End: 2021-11-10

## 2021-11-11 ENCOUNTER — APPOINTMENT (OUTPATIENT)
Dept: LAB | Facility: HOSPITAL | Age: 46
End: 2021-11-11
Payer: COMMERCIAL

## 2021-11-11 DIAGNOSIS — A09 DIARRHEA OF INFECTIOUS ORIGIN: ICD-10-CM

## 2021-11-11 PROCEDURE — 83993 ASSAY FOR CALPROTECTIN FECAL: CPT

## 2021-11-11 PROCEDURE — 87493 C DIFF AMPLIFIED PROBE: CPT

## 2021-11-11 PROCEDURE — 87505 NFCT AGENT DETECTION GI: CPT

## 2021-11-12 LAB
C DIFF TOX GENS STL QL NAA+PROBE: NEGATIVE
CAMPYLOBACTER DNA SPEC NAA+PROBE: NORMAL
SALMONELLA DNA SPEC QL NAA+PROBE: NORMAL
SHIGA TOXIN STX GENE SPEC NAA+PROBE: NORMAL
SHIGELLA DNA SPEC QL NAA+PROBE: NORMAL

## 2021-11-16 LAB — CALPROTECTIN STL-MCNT: 268 UG/G (ref 0–120)

## 2021-11-23 ENCOUNTER — OFFICE VISIT (OUTPATIENT)
Dept: NEPHROLOGY | Facility: CLINIC | Age: 46
End: 2021-11-23
Payer: COMMERCIAL

## 2021-11-23 VITALS
BODY MASS INDEX: 21.99 KG/M2 | HEART RATE: 72 BPM | WEIGHT: 132 LBS | HEIGHT: 65 IN | SYSTOLIC BLOOD PRESSURE: 108 MMHG | DIASTOLIC BLOOD PRESSURE: 70 MMHG | OXYGEN SATURATION: 98 %

## 2021-11-23 DIAGNOSIS — R19.8 IRREGULAR BOWEL HABITS: ICD-10-CM

## 2021-11-23 DIAGNOSIS — N18.32 STAGE 3B CHRONIC KIDNEY DISEASE (HCC): Primary | ICD-10-CM

## 2021-11-23 DIAGNOSIS — E67.3 HIGH VITAMIN D LEVEL: ICD-10-CM

## 2021-11-23 DIAGNOSIS — N18.30 BENIGN HYPERTENSION WITH CKD (CHRONIC KIDNEY DISEASE) STAGE III (HCC): ICD-10-CM

## 2021-11-23 DIAGNOSIS — F43.10 PTSD (POST-TRAUMATIC STRESS DISORDER): ICD-10-CM

## 2021-11-23 DIAGNOSIS — N11.9 CHRONIC TUBULOINTERSTITIAL NEPHRITIS: ICD-10-CM

## 2021-11-23 DIAGNOSIS — G89.4 CHRONIC PAIN SYNDROME: ICD-10-CM

## 2021-11-23 DIAGNOSIS — I12.9 BENIGN HYPERTENSION WITH CKD (CHRONIC KIDNEY DISEASE) STAGE III (HCC): ICD-10-CM

## 2021-11-23 PROCEDURE — 99214 OFFICE O/P EST MOD 30 MIN: CPT | Performed by: NURSE PRACTITIONER

## 2021-11-23 RX ORDER — ILOPERIDONE 8 MG/1
TABLET ORAL
COMMUNITY
Start: 2021-11-02 | End: 2022-02-24

## 2021-11-30 ENCOUNTER — OFFICE VISIT (OUTPATIENT)
Dept: FAMILY MEDICINE CLINIC | Facility: CLINIC | Age: 46
End: 2021-11-30
Payer: COMMERCIAL

## 2021-11-30 VITALS
OXYGEN SATURATION: 98 % | TEMPERATURE: 98.1 F | HEIGHT: 65 IN | WEIGHT: 128.13 LBS | BODY MASS INDEX: 21.35 KG/M2 | DIASTOLIC BLOOD PRESSURE: 78 MMHG | HEART RATE: 90 BPM | SYSTOLIC BLOOD PRESSURE: 116 MMHG

## 2021-11-30 DIAGNOSIS — H92.09 EARACHE: ICD-10-CM

## 2021-11-30 DIAGNOSIS — R19.7 DIARRHEA, UNSPECIFIED TYPE: ICD-10-CM

## 2021-11-30 DIAGNOSIS — F41.9 ANXIETY: Primary | ICD-10-CM

## 2021-11-30 DIAGNOSIS — F32.A DEPRESSION, UNSPECIFIED DEPRESSION TYPE: ICD-10-CM

## 2021-11-30 PROCEDURE — 3008F BODY MASS INDEX DOCD: CPT | Performed by: NURSE PRACTITIONER

## 2021-11-30 PROCEDURE — 99214 OFFICE O/P EST MOD 30 MIN: CPT | Performed by: NURSE PRACTITIONER

## 2021-11-30 RX ORDER — METHOCARBAMOL 750 MG/1
TABLET, FILM COATED ORAL
COMMUNITY
Start: 2021-11-29 | End: 2022-02-24 | Stop reason: SDUPTHER

## 2021-11-30 RX ORDER — BUPROPION HYDROCHLORIDE 150 MG/1
TABLET ORAL
COMMUNITY
Start: 2021-11-19

## 2021-12-07 ENCOUNTER — APPOINTMENT (OUTPATIENT)
Dept: LAB | Facility: HOSPITAL | Age: 46
End: 2021-12-07
Payer: COMMERCIAL

## 2021-12-07 ENCOUNTER — HOSPITAL ENCOUNTER (OUTPATIENT)
Dept: RADIOLOGY | Facility: HOSPITAL | Age: 46
Discharge: HOME | End: 2021-12-07
Payer: COMMERCIAL

## 2021-12-07 DIAGNOSIS — R10.9 STOMACH ACHE: ICD-10-CM

## 2021-12-07 DIAGNOSIS — R19.5 ABNORMAL FECES: ICD-10-CM

## 2021-12-07 DIAGNOSIS — K21.9 GASTROESOPHAGEAL REFLUX DISEASE WITHOUT ESOPHAGITIS: ICD-10-CM

## 2021-12-07 DIAGNOSIS — R19.5 FECES, ABNORMAL: ICD-10-CM

## 2021-12-07 DIAGNOSIS — R19.7 DIARRHEA, UNSPECIFIED TYPE: ICD-10-CM

## 2021-12-07 DIAGNOSIS — R10.9 ABDOMINAL PAIN, UNSPECIFIED ABDOMINAL LOCATION: ICD-10-CM

## 2021-12-07 DIAGNOSIS — R19.7 DIARRHEA OF PRESUMED INFECTIOUS ORIGIN: ICD-10-CM

## 2021-12-07 LAB
CRP SERPL QL: <3 MG/L
ERYTHROCYTE [SEDIMENTATION RATE] IN BLOOD: 14 MM/HOUR (ref 0–19)

## 2021-12-07 PROCEDURE — 85652 RBC SED RATE AUTOMATED: CPT

## 2021-12-07 PROCEDURE — 86140 C-REACTIVE PROTEIN: CPT

## 2021-12-07 PROCEDURE — 74250 X-RAY XM SM INT 1CNTRST STD: CPT

## 2021-12-07 PROCEDURE — 86671 FUNGUS NES ANTIBODY: CPT

## 2021-12-07 PROCEDURE — 86255 FLUORESCENT ANTIBODY SCREEN: CPT

## 2021-12-07 PROCEDURE — 36415 COLL VENOUS BLD VENIPUNCTURE: CPT

## 2021-12-09 LAB
C-ANCA TITR SER IF: NORMAL TITER
MYELOPEROXIDASE AB SER IA-ACNC: <9 U/ML (ref 0–9)
P-ANCA ATYPICAL TITR SER IF: NORMAL TITER
P-ANCA TITR SER IF: NORMAL TITER
PROTEINASE3 AB SER IA-ACNC: <3.5 U/ML (ref 0–3.5)

## 2021-12-13 ENCOUNTER — TELEPHONE (OUTPATIENT)
Dept: FAMILY MEDICINE CLINIC | Facility: CLINIC | Age: 46
End: 2021-12-13

## 2021-12-15 LAB — MISCELLANEOUS LAB TEST RESULT: NORMAL

## 2021-12-18 ENCOUNTER — HOSPITAL ENCOUNTER (OUTPATIENT)
Dept: MAMMOGRAPHY | Facility: HOSPITAL | Age: 46
Discharge: HOME | End: 2021-12-18
Payer: COMMERCIAL

## 2021-12-18 VITALS — BODY MASS INDEX: 21.33 KG/M2 | WEIGHT: 128 LBS | HEIGHT: 65 IN

## 2021-12-18 DIAGNOSIS — Z12.31 ENCOUNTER FOR SCREENING MAMMOGRAM FOR MALIGNANT NEOPLASM OF BREAST: ICD-10-CM

## 2021-12-18 PROCEDURE — 77063 BREAST TOMOSYNTHESIS BI: CPT

## 2021-12-18 PROCEDURE — 77067 SCR MAMMO BI INCL CAD: CPT

## 2021-12-22 ENCOUNTER — OFFICE VISIT (OUTPATIENT)
Dept: URGENT CARE | Facility: CLINIC | Age: 46
End: 2021-12-22
Payer: COMMERCIAL

## 2021-12-22 VITALS
OXYGEN SATURATION: 99 % | RESPIRATION RATE: 16 BRPM | TEMPERATURE: 97.1 F | HEIGHT: 65 IN | WEIGHT: 130 LBS | HEART RATE: 97 BPM | BODY MASS INDEX: 21.66 KG/M2

## 2021-12-22 DIAGNOSIS — J20.9 ACUTE BRONCHITIS, UNSPECIFIED ORGANISM: Primary | ICD-10-CM

## 2021-12-22 PROCEDURE — 87636 SARSCOV2 & INF A&B AMP PRB: CPT | Performed by: PHYSICIAN ASSISTANT

## 2021-12-22 PROCEDURE — 99213 OFFICE O/P EST LOW 20 MIN: CPT | Performed by: PHYSICIAN ASSISTANT

## 2021-12-22 RX ORDER — LACTOBACIL 2/BIFIDO 1/S.THERMO 450B CELL
PACKET (EA) ORAL
COMMUNITY
Start: 2021-11-29

## 2021-12-22 RX ORDER — AZITHROMYCIN 250 MG/1
TABLET, FILM COATED ORAL
Qty: 6 TABLET | Refills: 0 | Status: SHIPPED | OUTPATIENT
Start: 2021-12-22 | End: 2021-12-26

## 2021-12-24 LAB
FLUAV RNA RESP QL NAA+PROBE: NEGATIVE
FLUBV RNA RESP QL NAA+PROBE: NEGATIVE
SARS-COV-2 RNA RESP QL NAA+PROBE: NEGATIVE

## 2022-02-04 NOTE — PATIENT INSTRUCTIONS
EGD scheduled 2/20/20 with Dr Crystal Viera at [facility]  Instructions given to pt during OV by Tiara Gan  Jennifer Beckham is alert and oriented. He is voidng freely. His pain medication has been changed to Tramadol 50mg every 6 hrs as needed, per surgeon. The Norco causes confusion. Bed alarm and camera are in place. He also has gel ice. All needs are within reach. He calls for assistance. Due to the pain he is up with the lift. But he does reposition himself in the bed. Discharge plan is [facility] vs Acute when medically cleared.

## 2022-02-24 ENCOUNTER — OFFICE VISIT (OUTPATIENT)
Dept: FAMILY MEDICINE CLINIC | Facility: CLINIC | Age: 47
End: 2022-02-24
Payer: COMMERCIAL

## 2022-02-24 VITALS
TEMPERATURE: 98 F | SYSTOLIC BLOOD PRESSURE: 106 MMHG | OXYGEN SATURATION: 99 % | HEIGHT: 65 IN | HEART RATE: 108 BPM | BODY MASS INDEX: 21.19 KG/M2 | DIASTOLIC BLOOD PRESSURE: 58 MMHG | WEIGHT: 127.2 LBS

## 2022-02-24 DIAGNOSIS — F41.9 ANXIETY: Primary | ICD-10-CM

## 2022-02-24 DIAGNOSIS — F31.81 BIPOLAR 2 DISORDER (HCC): ICD-10-CM

## 2022-02-24 DIAGNOSIS — M62.838 MUSCLE SPASM: ICD-10-CM

## 2022-02-24 DIAGNOSIS — F11.20 CONTINUOUS OPIOID DEPENDENCE (HCC): ICD-10-CM

## 2022-02-24 PROCEDURE — 1036F TOBACCO NON-USER: CPT | Performed by: NURSE PRACTITIONER

## 2022-02-24 PROCEDURE — 99213 OFFICE O/P EST LOW 20 MIN: CPT | Performed by: NURSE PRACTITIONER

## 2022-02-24 RX ORDER — BUDESONIDE 3 MG/1
CAPSULE, COATED PELLETS ORAL
COMMUNITY
Start: 2022-01-18

## 2022-02-24 RX ORDER — METHOCARBAMOL 750 MG/1
750 TABLET, FILM COATED ORAL EVERY 8 HOURS PRN
Qty: 180 TABLET | Refills: 0 | Status: SHIPPED | OUTPATIENT
Start: 2022-02-24 | End: 2022-04-20 | Stop reason: SDUPTHER

## 2022-02-24 RX ORDER — ARIPIPRAZOLE 5 MG/1
TABLET ORAL
COMMUNITY
Start: 2022-02-24

## 2022-02-24 RX ORDER — HYDROXYZINE HYDROCHLORIDE 25 MG/1
25 TABLET, FILM COATED ORAL EVERY 6 HOURS PRN
Qty: 15 TABLET | Refills: 0 | Status: SHIPPED | OUTPATIENT
Start: 2022-02-24 | End: 2022-05-17 | Stop reason: SDUPTHER

## 2022-02-24 NOTE — PROGRESS NOTES
Assessment/Plan:    No problem-specific Assessment & Plan notes found for this encounter  Diagnoses and all orders for this visit:    Anxiety  -     hydrOXYzine HCL (ATARAX) 25 mg tablet; Take 1 tablet (25 mg total) by mouth every 6 (six) hours as needed for anxiety    Continuous opioid dependence (HCC)    Bipolar 2 disorder (HCC)    Muscle spasm  -     methocarbamol (ROBAXIN) 750 mg tablet; Take 1 tablet (750 mg total) by mouth every 8 (eight) hours as needed for muscle spasms    Other orders  -     ARIPiprazole (ABILIFY) 5 mg tablet  -     budesonide (ENTOCORT EC) 3 MG capsule; TAKE 3 CAPSULES BY MOUTH ONCE DAILY FOR 1 MONTH THEN 2 CAPSULES EVERY DAY          Subjective:      Patient ID: Maria Teresa Rodriguez is a 48 y o  female  Patient presents for increased anxiety and panic attacks that started recently  Patient states that Saturday, her son moved out of her house to live with his father and this caused her a significant increase in anxiety  She also feels like she has a lot of stress at work as her job is very taxing  She does see psych- saw them today they started her on Abilify  She does have klonopin prn but is afraid to take it frequently as she does not want to become dependent on it  Patient states that she feels overwhelming anxiety, hyperventilation and feels like she can not get enough air in when this happens  Will give atarax prn for anxiety  Otherwise, she needs to follow up with psych  She is asking for a refill of her robaxin- provided for patient  The following portions of the patient's history were reviewed and updated as appropriate: allergies, current medications, past family history, past medical history, past social history, past surgical history and problem list     Review of Systems   Constitutional: Negative for activity change, appetite change, fatigue, fever and unexpected weight change  Respiratory: Negative for chest tightness, shortness of breath and wheezing  Cardiovascular: Negative for chest pain, palpitations and leg swelling  Gastrointestinal: Negative for abdominal distention, abdominal pain, constipation, diarrhea, nausea and vomiting  Skin: Negative for color change, rash and wound  Psychiatric/Behavioral: Positive for decreased concentration and sleep disturbance  Negative for agitation, behavioral problems, confusion, hallucinations, self-injury and suicidal ideas  The patient is nervous/anxious  The patient is not hyperactive  Objective:      /58 (BP Location: Left arm, Patient Position: Sitting)   Pulse (!) 108   Temp 98 °F (36 7 °C) (Tympanic)   Ht 5' 5" (1 651 m)   Wt 57 7 kg (127 lb 3 2 oz)   LMP  (LMP Unknown)   SpO2 99%   BMI 21 17 kg/m²          Physical Exam  Vitals and nursing note reviewed  Constitutional:       General: She is not in acute distress  Appearance: Normal appearance  She is normal weight  She is not ill-appearing or toxic-appearing  Cardiovascular:      Rate and Rhythm: Normal rate and regular rhythm  Pulses: Normal pulses  Heart sounds: Normal heart sounds  No murmur heard  No friction rub  No gallop  Pulmonary:      Effort: Pulmonary effort is normal       Breath sounds: Normal breath sounds  No wheezing, rhonchi or rales  Abdominal:      General: Abdomen is flat  Bowel sounds are normal       Palpations: Abdomen is soft  Tenderness: There is no abdominal tenderness  There is no guarding  Hernia: No hernia is present  Neurological:      General: No focal deficit present  Mental Status: She is alert and oriented to person, place, and time  Mental status is at baseline  Motor: No weakness  Coordination: Coordination normal       Gait: Gait normal    Psychiatric:         Mood and Affect: Mood is anxious  Affect is tearful  Speech: Speech normal          Behavior: Behavior normal  Behavior is cooperative  Thought Content:  Thought content normal          Cognition and Memory: Cognition normal          Judgment: Judgment normal

## 2022-02-24 NOTE — LETTER
February 24, 2022     Patient: Carlos Keane   YOB: 1975   Date of Visit: 2/24/2022       To Whom it May Concern:    Carlos Keane is under my professional care  She was seen in my office on 2/24/2022  She may return to work on 2/28/2022  If you have any questions or concerns, please don't hesitate to call           Sincerely,          FIONA Moody        CC: No Recipients

## 2022-02-26 ENCOUNTER — HOSPITAL ENCOUNTER (EMERGENCY)
Facility: HOSPITAL | Age: 47
Discharge: HOME | End: 2022-02-26
Attending: EMERGENCY MEDICINE | Admitting: EMERGENCY MEDICINE
Payer: COMMERCIAL

## 2022-02-26 ENCOUNTER — APPOINTMENT (EMERGENCY)
Dept: RADIOLOGY | Facility: HOSPITAL | Age: 47
End: 2022-02-26
Payer: COMMERCIAL

## 2022-02-26 ENCOUNTER — APPOINTMENT (EMERGENCY)
Dept: CT IMAGING | Facility: HOSPITAL | Age: 47
End: 2022-02-26
Payer: COMMERCIAL

## 2022-02-26 VITALS
BODY MASS INDEX: 21.66 KG/M2 | RESPIRATION RATE: 18 BRPM | TEMPERATURE: 98.3 F | SYSTOLIC BLOOD PRESSURE: 94 MMHG | HEIGHT: 65 IN | OXYGEN SATURATION: 94 % | DIASTOLIC BLOOD PRESSURE: 51 MMHG | HEART RATE: 57 BPM | WEIGHT: 130 LBS

## 2022-02-26 DIAGNOSIS — S49.90XA SHOULDER INJURY: Primary | ICD-10-CM

## 2022-02-26 PROCEDURE — 99284 EMERGENCY DEPT VISIT MOD MDM: CPT

## 2022-02-26 PROCEDURE — 71250 CT THORAX DX C-: CPT

## 2022-02-26 PROCEDURE — 99285 EMERGENCY DEPT VISIT HI MDM: CPT | Performed by: EMERGENCY MEDICINE

## 2022-02-26 PROCEDURE — 73030 X-RAY EXAM OF SHOULDER: CPT

## 2022-02-26 PROCEDURE — 96372 THER/PROPH/DIAG INJ SC/IM: CPT

## 2022-02-26 PROCEDURE — G1004 CDSM NDSC: HCPCS

## 2022-02-26 PROCEDURE — 72125 CT NECK SPINE W/O DYE: CPT

## 2022-02-26 RX ORDER — KETOROLAC TROMETHAMINE 30 MG/ML
15 INJECTION, SOLUTION INTRAMUSCULAR; INTRAVENOUS ONCE
Status: COMPLETED | OUTPATIENT
Start: 2022-02-26 | End: 2022-02-26

## 2022-02-26 RX ORDER — HYDROMORPHONE HCL/PF 1 MG/ML
0.5 SYRINGE (ML) INJECTION ONCE
Status: COMPLETED | OUTPATIENT
Start: 2022-02-26 | End: 2022-02-26

## 2022-02-26 RX ADMIN — KETOROLAC TROMETHAMINE 15 MG: 30 INJECTION, SOLUTION INTRAMUSCULAR at 19:33

## 2022-02-26 RX ADMIN — HYDROMORPHONE HYDROCHLORIDE 0.5 MG: 1 INJECTION, SOLUTION INTRAMUSCULAR; INTRAVENOUS; SUBCUTANEOUS at 17:45

## 2022-02-26 NOTE — Clinical Note
Mildred Ryan was seen and treated in our emergency department on 2/26/2022  No restrictions            Diagnosis:     Ericka LORRIE    She may return on this date: 03/01/2022         If you have any questions or concerns, please don't hesitate to call        Noam Bullock, DO    ______________________________           _______________          _______________  Hospital Representative                              Date                                Time
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
## 2022-02-28 ENCOUNTER — OFFICE VISIT (OUTPATIENT)
Dept: FAMILY MEDICINE CLINIC | Facility: CLINIC | Age: 47
End: 2022-02-28
Payer: COMMERCIAL

## 2022-02-28 ENCOUNTER — OFFICE VISIT (OUTPATIENT)
Dept: PAIN MEDICINE | Facility: CLINIC | Age: 47
End: 2022-02-28
Payer: COMMERCIAL

## 2022-02-28 VITALS
DIASTOLIC BLOOD PRESSURE: 60 MMHG | OXYGEN SATURATION: 98 % | HEIGHT: 65 IN | HEART RATE: 115 BPM | BODY MASS INDEX: 21.23 KG/M2 | WEIGHT: 127.4 LBS | TEMPERATURE: 98.6 F | SYSTOLIC BLOOD PRESSURE: 100 MMHG

## 2022-02-28 VITALS
DIASTOLIC BLOOD PRESSURE: 65 MMHG | HEIGHT: 65 IN | BODY MASS INDEX: 21.16 KG/M2 | WEIGHT: 127 LBS | SYSTOLIC BLOOD PRESSURE: 103 MMHG | HEART RATE: 105 BPM

## 2022-02-28 DIAGNOSIS — M54.50 CHRONIC BILATERAL LOW BACK PAIN, UNSPECIFIED WHETHER SCIATICA PRESENT: ICD-10-CM
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
## 2022-02-28 DIAGNOSIS — M25.512 LEFT SHOULDER PAIN, UNSPECIFIED CHRONICITY: Primary | ICD-10-CM

## 2022-02-28 DIAGNOSIS — M79.18 MYOFASCIAL PAIN SYNDROME: ICD-10-CM

## 2022-02-28 DIAGNOSIS — G89.29 CHRONIC BILATERAL LOW BACK PAIN, UNSPECIFIED WHETHER SCIATICA PRESENT: ICD-10-CM

## 2022-02-28 DIAGNOSIS — M54.2 NECK PAIN: ICD-10-CM

## 2022-02-28 DIAGNOSIS — M54.9 MID BACK PAIN: ICD-10-CM

## 2022-02-28 DIAGNOSIS — M54.16 LUMBAR RADICULOPATHY: Primary | ICD-10-CM

## 2022-02-28 DIAGNOSIS — G89.29 OTHER CHRONIC PAIN: ICD-10-CM

## 2022-02-28 PROCEDURE — 99204 OFFICE O/P NEW MOD 45 MIN: CPT | Performed by: ANESTHESIOLOGY

## 2022-02-28 PROCEDURE — 3008F BODY MASS INDEX DOCD: CPT | Performed by: NURSE PRACTITIONER

## 2022-02-28 PROCEDURE — 99213 OFFICE O/P EST LOW 20 MIN: CPT | Performed by: NURSE PRACTITIONER

## 2022-02-28 RX ORDER — LIDOCAINE 50 MG/G
OINTMENT TOPICAL AS NEEDED
Qty: 35.44 G | Refills: 0 | Status: SHIPPED | OUTPATIENT
Start: 2022-02-28 | End: 2022-05-27 | Stop reason: SDUPTHER

## 2022-02-28 RX ORDER — CARISOPRODOL 350 MG/1
350 TABLET ORAL 3 TIMES DAILY
Qty: 90 TABLET | Refills: 1 | Status: SHIPPED | OUTPATIENT
Start: 2022-02-28 | End: 2022-05-27

## 2022-02-28 NOTE — PATIENT INSTRUCTIONS
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
## 2022-02-28 NOTE — LETTER
February 28, 2022     Patient: Nirali Aden   YOB: 1975   Date of Visit: 2/28/2022       To Whom it May Concern:    Nirali Aden is under my professional care  She was seen in my office on 2/28/2022  She may return to work on 3/3/2022  If you have any questions or concerns, please don't hesitate to call           Sincerely,          FIONA Amaya        CC: No Recipients

## 2022-03-15 ENCOUNTER — OFFICE VISIT (OUTPATIENT)
Dept: OBGYN CLINIC | Facility: CLINIC | Age: 47
End: 2022-03-15
Payer: COMMERCIAL

## 2022-03-15 VITALS
BODY MASS INDEX: 21.16 KG/M2 | HEIGHT: 65 IN | SYSTOLIC BLOOD PRESSURE: 107 MMHG | DIASTOLIC BLOOD PRESSURE: 70 MMHG | WEIGHT: 127 LBS | HEART RATE: 81 BPM

## 2022-03-15 DIAGNOSIS — S50.02XA CONTUSION OF LEFT ELBOW, INITIAL ENCOUNTER: ICD-10-CM

## 2022-03-15 DIAGNOSIS — M77.02 MEDIAL EPICONDYLITIS OF LEFT ELBOW: Primary | ICD-10-CM

## 2022-03-15 DIAGNOSIS — M25.512 LEFT SHOULDER PAIN, UNSPECIFIED CHRONICITY: ICD-10-CM

## 2022-03-15 PROCEDURE — 99213 OFFICE O/P EST LOW 20 MIN: CPT | Performed by: ORTHOPAEDIC SURGERY

## 2022-03-15 PROCEDURE — 20605 DRAIN/INJ JOINT/BURSA W/O US: CPT | Performed by: ORTHOPAEDIC SURGERY

## 2022-03-15 PROCEDURE — 3008F BODY MASS INDEX DOCD: CPT | Performed by: ANESTHESIOLOGY

## 2022-03-15 RX ORDER — BETAMETHASONE SODIUM PHOSPHATE AND BETAMETHASONE ACETATE 3; 3 MG/ML; MG/ML
6 INJECTION, SUSPENSION INTRA-ARTICULAR; INTRALESIONAL; INTRAMUSCULAR; SOFT TISSUE
Status: COMPLETED | OUTPATIENT
Start: 2022-03-15 | End: 2022-03-15

## 2022-03-15 RX ORDER — BUPIVACAINE HYDROCHLORIDE 2.5 MG/ML
1 INJECTION, SOLUTION INFILTRATION; PERINEURAL
Status: COMPLETED | OUTPATIENT
Start: 2022-03-15 | End: 2022-03-15

## 2022-03-15 RX ADMIN — BETAMETHASONE SODIUM PHOSPHATE AND BETAMETHASONE ACETATE 6 MG: 3; 3 INJECTION, SUSPENSION INTRA-ARTICULAR; INTRALESIONAL; INTRAMUSCULAR; SOFT TISSUE at 10:37

## 2022-03-15 RX ADMIN — BUPIVACAINE HYDROCHLORIDE 1 ML: 2.5 INJECTION, SOLUTION INFILTRATION; PERINEURAL at 10:37
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
## 2022-03-19 DIAGNOSIS — E03.9 HYPOTHYROIDISM, UNSPECIFIED TYPE: ICD-10-CM

## 2022-03-21 ENCOUNTER — TELEPHONE (OUTPATIENT)
Dept: OBGYN CLINIC | Facility: HOSPITAL | Age: 47
End: 2022-03-21

## 2022-03-21 RX ORDER — LEVOTHYROXINE SODIUM 0.05 MG/1
TABLET ORAL
Qty: 30 TABLET | Refills: 0 | Status: SHIPPED | OUTPATIENT
Start: 2022-03-21 | End: 2022-04-20 | Stop reason: SDUPTHER

## 2022-03-21 NOTE — TELEPHONE ENCOUNTER
It is not necessary that she be seen tomorrow for her elbow  The elbow was only injected 6 days ago  She must wait at least 6 weeks  Please keep the follow-up visit as previously scheduled and cancel tomorrow's visit

## 2022-03-21 NOTE — TELEPHONE ENCOUNTER
Patient called in to advise she is still having elbow pain after receiving injection  She did make a follow up appt for tomorrow and would like to know if she should be seen? She would like a call at [phone]

## 2022-03-21 NOTE — TELEPHONE ENCOUNTER
Spoke to patient  She denies redness, heat to touch, or swelling that she notices  She stated she has been using ice once in a while  Stated she cannot take NSAIDs and tylenol doesn't help  She uses lidocaine gel which isn't helping  She stated she can use topical voltaren  She stated she will try it  Advised ice 20 mins on 20 mins off and rest when needed  Call back if redness, heat to touch, swelling, or fever develop  Understanding verbalized

## 2022-03-24 ENCOUNTER — TELEPHONE (OUTPATIENT)
Dept: OBGYN CLINIC | Facility: OTHER | Age: 47
End: 2022-03-24

## 2022-03-24 NOTE — TELEPHONE ENCOUNTER
Daryn Ortiz @ [facility] called in to make sure we had Policy Information on patient, yes e do but some information was not correct  Claim # [id number]  Policy # [id number]   : Olivia Horne  C/b # [phone]  Fax # [phone]  P  [facility]

## 2022-04-01 ENCOUNTER — OFFICE VISIT (OUTPATIENT)
Dept: FAMILY MEDICINE CLINIC | Facility: CLINIC | Age: 47
End: 2022-04-01
Payer: COMMERCIAL

## 2022-04-01 VITALS
HEIGHT: 65 IN | SYSTOLIC BLOOD PRESSURE: 124 MMHG | WEIGHT: 125 LBS | HEART RATE: 87 BPM | TEMPERATURE: 98.3 F | OXYGEN SATURATION: 99 % | DIASTOLIC BLOOD PRESSURE: 72 MMHG | BODY MASS INDEX: 20.83 KG/M2

## 2022-04-01 DIAGNOSIS — Z00.00 ANNUAL PHYSICAL EXAM: Primary | ICD-10-CM

## 2022-04-01 DIAGNOSIS — I12.9 BENIGN HYPERTENSION WITH CKD (CHRONIC KIDNEY DISEASE) STAGE III (HCC): ICD-10-CM

## 2022-04-01 DIAGNOSIS — E55.9 VITAMIN D DEFICIENCY: ICD-10-CM

## 2022-04-01 DIAGNOSIS — N18.30 BENIGN HYPERTENSION WITH CKD (CHRONIC KIDNEY DISEASE) STAGE III (HCC): ICD-10-CM

## 2022-04-01 DIAGNOSIS — Z13.220 SCREENING FOR HYPERLIPIDEMIA: ICD-10-CM

## 2022-04-01 DIAGNOSIS — E03.9 HYPOTHYROIDISM, UNSPECIFIED TYPE: ICD-10-CM

## 2022-04-01 PROCEDURE — 3008F BODY MASS INDEX DOCD: CPT | Performed by: NURSE PRACTITIONER

## 2022-04-01 PROCEDURE — 1036F TOBACCO NON-USER: CPT | Performed by: NURSE PRACTITIONER

## 2022-04-01 PROCEDURE — 99396 PREV VISIT EST AGE 40-64: CPT | Performed by: NURSE PRACTITIONER

## 2022-04-01 NOTE — PROGRESS NOTES
Assessment/Plan:    No problem-specific Assessment & Plan notes found for this encounter  {Assess/PlanSmartLinks:10079}      Subjective:      Patient ID: Anahi Serna is a 48 y o  female      HPI    {Common ambulatory SmartLinks:71040}    Review of Systems      Objective:      /72 (BP Location: Left arm, Patient Position: Sitting, Cuff Size: Standard)   Pulse 87   Temp 98 3 °F (36 8 °C) (Tympanic)   Ht 5' 5" (1 651 m)   Wt 56 7 kg (125 lb)   SpO2 99%   BMI 20 80 kg/m²          Physical Exam
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
## 2022-04-04 NOTE — PATIENT INSTRUCTIONS

## 2022-04-14 ENCOUNTER — PROCEDURE VISIT (OUTPATIENT)
Dept: PAIN MEDICINE | Facility: CLINIC | Age: 47
End: 2022-04-14
Payer: COMMERCIAL

## 2022-04-14 DIAGNOSIS — M54.9 MID BACK PAIN: Primary | ICD-10-CM

## 2022-04-14 DIAGNOSIS — M79.18 MYOFASCIAL PAIN SYNDROME: ICD-10-CM

## 2022-04-14 DIAGNOSIS — G89.29 CHRONIC BILATERAL LOW BACK PAIN WITHOUT SCIATICA: ICD-10-CM

## 2022-04-14 DIAGNOSIS — M54.50 CHRONIC BILATERAL LOW BACK PAIN WITHOUT SCIATICA: ICD-10-CM

## 2022-04-14 PROCEDURE — 20553 NJX 1/MLT TRIGGER POINTS 3/>: CPT | Performed by: ANESTHESIOLOGY

## 2022-04-14 PROCEDURE — 76942 ECHO GUIDE FOR BIOPSY: CPT | Performed by: ANESTHESIOLOGY

## 2022-04-14 RX ORDER — BUPIVACAINE HYDROCHLORIDE 2.5 MG/ML
10 INJECTION, SOLUTION EPIDURAL; INFILTRATION; INTRACAUDAL
Status: COMPLETED | OUTPATIENT
Start: 2022-04-14 | End: 2022-04-14

## 2022-04-14 RX ORDER — TRIAMCINOLONE ACETONIDE 40 MG/ML
40 INJECTION, SUSPENSION INTRA-ARTICULAR; INTRAMUSCULAR
Status: COMPLETED | OUTPATIENT
Start: 2022-04-14 | End: 2022-04-14

## 2022-04-14 RX ORDER — BACLOFEN 10 MG/1
10 TABLET ORAL 3 TIMES DAILY
Qty: 90 TABLET | Refills: 1 | Status: SHIPPED | OUTPATIENT
Start: 2022-04-14 | End: 2022-05-27 | Stop reason: SDUPTHER

## 2022-04-14 RX ADMIN — TRIAMCINOLONE ACETONIDE 40 MG: 40 INJECTION, SUSPENSION INTRA-ARTICULAR; INTRAMUSCULAR at 15:32

## 2022-04-14 RX ADMIN — BUPIVACAINE HYDROCHLORIDE 10 ML: 2.5 INJECTION, SOLUTION EPIDURAL; INFILTRATION; INTRACAUDAL at 15:32

## 2022-04-14 NOTE — PROGRESS NOTES
Universal Protocol:  Consent: Verbal consent obtained  Written consent obtained  Risks and benefits: risks, benefits and alternatives were discussed  Consent given by: patient  Time out: Immediately prior to procedure a "time out" was called to verify the correct patient, procedure, equipment, support staff and site/side marked as required  Timeout called at: 4/14/2022 3:28 PM   Patient understanding: patient states understanding of the procedure being performed  Patient consent: the patient's understanding of the procedure matches consent given  Procedure consent: procedure consent matches procedure scheduled  Relevant documents: relevant documents present and verified  Test results: test results available and properly labeled  Site marked: the operative site was marked  Radiology Images displayed and confirmed  If images not available, report reviewed: imaging studies available  Required items: required blood products, implants, devices, and special equipment available  Patient identity confirmed: verbally with patient    Supporting Documentation  Indications: pain   Trigger Point Injections: multiple trigger points: 3 or more muscle groups    Injection site identified by: ultrasound    Procedure Details  Location(s):    Middle Back: L thoracic paraspinals, R thoracic paraspinals, L latissimus dorsi and R latissimus dorsi     Lower Back: L lumbar paraspinals, R lumbar paraspinals, R quadratus lumborum and L quadratus lumborum     Prep: patient was prepped and draped in usual sterile fashion  Needle size: 22 G  Medications: 10 mL bupivacaine (PF) 0 25 %; 40 mg triamcinolone acetonide 40 mg/mL  Patient tolerance: patient tolerated the procedure well with no immediate complications        Patient reported some was making her dizzy and was not provided with any relief    We will discontinue Soma and try baclofen 10 mg p o  t i d  script sent to pharmacy

## 2022-04-14 NOTE — PATIENT INSTRUCTIONS
1  Do not apply heat to any area that is numb  If you have discomfort or soreness at the injection site, you may apply ice today, 20 minutes on and 20 minutes off  Tomorrow you may use ice or warm, moist heat  Do not apply ice or heat directly to the skin  2  If you experience severe shortness of breath, go to the Emergency Room  3  You may have numbness for several hours from the local anesthetic  Please use caution and common sense, especially with weight-bearing activities  4  You may have an increase or change in the discomfort for 36-48 hours after your treatment  Apply ice and continue with any pain medicine you have been prescribed  5  Do not do anything strenuous today  You may shower, but no tub baths or hot tubs today  You may resume your normal activities tomorrow, but do not overdo it  Resume normal activities slowly when you are feeling better  6  If you experience redness, drainage or swelling at the injection site, or if you develop a fever above 100 degrees, please call [facility] at [phone] or go to the Emergency Room  7  Continue to take all routine medicines prescribed by your primary care physician unless otherwise instructed by our staff  Most blood thinners should be started again according to your regularly scheduled dosing  If you have any questions, please give our office a call  As no general anesthesia was used in today's procedure, you should not experience any side effects related to anesthesia  If you have a problem specifically related to your procedure, please call our office at [phone]  Problems not related to your procedure should be directed to your primary care physician

## 2022-04-15 ENCOUNTER — TELEPHONE (OUTPATIENT)
Dept: PAIN MEDICINE | Facility: CLINIC | Age: 47
End: 2022-04-15

## 2022-04-15 NOTE — TELEPHONE ENCOUNTER
Bjorn from [facility] asking if the patient is stopping any of these medications since taking the Bacoflen    - pt has been taking Soma & Robaxin- pls call pharmacy to confirm -thank you    salomón [phone]

## 2022-04-15 NOTE — TELEPHONE ENCOUNTER
Received phone message from pharmacy regarding baclofen  Pt med list indicated that she was on Soma and Robaxin    Writing nurse spoke to pt regarding medications   Pt stated she is not longer taking Soma due to making her feel "weird"   Pt stated she is no longer taking Robaxin due to "not working"    [id number] [facility] to fill Baclofen?

## 2022-04-20 DIAGNOSIS — E03.9 HYPOTHYROIDISM, UNSPECIFIED TYPE: ICD-10-CM

## 2022-04-20 DIAGNOSIS — I12.9 BENIGN HYPERTENSION WITH CKD (CHRONIC KIDNEY DISEASE) STAGE III (HCC): ICD-10-CM

## 2022-04-20 DIAGNOSIS — N18.30 BENIGN HYPERTENSION WITH CKD (CHRONIC KIDNEY DISEASE) STAGE III (HCC): ICD-10-CM

## 2022-04-20 DIAGNOSIS — M62.838 MUSCLE SPASM: ICD-10-CM

## 2022-04-20 RX ORDER — LEVOTHYROXINE SODIUM 0.05 MG/1
50 TABLET ORAL DAILY
Qty: 90 TABLET | Refills: 3 | Status: SHIPPED | OUTPATIENT
Start: 2022-04-20

## 2022-04-20 RX ORDER — DESVENLAFAXINE 100 MG/1
100 TABLET, EXTENDED RELEASE ORAL
COMMUNITY
Start: 2022-04-02

## 2022-04-24 DIAGNOSIS — R19.7 DIARRHEA: ICD-10-CM

## 2022-04-24 RX ORDER — DICYCLOMINE HCL 20 MG
TABLET ORAL
Qty: 60 TABLET | Refills: 0 | Status: SHIPPED | OUTPATIENT
Start: 2022-04-24

## 2022-04-25 RX ORDER — LISINOPRIL 10 MG/1
10 TABLET ORAL DAILY
Qty: 90 TABLET | Refills: 2 | Status: SHIPPED | OUTPATIENT
Start: 2022-04-25

## 2022-04-25 RX ORDER — METHOCARBAMOL 750 MG/1
750 TABLET, FILM COATED ORAL EVERY 8 HOURS PRN
Qty: 180 TABLET | Refills: 0 | Status: SHIPPED | OUTPATIENT
Start: 2022-04-25 | End: 2022-05-27

## 2022-05-10 ENCOUNTER — OFFICE VISIT (OUTPATIENT)
Dept: OBGYN CLINIC | Facility: CLINIC | Age: 47
End: 2022-05-10
Payer: COMMERCIAL

## 2022-05-10 VITALS — HEIGHT: 65 IN | WEIGHT: 125 LBS | BODY MASS INDEX: 20.83 KG/M2

## 2022-05-10 DIAGNOSIS — M77.02 MEDIAL EPICONDYLITIS OF LEFT ELBOW: Primary | ICD-10-CM

## 2022-05-10 PROCEDURE — 99213 OFFICE O/P EST LOW 20 MIN: CPT | Performed by: PHYSICIAN ASSISTANT

## 2022-05-10 PROCEDURE — 20605 DRAIN/INJ JOINT/BURSA W/O US: CPT | Performed by: PHYSICIAN ASSISTANT

## 2022-05-10 RX ORDER — BUPIVACAINE HYDROCHLORIDE 2.5 MG/ML
1 INJECTION, SOLUTION INFILTRATION; PERINEURAL
Status: COMPLETED | OUTPATIENT
Start: 2022-05-10 | End: 2022-05-10

## 2022-05-10 RX ORDER — BETAMETHASONE SODIUM PHOSPHATE AND BETAMETHASONE ACETATE 3; 3 MG/ML; MG/ML
6 INJECTION, SUSPENSION INTRA-ARTICULAR; INTRALESIONAL; INTRAMUSCULAR; SOFT TISSUE
Status: COMPLETED | OUTPATIENT
Start: 2022-05-10 | End: 2022-05-10

## 2022-05-10 RX ADMIN — BETAMETHASONE SODIUM PHOSPHATE AND BETAMETHASONE ACETATE 6 MG: 3; 3 INJECTION, SUSPENSION INTRA-ARTICULAR; INTRALESIONAL; INTRAMUSCULAR; SOFT TISSUE at 15:23

## 2022-05-10 RX ADMIN — BUPIVACAINE HYDROCHLORIDE 1 ML: 2.5 INJECTION, SOLUTION INFILTRATION; PERINEURAL at 15:23
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
## 2022-05-17 DIAGNOSIS — F41.9 ANXIETY: ICD-10-CM

## 2022-05-17 RX ORDER — HYDROXYZINE HYDROCHLORIDE 25 MG/1
25 TABLET, FILM COATED ORAL EVERY 6 HOURS PRN
Qty: 15 TABLET | Refills: 0 | Status: SHIPPED | OUTPATIENT
Start: 2022-05-17

## 2022-05-27 ENCOUNTER — OFFICE VISIT (OUTPATIENT)
Dept: PAIN MEDICINE | Facility: CLINIC | Age: 47
End: 2022-05-27
Payer: COMMERCIAL

## 2022-05-27 ENCOUNTER — TELEPHONE (OUTPATIENT)
Dept: PAIN MEDICINE | Facility: CLINIC | Age: 47
End: 2022-05-27

## 2022-05-27 VITALS
WEIGHT: 128 LBS | HEIGHT: 65 IN | SYSTOLIC BLOOD PRESSURE: 132 MMHG | HEART RATE: 98 BPM | BODY MASS INDEX: 21.33 KG/M2 | DIASTOLIC BLOOD PRESSURE: 83 MMHG

## 2022-05-27 DIAGNOSIS — G89.29 CHRONIC BILATERAL LOW BACK PAIN WITHOUT SCIATICA: ICD-10-CM

## 2022-05-27 DIAGNOSIS — G89.4 CHRONIC PAIN SYNDROME: Primary | ICD-10-CM

## 2022-05-27 DIAGNOSIS — M54.50 CHRONIC BILATERAL LOW BACK PAIN WITHOUT SCIATICA: ICD-10-CM

## 2022-05-27 DIAGNOSIS — M79.18 MYOFASCIAL PAIN SYNDROME: ICD-10-CM

## 2022-05-27 DIAGNOSIS — M54.9 MID BACK PAIN: ICD-10-CM

## 2022-05-27 PROCEDURE — 99214 OFFICE O/P EST MOD 30 MIN: CPT | Performed by: NURSE PRACTITIONER

## 2022-05-27 PROCEDURE — 3008F BODY MASS INDEX DOCD: CPT | Performed by: NURSE PRACTITIONER

## 2022-05-27 RX ORDER — LIDOCAINE 50 MG/G
OINTMENT TOPICAL AS NEEDED
Qty: 35.44 G | Refills: 5 | Status: SHIPPED | OUTPATIENT
Start: 2022-05-27

## 2022-05-27 RX ORDER — BACLOFEN 10 MG/1
10 TABLET ORAL 3 TIMES DAILY
Qty: 90 TABLET | Refills: 1 | Status: SHIPPED | OUTPATIENT
Start: 2022-05-27 | End: 2022-06-13 | Stop reason: SDUPTHER

## 2022-05-27 NOTE — TELEPHONE ENCOUNTER
Bjorn from [facility] DR ROSSANA RAMESH called asking what medications is the patient taking- the pt is taking a lot  of different muscle relaxers and they arent sure what to fill appropriately     Pt has had bacoflen on 5/11, 4/15, 5/27    soma on 5/18     Robaxin on 4/25     [phone]
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
## 2022-05-27 NOTE — TELEPHONE ENCOUNTER
I called and spoke with Leonidas Collazo  Explained him that she is no longer taking Soma or Robaxin  Will continue with baclofen

## 2022-05-27 NOTE — TELEPHONE ENCOUNTER
Spoke to pharmacist Bjorn regarding medications baclofen  Beltran Montejo inquiring why she is being switched from baclofen to soma to robaxin back to baclofen  Writing nurse advised Beltran Montejo that pt was taken off soma due to making her feel "weird" and robaxin not working  [employer] he need more information to put in his computer why she went back to baclofen when it didn't work in the first place       Pt last OV 5/27

## 2022-06-03 ENCOUNTER — PROCEDURE VISIT (OUTPATIENT)
Dept: PAIN MEDICINE | Facility: CLINIC | Age: 47
End: 2022-06-03
Payer: COMMERCIAL

## 2022-06-03 DIAGNOSIS — M79.18 MYOFASCIAL PAIN SYNDROME: ICD-10-CM

## 2022-06-03 DIAGNOSIS — M54.2 NECK PAIN: ICD-10-CM

## 2022-06-03 DIAGNOSIS — M54.9 MID BACK PAIN: ICD-10-CM

## 2022-06-03 PROCEDURE — 20553 NJX 1/MLT TRIGGER POINTS 3/>: CPT | Performed by: ANESTHESIOLOGY

## 2022-06-03 PROCEDURE — 76942 ECHO GUIDE FOR BIOPSY: CPT | Performed by: ANESTHESIOLOGY

## 2022-06-03 RX ORDER — METHYLPREDNISOLONE ACETATE 40 MG/ML
2 INJECTION, SUSPENSION INTRA-ARTICULAR; INTRALESIONAL; INTRAMUSCULAR; SOFT TISSUE
Status: COMPLETED | OUTPATIENT
Start: 2022-06-03 | End: 2022-06-03

## 2022-06-03 RX ORDER — BUPIVACAINE HYDROCHLORIDE 2.5 MG/ML
10 INJECTION, SOLUTION EPIDURAL; INFILTRATION; INTRACAUDAL
Status: COMPLETED | OUTPATIENT
Start: 2022-06-03 | End: 2022-06-03

## 2022-06-03 RX ADMIN — METHYLPREDNISOLONE ACETATE 2 ML: 40 INJECTION, SUSPENSION INTRA-ARTICULAR; INTRALESIONAL; INTRAMUSCULAR; SOFT TISSUE at 12:10

## 2022-06-03 RX ADMIN — BUPIVACAINE HYDROCHLORIDE 10 ML: 2.5 INJECTION, SOLUTION EPIDURAL; INFILTRATION; INTRACAUDAL at 12:10

## 2022-06-03 NOTE — PROGRESS NOTES
Universal Protocol:  Consent: Verbal consent obtained  Written consent obtained  Risks and benefits: risks, benefits and alternatives were discussed  Consent given by: patient  Time out: Immediately prior to procedure a "time out" was called to verify the correct patient, procedure, equipment, support staff and site/side marked as required  Timeout called at: 6/3/2022 11:40 AM   Patient understanding: patient states understanding of the procedure being performed  Patient consent: the patient's understanding of the procedure matches consent given  Procedure consent: procedure consent matches procedure scheduled  Relevant documents: relevant documents present and verified  Test results: test results available and properly labeled  Site marked: the operative site was marked  Radiology Images displayed and confirmed   If images not available, report reviewed: imaging studies available  Required items: required blood products, implants, devices, and special equipment available  Patient identity confirmed: verbally with patient    Supporting Documentation  Indications: pain   Trigger Point Injections: multiple trigger points: 3 or more muscle groups    Injection site identified by: ultrasound    Procedure Details  Location(s):    Neck/Upper Back: L upper trapezius, L levator scapulae, R levator scapulae, R upper trapezius, R cervical paraspinals and L cervical paraspinals     Middle Back: L thoracic paraspinals, R thoracic paraspinals, R latissimus dorsi, L latissimus dorsi, L lower trapezius, R lower trapezius, R longissimus and L longissimus     Prep: patient was prepped and draped in usual sterile fashion  Needle size: 22 G  Medications: 10 mL bupivacaine (PF) 0 25 %; 2 mL methylPREDNISolone acetate 40 mg/mL  Patient tolerance: patient tolerated the procedure well with no immediate complications

## 2022-06-03 NOTE — PATIENT INSTRUCTIONS
Do not apply heat to any area that is numb  If you have discomfort or soreness at the injection site, you may apply ice today, 20 minutes on and 20 minutes off  Tomorrow you may use ice or warm, moist heat  Do not apply ice or heat directly to the skin  If you experience severe shortness of breath, go to the Emergency Room  You may have numbness for several hours from the local anesthetic  Please use caution and common sense, especially with weight-bearing activities  You may have an increase or change in the discomfort for 36-48 hours after your treatment  Apply ice and continue with any pain medicine you have been prescribed  Do not do anything strenuous today  You may shower, but no tub baths or hot tubs today  You may resume your normal activities tomorrow, but do not overdo it  Resume normal activities slowly when you are feeling better  If you experience redness, drainage or swelling at the injection site, or if you develop a fever above 100 degrees, please call [facility] at [phone] or go to the Emergency Room  Continue to take all routine medicines prescribed by your primary care physician unless otherwise instructed by our staff  Most blood thinners should be started again according to your regularly scheduled dosing  If you have any questions, please give our office a call  As no general anesthesia was used in today's procedure, you should not experience any side effects related to anesthesia  If you have a problem specifically related to your procedure, please call our office at [phone]  Problems not related to your procedure should be directed to your primary care physician

## 2022-06-10 ENCOUNTER — TELEPHONE (OUTPATIENT)
Dept: PAIN MEDICINE | Facility: CLINIC | Age: 47
End: 2022-06-10

## 2022-06-10 NOTE — TELEPHONE ENCOUNTER
Pt returning will the 40% of improvement and pain level 8 -9 /10  Pt would like to increase the muscle relaxer  Please be advised thank you    Pt can be contacted @ [phone]

## 2022-06-13 DIAGNOSIS — M79.18 MYOFASCIAL PAIN SYNDROME: ICD-10-CM

## 2022-06-13 DIAGNOSIS — G89.29 CHRONIC BILATERAL LOW BACK PAIN WITHOUT SCIATICA: ICD-10-CM

## 2022-06-13 DIAGNOSIS — M54.9 MID BACK PAIN: ICD-10-CM

## 2022-06-13 DIAGNOSIS — M54.50 CHRONIC BILATERAL LOW BACK PAIN WITHOUT SCIATICA: ICD-10-CM

## 2022-06-13 RX ORDER — BACLOFEN 20 MG/1
10 TABLET ORAL 2 TIMES DAILY
Qty: 60 TABLET | Refills: 1 | Status: SHIPPED | OUTPATIENT
Start: 2022-06-13 | End: 2022-07-06 | Stop reason: SDUPTHER

## 2022-06-20 NOTE — TELEPHONE ENCOUNTER
Pt called in to let the doctor know that she is having issues with her neck and if the doctor can treat that with an injection  Please be advised thank you    Pt can be reached @   [phone]

## 2022-06-21 NOTE — TELEPHONE ENCOUNTER
Patient returning nurses call; please reach out to her at # [phone] (Call between 12:30 - 1:30 pm), pam

## 2022-06-21 NOTE — TELEPHONE ENCOUNTER
S/W pt who states she is having neck area pain for the last few days  Seen 2/28 for consult and neck pain mentioned at that time  States pain feels like it is in her muscle, on both sides of neck and traveling up into her head  R>L  Describes it as a "stiff neck " Wakes her up at night  Taking Baclofen 20mg without help  Unable to take NSAID's due to stage 3 kidney disease  She is asking for an injection  States she "had one at the last place and it worked " But she is unsure of what she had    Please advise    (Best times to call pt is [phone], [phone] and [phone])

## 2022-06-21 NOTE — TELEPHONE ENCOUNTER
Please schedule patient for ultrasound-guided bilateral paracervical and trapezius trigger point injection with steroid

## 2022-06-22 DIAGNOSIS — M19.90 ARTHRITIS: Primary | ICD-10-CM

## 2022-06-23 ENCOUNTER — OFFICE VISIT (OUTPATIENT)
Dept: OBGYN CLINIC | Facility: CLINIC | Age: 47
End: 2022-06-23
Payer: COMMERCIAL

## 2022-06-23 VITALS
WEIGHT: 131 LBS | HEIGHT: 65 IN | BODY MASS INDEX: 21.83 KG/M2 | DIASTOLIC BLOOD PRESSURE: 84 MMHG | SYSTOLIC BLOOD PRESSURE: 143 MMHG | HEART RATE: 99 BPM

## 2022-06-23 DIAGNOSIS — M77.02 MEDIAL EPICONDYLITIS OF LEFT ELBOW: Primary | ICD-10-CM

## 2022-06-23 PROCEDURE — 3008F BODY MASS INDEX DOCD: CPT | Performed by: PHYSICIAN ASSISTANT

## 2022-06-23 PROCEDURE — 99213 OFFICE O/P EST LOW 20 MIN: CPT | Performed by: PHYSICIAN ASSISTANT

## 2022-06-23 PROCEDURE — 1036F TOBACCO NON-USER: CPT | Performed by: PHYSICIAN ASSISTANT
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
## 2022-06-27 DIAGNOSIS — G89.29 CHRONIC BILATERAL LOW BACK PAIN WITHOUT SCIATICA: Primary | ICD-10-CM

## 2022-06-27 DIAGNOSIS — M54.50 CHRONIC BILATERAL LOW BACK PAIN WITHOUT SCIATICA: Primary | ICD-10-CM

## 2022-07-01 ENCOUNTER — TELEPHONE (OUTPATIENT)
Dept: PAIN MEDICINE | Facility: CLINIC | Age: 47
End: 2022-07-01

## 2022-07-01 NOTE — TELEPHONE ENCOUNTER
Patient called in for a refill of :  Name of medication: baclofen 20 mg tablet       Frequency: Take 0 5 tablets (10 mg total) by mouth 2 (two) times a day    How many left: a few days worth     Pharmacy:  [address] call back # [phone]    Pt aware it can take 24-48 hrs to process refills- thank you

## 2022-07-01 NOTE — TELEPHONE ENCOUNTER
Spoke to pt advising pt that when script was sent on 6/13 RM added 1 additional refil  Pt verbalized understanding of instructions

## 2022-07-05 ENCOUNTER — HOSPITAL ENCOUNTER (OUTPATIENT)
Dept: MRI IMAGING | Facility: HOSPITAL | Age: 47
Discharge: HOME | End: 2022-07-05
Attending: PHYSICIAN ASSISTANT
Payer: COMMERCIAL

## 2022-07-05 DIAGNOSIS — M77.02 MEDIAL EPICONDYLITIS OF LEFT ELBOW: ICD-10-CM

## 2022-07-05 PROCEDURE — 73221 MRI JOINT UPR EXTREM W/O DYE: CPT

## 2022-07-05 NOTE — TELEPHONE ENCOUNTER
Pt called back and says she needs clarification on med instructions  # [phone]  Can leave detailed message

## 2022-07-05 NOTE — TELEPHONE ENCOUNTER
S/w pt who was previously taking baclofen 10mg tid  Pt states she asked for an increase because it was not helping but new script is for a 20mg tablet and to take half a tablet bid which is a decrease  Pt is asking if this can be corrected  Pt states a decrease will not help her  Pt states to leave a detailed message when calling back regarding instructions

## 2022-07-06 DIAGNOSIS — M54.9 MID BACK PAIN: ICD-10-CM

## 2022-07-06 DIAGNOSIS — M79.18 MYOFASCIAL PAIN SYNDROME: ICD-10-CM

## 2022-07-06 DIAGNOSIS — M54.50 CHRONIC BILATERAL LOW BACK PAIN WITHOUT SCIATICA: ICD-10-CM

## 2022-07-06 DIAGNOSIS — G89.29 CHRONIC BILATERAL LOW BACK PAIN WITHOUT SCIATICA: ICD-10-CM

## 2022-07-06 RX ORDER — BACLOFEN 20 MG/1
20 TABLET ORAL 3 TIMES DAILY
Qty: 90 TABLET | Refills: 1 | Status: SHIPPED | OUTPATIENT
Start: 2022-07-06 | End: 2022-08-11 | Stop reason: SDUPTHER

## 2022-07-08 NOTE — TELEPHONE ENCOUNTER
Attempted to reach pt  Detailed vmmlom as per release of information document advising pt of same  CB# and OH provided

## 2022-07-14 ENCOUNTER — PROCEDURE VISIT (OUTPATIENT)
Dept: PAIN MEDICINE | Facility: CLINIC | Age: 47
End: 2022-07-14
Payer: COMMERCIAL

## 2022-07-14 DIAGNOSIS — M79.18 MYOFASCIAL PAIN SYNDROME: ICD-10-CM

## 2022-07-14 DIAGNOSIS — M54.2 NECK PAIN: Primary | ICD-10-CM

## 2022-07-14 PROCEDURE — 20553 NJX 1/MLT TRIGGER POINTS 3/>: CPT | Performed by: ANESTHESIOLOGY

## 2022-07-14 RX ORDER — BUPIVACAINE HYDROCHLORIDE 2.5 MG/ML
10 INJECTION, SOLUTION EPIDURAL; INFILTRATION; INTRACAUDAL
Status: COMPLETED | OUTPATIENT
Start: 2022-07-14 | End: 2022-07-14

## 2022-07-14 RX ORDER — METHYLPREDNISOLONE ACETATE 40 MG/ML
1 INJECTION, SUSPENSION INTRA-ARTICULAR; INTRALESIONAL; INTRAMUSCULAR; SOFT TISSUE
Status: COMPLETED | OUTPATIENT
Start: 2022-07-14 | End: 2022-07-14

## 2022-07-14 RX ADMIN — BUPIVACAINE HYDROCHLORIDE 10 ML: 2.5 INJECTION, SOLUTION EPIDURAL; INFILTRATION; INTRACAUDAL at 15:20

## 2022-07-14 RX ADMIN — METHYLPREDNISOLONE ACETATE 1 ML: 40 INJECTION, SUSPENSION INTRA-ARTICULAR; INTRALESIONAL; INTRAMUSCULAR; SOFT TISSUE at 15:20

## 2022-07-14 NOTE — PATIENT INSTRUCTIONS
Do not apply heat to any area that is numb  If you have discomfort or soreness at the injection site, you may apply ice today, 20 minutes on and 20 minutes off  Tomorrow you may use ice or warm, moist heat  Do not apply ice or heat directly to the skin  If you experience severe shortness of breath, go to the Emergency Room  You may have numbness for several hours from the local anesthetic  Please use caution and common sense, especially with weight-bearing activities  You may have an increase or change in the discomfort for 36-48 hours after your treatment  Apply ice and continue with any pain medicine you have been prescribed  Do not do anything strenuous today  You may shower, but no tub baths or hot tubs today  You may resume your normal activities tomorrow, but do not overdo it  Resume normal activities slowly when you are feeling better  If you experience redness, drainage or swelling at the injection site, or if you develop a fever above 100 degrees, please call [facility] at [phone] or go to the Emergency Room  Continue to take all routine medicines prescribed by your primary care physician unless otherwise instructed by our staff  Most blood thinners should be started again according to your regularly scheduled dosing  If you have any questions, please give our office a call  As no general anesthesia was used in today's procedure, you should not experience any side effects related to anesthesia  If you have a problem specifically related to your procedure, please call our office at [phone]  Problems not related to your procedure should be directed to your primary care physician

## 2022-07-14 NOTE — PROGRESS NOTES
Universal Protocol:  Consent: Verbal consent obtained  Written consent obtained  Risks and benefits: risks, benefits and alternatives were discussed  Consent given by: patient  Time out: Immediately prior to procedure a "time out" was called to verify the correct patient, procedure, equipment, support staff and site/side marked as required  Timeout called at: 7/14/2022 3:18 PM   Patient understanding: patient states understanding of the procedure being performed  Patient consent: the patient's understanding of the procedure matches consent given  Procedure consent: procedure consent matches procedure scheduled  Relevant documents: relevant documents present and verified  Test results: test results available and properly labeled  Site marked: the operative site was marked  Radiology Images displayed and confirmed   If images not available, report reviewed: imaging studies available  Required items: required blood products, implants, devices, and special equipment available  Patient identity confirmed: verbally with patient    Supporting Documentation  Indications: pain   Trigger Point Injections: multiple trigger points: 3 or more muscle groups    Injection site identified by: ultrasound    Procedure Details  Location(s):    Neck/Upper Back: L upper trapezius, L levator scapulae, R levator scapulae, R upper trapezius, R cervical paraspinals and L cervical paraspinals     Prep: patient was prepped and draped in usual sterile fashion  Needle size: 22 G  Medications: 10 mL bupivacaine (PF) 0 25 %; 1 mL methylPREDNISolone acetate 40 mg/mL  Patient tolerance: patient tolerated the procedure well with no immediate complications

## 2022-08-11 ENCOUNTER — PROCEDURE VISIT (OUTPATIENT)
Dept: PAIN MEDICINE | Facility: CLINIC | Age: 47
End: 2022-08-11
Payer: COMMERCIAL

## 2022-08-11 DIAGNOSIS — G89.29 CHRONIC BILATERAL LOW BACK PAIN WITHOUT SCIATICA: ICD-10-CM

## 2022-08-11 DIAGNOSIS — M54.50 CHRONIC BILATERAL LOW BACK PAIN WITHOUT SCIATICA: ICD-10-CM

## 2022-08-11 DIAGNOSIS — F41.9 ANXIETY: ICD-10-CM

## 2022-08-11 DIAGNOSIS — M54.9 MID BACK PAIN: Primary | ICD-10-CM

## 2022-08-11 DIAGNOSIS — M79.18 MYOFASCIAL PAIN SYNDROME: ICD-10-CM

## 2022-08-11 DIAGNOSIS — M54.2 NECK PAIN: ICD-10-CM

## 2022-08-11 PROCEDURE — 20553 NJX 1/MLT TRIGGER POINTS 3/>: CPT | Performed by: ANESTHESIOLOGY

## 2022-08-11 PROCEDURE — 76942 ECHO GUIDE FOR BIOPSY: CPT | Performed by: ANESTHESIOLOGY

## 2022-08-11 RX ORDER — METHYLPREDNISOLONE ACETATE 40 MG/ML
1 INJECTION, SUSPENSION INTRA-ARTICULAR; INTRALESIONAL; INTRAMUSCULAR; SOFT TISSUE
Status: COMPLETED | OUTPATIENT
Start: 2022-08-11 | End: 2022-08-11

## 2022-08-11 RX ORDER — BUPIVACAINE HYDROCHLORIDE 2.5 MG/ML
10 INJECTION, SOLUTION EPIDURAL; INFILTRATION; INTRACAUDAL
Status: COMPLETED | OUTPATIENT
Start: 2022-08-11 | End: 2022-08-11

## 2022-08-11 RX ORDER — GABAPENTIN 800 MG/1
800 TABLET ORAL 3 TIMES DAILY
Qty: 90 TABLET | Refills: 3 | Status: SHIPPED | OUTPATIENT
Start: 2022-08-11

## 2022-08-11 RX ORDER — BACLOFEN 20 MG/1
20 TABLET ORAL 3 TIMES DAILY
Qty: 90 TABLET | Refills: 1 | Status: SHIPPED | OUTPATIENT
Start: 2022-08-11 | End: 2022-09-14 | Stop reason: SDUPTHER

## 2022-08-11 RX ADMIN — BUPIVACAINE HYDROCHLORIDE 10 ML: 2.5 INJECTION, SOLUTION EPIDURAL; INFILTRATION; INTRACAUDAL at 15:37

## 2022-08-11 RX ADMIN — METHYLPREDNISOLONE ACETATE 1 ML: 40 INJECTION, SUSPENSION INTRA-ARTICULAR; INTRALESIONAL; INTRAMUSCULAR; SOFT TISSUE at 15:37

## 2022-08-11 NOTE — PATIENT INSTRUCTIONS
Do not apply heat to any area that is numb  If you have discomfort or soreness at the injection site, you may apply ice today, 20 minutes on and 20 minutes off  Tomorrow you may use ice or warm, moist heat  Do not apply ice or heat directly to the skin  If you experience severe shortness of breath, go to the Emergency Room  You may have numbness for several hours from the local anesthetic  Please use caution and common sense, especially with weight-bearing activities  You may have an increase or change in the discomfort for 36-48 hours after your treatment  Apply ice and continue with any pain medicine you have been prescribed  Do not do anything strenuous today  You may shower, but no tub baths or hot tubs today  You may resume your normal activities tomorrow, but do not overdo it  Resume normal activities slowly when you are feeling better  If you experience redness, drainage or swelling at the injection site, or if you develop a fever above 100 degrees, please call [facility] at [phone] or go to the Emergency Room  Continue to take all routine medicines prescribed by your primary care physician unless otherwise instructed by our staff  Most blood thinners should be started again according to your regularly scheduled dosing  If you have any questions, please give our office a call  As no general anesthesia was used in today's procedure, you should not experience any side effects related to anesthesia  If you have a problem specifically related to your procedure, please call our office at [phone]  Problems not related to your procedure should be directed to your primary care physician

## 2022-08-11 NOTE — PROGRESS NOTES
Universal Protocol:  Consent: Verbal consent obtained  Written consent obtained  Risks and benefits: risks, benefits and alternatives were discussed  Consent given by: patient  Time out: Immediately prior to procedure a "time out" was called to verify the correct patient, procedure, equipment, support staff and site/side marked as required  Timeout called at: 8/11/2022 3:36 PM   Patient understanding: patient states understanding of the procedure being performed  Patient consent: the patient's understanding of the procedure matches consent given  Procedure consent: procedure consent matches procedure scheduled  Relevant documents: relevant documents present and verified  Test results: test results available and properly labeled  Site marked: the operative site was marked  Radiology Images displayed and confirmed   If images not available, report reviewed: imaging studies available  Required items: required blood products, implants, devices, and special equipment available  Patient identity confirmed: verbally with patient    Supporting Documentation  Indications: pain   Trigger Point Injections: multiple trigger points: 3 or more muscle groups    Injection site identified by: ultrasound    Procedure Details  Location(s):    Middle Back: L thoracic paraspinals, L latissimus dorsi, L lower trapezius, R latissimus dorsi, R thoracic paraspinals, R lower trapezius, R longissimus and L longissimus     Prep: patient was prepped and draped in usual sterile fashion  Needle size: 22 G  Medications: 10 mL bupivacaine (PF) 0 25 %; 1 mL methylPREDNISolone acetate 40 mg/mL  Patient tolerance: patient tolerated the procedure well with no immediate complications

## 2022-08-29 ENCOUNTER — OFFICE VISIT (OUTPATIENT)
Dept: OBGYN CLINIC | Facility: CLINIC | Age: 47
End: 2022-08-29
Payer: COMMERCIAL

## 2022-08-29 VITALS
BODY MASS INDEX: 23.32 KG/M2 | DIASTOLIC BLOOD PRESSURE: 76 MMHG | HEIGHT: 65 IN | SYSTOLIC BLOOD PRESSURE: 122 MMHG | WEIGHT: 140 LBS | HEART RATE: 60 BPM

## 2022-08-29 DIAGNOSIS — M77.02 MEDIAL EPICONDYLITIS OF LEFT ELBOW: Primary | ICD-10-CM

## 2022-08-29 PROCEDURE — 99213 OFFICE O/P EST LOW 20 MIN: CPT | Performed by: ORTHOPAEDIC SURGERY

## 2022-08-29 PROCEDURE — 20551 NJX 1 TENDON ORIGIN/INSJ: CPT | Performed by: ORTHOPAEDIC SURGERY

## 2022-08-29 RX ORDER — BUPIVACAINE HYDROCHLORIDE 2.5 MG/ML
1 INJECTION, SOLUTION EPIDURAL; INFILTRATION; INTRACAUDAL
Status: COMPLETED | OUTPATIENT
Start: 2022-08-29 | End: 2022-08-29

## 2022-08-29 RX ORDER — BETAMETHASONE SODIUM PHOSPHATE AND BETAMETHASONE ACETATE 3; 3 MG/ML; MG/ML
6 INJECTION, SUSPENSION INTRA-ARTICULAR; INTRALESIONAL; INTRAMUSCULAR; SOFT TISSUE
Status: COMPLETED | OUTPATIENT
Start: 2022-08-29 | End: 2022-08-29

## 2022-08-29 RX ADMIN — BETAMETHASONE SODIUM PHOSPHATE AND BETAMETHASONE ACETATE 6 MG: 3; 3 INJECTION, SUSPENSION INTRA-ARTICULAR; INTRALESIONAL; INTRAMUSCULAR; SOFT TISSUE at 10:40

## 2022-08-29 RX ADMIN — BUPIVACAINE HYDROCHLORIDE 1 ML: 2.5 INJECTION, SOLUTION EPIDURAL; INFILTRATION; INTRACAUDAL at 10:40

## 2022-08-29 NOTE — LETTER
August 29, 2022     Patient: Ritesh Goodman  YOB: 1975  Date of Visit: 8/29/2022      To Whom it May Concern:    Ritesh Goodman is under my professional care  Keith Loving was seen in my office on 8/29/2022  Keith Loving he is cleared to return to work beginning 8/29/2022 without limitations or restrictions  If you have any questions or concerns, please don't hesitate to call           Sincerely,          Emeterio Dia DO        CC: No Recipients
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
## 2022-09-03 ENCOUNTER — APPOINTMENT (OUTPATIENT)
Dept: LAB | Facility: HOSPITAL | Age: 47
End: 2022-09-03
Payer: COMMERCIAL

## 2022-09-03 DIAGNOSIS — E03.9 HYPOTHYROIDISM, UNSPECIFIED TYPE: ICD-10-CM

## 2022-09-03 DIAGNOSIS — I12.9 BENIGN HYPERTENSION WITH CKD (CHRONIC KIDNEY DISEASE) STAGE III (HCC): ICD-10-CM

## 2022-09-03 DIAGNOSIS — Z13.220 SCREENING FOR HYPERLIPIDEMIA: ICD-10-CM

## 2022-09-03 DIAGNOSIS — N18.32 STAGE 3B CHRONIC KIDNEY DISEASE (HCC): ICD-10-CM

## 2022-09-03 DIAGNOSIS — E55.9 VITAMIN D DEFICIENCY: ICD-10-CM

## 2022-09-03 DIAGNOSIS — N18.30 BENIGN HYPERTENSION WITH CKD (CHRONIC KIDNEY DISEASE) STAGE III (HCC): ICD-10-CM

## 2022-09-03 DIAGNOSIS — E67.3 HIGH VITAMIN D LEVEL: ICD-10-CM

## 2022-09-03 LAB
ALBUMIN SERPL BCP-MCNC: 4.6 G/DL (ref 3.5–5)
ALP SERPL-CCNC: 62 U/L (ref 34–104)
ALT SERPL W P-5'-P-CCNC: 23 U/L (ref 7–52)
ANION GAP SERPL CALCULATED.3IONS-SCNC: 8 MMOL/L (ref 4–13)
AST SERPL W P-5'-P-CCNC: 18 U/L (ref 13–39)
BACTERIA UR QL AUTO: ABNORMAL /HPF
BASOPHILS # BLD AUTO: 0.04 THOUSANDS/ΜL (ref 0–0.1)
BASOPHILS NFR BLD AUTO: 1 % (ref 0–1)
BILIRUB SERPL-MCNC: 0.68 MG/DL (ref 0.2–1)
BILIRUB UR QL STRIP: NEGATIVE
BUN SERPL-MCNC: 15 MG/DL (ref 5–25)
CALCIUM SERPL-MCNC: 9.8 MG/DL (ref 8.4–10.2)
CHLORIDE SERPL-SCNC: 102 MMOL/L (ref 96–108)
CHOLEST SERPL-MCNC: 195 MG/DL
CLARITY UR: ABNORMAL
CO2 SERPL-SCNC: 30 MMOL/L (ref 21–32)
COLOR UR: YELLOW
CREAT SERPL-MCNC: 1.29 MG/DL (ref 0.6–1.3)
EOSINOPHIL # BLD AUTO: 0.11 THOUSAND/ΜL (ref 0–0.61)
EOSINOPHIL NFR BLD AUTO: 2 % (ref 0–6)
ERYTHROCYTE [DISTWIDTH] IN BLOOD BY AUTOMATED COUNT: 11.4 % (ref 11.6–15.1)
GFR SERPL CREATININE-BSD FRML MDRD: 49 ML/MIN/1.73SQ M
GLUCOSE P FAST SERPL-MCNC: 66 MG/DL (ref 65–99)
GLUCOSE UR STRIP-MCNC: NEGATIVE MG/DL
HCT VFR BLD AUTO: 40.6 % (ref 34.8–46.1)
HDLC SERPL-MCNC: 73 MG/DL
HGB BLD-MCNC: 13.5 G/DL (ref 11.5–15.4)
HGB UR QL STRIP.AUTO: NEGATIVE
IMM GRANULOCYTES # BLD AUTO: 0.01 THOUSAND/UL (ref 0–0.2)
IMM GRANULOCYTES NFR BLD AUTO: 0 % (ref 0–2)
KETONES UR STRIP-MCNC: NEGATIVE MG/DL
LDLC SERPL CALC-MCNC: 100 MG/DL (ref 0–100)
LEUKOCYTE ESTERASE UR QL STRIP: ABNORMAL
LYMPHOCYTES # BLD AUTO: 1.76 THOUSANDS/ΜL (ref 0.6–4.47)
LYMPHOCYTES NFR BLD AUTO: 31 % (ref 14–44)
MAGNESIUM SERPL-MCNC: 1.9 MG/DL (ref 1.9–2.7)
MCH RBC QN AUTO: 35.2 PG (ref 26.8–34.3)
MCHC RBC AUTO-ENTMCNC: 33.3 G/DL (ref 31.4–37.4)
MCV RBC AUTO: 106 FL (ref 82–98)
MONOCYTES # BLD AUTO: 0.38 THOUSAND/ΜL (ref 0.17–1.22)
MONOCYTES NFR BLD AUTO: 7 % (ref 4–12)
NEUTROPHILS # BLD AUTO: 3.41 THOUSANDS/ΜL (ref 1.85–7.62)
NEUTS SEG NFR BLD AUTO: 59 % (ref 43–75)
NITRITE UR QL STRIP: NEGATIVE
NON-SQ EPI CELLS URNS QL MICRO: ABNORMAL /HPF
NONHDLC SERPL-MCNC: 122 MG/DL
NRBC BLD AUTO-RTO: 0 /100 WBCS
PH UR STRIP.AUTO: 6.5 [PH]
PHOSPHATE SERPL-MCNC: 2.9 MG/DL (ref 2.7–4.5)
PLATELET # BLD AUTO: 244 THOUSANDS/UL (ref 149–390)
PMV BLD AUTO: 10.5 FL (ref 8.9–12.7)
POTASSIUM SERPL-SCNC: 3.6 MMOL/L (ref 3.5–5.3)
PROT SERPL-MCNC: 6.9 G/DL (ref 6.4–8.4)
PROT UR STRIP-MCNC: NEGATIVE MG/DL
RBC # BLD AUTO: 3.84 MILLION/UL (ref 3.81–5.12)
RBC #/AREA URNS AUTO: ABNORMAL /HPF
SODIUM SERPL-SCNC: 140 MMOL/L (ref 135–147)
SP GR UR STRIP.AUTO: 1.02 (ref 1–1.03)
TRIGL SERPL-MCNC: 111 MG/DL
TSH SERPL DL<=0.05 MIU/L-ACNC: 1.13 UIU/ML (ref 0.45–4.5)
UROBILINOGEN UR QL STRIP.AUTO: 0.2 E.U./DL
WBC # BLD AUTO: 5.71 THOUSAND/UL (ref 4.31–10.16)
WBC #/AREA URNS AUTO: ABNORMAL /HPF

## 2022-09-03 PROCEDURE — 84100 ASSAY OF PHOSPHORUS: CPT

## 2022-09-03 PROCEDURE — 85025 COMPLETE CBC W/AUTO DIFF WBC: CPT

## 2022-09-03 PROCEDURE — 82570 ASSAY OF URINE CREATININE: CPT

## 2022-09-03 PROCEDURE — 80061 LIPID PANEL: CPT

## 2022-09-03 PROCEDURE — 84443 ASSAY THYROID STIM HORMONE: CPT

## 2022-09-03 PROCEDURE — 83735 ASSAY OF MAGNESIUM: CPT

## 2022-09-03 PROCEDURE — 82043 UR ALBUMIN QUANTITATIVE: CPT

## 2022-09-03 PROCEDURE — 81001 URINALYSIS AUTO W/SCOPE: CPT

## 2022-09-03 PROCEDURE — 80053 COMPREHEN METABOLIC PANEL: CPT

## 2022-09-03 PROCEDURE — 82306 VITAMIN D 25 HYDROXY: CPT

## 2022-09-03 PROCEDURE — 36415 COLL VENOUS BLD VENIPUNCTURE: CPT

## 2022-09-04 LAB
25(OH)D3 SERPL-MCNC: 47.5 NG/ML (ref 30–100)
CREAT UR-MCNC: 212 MG/DL
MICROALBUMIN UR-MCNC: 39.6 MG/L (ref 0–20)
MICROALBUMIN/CREAT 24H UR: 19 MG/G CREATININE (ref 0–30)

## 2022-09-14 ENCOUNTER — OFFICE VISIT (OUTPATIENT)
Dept: PAIN MEDICINE | Facility: CLINIC | Age: 47
End: 2022-09-14
Payer: COMMERCIAL

## 2022-09-14 VITALS
SYSTOLIC BLOOD PRESSURE: 119 MMHG | BODY MASS INDEX: 22.19 KG/M2 | HEART RATE: 99 BPM | DIASTOLIC BLOOD PRESSURE: 77 MMHG | HEIGHT: 65 IN | WEIGHT: 133.2 LBS

## 2022-09-14 DIAGNOSIS — M54.50 CHRONIC BILATERAL LOW BACK PAIN WITHOUT SCIATICA: ICD-10-CM

## 2022-09-14 DIAGNOSIS — M54.6 CHRONIC BILATERAL THORACIC BACK PAIN: ICD-10-CM

## 2022-09-14 DIAGNOSIS — G89.4 CHRONIC PAIN SYNDROME: Primary | ICD-10-CM

## 2022-09-14 DIAGNOSIS — M54.2 NECK PAIN: ICD-10-CM

## 2022-09-14 DIAGNOSIS — M79.18 MYOFASCIAL PAIN SYNDROME: ICD-10-CM

## 2022-09-14 DIAGNOSIS — M54.9 MID BACK PAIN: ICD-10-CM

## 2022-09-14 DIAGNOSIS — G89.29 CHRONIC BILATERAL THORACIC BACK PAIN: ICD-10-CM

## 2022-09-14 DIAGNOSIS — G89.29 CHRONIC BILATERAL LOW BACK PAIN WITHOUT SCIATICA: ICD-10-CM

## 2022-09-14 PROCEDURE — 99214 OFFICE O/P EST MOD 30 MIN: CPT | Performed by: NURSE PRACTITIONER

## 2022-09-14 RX ORDER — BACLOFEN 20 MG/1
20 TABLET ORAL 3 TIMES DAILY
Qty: 90 TABLET | Refills: 1 | Status: SHIPPED | OUTPATIENT
Start: 2022-09-14 | End: 2022-10-27 | Stop reason: SDUPTHER
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
## 2022-10-06 ENCOUNTER — OFFICE VISIT (OUTPATIENT)
Dept: FAMILY MEDICINE CLINIC | Facility: CLINIC | Age: 47
End: 2022-10-06
Payer: COMMERCIAL

## 2022-10-06 VITALS
HEIGHT: 65 IN | BODY MASS INDEX: 21.49 KG/M2 | WEIGHT: 129 LBS | DIASTOLIC BLOOD PRESSURE: 76 MMHG | SYSTOLIC BLOOD PRESSURE: 126 MMHG | TEMPERATURE: 98.1 F | HEART RATE: 95 BPM | OXYGEN SATURATION: 98 %

## 2022-10-06 DIAGNOSIS — N18.30 BENIGN HYPERTENSION WITH CKD (CHRONIC KIDNEY DISEASE) STAGE III (HCC): ICD-10-CM

## 2022-10-06 DIAGNOSIS — I12.9 BENIGN HYPERTENSION WITH CKD (CHRONIC KIDNEY DISEASE) STAGE III (HCC): ICD-10-CM

## 2022-10-06 DIAGNOSIS — Z23 ENCOUNTER FOR IMMUNIZATION: ICD-10-CM

## 2022-10-06 DIAGNOSIS — M79.18 MYOFASCIAL PAIN SYNDROME: ICD-10-CM

## 2022-10-06 DIAGNOSIS — F41.9 ANXIETY: Primary | ICD-10-CM

## 2022-10-06 DIAGNOSIS — M54.2 NECK PAIN: ICD-10-CM

## 2022-10-06 PROCEDURE — 99214 OFFICE O/P EST MOD 30 MIN: CPT | Performed by: NURSE PRACTITIONER

## 2022-10-06 PROCEDURE — 90471 IMMUNIZATION ADMIN: CPT | Performed by: NURSE PRACTITIONER

## 2022-10-06 PROCEDURE — 90686 IIV4 VACC NO PRSV 0.5 ML IM: CPT | Performed by: NURSE PRACTITIONER

## 2022-10-06 RX ORDER — HYDROXYZINE HYDROCHLORIDE 25 MG/1
25 TABLET, FILM COATED ORAL EVERY 6 HOURS PRN
Qty: 60 TABLET | Refills: 0 | Status: SHIPPED | OUTPATIENT
Start: 2022-10-06

## 2022-10-06 RX ORDER — ARIPIPRAZOLE 15 MG/1
TABLET ORAL
COMMUNITY
Start: 2022-08-16

## 2022-10-06 NOTE — PATIENT INSTRUCTIONS
Talk to Dr Ema Benson about your Wellbutrin to see if he thinks it can be contributing to your anxiety  Also, talk to him about the addition of Buspar with your medications

## 2022-10-07 ENCOUNTER — TELEPHONE (OUTPATIENT)
Dept: PAIN MEDICINE | Facility: CLINIC | Age: 47
End: 2022-10-07

## 2022-10-07 NOTE — TELEPHONE ENCOUNTER
Caller: patient    Doctor/Office: Harriet Worrell [phone]     Location of pain: neck pain    Pain scale: 10    Duration of pain: 1 month

## 2022-10-07 NOTE — TELEPHONE ENCOUNTER
S/w pt  Advised of same  Tylenol does not help, ice and or heat provide temporary relief and pt has TENS unit that she will try to adjust pads to help her pain  Pt will discuss further with RM at appt for TPI's  Pt verbalized understanding

## 2022-10-07 NOTE — TELEPHONE ENCOUNTER
S/w pt who states that she is having #10/10 neck pain and headaches that were better after TPI's but have progressively gotten worse  Pt is unable to sleep at hs  Pt saw PCP who recommended asking [facility] for a different muscle relaxer as the baclofen helps her back pain but not her neck pain  PT stretches help some but not for long  Pt states that she is desperate for relief  Pt advised to go to ER if pain that severe but pt replied that she would "be there every day"       Please advise

## 2022-10-07 NOTE — TELEPHONE ENCOUNTER
We do not have a lot of medication options since she is already taking a pretty strong muscle relaxer, gabapentin, and is prescribed lidocaine ointment  I would avoid NSAIDs since she appears to have some GI issues as evidence by as the fact that she is taking both Bentyl and Pepcid  She could try taking Tylenol 1000 mg up to 3 times daily as long as there is no contraindication to her doing so  She is scheduled for trigger point injection next month  If she does not have one, I could prescribed a TENs unit for home use

## 2022-10-09 NOTE — PROGRESS NOTES
Name: Jacob Harmon      : 1975      MRN: [id number]  Encounter Provider: FIONA Morris  Encounter Date: 10/6/2022   Encounter department: [address] [place] [address]     1  Anxiety  -     hydrOXYzine HCL (ATARAX) 25 mg tablet; Take 1 tablet (25 mg total) by mouth every 6 (six) hours as needed for anxiety    2  Encounter for immunization  -     influenza vaccine, quadrivalent, 0 5 mL, preservative-free, for adult and pediatric patients 6 mos+ (AFLURIA, FLUARIX, FLULAVAL, FLUZONE)    3  Benign hypertension with CKD (chronic kidney disease) stage III ([facility] [address] )    4  Myofascial pain syndrome    5  Neck pain         Subjective      Patient presents for routine follow up  Neck pain- patient has chronic neck pain and is seeing pain management  She states that she has injections but her pain is still significant  She states that she is sitting at a desk all day and this seems to make her neck tightness worse  She has been using heat, neck massager and tens unit with minimal relief  She is taking Baclofen but does not feel like it helps  She is doing stretches as well  She is unable to take NSAIDs due to CKD  Recommend she try taking tylenol 975 tid for a few days to see if this helps along with current treatment  Call pain management for follow up  Anxiety-patient is following with a psychiatrist at this time  She is also seeing a therapist   She is currently taking Wellbutrin, Pristiq, Abilify, Ambien and Klonopin  Patient states that she feels like she will was doing well with her anxiety but the last couple weeks her anxiety has increased  She is going through some life stressors at home that she thinks is affecting it  She does feel that her depression symptoms are very well controlled at this time  Coping techniques reviewed patient and recommend that she follow-up with her psychiatrist discussed possible medication adjustments    She is requesting refill of Atarax take as needed  Counseled on use and denies use of other medications that can make you drowsy  Leg cramps-patient states that she is randomly developing g in her legs and her feet  She states that she is very daily drinking water  This is likely the reason that she is having creams  Instructed patient to increase her water intake to 64 oz per day and if this does not improve her symptoms over the next 1-2 weeks, call for follow-up  Hypertension-blood pressure is well controlled at this time 1:20 a m  6/76  Continue current medications and continue to avoid NSAIDs  Review of Systems   Constitutional: Negative for activity change, appetite change, diaphoresis, fatigue, fever and unexpected weight change  HENT: Negative for congestion, mouth sores, rhinorrhea, sinus pain, trouble swallowing and voice change  Eyes: Negative for photophobia and visual disturbance  Respiratory: Negative for apnea, cough, chest tightness, shortness of breath and wheezing  Cardiovascular: Negative for chest pain, palpitations and leg swelling  Gastrointestinal: Negative for abdominal distention, abdominal pain, blood in stool, constipation, diarrhea, nausea and vomiting  Endocrine: Negative for cold intolerance, heat intolerance, polydipsia, polyphagia and polyuria  Genitourinary: Negative for decreased urine volume, difficulty urinating, frequency and urgency  Musculoskeletal: Positive for neck pain and neck stiffness  Negative for arthralgias and myalgias  Skin: Negative for color change, rash and wound  Neurological: Negative for dizziness, weakness, light-headedness, numbness and headaches  Hematological: Negative for adenopathy  Does not bruise/bleed easily  Psychiatric/Behavioral: Negative for self-injury, sleep disturbance and suicidal ideas  The patient is nervous/anxious          Current Outpatient Medications on File Prior to Visit   Medication Sig   • ARIPiprazole (ABILIFY) 15 mg tablet TAKE 1/2 (ONE-HALF) TABLET BY MOUTH AT BEDTIME   • baclofen 20 mg tablet Take 1 tablet (20 mg total) by mouth 3 (three) times a day   • budesonide (ENTOCORT EC) 3 MG capsule TAKE 3 CAPSULES BY MOUTH ONCE DAILY FOR 1 MONTH THEN 2 CAPSULES EVERY DAY   • buPROPion (WELLBUTRIN XL) 150 mg 24 hr tablet    • buPROPion (WELLBUTRIN XL) 300 mg 24 hr tablet Take 300 mg by mouth daily    • clonazePAM (KlonoPIN) 1 mg tablet Take 1 mg by mouth 3 (three) times a day as needed   • desvenlafaxine (PRISTIQ) 100 mg 24 hr tablet Take 100 mg by mouth daily at bedtime   • dicyclomine (BENTYL) 20 mg tablet Take 1 tablet by mouth twice daily   • famotidine (PEPCID) 20 mg tablet Take 1 tablet (20 mg total) by mouth 2 (two) times a day   • gabapentin (NEURONTIN) 800 mg tablet Take 1 tablet (800 mg total) by mouth 3 (three) times a day   • lansoprazole (PREVACID) 30 mg capsule Take 30 mg by mouth 2 (two) times a day   • levonorgestrel (MIRENA) 20 MCG/24HR IUD 1 each by Intrauterine route once   • levothyroxine 50 mcg tablet Take 1 tablet (50 mcg total) by mouth daily   • lidocaine (XYLOCAINE) 5 % ointment Apply topically as needed for mild pain   • lisinopril (ZESTRIL) 10 mg tablet Take 1 tablet (10 mg total) by mouth daily   • Probiotic Product (VSL#3) CAPS    • zolpidem (AMBIEN) 10 mg tablet Take 1 tablet (10 mg total) by mouth daily at bedtime as needed for sleep for up to 10 days   • ARIPiprazole (ABILIFY) 5 mg tablet  (Patient not taking: Reported on 10/6/2022)   • ondansetron (ZOFRAN-ODT) 4 mg disintegrating tablet Take 1 tablet (4 mg total) by mouth every 6 (six) hours as needed for nausea or vomiting (Patient not taking: Reported on 10/6/2022)       Objective     /76   Pulse 95   Temp 98 1 °F (36 7 °C)   Ht 5' 5" (1 651 m)   Wt 58 5 kg (129 lb)   SpO2 98%   BMI 21 47 kg/m²     Physical Exam  Constitutional:       General: She is not in acute distress  Appearance: Normal appearance  She is not ill-appearing  HENT:      Head: Normocephalic and atraumatic  Cardiovascular:      Rate and Rhythm: Normal rate and regular rhythm  Pulses: Normal pulses  Heart sounds: Normal heart sounds  No murmur heard  No friction rub  Pulmonary:      Effort: Pulmonary effort is normal  No respiratory distress  Breath sounds: Normal breath sounds  No wheezing  Chest:      Chest wall: No tenderness  Abdominal:      General: Abdomen is flat  Bowel sounds are normal       Palpations: Abdomen is soft  There is no mass  Tenderness: There is no abdominal tenderness  There is no guarding or rebound  Musculoskeletal:      Cervical back: Normal range of motion  Tenderness present  No rigidity  Right lower leg: No edema  Left lower leg: No edema  Lymphadenopathy:      Cervical: No cervical adenopathy  Skin:     General: Skin is warm and dry  Neurological:      General: No focal deficit present  Mental Status: She is alert and oriented to person, place, and time  Mental status is at baseline  Psychiatric:         Mood and Affect: Mood normal  Affect is tearful  Behavior: Behavior normal          Thought Content:  Thought content normal          Judgment: Judgment normal        FIONA Torres

## 2022-10-11 ENCOUNTER — HOSPITAL ENCOUNTER (EMERGENCY)
Facility: HOSPITAL | Age: 47
Discharge: HOME | End: 2022-10-11
Attending: EMERGENCY MEDICINE
Payer: COMMERCIAL

## 2022-10-11 VITALS
DIASTOLIC BLOOD PRESSURE: 90 MMHG | WEIGHT: 130 LBS | BODY MASS INDEX: 21.66 KG/M2 | HEART RATE: 93 BPM | RESPIRATION RATE: 18 BRPM | OXYGEN SATURATION: 98 % | TEMPERATURE: 97.8 F | SYSTOLIC BLOOD PRESSURE: 135 MMHG | HEIGHT: 65 IN

## 2022-10-11 DIAGNOSIS — M62.838 TRAPEZIUS MUSCLE SPASM: ICD-10-CM

## 2022-10-11 DIAGNOSIS — M54.2 NECK PAIN: Primary | ICD-10-CM

## 2022-10-11 PROCEDURE — 64405 NJX AA&/STRD GR OCPL NRV: CPT | Performed by: EMERGENCY MEDICINE

## 2022-10-11 PROCEDURE — 99284 EMERGENCY DEPT VISIT MOD MDM: CPT | Performed by: EMERGENCY MEDICINE

## 2022-10-11 PROCEDURE — 99283 EMERGENCY DEPT VISIT LOW MDM: CPT

## 2022-10-11 RX ORDER — ROPIVACAINE HYDROCHLORIDE 5 MG/ML
30 INJECTION, SOLUTION EPIDURAL; INFILTRATION; PERINEURAL ONCE
Status: COMPLETED | OUTPATIENT
Start: 2022-10-11 | End: 2022-10-11

## 2022-10-11 RX ADMIN — ROPIVACAINE HYDROCHLORIDE 30 ML: 5 INJECTION EPIDURAL; INFILTRATION; PERINEURAL at 20:30

## 2022-10-12 PROBLEM — S36.116A LIVER LACERATION, GRADE III, WITHOUT OPEN WOUND INTO CAVITY: Status: RESOLVED | Noted: 2021-08-23 | Resolved: 2022-10-12

## 2022-10-12 PROBLEM — S01.81XA FACE LACERATIONS: Status: RESOLVED | Noted: 2021-08-24 | Resolved: 2022-10-12

## 2022-10-12 NOTE — DISCHARGE INSTRUCTIONS
You received Ropivacaine injections today  Consider getting medications to control your mood and pain interpretation (Buspar), sleep and muscle relaxation (Valium)

## 2022-10-12 NOTE — TELEPHONE ENCOUNTER
Caller: patient    Doctor: Bobby Douglas    Reason for call: to discuss medications    Call back#: [phone]
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
## 2022-10-13 ENCOUNTER — TELEPHONE (OUTPATIENT)
Dept: OBGYN CLINIC | Facility: CLINIC | Age: 47
End: 2022-10-13

## 2022-10-13 NOTE — TELEPHONE ENCOUNTER
It looks like the ER physician did trigger point injections in her trapezius muscles  She is scheduled for trigger point injections in her trapezius muscles as well as her cervical paraspinal muscles  He also did occipital nerve blocks  I would like to evaluate her in-person before I schedule occipital nerve blocks  Agree with other recommendations that prescription for Valium is something that needs to be discussed with her PCP as we do not prescribe this medication from a pain management standpoint

## 2022-10-13 NOTE — TELEPHONE ENCOUNTER
S/W pt  Pt stated she went to the ER and got injections from the ER  She stated the ER doctor suggested her to get valium for anxiety and sleeping  Advised her to reach out to her PCP for that  Pt asking if [facility] can give her the injections that the ER did? ER note in chart from 10/11/22  Please advise

## 2022-10-13 NOTE — TELEPHONE ENCOUNTER
S/W pt  Advised pt of the same  Scheduled pt for [facility] on 10/27 at 7:15 w/ BK  Pt verbalized understanding

## 2022-10-13 NOTE — TELEPHONE ENCOUNTER
Caller: Edis Dominguez    Doctor: [facility]    Reason for call: patient returning call from [facility]
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
## 2022-10-24 ENCOUNTER — TELEPHONE (OUTPATIENT)
Dept: PAIN MEDICINE | Facility: CLINIC | Age: 47
End: 2022-10-24

## 2022-10-24 NOTE — TELEPHONE ENCOUNTER
Attempted to reach pt   VMMLOM with CB# and [place]    Need to verify if its regarding her lumbar spine PT

## 2022-10-24 NOTE — TELEPHONE ENCOUNTER
Caller: patient    Doctor: Shivani Mccartney    Reason for call: She states [facility] physical therapy is requesting a new PT script / referral to them, so she can continue her sessions   Please fax script to fax# [phone] / kate # [phone] ext # [phone]    Call back#: [phone]

## 2022-10-25 NOTE — TELEPHONE ENCOUNTER
S/w pt's physical therapist, he said she is mechanically unresponsive to her PT and she has a positive Pedersen's sign  Pt said her primary pain is at night not during the day  Pt said she wants to continue with PT, but therapist does not see a benefit  Pt has a f/u appt on Thursday and he wanted to make us aware of his findings

## 2022-10-25 NOTE — TELEPHONE ENCOUNTER
Caller:  Ernestina Ocasio     Doctor: Dr Jocelyn Haji     Reason for call: Physical Therapy script     Call back#: [phone]

## 2022-10-25 NOTE — TELEPHONE ENCOUNTER
SHIFT CHANGE NOTE FOR [facility]    Bedside and Verbal shift change report given to Courtney Yadav RN (oncoming nurse) by Colonel Mcdermott (offgoing nurse). Report included the following information SBAR, Kardex, MAR and Recent Results. Situation:   Code Status: Full Code   Hospital Day: 14   Problem List:   Hospital Problems  Date Reviewed: 10/1/2021        Codes Class Noted POA    Essential hypertension (Chronic) ICD-10-CM: I10  ICD-9-CM: 401.9  Unknown Yes        Type 2 diabetes mellitus without complication, with long-term current use of insulin (HCC) (Chronic) ICD-10-CM: E11.9, Z79.4  ICD-9-CM: 250.00, V58.67  Unknown Yes    Overview Signed 9/20/2021 11:13 PM by Samantha Gar MD     HbA1c (9/3/2021) = 10.8             Hyperthyroidism (Chronic) ICD-10-CM: E05.90  ICD-9-CM: 242.90  Unknown Yes        Vitamin D insufficiency (Chronic) ICD-10-CM: E55.9  ICD-9-CM: 268.9  9/19/2021 Yes    Overview Signed 9/20/2021 11:11 PM by Samantha Gar MD     Vitamin D 25-Hydroxy (9/19/2021) = 24.0             Status post incision and drainage ICD-10-CM: Z98.890  ICD-9-CM: V45.89  9/9/2021 Yes    Overview Signed 9/20/2021 11:18 PM by Samantha Gar MD     S/P Incision and drainage, with decompression of multiple subfascial layers, right foot; Wound debridement, skin, soft tissue, and muscle, right foot (9/9/2021 - Dr. Pedrito Mckeon)             [id number] ruled out by laboratory testing ICD-10-CM: Z20.822  ICD-9-CM: V01.79  9/8/2021 Yes    Overview Signed 9/20/2021 11:14 PM by Samantha Gar MD     SARS-CoV-2 ([address] [place] [address]) (9/8/2021):  Not detected             History of incision and drainage ICD-10-CM: Z98.890  ICD-9-CM: V45.89  9/4/2021 Yes    Overview Signed 9/20/2021 11:18 PM by Samantha Gar MD     S/P Incision and drainage of right foot (9/4/2021 - Dr. Eliza Stanley.)             * (Principal) Diabetic infection of right foot [facility]) ICD-10-CM: E11.628, L08.9  ICD-9-CM: 250.80, 686.9  9/2/2021 Yes        Impaired Thanks  I will address at her next office visit  mobility and ADLs ICD-10-CM: Z74.09, Z78.9  ICD-9-CM: V49.89  9/2/2021 Yes              Background:   Past Medical History:   Past Medical History:   Diagnosis Date    COVID-19 ruled out by laboratory testing 9/8/2021    SARS-CoV-2 ([place]) (9/8/2021):  Not detected    Diabetic infection of right foot ([address] 9/2/2021    Essential hypertension     Hyperthyroidism     Type 2 diabetes mellitus without complication, with long-term current use of insulin ([facility])     HbA1c (9/3/2021) = 10.8    Vitamin D insufficiency 9/19/2021    Vitamin D 25-Hydroxy (9/19/2021) = 24.0        Assessment:   Changes in Assessment throughout shift: Changes documented below    Patient has a central line: no   Patient has Bal Cath: no      Last Vitals:     Vitals:    10/01/21 0756 10/01/21 1707 10/01/21 2117 10/02/21 0759   BP: 133/74 (!) 146/85 (!) 143/84 128/72   Pulse: 86 80 82 82   Resp: 16 18 18 16   Temp: 98.9 °F (37.2 °C) 98.7 °F (37.1 °C) 98.7 °F (37.1 °C) 97.7 °F (36.5 °C)   SpO2: 100% 96% 96% 100%   Weight:       Height:            PAIN    Pain Assessment    Pain Intensity 1: 0 (10/02/21 0830) Pain Intensity 1: 2 (12/29/14 1105)    Pain Location 1: Foot Pain Location 1: Abdomen    Pain Intervention(s) 1: Medication (see MAR) (premedicated for dressing change) Pain Intervention(s) 1: Medication (see MAR)  Patient Stated Pain Goal: 0 Patient Stated Pain Goal: 0  o Intervention effective: yes  o Other actions taken for pain:       Skin Assessment  Skin color Skin Color: Appropriate for ethnicity  Condition/Temperature Skin Condition/Temp: Dry, Warm  Integrity Skin Integrity: Incision (comment)  Turgor Turgor: Non-tenting  Weekly Pressure Ulcer Documentation  Pressure  Injury Documentation: No Pressure Injury Noted-Pressure Ulcer Prevention Initiated  Wound Prevention & Protection Methods  Orientation of wound Orientation of Wound Prevention: Posterior  Location of Prevention Location of Wound Prevention: Buttocks, Sacrum/Coccyx  Dressing Present Dressing Present : No  Dressing Status    Wound Offloading Wound Offloading (Prevention Methods): Bed, pressure redistribution/air, Pillows, Repositioning     INTAKE/OUPUT  Date 10/01/21 0700 - 10/02/21 0659 10/02/21 0700 - 10/03/21 0659   Shift 2723-8513 0350-6882 24 Hour Total 7368-4202 4445-2760 24 Hour Total   INTAKE   P.O. 360 240 600 360  360     P. O. 360 240 600 360  360   Shift Total(mL/kg) 360(3.8) 240(2.5) 600(6.3) 360(3.8)  360(3.8)   OUTPUT   Urine(mL/kg/hr)           Urine Occurrence(s) 3 x 3 x 6 x 1 x  1 x   Stool           Stool Occurrence(s) 0 x 0 x 0 x 0 x  0 x   Shift Total(mL/kg)          240 600 360  360   Weight (kg) 96 96 96 96 96 96       Recommendations:  1. Patient needs and requests: None at this time    2. Pending tests/procedures: routine labs     3. Functional Level/Equipment: Partial (one person) / Bed (comment); Wheelchair;Vacuum assisted closure device    Fall Precautions:   Fall risk precautions were reinforced with the patient; he was instructed to call for help prior to getting up. The following fall risk precautions were continued: bed/ chair alarms, door signage, yellow bracelet and socks as well as update of the [place] Song tool in the patient's room. Willy Score: 3    HEALS Safety Check    A safety check occurred in the patient's room between off going nurse and oncoming nurse listed above.     The safety check included the below items  Area Items   H  High Alert Medications - Verify all high alert medication drips (heparin, PCA, etc.)   E  Equipment - Suction is set up for ALL patients (with yanker)  - Red plugs utilized for all equipment (IV pumps, etc.)  - WOWs wiped down at end of shift.  - Room stocked with oxygen, suction, and other unit-specific supplies   A  Alarms - Bed alarm is set for fall risk patients  - Ensure chair alarm is in place and activated if patient is up in a chair   L  Lines - Check IV for any infiltration  - Bal bag is empty if patient has a Bal   - Tubing and IV bags are labeled   S  Safety   - Room is clean, patient is clean, and equipment is clean. - Hallways are clear from equipment besides carts. - Fall bracelet on for fall risk patients  - Ensure room is clear and free of clutter  - Suction is set up for ALL patients (with roman)  - Hallways are clear from equipment besides carts.    - Isolation precautions followed, supplies available outside room, sign posted     [facility]

## 2022-10-27 ENCOUNTER — OFFICE VISIT (OUTPATIENT)
Dept: PAIN MEDICINE | Facility: CLINIC | Age: 47
End: 2022-10-27
Payer: COMMERCIAL

## 2022-10-27 ENCOUNTER — OFFICE VISIT (OUTPATIENT)
Dept: NEPHROLOGY | Facility: CLINIC | Age: 47
End: 2022-10-27

## 2022-10-27 VITALS
WEIGHT: 127.4 LBS | HEART RATE: 91 BPM | HEIGHT: 65 IN | SYSTOLIC BLOOD PRESSURE: 108 MMHG | DIASTOLIC BLOOD PRESSURE: 72 MMHG | BODY MASS INDEX: 21.23 KG/M2

## 2022-10-27 VITALS
DIASTOLIC BLOOD PRESSURE: 72 MMHG | SYSTOLIC BLOOD PRESSURE: 108 MMHG | BODY MASS INDEX: 21.16 KG/M2 | HEIGHT: 65 IN | HEART RATE: 91 BPM | WEIGHT: 127 LBS

## 2022-10-27 DIAGNOSIS — N18.31 STAGE 3A CHRONIC KIDNEY DISEASE (HCC): ICD-10-CM

## 2022-10-27 DIAGNOSIS — M54.2 NECK PAIN: ICD-10-CM

## 2022-10-27 DIAGNOSIS — M54.50 CHRONIC BILATERAL LOW BACK PAIN WITHOUT SCIATICA: ICD-10-CM

## 2022-10-27 DIAGNOSIS — M79.18 MYOFASCIAL PAIN SYNDROME: ICD-10-CM

## 2022-10-27 DIAGNOSIS — M47.812 CERVICAL SPONDYLOSIS: ICD-10-CM

## 2022-10-27 DIAGNOSIS — N17.9 AKI (ACUTE KIDNEY INJURY) (HCC): Primary | ICD-10-CM

## 2022-10-27 DIAGNOSIS — G89.4 CHRONIC PAIN SYNDROME: Primary | ICD-10-CM

## 2022-10-27 DIAGNOSIS — Z98.1 HISTORY OF FUSION OF CERVICAL SPINE: ICD-10-CM

## 2022-10-27 DIAGNOSIS — N18.30 BENIGN HYPERTENSION WITH CKD (CHRONIC KIDNEY DISEASE) STAGE III (HCC): ICD-10-CM

## 2022-10-27 DIAGNOSIS — G89.29 CHRONIC BILATERAL LOW BACK PAIN WITHOUT SCIATICA: ICD-10-CM

## 2022-10-27 DIAGNOSIS — I12.9 BENIGN HYPERTENSION WITH CKD (CHRONIC KIDNEY DISEASE) STAGE III (HCC): ICD-10-CM

## 2022-10-27 DIAGNOSIS — I10 ESSENTIAL HYPERTENSION: ICD-10-CM

## 2022-10-27 DIAGNOSIS — M54.9 MID BACK PAIN: ICD-10-CM

## 2022-10-27 PROCEDURE — 99214 OFFICE O/P EST MOD 30 MIN: CPT | Performed by: NURSE PRACTITIONER

## 2022-10-27 RX ORDER — LISINOPRIL 10 MG/1
5 TABLET ORAL DAILY
Qty: 90 TABLET | Refills: 2 | Status: SHIPPED | OUTPATIENT
Start: 2022-10-27

## 2022-10-27 RX ORDER — DIAZEPAM 5 MG/1
5 TABLET ORAL 2 TIMES DAILY
COMMUNITY
Start: 2022-10-20

## 2022-10-27 RX ORDER — BACLOFEN 20 MG/1
20 TABLET ORAL 3 TIMES DAILY
Qty: 90 TABLET | Refills: 1 | Status: SHIPPED | OUTPATIENT
Start: 2022-10-27 | End: 2022-11-26

## 2022-10-27 RX ORDER — LIDOCAINE 50 MG/G
OINTMENT TOPICAL AS NEEDED
Qty: 35.44 G | Refills: 5 | Status: SHIPPED | OUTPATIENT
Start: 2022-10-27
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
## 2022-10-27 NOTE — ASSESSMENT & PLAN NOTE
Lab Results   Component Value Date    EGFR 49 09/03/2022    EGFR 48 10/13/2021    EGFR 35 09/20/2021    CREATININE 1 29 09/03/2022    CREATININE 1 32 (H) 10/13/2021    CREATININE 1 72 (H) 09/20/2021    she has had marked improvement in her kidney function with a drop in her creatinine to 1 29 mg/dL  I reviewed her medications for potential nephrotoxins  She has improvement from prior renal insult    She will continue to avoid NSAIDs and I will give her a list of medications to avoid   Over-the-counter

## 2022-10-31 ENCOUNTER — TELEPHONE (OUTPATIENT)
Dept: PAIN MEDICINE | Facility: CLINIC | Age: 47
End: 2022-10-31

## 2022-10-31 NOTE — TELEPHONE ENCOUNTER
Caller: Geneva Sanchez    Doctor: Harjeet    Reason for call: patient returned call from  and asked if all upcoming procedures can be scheduled for the same day    Call back#: [phone] to leave a detailed message

## 2022-11-01 ENCOUNTER — HOSPITAL ENCOUNTER (EMERGENCY)
Facility: HOSPITAL | Age: 47
Discharge: HOME | End: 2022-11-01
Attending: EMERGENCY MEDICINE

## 2022-11-01 VITALS
SYSTOLIC BLOOD PRESSURE: 123 MMHG | RESPIRATION RATE: 16 BRPM | BODY MASS INDEX: 21.66 KG/M2 | HEART RATE: 79 BPM | HEIGHT: 65 IN | WEIGHT: 130 LBS | OXYGEN SATURATION: 98 % | DIASTOLIC BLOOD PRESSURE: 81 MMHG

## 2022-11-01 DIAGNOSIS — M54.2 NECK PAIN: Primary | ICD-10-CM

## 2022-11-01 RX ORDER — OXYCODONE HYDROCHLORIDE AND ACETAMINOPHEN 5; 325 MG/1; MG/1
1-2 TABLET ORAL EVERY 4 HOURS PRN
Qty: 10 TABLET | Refills: 0 | Status: SHIPPED | OUTPATIENT
Start: 2022-11-01

## 2022-11-01 RX ORDER — METHYLPREDNISOLONE 4 MG/1
TABLET ORAL
Qty: 21 TABLET | Refills: 0 | Status: SHIPPED | OUTPATIENT
Start: 2022-11-01 | End: 2022-11-20 | Stop reason: ALTCHOICE

## 2022-11-01 RX ORDER — HYDROMORPHONE HCL/PF 1 MG/ML
1 SYRINGE (ML) INJECTION ONCE
Status: COMPLETED | OUTPATIENT
Start: 2022-11-01 | End: 2022-11-01

## 2022-11-01 RX ORDER — METHYLPREDNISOLONE SODIUM SUCCINATE 125 MG/2ML
60 INJECTION, POWDER, LYOPHILIZED, FOR SOLUTION INTRAMUSCULAR; INTRAVENOUS ONCE
Status: COMPLETED | OUTPATIENT
Start: 2022-11-01 | End: 2022-11-01

## 2022-11-01 RX ADMIN — HYDROMORPHONE HYDROCHLORIDE 1 MG: 1 INJECTION, SOLUTION INTRAMUSCULAR; INTRAVENOUS; SUBCUTANEOUS at 22:16

## 2022-11-01 RX ADMIN — METHYLPREDNISOLONE SODIUM SUCCINATE 60 MG: 125 INJECTION, POWDER, FOR SOLUTION INTRAMUSCULAR; INTRAVENOUS at 22:16

## 2022-11-02 NOTE — DISCHARGE INSTRUCTIONS
RETURN IF WORSE IN ANY WAY:   WORSENING PAIN,   CHEST PAIN OR SHORTNESS OF BREATH,   FEVER OR FLU LIKE SYMPTOMS,   OR NEW AND CONCERNING SYMPTOMS SIGNS OR SYMPTOMS      PLEASE CALL YOUR PRIMARY DOCTOR and Your SPINE SPECIALIST IN THE MORNING TO SET UP FOLLOW UP for asap   PLEASE REVIEW THE WORK UP RESULTS WITH YOUR DOCTOR      USE EXTREME CAUTION WHILE TAKING NARCOTICS FOR PAIN  ONLY TAKE FOR ACUTE PAIN  NEVER TAKE WITH OTHER SEDATIVE MEDICATIONS OR SUBSTANCES, SUCH AS SLEEPING MEDICATIONS OR ALCOHOL OR OTHER SUCH SUBSTANCES  YOU MAY NEED TO TAKE LAXATIVES WHILE TAKING THIS MEDICATION  PLEASE DISCUSS THE ABOVE WITH YOUR FAMILY DOCTOR
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
## 2022-11-20 ENCOUNTER — OFFICE VISIT (OUTPATIENT)
Dept: URGENT CARE | Facility: CLINIC | Age: 47
End: 2022-11-20

## 2022-11-20 VITALS
HEIGHT: 65 IN | SYSTOLIC BLOOD PRESSURE: 123 MMHG | OXYGEN SATURATION: 100 % | HEART RATE: 90 BPM | BODY MASS INDEX: 21.76 KG/M2 | DIASTOLIC BLOOD PRESSURE: 71 MMHG | TEMPERATURE: 97.9 F | WEIGHT: 130.6 LBS | RESPIRATION RATE: 20 BRPM

## 2022-11-20 DIAGNOSIS — J06.9 UPPER RESPIRATORY TRACT INFECTION, UNSPECIFIED TYPE: ICD-10-CM

## 2022-11-20 DIAGNOSIS — H10.31 ACUTE BACTERIAL CONJUNCTIVITIS OF RIGHT EYE: Primary | ICD-10-CM

## 2022-11-20 DIAGNOSIS — H66.001 NON-RECURRENT ACUTE SUPPURATIVE OTITIS MEDIA OF RIGHT EAR WITHOUT SPONTANEOUS RUPTURE OF TYMPANIC MEMBRANE: ICD-10-CM

## 2022-11-20 RX ORDER — TOBRAMYCIN 3 MG/ML
1 SOLUTION/ DROPS OPHTHALMIC
Qty: 5 ML | Refills: 1 | Status: SHIPPED | OUTPATIENT
Start: 2022-11-20

## 2022-11-20 RX ORDER — ARIPIPRAZOLE 10 MG/1
10 TABLET ORAL
COMMUNITY
Start: 2022-11-08

## 2022-11-20 RX ORDER — AMOXICILLIN 875 MG/1
875 TABLET, COATED ORAL 2 TIMES DAILY
Qty: 20 TABLET | Refills: 0 | Status: SHIPPED | OUTPATIENT
Start: 2022-11-20 | End: 2022-11-30

## 2022-11-20 NOTE — PATIENT INSTRUCTIONS
Take the amoxicillin as ordered until completed  This will treat your ear and the sinuses/congestion  Change your pillow case tonight and tomorrow night  Use a clean cloth each time you wipe your eye  Practice careful handwashing, especially for the first 24 hours of treatment  If the other eye becomes symptomatic, you may use the same drops the same way for that eye as well  For the first 24 hours, it is alright to use the drops every 2 hours while awake; after that resume the every 4 hours while awake frequency  Conjunctivitis   AMBULATORY CARE:   Conjunctivitis,  or pink eye, is inflammation of your conjunctiva  The conjunctiva is a thin tissue that covers the front of your eye and the back of your eyelids  The conjunctiva helps protect your eye and keep it moist  Conjunctivitis may be caused by bacteria, allergies, or a virus  If your conjunctivitis is caused by bacteria, it may get better on its own in about 7 days  Viral conjunctivitis can last up to 3 weeks  Common symptoms may include any of the following: You will usually have symptoms in both eyes if your conjunctivitis is caused by allergies  You may also have other allergic symptoms, such as a rash or runny nose  Symptoms will usually start in 1 eye if your conjunctivitis is caused by a virus or bacteria  Redness in the whites of your eye    Itching in your eye or around your eye    Feeling like there is something in your eye    Watery or thick, sticky discharge    Crusty eyelids when you wake up in the morning    Burning, stinging, or swelling in your eye    Pain when you see bright light    Seek care immediately if:   You have worsening eye pain  The swelling in your eye gets worse, even after treatment  Your vision suddenly becomes worse or you cannot see at all  Contact your healthcare provider if:   You develop a fever and ear pain  You have tiny bumps or spots of blood on your eye      You have questions or concerns about your condition or care  Treatment  will depend on the cause of your conjunctivitis  You may need antibiotics or allergy medicine as a pill, eye drop, or eye ointment  Manage your symptoms:   Apply a cool compress  Wet a washcloth with cold water and place it on your eye  This will help decrease itching and irritation  Do not wear contact lenses  They can irritate your eye  Throw away the pair you are using and ask when you can wear them again  Use a new pair of lenses when your healthcare provider says it is okay  Avoid irritants  Stay away from smoke filled areas  Shield your eyes from wind and sun  Flush your eye  You may need to flush your eye with saline to help decrease your symptoms  Ask for more information on how to flush your eye  Medicines:  Treatment depends on what is causing your conjunctivitis  You may be given any of the following: Allergy medicine  helps decrease itchy, red, swollen eyes caused by allergies  It may be given as a pill, eye drops, or nasal spray  Antibiotics  may be needed if your conjunctivitis is caused by bacteria  This medicine may be given as a pill, eye drops, or eye ointment  Take your medicine as directed  Contact your healthcare provider if you think your medicine is not helping or if you have side effects  Tell him or her if you are allergic to any medicine  Keep a list of the medicines, vitamins, and herbs you take  Include the amounts, and when and why you take them  Bring the list or the pill bottles to follow-up visits  Carry your medicine list with you in case of an emergency  Prevent the spread of conjunctivitis:   Wash your hands with soap and water often  Wash your hands before and after you touch your eyes  Also wash your hands before you prepare or eat food and after you use the bathroom or change a diaper  Avoid allergens  Try to avoid the things that cause your allergies, such as pets, dust, or grass       Avoid contact with others  Do not share towels or washcloths  Try to stay away from others as much as possible  Ask when you can return to work or school  Throw away eye makeup  The bacteria that caused your conjunctivitis can stay in eye makeup  Throw away mascara and other eye makeup  © Copyright [employer] 2022 Information is for End User's use only and may not be sold, redistributed or otherwise used for commercial purposes  All illustrations and images included in CareNotes® are the copyrighted property of [employer]  or [facility]   The above information is an  only  It is not intended as medical advice for individual conditions or treatments  Talk to your doctor, nurse or pharmacist before following any medical regimen to see if it is safe and effective for you  Ear Infection   AMBULATORY CARE:   An ear infection  is also called otitis media  Blocked or swollen eustachian tubes can cause an infection  Eustachian tubes connect the middle ear to the back of the nose and throat  They drain fluid from the middle ear  You may have a buildup of fluid in your ear  Germs build up in the fluid and infection develops  Common signs and symptoms:   Ear pain    Fever or a headache    Trouble hearing    Ringing or buzzing in your ear    Plugged ear or an ear that feels full    Dizziness    Nausea or vomiting    Seek care immediately if:   You have clear fluid coming from your ear  You have a stiff neck, headache, and a fever  Call your doctor if:   You see blood or pus draining from your ear  Your ear pain gets worse or does not go away, even after treatment  The outside of your ear is red or swollen  You are vomiting or have diarrhea  You have questions or concerns about your condition or care  Medicines: You may  need any of the following:  Acetaminophen  decreases pain and fever  It is available without a doctor's order  Ask how much to take and how often to take it  Follow directions  Read the labels of all other medicines you are using to see if they also contain acetaminophen, or ask your doctor or pharmacist  Acetaminophen can cause liver damage if not taken correctly  Do not use more than 4 grams (4,000 milligrams) total of acetaminophen in one day  NSAIDs , such as ibuprofen, help decrease swelling, pain, and fever  This medicine is available with or without a doctor's order  NSAIDs can cause stomach bleeding or kidney problems in certain people  If you take blood thinner medicine, always ask your healthcare provider if NSAIDs are safe for you  Always read the medicine label and follow directions  Ear drops  may contain medicine to decrease pain and inflammation  Antibiotics  help treat a bacterial infection  Self-care:   Apply heat  on your ear for 15 to 20 minutes, 3 to 4 times a day or as directed  You can apply heat with an electric heating pad, hot water bottle, or warm compress  Always put a cloth between your skin and the heat pack to prevent burns  Heat helps decrease pain  Apply ice  on your ear for 15 to 20 minutes, 3 to 4 times a day for 2 days or as directed  Use an ice pack, or put crushed ice in a plastic bag  Cover it with a towel before you apply it to your ear  Ice decreases swelling and pain  Prevent an ear infection:   Wash your hands often  to help prevent the spread of germs  Ask everyone in your house to wash their hands with soap and water  Ask them to wash after they use the bathroom or change a diaper  Remind them to wash before they prepare or eat food  Stay away from people who are ill  Some germs spread easily and quickly through contact  Follow up with your doctor as directed:  Write down your questions so you remember to ask them during your visits  © Copyright [employer] 2022 Information is for End User's use only and may not be sold, redistributed or otherwise used for commercial purposes   All illustrations and images included in CareNotes® are the copyrighted property of [employer]  or [employer] Mcn[employer]   The above information is an  only  It is not intended as medical advice for individual conditions or treatments  Talk to your doctor, nurse or pharmacist before following any medical regimen to see if it is safe and effective for you  Upper Respiratory Infection   AMBULATORY CARE:   An upper respiratory infection  is also called a cold  Your nose, throat, ears, and sinuses may be affected  You are more likely to get a cold in the winter  Your risk of getting a cold may be increased if you smoke cigarettes or have allergies, such as hay fever  What causes a cold? A cold is caused by a virus  Many viruses can cause a cold, and each is contagious  This means the virus can be easily spread to another person when the sick person coughs or sneezes  The virus can also be spread if you touch an object the virus is on and then touch your eyes, mouth, or nose  Cold symptoms  are usually worst for the first 3 to 5 days  You may have any of the following:  Runny or stuffy nose    Sneezing and coughing    Sore throat or hoarseness    Red, watery, and sore eyes    Fatigue (you feel more tired than usual)    Chills and fever    Headache, body aches, or sore muscles    Call your local emergency number (911 in the [address] [place] [address],[address]) if:   You have chest pain or trouble breathing  Seek care immediately if:   You have a fever over 102ºF (39ºC)  Call your doctor if:   You have a low fever  Your sore throat gets worse or you see white or yellow spots in your throat  Your symptoms get worse after 3 to 5 days or are not better in 14 days  You have a rash anywhere on your skin  You have large, tender lumps in your neck  You have thick, green, or yellow drainage from your nose  You cough up thick yellow, green, or bloody mucus  You have a bad earache      You have questions or concerns about your condition or care  Treatment:  Colds are caused by viruses and do not get better with antibiotics  Most people get better in 7 to 14 days  You may continue to cough for 2 to 3 weeks  The following may help decrease your symptoms:  Decongestants  help reduce nasal congestion and help you breathe more easily  If you take decongestant pills, they may make you feel restless or not able to sleep  Do not use decongestant sprays for more than a few days  Cough suppressants  help reduce coughing  Ask your healthcare provider which type of cough medicine is best for you  NSAIDs , such as ibuprofen, help decrease swelling, pain, and fever  NSAIDs can cause stomach bleeding or kidney problems in certain people  If you take blood thinner medicine, always ask your healthcare provider if NSAIDs are safe for you  Always read the medicine label and follow directions  Acetaminophen  decreases pain and fever  It is available without a doctor's order  Ask how much to take and how often to take it  Follow directions  Read the labels of all other medicines you are using to see if they also contain acetaminophen, or ask your doctor or pharmacist  Acetaminophen can cause liver damage if not taken correctly  Do not use more than 4 grams (4,000 milligrams) total of acetaminophen in one day  Manage a cold:   Rest as much as possible  Slowly start to do more each day  Drink more liquids as directed  Liquids will help thin and loosen mucus so you can cough it up  Liquids will also help prevent dehydration  Liquids that help prevent dehydration include water, fruit juice, and broth  Do not drink liquids that contain caffeine  Caffeine can increase your risk for dehydration  Ask your healthcare provider how much liquid to drink each day  Soothe a sore throat  Gargle with warm salt water  Make salt water by dissolving ¼ teaspoon salt in 1 cup warm water  You may also suck on hard candy or throat lozenges   You may use a sore throat spray  Use a humidifier or vaporizer  Use a cool mist humidifier or a vaporizer to increase air moisture in your home  This may make it easier for you to breathe and help decrease your cough  Use saline nasal drops as directed  These help relieve congestion  Apply petroleum-based jelly around the outside of your nostrils  This can decrease irritation from blowing your nose  Do not smoke  Nicotine and other chemicals in cigarettes and cigars can make your symptoms worse  They can also cause infections such as bronchitis or pneumonia  Ask your healthcare provider for information if you currently smoke and need help to quit  E-cigarettes or smokeless tobacco still contain nicotine  Talk to your healthcare provider before you use these products  Prevent a cold: Wash your hands often  Use soap and water every time you wash your hands  Rub your soapy hands together, lacing your fingers  Use the fingers of one hand to scrub under the nails of the other hand  Wash for at least 20 seconds  Rinse with warm, running water for several seconds  Then dry your hands  Use germ-killing gel if soap and water are not available  Do not touch your eyes or mouth without washing your hands first          Cover a sneeze or cough  Use a tissue that covers your mouth and nose  Put the used tissue in the trash right away  Use the bend of your arm if a tissue is not available  Wash your hands well with soap and water or use a hand   Do not stand close to anyone who is sneezing or coughing  Try to stay away from others while you are sick  This is especially important during the first 2 to 3 days when the virus is more easily spread  Wait until a fever, cough, or other symptoms are gone before you return to work or other regular activities  Do not share items while you are sick  This includes food, drinks, eating utensils, and dishes      Follow up with your doctor as directed:  Write down your questions so you remember to ask them during your visits  © Copyright [employer] 2022 Information is for End User's use only and may not be sold, redistributed or otherwise used for commercial purposes  All illustrations and images included in CareNotes® are the copyrighted property of [employer]  or Sohail Evans  The above information is an  only  It is not intended as medical advice for individual conditions or treatments  Talk to your doctor, nurse or pharmacist before following any medical regimen to see if it is safe and effective for you

## 2022-11-20 NOTE — LETTER
November 20, 2022     Patient: Izetta Nageotte   YOB: 1975   Date of Visit: 11/20/2022       To Whom It May Concern: It is my medical opinion that Izetta Nageotte may return to work on 11/22 or 11/23, depending on symptoms  Please excuse from work 11/21 and possibly 11/22  If you have any questions or concerns, please don't hesitate to call           Sincerely,        FIONA Nava    CC: No Recipients
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
## 2022-11-21 ENCOUNTER — APPOINTMENT (EMERGENCY)
Dept: CT IMAGING | Facility: HOSPITAL | Age: 47
End: 2022-11-21

## 2022-11-21 ENCOUNTER — HOSPITAL ENCOUNTER (EMERGENCY)
Facility: HOSPITAL | Age: 47
Discharge: HOME | End: 2022-11-21
Attending: EMERGENCY MEDICINE

## 2022-11-21 VITALS
HEART RATE: 95 BPM | BODY MASS INDEX: 21.63 KG/M2 | OXYGEN SATURATION: 98 % | TEMPERATURE: 97.9 F | DIASTOLIC BLOOD PRESSURE: 68 MMHG | SYSTOLIC BLOOD PRESSURE: 118 MMHG | WEIGHT: 130 LBS | RESPIRATION RATE: 18 BRPM

## 2022-11-21 DIAGNOSIS — S16.1XXA ACUTE STRAIN OF NECK MUSCLE, INITIAL ENCOUNTER: Primary | ICD-10-CM

## 2022-11-21 DIAGNOSIS — M62.838 MUSCLE SPASMS OF NECK: ICD-10-CM

## 2022-11-21 LAB
ANION GAP SERPL CALCULATED.3IONS-SCNC: 7 MMOL/L (ref 4–13)
BASOPHILS # BLD AUTO: 0.03 THOUSANDS/ÂΜL (ref 0–0.1)
BASOPHILS NFR BLD AUTO: 1 % (ref 0–1)
BUN SERPL-MCNC: 14 MG/DL (ref 5–25)
CALCIUM SERPL-MCNC: 9.6 MG/DL (ref 8.4–10.2)
CHLORIDE SERPL-SCNC: 101 MMOL/L (ref 96–108)
CO2 SERPL-SCNC: 30 MMOL/L (ref 21–32)
CREAT SERPL-MCNC: 1.31 MG/DL (ref 0.6–1.3)
EOSINOPHIL # BLD AUTO: 0.16 THOUSAND/ÂΜL (ref 0–0.61)
EOSINOPHIL NFR BLD AUTO: 3 % (ref 0–6)
ERYTHROCYTE [DISTWIDTH] IN BLOOD BY AUTOMATED COUNT: 11.7 % (ref 11.6–15.1)
GFR SERPL CREATININE-BSD FRML MDRD: 48 ML/MIN/1.73SQ M
GLUCOSE SERPL-MCNC: 108 MG/DL (ref 65–140)
HCT VFR BLD AUTO: 34.5 % (ref 34.8–46.1)
HGB BLD-MCNC: 11.7 G/DL (ref 11.5–15.4)
IMM GRANULOCYTES # BLD AUTO: 0.02 THOUSAND/UL (ref 0–0.2)
IMM GRANULOCYTES NFR BLD AUTO: 0 % (ref 0–2)
LYMPHOCYTES # BLD AUTO: 1.6 THOUSANDS/ÂΜL (ref 0.6–4.47)
LYMPHOCYTES NFR BLD AUTO: 28 % (ref 14–44)
MCH RBC QN AUTO: 35.1 PG (ref 26.8–34.3)
MCHC RBC AUTO-ENTMCNC: 33.9 G/DL (ref 31.4–37.4)
MCV RBC AUTO: 104 FL (ref 82–98)
MONOCYTES # BLD AUTO: 0.47 THOUSAND/ÂΜL (ref 0.17–1.22)
MONOCYTES NFR BLD AUTO: 8 % (ref 4–12)
NEUTROPHILS # BLD AUTO: 3.47 THOUSANDS/ÂΜL (ref 1.85–7.62)
NEUTS SEG NFR BLD AUTO: 60 % (ref 43–75)
NRBC BLD AUTO-RTO: 0 /100 WBCS
PLATELET # BLD AUTO: 199 THOUSANDS/UL (ref 149–390)
PMV BLD AUTO: 10.3 FL (ref 8.9–12.7)
POTASSIUM SERPL-SCNC: 3.9 MMOL/L (ref 3.5–5.3)
RBC # BLD AUTO: 3.33 MILLION/UL (ref 3.81–5.12)
SODIUM SERPL-SCNC: 138 MMOL/L (ref 135–147)
WBC # BLD AUTO: 5.75 THOUSAND/UL (ref 4.31–10.16)

## 2022-11-21 RX ORDER — PREDNISONE 20 MG/1
40 TABLET ORAL DAILY
Qty: 8 TABLET | Refills: 0 | Status: SHIPPED | OUTPATIENT
Start: 2022-11-21 | End: 2022-11-25

## 2022-11-21 RX ORDER — CYCLOBENZAPRINE HCL 10 MG
10 TABLET ORAL ONCE
Status: DISCONTINUED | OUTPATIENT
Start: 2022-11-21 | End: 2022-11-21

## 2022-11-21 RX ORDER — HYDROMORPHONE HCL/PF 1 MG/ML
1 SYRINGE (ML) INJECTION ONCE
Status: DISCONTINUED | OUTPATIENT
Start: 2022-11-21 | End: 2022-11-21

## 2022-11-21 RX ORDER — PREDNISONE 20 MG/1
40 TABLET ORAL ONCE
Status: COMPLETED | OUTPATIENT
Start: 2022-11-21 | End: 2022-11-21

## 2022-11-21 RX ORDER — HYDROMORPHONE HCL/PF 1 MG/ML
1 SYRINGE (ML) INJECTION ONCE
Status: COMPLETED | OUTPATIENT
Start: 2022-11-21 | End: 2022-11-21

## 2022-11-21 RX ORDER — OXYCODONE HYDROCHLORIDE 5 MG/1
5 TABLET ORAL ONCE
Status: COMPLETED | OUTPATIENT
Start: 2022-11-21 | End: 2022-11-21

## 2022-11-21 RX ORDER — OXYCODONE HYDROCHLORIDE 5 MG/1
5 TABLET ORAL EVERY 8 HOURS PRN
Qty: 12 TABLET | Refills: 0 | Status: SHIPPED | OUTPATIENT
Start: 2022-11-21 | End: 2022-11-25

## 2022-11-21 RX ADMIN — HYDROMORPHONE HYDROCHLORIDE 1 MG: 1 INJECTION, SOLUTION INTRAMUSCULAR; INTRAVENOUS; SUBCUTANEOUS at 17:18

## 2022-11-21 RX ADMIN — OXYCODONE HYDROCHLORIDE 5 MG: 5 TABLET ORAL at 18:45

## 2022-11-21 RX ADMIN — PREDNISONE 40 MG: 20 TABLET ORAL at 17:16

## 2022-11-21 RX ADMIN — IOHEXOL 85 ML: 350 INJECTION, SOLUTION INTRAVENOUS at 17:58
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
## 2022-12-01 ENCOUNTER — APPOINTMENT (OUTPATIENT)
Dept: RADIOLOGY | Facility: HOSPITAL | Age: 47
End: 2022-12-01

## 2022-12-01 ENCOUNTER — HOSPITAL ENCOUNTER (OUTPATIENT)
Facility: HOSPITAL | Age: 47
Setting detail: OUTPATIENT SURGERY
Discharge: HOME | End: 2022-12-01
Attending: ANESTHESIOLOGY | Admitting: ANESTHESIOLOGY

## 2022-12-01 VITALS
WEIGHT: 130 LBS | HEART RATE: 81 BPM | BODY MASS INDEX: 21.66 KG/M2 | DIASTOLIC BLOOD PRESSURE: 81 MMHG | TEMPERATURE: 97.3 F | OXYGEN SATURATION: 99 % | SYSTOLIC BLOOD PRESSURE: 134 MMHG | RESPIRATION RATE: 21 BRPM | HEIGHT: 65 IN

## 2022-12-01 DIAGNOSIS — M54.2 NECK PAIN: ICD-10-CM

## 2022-12-01 DIAGNOSIS — M79.18 MYOFASCIAL PAIN SYNDROME: ICD-10-CM

## 2022-12-01 DIAGNOSIS — M47.812 CERVICAL SPONDYLOSIS: ICD-10-CM

## 2022-12-01 RX ORDER — LIDOCAINE HYDROCHLORIDE 10 MG/ML
INJECTION, SOLUTION EPIDURAL; INFILTRATION; INTRACAUDAL; PERINEURAL AS NEEDED
Status: DISCONTINUED | OUTPATIENT
Start: 2022-12-01 | End: 2022-12-01 | Stop reason: HOSPADM

## 2022-12-01 RX ORDER — METHYLPREDNISOLONE ACETATE 40 MG/ML
INJECTION, SUSPENSION INTRA-ARTICULAR; INTRALESIONAL; INTRAMUSCULAR; SOFT TISSUE AS NEEDED
Status: DISCONTINUED | OUTPATIENT
Start: 2022-12-01 | End: 2022-12-01 | Stop reason: HOSPADM

## 2022-12-01 RX ORDER — BUPIVACAINE HYDROCHLORIDE 5 MG/ML
INJECTION, SOLUTION EPIDURAL; INTRACAUDAL AS NEEDED
Status: DISCONTINUED | OUTPATIENT
Start: 2022-12-01 | End: 2022-12-01 | Stop reason: HOSPADM

## 2022-12-01 RX ORDER — LIDOCAINE HYDROCHLORIDE 20 MG/ML
INJECTION, SOLUTION EPIDURAL; INFILTRATION; INTRACAUDAL; PERINEURAL AS NEEDED
Status: DISCONTINUED | OUTPATIENT
Start: 2022-12-01 | End: 2022-12-01 | Stop reason: HOSPADM

## 2022-12-01 NOTE — DISCHARGE INSTRUCTIONS

## 2022-12-06 ENCOUNTER — OFFICE VISIT (OUTPATIENT)
Dept: OBGYN CLINIC | Facility: CLINIC | Age: 47
End: 2022-12-06

## 2022-12-06 VITALS
HEART RATE: 67 BPM | HEIGHT: 65 IN | WEIGHT: 125 LBS | BODY MASS INDEX: 20.83 KG/M2 | DIASTOLIC BLOOD PRESSURE: 78 MMHG | SYSTOLIC BLOOD PRESSURE: 132 MMHG

## 2022-12-06 DIAGNOSIS — M77.02 MEDIAL EPICONDYLITIS OF LEFT ELBOW: Primary | ICD-10-CM

## 2022-12-06 RX ORDER — BETAMETHASONE SODIUM PHOSPHATE AND BETAMETHASONE ACETATE 3; 3 MG/ML; MG/ML
6 INJECTION, SUSPENSION INTRA-ARTICULAR; INTRALESIONAL; INTRAMUSCULAR; SOFT TISSUE
Status: COMPLETED | OUTPATIENT
Start: 2022-12-06 | End: 2022-12-06

## 2022-12-06 RX ORDER — BUPIVACAINE HYDROCHLORIDE 2.5 MG/ML
1 INJECTION, SOLUTION INFILTRATION; PERINEURAL
Status: COMPLETED | OUTPATIENT
Start: 2022-12-06 | End: 2022-12-06

## 2022-12-06 RX ADMIN — BETAMETHASONE SODIUM PHOSPHATE AND BETAMETHASONE ACETATE 6 MG: 3; 3 INJECTION, SUSPENSION INTRA-ARTICULAR; INTRALESIONAL; INTRAMUSCULAR; SOFT TISSUE at 08:46

## 2022-12-06 RX ADMIN — BUPIVACAINE HYDROCHLORIDE 1 ML: 2.5 INJECTION, SOLUTION INFILTRATION; PERINEURAL at 08:46
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
## 2022-12-09 ENCOUNTER — OFFICE VISIT (OUTPATIENT)
Dept: PAIN MEDICINE | Facility: CLINIC | Age: 47
End: 2022-12-09

## 2022-12-09 VITALS
BODY MASS INDEX: 20.36 KG/M2 | DIASTOLIC BLOOD PRESSURE: 77 MMHG | HEIGHT: 65 IN | HEART RATE: 92 BPM | SYSTOLIC BLOOD PRESSURE: 132 MMHG | WEIGHT: 122.2 LBS

## 2022-12-09 DIAGNOSIS — M54.50 CHRONIC BILATERAL LOW BACK PAIN WITHOUT SCIATICA: ICD-10-CM

## 2022-12-09 DIAGNOSIS — M47.812 CERVICAL SPONDYLOSIS: ICD-10-CM

## 2022-12-09 DIAGNOSIS — M79.18 MYOFASCIAL PAIN SYNDROME: ICD-10-CM

## 2022-12-09 DIAGNOSIS — G89.29 CHRONIC BILATERAL LOW BACK PAIN WITHOUT SCIATICA: ICD-10-CM

## 2022-12-09 DIAGNOSIS — G89.4 CHRONIC PAIN SYNDROME: Primary | ICD-10-CM

## 2022-12-09 DIAGNOSIS — M54.2 NECK PAIN: ICD-10-CM

## 2022-12-09 DIAGNOSIS — M54.9 MID BACK PAIN: ICD-10-CM

## 2022-12-09 RX ORDER — BACLOFEN 20 MG/1
20 TABLET ORAL 3 TIMES DAILY
Qty: 90 TABLET | Refills: 1 | Status: SHIPPED | OUTPATIENT
Start: 2022-12-09 | End: 2023-01-08

## 2022-12-09 RX ORDER — GABAPENTIN 800 MG/1
800 TABLET ORAL 3 TIMES DAILY
Qty: 90 TABLET | Refills: 3 | Status: SHIPPED | OUTPATIENT
Start: 2022-12-09
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
## 2022-12-09 NOTE — OP NOTE
OPERATIVE REPORT  PATIENT NAME: Ritesh Goodman    :  1975  MRN: [id number]  Pt Location: [facility] ROOM 01    SURGERY DATE: 2022    Surgeon(s) and Role:     * Jaswant Sims MD - Primary    Preop Diagnosis:  Chronic pain syndrome [G89 4]  Neck pain [M54 2]  Cervical spondylosis [M47 812]  Myofascial pain syndrome [M79 18]    Post-Op Diagnosis Codes:     * Chronic pain syndrome [G89 4]     * Neck pain [M54 2]     * Cervical spondylosis [M47 812]     * Myofascial pain syndrome [M79 18]    Procedure(s) (LRB):  BLOCK MEDIAL BRANCH  left C2-3 and C3-4 (Left)    Specimen(s):  * No specimens in log *    Estimated Blood Loss:   Minimal    Drains:  * No LDAs found *    Anesthesia Type:   Local    Operative Indications:  Chronic pain syndrome [G89 4]  Neck pain [M54 2]  Cervical spondylosis [M47 812]  Myofascial pain syndrome [M79 18]      Operative Findings:  same    Complications:   None    Procedure and Technique:  Fluoroscopically-guided Medial Branch Nerve Blocks of the left C2-C3 and C3-C4 facet joint(s) using 0 5% bupivacaine      After discussing the risks, benefits, and alternatives to the procedure, the patient expressed understanding and wished to proceed  The patient was brought to the fluoroscopy suite and placed in the prone position  Procedural pause conducted to verify:  correct patient identity, procedure to be performed and as applicable, correct side and site, correct patient position, and availability of implants, special equipment and special requirements  Using fluoroscopy, the mid-body of the articular pillar at the left C2, C3, C4 levels were identified  The skin was sterilely prepped and draped in the usual fashion using Chloraprep skin prep  Using fluoroscopic guidance, a 3 5 inch 25 gauge spinal needle was advanced to each target  After negative aspiration, 0 5cc of 0 5% bupivacaine was injected at each site and the needles were then removed   The patient tolerated the procedure well and there were no apparent complications  After appropriate observation, the patient was dismissed from the clinic in good condition under their own power  The patient was instructed to keep a pain diary and report the results at her follow up appointment                 I was present for the entire procedure    Patient Disposition:  hemodynamically stable        SIGNATURE: Rupal Quiroz MD  DATE: December 9, 2022  TIME: 9:14 AM

## 2022-12-14 ENCOUNTER — TELEPHONE (OUTPATIENT)
Dept: PAIN MEDICINE | Facility: CLINIC | Age: 47
End: 2022-12-14

## 2022-12-14 NOTE — TELEPHONE ENCOUNTER
Caller: Genie Carr ( PT)    Doctor: Lucho JAIMES    Reason for call: Schedule a procedure     Call back#: [phone]

## 2022-12-19 ENCOUNTER — HOSPITAL ENCOUNTER (EMERGENCY)
Facility: HOSPITAL | Age: 47
Discharge: HOME | End: 2022-12-19
Attending: INTERNAL MEDICINE

## 2022-12-19 VITALS
BODY MASS INDEX: 21.63 KG/M2 | DIASTOLIC BLOOD PRESSURE: 59 MMHG | OXYGEN SATURATION: 95 % | SYSTOLIC BLOOD PRESSURE: 105 MMHG | RESPIRATION RATE: 18 BRPM | TEMPERATURE: 98.4 F | WEIGHT: 130 LBS | HEART RATE: 89 BPM

## 2022-12-19 DIAGNOSIS — M62.838 TRAPEZIUS MUSCLE SPASM: ICD-10-CM

## 2022-12-19 DIAGNOSIS — M62.838 MUSCLE SPASM: ICD-10-CM

## 2022-12-19 DIAGNOSIS — M54.2 NECK PAIN: Primary | ICD-10-CM

## 2022-12-19 RX ORDER — OXYCODONE HYDROCHLORIDE AND ACETAMINOPHEN 5; 325 MG/1; MG/1
1 TABLET ORAL ONCE
Status: COMPLETED | OUTPATIENT
Start: 2022-12-19 | End: 2022-12-19

## 2022-12-19 RX ORDER — PREDNISONE 20 MG/1
20 TABLET ORAL DAILY
Qty: 15 TABLET | Refills: 0 | Status: SHIPPED | OUTPATIENT
Start: 2022-12-19

## 2022-12-19 RX ORDER — OXYCODONE HYDROCHLORIDE AND ACETAMINOPHEN 5; 325 MG/1; MG/1
1 TABLET ORAL EVERY 4 HOURS PRN
Qty: 12 TABLET | Refills: 0 | OUTPATIENT
Start: 2022-12-19 | End: 2022-12-26

## 2022-12-19 RX ADMIN — OXYCODONE HYDROCHLORIDE AND ACETAMINOPHEN 1 TABLET: 5; 325 TABLET ORAL at 21:29

## 2022-12-19 RX ADMIN — MORPHINE SULFATE 2 MG: 2 INJECTION, SOLUTION INTRAMUSCULAR; INTRAVENOUS at 22:45

## 2022-12-20 NOTE — TELEPHONE ENCOUNTER
Caller: Felisa Vazquez    Doctor: Dr Jennifer Garcia    Reason for call: Patient was giving Percocet 5-325 MG per tablet  In the ER and is asking due to prior auth and ED doctor not being in if generic can be giving by Dr Jennifer Garcia  Please advise  Patient ask for detailed message to be left if she does not answer          Call back#: [phone]

## 2022-12-20 NOTE — TELEPHONE ENCOUNTER
Caller: Erlinda Awad ( PT)    Doctor: Dr Cayden Rojas     Reason for call: Schedule a procedure     Call back#: [phone]

## 2022-12-20 NOTE — TELEPHONE ENCOUNTER
Caller: Aylin Mays    Doctor: Dr Ned Estrada    Reason for call: Patient returning call states she is at work if she can receive a call tomorrow morning before 8am     Call back#: [phone]
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
## 2022-12-21 NOTE — TELEPHONE ENCOUNTER
Caller: Ericka      Doctor/Office: Dr Kareen Smith      Call regarding :  Schedule a procedure       Call back # [phone] until 1:30 pm

## 2022-12-21 NOTE — TELEPHONE ENCOUNTER
Caller: Ericka     Doctor/Office: Dr Malini Gonzales     Call regarding :  Schedule a procedure      Call back # [phone] in the next 5 minutes

## 2022-12-26 ENCOUNTER — HOSPITAL ENCOUNTER (EMERGENCY)
Facility: HOSPITAL | Age: 47
Discharge: HOME | End: 2022-12-26
Attending: EMERGENCY MEDICINE

## 2022-12-26 VITALS
BODY MASS INDEX: 21.66 KG/M2 | RESPIRATION RATE: 16 BRPM | HEIGHT: 65 IN | TEMPERATURE: 98.4 F | WEIGHT: 130 LBS | HEART RATE: 90 BPM | DIASTOLIC BLOOD PRESSURE: 72 MMHG | SYSTOLIC BLOOD PRESSURE: 108 MMHG | OXYGEN SATURATION: 97 %

## 2022-12-26 DIAGNOSIS — M43.6 TORTICOLLIS: Primary | ICD-10-CM

## 2022-12-26 RX ORDER — OXYCODONE HYDROCHLORIDE 5 MG/1
5 TABLET ORAL EVERY 6 HOURS PRN
Qty: 5 TABLET | Refills: 0 | Status: SHIPPED | OUTPATIENT
Start: 2022-12-26 | End: 2022-12-31

## 2022-12-26 RX ORDER — PREDNISONE 20 MG/1
20 TABLET ORAL 2 TIMES DAILY WITH MEALS
Qty: 8 TABLET | Refills: 0 | Status: SHIPPED | OUTPATIENT
Start: 2022-12-26 | End: 2022-12-30

## 2022-12-26 RX ORDER — PREDNISONE 20 MG/1
40 TABLET ORAL ONCE
Status: COMPLETED | OUTPATIENT
Start: 2022-12-26 | End: 2022-12-26

## 2022-12-26 RX ORDER — HYDROMORPHONE HCL/PF 1 MG/ML
1 SYRINGE (ML) INJECTION ONCE
Status: COMPLETED | OUTPATIENT
Start: 2022-12-26 | End: 2022-12-26

## 2022-12-26 RX ADMIN — HYDROMORPHONE HYDROCHLORIDE 1 MG: 1 INJECTION, SOLUTION INTRAMUSCULAR; INTRAVENOUS; SUBCUTANEOUS at 15:06

## 2022-12-26 RX ADMIN — PREDNISONE 40 MG: 20 TABLET ORAL at 15:05
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
## 2022-12-26 NOTE — DISCHARGE INSTRUCTIONS
Please follow up with your pain management doctor for further management of your chronic pain  Please continue taking tylenol and motrin as needed for pain control, reserve narcotics for severe pain  If you develop any significant changes or worsening condition, please return to the emergency department for re-evaluation

## 2022-12-29 ENCOUNTER — HOSPITAL ENCOUNTER (EMERGENCY)
Facility: HOSPITAL | Age: 47
Discharge: HOME | End: 2022-12-29
Attending: INTERNAL MEDICINE

## 2022-12-29 VITALS
WEIGHT: 130 LBS | HEART RATE: 89 BPM | TEMPERATURE: 98.9 F | OXYGEN SATURATION: 98 % | DIASTOLIC BLOOD PRESSURE: 80 MMHG | RESPIRATION RATE: 16 BRPM | SYSTOLIC BLOOD PRESSURE: 123 MMHG | BODY MASS INDEX: 21.63 KG/M2

## 2022-12-29 DIAGNOSIS — M62.838 MUSCLE SPASMS OF NECK: Primary | ICD-10-CM

## 2022-12-29 DIAGNOSIS — M43.6 TORTICOLLIS: ICD-10-CM

## 2022-12-29 RX ORDER — HYDROMORPHONE HCL/PF 1 MG/ML
0.5 SYRINGE (ML) INJECTION ONCE
Status: COMPLETED | OUTPATIENT
Start: 2022-12-29 | End: 2022-12-29

## 2022-12-29 RX ORDER — OXYCODONE HYDROCHLORIDE AND ACETAMINOPHEN 5; 325 MG/1; MG/1
1 TABLET ORAL EVERY 4 HOURS PRN
Qty: 12 TABLET | Refills: 0 | Status: SHIPPED | OUTPATIENT
Start: 2022-12-29 | End: 2023-01-08

## 2022-12-29 RX ADMIN — HYDROMORPHONE HYDROCHLORIDE 0.5 MG: 1 INJECTION, SOLUTION INTRAMUSCULAR; INTRAVENOUS; SUBCUTANEOUS at 19:39

## 2022-12-30 ENCOUNTER — OFFICE VISIT (OUTPATIENT)
Dept: FAMILY MEDICINE CLINIC | Facility: CLINIC | Age: 47
End: 2022-12-30

## 2022-12-30 ENCOUNTER — TELEPHONE (OUTPATIENT)
Dept: FAMILY MEDICINE CLINIC | Facility: CLINIC | Age: 47
End: 2022-12-30

## 2022-12-30 VITALS
TEMPERATURE: 96.8 F | SYSTOLIC BLOOD PRESSURE: 118 MMHG | BODY MASS INDEX: 21.16 KG/M2 | HEIGHT: 65 IN | OXYGEN SATURATION: 100 % | HEART RATE: 94 BPM | WEIGHT: 127 LBS | DIASTOLIC BLOOD PRESSURE: 70 MMHG

## 2022-12-30 DIAGNOSIS — M54.2 NECK PAIN: Primary | ICD-10-CM

## 2022-12-30 NOTE — PROGRESS NOTES
Name: Adilene Erwin      : 1975      MRN: [id number]  Encounter Provider: FIONA Chacon  Encounter Date: 2022   Encounter department: [address] [place] [address]  Neck pain         Subjective      Patient presents to discuss FMLA paperwork regarding chornic neck pain  Patient is being seen by pain management for this but they states they would not fill this out to return to PCP to have this completed  Patient has been following with them routinely  She states that she is scheduled for injections on the right side of the neck in February and is hopeful that this will help her pain  She is currently taking her medications as prescribed but states the Robaxin only helps her back it does not help her neck  She is asking for stronger pain medication at this time  Will not prescribe any type of narcotic pain medication as it is not indicated for chronic pain and patient verbalized understanding  She should continue follow-up with pain management for this issue  She states that it is affecting her work as that she has to take frequent breaks to look away from her computer or get up and walk away so that she can do stretches for her neck to help with her range of motion  Otherwise she is functioning at her job  She does have to take several days a month to go to appointments or to take days when her pain is so severe  FMLA paperwork completed accordingly  No other complaints or concerns for today  Review of Systems   Constitutional: Negative for activity change, fatigue, fever and unexpected weight change  Musculoskeletal: Positive for neck pain  Negative for arthralgias, gait problem, joint swelling and neck stiffness         Current Outpatient Medications on File Prior to Visit   Medication Sig   • ARIPiprazole (ABILIFY) 10 mg tablet Take 10 mg by mouth daily at bedtime   • baclofen 20 mg tablet Take 1 tablet (20 mg total) by mouth 3 (three) times a day   • budesonide (ENTOCORT EC) 3 MG capsule TAKE 3 CAPSULES BY MOUTH ONCE DAILY FOR 1 MONTH THEN 2 CAPSULES EVERY DAY   • buPROPion (WELLBUTRIN XL) 150 mg 24 hr tablet    • buPROPion (WELLBUTRIN XL) 300 mg 24 hr tablet Take 300 mg by mouth daily    • desvenlafaxine (PRISTIQ) 100 mg 24 hr tablet Take 100 mg by mouth daily at bedtime   • diazepam (VALIUM) 5 mg tablet Take 5 mg by mouth 2 (two) times a day   • dicyclomine (BENTYL) 20 mg tablet Take 1 tablet by mouth twice daily   • famotidine (PEPCID) 20 mg tablet Take 1 tablet (20 mg total) by mouth 2 (two) times a day   • gabapentin (NEURONTIN) 800 mg tablet Take 1 tablet (800 mg total) by mouth 3 (three) times a day   • hydrOXYzine HCL (ATARAX) 25 mg tablet Take 1 tablet (25 mg total) by mouth every 6 (six) hours as needed for anxiety   • lansoprazole (PREVACID) 30 mg capsule Take 30 mg by mouth 2 (two) times a day   • levonorgestrel (MIRENA) 20 MCG/24HR IUD 1 each by Intrauterine route once   • levothyroxine 50 mcg tablet Take 1 tablet (50 mcg total) by mouth daily   • lidocaine (XYLOCAINE) 5 % ointment Apply topically as needed for mild pain   • lisinopril (ZESTRIL) 10 mg tablet Take 0 5 tablets (5 mg total) by mouth daily   • oxyCODONE (Roxicodone) 5 immediate release tablet Take 1 tablet (5 mg total) by mouth every 6 (six) hours as needed for severe pain for up to 5 days Max Daily Amount: 20 mg   • oxyCODONE-acetaminophen (Percocet) 5-325 mg per tablet Take 1 tablet by mouth every 4 (four) hours as needed for moderate pain for up to 10 days Max Daily Amount: 6 tablets   • predniSONE 20 mg tablet Take 1 tablet (20 mg total) by mouth daily   • predniSONE 20 mg tablet Take 1 tablet (20 mg total) by mouth 2 (two) times a day with meals for 4 days   • Probiotic Product (VSL#3) CAPS    • zolpidem (AMBIEN) 10 mg tablet Take 1 tablet (10 mg total) by mouth daily at bedtime as needed for sleep for up to 10 days   • tobramycin (TOBREX) 0 3 % SOLN Administer 1 drop to the right eye every 4 (four) hours while awake (Patient not taking: Reported on 12/19/2022)       Objective     /70   Pulse 94   Temp (!) 96 8 °F (36 °C)   Ht 5' 5" (1 651 m)   Wt 57 6 kg (127 lb)   LMP  (LMP Unknown)   SpO2 100%   BMI 21 13 kg/m²     Physical Exam  Constitutional:       General: She is not in acute distress  Appearance: Normal appearance  She is not ill-appearing  Cardiovascular:      Rate and Rhythm: Normal rate and regular rhythm  Pulses: Normal pulses  Heart sounds: Normal heart sounds  No murmur heard  No friction rub  Pulmonary:      Effort: Pulmonary effort is normal  No respiratory distress  Breath sounds: Normal breath sounds  No wheezing  Chest:      Chest wall: No tenderness  Musculoskeletal:         General: No signs of injury  Cervical back: Tenderness present  No swelling or deformity  Pain with movement present  Decreased range of motion  Right lower leg: No edema  Left lower leg: No edema  Skin:     General: Skin is warm and dry  Neurological:      General: No focal deficit present  Mental Status: She is alert and oriented to person, place, and time  Mental status is at baseline  Psychiatric:         Mood and Affect: Mood normal          Behavior: Behavior normal          Thought Content:  Thought content normal          Judgment: Judgment normal        FIONA Garza

## 2022-12-30 NOTE — TELEPHONE ENCOUNTER
Patient called in stating that she caught a flat tire but got it fixed and will be a little late to her appointment
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
## 2023-01-11 ENCOUNTER — TELEPHONE (OUTPATIENT)
Dept: PAIN MEDICINE | Facility: CLINIC | Age: 48
End: 2023-01-11

## 2023-01-11 ENCOUNTER — TELEPHONE (OUTPATIENT)
Dept: OBGYN CLINIC | Facility: HOSPITAL | Age: 48
End: 2023-01-11

## 2023-01-11 NOTE — TELEPHONE ENCOUNTER
Caller: Cheli Guadalupe    Doctor: Harjeet    Reason for call: patient called stating 1010 pain level in the right side of her neck and wants to know if she can have procedure in that area    Call back#: [phone]

## 2023-01-11 NOTE — TELEPHONE ENCOUNTER
S/W pt  Pt stated she has bad pain in her right side of her neck  She has been to the ER a few times  She is asking for a procedure for her right side of the her neck  She is getting procedures done on the left side now  Please advise

## 2023-01-11 NOTE — TELEPHONE ENCOUNTER
With regards to the medial branch blocks and radiofrequency ablations, we can only work on 1 side at a time  She should be scheduled for a second medial branch block sometime in the near future  If that provides her with good, temporary relief we will proceed with the radiofrequency ablation  After that, we can start to address the left side

## 2023-01-11 NOTE — TELEPHONE ENCOUNTER
Caller: Ericka     Doctor: Christine Mason    Reason for call: patient is at work unable to  phone returning nurse phone call wants to hold for nurse     I read her message verbatim, "but the pain is on the Right Side is there something else they can do for right side or give her something" please advise       Call back#: [phone]

## 2023-01-11 NOTE — TELEPHONE ENCOUNTER
Caller: Bibiana Ruiz  Doctor: Isi Patricia    Reason for call: Requested copy of bill for 12/6 visit   Transferred to billing    Call back#: [phone]

## 2023-01-11 NOTE — TELEPHONE ENCOUNTER
S/W pt   Understands she will complete her #2 MFF for one side then move forward to the other however the opain in her right side is bad and is asking what she can do at this time to get right side neck pain relief    Please advise and pt would like a detailed message left on her VM

## 2023-01-12 NOTE — TELEPHONE ENCOUNTER
Patient is already taking a high dose of baclofen and gabapentin  She can take over-the-counter Tylenol, not to exceed more than 3000 mg a day

## 2023-01-25 ENCOUNTER — APPOINTMENT (OUTPATIENT)
Dept: MRI IMAGING | Facility: HOSPITAL | Age: 48
End: 2023-01-25

## 2023-01-25 ENCOUNTER — APPOINTMENT (EMERGENCY)
Dept: CT IMAGING | Facility: HOSPITAL | Age: 48
End: 2023-01-25

## 2023-01-25 ENCOUNTER — HOSPITAL ENCOUNTER (OUTPATIENT)
Facility: HOSPITAL | Age: 48
Setting detail: OBSERVATION
Discharge: HOME | End: 2023-01-26
Attending: INTERNAL MEDICINE | Admitting: INTERNAL MEDICINE

## 2023-01-25 DIAGNOSIS — M54.2 NECK PAIN: ICD-10-CM

## 2023-01-25 DIAGNOSIS — M47.812 CERVICAL SPONDYLOSIS: ICD-10-CM

## 2023-01-25 DIAGNOSIS — M62.838 MUSCLE SPASM: ICD-10-CM

## 2023-01-25 DIAGNOSIS — R53.1 RIGHT SIDED WEAKNESS: Primary | ICD-10-CM

## 2023-01-25 DIAGNOSIS — Z98.1 HISTORY OF FUSION OF CERVICAL SPINE: ICD-10-CM

## 2023-01-25 PROBLEM — S22.41XA CLOSED FRACTURE OF MULTIPLE RIBS OF RIGHT SIDE: Status: RESOLVED | Noted: 2021-08-23 | Resolved: 2023-01-25

## 2023-01-25 PROBLEM — N17.9 AKI (ACUTE KIDNEY INJURY) (HCC): Status: RESOLVED | Noted: 2020-02-03 | Resolved: 2023-01-25

## 2023-01-25 PROBLEM — N39.0 UTI (URINARY TRACT INFECTION): Status: RESOLVED | Noted: 2017-05-06 | Resolved: 2023-01-25

## 2023-01-25 PROBLEM — Z00.00 ANNUAL PHYSICAL EXAM: Status: RESOLVED | Noted: 2021-03-19 | Resolved: 2023-01-25

## 2023-01-25 PROBLEM — S22.32XD CLOSED FRACTURE OF ONE RIB OF LEFT SIDE WITH ROUTINE HEALING: Status: RESOLVED | Noted: 2020-03-09 | Resolved: 2023-01-25

## 2023-01-25 PROBLEM — R41.0 DELIRIUM: Status: RESOLVED | Noted: 2020-02-03 | Resolved: 2023-01-25

## 2023-01-25 PROBLEM — R35.0 INCREASED FREQUENCY OF URINATION: Status: RESOLVED | Noted: 2017-05-06 | Resolved: 2023-01-25

## 2023-01-25 LAB
ANION GAP SERPL CALCULATED.3IONS-SCNC: 9 MMOL/L (ref 4–13)
APTT PPP: 29 SECONDS (ref 23–37)
BUN SERPL-MCNC: 16 MG/DL (ref 5–25)
CALCIUM SERPL-MCNC: 9.8 MG/DL (ref 8.4–10.2)
CARDIAC TROPONIN I PNL SERPL HS: <2 NG/L
CARDIAC TROPONIN I PNL SERPL HS: <2 NG/L
CHLORIDE SERPL-SCNC: 102 MMOL/L (ref 96–108)
CO2 SERPL-SCNC: 27 MMOL/L (ref 21–32)
CREAT SERPL-MCNC: 1.34 MG/DL (ref 0.6–1.3)
ERYTHROCYTE [DISTWIDTH] IN BLOOD BY AUTOMATED COUNT: 11.6 % (ref 11.6–15.1)
FLUAV RNA RESP QL NAA+PROBE: NEGATIVE
FLUBV RNA RESP QL NAA+PROBE: NEGATIVE
GFR SERPL CREATININE-BSD FRML MDRD: 46 ML/MIN/1.73SQ M
GLUCOSE SERPL-MCNC: 155 MG/DL (ref 65–140)
GLUCOSE SERPL-MCNC: 85 MG/DL (ref 65–140)
HCT VFR BLD AUTO: 35.2 % (ref 34.8–46.1)
HGB BLD-MCNC: 12.9 G/DL (ref 11.5–15.4)
INR PPP: 0.96 (ref 0.84–1.19)
MCH RBC QN AUTO: 37.3 PG (ref 26.8–34.3)
MCHC RBC AUTO-ENTMCNC: 36.6 G/DL (ref 31.4–37.4)
MCV RBC AUTO: 102 FL (ref 82–98)
PLATELET # BLD AUTO: 249 THOUSANDS/UL (ref 149–390)
PMV BLD AUTO: 11.3 FL (ref 8.9–12.7)
POTASSIUM SERPL-SCNC: 3.7 MMOL/L (ref 3.5–5.3)
PROTHROMBIN TIME: 12.8 SECONDS (ref 11.6–14.5)
RBC # BLD AUTO: 3.46 MILLION/UL (ref 3.81–5.12)
RSV RNA RESP QL NAA+PROBE: NEGATIVE
SARS-COV-2 RNA RESP QL NAA+PROBE: NEGATIVE
SODIUM SERPL-SCNC: 138 MMOL/L (ref 135–147)
WBC # BLD AUTO: 5.39 THOUSAND/UL (ref 4.31–10.16)

## 2023-01-25 RX ORDER — MAGNESIUM SULFATE 1 G/100ML
1 INJECTION INTRAVENOUS ONCE
Status: COMPLETED | OUTPATIENT
Start: 2023-01-25 | End: 2023-01-25

## 2023-01-25 RX ORDER — DICYCLOMINE HCL 20 MG
20 TABLET ORAL 2 TIMES DAILY
Status: DISCONTINUED | OUTPATIENT
Start: 2023-01-25 | End: 2023-01-26 | Stop reason: HOSPADM

## 2023-01-25 RX ORDER — ONDANSETRON 2 MG/ML
4 INJECTION INTRAMUSCULAR; INTRAVENOUS EVERY 6 HOURS PRN
Status: DISCONTINUED | OUTPATIENT
Start: 2023-01-25 | End: 2023-01-26 | Stop reason: HOSPADM

## 2023-01-25 RX ORDER — PANTOPRAZOLE SODIUM 40 MG/1
40 TABLET, DELAYED RELEASE ORAL
Status: DISCONTINUED | OUTPATIENT
Start: 2023-01-26 | End: 2023-01-26 | Stop reason: HOSPADM

## 2023-01-25 RX ORDER — BUPROPION HYDROCHLORIDE 150 MG/1
150 TABLET ORAL DAILY
Status: DISCONTINUED | OUTPATIENT
Start: 2023-01-26 | End: 2023-01-26 | Stop reason: HOSPADM

## 2023-01-25 RX ORDER — ASPIRIN 81 MG/1
81 TABLET, CHEWABLE ORAL DAILY
Status: DISCONTINUED | OUTPATIENT
Start: 2023-01-26 | End: 2023-01-26 | Stop reason: HOSPADM

## 2023-01-25 RX ORDER — SODIUM CHLORIDE 9 MG/ML
125 INJECTION, SOLUTION INTRAVENOUS CONTINUOUS
Status: DISCONTINUED | OUTPATIENT
Start: 2023-01-25 | End: 2023-01-26

## 2023-01-25 RX ORDER — METHYLPREDNISOLONE SODIUM SUCCINATE 125 MG/2ML
80 INJECTION, POWDER, LYOPHILIZED, FOR SOLUTION INTRAMUSCULAR; INTRAVENOUS ONCE
Status: COMPLETED | OUTPATIENT
Start: 2023-01-25 | End: 2023-01-25

## 2023-01-25 RX ORDER — HEPARIN SODIUM 5000 [USP'U]/ML
5000 INJECTION, SOLUTION INTRAVENOUS; SUBCUTANEOUS EVERY 8 HOURS SCHEDULED
Status: DISCONTINUED | OUTPATIENT
Start: 2023-01-25 | End: 2023-01-26 | Stop reason: HOSPADM

## 2023-01-25 RX ORDER — BACLOFEN 10 MG/1
20 TABLET ORAL 3 TIMES DAILY
Status: DISCONTINUED | OUTPATIENT
Start: 2023-01-25 | End: 2023-01-25

## 2023-01-25 RX ORDER — GABAPENTIN 800 MG/1
800 TABLET ORAL 3 TIMES DAILY
Status: DISCONTINUED | OUTPATIENT
Start: 2023-01-25 | End: 2023-01-25

## 2023-01-25 RX ORDER — LEVOTHYROXINE SODIUM 0.05 MG/1
50 TABLET ORAL DAILY
Status: DISCONTINUED | OUTPATIENT
Start: 2023-01-26 | End: 2023-01-26 | Stop reason: HOSPADM

## 2023-01-25 RX ORDER — BUPROPION HYDROCHLORIDE 150 MG/1
450 TABLET ORAL DAILY
Status: DISCONTINUED | OUTPATIENT
Start: 2023-01-26 | End: 2023-01-25

## 2023-01-25 RX ORDER — LIDOCAINE 40 MG/G
CREAM TOPICAL DAILY PRN
Status: DISCONTINUED | OUTPATIENT
Start: 2023-01-25 | End: 2023-01-26 | Stop reason: HOSPADM

## 2023-01-25 RX ORDER — HYDROXYZINE HYDROCHLORIDE 25 MG/1
25 TABLET, FILM COATED ORAL EVERY 6 HOURS PRN
Status: DISCONTINUED | OUTPATIENT
Start: 2023-01-25 | End: 2023-01-26 | Stop reason: HOSPADM

## 2023-01-25 RX ORDER — DESVENLAFAXINE 50 MG/1
50 TABLET, EXTENDED RELEASE ORAL
Status: DISCONTINUED | OUTPATIENT
Start: 2023-01-25 | End: 2023-01-26 | Stop reason: HOSPADM

## 2023-01-25 RX ORDER — ASPIRIN 325 MG
325 TABLET ORAL ONCE
Status: COMPLETED | OUTPATIENT
Start: 2023-01-25 | End: 2023-01-25

## 2023-01-25 RX ORDER — DESVENLAFAXINE 50 MG/1
100 TABLET, EXTENDED RELEASE ORAL
Status: DISCONTINUED | OUTPATIENT
Start: 2023-01-25 | End: 2023-01-25

## 2023-01-25 RX ORDER — GABAPENTIN 600 MG/1
300 TABLET ORAL 3 TIMES DAILY
Status: DISCONTINUED | OUTPATIENT
Start: 2023-01-25 | End: 2023-01-26 | Stop reason: HOSPADM

## 2023-01-25 RX ORDER — ACETAMINOPHEN 325 MG/1
650 TABLET ORAL EVERY 4 HOURS PRN
Status: DISCONTINUED | OUTPATIENT
Start: 2023-01-25 | End: 2023-01-26 | Stop reason: HOSPADM

## 2023-01-25 RX ORDER — HYDROCODONE BITARTRATE AND ACETAMINOPHEN 5; 325 MG/1; MG/1
1 TABLET ORAL EVERY 6 HOURS PRN
Status: DISCONTINUED | OUTPATIENT
Start: 2023-01-25 | End: 2023-01-26 | Stop reason: HOSPADM

## 2023-01-25 RX ORDER — ARIPIPRAZOLE 10 MG/1
10 TABLET ORAL
Status: DISCONTINUED | OUTPATIENT
Start: 2023-01-25 | End: 2023-01-26 | Stop reason: HOSPADM

## 2023-01-25 RX ORDER — LISINOPRIL 5 MG/1
5 TABLET ORAL DAILY
Status: DISCONTINUED | OUTPATIENT
Start: 2023-01-26 | End: 2023-01-26 | Stop reason: HOSPADM

## 2023-01-25 RX ORDER — ATORVASTATIN CALCIUM 40 MG/1
40 TABLET, FILM COATED ORAL EVERY EVENING
Status: DISCONTINUED | OUTPATIENT
Start: 2023-01-25 | End: 2023-01-26 | Stop reason: HOSPADM

## 2023-01-25 RX ORDER — BACLOFEN 10 MG/1
10 TABLET ORAL 3 TIMES DAILY
Status: DISCONTINUED | OUTPATIENT
Start: 2023-01-25 | End: 2023-01-26 | Stop reason: HOSPADM

## 2023-01-25 RX ORDER — LORAZEPAM 2 MG/ML
0.5 INJECTION INTRAMUSCULAR ONCE
Status: COMPLETED | OUTPATIENT
Start: 2023-01-25 | End: 2023-01-25

## 2023-01-25 RX ORDER — FENTANYL CITRATE 50 UG/ML
25 INJECTION, SOLUTION INTRAMUSCULAR; INTRAVENOUS ONCE
Status: COMPLETED | OUTPATIENT
Start: 2023-01-25 | End: 2023-01-25

## 2023-01-25 RX ORDER — DIAZEPAM 5 MG/1
5 TABLET ORAL 2 TIMES DAILY
Status: DISCONTINUED | OUTPATIENT
Start: 2023-01-25 | End: 2023-01-26 | Stop reason: HOSPADM

## 2023-01-25 RX ADMIN — HEPARIN SODIUM 5000 UNITS: 5000 INJECTION INTRAVENOUS; SUBCUTANEOUS at 22:22

## 2023-01-25 RX ADMIN — SODIUM CHLORIDE 500 MG: 9 INJECTION, SOLUTION INTRAVENOUS at 20:07

## 2023-01-25 RX ADMIN — FENTANYL CITRATE 25 MCG: 50 INJECTION, SOLUTION INTRAMUSCULAR; INTRAVENOUS at 18:44

## 2023-01-25 RX ADMIN — MAGNESIUM SULFATE HEPTAHYDRATE 1 G: 1 INJECTION, SOLUTION INTRAVENOUS at 20:05

## 2023-01-25 RX ADMIN — BACLOFEN 10 MG: 10 TABLET ORAL at 22:22

## 2023-01-25 RX ADMIN — DICYCLOMINE HYDROCHLORIDE 20 MG: 20 TABLET ORAL at 22:22

## 2023-01-25 RX ADMIN — LORAZEPAM 0.5 MG: 2 INJECTION INTRAMUSCULAR; INTRAVENOUS at 19:58

## 2023-01-25 RX ADMIN — ACETAMINOPHEN 650 MG: 325 TABLET ORAL at 22:27

## 2023-01-25 RX ADMIN — IOHEXOL 85 ML: 350 INJECTION, SOLUTION INTRAVENOUS at 18:10

## 2023-01-25 RX ADMIN — ATORVASTATIN CALCIUM 40 MG: 40 TABLET, FILM COATED ORAL at 22:22

## 2023-01-25 RX ADMIN — DESVENLAFAXINE 50 MG: 50 TABLET, FILM COATED, EXTENDED RELEASE ORAL at 22:22

## 2023-01-25 RX ADMIN — HYDROCODONE BITARTRATE AND ACETAMINOPHEN 1 TABLET: 5; 325 TABLET ORAL at 23:20

## 2023-01-25 RX ADMIN — DIAZEPAM 5 MG: 5 TABLET ORAL at 22:22

## 2023-01-25 RX ADMIN — GABAPENTIN 300 MG: 600 TABLET, FILM COATED ORAL at 22:22

## 2023-01-25 RX ADMIN — METHYLPREDNISOLONE SODIUM SUCCINATE 80 MG: 125 INJECTION, POWDER, FOR SOLUTION INTRAMUSCULAR; INTRAVENOUS at 19:58

## 2023-01-25 RX ADMIN — ARIPIPRAZOLE 10 MG: 10 TABLET ORAL at 22:22

## 2023-01-25 RX ADMIN — ASPIRIN 325 MG: 325 TABLET ORAL at 19:57

## 2023-01-25 RX ADMIN — SODIUM CHLORIDE 125 ML/HR: 0.9 INJECTION, SOLUTION INTRAVENOUS at 18:33

## 2023-01-25 NOTE — ED TRIAGE NOTES
Via WR w/complaint of chronic neck pain stating "I've been dealing with this for months"; is receiving injections on left side of neck, "unable to get injections on right side until left side is completed"; complains of right sided neck pain "it's just worse today"; nothing OTC for pain "I can't take anything because of my kidneys"

## 2023-01-25 NOTE — ED PROVIDER NOTES
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 total) by mouth daily  Qty: 15 tablet, Refills: 0    Associated Diagnoses: Neck pain; Trapezius muscle spasm; Muscle spasm      tobramycin (TOBREX) 0 3 % SOLN Administer 1 drop to the right eye every 4 (four) hours while awake  Qty: 5 mL, Refills: 1    Associated Diagnoses: Acute bacterial conjunctivitis of right eye       !! - Potential duplicate medications found  Please discuss with provider  No discharge procedures on file      PDMP Review       Value Time User    PDMP Reviewed  Yes 11/21/2022  6:39 PM Carrie Taveras DO          ED Provider  Electronically Signed by           Roxana Macias DO  01/25/23 4048
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
## 2023-01-26 ENCOUNTER — TRANSITIONAL CARE MANAGEMENT (OUTPATIENT)
Dept: FAMILY MEDICINE CLINIC | Facility: CLINIC | Age: 48
End: 2023-01-26

## 2023-01-26 ENCOUNTER — APPOINTMENT (OUTPATIENT)
Dept: NON INVASIVE DIAGNOSTICS | Facility: HOSPITAL | Age: 48
End: 2023-01-26

## 2023-01-26 VITALS
HEART RATE: 70 BPM | BODY MASS INDEX: 20.83 KG/M2 | SYSTOLIC BLOOD PRESSURE: 99 MMHG | WEIGHT: 125 LBS | HEIGHT: 65 IN | RESPIRATION RATE: 16 BRPM | DIASTOLIC BLOOD PRESSURE: 60 MMHG | OXYGEN SATURATION: 99 % | TEMPERATURE: 97.2 F

## 2023-01-26 LAB
ANION GAP SERPL CALCULATED.3IONS-SCNC: 8 MMOL/L (ref 4–13)
AORTIC ROOT: 2.7 CM
AORTIC VALVE MEAN VELOCITY: 9.1 M/S
APICAL FOUR CHAMBER EJECTION FRACTION: 66 %
ASCENDING AORTA: 2.8 CM
ATRIAL RATE: 76 BPM
AV AREA BY CONTINUOUS VTI: 2.8 CM2
AV AREA PEAK VELOCITY: 2.9 CM2
AV LVOT MEAN GRADIENT: 3 MMHG
AV LVOT PEAK GRADIENT: 6 MMHG
AV MEAN GRADIENT: 4 MMHG
AV PEAK GRADIENT: 7 MMHG
AV VALVE AREA: 2.84 CM2
AV VELOCITY RATIO: 0.91
BUN SERPL-MCNC: 18 MG/DL (ref 5–25)
CALCIUM SERPL-MCNC: 9.1 MG/DL (ref 8.4–10.2)
CARDIAC TROPONIN I PNL SERPL HS: <2 NG/L
CHLORIDE SERPL-SCNC: 104 MMOL/L (ref 96–108)
CHOLEST SERPL-MCNC: 168 MG/DL
CO2 SERPL-SCNC: 26 MMOL/L (ref 21–32)
CREAT SERPL-MCNC: 1.44 MG/DL (ref 0.6–1.3)
DOP CALC AO PEAK VEL: 1.36 M/S
DOP CALC AO VTI: 28.82 CM
DOP CALC LVOT AREA: 3.14 CM2
DOP CALC LVOT DIAMETER: 2 CM
DOP CALC LVOT PEAK VEL VTI: 26.08 CM
DOP CALC LVOT PEAK VEL: 1.24 M/S
DOP CALC LVOT STROKE INDEX: 51.2 ML/M2
DOP CALC LVOT STROKE VOLUME: 81.89 CM3
E WAVE DECELERATION TIME: 284 MS
ERYTHROCYTE [DISTWIDTH] IN BLOOD BY AUTOMATED COUNT: 11.3 % (ref 11.6–15.1)
EST. AVERAGE GLUCOSE BLD GHB EST-MCNC: 94 MG/DL
FRACTIONAL SHORTENING: 33 % (ref 28–44)
GFR SERPL CREATININE-BSD FRML MDRD: 42 ML/MIN/1.73SQ M
GLUCOSE SERPL-MCNC: 106 MG/DL (ref 65–140)
GLUCOSE SERPL-MCNC: 119 MG/DL (ref 65–140)
GLUCOSE SERPL-MCNC: 142 MG/DL (ref 65–140)
HBA1C MFR BLD: 4.9 %
HCT VFR BLD AUTO: 33.1 % (ref 34.8–46.1)
HDLC SERPL-MCNC: 67 MG/DL
HGB BLD-MCNC: 11.3 G/DL (ref 11.5–15.4)
INTERVENTRICULAR SEPTUM IN DIASTOLE (PARASTERNAL SHORT AXIS VIEW): 1 CM
INTERVENTRICULAR SEPTUM: 1 CM (ref 0.6–1.1)
LAAS-AP2: 13.6 CM2
LAAS-AP4: 16.1 CM2
LDLC SERPL CALC-MCNC: 90 MG/DL (ref 0–100)
LEFT ATRIUM SIZE: 3.1 CM
LEFT INTERNAL DIMENSION IN SYSTOLE: 2.4 CM (ref 2.1–4)
LEFT VENTRICULAR INTERNAL DIMENSION IN DIASTOLE: 3.6 CM (ref 3.5–6)
LEFT VENTRICULAR POSTERIOR WALL IN END DIASTOLE: 0.8 CM
LEFT VENTRICULAR STROKE VOLUME: 34 ML
LVSV (TEICH): 34 ML
MCH RBC QN AUTO: 35.3 PG (ref 26.8–34.3)
MCHC RBC AUTO-ENTMCNC: 34.1 G/DL (ref 31.4–37.4)
MCV RBC AUTO: 103 FL (ref 82–98)
MV E'TISSUE VEL-SEP: 14 CM/S
MV PEAK A VEL: 0.74 M/S
MV PEAK E VEL: 81 CM/S
MV STENOSIS PRESSURE HALF TIME: 82 MS
MV VALVE AREA P 1/2 METHOD: 2.68 CM2
P AXIS: 35 DEGREES
PLATELET # BLD AUTO: 202 THOUSANDS/UL (ref 149–390)
PMV BLD AUTO: 10.5 FL (ref 8.9–12.7)
POTASSIUM SERPL-SCNC: 4.2 MMOL/L (ref 3.5–5.3)
PR INTERVAL: 130 MS
QRS AXIS: 52 DEGREES
QRSD INTERVAL: 84 MS
QT INTERVAL: 380 MS
QTC INTERVAL: 427 MS
RBC # BLD AUTO: 3.2 MILLION/UL (ref 3.81–5.12)
RIGHT ATRIUM AREA SYSTOLE A4C: 11.3 CM2
RIGHT VENTRICLE ID DIMENSION: 3.4 CM
SL CV LEFT ATRIUM LENGTH A2C: 4.7 CM
SL CV LV EF: 65
SL CV PED ECHO LEFT VENTRICLE DIASTOLIC VOLUME (MOD BIPLANE) 2D: 53 ML
SL CV PED ECHO LEFT VENTRICLE SYSTOLIC VOLUME (MOD BIPLANE) 2D: 19 ML
SODIUM SERPL-SCNC: 138 MMOL/L (ref 135–147)
T WAVE AXIS: 68 DEGREES
TR MAX PG: 22 MMHG
TR PEAK VELOCITY: 2.3 M/S
TRICUSPID ANNULAR PLANE SYSTOLIC EXCURSION: 2 CM
TRICUSPID VALVE PEAK REGURGITATION VELOCITY: 2.34 M/S
TRIGL SERPL-MCNC: 56 MG/DL
VENTRICULAR RATE: 76 BPM
WBC # BLD AUTO: 3.17 THOUSAND/UL (ref 4.31–10.16)

## 2023-01-26 RX ORDER — HYDROCODONE BITARTRATE AND ACETAMINOPHEN 5; 325 MG/1; MG/1
1 TABLET ORAL EVERY 6 HOURS PRN
Qty: 15 TABLET | Refills: 0 | Status: SHIPPED | OUTPATIENT
Start: 2023-01-26 | End: 2023-01-29

## 2023-01-26 RX ADMIN — DICYCLOMINE HYDROCHLORIDE 20 MG: 20 TABLET ORAL at 09:12

## 2023-01-26 RX ADMIN — BACLOFEN 10 MG: 10 TABLET ORAL at 09:11

## 2023-01-26 RX ADMIN — SODIUM CHLORIDE 125 ML/HR: 0.9 INJECTION, SOLUTION INTRAVENOUS at 04:24

## 2023-01-26 RX ADMIN — LIDOCAINE 4%: 4 CREAM TOPICAL at 09:12

## 2023-01-26 RX ADMIN — HYDROCODONE BITARTRATE AND ACETAMINOPHEN 1 TABLET: 5; 325 TABLET ORAL at 14:09

## 2023-01-26 RX ADMIN — HEPARIN SODIUM 5000 UNITS: 5000 INJECTION INTRAVENOUS; SUBCUTANEOUS at 06:17

## 2023-01-26 RX ADMIN — HYDROCODONE BITARTRATE AND ACETAMINOPHEN 1 TABLET: 5; 325 TABLET ORAL at 06:17

## 2023-01-26 RX ADMIN — DIAZEPAM 5 MG: 5 TABLET ORAL at 09:11

## 2023-01-26 RX ADMIN — PANTOPRAZOLE SODIUM 40 MG: 40 TABLET, DELAYED RELEASE ORAL at 06:17

## 2023-01-26 RX ADMIN — BUPROPION HYDROCHLORIDE 150 MG: 150 TABLET, EXTENDED RELEASE ORAL at 09:12

## 2023-01-26 RX ADMIN — GABAPENTIN 300 MG: 600 TABLET, FILM COATED ORAL at 09:12

## 2023-01-26 RX ADMIN — LEVOTHYROXINE SODIUM 50 MCG: 50 TABLET ORAL at 06:17

## 2023-01-26 RX ADMIN — ASPIRIN 81 MG CHEWABLE TABLET 81 MG: 81 TABLET CHEWABLE at 09:12

## 2023-01-26 NOTE — CONSULTS
Presents with neck pain and right-sided numbness  Weight history reviewed, no significant changes to note  Skin integrity intact  Ordered for regular diet  Pt reports having a consistently okay appetite  She states she has 1 meal daily, dinner, and follows a regular diet  She reports having no difficulties chewing or swallowing at present and at baseline  RD discussed stroke nutrition therapy handout  Pt verbalizes understanding and has no questions at this time  Please see full nutrition assessment in flow sheets for additional detail
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
## 2023-01-26 NOTE — DISCHARGE SUMMARY
Issac 43  Discharge- Sheng Cash 1975, 50 y o  female MRN: [id number]  Unit/Bed#: -01 Encounter: [id number]  Primary Care Provider: FIONA Razo   Date and time admitted to hospital: 1/25/2023  5:11 PM    * Acute right-sided weakness  Assessment & Plan  · Resolved   · Initial considerations were for the possibility of an acute CVA-this condition has been ruled out  · Patient has history of cervical fusion 10 years ago well as left-sided neck injections by pain management -symptoms were more related to this  · Inpatient work-up thus far:-  · #1  CT stroke alert brain- no acute intracranial pathology  · #2  CTA stroke alert head and neck-No significant stenosis of the cervical carotid or vertebral arteries  No significant intracranial stenosis, large vessel occlusion or aneurysm  · #3  MRI brain-No evidence of acute infarct, cranial hemorrhage or mass  · #4   2D echocardiogram- EF of 65%, no regional wall motion abnormalities, no valvular abnormalities  Please refer to the official report for additional details  · #5  Lipid panel-reviewed  · #6  HbA1c- 4 9%  · #7  Case reviewed by neurology remotely at time of arrival in the ED  · #8    Status post a PT and OT evaluation-no postacute needs  · Patient is medically stable for discharge, acute CVA has been ruled out, therefore no further aspirin, and/or Lipitor needed  · Patient given a pain prescription for her cervical radiculopathy  · DC home today    Essential hypertension  Assessment & Plan  · Continue prehospital lisinopril 5 mg p o  daily post discharge    Acquired hypothyroidism  Assessment & Plan  · Continue prehospital levothyroxine 50 mcg p o  daily post discharge    CKD (chronic kidney disease) stage 3, GFR 30-59 ml/min [facility])  Assessment & Plan  Lab Results   Component Value Date    EGFR 42 01/26/2023    EGFR 46 01/25/2023    EGFR 48 11/21/2022    CREATININE 1 44 (H) 01/26/2023 CREATININE 1 34 (H) 01/25/2023    CREATININE 1 31 (H) 11/21/2022     · Renal function is at baseline  · Will need continued periodic outpatient BMP monitoring via her PCP    Bipolar 2 disorder ([facility] [address] )  Assessment & Plan  · Continue prehospital Atarax 25 mg p o  every 6 hours as needed, Valium 5 mg p o  twice daily, Pristiq 100 mg p o  daily and Abilify 10 mg p o  daily post discharge    Hypercholesterolemia  Assessment & Plan  · Lipid panel reviewed  · FLP is within normal limits    Anxiety  Assessment & Plan  · Continue prehospital Atarax 25 mg p o  every 6 hours as needed        Discharging Physician / Practitioner: Vasu Metcalf MD  PCP: Daisy Cabral, [facility]  Admission Date:   Admission Orders (From admission, onward)     Ordered        01/25/23 1949  Place in Observation  Once                      Discharge Date: 01/26/23    Medical Problems     Resolved Problems  Date Reviewed: 1/25/2023   None         Consultations During Hospital Stay:  · Neurology    Procedures Performed:   · None    Significant Findings / Test Results:   · CT brain-as outlined above  · CTA head and neck-results as outlined above  · MRI brain-results as outlined above  · 2D echocardiogram-results as outlined above, see the full report for additional details    Incidental Findings:   · None    Test Results Pending at Discharge (will require follow up): · None     Outpatient Tests Requested:  · None    Complications:    • None    Reason for Admission: Right-sided weakness/strokelike symptoms    Hospital Course:     Ernst Drew is a 50 y o  female patient who originally presented to the hospital on 1/25/2023 due to right-sided weakness  Please refer to the initial history and physical examination completed by Billy Lainez PA-C for the initial presenting features and complaints    In brief, the patient is a 47-year-old female, that presented to the emergency room late last evening with a chief complaint of right-sided weakness  In the emergency room, stroke alert was called, a CAT scan of the brain, and a CTA of the head and neck were performed with the results as outlined above  At that point a discussion was had with on-call neurology who felt that the patient should be admitted for CVA rule out  [address]  She had an MRI of the brain the next morning along with a 2D echocardiogram all of which was grossly within normal limits  Her symptoms had significantly improved  It was felt that her symptoms were more likely secondary to a radiculopathy was by chronic issues that she has been experiencing with her cervical spine  She was deemed medically stable for discharge on 1/26/2023  He was given a prescription for Percocet  Additionally, she was counseled on securing 1 physician, either her primary care physician, and/or pain management to provide narcotic-based medications moving forward  The PDMP was reviewed, the patient has had 6 providers over the past year, however most of the more emergency room providers here  Patient verbalized understanding  Please refer to the assessment/plan portion of this discharge summary as outlined above for additional details  Please see above list of diagnoses and related plan for additional information  Condition at Discharge: good     Discharge Day Visit / Exam:     Subjective: Patient seen and examined, resting in bed, reports feeling better  Continues to have ongoing right-sided neck pain but feels well enough to go home  Vitals: Blood Pressure: 99/60 (01/26/23 1100)  Pulse: 70 (01/26/23 1100)  Temperature: (!) 97 2 °F (36 2 °C) (01/26/23 0736)  Temp Source: Oral (01/26/23 0630)  Respirations: 16 (01/26/23 1100)  Height: 5' 5" (165 1 cm) (01/26/23 0845)  Weight - Scale: 56 7 kg (125 lb) (01/26/23 0845)  SpO2: 99 % (01/26/23 1100)  Exam:   Physical Exam  Constitutional:       General: She is not in acute distress       Appearance: Normal appearance  She is normal weight  She is not ill-appearing  HENT:      Head: Normocephalic and atraumatic  Nose: Nose normal       Mouth/Throat:      Mouth: Mucous membranes are moist    Eyes:      Extraocular Movements: Extraocular movements intact  Pupils: Pupils are equal, round, and reactive to light  Cardiovascular:      Rate and Rhythm: Normal rate and regular rhythm  Pulses: Normal pulses  Heart sounds: Normal heart sounds  No murmur heard  No friction rub  No gallop  Pulmonary:      Effort: Pulmonary effort is normal  No respiratory distress  Breath sounds: Normal breath sounds  No wheezing, rhonchi or rales  Abdominal:      General: There is no distension  Palpations: Abdomen is soft  There is no mass  Tenderness: There is no abdominal tenderness  Hernia: No hernia is present  Musculoskeletal:         General: No swelling or tenderness  Normal range of motion  Cervical back: Normal range of motion and neck supple  No rigidity  Right lower leg: No edema  Left lower leg: No edema  Skin:     General: Skin is warm  Capillary Refill: Capillary refill takes less than 2 seconds  Findings: No erythema or rash  Neurological:      General: No focal deficit present  Mental Status: She is alert and oriented to person, place, and time  Mental status is at baseline  Cranial Nerves: No cranial nerve deficit  Motor: No weakness  Psychiatric:         Mood and Affect: Mood normal          Behavior: Behavior normal          Discussion with Family: No family members were present at the time of my evaluation, nor did the patient asking to want me to call anyone    Discharge instructions/Information to patient and family:   See after visit summary for information provided to patient and family        Provisions for Follow-Up Care:  See after visit summary for information related to follow-up care and any pertinent home health orders  Disposition:     Home      Planned Readmission:   • None     Discharge Statement:  I spent 45 minutes discharging the patient  This time was spent on the day of discharge  I had direct contact with the patient on the day of discharge  Greater than 50% of the total time was spent examining patient, answering all patient questions, arranging and discussing plan of care with patient as well as directly providing post-discharge instructions  Additional time then spent on discharge activities  Discharge Medications:  See after visit summary for reconciled discharge medications provided to patient and family        ** Please Note: This note has been constructed using a voice recognition system **

## 2023-01-26 NOTE — PLAN OF CARE
Problem: PHYSICAL THERAPY ADULT  Goal: Performs mobility at highest level of function for planned discharge setting  See evaluation for individualized goals  Description: Treatment/Interventions: Spoke to nursing, [employer] mobility, Gait training, Patient/family training, Endurance training, Therapeutic exercise  Equipment Recommended:  (none)       See flowsheet documentation for full assessment, interventions and recommendations  Note: Prognosis: Good  Problem List: Decreased endurance, Impaired sensation, Pain, Decreased mobility  Assessment: Pt is a 50 y o  female admitted [facility] on 01/25/2023 for chronic neck pain accompanied by R sided weakness and muscle spasm  Pt seen for PT evaluation for assessment of D/C needs  Comorbidities affecting pt outcome at this time are anxiety, bipolar 2 disorder, CKD, and chronic pain syndrome  PTA, pt was independent with all ADLs, including dressing, bathing, functional mobility, driving, medication management, and cooking  At time of assessment, pt was able to complete bed mobilities and transfers w/ modified independene and increased time  pt was also able to ambulate 50 ft x 2 w/ S and navigate 2 steps up and 2 steps down with double handrails and step through pattern  Based on pt mobilty at time of assessment, upon D/C the reccomendation is home w/ outpatient rehabilitation services  PT Discharge Recommendation: Home with outpatient rehabilitation    See flowsheet documentation for full assessment

## 2023-01-26 NOTE — ASSESSMENT & PLAN NOTE
Lab Results   Component Value Date    EGFR 46 01/25/2023    EGFR 48 11/21/2022    EGFR 49 09/03/2022    CREATININE 1 34 (H) 01/25/2023    CREATININE 1 31 (H) 11/21/2022    CREATININE 1 29 09/03/2022     Creatinine appears to be approximately baseline, will continue to monitor with repeat labs in a m
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
## 2023-01-26 NOTE — DISCHARGE INSTR - AVS FIRST PAGE
Dear Lele Huerta,     It was our pleasure to care for you here at [address]  It is our hope that we were always able to exceed the expected standards for your care during your stay  You were hospitalized due to strokelike symptoms  You were cared for on the medical/surgical floor by Piter Sharma MD with the [facility] who covers for your primary care physician (PCP), FIONA Lucas, while you were hospitalized  If you have any questions or concerns related to this hospitalization, you may contact us at [phone]  For follow up as well as any medication refills, we recommend that you follow up with your primary care physician  A registered nurse will reach out to you by phone within a few days after your discharge to answer any additional questions that you may have after going home  However, at this time we provide for you here, the most important instructions / recommendations at discharge:     Notable Medication Adjustments -   New prescription-Norco 5 x 325 mg-take 1 tablet every 4-6 hours as needed for severe pain  Okay to resume all other preadmission medications at the preadmission doses  Testing Required after Discharge -   To be further determined in the outpatient setting by your primary care provider  Important follow up information -   Please follow-up with your PCP within 7 to 10 days  Other Instructions -   Please maintain a healthy diet  Please consider establishing care with a pain management specialist  Please review this entire after visit summary as additional general instructions including medication list, appointments, activity, diet, any pertinent wound care, and other additional recommendations from your care team that may be provided for you        Sincerely,     Piter Sharma MD

## 2023-01-26 NOTE — PLAN OF CARE
Problem: MOBILITY - ADULT  Goal: Maintain or return to baseline ADL function  Description: INTERVENTIONS:  -  Assess patient's ability to carry out ADLs; assess patient's baseline for ADL function and identify physical deficits which impact ability to perform ADLs (bathing, care of mouth/teeth, toileting, grooming, dressing, etc )  - Assess/evaluate cause of self-care deficits   - Assess range of motion  - Assess patient's mobility; develop plan if impaired  - Assess patient's need for assistive devices and provide as appropriate  - Encourage maximum independence but intervene and supervise when necessary  - Involve family in performance of ADLs  - Assess for home care needs following discharge   - Consider OT consult to assist with ADL evaluation and planning for discharge  - Provide patient education as appropriate  Outcome: Progressing  Goal: Maintains/Returns to pre admission functional level  Description: INTERVENTIONS:  - Perform BMAT or MOVE assessment daily    - Set and communicate daily mobility goal to care team and patient/family/caregiver  - Collaborate with rehabilitation services on mobility goals if consulted  - Perform Range of Motion 3 times a day  - Reposition patient every 2 hours    - Dangle patient 3 times a day  - Stand patient 3 times a day  - Ambulate patient 3 times a day  - Out of bed to chair 3 times a day   - Out of bed for meals 3 times a day  - Out of bed for toileting  - Record patient progress and toleration of activity level   Outcome: Progressing     Problem: PAIN - ADULT  Goal: Verbalizes/displays adequate comfort level or baseline comfort level  Description: Interventions:  - Encourage patient to monitor pain and request assistance  - Assess pain using appropriate pain scale  - Administer analgesics based on type and severity of pain and evaluate response  - Implement non-pharmacological measures as appropriate and evaluate response  - Consider cultural and social influences on pain and pain management  - Notify physician/advanced practitioner if interventions unsuccessful or patient reports new pain  Outcome: Progressing     Problem: INFECTION - ADULT  Goal: Absence or prevention of progression during hospitalization  Description: INTERVENTIONS:  - Assess and monitor for signs and symptoms of infection  - Monitor lab/diagnostic results  - Monitor all insertion sites, i e  indwelling lines, tubes, and drains  - Monitor endotracheal if appropriate and nasal secretions for changes in amount and color  - Dinosaur appropriate cooling/warming therapies per order  - Administer medications as ordered  - Instruct and encourage patient and family to use good hand hygiene technique  - Identify and instruct in appropriate isolation precautions for identified infection/condition  Outcome: Progressing  Goal: Absence of fever/infection during neutropenic period  Description: INTERVENTIONS:  - Monitor WBC    Outcome: Progressing     Problem: SAFETY ADULT  Goal: Maintain or return to baseline ADL function  Description: INTERVENTIONS:  -  Assess patient's ability to carry out ADLs; assess patient's baseline for ADL function and identify physical deficits which impact ability to perform ADLs (bathing, care of mouth/teeth, toileting, grooming, dressing, etc )  - Assess/evaluate cause of self-care deficits   - Assess range of motion  - Assess patient's mobility; develop plan if impaired  - Assess patient's need for assistive devices and provide as appropriate  - Encourage maximum independence but intervene and supervise when necessary  - Involve family in performance of ADLs  - Assess for home care needs following discharge   - Consider OT consult to assist with ADL evaluation and planning for discharge  - Provide patient education as appropriate  Outcome: Progressing  Goal: Maintains/Returns to pre admission functional level  Description: INTERVENTIONS:  - Perform BMAT or MOVE assessment daily    - Set and communicate daily mobility goal to care team and patient/family/caregiver  - Collaborate with rehabilitation services on mobility goals if consulted  - Perform Range of Motion 3 times a day  - Reposition patient every 2 hours    - Dangle patient 3 times a day  - Stand patient 3 times a day  - Ambulate patient 3 times a day  - Out of bed to chair 3 times a day   - Out of bed for meals 3 times a day  - Out of bed for toileting  - Record patient progress and toleration of activity level   Outcome: Progressing  Goal: Patient will remain free of falls  Description: INTERVENTIONS:  - Educate patient/family on patient safety including physical limitations  - Instruct patient to call for assistance with activity   - Consult OT/PT to assist with strengthening/mobility   - Keep Call bell within reach  - Keep bed low and locked with side rails adjusted as appropriate  - Keep care items and personal belongings within reach  - Initiate and maintain comfort rounds  - Make Fall Risk Sign visible to staff  - Offer Toileting every 2 Hours, in advance of need  - Initiate/Maintain bed and chair alarms  - Obtain necessary fall risk management equipment: alarms, yellow socks  - Apply yellow socks and bracelet for high fall risk patients  - Consider moving patient to room near nurses station  Outcome: Progressing     Problem: DISCHARGE PLANNING  Goal: Discharge to home or other facility with appropriate resources  Description: INTERVENTIONS:  - Identify barriers to discharge w/patient and caregiver  - Arrange for needed discharge resources and transportation as appropriate  - Identify discharge learning needs (meds, wound care, etc )  - Arrange for interpretive services to assist at discharge as needed  - Refer to Case Management Department for coordinating discharge planning if the patient needs post-hospital services based on physician/advanced practitioner order or complex needs related to functional status, cognitive ability, or social support system  Outcome: Progressing     Problem: Knowledge Deficit  Goal: Patient/family/caregiver demonstrates understanding of disease process, treatment plan, medications, and discharge instructions  Description: Complete learning assessment and assess knowledge base    Interventions:  - Provide teaching at level of understanding  - Provide teaching via preferred learning methods  Outcome: Progressing
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
## 2023-01-26 NOTE — ASSESSMENT & PLAN NOTE
· Placed in observation telemetry  · Patient has history of cervical fusion 10 years ago well as left-sided neck injections by pain management  · Case was discussed by ER provider with neurology on-call who recommended admission stroke pathway  · Will obtain vital signs and neurochecks per stroke protocol  · Allow permissive hypertension  · Give aspirin 81 mg p o  daily begin Lipitor 40 mg p o  daily  · Obtain MRI and echocardiogram in a m  · Consult neurology in a m

## 2023-01-26 NOTE — PHYSICAL THERAPY NOTE
Physical Therapy Cancellation Note         01/26/23 0838   PT Last Visit   PT Visit Date 01/26/23   Note Type   Note type Evaluation   Cancel Reasons Patient off floor/test  (Patient was receiving bedside echo  PT assessment to be complete at a later time )     Jordan Barron

## 2023-01-26 NOTE — PLAN OF CARE
Problem: MOBILITY - ADULT  Goal: Maintain or return to baseline ADL function  Description: INTERVENTIONS:  -  Assess patient's ability to carry out ADLs; assess patient's baseline for ADL function and identify physical deficits which impact ability to perform ADLs (bathing, care of mouth/teeth, toileting, grooming, dressing, etc )  - Assess/evaluate cause of self-care deficits   - Assess range of motion  - Assess patient's mobility; develop plan if impaired  - Assess patient's need for assistive devices and provide as appropriate  - Encourage maximum independence but intervene and supervise when necessary  - Involve family in performance of ADLs  - Assess for home care needs following discharge   - Consider OT consult to assist with ADL evaluation and planning for discharge  - Provide patient education as appropriate  1/26/2023 1346 by Silva Veliz RN  Outcome: Adequate for Discharge  1/26/2023 0804 by Silva Veliz RN  Outcome: Progressing  Goal: Maintains/Returns to pre admission functional level  Description: INTERVENTIONS:  - Perform BMAT or MOVE assessment daily    - Set and communicate daily mobility goal to care team and patient/family/caregiver  - Collaborate with rehabilitation services on mobility goals if consulted  - Perform Range of Motion 3 times a day  - Reposition patient every 2 hours    - Dangle patient 3 times a day  - Stand patient 3 times a day  - Ambulate patient 3 times a day  - Out of bed to chair 3 times a day   - Out of bed for meals 3 times a day  - Out of bed for toileting  - Record patient progress and toleration of activity level   1/26/2023 1346 by Silva Veliz RN  Outcome: Adequate for Discharge  1/26/2023 0804 by Silva Veliz RN  Outcome: Progressing     Problem: PAIN - ADULT  Goal: Verbalizes/displays adequate comfort level or baseline comfort level  Description: Interventions:  - Encourage patient to monitor pain and request assistance  - Assess pain using appropriate pain scale  - Administer analgesics based on type and severity of pain and evaluate response  - Implement non-pharmacological measures as appropriate and evaluate response  - Consider cultural and social influences on pain and pain management  - Notify physician/advanced practitioner if interventions unsuccessful or patient reports new pain  1/26/2023 1346 by Betty Puga RN  Outcome: Adequate for Discharge  1/26/2023 0804 by Betty Puga RN  Outcome: Progressing     Problem: INFECTION - ADULT  Goal: Absence or prevention of progression during hospitalization  Description: INTERVENTIONS:  - Assess and monitor for signs and symptoms of infection  - Monitor lab/diagnostic results  - Monitor all insertion sites, i e  indwelling lines, tubes, and drains  - Monitor endotracheal if appropriate and nasal secretions for changes in amount and color  - [place] appropriate cooling/warming therapies per order  - Administer medications as ordered  - Instruct and encourage patient and family to use good hand hygiene technique  - Identify and instruct in appropriate isolation precautions for identified infection/condition  1/26/2023 1346 by Betty Puga RN  Outcome: Adequate for Discharge  1/26/2023 0804 by Betty Puga RN  Outcome: Progressing  Goal: Absence of fever/infection during neutropenic period  Description: INTERVENTIONS:  - Monitor WBC    1/26/2023 1346 by Betty Puga RN  Outcome: Adequate for Discharge  1/26/2023 0804 by Betty Puga RN  Outcome: Progressing     Problem: SAFETY ADULT  Goal: Maintain or return to baseline ADL function  Description: INTERVENTIONS:  -  Assess patient's ability to carry out ADLs; assess patient's baseline for ADL function and identify physical deficits which impact ability to perform ADLs (bathing, care of mouth/teeth, toileting, grooming, dressing, etc )  - Assess/evaluate cause of self-care deficits   - Assess range of motion  - Assess patient's mobility; develop plan if impaired  - Assess patient's need for assistive devices and provide as appropriate  - Encourage maximum independence but intervene and supervise when necessary  - Involve family in performance of ADLs  - Assess for home care needs following discharge   - Consider OT consult to assist with ADL evaluation and planning for discharge  - Provide patient education as appropriate  1/26/2023 1346 by Tiffanie Pride RN  Outcome: Adequate for Discharge  1/26/2023 0804 by Tiffanie Pride RN  Outcome: Progressing  Goal: Maintains/Returns to pre admission functional level  Description: INTERVENTIONS:  - Perform BMAT or MOVE assessment daily    - Set and communicate daily mobility goal to care team and patient/family/caregiver  - Collaborate with rehabilitation services on mobility goals if consulted  - Perform Range of Motion 3 times a day  - Reposition patient every 2 hours    - Dangle patient 3 times a day  - Stand patient 3 times a day  - Ambulate patient 3 times a day  - Out of bed to chair 3 times a day   - Out of bed for meals 3 times a day  - Out of bed for toileting  - Record patient progress and toleration of activity level   1/26/2023 1346 by Tiffanie Pride RN  Outcome: Adequate for Discharge  1/26/2023 0804 by Tiffanie Pride RN  Outcome: Progressing  Goal: Patient will remain free of falls  Description: INTERVENTIONS:  -  Assess patient's ability to carry out ADLs; assess patient's baseline for ADL function and identify physical deficits which impact ability to perform ADLs (bathing, care of mouth/teeth, toileting, grooming, dressing, etc )  - Assess/evaluate cause of self-care deficits   - Assess range of motion  - Assess patient's mobility; develop plan if impaired  - Assess patient's need for assistive devices and provide as appropriate  - Encourage maximum independence but intervene and supervise when necessary  - Involve family in performance of ADLs  - Assess for home care needs following discharge   - Consider OT consult to assist with ADL evaluation and planning for discharge  - Provide patient education as appropriate  1/26/2023 1346 by Amado Mcdaniel RN  Outcome: Adequate for Discharge  1/26/2023 0804 by Amado Mcdaniel RN  Outcome: Progressing     Problem: DISCHARGE PLANNING  Goal: Discharge to home or other facility with appropriate resources  Description: INTERVENTIONS:  - Identify barriers to discharge w/patient and caregiver  - Arrange for needed discharge resources and transportation as appropriate  - Identify discharge learning needs (meds, wound care, etc )  - Arrange for interpretive services to assist at discharge as needed  - Refer to Case Management Department for coordinating discharge planning if the patient needs post-hospital services based on physician/advanced practitioner order or complex needs related to functional status, cognitive ability, or social support system  1/26/2023 1346 by Amado Mcdaniel RN  Outcome: Adequate for Discharge  1/26/2023 0804 by Amado Mcdaniel RN  Outcome: Progressing     Problem: Knowledge Deficit  Goal: Patient/family/caregiver demonstrates understanding of disease process, treatment plan, medications, and discharge instructions  Description: Complete learning assessment and assess knowledge base    Interventions:  - Provide teaching at level of understanding  - Provide teaching via preferred learning methods  1/26/2023 1346 by Amado Mcdaniel RN  Outcome: Adequate for Discharge  1/26/2023 0804 by Amado Mcdaniel RN  Outcome: Progressing

## 2023-01-26 NOTE — ASSESSMENT & PLAN NOTE
· Resolved   · Initial considerations were for the possibility of an acute CVA-this condition has been ruled out  · Patient has history of cervical fusion 10 years ago well as left-sided neck injections by pain management -symptoms were more related to this  · Inpatient work-up thus far:-  · #1  CT stroke alert brain- no acute intracranial pathology  · #2  CTA stroke alert head and neck-No significant stenosis of the cervical carotid or vertebral arteries  No significant intracranial stenosis, large vessel occlusion or aneurysm  · #3  MRI brain-No evidence of acute infarct, cranial hemorrhage or mass  · #4   2D echocardiogram- EF of 65%, no regional wall motion abnormalities, no valvular abnormalities  Please refer to the official report for additional details  · #5  Lipid panel-reviewed  · #6  HbA1c- 4 9%  · #7  Case reviewed by neurology remotely at time of arrival in the ED  · #8    Status post a PT and OT evaluation-no postacute needs  · Patient is medically stable for discharge, acute CVA has been ruled out, therefore no further aspirin, and/or Lipitor needed  · Patient given a pain prescription for her cervical radiculopathy  · DC home today

## 2023-01-26 NOTE — ASSESSMENT & PLAN NOTE
Lab Results   Component Value Date    EGFR 42 01/26/2023    EGFR 46 01/25/2023    EGFR 48 11/21/2022    CREATININE 1 44 (H) 01/26/2023    CREATININE 1 34 (H) 01/25/2023    CREATININE 1 31 (H) 11/21/2022     · Renal function is at baseline  · Will need continued periodic outpatient BMP monitoring via her PCP
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
## 2023-01-26 NOTE — ASSESSMENT & PLAN NOTE
· Continue prehospital Atarax 25 mg p o  every 6 hours as needed, Valium 5 mg p o  twice daily, Pristiq 100 mg p o  daily and Abilify 10 mg p o  daily post discharge

## 2023-02-01 DIAGNOSIS — M79.18 MYOFASCIAL PAIN SYNDROME: ICD-10-CM

## 2023-02-01 DIAGNOSIS — M54.9 MID BACK PAIN: ICD-10-CM

## 2023-02-01 RX ORDER — BACLOFEN 20 MG/1
TABLET ORAL
Qty: 90 TABLET | Refills: 0 | Status: SHIPPED | OUTPATIENT
Start: 2023-02-01

## 2023-02-07 ENCOUNTER — HOSPITAL ENCOUNTER (OUTPATIENT)
Dept: RADIOLOGY | Facility: HOSPITAL | Age: 48
Discharge: HOME | End: 2023-02-07
Admitting: ANESTHESIOLOGY

## 2023-02-07 VITALS
TEMPERATURE: 98.4 F | HEART RATE: 72 BPM | DIASTOLIC BLOOD PRESSURE: 76 MMHG | OXYGEN SATURATION: 100 % | SYSTOLIC BLOOD PRESSURE: 120 MMHG | RESPIRATION RATE: 18 BRPM

## 2023-02-07 DIAGNOSIS — M47.812 CERVICAL SPONDYLOSIS: ICD-10-CM

## 2023-02-07 DIAGNOSIS — M54.2 NECK PAIN: ICD-10-CM

## 2023-02-07 DIAGNOSIS — M19.90 ARTHRITIS: Primary | ICD-10-CM

## 2023-02-07 RX ORDER — LIDOCAINE HYDROCHLORIDE 10 MG/ML
5 INJECTION, SOLUTION EPIDURAL; INFILTRATION; INTRACAUDAL; PERINEURAL ONCE
Status: COMPLETED | OUTPATIENT
Start: 2023-02-07 | End: 2023-02-07

## 2023-02-07 RX ORDER — BUPIVACAINE HYDROCHLORIDE 5 MG/ML
3 INJECTION, SOLUTION EPIDURAL; INTRACAUDAL ONCE
Status: COMPLETED | OUTPATIENT
Start: 2023-02-07 | End: 2023-02-07

## 2023-02-07 RX ORDER — NABUMETONE 500 MG/1
500 TABLET, FILM COATED ORAL 2 TIMES DAILY
Qty: 60 TABLET | Refills: 1 | Status: SHIPPED | OUTPATIENT
Start: 2023-02-07 | End: 2023-02-22 | Stop reason: SDUPTHER

## 2023-02-07 RX ADMIN — BUPIVACAINE HYDROCHLORIDE 3 ML: 5 INJECTION, SOLUTION EPIDURAL; INTRACAUDAL at 10:30

## 2023-02-07 RX ADMIN — LIDOCAINE HYDROCHLORIDE 5 ML: 10 INJECTION, SOLUTION EPIDURAL; INFILTRATION; INTRACAUDAL; PERINEURAL at 10:26

## 2023-02-07 NOTE — DISCHARGE INSTRUCTIONS
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
## 2023-02-17 RX ORDER — LIDOCAINE 50 MG/G
PATCH TOPICAL
COMMUNITY
Start: 2023-01-16

## 2023-02-17 RX ORDER — DESVENLAFAXINE 100 MG/1
1 TABLET, EXTENDED RELEASE ORAL
COMMUNITY
Start: 2023-01-26

## 2023-02-22 ENCOUNTER — PROCEDURE VISIT (OUTPATIENT)
Dept: PAIN MEDICINE | Facility: CLINIC | Age: 48
End: 2023-02-22

## 2023-02-22 DIAGNOSIS — M19.90 ARTHRITIS: ICD-10-CM

## 2023-02-22 DIAGNOSIS — M79.18 MYOFASCIAL PAIN SYNDROME: ICD-10-CM

## 2023-02-22 DIAGNOSIS — M47.812 CERVICAL SPONDYLOSIS: ICD-10-CM

## 2023-02-22 DIAGNOSIS — G89.29 CHRONIC BILATERAL LOW BACK PAIN WITHOUT SCIATICA: ICD-10-CM

## 2023-02-22 DIAGNOSIS — M54.9 MID BACK PAIN: Primary | ICD-10-CM

## 2023-02-22 DIAGNOSIS — M54.50 CHRONIC BILATERAL LOW BACK PAIN WITHOUT SCIATICA: ICD-10-CM

## 2023-02-22 RX ORDER — TRIAMCINOLONE ACETONIDE 40 MG/ML
40 INJECTION, SUSPENSION INTRA-ARTICULAR; INTRAMUSCULAR
Status: COMPLETED | OUTPATIENT
Start: 2023-02-22 | End: 2023-02-22

## 2023-02-22 RX ORDER — NABUMETONE 500 MG/1
500 TABLET, FILM COATED ORAL 2 TIMES DAILY
Qty: 60 TABLET | Refills: 1 | Status: SHIPPED | OUTPATIENT
Start: 2023-02-22 | End: 2023-03-24

## 2023-02-22 RX ORDER — BUPIVACAINE HYDROCHLORIDE 2.5 MG/ML
10 INJECTION, SOLUTION EPIDURAL; INFILTRATION; INTRACAUDAL
Status: COMPLETED | OUTPATIENT
Start: 2023-02-22 | End: 2023-02-22

## 2023-02-22 RX ORDER — GABAPENTIN 800 MG/1
800 TABLET ORAL 3 TIMES DAILY
Qty: 90 TABLET | Refills: 3 | Status: SHIPPED | OUTPATIENT
Start: 2023-02-22

## 2023-02-22 RX ADMIN — TRIAMCINOLONE ACETONIDE 40 MG: 40 INJECTION, SUSPENSION INTRA-ARTICULAR; INTRAMUSCULAR at 14:25

## 2023-02-22 RX ADMIN — BUPIVACAINE HYDROCHLORIDE 10 ML: 2.5 INJECTION, SOLUTION EPIDURAL; INFILTRATION; INTRACAUDAL at 14:25

## 2023-02-22 NOTE — PATIENT INSTRUCTIONS
Do not apply heat to any area that is numb  If you have discomfort or soreness at the injection site, you may apply ice today, 20 minutes on and 20 minutes off  Tomorrow you may use ice or warm, moist heat  Do not apply ice or heat directly to the skin  If you experience severe shortness of breath, go to the Emergency Room  You may have numbness for several hours from the local anesthetic  Please use caution and common sense, especially with weight-bearing activities  You may have an increase or change in the discomfort for 36-48 hours after your treatment  Apply ice and continue with any pain medicine you have been prescribed  Do not do anything strenuous today  You may shower, but no tub baths or hot tubs today  You may resume your normal activities tomorrow, but do not “overdo it”  Resume normal activities slowly when you are feeling better  If you experience redness, drainage or swelling at the injection site, or if you develop a fever above 100 degrees, please call [facility] at [phone] or go to the Emergency Room  Continue to take all routine medicines prescribed by your primary care physician unless otherwise instructed by our staff  Most blood thinners should be started again according to your regularly scheduled dosing  If you have any questions, please give our office a call  As no general anesthesia was used in today's procedure, you should not experience any side effects related to anesthesia  If you have a problem specifically related to your procedure, please call our office at [phone]  Problems not related to your procedure should be directed to your primary care physician

## 2023-02-22 NOTE — PROGRESS NOTES
Universal Protocol:  Consent: Verbal consent obtained  Written consent obtained  Risks and benefits: risks, benefits and alternatives were discussed  Consent given by: patient  Time out: Immediately prior to procedure a "time out" was called to verify the correct patient, procedure, equipment, support staff and site/side marked as required  Timeout called at: 2/22/2023 2:25 PM   Patient understanding: patient states understanding of the procedure being performed  Patient consent: the patient's understanding of the procedure matches consent given  Procedure consent: procedure consent matches procedure scheduled  Relevant documents: relevant documents present and verified  Test results: test results available and properly labeled  Site marked: the operative site was marked  Radiology Images displayed and confirmed   If images not available, report reviewed: imaging studies not available  Required items: required blood products, implants, devices, and special equipment available  Patient identity confirmed: verbally with patient    Supporting Documentation  Indications: pain   Trigger Point Injections: multiple trigger points: 3 or more muscle groups    Injection site identified by: ultrasound    Procedure Details  Location(s):    Middle Back: L thoracic paraspinals, L latissimus dorsi, R latissimus dorsi, R thoracic paraspinals, R lower trapezius, L lower trapezius, L longissimus and R longissimus     Prep: patient was prepped and draped in usual sterile fashion  Needle size: 22 G  Medications: 10 mL bupivacaine (PF) 0 25 %; 40 mg triamcinolone acetonide 40 mg/mL  Patient tolerance: patient tolerated the procedure well with no immediate complications

## 2023-03-01 ENCOUNTER — TELEPHONE (OUTPATIENT)
Dept: PAIN MEDICINE | Facility: CLINIC | Age: 48
End: 2023-03-01

## 2023-03-06 ENCOUNTER — OFFICE VISIT (OUTPATIENT)
Dept: URGENT CARE | Facility: CLINIC | Age: 48
End: 2023-03-06

## 2023-03-06 VITALS
WEIGHT: 120 LBS | TEMPERATURE: 98.1 F | DIASTOLIC BLOOD PRESSURE: 66 MMHG | HEIGHT: 65 IN | HEART RATE: 90 BPM | RESPIRATION RATE: 18 BRPM | SYSTOLIC BLOOD PRESSURE: 123 MMHG | OXYGEN SATURATION: 100 % | BODY MASS INDEX: 19.99 KG/M2

## 2023-03-06 DIAGNOSIS — A08.4 VIRAL GASTROENTERITIS: Primary | ICD-10-CM

## 2023-03-06 RX ORDER — ONDANSETRON 4 MG/1
4 TABLET, ORALLY DISINTEGRATING ORAL EVERY 6 HOURS PRN
Qty: 15 TABLET | Refills: 0 | Status: SHIPPED | OUTPATIENT
Start: 2023-03-06
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
## 2023-03-06 NOTE — LETTER
March 6, 2023     Patient: Rosibel Moe   YOB: 1975   Date of Visit: 3/6/2023       To Whom It May Concern: It is my medical opinion that Rosibel Moe should remain out of work until symptoms have resolved for at least 24 hours  Please excuse for time missed this week  If you have any questions or concerns, please don't hesitate to call           Sincerely,        Saman SenderFIONA    CC: No Recipients

## 2023-03-06 NOTE — PATIENT INSTRUCTIONS
Give the zofran 30 min to work before pushing fluids  If you cannot maintain hydration, you should be seen in the ER  For food when you feel up to it, start bland and advance as tolerated  You should remain home until symptoms have resolved for 24 hours  Gastroenteritis   AMBULATORY CARE:   Gastroenteritis , or stomach flu, is an infection of the stomach and intestines  It is caused by bacteria, parasites, or viruses  Common symptoms include the following:   Diarrhea or gas    Nausea, vomiting, or poor appetite    Abdominal cramps, pain, or gurgling    Fever    Tiredness or weakness    Headaches or muscle aches with any of the above symptoms    Call 911 for any of the following: You have trouble breathing or a very fast pulse  Seek care immediately if:   You see blood in your diarrhea  You cannot stop vomiting  You have not urinated for 12 hours  You feel like you are going to faint  Contact your healthcare provider if:   You have a fever  You continue to vomit or have diarrhea, even after treatment  You see worms in your diarrhea  Your mouth or eyes are dry  You are not urinating as much or as often  You have questions or concerns about your condition or care  Treatment for gastroenteritis  may include medicines to slow or stop your diarrhea or vomiting  You may also need medicines to treat an infection caused by bacteria or a parasite  Manage your symptoms:   Drink liquids as directed  Ask your healthcare provider how much liquid to drink each day, and which liquids are best for you  You may also need to drink an oral rehydration solution (ORS)  An ORS has the right amounts of sugar, salt, and minerals in water to replace body fluids  Eat bland foods  When you feel hungry, begin eating soft, bland foods  Examples are bananas, clear soup, potatoes, and applesauce   Do not have dairy products, alcohol, sugary drinks, or drinks with caffeine until you feel better  Rest as much as possible  Slowly start to do more each day when you begin to feel better  Prevent the spread of germs:  Gastroenteritis can spread easily  Keep yourself, your family, and your surroundings clean to help prevent the spread of gastroenteritis:  Wash your hands often  Use soap and water  Wash your hands after you use the bathroom, change a child's diapers, or sneeze  Wash your hands before you prepare or eat food  Clean surfaces and do laundry often  Wash your clothes and towels separately from the rest of the laundry  Clean surfaces in your home with antibacterial  or bleach  Clean food thoroughly and cook safely  Wash raw vegetables before you cook  Cook meat, fish, and eggs fully  Do not use the same dishes for raw meat as you do for other foods  Refrigerate any leftover food immediately  Be aware when you camp or travel  Drink only clean water  Do not drink from rivers or lakes unless you purify or boil the water first  When you travel, drink bottled water and do not add ice  Do not eat fruit that has not been peeled  Do not eat raw fish or meat that is not fully cooked  Follow up with your doctor as directed:  Write down your questions so you remember to ask them during your visits  © Copyright [employer] 2022 Information is for End User's use only and may not be sold, redistributed or otherwise used for commercial purposes  The above information is an  only  It is not intended as medical advice for individual conditions or treatments  Talk to your doctor, nurse or pharmacist before following any medical regimen to see if it is safe and effective for you

## 2023-03-07 ENCOUNTER — OFFICE VISIT (OUTPATIENT)
Dept: OBGYN CLINIC | Facility: CLINIC | Age: 48
End: 2023-03-07

## 2023-03-07 VITALS
BODY MASS INDEX: 19.99 KG/M2 | HEIGHT: 65 IN | DIASTOLIC BLOOD PRESSURE: 78 MMHG | SYSTOLIC BLOOD PRESSURE: 115 MMHG | WEIGHT: 120 LBS | HEART RATE: 83 BPM

## 2023-03-07 DIAGNOSIS — M77.02 MEDIAL EPICONDYLITIS OF LEFT ELBOW: Primary | ICD-10-CM

## 2023-03-07 RX ORDER — BETAMETHASONE SODIUM PHOSPHATE AND BETAMETHASONE ACETATE 3; 3 MG/ML; MG/ML
6 INJECTION, SUSPENSION INTRA-ARTICULAR; INTRALESIONAL; INTRAMUSCULAR; SOFT TISSUE
Status: COMPLETED | OUTPATIENT
Start: 2023-03-07 | End: 2023-03-07

## 2023-03-07 RX ORDER — BUPIVACAINE HYDROCHLORIDE 2.5 MG/ML
1 INJECTION, SOLUTION EPIDURAL; INFILTRATION; INTRACAUDAL
Status: COMPLETED | OUTPATIENT
Start: 2023-03-07 | End: 2023-03-07

## 2023-03-07 RX ADMIN — BUPIVACAINE HYDROCHLORIDE 1 ML: 2.5 INJECTION, SOLUTION EPIDURAL; INFILTRATION; INTRACAUDAL at 08:33

## 2023-03-07 RX ADMIN — BETAMETHASONE SODIUM PHOSPHATE AND BETAMETHASONE ACETATE 6 MG: 3; 3 INJECTION, SUSPENSION INTRA-ARTICULAR; INTRALESIONAL; INTRAMUSCULAR; SOFT TISSUE at 08:33
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
## 2023-03-10 ENCOUNTER — TELEPHONE (OUTPATIENT)
Dept: PAIN MEDICINE | Facility: CLINIC | Age: 48
End: 2023-03-10

## 2023-03-10 ENCOUNTER — HOSPITAL ENCOUNTER (OUTPATIENT)
Dept: RADIOLOGY | Facility: HOSPITAL | Age: 48
Discharge: HOME | End: 2023-03-10

## 2023-03-10 VITALS
HEART RATE: 106 BPM | OXYGEN SATURATION: 98 % | DIASTOLIC BLOOD PRESSURE: 70 MMHG | TEMPERATURE: 99.5 F | RESPIRATION RATE: 20 BRPM | SYSTOLIC BLOOD PRESSURE: 131 MMHG

## 2023-03-10 DIAGNOSIS — M47.812 CERVICAL SPONDYLOSIS: ICD-10-CM

## 2023-03-10 RX ORDER — LIDOCAINE HYDROCHLORIDE 10 MG/ML
5 INJECTION, SOLUTION EPIDURAL; INFILTRATION; INTRACAUDAL; PERINEURAL ONCE
Status: COMPLETED | OUTPATIENT
Start: 2023-03-10 | End: 2023-03-10

## 2023-03-10 RX ADMIN — LIDOCAINE HYDROCHLORIDE 5 ML: 10 INJECTION, SOLUTION EPIDURAL; INFILTRATION; INTRACAUDAL; PERINEURAL at 14:02

## 2023-03-10 RX ADMIN — Medication 3 ML: at 14:15
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
## 2023-03-10 NOTE — DISCHARGE INSTRUCTIONS
Medial Branch Radiofrequency Ablation     WHAT YOU NEED TO KNOW:   Medial branch radiofrequency ablation (RFA) is a procedure used to treat facet joint pain in your neck, mid back, or lower back  Facet joints are found at the back of each vertebra  A needle electrode is used to send electrical currents to the nerves in your facet joint  The electrical currents create heat that damages the nerve so it cannot send pain signals  ACTIVITY  Do not drive or operate machinery today  No strenuous activity today - bending, lifting, etc   You may shower today, but do not sit in a tub of water  Resume normal activities tomorrow as tolerated  CARE OF THE INJECTION SITE  If you have soreness or pain, apply ice to the area today (20 minutes on/20 minutes off)  Starting tomorrow, you may use warm, moist heat or ice if needed  Notify [facility] if you have any of the following: redness, drainage, swelling, or fever above 100°F     SPECIAL INSTRUCTIONS  Our office will call you tomorrow for a progress report and make an appointment for a follow up visit in 4 weeks  If you feel a sunburn-like sensation in the area of your procedure, call our office  MEDICATIONS  Continue to take all routine medications  Our office may have instructed you to hold some medications  As no general anesthesia was used in today's procedure, you should not experience any side effects related to anesthesia  If you have a problem specifically related to your procedure, please call our office at [phone]  Problems not related to your procedure should be directed to your primary care physician

## 2023-03-10 NOTE — TELEPHONE ENCOUNTER
Please call 3/13/23  Left C2-C3 and C3-C4 RFA @ [facility] with RM      Next appt 4/12/23 at 8am with 7:45 arrival with CATA

## 2023-03-13 ENCOUNTER — OFFICE VISIT (OUTPATIENT)
Dept: URGENT CARE | Facility: CLINIC | Age: 48
End: 2023-03-13

## 2023-03-13 VITALS
SYSTOLIC BLOOD PRESSURE: 145 MMHG | WEIGHT: 124 LBS | HEART RATE: 82 BPM | RESPIRATION RATE: 20 BRPM | DIASTOLIC BLOOD PRESSURE: 77 MMHG | BODY MASS INDEX: 20.66 KG/M2 | TEMPERATURE: 98.2 F | HEIGHT: 65 IN | OXYGEN SATURATION: 97 %

## 2023-03-13 DIAGNOSIS — H16.001 CORNEAL ULCER OF RIGHT EYE: Primary | ICD-10-CM

## 2023-03-13 RX ORDER — KETOROLAC TROMETHAMINE 4 MG/ML
1 SOLUTION/ DROPS OPHTHALMIC 4 TIMES DAILY
Qty: 5 ML | Refills: 0 | Status: SHIPPED | OUTPATIENT
Start: 2023-03-13

## 2023-03-13 RX ORDER — MOXIFLOXACIN 5 MG/ML
1 SOLUTION/ DROPS OPHTHALMIC 3 TIMES DAILY
Qty: 3 ML | Refills: 0 | Status: SHIPPED | OUTPATIENT
Start: 2023-03-13

## 2023-03-13 NOTE — PATIENT INSTRUCTIONS
Use antibiotic drops as prescribed   Avoid touching eyes  Wash hands frequently  Change pillowcases daily  Follow up with PCP in 3-5 days  Proceed to ER if symptoms worsen

## 2023-03-13 NOTE — LETTER
March 13, 2023     Patient: Jaky Bustillo   YOB: 1975   Date of Visit: 3/13/2023       To Whom It May Concern: It is my medical opinion that Jaky Bustillo may return to work on 3/14/2023  If you have any questions or concerns, please don't hesitate to call           Sincerely,        Tri Guaman PA-C    CC: No Recipients

## 2023-03-13 NOTE — TELEPHONE ENCOUNTER
Caller: Pt    Doctor:     Reason for call: Pt says that she is fine and everything is good  When can she schedule the first injection for the right side now?     Call back#: [phone]
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
## 2023-03-15 NOTE — TELEPHONE ENCOUNTER
Caller: Carolee Dixon     Doctor: Dr Theresa Lam     Reason for call: Patient calling back to schedule procedure      Call back#: [phone]

## 2023-03-15 NOTE — TELEPHONE ENCOUNTER
Caller: Itz Caraballo   Doctor: Dr Morenita Salmeron     Reason for call: Patient calling back to confirm that her procedure is  Scheduled for 4/4     Call back#: [phone]

## 2023-03-16 NOTE — TELEPHONE ENCOUNTER
Caller: patient    Doctor: Santhosh Crawford    Reason for call: patient would like a call back to confirm date and time of her procedure    Call back#:

## 2023-03-22 DIAGNOSIS — M79.18 MYOFASCIAL PAIN SYNDROME: ICD-10-CM

## 2023-03-22 DIAGNOSIS — M54.2 NECK PAIN: ICD-10-CM

## 2023-03-22 DIAGNOSIS — G89.4 CHRONIC PAIN SYNDROME: Primary | ICD-10-CM

## 2023-03-22 RX ORDER — METHYLPREDNISOLONE 4 MG/1
TABLET ORAL
Qty: 21 TABLET | Refills: 0 | Status: SHIPPED | OUTPATIENT
Start: 2023-03-22 | End: 2023-04-21

## 2023-03-23 ENCOUNTER — HOSPITAL ENCOUNTER (EMERGENCY)
Facility: HOSPITAL | Age: 48
Discharge: HOME | End: 2023-03-23
Attending: EMERGENCY MEDICINE

## 2023-03-23 VITALS
RESPIRATION RATE: 16 BRPM | DIASTOLIC BLOOD PRESSURE: 70 MMHG | SYSTOLIC BLOOD PRESSURE: 119 MMHG | TEMPERATURE: 97.7 F | OXYGEN SATURATION: 96 % | HEART RATE: 74 BPM

## 2023-03-23 DIAGNOSIS — G89.29 CHRONIC NECK PAIN: Primary | ICD-10-CM

## 2023-03-23 DIAGNOSIS — M54.2 CHRONIC NECK PAIN: Primary | ICD-10-CM

## 2023-03-23 RX ORDER — OXYCODONE HYDROCHLORIDE 5 MG/1
5 TABLET ORAL ONCE
Status: COMPLETED | OUTPATIENT
Start: 2023-03-23 | End: 2023-03-23

## 2023-03-23 RX ORDER — OXYCODONE HYDROCHLORIDE 5 MG/1
5 TABLET ORAL EVERY 8 HOURS PRN
Qty: 6 TABLET | Refills: 0 | Status: SHIPPED | OUTPATIENT
Start: 2023-03-23 | End: 2023-03-25

## 2023-03-23 RX ORDER — FENTANYL CITRATE 50 UG/ML
75 INJECTION, SOLUTION INTRAMUSCULAR; INTRAVENOUS ONCE
Status: COMPLETED | OUTPATIENT
Start: 2023-03-23 | End: 2023-03-23

## 2023-03-23 RX ADMIN — FENTANYL CITRATE 75 MCG: 50 INJECTION, SOLUTION INTRAMUSCULAR; INTRAVENOUS at 20:04

## 2023-03-23 RX ADMIN — OXYCODONE HYDROCHLORIDE 5 MG: 5 TABLET ORAL at 19:28
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
## 2023-03-24 NOTE — DISCHARGE INSTRUCTIONS
Please make sure to follow-up with your pain management doctor as you should be receiving pain medicine from them as this is a chronic condition  If you develop any new or worsening symptoms please immediately return to your nearest emergency department

## 2023-03-24 NOTE — ED NOTES
Patients SpO2 has been dropping into the mid 80's when patient dozes off  Once I was bedside it returned to mid to high 90's  Will continue to monitor patient a little bit longer before discharging   Dr Clement Douglas aware     Antoinette Rueda RN  03/23/23 [id number]

## 2023-03-27 ENCOUNTER — TELEPHONE (OUTPATIENT)
Dept: PAIN MEDICINE | Facility: MEDICAL CENTER | Age: 48
End: 2023-03-27

## 2023-03-27 NOTE — TELEPHONE ENCOUNTER
Caller: patient    Doctor: Arti Messina    Reason for call: pain level 8 patient does not feel no improvement after her procedure   Would like to know next step    Call back#:

## 2023-03-27 NOTE — TELEPHONE ENCOUNTER
Caller: pt    Doctor:     Reason for call: Please reach back out to patient      Call back#: [phone]

## 2023-03-27 NOTE — TELEPHONE ENCOUNTER
Caller: Cristo King     Doctor: Dr Pham Lynn     Reason for call: Patient returning call     Call back#: [phone]

## 2023-03-28 NOTE — TELEPHONE ENCOUNTER
Spoke with Pt regarding increased pain post Left C2-3, C3-4 RFA which was completed on 3/10 with RM  Pts current pain is 9/10  Advised Pt that it takes x4-6 wks for full effect  Advised Pt she can try OTC Tylenol ( 3000mg/day with no liver issues and to check with PCP since she has kidney issues), & heat 20min on/20min off  Pt is taking Baclofen, Gabapentin, & Nabumetone as prescribed & lidocaine ointment & ice with little to no relief  Completed dose pack which was ordered 3/22 by RM  Pt also having increased right cervical pain & is scheduled for a right cerv MBB#1 on 4/4 & MBB#2 on 4/21      Please advise-

## 2023-03-28 NOTE — TELEPHONE ENCOUNTER
Caller: patient     Doctor: Kyle Last    Reason for call: pt returning nurses call     Call back#: [phone]

## 2023-03-28 NOTE — TELEPHONE ENCOUNTER
Caller: carlos    Doctor: Harjeet    Reason for call: Pt call pt back today around 12:30-1:15 pm     Call back#: [phone]

## 2023-04-04 ENCOUNTER — HOSPITAL ENCOUNTER (OUTPATIENT)
Dept: RADIOLOGY | Facility: HOSPITAL | Age: 48
Discharge: HOME | End: 2023-04-04
Admitting: ANESTHESIOLOGY

## 2023-04-04 VITALS
HEART RATE: 77 BPM | TEMPERATURE: 98.1 F | OXYGEN SATURATION: 100 % | RESPIRATION RATE: 20 BRPM | SYSTOLIC BLOOD PRESSURE: 140 MMHG | DIASTOLIC BLOOD PRESSURE: 68 MMHG

## 2023-04-04 DIAGNOSIS — M47.812 CERVICAL SPONDYLOSIS: ICD-10-CM

## 2023-04-04 RX ORDER — LIDOCAINE HYDROCHLORIDE 10 MG/ML
5 INJECTION, SOLUTION EPIDURAL; INFILTRATION; INTRACAUDAL; PERINEURAL ONCE
Status: COMPLETED | OUTPATIENT
Start: 2023-04-04 | End: 2023-04-04

## 2023-04-04 RX ADMIN — LIDOCAINE HYDROCHLORIDE 1.5 ML: 10 INJECTION, SOLUTION EPIDURAL; INFILTRATION; INTRACAUDAL; PERINEURAL at 09:30

## 2023-04-04 RX ADMIN — Medication 1.5 ML: at 09:36

## 2023-04-04 NOTE — TELEPHONE ENCOUNTER
Caller: Ericka     Doctor: Anneliese Jack     Reason for call: patient states Dr Anneliese Jack told her today 4/4 to call and schedule Stim neck injection and normal back injections please advise     Call back#: [phone]

## 2023-04-04 NOTE — DISCHARGE INSTRUCTIONS
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
## 2023-04-05 ENCOUNTER — TELEPHONE (OUTPATIENT)
Dept: PAIN MEDICINE | Facility: CLINIC | Age: 48
End: 2023-04-05

## 2023-04-05 DIAGNOSIS — M54.9 MID BACK PAIN: ICD-10-CM

## 2023-04-05 DIAGNOSIS — M79.18 MYOFASCIAL PAIN SYNDROME: ICD-10-CM

## 2023-04-05 DIAGNOSIS — M47.812 CERVICAL SPONDYLOSIS: ICD-10-CM

## 2023-04-05 DIAGNOSIS — M19.90 ARTHRITIS: ICD-10-CM

## 2023-04-05 RX ORDER — BACLOFEN 20 MG/1
20 TABLET ORAL 3 TIMES DAILY
Qty: 90 TABLET | Refills: 0 | Status: SHIPPED | OUTPATIENT
Start: 2023-04-05

## 2023-04-05 NOTE — TELEPHONE ENCOUNTER
Baclofen prescription was sent to pharmacy  With regard to any additional injections, she will need a follow-up to determine what needs to be done  This is not something that we can decide over the phone

## 2023-04-05 NOTE — TELEPHONE ENCOUNTER
Caller: Ericka     Doctor: Adalgisa Ramey     Reason for call: patient called back, wants to be scheduled for back and neck injections I told her she is scheduled for procedure 4/21 but she states that is for something else, please advise  Thank you     She also called for med refill just now       Call back#: [phone]

## 2023-04-05 NOTE — TELEPHONE ENCOUNTER
Refill request  Baclofen 20mg  Remaining: a few  TAKE 1 [id number] [facility] [address], [address]   Call back [phone]    Also needs injection scheduled

## 2023-04-05 NOTE — TELEPHONE ENCOUNTER
Last fill was 2/1/23  Please advise on refill of Baclofen now      Also see note regarding injections

## 2023-04-06 RX ORDER — NABUMETONE 500 MG/1
TABLET, FILM COATED ORAL
Qty: 60 TABLET | Refills: 0 | Status: SHIPPED | OUTPATIENT
Start: 2023-04-06

## 2023-04-07 ENCOUNTER — APPOINTMENT (EMERGENCY)
Dept: CT IMAGING | Facility: HOSPITAL | Age: 48
End: 2023-04-07

## 2023-04-07 ENCOUNTER — APPOINTMENT (EMERGENCY)
Dept: RADIOLOGY | Facility: HOSPITAL | Age: 48
End: 2023-04-07

## 2023-04-07 ENCOUNTER — HOSPITAL ENCOUNTER (EMERGENCY)
Facility: HOSPITAL | Age: 48
Discharge: HOME | End: 2023-04-07
Attending: EMERGENCY MEDICINE

## 2023-04-07 VITALS
BODY MASS INDEX: 20.63 KG/M2 | WEIGHT: 124 LBS | OXYGEN SATURATION: 97 % | HEART RATE: 78 BPM | TEMPERATURE: 97.8 F | DIASTOLIC BLOOD PRESSURE: 70 MMHG | SYSTOLIC BLOOD PRESSURE: 153 MMHG | RESPIRATION RATE: 18 BRPM

## 2023-04-07 DIAGNOSIS — R79.89 ELEVATED SERUM CREATININE: ICD-10-CM
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
## 2023-04-07 DIAGNOSIS — M54.9 BACK PAIN: ICD-10-CM

## 2023-04-07 DIAGNOSIS — M54.2 NECK PAIN: ICD-10-CM
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
## 2023-04-07 LAB
ABO GROUP BLD: NORMAL
ANION GAP SERPL CALCULATED.3IONS-SCNC: 9 MMOL/L (ref 4–13)
BASOPHILS # BLD AUTO: 0.04 THOUSANDS/ÂΜL (ref 0–0.1)
BASOPHILS NFR BLD AUTO: 1 % (ref 0–1)
BLD GP AB SCN SERPL QL: NEGATIVE
BUN SERPL-MCNC: 20 MG/DL (ref 5–25)
CALCIUM SERPL-MCNC: 10.3 MG/DL (ref 8.4–10.2)
CHLORIDE SERPL-SCNC: 102 MMOL/L (ref 96–108)
CO2 SERPL-SCNC: 30 MMOL/L (ref 21–32)
CREAT SERPL-MCNC: 1.48 MG/DL (ref 0.6–1.3)
EOSINOPHIL # BLD AUTO: 0.04 THOUSAND/ÂΜL (ref 0–0.61)
EOSINOPHIL NFR BLD AUTO: 1 % (ref 0–6)
ERYTHROCYTE [DISTWIDTH] IN BLOOD BY AUTOMATED COUNT: 11.9 % (ref 11.6–15.1)
GFR SERPL CREATININE-BSD FRML MDRD: 41 ML/MIN/1.73SQ M
GLUCOSE SERPL-MCNC: 87 MG/DL (ref 65–140)
HCT VFR BLD AUTO: 37.4 % (ref 34.8–46.1)
HGB BLD-MCNC: 12.6 G/DL (ref 11.5–15.4)
IMM GRANULOCYTES # BLD AUTO: 0.01 THOUSAND/UL (ref 0–0.2)
IMM GRANULOCYTES NFR BLD AUTO: 0 % (ref 0–2)
LYMPHOCYTES # BLD AUTO: 1.29 THOUSANDS/ÂΜL (ref 0.6–4.47)
LYMPHOCYTES NFR BLD AUTO: 22 % (ref 14–44)
MCH RBC QN AUTO: 35 PG (ref 26.8–34.3)
MCHC RBC AUTO-ENTMCNC: 33.7 G/DL (ref 31.4–37.4)
MCV RBC AUTO: 104 FL (ref 82–98)
MONOCYTES # BLD AUTO: 0.32 THOUSAND/ÂΜL (ref 0.17–1.22)
MONOCYTES NFR BLD AUTO: 5 % (ref 4–12)
NEUTROPHILS # BLD AUTO: 4.22 THOUSANDS/ÂΜL (ref 1.85–7.62)
NEUTS SEG NFR BLD AUTO: 71 % (ref 43–75)
NRBC BLD AUTO-RTO: 0 /100 WBCS
PLATELET # BLD AUTO: 207 THOUSANDS/UL (ref 149–390)
PMV BLD AUTO: 10.2 FL (ref 8.9–12.7)
POTASSIUM SERPL-SCNC: 4.3 MMOL/L (ref 3.5–5.3)
RBC # BLD AUTO: 3.6 MILLION/UL (ref 3.81–5.12)
RH BLD: POSITIVE
SODIUM SERPL-SCNC: 141 MMOL/L (ref 135–147)
SPECIMEN EXPIRATION DATE: NORMAL
WBC # BLD AUTO: 5.92 THOUSAND/UL (ref 4.31–10.16)

## 2023-04-07 RX ORDER — ACETAMINOPHEN 325 MG/1
650 TABLET ORAL ONCE
Status: COMPLETED | OUTPATIENT
Start: 2023-04-07 | End: 2023-04-07

## 2023-04-07 RX ORDER — LIDOCAINE 50 MG/G
2 PATCH TOPICAL ONCE
Status: DISCONTINUED | OUTPATIENT
Start: 2023-04-07 | End: 2023-04-07 | Stop reason: HOSPADM

## 2023-04-07 RX ORDER — FENTANYL CITRATE 50 UG/ML
50 INJECTION, SOLUTION INTRAMUSCULAR; INTRAVENOUS ONCE
Status: COMPLETED | OUTPATIENT
Start: 2023-04-07 | End: 2023-04-07

## 2023-04-07 RX ORDER — OXYCODONE HYDROCHLORIDE 5 MG/1
5 TABLET ORAL ONCE
Status: COMPLETED | OUTPATIENT
Start: 2023-04-07 | End: 2023-04-07

## 2023-04-07 RX ORDER — LIDOCAINE 50 MG/G
1 PATCH TOPICAL DAILY
Qty: 6 PATCH | Refills: 0 | Status: SHIPPED | OUTPATIENT
Start: 2023-04-07

## 2023-04-07 RX ADMIN — LIDOCAINE 2 PATCH: 50 PATCH CUTANEOUS at 10:56

## 2023-04-07 RX ADMIN — OXYCODONE HYDROCHLORIDE 5 MG: 5 TABLET ORAL at 10:23

## 2023-04-07 RX ADMIN — ACETAMINOPHEN 650 MG: 325 TABLET ORAL at 10:23

## 2023-04-07 RX ADMIN — FENTANYL CITRATE 50 MCG: 50 INJECTION, SOLUTION INTRAMUSCULAR; INTRAVENOUS at 09:16

## 2023-04-07 RX ADMIN — SODIUM CHLORIDE 500 ML: 0.9 INJECTION, SOLUTION INTRAVENOUS at 09:22

## 2023-04-07 RX ADMIN — IOHEXOL 100 ML: 350 INJECTION, SOLUTION INTRAVENOUS at 09:27
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
## 2023-04-07 NOTE — ED NOTES
Pt is asking more fentanyl IV instead of oral pain meds  Writer educate pt and provider  notified  [facility]  04/07/23 1048

## 2023-04-07 NOTE — DISCHARGE INSTRUCTIONS
Please continue to follow up with your PCP for your kidney function  There was a slight elevation today  Continue to follow with your pain management specialist for you neck pain
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
## 2023-04-13 PROBLEM — M54.12 CERVICAL RADICULOPATHY: Status: ACTIVE | Noted: 2023-04-13
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
## 2023-04-19 ENCOUNTER — TELEPHONE (OUTPATIENT)
Dept: RADIOLOGY | Facility: CLINIC | Age: 48
End: 2023-04-19

## 2023-04-21 ENCOUNTER — HOSPITAL ENCOUNTER (OUTPATIENT)
Dept: RADIOLOGY | Facility: HOSPITAL | Age: 48
Discharge: HOME | End: 2023-04-21
Admitting: ANESTHESIOLOGY

## 2023-04-21 VITALS
OXYGEN SATURATION: 100 % | TEMPERATURE: 98.2 F | DIASTOLIC BLOOD PRESSURE: 71 MMHG | RESPIRATION RATE: 20 BRPM | SYSTOLIC BLOOD PRESSURE: 123 MMHG | HEART RATE: 69 BPM

## 2023-04-21 DIAGNOSIS — M47.812 CERVICAL SPONDYLOSIS: ICD-10-CM

## 2023-04-21 RX ORDER — BUPIVACAINE HYDROCHLORIDE 5 MG/ML
3 INJECTION, SOLUTION EPIDURAL; INTRACAUDAL ONCE
Status: COMPLETED | OUTPATIENT
Start: 2023-04-21 | End: 2023-04-21

## 2023-04-21 RX ORDER — LIDOCAINE HYDROCHLORIDE 10 MG/ML
5 INJECTION, SOLUTION EPIDURAL; INFILTRATION; INTRACAUDAL; PERINEURAL ONCE
Status: COMPLETED | OUTPATIENT
Start: 2023-04-21 | End: 2023-04-21

## 2023-04-21 RX ADMIN — BUPIVACAINE HYDROCHLORIDE 3 ML: 5 INJECTION, SOLUTION EPIDURAL; INTRACAUDAL at 09:18

## 2023-04-21 RX ADMIN — LIDOCAINE HYDROCHLORIDE 5 ML: 10 INJECTION, SOLUTION EPIDURAL; INFILTRATION; INTRACAUDAL; PERINEURAL at 09:15

## 2023-04-21 NOTE — INTERVAL H&P NOTE
Update: (This section must be completed if the H&P was completed greater than 24 hrs to procedure or admission)    H&P reviewed  After examining the patient, I find no changed to the H&P since it had been written  Patient re-evaluated   Accept as history and physical     Stephanie Cosby MD/April 21, 2023/9:06 AM

## 2023-04-21 NOTE — DISCHARGE INSTRUCTIONS

## 2023-04-24 ENCOUNTER — TELEPHONE (OUTPATIENT)
Dept: PAIN MEDICINE | Facility: CLINIC | Age: 48
End: 2023-04-24

## 2023-04-24 NOTE — TELEPHONE ENCOUNTER
Pt contacted Call Center requested refill of their medication  Medication Name: nabumetone (RELAFEN          Dosage of Med: 500 mg      Frequency of Med: Take 1 tablet by mouth twice daily      Remaining Medication: 0      Pharmacy and Location:   [facility] [address]SA[address] RAMESH [address] [facility] [address]  [address] , [address] [place] [address]  [id number]   Phone:  [phone]          Pt  Preferred Callback Phone Number: [phone]      Thank you

## 2023-04-25 ENCOUNTER — TELEPHONE (OUTPATIENT)
Dept: PAIN MEDICINE | Facility: MEDICAL CENTER | Age: 48
End: 2023-04-25

## 2023-04-25 NOTE — TELEPHONE ENCOUNTER
Pain diary was verified received by the [place] office [facility])   Was then sent to  at the [address] [place] [address]  office.  Awilda Matias)    [facility] for pt that once reviewed by RM she will be called to schedule the RFA

## 2023-04-25 NOTE — TELEPHONE ENCOUNTER
Pt called stating that she faxed her pain diary and would like to know when she can get her nerves burned     Pt can be reached at [phone]

## 2023-04-28 ENCOUNTER — TELEPHONE (OUTPATIENT)
Dept: PAIN MEDICINE | Facility: CLINIC | Age: 48
End: 2023-04-28

## 2023-05-02 ENCOUNTER — TELEPHONE (OUTPATIENT)
Dept: PAIN MEDICINE | Facility: MEDICAL CENTER | Age: 48
End: 2023-05-02

## 2023-05-02 NOTE — TELEPHONE ENCOUNTER
Caller: patient    Doctor: Silvester Blizzard    Reason for call: she wants to schedule a back injection    Call back#: [phone]

## 2023-05-02 NOTE — TELEPHONE ENCOUNTER
Spoke with Pt regarding back injections  Pt stated she would like repeat USGI Thoracic TPIs  Last PRO Thoracic TPIs 2/22 with RM  (May be too soon for repeat injections)  F/U TPI OV was canceled  Pt is also scheduled for PRO Right C2-C3, & C3-C4 RFA on 6/6 with RM  (Previously had Left Cervical RFA)  Pt did not provide her pain level or how long relief lasted from the previous TPIs (Pt stated she had to get back to work and couldn't talk)  Pt did state that the pain is in the same place as before (mid to upper back area)  Pts LOV 4/13 with RM & cervical SCS trial was discussed        Please advise-Repeat Thoracic TPIs

## 2023-05-03 NOTE — TELEPHONE ENCOUNTER
Caller: pt    Doctor: CATA    Reason for call: Please reach out for scheduling, thank you      Call back#: [phone]

## 2023-05-04 ENCOUNTER — OFFICE VISIT (OUTPATIENT)
Dept: FAMILY MEDICINE CLINIC | Facility: CLINIC | Age: 48
End: 2023-05-04

## 2023-05-04 ENCOUNTER — HOSPITAL ENCOUNTER (EMERGENCY)
Facility: HOSPITAL | Age: 48
Discharge: HOME | End: 2023-05-04
Attending: EMERGENCY MEDICINE

## 2023-05-04 VITALS
OXYGEN SATURATION: 99 % | BODY MASS INDEX: 19.83 KG/M2 | WEIGHT: 119 LBS | HEIGHT: 65 IN | DIASTOLIC BLOOD PRESSURE: 72 MMHG | SYSTOLIC BLOOD PRESSURE: 100 MMHG | HEART RATE: 82 BPM | TEMPERATURE: 97.8 F

## 2023-05-04 VITALS
RESPIRATION RATE: 18 BRPM | DIASTOLIC BLOOD PRESSURE: 64 MMHG | SYSTOLIC BLOOD PRESSURE: 103 MMHG | TEMPERATURE: 98.1 F | HEART RATE: 94 BPM | OXYGEN SATURATION: 97 %

## 2023-05-04 DIAGNOSIS — G89.29 CHRONIC NECK PAIN: Primary | ICD-10-CM

## 2023-05-04 DIAGNOSIS — Z11.59 NEED FOR HEPATITIS C SCREENING TEST: ICD-10-CM

## 2023-05-04 DIAGNOSIS — Z13.29 SCREENING FOR THYROID DISORDER: ICD-10-CM

## 2023-05-04 DIAGNOSIS — N18.32 STAGE 3B CHRONIC KIDNEY DISEASE (HCC): ICD-10-CM

## 2023-05-04 DIAGNOSIS — Z00.00 ANNUAL PHYSICAL EXAM: Primary | ICD-10-CM

## 2023-05-04 DIAGNOSIS — E78.00 HYPERCHOLESTEROLEMIA: ICD-10-CM

## 2023-05-04 DIAGNOSIS — M54.2 CHRONIC NECK PAIN: Primary | ICD-10-CM

## 2023-05-04 DIAGNOSIS — F11.20 CONTINUOUS OPIOID DEPENDENCE (HCC): ICD-10-CM

## 2023-05-04 DIAGNOSIS — F41.9 ANXIETY: ICD-10-CM

## 2023-05-04 RX ORDER — DIPHENHYDRAMINE HYDROCHLORIDE 50 MG/ML
25 INJECTION INTRAMUSCULAR; INTRAVENOUS ONCE
Status: COMPLETED | OUTPATIENT
Start: 2023-05-04 | End: 2023-05-04

## 2023-05-04 RX ORDER — OXYCODONE HYDROCHLORIDE 5 MG/1
5 TABLET ORAL ONCE
Status: COMPLETED | OUTPATIENT
Start: 2023-05-04 | End: 2023-05-04

## 2023-05-04 RX ORDER — LIDOCAINE 50 MG/G
1 PATCH TOPICAL ONCE
Status: DISCONTINUED | OUTPATIENT
Start: 2023-05-04 | End: 2023-05-04 | Stop reason: HOSPADM

## 2023-05-04 RX ORDER — HYDROMORPHONE HCL/PF 1 MG/ML
1 SYRINGE (ML) INJECTION ONCE
Status: DISCONTINUED | OUTPATIENT
Start: 2023-05-04 | End: 2023-05-04

## 2023-05-04 RX ORDER — HYDROXYZINE HYDROCHLORIDE 25 MG/1
25 TABLET, FILM COATED ORAL EVERY 6 HOURS PRN
Qty: 60 TABLET | Refills: 0 | Status: SHIPPED | OUTPATIENT
Start: 2023-05-04

## 2023-05-04 RX ORDER — HYDROMORPHONE HCL/PF 1 MG/ML
0.5 SYRINGE (ML) INJECTION ONCE
Status: COMPLETED | OUTPATIENT
Start: 2023-05-04 | End: 2023-05-04

## 2023-05-04 RX ORDER — OXYCODONE HYDROCHLORIDE AND ACETAMINOPHEN 5; 325 MG/1; MG/1
2 TABLET ORAL ONCE
Status: DISCONTINUED | OUTPATIENT
Start: 2023-05-04 | End: 2023-05-04

## 2023-05-04 RX ADMIN — LIDOCAINE 1 PATCH: 50 PATCH CUTANEOUS at 21:00

## 2023-05-04 RX ADMIN — HYDROMORPHONE HYDROCHLORIDE 0.5 MG: 1 INJECTION, SOLUTION INTRAMUSCULAR; INTRAVENOUS; SUBCUTANEOUS at 20:07

## 2023-05-04 RX ADMIN — OXYCODONE HYDROCHLORIDE 5 MG: 5 TABLET ORAL at 20:08

## 2023-05-04 RX ADMIN — DIPHENHYDRAMINE HYDROCHLORIDE 25 MG: 50 INJECTION, SOLUTION INTRAMUSCULAR; INTRAVENOUS at 21:00

## 2023-05-04 NOTE — TELEPHONE ENCOUNTER
Caller: Patient    Doctor: Kate Youssef    Reason for call:     Patient is calling to schedule the ultrasound-guided bilateral thoracic paraspinal injection  Please give her a call   Back to schedule    Thanks    Call back#: [phone]
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
## 2023-05-04 NOTE — TELEPHONE ENCOUNTER
Caller: Susana Chino     Doctor: Dr Shaka Brown     Reason for call: Patient is calling to schedule the ultrasound-guided bilateral thoracic paraspinal injection  Please give her a call   Back to schedule    Thanks     Call back#: [phone]
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
## 2023-05-08 NOTE — TELEPHONE ENCOUNTER
Caller: Patient     Doctor: Harjeet     Reason for call:      Patient is calling to schedule the ultrasound-guided bilateral thoracic paraspinal injection  Please give her a call   She is available after 3:00p     Call back#: [phone]

## 2023-05-11 NOTE — TELEPHONE ENCOUNTER
.Caller: pt    Doctor:     Reason for call: pt was made aware of message, and given the number below    Call back#: [phone]

## 2023-05-11 NOTE — TELEPHONE ENCOUNTER
Caller: Wendy Correia     Doctor: Dr Chidi Goddard     Reason for call: Patient calling stating she has not got a call back to set up appt for Psy please advise.  Please call patient at 12:30pm     Call back#: [phone]

## 2023-05-11 NOTE — TELEPHONE ENCOUNTER
Attempted to reach patient [facility] with call back number & office hours provided. When Pt calls back please provide her with the contact number to set up her Psychological Evaluation for her upcoming procedure.  Phone number: [phone]

## 2023-05-12 NOTE — TELEPHONE ENCOUNTER
CallerPatient  Doctor: Harjeet    Reason for call: pt is stating she is getting a SCS trial and is suppose to get a psychiatric evaluation done  When pt called they told her they need a referral from  to make a appointment to get it done   Any other questions call the patient    Call back#: [phone]

## 2023-05-15 DIAGNOSIS — Z98.1 HISTORY OF FUSION OF CERVICAL SPINE: ICD-10-CM

## 2023-05-15 DIAGNOSIS — M54.12 CERVICAL RADICULOPATHY: Primary | ICD-10-CM

## 2023-05-15 DIAGNOSIS — G89.4 CHRONIC PAIN SYNDROME: ICD-10-CM

## 2023-05-16 ENCOUNTER — TELEPHONE (OUTPATIENT)
Dept: BEHAVIORAL/MENTAL HEALTH CLINIC | Facility: CLINIC | Age: 48
End: 2023-05-16

## 2023-05-24 ENCOUNTER — HOSPITAL ENCOUNTER (OUTPATIENT)
Dept: MAMMOGRAPHY | Facility: MEDICAL CENTER | Age: 48
Discharge: HOME | End: 2023-05-24

## 2023-05-24 VITALS — WEIGHT: 119.05 LBS | BODY MASS INDEX: 19.83 KG/M2 | HEIGHT: 65 IN

## 2023-05-24 DIAGNOSIS — Z12.31 ENCOUNTER FOR SCREENING MAMMOGRAM FOR MALIGNANT NEOPLASM OF BREAST: ICD-10-CM

## 2023-05-28 DIAGNOSIS — M19.90 ARTHRITIS: ICD-10-CM

## 2023-05-28 DIAGNOSIS — M47.812 CERVICAL SPONDYLOSIS: ICD-10-CM

## 2023-05-30 ENCOUNTER — TELEPHONE (OUTPATIENT)
Dept: PAIN MEDICINE | Facility: CLINIC | Age: 48
End: 2023-05-30

## 2023-05-30 RX ORDER — NABUMETONE 500 MG/1
TABLET, FILM COATED ORAL
Qty: 60 TABLET | Refills: 0 | Status: SHIPPED | OUTPATIENT
Start: 2023-05-30

## 2023-05-30 NOTE — TELEPHONE ENCOUNTER
Pt contacted Call Center requested refill of their medication  Medication Name: nabumetone (RELAFEN          Dosage of Med: 500 mg      Frequency of Med: Take 1 tablet by mouth twice daily      Remaining Medication: A few       Pharmacy and Location:   [facility] [address] [facility]ESH [address] [facility] [address]  [address] , [address] [facility]  [address]   Phone:  [phone]          Pt  Preferred Callback Phone Number: [phone]      Thank you

## 2023-05-30 NOTE — TELEPHONE ENCOUNTER
Pt refill request for Relafen 500mg x1 BID  Last Rx 4/6 with no refill by RM  LOV 4/13 with RM  NOV 5/31 with RM    [facility]    Please advise-

## 2023-05-31 ENCOUNTER — PROCEDURE VISIT (OUTPATIENT)
Dept: PAIN MEDICINE | Facility: CLINIC | Age: 48
End: 2023-05-31

## 2023-05-31 DIAGNOSIS — M79.18 MYOFASCIAL PAIN SYNDROME: ICD-10-CM

## 2023-05-31 DIAGNOSIS — M54.6 CHRONIC BILATERAL THORACIC BACK PAIN: Primary | ICD-10-CM

## 2023-05-31 DIAGNOSIS — G89.29 CHRONIC BILATERAL THORACIC BACK PAIN: Primary | ICD-10-CM

## 2023-05-31 DIAGNOSIS — G89.4 CHRONIC PAIN SYNDROME: ICD-10-CM

## 2023-05-31 RX ORDER — TRIAMCINOLONE ACETONIDE 40 MG/ML
40 INJECTION, SUSPENSION INTRA-ARTICULAR; INTRAMUSCULAR
Status: COMPLETED | OUTPATIENT
Start: 2023-05-31 | End: 2023-05-31

## 2023-05-31 RX ORDER — BUPIVACAINE HYDROCHLORIDE 5 MG/ML
10 INJECTION, SOLUTION EPIDURAL; INTRACAUDAL
Status: COMPLETED | OUTPATIENT
Start: 2023-05-31 | End: 2023-05-31

## 2023-05-31 RX ADMIN — TRIAMCINOLONE ACETONIDE 40 MG: 40 INJECTION, SUSPENSION INTRA-ARTICULAR; INTRAMUSCULAR at 14:40

## 2023-05-31 RX ADMIN — BUPIVACAINE HYDROCHLORIDE 10 ML: 5 INJECTION, SOLUTION EPIDURAL; INTRACAUDAL at 14:40

## 2023-05-31 NOTE — PROGRESS NOTES
" Universal Protocol:  Consent: Verbal consent obtained  Written consent obtained  Risks and benefits: risks, benefits and alternatives were discussed  Consent given by: patient  Time out: Immediately prior to procedure a \"time out\" was called to verify the correct patient, procedure, equipment, support staff and site/side marked as required  Timeout called at: 5/31/2023 2:58 PM   Patient understanding: patient states understanding of the procedure being performed  Patient consent: the patient's understanding of the procedure matches consent given  Procedure consent: procedure consent matches procedure scheduled  Relevant documents: relevant documents present and verified  Test results: test results available and properly labeled  Site marked: the operative site was marked  Radiology Images displayed and confirmed   If images not available, report reviewed: imaging studies not available  Required items: required blood products, implants, devices, and special equipment available  Patient identity confirmed: verbally with patient    Supporting Documentation  Indications: pain   Trigger Point Injections: multiple trigger points: 3 or more muscle groups    Injection site identified by: ultrasound    Procedure Details  Location(s):    Middle Back: L thoracic paraspinals, L latissimus dorsi, L lower trapezius, R lower trapezius, R latissimus dorsi, R thoracic paraspinals, R longissimus and L longissimus     Prep: patient was prepped and draped in usual sterile fashion  Needle size: 22 G  Medications: 10 mL bupivacaine (PF) 0 5 %; 40 mg triamcinolone acetonide 40 mg/mL  Patient tolerance: patient tolerated the procedure well with no immediate complications        "

## 2023-05-31 NOTE — PATIENT INSTRUCTIONS
Do not apply heat to any area that is numb  If you have discomfort or soreness at the injection site, you may apply ice today, 20 minutes on and 20 minutes off  Tomorrow you may use ice or warm, moist heat  Do not apply ice or heat directly to the skin  If you experience severe shortness of breath, go to the Emergency Room  You may have numbness for several hours from the local anesthetic  Please use caution and common sense, especially with weight-bearing activities  You may have an increase or change in the discomfort for 36-48 hours after your treatment  Apply ice and continue with any pain medicine you have been prescribed  Do not do anything strenuous today  You may shower, but no tub baths or hot tubs today  You may resume your normal activities tomorrow, but do not “overdo it”  Resume normal activities slowly when you are feeling better  If you experience redness, drainage or swelling at the injection site, or if you develop a fever above 100 degrees, please call [facility] at [phone] or go to the Emergency Room  Continue to take all routine medicines prescribed by your primary care physician unless otherwise instructed by our staff  Most blood thinners should be started again according to your regularly scheduled dosing  If you have any questions, please give our office a call  As no general anesthesia was used in today's procedure, you should not experience any side effects related to anesthesia  If you have a problem specifically related to your procedure, please call our office at [phone]  Problems not related to your procedure should be directed to your primary care physician

## 2023-06-01 ENCOUNTER — OFFICE VISIT (OUTPATIENT)
Dept: URGENT CARE | Facility: CLINIC | Age: 48
End: 2023-06-01

## 2023-06-01 DIAGNOSIS — H61.21 RIGHT EAR IMPACTED CERUMEN: Primary | ICD-10-CM
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
## 2023-06-02 ENCOUNTER — TELEPHONE (OUTPATIENT)
Dept: OBGYN CLINIC | Facility: CLINIC | Age: 48
End: 2023-06-02

## 2023-06-06 ENCOUNTER — HOSPITAL ENCOUNTER (OUTPATIENT)
Dept: RADIOLOGY | Facility: HOSPITAL | Age: 48
Discharge: HOME | End: 2023-06-06
Payer: COMMERCIAL

## 2023-06-06 ENCOUNTER — TELEPHONE (OUTPATIENT)
Dept: PAIN MEDICINE | Facility: CLINIC | Age: 48
End: 2023-06-06

## 2023-06-06 VITALS
RESPIRATION RATE: 20 BRPM | OXYGEN SATURATION: 100 % | TEMPERATURE: 98.8 F | DIASTOLIC BLOOD PRESSURE: 80 MMHG | HEART RATE: 85 BPM | SYSTOLIC BLOOD PRESSURE: 126 MMHG

## 2023-06-06 DIAGNOSIS — M54.12 CERVICAL RADICULOPATHY: ICD-10-CM

## 2023-06-06 DIAGNOSIS — G89.4 CHRONIC PAIN SYNDROME: ICD-10-CM

## 2023-06-06 DIAGNOSIS — M47.812 CERVICAL SPONDYLOSIS: ICD-10-CM

## 2023-06-06 PROCEDURE — 64633 DESTROY CERV/THOR FACET JNT: CPT | Performed by: ANESTHESIOLOGY

## 2023-06-06 PROCEDURE — 64634 DESTROY C/TH FACET JNT ADDL: CPT | Performed by: ANESTHESIOLOGY

## 2023-06-06 RX ORDER — LIDOCAINE HYDROCHLORIDE 10 MG/ML
5 INJECTION, SOLUTION EPIDURAL; INFILTRATION; INTRACAUDAL; PERINEURAL ONCE
Status: COMPLETED | OUTPATIENT
Start: 2023-06-06 | End: 2023-06-06

## 2023-06-06 RX ADMIN — Medication 3 ML: at 11:20

## 2023-06-06 RX ADMIN — LIDOCAINE HYDROCHLORIDE 5 ML: 10 INJECTION, SOLUTION EPIDURAL; INFILTRATION; INTRACAUDAL; PERINEURAL at 11:14

## 2023-06-06 NOTE — DISCHARGE INSTRUCTIONS
Medial Branch Radiofrequency Ablation     WHAT YOU NEED TO KNOW:   Medial branch radiofrequency ablation (RFA) is a procedure used to treat facet joint pain in your neck, mid back, or lower back  Facet joints are found at the back of each vertebra  A needle electrode is used to send electrical currents to the nerves in your facet joint  The electrical currents create heat that damages the nerve so it cannot send pain signals  ACTIVITY  Do not drive or operate machinery today  No strenuous activity today - bending, lifting, etc   You may shower today, but do not sit in a tub of water  Resume normal activities tomorrow as tolerated  CARE OF THE INJECTION SITE  If you have soreness or pain, apply ice to the area today (20 minutes on/20 minutes off)  Starting tomorrow, you may use warm, moist heat or ice if needed  Notify [facility] if you have any of the following: redness, drainage, swelling, or fever above 100°F     SPECIAL INSTRUCTIONS  Our office will call you tomorrow for a progress report and make an appointment for a follow up visit in 4 weeks  If you feel a sunburn-like sensation in the area of your procedure, call our office  MEDICATIONS  Continue to take all routine medications  Our office may have instructed you to hold some medications  As no general anesthesia was used in today's procedure, you should not experience any side effects related to anesthesia  If you have a problem specifically related to your procedure, please call our office at [phone]  Problems not related to your procedure should be directed to your primary care physician
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
## 2023-06-07 ENCOUNTER — TELEMEDICINE (OUTPATIENT)
Dept: BEHAVIORAL/MENTAL HEALTH CLINIC | Facility: CLINIC | Age: 48
End: 2023-06-07
Payer: COMMERCIAL

## 2023-06-07 ENCOUNTER — TELEPHONE (OUTPATIENT)
Dept: PAIN MEDICINE | Facility: CLINIC | Age: 48
End: 2023-06-07

## 2023-06-07 DIAGNOSIS — F41.9 ANXIETY AND DEPRESSION: Primary | ICD-10-CM

## 2023-06-07 DIAGNOSIS — F32.A ANXIETY AND DEPRESSION: Primary | ICD-10-CM

## 2023-06-07 PROCEDURE — 90832 PSYTX W PT 30 MINUTES: CPT
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
## 2023-06-07 NOTE — TELEPHONE ENCOUNTER
S/W pt  Pt stated needle sites look good, denies S/S of infection, denies fever, mild injection site soreness and denies sunburn like sensation  Advised pt if he/she does get pain to take prescribed or OTC pain medications and/or use ice/heat and it will take 4-6 weeks to take full effect  Confirmed next appt with pt  Pt verbalized understanding

## 2023-06-11 DIAGNOSIS — M47.812 CERVICAL SPONDYLOSIS: ICD-10-CM

## 2023-06-11 DIAGNOSIS — F41.9 ANXIETY: ICD-10-CM

## 2023-06-11 DIAGNOSIS — M19.90 ARTHRITIS: ICD-10-CM

## 2023-06-11 RX ORDER — HYDROXYZINE HYDROCHLORIDE 25 MG/1
TABLET, FILM COATED ORAL
Qty: 60 TABLET | Refills: 0 | Status: SHIPPED | OUTPATIENT
Start: 2023-06-11

## 2023-06-14 ENCOUNTER — OFFICE VISIT (OUTPATIENT)
Dept: NEPHROLOGY | Facility: CLINIC | Age: 48
End: 2023-06-14
Payer: COMMERCIAL

## 2023-06-14 ENCOUNTER — HOSPITAL ENCOUNTER (EMERGENCY)
Facility: HOSPITAL | Age: 48
Discharge: HOME | End: 2023-06-14
Attending: EMERGENCY MEDICINE
Payer: COMMERCIAL

## 2023-06-14 VITALS
RESPIRATION RATE: 18 BRPM | HEIGHT: 65 IN | TEMPERATURE: 98.3 F | WEIGHT: 120 LBS | HEART RATE: 78 BPM | SYSTOLIC BLOOD PRESSURE: 114 MMHG | DIASTOLIC BLOOD PRESSURE: 73 MMHG | BODY MASS INDEX: 19.99 KG/M2 | OXYGEN SATURATION: 98 %

## 2023-06-14 VITALS
DIASTOLIC BLOOD PRESSURE: 80 MMHG | HEIGHT: 65 IN | BODY MASS INDEX: 19.99 KG/M2 | HEART RATE: 74 BPM | SYSTOLIC BLOOD PRESSURE: 110 MMHG | WEIGHT: 120 LBS | OXYGEN SATURATION: 98 %

## 2023-06-14 DIAGNOSIS — I12.9 BENIGN HYPERTENSION WITH CKD (CHRONIC KIDNEY DISEASE) STAGE III (HCC): ICD-10-CM

## 2023-06-14 DIAGNOSIS — N11.9 CHRONIC TUBULOINTERSTITIAL NEPHRITIS: ICD-10-CM

## 2023-06-14 DIAGNOSIS — G89.29 CHRONIC NECK PAIN: Primary | ICD-10-CM

## 2023-06-14 DIAGNOSIS — N18.32 STAGE 3B CHRONIC KIDNEY DISEASE (HCC): Primary | ICD-10-CM

## 2023-06-14 DIAGNOSIS — N25.81 SECONDARY HYPERPARATHYROIDISM (HCC): ICD-10-CM

## 2023-06-14 DIAGNOSIS — N18.30 BENIGN HYPERTENSION WITH CKD (CHRONIC KIDNEY DISEASE) STAGE III (HCC): ICD-10-CM

## 2023-06-14 DIAGNOSIS — E55.9 VITAMIN D DEFICIENCY: ICD-10-CM

## 2023-06-14 DIAGNOSIS — M54.2 CHRONIC NECK PAIN: Primary | ICD-10-CM

## 2023-06-14 PROCEDURE — 99283 EMERGENCY DEPT VISIT LOW MDM: CPT

## 2023-06-14 PROCEDURE — 96375 TX/PRO/DX INJ NEW DRUG ADDON: CPT

## 2023-06-14 PROCEDURE — 93005 ELECTROCARDIOGRAM TRACING: CPT

## 2023-06-14 PROCEDURE — 96374 THER/PROPH/DIAG INJ IV PUSH: CPT

## 2023-06-14 PROCEDURE — 99213 OFFICE O/P EST LOW 20 MIN: CPT | Performed by: NURSE PRACTITIONER

## 2023-06-14 RX ORDER — OXYCODONE HYDROCHLORIDE 5 MG/1
5 TABLET ORAL ONCE
Status: COMPLETED | OUTPATIENT
Start: 2023-06-14 | End: 2023-06-14

## 2023-06-14 RX ORDER — FENTANYL CITRATE 50 UG/ML
50 INJECTION, SOLUTION INTRAMUSCULAR; INTRAVENOUS ONCE
Status: COMPLETED | OUTPATIENT
Start: 2023-06-14 | End: 2023-06-14

## 2023-06-14 RX ORDER — DEXAMETHASONE SODIUM PHOSPHATE 10 MG/ML
8 INJECTION, SOLUTION INTRAMUSCULAR; INTRAVENOUS ONCE
Status: COMPLETED | OUTPATIENT
Start: 2023-06-14 | End: 2023-06-14

## 2023-06-14 RX ORDER — NABUMETONE 500 MG/1
TABLET, FILM COATED ORAL
Qty: 60 TABLET | Refills: 0 | Status: SHIPPED | OUTPATIENT
Start: 2023-06-14

## 2023-06-14 RX ADMIN — OXYCODONE HYDROCHLORIDE 5 MG: 5 TABLET ORAL at 21:25

## 2023-06-14 RX ADMIN — FENTANYL CITRATE 50 MCG: 50 INJECTION, SOLUTION INTRAMUSCULAR; INTRAVENOUS at 20:36

## 2023-06-14 RX ADMIN — DEXAMETHASONE SODIUM PHOSPHATE 8 MG: 10 INJECTION, SOLUTION INTRAMUSCULAR; INTRAVENOUS at 20:36

## 2023-06-15 LAB
ATRIAL RATE: 88 BPM
P AXIS: 70 DEGREES
PR INTERVAL: 142 MS
QRS AXIS: 60 DEGREES
QRSD INTERVAL: 82 MS
QT INTERVAL: 356 MS
QTC INTERVAL: 430 MS
T WAVE AXIS: 52 DEGREES
VENTRICULAR RATE: 88 BPM

## 2023-06-15 PROCEDURE — 93010 ELECTROCARDIOGRAM REPORT: CPT | Performed by: INTERNAL MEDICINE
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
## 2023-06-23 ENCOUNTER — TELEMEDICINE (OUTPATIENT)
Dept: BEHAVIORAL/MENTAL HEALTH CLINIC | Facility: CLINIC | Age: 48
End: 2023-06-23
Payer: COMMERCIAL

## 2023-06-23 DIAGNOSIS — F32.A ANXIETY AND DEPRESSION: Primary | ICD-10-CM

## 2023-06-23 DIAGNOSIS — F41.9 ANXIETY AND DEPRESSION: Primary | ICD-10-CM

## 2023-06-23 PROCEDURE — 90834 PSYTX W PT 45 MINUTES: CPT
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
## 2023-06-28 ENCOUNTER — TELEPHONE (OUTPATIENT)
Dept: BEHAVIORAL/MENTAL HEALTH CLINIC | Facility: CLINIC | Age: 48
End: 2023-06-28

## 2023-06-28 NOTE — TELEPHONE ENCOUNTER
LVM for pt re JERRY that was sent to [place] 6/23/23 for pt to sign as agreed upon on 6/26/23  JERRY has not been received  Requested pt to contact this writer with any concerns and reminded pt that SCS Implant eval cannot be completed without signed JERRY  Provided CB number

## 2023-07-03 DIAGNOSIS — M79.18 MYOFASCIAL PAIN SYNDROME: ICD-10-CM

## 2023-07-03 DIAGNOSIS — M54.9 MID BACK PAIN: ICD-10-CM

## 2023-07-03 RX ORDER — BACLOFEN 20 MG/1
TABLET ORAL
Qty: 90 TABLET | Refills: 0 | Status: SHIPPED | OUTPATIENT
Start: 2023-07-03

## 2023-07-03 NOTE — TELEPHONE ENCOUNTER
Refill request  Baclofen 20mg  Remainin-2 days  Take 1 tablet (20 mg total) by mouth 3 (three) times a day  [facility] [address] ROSSANA RAMESH [address] [place], [place]   Call back# [phone]

## 2023-07-03 NOTE — TELEPHONE ENCOUNTER
Pt is scheduled for f/u 7/7 but will be out of Baclofen before that appointment  Please advise on refill now

## 2023-07-06 ENCOUNTER — TELEPHONE (OUTPATIENT)
Dept: BEHAVIORAL/MENTAL HEALTH CLINIC | Facility: CLINIC | Age: 48
End: 2023-07-06

## 2023-07-06 NOTE — TELEPHONE ENCOUNTER
Spoke with pt and reviewed results of interview and assessments that contraindicate scs implant trial.  Reviewed recommendations including 4-6 months of therapy focusing on biofeedback/relaxation techniques, stress reduction, feelings identification/mgmt and self empowerment/responsibility. Pt voiced understanding and has session with therapist tomorrow and will review recommendations. Informed pt that this writer was unable to contact therapist as pt has not signed/returned JERRY.

## 2023-07-10 ENCOUNTER — TRANSCRIBE ORDERS (OUTPATIENT)
Dept: PAIN MEDICINE | Facility: CLINIC | Age: 48
End: 2023-07-10

## 2023-07-13 ENCOUNTER — VBI (OUTPATIENT)
Dept: ADMINISTRATIVE | Facility: OTHER | Age: 48
End: 2023-07-13

## 2023-07-16 ENCOUNTER — TELEPHONE (OUTPATIENT)
Dept: PAIN MEDICINE | Facility: CLINIC | Age: 48
End: 2023-07-16

## 2023-07-16 DIAGNOSIS — M54.9 MID BACK PAIN: ICD-10-CM

## 2023-07-16 DIAGNOSIS — M79.18 MYOFASCIAL PAIN SYNDROME: ICD-10-CM

## 2023-07-17 RX ORDER — BACLOFEN 20 MG/1
TABLET ORAL
Qty: 90 TABLET | Refills: 0 | Status: SHIPPED | OUTPATIENT
Start: 2023-07-17

## 2023-07-18 ENCOUNTER — OFFICE VISIT (OUTPATIENT)
Dept: OBGYN CLINIC | Facility: CLINIC | Age: 48
End: 2023-07-18
Payer: COMMERCIAL

## 2023-07-18 VITALS
SYSTOLIC BLOOD PRESSURE: 123 MMHG | DIASTOLIC BLOOD PRESSURE: 79 MMHG | HEIGHT: 65 IN | BODY MASS INDEX: 19.99 KG/M2 | WEIGHT: 120 LBS | HEART RATE: 85 BPM

## 2023-07-18 DIAGNOSIS — M77.02 MEDIAL EPICONDYLITIS OF LEFT ELBOW: Primary | ICD-10-CM

## 2023-07-18 PROCEDURE — 20551 NJX 1 TENDON ORIGIN/INSJ: CPT | Performed by: ORTHOPAEDIC SURGERY

## 2023-07-18 PROCEDURE — 99213 OFFICE O/P EST LOW 20 MIN: CPT | Performed by: ORTHOPAEDIC SURGERY

## 2023-07-18 RX ORDER — BUPIVACAINE HYDROCHLORIDE 2.5 MG/ML
1 INJECTION, SOLUTION INFILTRATION; PERINEURAL
Status: COMPLETED | OUTPATIENT
Start: 2023-07-18 | End: 2023-07-18

## 2023-07-18 RX ORDER — BETAMETHASONE SODIUM PHOSPHATE AND BETAMETHASONE ACETATE 3; 3 MG/ML; MG/ML
6 INJECTION, SUSPENSION INTRA-ARTICULAR; INTRALESIONAL; INTRAMUSCULAR; SOFT TISSUE
Status: COMPLETED | OUTPATIENT
Start: 2023-07-18 | End: 2023-07-18

## 2023-07-18 RX ADMIN — BUPIVACAINE HYDROCHLORIDE 1 ML: 2.5 INJECTION, SOLUTION INFILTRATION; PERINEURAL at 08:00

## 2023-07-18 RX ADMIN — BETAMETHASONE SODIUM PHOSPHATE AND BETAMETHASONE ACETATE 6 MG: 3; 3 INJECTION, SUSPENSION INTRA-ARTICULAR; INTRALESIONAL; INTRAMUSCULAR; SOFT TISSUE at 08:00

## 2023-07-18 NOTE — TELEPHONE ENCOUNTER
Caller: Siddharth Simons  Doctor: Sammy Chisholm     Reason for call: calling for med refill on Baclofen made her aware it was sent to her pharmacy yesterday     Call back#: [phone]
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
## 2023-07-26 ENCOUNTER — HOSPITAL ENCOUNTER (EMERGENCY)
Facility: HOSPITAL | Age: 48
Discharge: HOME | End: 2023-07-26
Attending: INTERNAL MEDICINE
Payer: COMMERCIAL

## 2023-07-26 ENCOUNTER — OFFICE VISIT (OUTPATIENT)
Dept: PAIN MEDICINE | Facility: CLINIC | Age: 48
End: 2023-07-26
Payer: COMMERCIAL

## 2023-07-26 VITALS
SYSTOLIC BLOOD PRESSURE: 120 MMHG | DIASTOLIC BLOOD PRESSURE: 75 MMHG | WEIGHT: 120 LBS | HEIGHT: 65 IN | HEART RATE: 87 BPM | BODY MASS INDEX: 19.99 KG/M2

## 2023-07-26 VITALS
HEART RATE: 96 BPM | RESPIRATION RATE: 16 BRPM | TEMPERATURE: 98.2 F | WEIGHT: 125 LBS | DIASTOLIC BLOOD PRESSURE: 63 MMHG | OXYGEN SATURATION: 98 % | BODY MASS INDEX: 20.83 KG/M2 | HEIGHT: 65 IN | SYSTOLIC BLOOD PRESSURE: 112 MMHG

## 2023-07-26 DIAGNOSIS — G89.4 CHRONIC PAIN SYNDROME: Primary | ICD-10-CM

## 2023-07-26 DIAGNOSIS — M54.12 CERVICAL RADICULOPATHY: Primary | ICD-10-CM

## 2023-07-26 DIAGNOSIS — M79.18 MYOFASCIAL PAIN SYNDROME: ICD-10-CM

## 2023-07-26 DIAGNOSIS — G89.29 CHRONIC BILATERAL THORACIC BACK PAIN: ICD-10-CM

## 2023-07-26 DIAGNOSIS — M54.12 CERVICAL RADICULOPATHY: ICD-10-CM

## 2023-07-26 DIAGNOSIS — M47.812 CERVICAL SPONDYLOSIS: ICD-10-CM

## 2023-07-26 DIAGNOSIS — M54.2 NECK PAIN: ICD-10-CM

## 2023-07-26 DIAGNOSIS — Z98.1 HISTORY OF FUSION OF CERVICAL SPINE: ICD-10-CM

## 2023-07-26 DIAGNOSIS — M54.6 CHRONIC BILATERAL THORACIC BACK PAIN: ICD-10-CM

## 2023-07-26 PROCEDURE — 99214 OFFICE O/P EST MOD 30 MIN: CPT | Performed by: NURSE PRACTITIONER

## 2023-07-26 RX ORDER — FENTANYL CITRATE 50 UG/ML
75 INJECTION, SOLUTION INTRAMUSCULAR; INTRAVENOUS ONCE
Status: COMPLETED | OUTPATIENT
Start: 2023-07-26 | End: 2023-07-26

## 2023-07-26 RX ORDER — FENTANYL CITRATE 50 UG/ML
50 INJECTION, SOLUTION INTRAMUSCULAR; INTRAVENOUS ONCE
Status: COMPLETED | OUTPATIENT
Start: 2023-07-26 | End: 2023-07-26

## 2023-07-26 RX ORDER — METHYLPREDNISOLONE 4 MG/1
TABLET ORAL
Qty: 21 TABLET | Refills: 0 | Status: SHIPPED | OUTPATIENT
Start: 2023-07-26

## 2023-07-26 RX ORDER — DEXAMETHASONE SODIUM PHOSPHATE 10 MG/ML
8 INJECTION, SOLUTION INTRAMUSCULAR; INTRAVENOUS ONCE
Status: COMPLETED | OUTPATIENT
Start: 2023-07-26 | End: 2023-07-26

## 2023-07-26 RX ORDER — GABAPENTIN 800 MG/1
800 TABLET ORAL 3 TIMES DAILY
Qty: 90 TABLET | Refills: 2 | Status: SHIPPED | OUTPATIENT
Start: 2023-07-26

## 2023-07-26 RX ORDER — NABUMETONE 500 MG/1
500 TABLET, FILM COATED ORAL 2 TIMES DAILY
Qty: 60 TABLET | Refills: 2 | Status: SHIPPED | OUTPATIENT
Start: 2023-07-26

## 2023-07-26 RX ORDER — OXYCODONE HYDROCHLORIDE AND ACETAMINOPHEN 5; 325 MG/1; MG/1
1 TABLET ORAL EVERY 4 HOURS PRN
Qty: 10 TABLET | Refills: 0 | Status: SHIPPED | OUTPATIENT
Start: 2023-07-26 | End: 2023-08-05

## 2023-07-26 RX ORDER — LIDOCAINE 50 MG/G
OINTMENT TOPICAL AS NEEDED
Qty: 35.44 G | Refills: 5 | Status: SHIPPED | OUTPATIENT
Start: 2023-07-26

## 2023-07-26 RX ADMIN — FENTANYL CITRATE 50 MCG: 50 INJECTION, SOLUTION INTRAMUSCULAR; INTRAVENOUS at 21:51

## 2023-07-26 RX ADMIN — FENTANYL CITRATE 75 MCG: 50 INJECTION, SOLUTION INTRAMUSCULAR; INTRAVENOUS at 20:52

## 2023-07-26 RX ADMIN — DEXAMETHASONE SODIUM PHOSPHATE 8 MG: 10 INJECTION, SOLUTION INTRAMUSCULAR; INTRAVENOUS at 20:53
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
## 2023-07-26 NOTE — PATIENT INSTRUCTIONS
Neck Exercises   AMBULATORY CARE:   Neck exercises  help reduce neck pain, and improve neck movement and strength. Neck exercises also help prevent long-term neck problems. Call your doctor if:   Your pain does not get better, or gets worse. You have questions or concerns about your condition, care, or exercise program.    What you need to know about exercise safety:   Move slowly, gently, and smoothly. Avoid fast or jerky motions. Stand and sit the way your healthcare provider shows you. Good posture may reduce your neck pain. Check your posture often, even when you are not doing your neck exercises. Follow the exercise program recommended by your healthcare provider. He or she will tell you which exercises are best for your condition. He or she will also tell you how many repetitions to do and how often you should do the exercises. How to perform neck exercises safely:   Exercise position:  You may sit or stand while you do neck exercises. Face forward. Your shoulders should be straight and relaxed, with a good posture. Head tilts, forward and back:  Gently bow your head and try to touch your chin to your chest. Your healthcare provider may tell you to push on the back of your neck to help bow your head. Raise your chin back to the starting position. Tilt your head back as far as possible so you are looking up at the ceiling. Your healthcare provider may tell you to lift your chin to help tilt your head back. Return your head to the starting position. Head tilts, side to side:  Tilt your head, bringing your ear toward your shoulder. Then tilt your head toward the other shoulder. Head turns:  Turn your head to look over your shoulder. Tilt your chin down and try to touch it to your shoulder. Do not raise your shoulder to your chin. Face forward again. Do the same on the other side.          Head rolls:  Slowly bring your chin toward your chest. Next, roll your head to the right. Your ear should be positioned over your shoulder. Hold this position for 5 seconds. Roll your head back toward your chest and to the left into the same position. Hold for 5 seconds. Gently roll your head back and around in a clockwise [place] 3 times. Next, move your head in the reverse direction (counterclockwise) in a [place] 3 times. Do not shrug your shoulders upwards while you do this exercise. Follow up with your doctor as directed:  Write down your questions so you remember to ask them during your visits. © Copyright [facility] 2022 Information is for End User's use only and may not be sold, redistributed or otherwise used for commercial purposes. The above information is an  only. It is not intended as medical advice for individual conditions or treatments. Talk to your doctor, nurse or pharmacist before following any medical regimen to see if it is safe and effective for you.

## 2023-07-27 ENCOUNTER — TELEPHONE (OUTPATIENT)
Dept: FAMILY MEDICINE CLINIC | Facility: CLINIC | Age: 48
End: 2023-07-27

## 2023-07-27 NOTE — TELEPHONE ENCOUNTER
Caller: Geovany Andrade PT    Doctor: Dr Jacek Ferrara for call: Pt called in to request a letter from the doctor for FMLA in regard to her procedures and recovery time as well as how often she needs to get the injection. Please put in [facility] .  Letter is due by 08/10/2023    Call back#: [phone]

## 2023-07-27 NOTE — DISCHARGE INSTRUCTIONS
Follow-up with primary care and pain management. Steroids as directed. Continue usual pain medications.   Percocet for severe pain over the next 2 to 3 days

## 2023-07-27 NOTE — TELEPHONE ENCOUNTER
Patient called stating that dr. Hanna Mclean put in a note in her chart for the [facility] and she would like the papers to be filled out as it is due 8/10
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
## 2023-07-31 ENCOUNTER — TELEPHONE (OUTPATIENT)
Dept: FAMILY MEDICINE CLINIC | Facility: CLINIC | Age: 48
End: 2023-07-31

## 2023-07-31 ENCOUNTER — TELEPHONE (OUTPATIENT)
Dept: PAIN MEDICINE | Facility: CLINIC | Age: 48
End: 2023-07-31

## 2023-07-31 NOTE — TELEPHONE ENCOUNTER
Pt would like to know what needs to be in the note from pain management for her FMLA.      PLEASE ADVISE

## 2023-07-31 NOTE — TELEPHONE ENCOUNTER
Caller: [address]     Doctor: Ryan Oneil     Reason for call: patient states [employer] is showing 8/10/23 is when her FMLA  please advise     Patient provided Fax # for primary [phone]    Call back#: [phone]

## 2023-07-31 NOTE — TELEPHONE ENCOUNTER
I talked to pt and she is confused on everything. She did say pain management did fax over a form.  We have not received it yet

## 2023-07-31 NOTE — TELEPHONE ENCOUNTER
Spoke with patient. . Advised to call PC  and have them refax FMLA form to [employer] as she is covered for Intermittent Leave till 11/23

## 2023-07-31 NOTE — TELEPHONE ENCOUNTER
Caller: Rosie Vasquez     Doctor: Dr Riya Vargas    Reason for call: Patient returning call     Call back#: [phone]

## 2023-07-31 NOTE — TELEPHONE ENCOUNTER
Received FMLA forms . Patients PC dr completed forms for Intermittent Leave till 11/23. Dr. Maria E Medina stated he was not going to write a work note for patient. L/M for return call to advise patient.

## 2023-08-01 NOTE — TELEPHONE ENCOUNTER
Spoke with Pham Pichardo from pain management who informed me that they faxed over patients FMLA paperwork to our office. She suggested that we send the FMLA paperwork that was faxed to us and the previous FMLA paperwork from May to the patients job. The paperwork from May states she is to be out until November 3 2023. Paperwork faxed.

## 2023-08-01 NOTE — TELEPHONE ENCOUNTER
[facility] WITH [facility] will resend her form. Advised patient of the same.

## 2023-08-02 NOTE — TELEPHONE ENCOUNTER
Patient called back. She states her job received the [employer] paperwork, but they need it renewed. That is why they sent the new forms to be filled out. They told her the dates need to be updated to Start: 8/11/2023- 8/11/24. Giving these forms to Fidel Mullins to see if she will update them.

## 2023-08-03 NOTE — TELEPHONE ENCOUNTER
Caller: Ericka ELMORE    Doctor/Office: Dr Jaylon Washington     Call regarding :  Reschedule procedure      Call was transferred to: DR TIERA SOUZA

## 2023-08-04 ENCOUNTER — TELEPHONE (OUTPATIENT)
Age: 48
End: 2023-08-04

## 2023-08-04 NOTE — TELEPHONE ENCOUNTER
Caller: [facility]     Doctor:      Reason for call: [facility] called from family practice would like a call back from [facility] regarding Disability pw needs to be renewed by pain management.    Call back#: [phone]

## 2023-08-09 ENCOUNTER — TELEPHONE (OUTPATIENT)
Age: 48
End: 2023-08-09

## 2023-08-09 ENCOUNTER — TELEPHONE (OUTPATIENT)
Dept: PAIN MEDICINE | Facility: CLINIC | Age: 48
End: 2023-08-09

## 2023-08-09 NOTE — TELEPHONE ENCOUNTER
PA REQUEST FOR LIDOCAINE OINTMENT 5% WAS SUBMITTED TO [employer]AN [employer]IA [employer]    CLAY [id number]    WILL AWAIT PLAN DETERMINATION

## 2023-08-09 NOTE — TELEPHONE ENCOUNTER
Received fax from from [facility] [employer] for prior auth for Lidocaine 5%    KEY: [id number]

## 2023-08-09 NOTE — TELEPHONE ENCOUNTER
Spoke to [facility] this still needs a PA.  Will submit clinicals via [employer]

## 2023-08-11 NOTE — TELEPHONE ENCOUNTER
Spoke with Lelia Tello from [facility]. A determination is due by midnight 8/12/23 via fax. Fax number was confirmed.

## 2023-08-14 NOTE — TELEPHONE ENCOUNTER
Jerry MAZARIEGOS Case ID: [id number] - Rx #: [id number]  Need help? Call us at [phone]  Outcome   Deniedon August 11  Your request has been denied  Drug    Lidocaine 5% ointment   Form    [employer] Electronic PA Form (2017 [employer])           Original Claim Info    75   Health Plan’s Preferred Product  LIDOCAINE HCL

## 2023-08-17 ENCOUNTER — HOSPITAL ENCOUNTER (EMERGENCY)
Facility: HOSPITAL | Age: 48
Discharge: HOME | End: 2023-08-17
Attending: EMERGENCY MEDICINE
Payer: COMMERCIAL

## 2023-08-17 VITALS
TEMPERATURE: 98.8 F | HEIGHT: 65 IN | HEART RATE: 89 BPM | BODY MASS INDEX: 20.83 KG/M2 | SYSTOLIC BLOOD PRESSURE: 105 MMHG | OXYGEN SATURATION: 99 % | DIASTOLIC BLOOD PRESSURE: 68 MMHG | RESPIRATION RATE: 18 BRPM | WEIGHT: 125 LBS

## 2023-08-17 DIAGNOSIS — M54.2 NECK PAIN: Primary | ICD-10-CM

## 2023-08-17 PROCEDURE — 99283 EMERGENCY DEPT VISIT LOW MDM: CPT

## 2023-08-17 PROCEDURE — 96372 THER/PROPH/DIAG INJ SC/IM: CPT

## 2023-08-17 PROCEDURE — 99284 EMERGENCY DEPT VISIT MOD MDM: CPT | Performed by: EMERGENCY MEDICINE

## 2023-08-17 RX ORDER — LIDOCAINE 40 MG/G
CREAM TOPICAL ONCE
Status: COMPLETED | OUTPATIENT
Start: 2023-08-17 | End: 2023-08-17

## 2023-08-17 RX ORDER — LIDOCAINE 50 MG/G
1 PATCH TOPICAL ONCE
Status: DISCONTINUED | OUTPATIENT
Start: 2023-08-17 | End: 2023-08-17 | Stop reason: HOSPADM

## 2023-08-17 RX ORDER — OXYCODONE HYDROCHLORIDE 5 MG/1
5 TABLET ORAL ONCE
Status: COMPLETED | OUTPATIENT
Start: 2023-08-17 | End: 2023-08-17

## 2023-08-17 RX ORDER — HYDROMORPHONE HCL/PF 1 MG/ML
1 SYRINGE (ML) INJECTION ONCE
Status: COMPLETED | OUTPATIENT
Start: 2023-08-17 | End: 2023-08-17

## 2023-08-17 RX ORDER — LIDOCAINE 40 MG/G
CREAM TOPICAL AS NEEDED
Qty: 15 G | Refills: 0 | Status: SHIPPED | OUTPATIENT
Start: 2023-08-17

## 2023-08-17 RX ORDER — HYDROMORPHONE HCL/PF 1 MG/ML
2 SYRINGE (ML) INJECTION ONCE
Status: COMPLETED | OUTPATIENT
Start: 2023-08-17 | End: 2023-08-17

## 2023-08-17 RX ADMIN — OXYCODONE HYDROCHLORIDE 5 MG: 5 TABLET ORAL at 16:16

## 2023-08-17 RX ADMIN — LIDOCAINE 1 APPLICATION: 40 CREAM TOPICAL at 16:28

## 2023-08-17 RX ADMIN — HYDROMORPHONE HYDROCHLORIDE 2 MG: 1 INJECTION, SOLUTION INTRAMUSCULAR; INTRAVENOUS; SUBCUTANEOUS at 16:57

## 2023-08-17 RX ADMIN — HYDROMORPHONE HYDROCHLORIDE 1 MG: 1 INJECTION, SOLUTION INTRAMUSCULAR; INTRAVENOUS; SUBCUTANEOUS at 16:16
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
## 2023-08-25 ENCOUNTER — OFFICE VISIT (OUTPATIENT)
Dept: URGENT CARE | Facility: CLINIC | Age: 48
End: 2023-08-25
Payer: COMMERCIAL

## 2023-08-25 VITALS
DIASTOLIC BLOOD PRESSURE: 69 MMHG | RESPIRATION RATE: 16 BRPM | OXYGEN SATURATION: 98 % | TEMPERATURE: 98.3 F | HEIGHT: 65 IN | BODY MASS INDEX: 20.26 KG/M2 | SYSTOLIC BLOOD PRESSURE: 124 MMHG | HEART RATE: 70 BPM | WEIGHT: 121.6 LBS

## 2023-08-25 DIAGNOSIS — H10.32 ACUTE BACTERIAL CONJUNCTIVITIS OF LEFT EYE: Primary | ICD-10-CM

## 2023-08-25 PROCEDURE — 99213 OFFICE O/P EST LOW 20 MIN: CPT | Performed by: NURSE PRACTITIONER

## 2023-08-25 RX ORDER — OFLOXACIN 3 MG/ML
SOLUTION/ DROPS OPHTHALMIC
Qty: 5 ML | Refills: 0 | Status: SHIPPED | OUTPATIENT
Start: 2023-08-25

## 2023-08-25 NOTE — PATIENT INSTRUCTIONS
Use drops as directed. Discard contacts and case. Do not wear contacts for 2 weeks. Clean pillow case daily. Frequent hand washing. Follow up with PCP in 3-5 days. Proceed to the ER if symptoms worsen.
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
## 2023-08-28 ENCOUNTER — PROCEDURE VISIT (OUTPATIENT)
Dept: PAIN MEDICINE | Facility: CLINIC | Age: 48
End: 2023-08-28
Payer: COMMERCIAL

## 2023-08-28 DIAGNOSIS — M54.2 NECK PAIN: Primary | ICD-10-CM

## 2023-08-28 DIAGNOSIS — G89.29 CHRONIC BILATERAL THORACIC BACK PAIN: ICD-10-CM

## 2023-08-28 DIAGNOSIS — M54.6 CHRONIC BILATERAL THORACIC BACK PAIN: ICD-10-CM

## 2023-08-28 DIAGNOSIS — M79.18 MYOFASCIAL PAIN SYNDROME: ICD-10-CM

## 2023-08-28 PROCEDURE — 20553 NJX 1/MLT TRIGGER POINTS 3/>: CPT | Performed by: ANESTHESIOLOGY

## 2023-08-28 PROCEDURE — 76942 ECHO GUIDE FOR BIOPSY: CPT | Performed by: ANESTHESIOLOGY

## 2023-08-28 RX ORDER — TRIAMCINOLONE ACETONIDE 40 MG/ML
40 INJECTION, SUSPENSION INTRA-ARTICULAR; INTRAMUSCULAR
Status: COMPLETED | OUTPATIENT
Start: 2023-08-28 | End: 2023-08-28

## 2023-08-28 RX ORDER — BUPIVACAINE HYDROCHLORIDE 2.5 MG/ML
10 INJECTION, SOLUTION EPIDURAL; INFILTRATION; INTRACAUDAL
Status: COMPLETED | OUTPATIENT
Start: 2023-08-28 | End: 2023-08-28

## 2023-08-28 RX ADMIN — BUPIVACAINE HYDROCHLORIDE 10 ML: 2.5 INJECTION, SOLUTION EPIDURAL; INFILTRATION; INTRACAUDAL at 08:00

## 2023-08-28 RX ADMIN — TRIAMCINOLONE ACETONIDE 40 MG: 40 INJECTION, SUSPENSION INTRA-ARTICULAR; INTRAMUSCULAR at 08:00

## 2023-08-28 NOTE — PROGRESS NOTES
Universal Protocol:  Consent: Verbal consent obtained. Written consent obtained. Risks and benefits: risks, benefits and alternatives were discussed  Consent given by: patient  Time out: Immediately prior to procedure a "time out" was called to verify the correct patient, procedure, equipment, support staff and site/side marked as required. Timeout called at: 8/28/2023 8:10 AM.  Patient understanding: patient states understanding of the procedure being performed  Patient consent: the patient's understanding of the procedure matches consent given  Procedure consent: procedure consent matches procedure scheduled  Relevant documents: relevant documents present and verified  Test results: test results available and properly labeled  Site marked: the operative site was marked  Radiology Images displayed and confirmed.  If images not available, report reviewed: imaging studies not available  Required items: required blood products, implants, devices, and special equipment available  Patient identity confirmed: verbally with patient    Supporting Documentation  Indications: pain   Trigger Point Injections: multiple trigger points: 3 or more muscle groups    Injection site identified by: ultrasound    Procedure Details  Location(s):    Neck/Upper Back: L upper trapezius, R levator scapulae, L levator scapulae, L cervical paraspinals and R cervical paraspinals     Middle Back: L thoracic paraspinals and R thoracic paraspinals     Prep: patient was prepped and draped in usual sterile fashion  Needle size: 22 G  Medications: 10 mL bupivacaine (PF) 0.25 %; 40 mg triamcinolone acetonide 40 mg/mL  Patient tolerance: patient tolerated the procedure well with no immediate complications

## 2023-08-28 NOTE — PATIENT INSTRUCTIONS
Do not apply heat to any area that is numb. If you have discomfort or soreness at the injection site, you may apply ice today, 20 minutes on and 20 minutes off. Tomorrow you may use ice or warm, moist heat. Do not apply ice or heat directly to the skin. If you experience severe shortness of breath, go to the Emergency Room. You may have numbness for several hours from the local anesthetic. Please use caution and common sense, especially with weight-bearing activities. You may have an increase or change in the discomfort for 36-48 hours after your treatment. Apply ice and continue with any pain medicine you have been prescribed. Do not do anything strenuous today. You may shower, but no tub baths or hot tubs today. You may resume your normal activities tomorrow, but do not “overdo it”. Resume normal activities slowly when you are feeling better. If you experience redness, drainage or swelling at the injection site, or if you develop a fever above 100 degrees, please call [facility] at [phone] or go to the Emergency Room. Continue to take all routine medicines prescribed by your primary care physician unless otherwise instructed by our staff. Most blood thinners should be started again according to your regularly scheduled dosing. If you have any questions, please give our office a call. As no general anesthesia was used in today's procedure, you should not experience any side effects related to anesthesia. If you have a problem specifically related to your procedure, please call our office at [phone]. Problems not related to your procedure should be directed to your primary care physician.

## 2023-09-27 ENCOUNTER — OFFICE VISIT (OUTPATIENT)
Dept: PAIN MEDICINE | Facility: CLINIC | Age: 48
End: 2023-09-27
Payer: COMMERCIAL

## 2023-09-27 VITALS
BODY MASS INDEX: 19.66 KG/M2 | HEART RATE: 87 BPM | HEIGHT: 65 IN | WEIGHT: 118 LBS | SYSTOLIC BLOOD PRESSURE: 115 MMHG | DIASTOLIC BLOOD PRESSURE: 78 MMHG

## 2023-09-27 DIAGNOSIS — M79.18 MYOFASCIAL PAIN SYNDROME: ICD-10-CM

## 2023-09-27 DIAGNOSIS — F11.20 UNCOMPLICATED OPIOID DEPENDENCE (HCC): ICD-10-CM

## 2023-09-27 DIAGNOSIS — G89.4 CHRONIC PAIN SYNDROME: ICD-10-CM

## 2023-09-27 DIAGNOSIS — Z79.891 LONG-TERM CURRENT USE OF OPIATE ANALGESIC: Primary | ICD-10-CM

## 2023-09-27 DIAGNOSIS — M54.9 MID BACK PAIN: ICD-10-CM

## 2023-09-27 PROCEDURE — 99214 OFFICE O/P EST MOD 30 MIN: CPT | Performed by: NURSE PRACTITIONER

## 2023-09-27 RX ORDER — BACLOFEN 20 MG/1
20 TABLET ORAL 3 TIMES DAILY
Qty: 90 TABLET | Refills: 1 | Status: SHIPPED | OUTPATIENT
Start: 2023-09-27
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
## 2023-09-30 ENCOUNTER — OFFICE VISIT (OUTPATIENT)
Dept: URGENT CARE | Facility: CLINIC | Age: 48
End: 2023-09-30
Payer: COMMERCIAL

## 2023-09-30 VITALS
OXYGEN SATURATION: 97 % | BODY MASS INDEX: 20.33 KG/M2 | TEMPERATURE: 97.9 F | RESPIRATION RATE: 20 BRPM | HEART RATE: 92 BPM | SYSTOLIC BLOOD PRESSURE: 112 MMHG | DIASTOLIC BLOOD PRESSURE: 60 MMHG | HEIGHT: 65 IN | WEIGHT: 122 LBS

## 2023-09-30 DIAGNOSIS — H10.31 ACUTE BACTERIAL CONJUNCTIVITIS OF RIGHT EYE: Primary | ICD-10-CM

## 2023-09-30 DIAGNOSIS — J40 BRONCHITIS: ICD-10-CM

## 2023-09-30 PROCEDURE — 99213 OFFICE O/P EST LOW 20 MIN: CPT | Performed by: PHYSICIAN ASSISTANT

## 2023-09-30 RX ORDER — OFLOXACIN 3 MG/ML
1 SOLUTION/ DROPS OPHTHALMIC 4 TIMES DAILY
Qty: 5 ML | Refills: 0 | Status: SHIPPED | OUTPATIENT
Start: 2023-09-30

## 2023-09-30 RX ORDER — BENZONATATE 200 MG/1
200 CAPSULE ORAL 3 TIMES DAILY PRN
Qty: 20 CAPSULE | Refills: 0 | Status: SHIPPED | OUTPATIENT
Start: 2023-09-30

## 2023-09-30 RX ORDER — AZITHROMYCIN 250 MG/1
TABLET, FILM COATED ORAL
Qty: 6 TABLET | Refills: 0 | Status: SHIPPED | OUTPATIENT
Start: 2023-09-30 | End: 2023-10-04

## 2023-09-30 RX ORDER — FLUCONAZOLE 150 MG/1
150 TABLET ORAL ONCE
Qty: 1 TABLET | Refills: 0 | Status: SHIPPED | OUTPATIENT
Start: 2023-09-30 | End: 2023-09-30

## 2023-09-30 NOTE — PATIENT INSTRUCTIONS
Use antibiotic eyedrops as instructed for the next 5 days, discussed discontinuing use of contacts while using drops. To take Tessalon and azithromycin as instructed. With any progression or worsening of symptoms to return or be seen in ER.

## 2023-10-02 LAB
6MAM UR QL CFM: NEGATIVE NG/ML
7AMINOCLONAZEPAM UR QL CFM: ABNORMAL NG/ML
A-OH ALPRAZ UR QL CFM: NEGATIVE NG/ML
ACCEPTABLE CREAT UR QL: NORMAL MG/DL
ACCEPTIBLE SP GR UR QL: NORMAL
AMPHET UR QL CFM: NEGATIVE NG/ML
AMPHET UR QL CFM: NEGATIVE NG/ML
BUPRENORPHINE UR QL CFM: NEGATIVE NG/ML
BUTALBITAL UR QL CFM: NEGATIVE NG/ML
BZE UR QL CFM: NEGATIVE NG/ML
CODEINE UR QL CFM: NEGATIVE NG/ML
DESIPRAMINE UR QL CFM: NEGATIVE NG/ML
EDDP UR QL CFM: NEGATIVE NG/ML
ETHYL GLUCURONIDE UR QL CFM: NEGATIVE NG/ML
ETHYL SULFATE UR QL SCN: NEGATIVE NG/ML
FENTANYL UR QL CFM: NEGATIVE NG/ML
GLIADIN IGG SER IA-ACNC: NEGATIVE NG/ML
GLUCOSE 30M P 50 G LAC PO SERPL-MCNC: NORMAL NG/ML
HYDROCODONE UR QL CFM: NEGATIVE NG/ML
HYDROCODONE UR QL CFM: NEGATIVE NG/ML
HYDROMORPHONE UR QL CFM: NEGATIVE NG/ML
LORAZEPAM UR QL CFM: NEGATIVE NG/ML
MDMA UR QL CFM: NEGATIVE NG/ML
ME-PHENIDATE UR QL CFM: NEGATIVE NG/ML
MEPERIDINE UR QL CFM: NEGATIVE NG/ML
METHADONE UR QL CFM: NEGATIVE NG/ML
METHAMPHET UR QL CFM: NEGATIVE NG/ML
MORPHINE UR QL CFM: NEGATIVE NG/ML
MORPHINE UR QL CFM: NEGATIVE NG/ML
NITRITE UR QL: NORMAL UG/ML
NORBUPRENORPHINE UR QL CFM: NEGATIVE NG/ML
NORDIAZEPAM UR QL CFM: NORMAL NG/ML
NORFENTANYL UR QL CFM: NEGATIVE NG/ML
NORHYDROCODONE UR QL CFM: NEGATIVE NG/ML
NORHYDROCODONE UR QL CFM: NEGATIVE NG/ML
NORMEPERIDINE UR QL CFM: NEGATIVE NG/ML
NOROXYCODONE UR QL CFM: NEGATIVE NG/ML
OLANZAPINE QUANTIFICATION: NEGATIVE NG/ML
OPC-3373 QUANTIFICATION: NORMAL
OXAZEPAM UR QL CFM: NORMAL NG/ML
OXYCODONE UR QL CFM: NEGATIVE NG/ML
OXYMORPHONE UR QL CFM: NEGATIVE NG/ML
OXYMORPHONE UR QL CFM: NEGATIVE NG/ML
PARA-FLUOROFENTANYL QUANTIFICATION: NORMAL NG/ML
PCP UR QL CFM: NEGATIVE NG/ML
PHENOBARB UR QL CFM: NEGATIVE NG/ML
RESULT ALL_PRESCRIBED MEDS AND SPECIAL INSTRUCTIONS: NORMAL
SECOBARBITAL UR QL CFM: NEGATIVE NG/ML
SL AMB 4-ANPP QUANTIFICATION: NORMAL NG/ML
SL AMB 7-OH-MITRAGYNINE (KRATOM ALKALOID) QUANTIFICATION: NEGATIVE NG/ML
SL AMB ACETYL FENTANYL QUANTIFICATION: NORMAL NG/ML
SL AMB ACETYL NORFENTANYL QUANTIFICATION: NORMAL NG/ML
SL AMB ACRYL FENTANYL QUANTIFICATION: NORMAL NG/ML
SL AMB CARFENTANIL QUANTIFICATION: NORMAL NG/ML
SL AMB CLOZAPINE QUANTIFICATION: NEGATIVE NG/ML
SL AMB CTHC (MARIJUANA METABOLITE) QUANTIFICATION: ABNORMAL NG/ML
SL AMB DEXTRORPHAN (DEXTROMETHORPHAN METABOLITE) QUANT: NEGATIVE NG/ML
SL AMB HALOPERIDOL  QUANTIFICATION: NEGATIVE NG/ML
SL AMB HALOPERIDOL METABOLITE QUANTIFICATION: NEGATIVE NG/ML
SL AMB HYDROXYRISPERIDONE QUANTIFICATION: NEGATIVE NG/ML
SL AMB N-DESMETHYL-TRAMADOL QUANTIFICATION: NEGATIVE NG/ML
SL AMB N-DESMETHYLCLOZAPINE QUANTIFICATION: NEGATIVE NG/ML
SL AMB NORQUETIAPINE QUANTIFICATION: NEGATIVE NG/ML
SL AMB PHENTERMINE QUANTIFICATION: NEGATIVE NG/ML
SL AMB PREGABALIN QUANTIFICATION: NEGATIVE
SL AMB QUETIAPINE QUANTIFICATION: NEGATIVE NG/ML
SL AMB RISPERIDONE QUANTIFICATION: NEGATIVE NG/ML
SL AMB RITALINIC ACID QUANTIFICATION: NEGATIVE NG/ML
SPECIMEN PH ACCEPTABLE UR: NORMAL
TAPENTADOL UR QL CFM: NEGATIVE NG/ML
TEMAZEPAM UR QL CFM: NORMAL NG/ML
TEMAZEPAM UR QL CFM: NORMAL NG/ML
TRAMADOL UR QL CFM: NEGATIVE NG/ML
URATE/CREAT 24H UR: NEGATIVE NG/ML

## 2023-10-03 ENCOUNTER — OFFICE VISIT (OUTPATIENT)
Dept: URGENT CARE | Facility: CLINIC | Age: 48
End: 2023-10-03
Payer: COMMERCIAL

## 2023-10-03 ENCOUNTER — APPOINTMENT (OUTPATIENT)
Dept: RADIOLOGY | Facility: CLINIC | Age: 48
End: 2023-10-03
Payer: COMMERCIAL

## 2023-10-03 VITALS
WEIGHT: 122 LBS | HEART RATE: 79 BPM | RESPIRATION RATE: 18 BRPM | SYSTOLIC BLOOD PRESSURE: 102 MMHG | BODY MASS INDEX: 20.33 KG/M2 | OXYGEN SATURATION: 99 % | HEIGHT: 65 IN | TEMPERATURE: 98.8 F | DIASTOLIC BLOOD PRESSURE: 63 MMHG

## 2023-10-03 DIAGNOSIS — R05.1 ACUTE COUGH: Primary | ICD-10-CM

## 2023-10-03 DIAGNOSIS — R05.1 ACUTE COUGH: ICD-10-CM

## 2023-10-03 PROCEDURE — 71046 X-RAY EXAM CHEST 2 VIEWS: CPT

## 2023-10-03 PROCEDURE — 99213 OFFICE O/P EST LOW 20 MIN: CPT | Performed by: NURSE PRACTITIONER

## 2023-10-03 RX ORDER — PREDNISONE 20 MG/1
20 TABLET ORAL 2 TIMES DAILY WITH MEALS
Qty: 10 TABLET | Refills: 0 | Status: SHIPPED | OUTPATIENT
Start: 2023-10-03 | End: 2023-10-08

## 2023-10-03 NOTE — PATIENT INSTRUCTIONS
Take medication as directed. Contiue zpack and tessalon pearls as previously prescribed. Rest and drink plenty of fluids. A cool mist humidifier can be helpful. If you develop a worsening cough, chest pain, shortness of breath, palpitations, coughing up blood, prolonged high fever, any new or concerning symptoms please return or proceed ER.   Recommend following up with PCP in 3-5 days
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
## 2023-10-07 DIAGNOSIS — M47.812 CERVICAL SPONDYLOSIS: ICD-10-CM

## 2023-10-07 DIAGNOSIS — G89.4 CHRONIC PAIN SYNDROME: ICD-10-CM

## 2023-10-09 RX ORDER — NABUMETONE 500 MG/1
500 TABLET, FILM COATED ORAL 2 TIMES DAILY
Qty: 60 TABLET | Refills: 0 | Status: SHIPPED | OUTPATIENT
Start: 2023-10-09

## 2023-10-11 ENCOUNTER — TELEPHONE (OUTPATIENT)
Age: 48
End: 2023-10-11

## 2023-10-11 ENCOUNTER — OFFICE VISIT (OUTPATIENT)
Age: 48
End: 2023-10-11
Payer: COMMERCIAL

## 2023-10-11 VITALS
HEIGHT: 65 IN | HEART RATE: 90 BPM | BODY MASS INDEX: 19.49 KG/M2 | DIASTOLIC BLOOD PRESSURE: 62 MMHG | WEIGHT: 117 LBS | SYSTOLIC BLOOD PRESSURE: 104 MMHG

## 2023-10-11 DIAGNOSIS — G89.4 CHRONIC PAIN SYNDROME: Primary | ICD-10-CM

## 2023-10-11 DIAGNOSIS — Z98.1 HISTORY OF FUSION OF CERVICAL SPINE: ICD-10-CM

## 2023-10-11 DIAGNOSIS — M79.18 MYOFASCIAL PAIN SYNDROME: ICD-10-CM

## 2023-10-11 DIAGNOSIS — M47.812 CERVICAL SPONDYLOSIS: ICD-10-CM

## 2023-10-11 DIAGNOSIS — M54.6 CHRONIC MIDLINE THORACIC BACK PAIN: ICD-10-CM

## 2023-10-11 DIAGNOSIS — M54.12 CERVICAL RADICULOPATHY: ICD-10-CM

## 2023-10-11 DIAGNOSIS — G89.29 CHRONIC MIDLINE THORACIC BACK PAIN: ICD-10-CM

## 2023-10-11 DIAGNOSIS — M54.50 CHRONIC BILATERAL LOW BACK PAIN WITHOUT SCIATICA: ICD-10-CM

## 2023-10-11 DIAGNOSIS — G89.29 CHRONIC BILATERAL LOW BACK PAIN WITHOUT SCIATICA: ICD-10-CM

## 2023-10-11 DIAGNOSIS — M54.2 NECK PAIN: ICD-10-CM

## 2023-10-11 PROCEDURE — 99214 OFFICE O/P EST MOD 30 MIN: CPT | Performed by: NURSE PRACTITIONER

## 2023-10-11 RX ORDER — NALOXONE HYDROCHLORIDE 4 MG/.1ML
SPRAY NASAL
Qty: 1 EACH | Refills: 1 | Status: SHIPPED | OUTPATIENT
Start: 2023-10-11 | End: 2024-10-10

## 2023-10-11 RX ORDER — HYDROCODONE BITARTRATE AND ACETAMINOPHEN 5; 325 MG/1; MG/1
1 TABLET ORAL EVERY 8 HOURS PRN
Qty: 45 TABLET | Refills: 0 | Status: SHIPPED | OUTPATIENT
Start: 2023-10-11

## 2023-10-11 NOTE — TELEPHONE ENCOUNTER
Caller: Ericka    Doctor: Bertha Quiñonez    Reason for call: Pt is calling because she was given a prescription and went to the pharmacy to get it filled but pharmacist stated they are waiting for the dr's approval    Please advise    Call back#: [phone]    Pt stated that if unable to contact due to connection please leave voicemail
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
## 2023-10-11 NOTE — TELEPHONE ENCOUNTER
Spoke with Saloni Tong from [address] [place] [address] regarding script for Standard [place] which was prescribed today 10/11 by [facility]. Per Saloni Tong: Medication requires a prior-authorization.

## 2023-10-11 NOTE — PATIENT INSTRUCTIONS
Opioid Safety   WHAT YOU NEED TO KNOW:   An opioid medicine is used to treat pain. Examples are oxycodone, morphine, fentanyl, or codeine. Pain control and management may help you rest, heal, and return to your daily activities. You and your family will receive information about how to manage your pain at home. The instructions will include what to do if you have side effects as your pain is managed. You will get information on how to handle opioid medicine safely. You will also get suggestions on how to control pain without opioids. It is important to follow all instructions so your pain is managed effectively. DISCHARGE INSTRUCTIONS:   Call your local emergency number (911 in the ), or have someone else call if:   You have a seizure. You cannot be woken. You have trouble staying awake and your breathing is slow or shallow. Your speech is slurred, or you are confused. You are dizzy or stumble when you walk. Call your doctor, or have someone close to you call if:   You are extremely drowsy, or you have trouble staying awake or speaking. You have pale or clammy skin. You have blue fingernails or lips. Your heartbeat is slower than normal.    You cannot stop vomiting. You have questions or concerns about your condition or care. Use opioids safely:   Take prescribed opioids exactly as directed. Opioids come with directions based on the kind and how it is given. Talk to your healthcare provider or a pharmacist if you have any questions. Do not take more than the recommended amount. Too much can cause a life-threatening overdose. Do not continue to take it after your pain stops. You may develop tolerance. This means you keep needing higher doses to get the same effect. You may also develop opioid use disorder. This means you are not able to control your opioid use. Do not give opioids to others or take opioids that belong to someone else.   The kind or amount one person takes may not be right for another. The person you share them with may also be taking medicines that do not mix with opioids. The person may drink alcohol or use other drugs that can cause life-threatening problems when mixed with opioids. Do not mix opioids with other medicines or alcohol. The combination can cause an overdose, or cause you to stop breathing. Alcohol, sleeping pills, and medicines such as antihistamines can make you sleepy. A combination with opioids can lead to a coma. Do not drive or operate heavy machinery after you use an opioid. You may feel drowsy or have trouble concentrating. You can injure yourself or others if you drive or use heavy machinery when you are not alert. Your provider or pharmacist can tell you how long to wait after a dose before you do these activities. Talk to your healthcare provider if you have any side effects. Side effects include nausea, sleepiness, itching, and trouble thinking clearly. Your provider may need to make changes to the kind or amount of opioid you are taking. Your provider can also help you find ways to prevent or relieve side effects. Manage constipation:  Constipation is the most common side effect of opioid medicine. Constipation is when you have hard, dry bowel movements, or you go longer than usual between bowel movements. Tell your healthcare provider about all changes in your bowel movements while you are taking opioids. Your provider may recommend laxative medicine to help you have a bowel movement. Your provider may also change the kind of opioid you are taking, or change when you take it. The following are more ways you can prevent or relieve constipation:  Drink liquids as directed. You may need to drink extra liquids to help soften and move your bowels. Ask how much liquid to drink each day and which liquids are best for you. Eat high-fiber foods. This may help decrease constipation by adding bulk to your bowel movements.  High-fiber foods include fruits, vegetables, whole-grain breads and cereals, and beans. Your healthcare provider or dietitian can help you create a high-fiber meal plan. Your provider may also recommend a fiber supplement if you cannot get enough fiber from food. Exercise regularly. Regular physical activity can help stimulate your intestines. Walking is a good exercise to prevent or relieve constipation. Ask which exercises are best for you. Schedule a time each day to have a bowel movement. This may help train your body to have regular bowel movements. Bend forward while you are on the toilet to help move the bowel movement out. Sit on the toilet for at least 10 minutes, even if you do not have a bowel movement. Store opioids safely:   Store opioids where others cannot easily get them. Keep them in a locked cabinet or secure area. Do not  keep them in a purse or other bag you carry with you. A person may be looking for something else and find the opioids. Make sure opioids are stored out of the reach of children. A child can easily overdose on opioids. Opioids may look like candy to a small child. The best way to dispose of opioids: The laws vary by country and area. In the [facility], the best way is to return the opioids through a take-back program. This program is offered by the [employer] ([employer]). The following are options for using the program:  Take the opioids to a LETICIA collection site. The site is often a law enforcement center. Call your local law enforcement center for scheduled take-back days in your area. You will be given information on where to go if the collection site is in a different location. Take the opioids to an approved pharmacy or hospital.  A pharmacy or hospital may be set up as a collection site. You will need to ask if it is a LETICIA collection site if you were not directed there.  A pharmacy or doctor's office may not be able to take back opioids unless it is a LETICIA site. Use a mail-back system. This means you are given containers to put the opioids into. You will then mail them in the containers. Use a take-back drop box. This is a place to leave the opioids at any time. People and animals will not be able to get into the box. Your local law enforcement agency can tell you where to find a drop box in your area. Other safe ways to dispose of opioids: The medicine may come with disposal instructions. The instructions may vary depending on the brand of medicine you are using. Instructions may come in a Medication Guide, but not every medicine has one. You may instead get instructions from your pharmacy or doctor. Follow instructions carefully. The following are general guidelines to follow:  Find out if you can flush the opioid. Some opioids can be flushed down the toilet or poured into the sink. You will need to contact authorities in your area to see if this is an option for you. The FDA also offers a list of medicines that are safe to flush down the toilet. You can check the list if you cannot get the information for your local area. Ask your waste management company about rules for putting opioids in the trash. The company will be able to give you specific directions. Scratch out personal information on the original medicine label so it cannot be read. Then put it in the trash. Do not label the trash or put any information on it about the opioids. It should look like regular household trash so no one is tempted to look for the opioids. Keep the trash out of the reach of children and animals. Always make sure trash is secure. Talk to officials if you live in a facility. If you live in a nursing home or assisted living center, talk to an official. The person will know the rules for your area. Other ways to manage pain:   Ask your healthcare provider about non-opioid medicines to control pain.   Some medicines may even work better than opioids, depending on the cause of your pain. Nonprescription medicines include NSAIDs (such as ibuprofen) and acetaminophen. Prescription medicines include muscle relaxers, antidepressants, and steroids. Pain may be managed without any medicines. Some ways to relieve pain include massage, aromatherapy, or meditation. Physical or occupational therapy may also help. Follow up with your doctor or pain specialist as directed: You may need to have your dose adjusted. Your doctor or pain specialist can also help you find ways to manage pain without opioids. Write down your questions so you remember to ask them during your visits. For more information:   Drug Enforcement Administration  [address] [facility] , [address] [facility]  Phone: 6- 017 - [phone]  Web Address: [employer]. [employer].gov/drug_disposal/    [address] [facility] [address] and Drug Administration  [address] , [address]  Phone: 0- 712 - 586-6463  Web Address: http://Fresh Direct/  © Copyright [facility] 2023 Information is for End User's use only and may not be sold, redistributed or otherwise used for commercial purposes. The above information is an  only. It is not intended as medical advice for individual conditions or treatments. Talk to your doctor, nurse or pharmacist before following any medical regimen to see if it is safe and effective for you.

## 2023-10-12 NOTE — TELEPHONE ENCOUNTER
PA SUBMITTED TO PLAN WITH OFFICE NOTES FOR COVERAGE DETERMINATION     CMM CLAY [id number]     WILL AWAIT PLAN RESPONSE

## 2023-10-13 ENCOUNTER — TELEPHONE (OUTPATIENT)
Age: 48
End: 2023-10-13

## 2023-10-13 NOTE — TELEPHONE ENCOUNTER
Caller: pt    Doctor: Cameron Flowers    Reason for call: [facility] called asking for more info on the pt and the hydrocodone. They are wondering this b/c of all the meds she is on from other drs.     Call back#: [phone]

## 2023-10-16 NOTE — TELEPHONE ENCOUNTER
I am aware and addressed the issue in my last office note. Patient is being prescribed hydrocodone. She will use it on a strictly as-needed basis. The intention is not for her to use it on a daily basis.

## 2023-10-17 ENCOUNTER — OFFICE VISIT (OUTPATIENT)
Dept: OBGYN CLINIC | Facility: CLINIC | Age: 48
End: 2023-10-17
Payer: COMMERCIAL

## 2023-10-17 VITALS
WEIGHT: 117 LBS | HEART RATE: 101 BPM | BODY MASS INDEX: 19.49 KG/M2 | HEIGHT: 65 IN | SYSTOLIC BLOOD PRESSURE: 115 MMHG | DIASTOLIC BLOOD PRESSURE: 68 MMHG

## 2023-10-17 DIAGNOSIS — M77.02 MEDIAL EPICONDYLITIS OF LEFT ELBOW: Primary | ICD-10-CM

## 2023-10-17 PROCEDURE — 20605 DRAIN/INJ JOINT/BURSA W/O US: CPT | Performed by: ORTHOPAEDIC SURGERY

## 2023-10-17 PROCEDURE — 99213 OFFICE O/P EST LOW 20 MIN: CPT | Performed by: ORTHOPAEDIC SURGERY

## 2023-10-17 RX ORDER — BUPIVACAINE HYDROCHLORIDE 2.5 MG/ML
1 INJECTION, SOLUTION INFILTRATION; PERINEURAL
Status: COMPLETED | OUTPATIENT
Start: 2023-10-17 | End: 2023-10-17

## 2023-10-17 RX ORDER — BETAMETHASONE SODIUM PHOSPHATE AND BETAMETHASONE ACETATE 3; 3 MG/ML; MG/ML
6 INJECTION, SUSPENSION INTRA-ARTICULAR; INTRALESIONAL; INTRAMUSCULAR; SOFT TISSUE
Status: COMPLETED | OUTPATIENT
Start: 2023-10-17 | End: 2023-10-17

## 2023-10-17 RX ADMIN — BETAMETHASONE SODIUM PHOSPHATE AND BETAMETHASONE ACETATE 6 MG: 3; 3 INJECTION, SUSPENSION INTRA-ARTICULAR; INTRALESIONAL; INTRAMUSCULAR; SOFT TISSUE at 08:00

## 2023-10-17 RX ADMIN — BUPIVACAINE HYDROCHLORIDE 1 ML: 2.5 INJECTION, SOLUTION INFILTRATION; PERINEURAL at 08:00

## 2023-10-17 NOTE — PROGRESS NOTES
Assessment/Plan:    No problem-specific Assessment & Plan notes found for this encounter. Diagnoses and all orders for this visit:    Medial epicondylitis of left elbow          Under aseptic technique, the left elbow was injected with Celestone and Marcaine. She tolerated procedure quite well. Return back in 3 months for evaluation. If her condition changes, she would not hesitate to let us know    Subjective:      Patient ID: Margaret Buchanan is a 50 y.o. female. HPI    The patient has a history of lateral epicondylitis of her left elbow. She presents for another set of corticosteroid injections. She denies any numbness or tingling. She denies any fever or chills. The injections last approxi-3 months    The following portions of the patient's history were reviewed and updated as appropriate: allergies, current medications, past family history, past medical history, past social history, past surgical history, and problem list.    Review of Systems   Constitutional:  Negative for chills, fever and unexpected weight change. HENT:  Negative for hearing loss, nosebleeds and sore throat. Eyes:  Negative for pain, redness and visual disturbance. Respiratory:  Negative for cough, shortness of breath and wheezing. Cardiovascular:  Negative for chest pain, palpitations and leg swelling. Gastrointestinal:  Negative for abdominal pain, nausea and vomiting. Endocrine: Negative for polydipsia and polyuria. Genitourinary:  Negative for dysuria and hematuria. Musculoskeletal:  Positive for arthralgias and myalgias. Negative for back pain, gait problem, joint swelling and neck pain. As noted in HPI   Skin:  Negative for rash and wound. Neurological:  Negative for dizziness, numbness and headaches. Psychiatric/Behavioral:  Negative for decreased concentration and suicidal ideas. The patient is not nervous/anxious.           Objective:      /68 (BP Location: Left arm, Patient Position: Sitting, Cuff Size: Standard)   Pulse 101   Ht 5' 5" (1.651 m)   Wt 53.1 kg (117 lb)   LMP  (LMP Unknown)   BMI 19.47 kg/m²          Physical Exam        Left upper extremity was neurovascularly intact. Fingers are pink and mobile. Compartments are soft. There is point tenderness along the medial aspect of her left elbow. There is tenderness of flexion and pronation. Negative Tinel's. No collateral ligament     Medium joint arthrocentesis: L elbow  Universal Protocol:  Consent: Verbal consent obtained. Written consent not obtained. Risks and benefits: risks, benefits and alternatives were discussed  Consent given by: patient  Patient understanding: patient states understanding of the procedure being performed  Test results: test results available and properly labeled  Site marked: the operative site was marked  Radiology Images displayed and confirmed. If images not available, report reviewed: imaging studies available  Patient identity confirmed: verbally with patient  Supporting Documentation  Indications: pain   Procedure Details  Location: elbow - L elbow  Needle size: 25 G  Ultrasound guidance: no  Approach: Medial.  Medications administered: 1 mL bupivacaine 0.25 %; 6 mg betamethasone acetate-betamethasone sodium phosphate 6 (3-3) mg/mL    Patient tolerance: patient tolerated the procedure well with no immediate complications  Dressing:  Sterile dressing applied        dysfunction.

## 2023-11-01 ENCOUNTER — OFFICE VISIT (OUTPATIENT)
Dept: FAMILY MEDICINE CLINIC | Facility: CLINIC | Age: 48
End: 2023-11-01
Payer: COMMERCIAL

## 2023-11-01 VITALS
DIASTOLIC BLOOD PRESSURE: 68 MMHG | WEIGHT: 119.6 LBS | OXYGEN SATURATION: 96 % | HEART RATE: 91 BPM | BODY MASS INDEX: 19.93 KG/M2 | SYSTOLIC BLOOD PRESSURE: 124 MMHG | HEIGHT: 65 IN | TEMPERATURE: 98 F

## 2023-11-01 DIAGNOSIS — G89.29 CHRONIC BILATERAL LOW BACK PAIN WITHOUT SCIATICA: ICD-10-CM

## 2023-11-01 DIAGNOSIS — M54.12 CERVICAL RADICULOPATHY: Primary | ICD-10-CM

## 2023-11-01 DIAGNOSIS — M54.50 CHRONIC BILATERAL LOW BACK PAIN WITHOUT SCIATICA: ICD-10-CM

## 2023-11-01 DIAGNOSIS — M47.812 CERVICAL SPONDYLOSIS: ICD-10-CM

## 2023-11-01 DIAGNOSIS — R41.3 MEMORY CHANGE: ICD-10-CM

## 2023-11-01 PROCEDURE — 99213 OFFICE O/P EST LOW 20 MIN: CPT | Performed by: NURSE PRACTITIONER

## 2023-11-01 NOTE — PROGRESS NOTES
Assessment/Plan:    No problem-specific Assessment & Plan notes found for this encounter. Diagnoses and all orders for this visit:    Cervical radiculopathy    Cervical spondylosis    Chronic bilateral low back pain without sciatica    Memory change          Subjective:      Patient ID: Demond Connors is a 50 y.o. female. Patient presents to have FMLA forms completed. State she stills needs to have this due to her neck pain/injections. States the injections do help when she gets them as does taking her muscle relaxer on a routine basis. She is following with pain management for this problem and will continue with them  for management. States at the end of the visit, she is having memory trouble. She will forget to do things often. Will forget what people tell her. No other associated symptoms. She Is under a lot of stress- signing her divorce papers after this. She is looking for something to help with her memory- recommend reducing stress, managing anxiety and brain challenging activities. Also, next visit will complete minicog. The following portions of the patient's history were reviewed and updated as appropriate: allergies, current medications, past family history, past medical history, past social history, past surgical history, and problem list.    Review of Systems   Constitutional:  Negative for chills and fever. HENT:  Negative for ear pain and sore throat. Eyes:  Negative for pain and visual disturbance. Respiratory:  Negative for cough and shortness of breath. Cardiovascular:  Negative for chest pain and palpitations. Gastrointestinal:  Negative for abdominal pain and vomiting. Genitourinary:  Negative for dysuria and hematuria. Musculoskeletal:  Positive for back pain, neck pain and neck stiffness. Negative for arthralgias. Skin:  Negative for color change and rash. Neurological:  Negative for seizures and syncope.    Psychiatric/Behavioral:  Negative for decreased concentration, dysphoric mood, self-injury, sleep disturbance and suicidal ideas. The patient is nervous/anxious. All other systems reviewed and are negative. Objective:      /68 (BP Location: Left arm, Patient Position: Sitting)   Pulse 91   Temp 98 °F (36.7 °C) (Tympanic)   Ht 5' 5" (1.651 m)   Wt 54.3 kg (119 lb 9.6 oz)   LMP  (LMP Unknown)   SpO2 96%   BMI 19.90 kg/m²          Physical Exam  Vitals and nursing note reviewed. Constitutional:       General: She is not in acute distress. Appearance: Normal appearance. She is not ill-appearing or toxic-appearing. Cardiovascular:      Rate and Rhythm: Normal rate and regular rhythm. Pulses: Normal pulses. Heart sounds: Normal heart sounds. No murmur heard. No friction rub. No gallop. Pulmonary:      Effort: Pulmonary effort is normal. No respiratory distress. Breath sounds: Normal breath sounds. No stridor. No wheezing or rales. Musculoskeletal:      Cervical back: No rigidity. Decreased range of motion. Right lower leg: No edema. Left lower leg: No edema. Lymphadenopathy:      Cervical: No cervical adenopathy. Skin:     General: Skin is warm and dry. Coloration: Skin is not jaundiced. Findings: No rash. Neurological:      General: No focal deficit present. Mental Status: She is alert and oriented to person, place, and time. Mental status is at baseline. Motor: No weakness. Gait: Gait normal.   Psychiatric:         Mood and Affect: Mood normal.         Behavior: Behavior normal.         Thought Content:  Thought content normal.         Judgment: Judgment normal.

## 2023-11-01 NOTE — PROGRESS NOTES
Aydin Connors is a 50 y.o. female who presents for annual well woman exam.  Last Pap smear 5/3/21 NILM. Last mammogram 23 birads 1. CRC screening 10/28/21 colonoscopy. Was due for follow-up in 1 year. Patient plans to call office to schedule. Periods are amenorrhea with mirena IUD. No intermenstrual bleeding, spotting, or discharge. Current contraception: Mirena IUD inserted 5/3/19  History of abnormal Pap smear: no  Family history of uterine or ovarian cancer: no  Regular self breast exam: no  History of abnormal mammogram: no  Family history of breast cancer: no  History of abnormal lipids: no    Menstrual History:  OB History          6    Para   6    Term   3       3    AB   0    Living   4         SAB   0    IAB   0    Ectopic   0    Multiple   1    Live Births                    Menarche age: around [age]  No LMP recorded (lmp unknown). (Menstrual status: Birth Control). Period Cycle (Days):  (amenorrhea with IUD)    The following portions of the patient's history were reviewed and updated as appropriate: allergies, current medications, past family history, past medical history, past social history, past surgical history, and problem list.    Review of Systems  Pertinent items are noted in HPI.       Objective      /76   Ht 5' 5" (1.651 m)   Wt 54.4 kg (120 lb)   LMP  (LMP Unknown)   BMI 19.97 kg/m²     General:   alert and oriented, in no acute distress, alert, appears stated age, and cooperative   Heart: regular rate and rhythm, S1, S2 normal, no murmur, click, rub or gallop   Lungs: clear to auscultation bilaterally   Abdomen: soft, non-tender, without masses or organomegaly   Vulva: normal, Bartholin's, Urethra, [place]'s normal   Vagina: normal mucosa, normal discharge, no palpable nodules   Cervix: no cervical motion tenderness, no lesions, and Mirena IUD string easily visualized with speculum exam   Uterus: normal size, non-tender, normal shape and consistency   Adnexa: normal adnexa and no mass, fullness, tenderness   Breast: breasts appear normal, no suspicious masses, no skin or nipple changes or axillary nodes. Assessment      @well woman  no contraindication to continue hormonal therapy@ . Plan      All questions answered. Breast self exam technique reviewed and patient encouraged to perform self-exam monthly. Contraception: Mirena IUD. Diagnosis explained in detail, including differential.  Dietary diary. Discussed healthy lifestyle modifications. Educational material distributed. Follow up in 1 year for annual exam.  Follow up as needed.   Mammogram.  Breast awareness reviewed   Encouraged to call GI to schedule repeat colonoscopy  Encouraged healthy diet, exercise and lifestyle  Encouraged follow-up with PCP as needed  Encouraged seasonal influenza vaccination  Will call / [facility] message with results  VBI-

## 2023-11-02 DIAGNOSIS — M47.812 CERVICAL SPONDYLOSIS: ICD-10-CM

## 2023-11-02 DIAGNOSIS — G89.4 CHRONIC PAIN SYNDROME: ICD-10-CM

## 2023-11-02 RX ORDER — NABUMETONE 500 MG/1
500 TABLET, FILM COATED ORAL 2 TIMES DAILY
Qty: 60 TABLET | Refills: 0 | Status: SHIPPED | OUTPATIENT
Start: 2023-11-02

## 2023-11-06 ENCOUNTER — ANNUAL EXAM (OUTPATIENT)
Dept: OBGYN CLINIC | Facility: MEDICAL CENTER | Age: 48
End: 2023-11-06
Payer: COMMERCIAL

## 2023-11-06 VITALS
SYSTOLIC BLOOD PRESSURE: 118 MMHG | DIASTOLIC BLOOD PRESSURE: 76 MMHG | HEIGHT: 65 IN | WEIGHT: 120 LBS | BODY MASS INDEX: 19.99 KG/M2

## 2023-11-06 DIAGNOSIS — Z12.31 BREAST CANCER SCREENING BY MAMMOGRAM: ICD-10-CM

## 2023-11-06 DIAGNOSIS — Z01.419 WELL WOMAN EXAM WITH ROUTINE GYNECOLOGICAL EXAM: Primary | ICD-10-CM

## 2023-11-06 PROCEDURE — S0612 ANNUAL GYNECOLOGICAL EXAMINA: HCPCS | Performed by: NURSE PRACTITIONER

## 2023-11-17 DIAGNOSIS — M54.9 MID BACK PAIN: ICD-10-CM

## 2023-11-17 DIAGNOSIS — M79.18 MYOFASCIAL PAIN SYNDROME: ICD-10-CM

## 2023-11-17 RX ORDER — BACLOFEN 20 MG/1
20 TABLET ORAL 3 TIMES DAILY
Qty: 90 TABLET | Refills: 0 | Status: SHIPPED | OUTPATIENT
Start: 2023-11-17

## 2023-11-29 ENCOUNTER — PROCEDURE VISIT (OUTPATIENT)
Dept: PAIN MEDICINE | Facility: CLINIC | Age: 48
End: 2023-11-29
Payer: COMMERCIAL

## 2023-11-29 DIAGNOSIS — M54.2 NECK PAIN: Primary | ICD-10-CM

## 2023-11-29 DIAGNOSIS — M54.9 MID BACK PAIN: ICD-10-CM

## 2023-11-29 DIAGNOSIS — G89.4 CHRONIC PAIN SYNDROME: ICD-10-CM

## 2023-11-29 DIAGNOSIS — M79.18 MYOFASCIAL PAIN SYNDROME: ICD-10-CM

## 2023-11-29 PROCEDURE — 20553 NJX 1/MLT TRIGGER POINTS 3/>: CPT | Performed by: ANESTHESIOLOGY

## 2023-11-29 RX ADMIN — BUPIVACAINE HYDROCHLORIDE 10 ML: 2.5 INJECTION, SOLUTION EPIDURAL; INFILTRATION; INTRACAUDAL at 14:40

## 2023-11-29 RX ADMIN — TRIAMCINOLONE ACETONIDE 40 MG: 40 INJECTION, SUSPENSION INTRA-ARTICULAR; INTRAMUSCULAR at 14:40

## 2023-11-29 NOTE — PATIENT INSTRUCTIONS
Do not apply heat to any area that is numb. If you have discomfort or soreness at the injection site, you may apply ice today, 20 minutes on and 20 minutes off. Tomorrow you may use ice or warm, moist heat. Do not apply ice or heat directly to the skin. If you experience severe shortness of breath, go to the Emergency Room. You may have numbness for several hours from the local anesthetic. Please use caution and common sense, especially with weight-bearing activities. You may have an increase or change in the discomfort for 36-48 hours after your treatment. Apply ice and continue with any pain medicine you have been prescribed. Do not do anything strenuous today. You may shower, but no tub baths or hot tubs today. You may resume your normal activities tomorrow, but do not “overdo it”. Resume normal activities slowly when you are feeling better. If you experience redness, drainage or swelling at the injection site, or if you develop a fever above 100 degrees, please call [facility] at [phone] or go to the Emergency Room. Continue to take all routine medicines prescribed by your primary care physician unless otherwise instructed by our staff. Most blood thinners should be started again according to your regularly scheduled dosing. If you have any questions, please give our office a call. As no general anesthesia was used in today's procedure, you should not experience any side effects related to anesthesia. If you have a problem specifically related to your procedure, please call our office at [phone]. Problems not related to your procedure should be directed to your primary care physician.

## 2023-11-29 NOTE — PROGRESS NOTES
Universal Protocol:  Consent: Verbal consent obtained. Written consent obtained. Risks and benefits: risks, benefits and alternatives were discussed  Consent given by: patient  Time out: Immediately prior to procedure a "time out" was called to verify the correct patient, procedure, equipment, support staff and site/side marked as required. Timeout called at: 11/29/2023 2:28 PM.  Patient understanding: patient states understanding of the procedure being performed  Patient consent: the patient's understanding of the procedure matches consent given  Procedure consent: procedure consent matches procedure scheduled  Relevant documents: relevant documents present and verified  Test results: test results available and properly labeled  Site marked: the operative site was marked  Radiology Images displayed and confirmed.  If images not available, report reviewed: imaging studies not available  Required items: required blood products, implants, devices, and special equipment available  Patient identity confirmed: verbally with patient  Supporting Documentation  Indications: pain   Trigger Point Injections: multiple trigger points: 3 or more muscle groups    Injection site identified by: ultrasound  Procedure Details  Location(s):    Neck/Upper Back: L upper trapezius, R levator scapulae, R upper trapezius, L rhomboid, L levator scapulae and R rhomboid     Middle Back: L thoracic paraspinals and R thoracic paraspinals     Prep: patient was prepped and draped in usual sterile fashion  Needle size: 22 G  Medications: 10 mL bupivacaine (PF) 0.25 %; 40 mg triamcinolone acetonide 40 mg/mL  Patient tolerance: patient tolerated the procedure well with no immediate complications

## 2023-12-01 DIAGNOSIS — G89.4 CHRONIC PAIN SYNDROME: ICD-10-CM

## 2023-12-01 DIAGNOSIS — M47.812 CERVICAL SPONDYLOSIS: ICD-10-CM

## 2023-12-01 RX ORDER — NABUMETONE 500 MG/1
500 TABLET, FILM COATED ORAL 2 TIMES DAILY
Qty: 60 TABLET | Refills: 0 | Status: SHIPPED | OUTPATIENT
Start: 2023-12-01

## 2023-12-04 ENCOUNTER — TELEPHONE (OUTPATIENT)
Dept: PAIN MEDICINE | Facility: CLINIC | Age: 48
End: 2023-12-04

## 2023-12-04 NOTE — TELEPHONE ENCOUNTER
Caller: Ericka    Doctor: Solo Woo    Reason for call: patient calling to schedule schedule injection for mid to low back pain she is having numbness and tingling with sharp pain 10/10    Call back#: [phone]

## 2023-12-05 NOTE — TELEPHONE ENCOUNTER
Pt initially called asking to schedule an injection for her mid to low back pain. Today she called to state she is having severe pain in her neck into the lower part of her head 10/10. She states none of her medication is helping (Baclofen, Norco and Relafen)  Did get relief from TPI injection 11/29.     Please advise regarding her neck pain and scheduling another mid/low back in injection

## 2023-12-05 NOTE — TELEPHONE ENCOUNTER
Recommend alternating between ice and heat. She just had the trigger point injection 6 days ago and it also looks like she had a steroid injection done with Ortho in October. At this point, we cannot perform another injection with steroids for 90 days. Also, with regards to meds, she is already taking baclofen for muscle spasms, Relafen for inflammation, gabapentin for neuropathic pain and hydrocodone for pain. She could try some topical options such as diclofenac gel, Salonpas, CBD cream.  Please advise her that if she uses CBD to make sure that it does not contain any THC.

## 2023-12-05 NOTE — TELEPHONE ENCOUNTER
Advised pt. She cancelled her one month f/u appt for end of December due to a "blackout" at work where they are not able to take time off work. She is not able to take off prior to Feb 2. I did explain she should discuss with employee to possibly start later or just leave early as she needs to be seen.  She will call back to the office

## 2023-12-05 NOTE — TELEPHONE ENCOUNTER
Patient is being prescribed hydrocodone. So, she should be scheduled for a follow-up in the near future. If she is not, she needs a follow-up visit #1) because she is on hydrocodone. #2) for a follow-up to the procedure that was performed on 11/29/2023. Will update plan of care and schedule necessary interventional therapy at that visit.

## 2023-12-05 NOTE — TELEPHONE ENCOUNTER
Caller: Ericka    Doctor: Vicky Villagomez     Reason for call: patient in a lot of pain requesting to speak with nurse 10/10 pain level     Call back#: [phone]

## 2023-12-05 NOTE — TELEPHONE ENCOUNTER
S/W pt and advised of all information. She would like to be scheduled for back injection for 90 days out, now.    Please advise

## 2023-12-06 ENCOUNTER — TELEPHONE (OUTPATIENT)
Dept: PAIN MEDICINE | Facility: CLINIC | Age: 48
End: 2023-12-06

## 2023-12-06 NOTE — TELEPHONE ENCOUNTER
Pt reports 60-70% improvement post inj   She said the majority of her pain is in her mid-low back  Also pain in her neck towards her head on the right   Pain level has been 10/10  She said the Hydrocodone is not helping

## 2023-12-14 RX ORDER — TRIAMCINOLONE ACETONIDE 40 MG/ML
40 INJECTION, SUSPENSION INTRA-ARTICULAR; INTRAMUSCULAR
Status: COMPLETED | OUTPATIENT
Start: 2023-11-29 | End: 2023-11-29

## 2023-12-14 RX ORDER — BUPIVACAINE HYDROCHLORIDE 2.5 MG/ML
10 INJECTION, SOLUTION EPIDURAL; INFILTRATION; INTRACAUDAL
Status: COMPLETED | OUTPATIENT
Start: 2023-11-29 | End: 2023-11-29

## 2023-12-19 ENCOUNTER — APPOINTMENT (OUTPATIENT)
Dept: LAB | Facility: CLINIC | Age: 48
End: 2023-12-19
Payer: COMMERCIAL

## 2023-12-19 DIAGNOSIS — Z13.29 SCREENING FOR THYROID DISORDER: ICD-10-CM

## 2023-12-19 DIAGNOSIS — Z11.59 NEED FOR HEPATITIS C SCREENING TEST: ICD-10-CM

## 2023-12-19 DIAGNOSIS — N18.32 STAGE 3B CHRONIC KIDNEY DISEASE (HCC): ICD-10-CM

## 2023-12-19 DIAGNOSIS — E78.00 HYPERCHOLESTEROLEMIA: ICD-10-CM

## 2023-12-19 LAB
ALBUMIN SERPL BCP-MCNC: 4.3 G/DL (ref 3.5–5)
ALP SERPL-CCNC: 48 U/L (ref 34–104)
ALT SERPL W P-5'-P-CCNC: 17 U/L (ref 7–52)
ANION GAP SERPL CALCULATED.3IONS-SCNC: 8 MMOL/L
AST SERPL W P-5'-P-CCNC: 19 U/L (ref 13–39)
BASOPHILS # BLD AUTO: 0.04 THOUSANDS/ÂΜL (ref 0–0.1)
BASOPHILS NFR BLD AUTO: 1 % (ref 0–1)
BILIRUB SERPL-MCNC: 0.31 MG/DL (ref 0.2–1)
BUN SERPL-MCNC: 16 MG/DL (ref 5–25)
CALCIUM SERPL-MCNC: 9.4 MG/DL (ref 8.4–10.2)
CHLORIDE SERPL-SCNC: 104 MMOL/L (ref 96–108)
CHOLEST SERPL-MCNC: 191 MG/DL
CO2 SERPL-SCNC: 29 MMOL/L (ref 21–32)
CREAT SERPL-MCNC: 1.28 MG/DL (ref 0.6–1.3)
EOSINOPHIL # BLD AUTO: 0.09 THOUSAND/ÂΜL (ref 0–0.61)
EOSINOPHIL NFR BLD AUTO: 2 % (ref 0–6)
ERYTHROCYTE [DISTWIDTH] IN BLOOD BY AUTOMATED COUNT: 12.3 % (ref 11.6–15.1)
GFR SERPL CREATININE-BSD FRML MDRD: 48 ML/MIN/1.73SQ M
GLUCOSE P FAST SERPL-MCNC: 85 MG/DL (ref 65–99)
HCT VFR BLD AUTO: 36.7 % (ref 34.8–46.1)
HCV AB SER QL: NORMAL
HDLC SERPL-MCNC: 65 MG/DL
HGB BLD-MCNC: 12.6 G/DL (ref 11.5–15.4)
IMM GRANULOCYTES # BLD AUTO: 0.02 THOUSAND/UL (ref 0–0.2)
IMM GRANULOCYTES NFR BLD AUTO: 0 % (ref 0–2)
LDLC SERPL CALC-MCNC: 107 MG/DL (ref 0–100)
LYMPHOCYTES # BLD AUTO: 1.18 THOUSANDS/ÂΜL (ref 0.6–4.47)
LYMPHOCYTES NFR BLD AUTO: 21 % (ref 14–44)
MCH RBC QN AUTO: 35.2 PG (ref 26.8–34.3)
MCHC RBC AUTO-ENTMCNC: 34.3 G/DL (ref 31.4–37.4)
MCV RBC AUTO: 103 FL (ref 82–98)
MONOCYTES # BLD AUTO: 0.39 THOUSAND/ÂΜL (ref 0.17–1.22)
MONOCYTES NFR BLD AUTO: 7 % (ref 4–12)
NEUTROPHILS # BLD AUTO: 3.98 THOUSANDS/ÂΜL (ref 1.85–7.62)
NEUTS SEG NFR BLD AUTO: 69 % (ref 43–75)
NONHDLC SERPL-MCNC: 126 MG/DL
NRBC BLD AUTO-RTO: 0 /100 WBCS
PLATELET # BLD AUTO: 203 THOUSANDS/UL (ref 149–390)
PMV BLD AUTO: 11.5 FL (ref 8.9–12.7)
POTASSIUM SERPL-SCNC: 3.9 MMOL/L (ref 3.5–5.3)
PROT SERPL-MCNC: 6.4 G/DL (ref 6.4–8.4)
RBC # BLD AUTO: 3.58 MILLION/UL (ref 3.81–5.12)
SODIUM SERPL-SCNC: 141 MMOL/L (ref 135–147)
TRIGL SERPL-MCNC: 96 MG/DL
TSH SERPL DL<=0.05 MIU/L-ACNC: 2.25 UIU/ML (ref 0.45–4.5)
WBC # BLD AUTO: 5.7 THOUSAND/UL (ref 4.31–10.16)

## 2023-12-19 PROCEDURE — 36415 COLL VENOUS BLD VENIPUNCTURE: CPT

## 2023-12-19 PROCEDURE — 85025 COMPLETE CBC W/AUTO DIFF WBC: CPT

## 2023-12-19 PROCEDURE — 80053 COMPREHEN METABOLIC PANEL: CPT

## 2023-12-19 PROCEDURE — 86803 HEPATITIS C AB TEST: CPT

## 2023-12-19 PROCEDURE — 80061 LIPID PANEL: CPT

## 2023-12-19 PROCEDURE — 84443 ASSAY THYROID STIM HORMONE: CPT

## 2023-12-20 ENCOUNTER — OFFICE VISIT (OUTPATIENT)
Dept: FAMILY MEDICINE CLINIC | Facility: CLINIC | Age: 48
End: 2023-12-20
Payer: COMMERCIAL

## 2023-12-20 VITALS
OXYGEN SATURATION: 100 % | TEMPERATURE: 97.1 F | DIASTOLIC BLOOD PRESSURE: 62 MMHG | SYSTOLIC BLOOD PRESSURE: 106 MMHG | HEIGHT: 65 IN | HEART RATE: 75 BPM | BODY MASS INDEX: 19.83 KG/M2 | WEIGHT: 119 LBS

## 2023-12-20 DIAGNOSIS — R79.89 ABNORMAL CBC: ICD-10-CM

## 2023-12-20 DIAGNOSIS — E03.9 HYPOTHYROIDISM, UNSPECIFIED TYPE: ICD-10-CM

## 2023-12-20 DIAGNOSIS — F32.A DEPRESSION, UNSPECIFIED DEPRESSION TYPE: ICD-10-CM

## 2023-12-20 DIAGNOSIS — R41.840 CONCENTRATION DEFICIT: ICD-10-CM

## 2023-12-20 DIAGNOSIS — R41.3 MEMORY DEFICIT: Primary | ICD-10-CM

## 2023-12-20 DIAGNOSIS — I12.9 BENIGN HYPERTENSION WITH CKD (CHRONIC KIDNEY DISEASE) STAGE III (HCC): ICD-10-CM

## 2023-12-20 DIAGNOSIS — N18.30 BENIGN HYPERTENSION WITH CKD (CHRONIC KIDNEY DISEASE) STAGE III (HCC): ICD-10-CM

## 2023-12-20 DIAGNOSIS — F31.81 BIPOLAR 2 DISORDER (HCC): ICD-10-CM

## 2023-12-20 DIAGNOSIS — F41.9 ANXIETY: ICD-10-CM

## 2023-12-20 DIAGNOSIS — Z23 ENCOUNTER FOR IMMUNIZATION: ICD-10-CM

## 2023-12-20 PROCEDURE — 90471 IMMUNIZATION ADMIN: CPT

## 2023-12-20 PROCEDURE — 90750 HZV VACC RECOMBINANT IM: CPT

## 2023-12-20 PROCEDURE — 99214 OFFICE O/P EST MOD 30 MIN: CPT | Performed by: NURSE PRACTITIONER

## 2023-12-20 RX ORDER — LISINOPRIL 10 MG/1
5 TABLET ORAL DAILY
Qty: 90 TABLET | Refills: 2 | Status: SHIPPED | OUTPATIENT
Start: 2023-12-20

## 2023-12-20 RX ORDER — HYDROXYZINE HYDROCHLORIDE 25 MG/1
25 TABLET, FILM COATED ORAL EVERY 6 HOURS PRN
Qty: 60 TABLET | Refills: 0 | Status: SHIPPED | OUTPATIENT
Start: 2023-12-20

## 2023-12-20 RX ORDER — LEVOTHYROXINE SODIUM 0.05 MG/1
50 TABLET ORAL DAILY
Qty: 90 TABLET | Refills: 3 | Status: SHIPPED | OUTPATIENT
Start: 2023-12-20

## 2023-12-20 NOTE — PROGRESS NOTES
Name: Ericka Castillo      : 1975      MRN: [id number]  Encounter Provider: FIONA Morales  Encounter Date: 2023   Encounter department: [facility]    Assessment & Plan     1. Memory deficit  -     Vitamin B12; Future  -     Ambulatory Referral to Neuropsychology; Future    2. Concentration deficit  -     Ambulatory Referral to Neuropsychology; Future    3. Encounter for immunization  -     Zoster Vaccine Recombinant IM    4. Anxiety  -     hydrOXYzine HCL (ATARAX) 25 mg tablet; Take 1 tablet (25 mg total) by mouth every 6 (six) hours as needed for anxiety  -     Ambulatory Referral to Neuropsychology; Future    5. Hypothyroidism, unspecified type  Comments:  continue levothyroxine 50 mcg   Orders:  -     levothyroxine 50 mcg tablet; Take 1 tablet (50 mcg total) by mouth daily    6. Benign hypertension with CKD (chronic kidney disease) stage III (HCC)  Comments:  continues follow up with nephrology  Orders:  -     lisinopril (ZESTRIL) 10 mg tablet; Take 0.5 tablets (5 mg total) by mouth daily    7. Abnormal CBC  -     Iron Panel (Includes Ferritin, Iron Sat%, Iron, and TIBC); Future    8. Depression, unspecified depression type    9. Bipolar 2 disorder (HCC)  -     Ambulatory Referral to Neuropsychology; Future           Subjective      Patient presents with memory and concentration concerns. Ongoing for months. Forgets things like getting gas or getting her mail- will drive right past the mailbox. Also, feels like she is not focused at work and can not concentrate. Under stress, finalizing divorce after three years being . Is following with psychiatry. She is on a lot of medication that have potential effecting this; valium bid, norco prn, ambien, atarax,  gabapentin. Recommend f/u with neuropsychology. Denies alcohol or recreational drug use.   MMSE 27 today.       Review of Systems   Constitutional:  Negative for chills and fever.   Respiratory:   Negative for cough and shortness of breath.    Cardiovascular:  Negative for chest pain and palpitations.   Gastrointestinal:  Negative for abdominal pain, constipation, diarrhea, nausea and vomiting.   Genitourinary:  Negative for dysuria and hematuria.   Musculoskeletal:  Negative for arthralgias and back pain.   Skin:  Negative for color change and rash.   Neurological:  Negative for seizures and syncope.   Psychiatric/Behavioral:  Positive for confusion and decreased concentration. Negative for dysphoric mood, self-injury and suicidal ideas. The patient is nervous/anxious.    All other systems reviewed and are negative.      Current Outpatient Medications on File Prior to Visit   Medication Sig   • ARIPiprazole (ABILIFY) 10 mg tablet Take 10 mg by mouth daily at bedtime   • baclofen 20 mg tablet TAKE 1 TABLET BY MOUTH THREE TIMES DAILY   • budesonide (ENTOCORT EC) 3 MG capsule TAKE 3 CAPSULES BY MOUTH ONCE DAILY FOR 1 MONTH THEN 2 CAPSULES EVERY DAY   • buPROPion (WELLBUTRIN XL) 150 mg 24 hr tablet in the morning   • buPROPion (WELLBUTRIN XL) 300 mg 24 hr tablet Take 300 mg by mouth daily    • desvenlafaxine (PRISTIQ) 100 mg 24 hr tablet Take 100 mg by mouth daily at bedtime   • diazepam (VALIUM) 5 mg tablet Take 5 mg by mouth 2 (two) times a day   • famotidine (PEPCID) 20 mg tablet Take 1 tablet (20 mg total) by mouth 2 (two) times a day   • gabapentin (NEURONTIN) 800 mg tablet Take 1 tablet (800 mg total) by mouth 3 (three) times a day   • HYDROcodone-acetaminophen (NORCO) 5-325 mg per tablet Take 1 tablet by mouth every 8 (eight) hours as needed for pain Max Daily Amount: 3 tablets   • lansoprazole (PREVACID) 30 mg capsule Take 30 mg by mouth 2 (two) times a day   • levonorgestrel (MIRENA) 20 MCG/24HR IUD 1 each by Intrauterine route once   • lidocaine (XYLOCAINE) 5 % ointment Apply topically as needed for mild pain   • nabumetone (RELAFEN) 500 mg tablet Take 1 tablet by mouth twice daily   • naloxone  "(NARCAN) 4 mg/0.1 mL nasal spray Administer 1 spray into a nostril. If no response after 2-3 minutes, give another dose in the other nostril using a new spray.   • Probiotic Product (VSL#3) CAPS    • zolpidem (AMBIEN) 10 mg tablet Take 1 tablet (10 mg total) by mouth daily at bedtime as needed for sleep for up to 10 days   • [DISCONTINUED] hydrOXYzine HCL (ATARAX) 25 mg tablet TAKE 1 TABLET BY MOUTH EVERY 6 HOURS AS NEEDED FOR ANXIETY   • [DISCONTINUED] levothyroxine 50 mcg tablet Take 1 tablet (50 mcg total) by mouth daily   • [DISCONTINUED] lisinopril (ZESTRIL) 10 mg tablet Take 0.5 tablets (5 mg total) by mouth daily       Objective     /62 (BP Location: Left arm, Patient Position: Sitting)   Pulse 75   Temp (!) 97.1 °F (36.2 °C) (Tympanic)   Ht 5' 5\" (1.651 m)   Wt 54 kg (119 lb)   SpO2 100%   BMI 19.80 kg/m²     Physical Exam  Vitals and nursing note reviewed.   Constitutional:       General: She is not in acute distress.     Appearance: Normal appearance. She is not ill-appearing or toxic-appearing.   Cardiovascular:      Rate and Rhythm: Normal rate and regular rhythm.      Pulses: Normal pulses.      Heart sounds: Normal heart sounds. No murmur heard.     No friction rub. No gallop.   Pulmonary:      Effort: Pulmonary effort is normal. No respiratory distress.      Breath sounds: Normal breath sounds. No stridor. No wheezing or rales.   Skin:     General: Skin is warm and dry.      Coloration: Skin is not jaundiced.      Findings: No rash.   Neurological:      General: No focal deficit present.      Mental Status: She is alert and oriented to person, place, and time. Mental status is at baseline.      Motor: No weakness.      Gait: Gait normal.   Psychiatric:         Mood and Affect: Mood normal.         Behavior: Behavior normal.         Thought Content: Thought content normal.         Judgment: Judgment normal.       FIONA Morales    "

## 2023-12-28 ENCOUNTER — TELEPHONE (OUTPATIENT)
Dept: PSYCHIATRY | Facility: CLINIC | Age: 48
End: 2023-12-28

## 2023-12-28 NOTE — TELEPHONE ENCOUNTER
Ericka Castillo  requested a call back to discuss scheduling a neuropsychology appt. Patient does have a referral in her chart. .    They can be reached at P# [phone].       Thank you.

## 2024-01-01 DIAGNOSIS — M47.812 CERVICAL SPONDYLOSIS: ICD-10-CM

## 2024-01-01 DIAGNOSIS — G89.4 CHRONIC PAIN SYNDROME: ICD-10-CM

## 2024-01-02 RX ORDER — NABUMETONE 500 MG/1
500 TABLET, FILM COATED ORAL 2 TIMES DAILY
Qty: 60 TABLET | Refills: 0 | Status: SHIPPED | OUTPATIENT
Start: 2024-01-02

## 2024-01-04 ENCOUNTER — TELEPHONE (OUTPATIENT)
Dept: PSYCHIATRY | Facility: CLINIC | Age: 49
End: 2024-01-04

## 2024-01-04 NOTE — TELEPHONE ENCOUNTER
Reached out to patient in regards to Neuropsychology referral Left VM for pt to contact intake department.     When pt calls back will need to be informed referral would need to come from [facility]'s Neuro Dept.

## 2024-01-04 NOTE — TELEPHONE ENCOUNTER
The patient contacted the office, requesting to schedule a neuropsych testing appointment. The writer informed the patient that we would need a referral from one of our [facility] neurologists in order to schedule an appointment with the neuropsychologist. The writer referred the patient to [facility] neurology office for assistance.

## 2024-01-06 ENCOUNTER — HOSPITAL ENCOUNTER (EMERGENCY)
Facility: HOSPITAL | Age: 49
Discharge: HOME | End: 2024-01-06
Attending: EMERGENCY MEDICINE | Admitting: EMERGENCY MEDICINE
Payer: COMMERCIAL

## 2024-01-06 VITALS
DIASTOLIC BLOOD PRESSURE: 76 MMHG | HEART RATE: 88 BPM | SYSTOLIC BLOOD PRESSURE: 119 MMHG | OXYGEN SATURATION: 98 % | RESPIRATION RATE: 18 BRPM | TEMPERATURE: 97.9 F

## 2024-01-06 DIAGNOSIS — M54.2 CHRONIC NECK PAIN: Primary | ICD-10-CM

## 2024-01-06 DIAGNOSIS — G89.29 CHRONIC NECK PAIN: Primary | ICD-10-CM

## 2024-01-06 PROCEDURE — 96372 THER/PROPH/DIAG INJ SC/IM: CPT

## 2024-01-06 PROCEDURE — 99284 EMERGENCY DEPT VISIT MOD MDM: CPT | Performed by: EMERGENCY MEDICINE

## 2024-01-06 PROCEDURE — 99283 EMERGENCY DEPT VISIT LOW MDM: CPT

## 2024-01-06 RX ORDER — HYDROMORPHONE HCL/PF 1 MG/ML
1 SYRINGE (ML) INJECTION ONCE
Status: DISCONTINUED | OUTPATIENT
Start: 2024-01-06 | End: 2024-01-06

## 2024-01-06 RX ORDER — LIDOCAINE 50 MG/G
1 PATCH TOPICAL ONCE
Status: DISCONTINUED | OUTPATIENT
Start: 2024-01-06 | End: 2024-01-06 | Stop reason: HOSPADM

## 2024-01-06 RX ORDER — HYDROMORPHONE HCL/PF 1 MG/ML
0.5 SYRINGE (ML) INJECTION ONCE
Status: COMPLETED | OUTPATIENT
Start: 2024-01-06 | End: 2024-01-06

## 2024-01-06 RX ORDER — OXYCODONE HYDROCHLORIDE 5 MG/1
5 TABLET ORAL ONCE
Status: COMPLETED | OUTPATIENT
Start: 2024-01-06 | End: 2024-01-06

## 2024-01-06 RX ADMIN — LIDOCAINE 5% 1 PATCH: 700 PATCH TOPICAL at 18:40

## 2024-01-06 RX ADMIN — HYDROMORPHONE HYDROCHLORIDE 0.5 MG: 1 INJECTION, SOLUTION INTRAMUSCULAR; INTRAVENOUS; SUBCUTANEOUS at 18:35

## 2024-01-06 RX ADMIN — OXYCODONE HYDROCHLORIDE 5 MG: 5 TABLET ORAL at 18:35
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
## 2024-01-09 ENCOUNTER — PATIENT MESSAGE (OUTPATIENT)
Dept: NEUROLOGY | Facility: CLINIC | Age: 49
End: 2024-01-09

## 2024-01-16 ENCOUNTER — OFFICE VISIT (OUTPATIENT)
Dept: OBGYN CLINIC | Facility: CLINIC | Age: 49
End: 2024-01-16
Payer: COMMERCIAL

## 2024-01-16 VITALS
HEIGHT: 65 IN | HEART RATE: 89 BPM | DIASTOLIC BLOOD PRESSURE: 61 MMHG | SYSTOLIC BLOOD PRESSURE: 99 MMHG | BODY MASS INDEX: 19.83 KG/M2 | WEIGHT: 119 LBS

## 2024-01-16 DIAGNOSIS — M77.02 MEDIAL EPICONDYLITIS OF LEFT ELBOW: Primary | ICD-10-CM

## 2024-01-16 PROCEDURE — 20551 NJX 1 TENDON ORIGIN/INSJ: CPT | Performed by: ORTHOPAEDIC SURGERY

## 2024-01-16 PROCEDURE — 99213 OFFICE O/P EST LOW 20 MIN: CPT | Performed by: ORTHOPAEDIC SURGERY

## 2024-01-16 RX ORDER — BETAMETHASONE SODIUM PHOSPHATE AND BETAMETHASONE ACETATE 3; 3 MG/ML; MG/ML
6 INJECTION, SUSPENSION INTRA-ARTICULAR; INTRALESIONAL; INTRAMUSCULAR; SOFT TISSUE
Status: COMPLETED | OUTPATIENT
Start: 2024-01-16 | End: 2024-01-16

## 2024-01-16 RX ORDER — BUPIVACAINE HYDROCHLORIDE 2.5 MG/ML
1 INJECTION, SOLUTION EPIDURAL; INFILTRATION; INTRACAUDAL
Status: COMPLETED | OUTPATIENT
Start: 2024-01-16 | End: 2024-01-16

## 2024-01-16 RX ADMIN — BUPIVACAINE HYDROCHLORIDE 1 ML: 2.5 INJECTION, SOLUTION EPIDURAL; INFILTRATION; INTRACAUDAL at 09:30

## 2024-01-16 RX ADMIN — BETAMETHASONE SODIUM PHOSPHATE AND BETAMETHASONE ACETATE 6 MG: 3; 3 INJECTION, SUSPENSION INTRA-ARTICULAR; INTRALESIONAL; INTRAMUSCULAR; SOFT TISSUE at 09:30
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
## 2024-01-24 ENCOUNTER — TELEPHONE (OUTPATIENT)
Age: 49
End: 2024-01-24

## 2024-01-24 ENCOUNTER — TELEPHONE (OUTPATIENT)
Dept: NEUROLOGY | Facility: CLINIC | Age: 49
End: 2024-01-24

## 2024-01-24 NOTE — TELEPHONE ENCOUNTER
LOV 10/11/23  Last Proc 11/29.  Has not had follow up on the office since that time. Next ppt 3/4/24. Please advise

## 2024-01-24 NOTE — TELEPHONE ENCOUNTER
Caller: carlos Barnett    Doctor: Harjeet    Reason for call: pt stated she has no refill for the HYDROcodone-acetaminophen (NORCO) 5-325 mg per tablet, pt stated she has an appt to see Barbara Kocher on 4/10/24. Pt would like to know if she can get a refill until she sees Barbara Kocher. Pt also wanted to know if she can get a list of her diagnosis. Please Advise    Call back#: [phone]

## 2024-01-24 NOTE — TELEPHONE ENCOUNTER
Patient called in to schedule appointment.    Patient is schedule w/Ko at [place] on 2/29/24 at 4pm

## 2024-01-25 NOTE — TELEPHONE ENCOUNTER
If patient is on an opiate, she needs a follow-up every 2 months to renew opiates.  She needs a much sooner follow-up visit.  I will provide a short prescription to that date.

## 2024-01-29 ENCOUNTER — TELEPHONE (OUTPATIENT)
Dept: NEPHROLOGY | Facility: CLINIC | Age: 49
End: 2024-01-29

## 2024-01-29 DIAGNOSIS — M47.812 CERVICAL SPONDYLOSIS: ICD-10-CM

## 2024-01-29 DIAGNOSIS — G89.4 CHRONIC PAIN SYNDROME: ICD-10-CM

## 2024-01-29 DIAGNOSIS — N18.32 STAGE 3B CHRONIC KIDNEY DISEASE (HCC): Primary | ICD-10-CM

## 2024-01-29 RX ORDER — NABUMETONE 500 MG/1
500 TABLET, FILM COATED ORAL 2 TIMES DAILY
Qty: 60 TABLET | Refills: 0 | Status: SHIPPED | OUTPATIENT
Start: 2024-01-29

## 2024-01-31 DIAGNOSIS — M79.18 MYOFASCIAL PAIN SYNDROME: ICD-10-CM

## 2024-01-31 DIAGNOSIS — M54.9 MID BACK PAIN: ICD-10-CM

## 2024-02-01 ENCOUNTER — TELEPHONE (OUTPATIENT)
Age: 49
End: 2024-02-01

## 2024-02-01 RX ORDER — BACLOFEN 20 MG/1
20 TABLET ORAL 3 TIMES DAILY
Qty: 90 TABLET | Refills: 0 | Status: SHIPPED | OUTPATIENT
Start: 2024-02-01

## 2024-02-01 NOTE — TELEPHONE ENCOUNTER
Caller: Patient     Doctor: Kocher    Reason for call: Calling to confirm baclofen was sent to pharmacy     Call back#: n/a

## 2024-02-03 ENCOUNTER — APPOINTMENT (OUTPATIENT)
Dept: LAB | Facility: CLINIC | Age: 49
End: 2024-02-03
Payer: COMMERCIAL

## 2024-02-03 DIAGNOSIS — N25.81 SECONDARY HYPERPARATHYROIDISM (HCC): ICD-10-CM

## 2024-02-03 DIAGNOSIS — R41.3 MEMORY DEFICIT: ICD-10-CM

## 2024-02-03 DIAGNOSIS — N18.32 STAGE 3B CHRONIC KIDNEY DISEASE (HCC): ICD-10-CM

## 2024-02-03 DIAGNOSIS — R79.89 ABNORMAL CBC: ICD-10-CM

## 2024-02-03 DIAGNOSIS — E55.9 VITAMIN D DEFICIENCY: ICD-10-CM

## 2024-02-03 LAB
25(OH)D3 SERPL-MCNC: >120 NG/ML (ref 30–100)
ALBUMIN SERPL BCP-MCNC: 4.4 G/DL (ref 3.5–5)
ALP SERPL-CCNC: 55 U/L (ref 34–104)
ALT SERPL W P-5'-P-CCNC: 11 U/L (ref 7–52)
AMORPH URATE CRY URNS QL MICRO: ABNORMAL
ANION GAP SERPL CALCULATED.3IONS-SCNC: 8 MMOL/L
AST SERPL W P-5'-P-CCNC: 13 U/L (ref 13–39)
BACTERIA UR QL AUTO: ABNORMAL /HPF
BASOPHILS # BLD AUTO: 0.04 THOUSANDS/ÂΜL (ref 0–0.1)
BASOPHILS NFR BLD AUTO: 1 % (ref 0–1)
BILIRUB SERPL-MCNC: 0.46 MG/DL (ref 0.2–1)
BILIRUB UR QL STRIP: NEGATIVE
BUN SERPL-MCNC: 20 MG/DL (ref 5–25)
CALCIUM SERPL-MCNC: 9.3 MG/DL (ref 8.4–10.2)
CHLORIDE SERPL-SCNC: 104 MMOL/L (ref 96–108)
CLARITY UR: ABNORMAL
CO2 SERPL-SCNC: 30 MMOL/L (ref 21–32)
COLOR UR: YELLOW
CREAT SERPL-MCNC: 1.22 MG/DL (ref 0.6–1.3)
CREAT UR-MCNC: 128.1 MG/DL
EOSINOPHIL # BLD AUTO: 0.13 THOUSAND/ÂΜL (ref 0–0.61)
EOSINOPHIL NFR BLD AUTO: 3 % (ref 0–6)
ERYTHROCYTE [DISTWIDTH] IN BLOOD BY AUTOMATED COUNT: 12.3 % (ref 11.6–15.1)
FERRITIN SERPL-MCNC: 200 NG/ML (ref 11–307)
GFR SERPL CREATININE-BSD FRML MDRD: 51 ML/MIN/1.73SQ M
GLUCOSE P FAST SERPL-MCNC: 70 MG/DL (ref 65–99)
GLUCOSE UR STRIP-MCNC: NEGATIVE MG/DL
HCT VFR BLD AUTO: 38.8 % (ref 34.8–46.1)
HGB BLD-MCNC: 12.7 G/DL (ref 11.5–15.4)
HGB UR QL STRIP.AUTO: NEGATIVE
IMM GRANULOCYTES # BLD AUTO: 0.01 THOUSAND/UL (ref 0–0.2)
IMM GRANULOCYTES NFR BLD AUTO: 0 % (ref 0–2)
IRON SATN MFR SERPL: 57 % (ref 15–50)
IRON SERPL-MCNC: 151 UG/DL (ref 50–212)
KETONES UR STRIP-MCNC: NEGATIVE MG/DL
LEUKOCYTE ESTERASE UR QL STRIP: ABNORMAL
LYMPHOCYTES # BLD AUTO: 1.13 THOUSANDS/ÂΜL (ref 0.6–4.47)
LYMPHOCYTES NFR BLD AUTO: 23 % (ref 14–44)
MAGNESIUM SERPL-MCNC: 2.3 MG/DL (ref 1.9–2.7)
MCH RBC QN AUTO: 33.9 PG (ref 26.8–34.3)
MCHC RBC AUTO-ENTMCNC: 32.7 G/DL (ref 31.4–37.4)
MCV RBC AUTO: 104 FL (ref 82–98)
MICROALBUMIN UR-MCNC: 18 MG/L
MICROALBUMIN/CREAT 24H UR: 14 MG/G CREATININE (ref 0–30)
MONOCYTES # BLD AUTO: 0.32 THOUSAND/ÂΜL (ref 0.17–1.22)
MONOCYTES NFR BLD AUTO: 7 % (ref 4–12)
MUCOUS THREADS UR QL AUTO: ABNORMAL
NEUTROPHILS # BLD AUTO: 3.25 THOUSANDS/ÂΜL (ref 1.85–7.62)
NEUTS SEG NFR BLD AUTO: 66 % (ref 43–75)
NITRITE UR QL STRIP: NEGATIVE
NON-SQ EPI CELLS URNS QL MICRO: ABNORMAL /HPF
NRBC BLD AUTO-RTO: 0 /100 WBCS
PH UR STRIP.AUTO: 7 [PH]
PHOSPHATE SERPL-MCNC: 3.3 MG/DL (ref 2.7–4.5)
PLATELET # BLD AUTO: 232 THOUSANDS/UL (ref 149–390)
PMV BLD AUTO: 13.8 FL (ref 8.9–12.7)
POTASSIUM SERPL-SCNC: 4.1 MMOL/L (ref 3.5–5.3)
PROT SERPL-MCNC: 6.8 G/DL (ref 6.4–8.4)
PROT UR STRIP-MCNC: ABNORMAL MG/DL
PTH-INTACT SERPL-MCNC: 39.8 PG/ML (ref 12–88)
RBC # BLD AUTO: 3.75 MILLION/UL (ref 3.81–5.12)
RBC #/AREA URNS AUTO: ABNORMAL /HPF
RENAL EPI CELLS #/AREA URNS HPF: PRESENT /[HPF]
SODIUM SERPL-SCNC: 142 MMOL/L (ref 135–147)
SP GR UR STRIP.AUTO: 1.02 (ref 1–1.03)
TIBC SERPL-MCNC: 267 UG/DL (ref 250–450)
UIBC SERPL-MCNC: 116 UG/DL (ref 155–355)
UROBILINOGEN UR STRIP-ACNC: <2 MG/DL
VIT B12 SERPL-MCNC: 267 PG/ML (ref 180–914)
WBC # BLD AUTO: 4.88 THOUSAND/UL (ref 4.31–10.16)
WBC #/AREA URNS AUTO: ABNORMAL /HPF

## 2024-02-03 PROCEDURE — 82570 ASSAY OF URINE CREATININE: CPT

## 2024-02-03 PROCEDURE — 83970 ASSAY OF PARATHORMONE: CPT

## 2024-02-03 PROCEDURE — 83540 ASSAY OF IRON: CPT

## 2024-02-03 PROCEDURE — 82306 VITAMIN D 25 HYDROXY: CPT

## 2024-02-03 PROCEDURE — 81001 URINALYSIS AUTO W/SCOPE: CPT

## 2024-02-03 PROCEDURE — 82728 ASSAY OF FERRITIN: CPT

## 2024-02-03 PROCEDURE — 36415 COLL VENOUS BLD VENIPUNCTURE: CPT

## 2024-02-03 PROCEDURE — 82043 UR ALBUMIN QUANTITATIVE: CPT

## 2024-02-03 PROCEDURE — 83735 ASSAY OF MAGNESIUM: CPT

## 2024-02-03 PROCEDURE — 84100 ASSAY OF PHOSPHORUS: CPT

## 2024-02-03 PROCEDURE — 85025 COMPLETE CBC W/AUTO DIFF WBC: CPT

## 2024-02-03 PROCEDURE — 83550 IRON BINDING TEST: CPT

## 2024-02-03 PROCEDURE — 80053 COMPREHEN METABOLIC PANEL: CPT

## 2024-02-03 PROCEDURE — 82607 VITAMIN B-12: CPT

## 2024-02-04 ENCOUNTER — APPOINTMENT (OUTPATIENT)
Dept: URGENT CARE | Age: 49
End: 2024-02-04

## 2024-02-04 PROCEDURE — 86580 TB INTRADERMAL TEST: CPT

## 2024-02-05 ENCOUNTER — TELEPHONE (OUTPATIENT)
Dept: NEPHROLOGY | Facility: CLINIC | Age: 49
End: 2024-02-05

## 2024-02-05 NOTE — TELEPHONE ENCOUNTER
----- Message from FIONA Byrnes sent at 2/4/2024  3:23 PM EST -----  Vitamin D level very high- is she taking any vitamin D? If so, discontinue    Urine has bacteria- an symptoms of UTI?

## 2024-02-05 NOTE — TELEPHONE ENCOUNTER
Tried reaching patient, no answer.  Left message on voicemail for patient to return call.    Vitamin D level very high- is she taking any vitamin D? If so, discontinue     Urine has bacteria- an symptoms of UTI?

## 2024-02-13 ENCOUNTER — APPOINTMENT (OUTPATIENT)
Dept: URGENT CARE | Facility: MEDICAL CENTER | Age: 49
End: 2024-02-13

## 2024-02-14 ENCOUNTER — OFFICE VISIT (OUTPATIENT)
Dept: NEPHROLOGY | Facility: CLINIC | Age: 49
End: 2024-02-14
Payer: COMMERCIAL

## 2024-02-14 VITALS
OXYGEN SATURATION: 99 % | HEART RATE: 112 BPM | DIASTOLIC BLOOD PRESSURE: 82 MMHG | SYSTOLIC BLOOD PRESSURE: 116 MMHG | WEIGHT: 115.8 LBS | BODY MASS INDEX: 19.29 KG/M2 | HEIGHT: 65 IN

## 2024-02-14 DIAGNOSIS — E67.3 HIGH VITAMIN D LEVEL: ICD-10-CM

## 2024-02-14 DIAGNOSIS — N18.30 BENIGN HYPERTENSION WITH CKD (CHRONIC KIDNEY DISEASE) STAGE III (HCC): ICD-10-CM

## 2024-02-14 DIAGNOSIS — N18.31 STAGE 3A CHRONIC KIDNEY DISEASE (HCC): Primary | ICD-10-CM

## 2024-02-14 DIAGNOSIS — I12.9 BENIGN HYPERTENSION WITH CKD (CHRONIC KIDNEY DISEASE) STAGE III (HCC): ICD-10-CM

## 2024-02-14 PROCEDURE — 99214 OFFICE O/P EST MOD 30 MIN: CPT | Performed by: NURSE PRACTITIONER

## 2024-02-14 NOTE — PATIENT INSTRUCTIONS
It was nice to see you today.   Please stop vitamin D supplement as your vitamin D level is too high  Please stop using iron as your iron level is too high. Use a multivitamin without iron
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
## 2024-02-25 DIAGNOSIS — M54.9 MID BACK PAIN: ICD-10-CM

## 2024-02-25 DIAGNOSIS — M79.18 MYOFASCIAL PAIN SYNDROME: ICD-10-CM

## 2024-02-26 RX ORDER — BACLOFEN 20 MG/1
20 TABLET ORAL 3 TIMES DAILY
Qty: 90 TABLET | Refills: 0 | Status: SHIPPED | OUTPATIENT
Start: 2024-02-26

## 2024-02-27 ENCOUNTER — TELEPHONE (OUTPATIENT)
Dept: FAMILY MEDICINE CLINIC | Facility: CLINIC | Age: 49
End: 2024-02-27

## 2024-02-27 DIAGNOSIS — R41.3 MEMORY DISORDER: Primary | ICD-10-CM

## 2024-02-27 NOTE — TELEPHONE ENCOUNTER
Elaina at [facility] called regarding this patient and is asking for a doctor to doctor referral to be placed in [facility] for evaluated treatment.  Diagnosis is Memory, Code R41.3  Patient's appointment is February 29.      (Any questions, Elaina's phone number is [phone])

## 2024-02-28 ENCOUNTER — TELEPHONE (OUTPATIENT)
Dept: NEUROLOGY | Facility: CLINIC | Age: 49
End: 2024-02-28

## 2024-02-28 DIAGNOSIS — G89.4 CHRONIC PAIN SYNDROME: ICD-10-CM

## 2024-02-28 DIAGNOSIS — M47.812 CERVICAL SPONDYLOSIS: ICD-10-CM

## 2024-02-28 RX ORDER — NABUMETONE 500 MG/1
500 TABLET, FILM COATED ORAL 2 TIMES DAILY
Qty: 60 TABLET | Refills: 0 | Status: SHIPPED | OUTPATIENT
Start: 2024-02-28

## 2024-02-29 ENCOUNTER — OFFICE VISIT (OUTPATIENT)
Dept: NEUROLOGY | Facility: CLINIC | Age: 49
End: 2024-02-29
Payer: COMMERCIAL

## 2024-02-29 VITALS
BODY MASS INDEX: 19.33 KG/M2 | DIASTOLIC BLOOD PRESSURE: 80 MMHG | SYSTOLIC BLOOD PRESSURE: 130 MMHG | HEART RATE: 87 BPM | HEIGHT: 65 IN | WEIGHT: 116 LBS

## 2024-02-29 DIAGNOSIS — R41.3 MEMORY DISORDER: ICD-10-CM

## 2024-02-29 DIAGNOSIS — G47.00 INSOMNIA: Primary | ICD-10-CM

## 2024-02-29 DIAGNOSIS — E53.8 B12 DEFICIENCY: ICD-10-CM

## 2024-02-29 PROCEDURE — 99204 OFFICE O/P NEW MOD 45 MIN: CPT | Performed by: PSYCHIATRY & NEUROLOGY

## 2024-02-29 RX ORDER — MAGNESIUM 200 MG
1000 TABLET ORAL DAILY
Qty: 30 TABLET | Refills: 3 | Status: SHIPPED | OUTPATIENT
Start: 2024-02-29
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
## 2024-02-29 NOTE — PATIENT INSTRUCTIONS
Obtain MRI brain, with and without contrast. You will need to check you kidney function within the month prior to the MRI    Please check some additional labs including thyroid and thiamine    Supplement your B12 with under-the-tongue supplements and recheck your B12 level in about 3 months    Please see Neuropsychology for neurocognitive testing. If not available in the [facility] system look at other places like [facility]    Please see Sleep Medicine about your insomnia

## 2024-03-04 ENCOUNTER — PROCEDURE VISIT (OUTPATIENT)
Dept: PAIN MEDICINE | Facility: CLINIC | Age: 49
End: 2024-03-04
Payer: COMMERCIAL

## 2024-03-04 DIAGNOSIS — G89.4 CHRONIC PAIN SYNDROME: ICD-10-CM

## 2024-03-04 DIAGNOSIS — M54.9 MID BACK PAIN: ICD-10-CM

## 2024-03-04 DIAGNOSIS — M79.18 MYOFASCIAL PAIN SYNDROME: ICD-10-CM

## 2024-03-04 DIAGNOSIS — M54.2 NECK PAIN: Primary | ICD-10-CM

## 2024-03-04 PROCEDURE — 20553 NJX 1/MLT TRIGGER POINTS 3/>: CPT | Performed by: ANESTHESIOLOGY

## 2024-03-04 RX ORDER — BUPIVACAINE HYDROCHLORIDE 2.5 MG/ML
10 INJECTION, SOLUTION EPIDURAL; INFILTRATION; INTRACAUDAL
Status: COMPLETED | OUTPATIENT
Start: 2024-03-04 | End: 2024-03-04

## 2024-03-04 RX ORDER — TRIAMCINOLONE ACETONIDE 40 MG/ML
40 INJECTION, SUSPENSION INTRA-ARTICULAR; INTRAMUSCULAR
Status: COMPLETED | OUTPATIENT
Start: 2024-03-04 | End: 2024-03-04

## 2024-03-04 RX ADMIN — TRIAMCINOLONE ACETONIDE 40 MG: 40 INJECTION, SUSPENSION INTRA-ARTICULAR; INTRAMUSCULAR at 14:40

## 2024-03-04 RX ADMIN — BUPIVACAINE HYDROCHLORIDE 10 ML: 2.5 INJECTION, SOLUTION EPIDURAL; INFILTRATION; INTRACAUDAL at 14:40

## 2024-03-04 NOTE — PATIENT INSTRUCTIONS
Do not apply heat to any area that is numb. If you have discomfort or soreness at the injection site, you may apply ice today, 20 minutes on and 20 minutes off. Tomorrow you may use ice or warm, moist heat. Do not apply ice or heat directly to the skin.  If you experience severe shortness of breath, go to the Emergency Room.  You may have numbness for several hours from the local anesthetic. Please use caution and common sense, especially with weight-bearing activities.  You may have an increase or change in the discomfort for 36-48 hours after your treatment. Apply ice and continue with any pain medicine you have been prescribed.  Do not do anything strenuous today. You may shower, but no tub baths or hot tubs today. You may resume your normal activities tomorrow, but do not “overdo it”. Resume normal activities slowly when you are feeling better.  If you experience redness, drainage or swelling at the injection site, or if you develop a fever above 100 degrees, please call [facility] at [phone] or go to the Emergency Room.  Continue to take all routine medicines prescribed by your primary care physician unless otherwise instructed by our staff. Most blood thinners should be started again according to your regularly scheduled dosing. If you have any questions, please give our office a call.    As no general anesthesia was used in today's procedure, you should not experience any side effects related to anesthesia.       If you have a problem specifically related to your procedure, please call our office at [phone].  Problems not related to your procedure should be directed to your primary care physician.

## 2024-03-04 NOTE — PROGRESS NOTES
" Universal Protocol:  Consent: Verbal consent obtained. Written consent obtained.  Risks and benefits: risks, benefits and alternatives were discussed  Consent given by: patient  Time out: Immediately prior to procedure a \"time out\" was called to verify the correct patient, procedure, equipment, support staff and site/side marked as required.  Timeout called at: 3/4/2024 2:37 PM.  Patient understanding: patient states understanding of the procedure being performed  Patient consent: the patient's understanding of the procedure matches consent given  Procedure consent: procedure consent matches procedure scheduled  Relevant documents: relevant documents present and verified  Test results: test results available and properly labeled  Site marked: the operative site was marked  Radiology Images displayed and confirmed. If images not available, report reviewed: imaging studies not available  Required items: required blood products, implants, devices, and special equipment available  Patient identity confirmed: verbally with patient  Supporting Documentation  Indications: pain   Trigger Point Injections: multiple trigger points: 3 or more muscle groups    Injection site identified by: ultrasound  Procedure Details  Location(s):    Neck/Upper Back: L upper trapezius, R upper trapezius, R levator scapulae, L levator scapulae, L cervical paraspinals and R cervical paraspinals     Middle Back: R thoracic paraspinals and L thoracic paraspinals     Prep: patient was prepped and draped in usual sterile fashion  Needle size: 22 G  Medications: 10 mL bupivacaine (PF) 0.25 %; 40 mg triamcinolone acetonide 40 mg/mL  Patient tolerance: patient tolerated the procedure well with no immediate complications          "

## 2024-03-11 ENCOUNTER — TELEPHONE (OUTPATIENT)
Dept: PAIN MEDICINE | Facility: CLINIC | Age: 49
End: 2024-03-11

## 2024-03-16 ENCOUNTER — HOSPITAL ENCOUNTER (OUTPATIENT)
Facility: MEDICAL CENTER | Age: 49
Discharge: HOME | End: 2024-03-16
Payer: COMMERCIAL

## 2024-03-16 DIAGNOSIS — R41.3 MEMORY DISORDER: ICD-10-CM

## 2024-03-16 PROCEDURE — 70553 MRI BRAIN STEM W/O & W/DYE: CPT

## 2024-03-16 PROCEDURE — A9585 GADOBUTROL INJECTION: HCPCS | Performed by: NURSE PRACTITIONER

## 2024-03-16 RX ORDER — GADOBUTROL 604.72 MG/ML
5 INJECTION INTRAVENOUS
Status: COMPLETED | OUTPATIENT
Start: 2024-03-16 | End: 2024-03-16

## 2024-03-16 RX ADMIN — GADOBUTROL 5 ML: 604.72 INJECTION INTRAVENOUS at 15:24

## 2024-03-20 ENCOUNTER — OFFICE VISIT (OUTPATIENT)
Dept: FAMILY MEDICINE CLINIC | Facility: CLINIC | Age: 49
End: 2024-03-20
Payer: COMMERCIAL

## 2024-03-20 VITALS
TEMPERATURE: 97.6 F | WEIGHT: 114.8 LBS | SYSTOLIC BLOOD PRESSURE: 118 MMHG | HEIGHT: 65 IN | DIASTOLIC BLOOD PRESSURE: 72 MMHG | BODY MASS INDEX: 19.13 KG/M2 | OXYGEN SATURATION: 99 % | HEART RATE: 88 BPM

## 2024-03-20 DIAGNOSIS — R41.3 MEMORY DEFICIT: ICD-10-CM

## 2024-03-20 DIAGNOSIS — N18.32 STAGE 3B CHRONIC KIDNEY DISEASE (HCC): ICD-10-CM

## 2024-03-20 DIAGNOSIS — F31.81 BIPOLAR 2 DISORDER (HCC): ICD-10-CM

## 2024-03-20 DIAGNOSIS — N25.81 SECONDARY HYPERPARATHYROIDISM (HCC): ICD-10-CM

## 2024-03-20 DIAGNOSIS — R11.0 NAUSEA: ICD-10-CM

## 2024-03-20 DIAGNOSIS — F11.20 CONTINUOUS OPIOID DEPENDENCE (HCC): ICD-10-CM

## 2024-03-20 DIAGNOSIS — R10.9 ABDOMINAL PAIN, UNSPECIFIED ABDOMINAL LOCATION: Primary | ICD-10-CM

## 2024-03-20 DIAGNOSIS — Z23 ENCOUNTER FOR IMMUNIZATION: ICD-10-CM

## 2024-03-20 LAB
BACTERIA UR QL AUTO: ABNORMAL /HPF
BILIRUB UR QL STRIP: NEGATIVE
CAOX CRY URNS QL MICRO: ABNORMAL /HPF
CLARITY UR: CLEAR
COLOR UR: YELLOW
GLUCOSE UR STRIP-MCNC: NEGATIVE MG/DL
HGB UR QL STRIP.AUTO: NEGATIVE
HYALINE CASTS #/AREA URNS LPF: ABNORMAL /LPF
KETONES UR STRIP-MCNC: ABNORMAL MG/DL
LEUKOCYTE ESTERASE UR QL STRIP: NEGATIVE
MUCOUS THREADS UR QL AUTO: ABNORMAL
NITRITE UR QL STRIP: NEGATIVE
NON-SQ EPI CELLS URNS QL MICRO: ABNORMAL /HPF
PH UR STRIP.AUTO: 6 [PH]
PROT UR STRIP-MCNC: ABNORMAL MG/DL
RBC #/AREA URNS AUTO: ABNORMAL /HPF
SL AMB  POCT GLUCOSE, UA: NEGATIVE
SL AMB LEUKOCYTE ESTERASE,UA: NEGATIVE
SL AMB POCT BILIRUBIN,UA: NEGATIVE
SL AMB POCT BLOOD,UA: NEGATIVE
SL AMB POCT CLARITY,UA: ABNORMAL
SL AMB POCT COLOR,UA: YELLOW
SL AMB POCT KETONES,UA: NEGATIVE
SL AMB POCT NITRITE,UA: NEGATIVE
SL AMB POCT PH,UA: 6
SL AMB POCT SPECIFIC GRAVITY,UA: 1.03
SL AMB POCT URINE PROTEIN: ABNORMAL
SL AMB POCT UROBILINOGEN: 0.2
SP GR UR STRIP.AUTO: 1.03 (ref 1–1.03)
UROBILINOGEN UR STRIP-ACNC: <2 MG/DL
WBC #/AREA URNS AUTO: ABNORMAL /HPF

## 2024-03-20 PROCEDURE — 81001 URINALYSIS AUTO W/SCOPE: CPT | Performed by: NURSE PRACTITIONER

## 2024-03-20 PROCEDURE — 99214 OFFICE O/P EST MOD 30 MIN: CPT | Performed by: NURSE PRACTITIONER

## 2024-03-20 PROCEDURE — 81002 URINALYSIS NONAUTO W/O SCOPE: CPT | Performed by: NURSE PRACTITIONER

## 2024-03-20 PROCEDURE — 87086 URINE CULTURE/COLONY COUNT: CPT | Performed by: NURSE PRACTITIONER

## 2024-03-20 PROCEDURE — 90471 IMMUNIZATION ADMIN: CPT

## 2024-03-20 PROCEDURE — 90750 HZV VACC RECOMBINANT IM: CPT

## 2024-03-20 RX ORDER — ONDANSETRON 4 MG/1
4 TABLET, FILM COATED ORAL EVERY 8 HOURS PRN
Qty: 20 TABLET | Refills: 0 | Status: SHIPPED | OUTPATIENT
Start: 2024-03-20

## 2024-03-20 NOTE — PROGRESS NOTES
"Name: Ericka Castillo      : 1975      MRN: [id number]  Encounter Provider: FIONA Morales  Encounter Date: 3/20/2024   Encounter department: [facility]    Assessment & Plan     1. Abdominal pain, unspecified abdominal location  -     Urine culture  -     POCT urine dip  -     Urinalysis with microscopic    2. Encounter for immunization  -     Zoster Vaccine Recombinant IM    3. Secondary hyperparathyroidism (HCC)    4. Bipolar 2 disorder (HCC)    5. Continuous opioid dependence (HCC)    6. Nausea  -     ondansetron (ZOFRAN) 4 mg tablet; Take 1 tablet (4 mg total) by mouth every 8 (eight) hours as needed for nausea or vomiting    7. Stage 3b chronic kidney disease (HCC)    8. Memory deficit           Subjective      Patient presents for follow up. She states that she feels like her memory is worsening. She states that she is more forgetful now then she was a few months ago. She was seen by neurology. She had an MRI which is still pending. She has labs to be completed. She has to follow up with neuropsychology and sleep medicine which she states she was not aware of. Information on this provided to patient. She will continue f/u with neurology for her memory. Does have a lot of stressors so also recommend continued follow up with psych/counseling.     Abdominal pain- about one month ago, developed increase in diarrhea again. She states she was under a lot of stress with her son as he was failing out of school and this was really effecting her. She then developed abdominal pain in the center of the abdomen intermittently that she describes as a sharp stabbing pain that happens a few times a day. States \"it feels like there is something moving in there.\" She does have intermittent constipation as well where it is hard for her to pass stool- this is her normal. Denies any blood, mucus or pus in a stool. She does have colonoscopy scheduled for Friday as she sees GI for " these issues. The last few days she has developed nausea. No indigestion, reflux etc. Will given zofran prn for nausea. Recommend she call GI to make them aware of the nausea. F/u as needed.   Patient follows with nephrology for CKD and hyperparathyroid.   She is no longer on opioids for her chronic pain.     Review of Systems   Constitutional:  Negative for chills and fever.   Respiratory:  Negative for cough and shortness of breath.    Cardiovascular:  Negative for chest pain and palpitations.   Gastrointestinal:  Positive for abdominal pain, constipation, diarrhea and nausea. Negative for abdominal distention and vomiting.   Genitourinary:  Negative for dysuria and hematuria.   Musculoskeletal:  Positive for neck pain (chronic, no changes). Negative for arthralgias, back pain and neck stiffness.   Skin:  Negative for color change and rash.   Neurological:  Negative for dizziness, seizures and syncope.   Psychiatric/Behavioral:  Positive for confusion.    All other systems reviewed and are negative.      Current Outpatient Medications on File Prior to Visit   Medication Sig   • ARIPiprazole (ABILIFY) 10 mg tablet Take 10 mg by mouth daily at bedtime   • baclofen 20 mg tablet TAKE 1 TABLET BY MOUTH THREE TIMES DAILY   • buPROPion (WELLBUTRIN XL) 150 mg 24 hr tablet in the morning   • buPROPion (WELLBUTRIN XL) 300 mg 24 hr tablet Take 300 mg by mouth daily    • Cyanocobalamin (B-12) 1000 MCG SUBL Place 1 tablet (1,000 mcg total) under the tongue in the morning   • desvenlafaxine (PRISTIQ) 100 mg 24 hr tablet Take 100 mg by mouth daily at bedtime   • famotidine (PEPCID) 20 mg tablet Take 1 tablet (20 mg total) by mouth 2 (two) times a day   • gabapentin (NEURONTIN) 800 mg tablet Take 1 tablet (800 mg total) by mouth 3 (three) times a day   • hydrOXYzine HCL (ATARAX) 25 mg tablet Take 1 tablet (25 mg total) by mouth every 6 (six) hours as needed for anxiety (Patient taking differently: Take 25 mg by mouth if needed  "for anxiety)   • lansoprazole (PREVACID) 30 mg capsule Take 30 mg by mouth 2 (two) times a day   • levonorgestrel (MIRENA) 20 MCG/24HR IUD 1 each by Intrauterine route once   • levothyroxine 50 mcg tablet Take 1 tablet (50 mcg total) by mouth daily   • lidocaine (XYLOCAINE) 5 % ointment Apply topically as needed for mild pain   • lisinopril (ZESTRIL) 10 mg tablet Take 0.5 tablets (5 mg total) by mouth daily (Patient taking differently: Take 5 mg by mouth daily Take 5mg daily.)   • nabumetone (RELAFEN) 500 mg tablet Take 1 tablet by mouth twice daily   • Probiotic Product (VSL#3) CAPS    • zolpidem (AMBIEN) 10 mg tablet Take 1 tablet (10 mg total) by mouth daily at bedtime as needed for sleep for up to 10 days   • [DISCONTINUED] budesonide (ENTOCORT EC) 3 MG capsule TAKE 3 CAPSULES BY MOUTH ONCE DAILY FOR 1 MONTH THEN 2 CAPSULES EVERY DAY (Patient not taking: Reported on 3/20/2024)   • [DISCONTINUED] diazepam (VALIUM) 5 mg tablet Take 5 mg by mouth 2 (two) times a day (Patient not taking: Reported on 3/20/2024)   • [DISCONTINUED] HYDROcodone-acetaminophen (NORCO) 5-325 mg per tablet Take 1 tablet by mouth every 8 (eight) hours as needed for pain Max Daily Amount: 3 tablets (Patient not taking: Reported on 2/29/2024)   • [DISCONTINUED] naloxone (NARCAN) 4 mg/0.1 mL nasal spray Administer 1 spray into a nostril. If no response after 2-3 minutes, give another dose in the other nostril using a new spray. (Patient not taking: Reported on 2/29/2024)       Objective     /72 (BP Location: Left arm, Patient Position: Sitting)   Pulse 88   Temp 97.6 °F (36.4 °C) (Tympanic)   Ht 5' 5\" (1.651 m)   Wt 52.1 kg (114 lb 12.8 oz)   SpO2 99%   BMI 19.10 kg/m²     Physical Exam  Vitals and nursing note reviewed.   Constitutional:       General: She is not in acute distress.     Appearance: Normal appearance. She is not ill-appearing or toxic-appearing.   Cardiovascular:      Rate and Rhythm: Normal rate and regular rhythm. "      Pulses: Normal pulses.      Heart sounds: Normal heart sounds. No murmur heard.     No friction rub. No gallop.   Pulmonary:      Effort: Pulmonary effort is normal. No respiratory distress.      Breath sounds: Normal breath sounds. No stridor. No wheezing or rales.   Abdominal:      General: Abdomen is flat. Bowel sounds are normal. There is no distension.      Palpations: Abdomen is soft.      Tenderness: There is no abdominal tenderness. There is no guarding.      Hernia: No hernia is present.   Musculoskeletal:      Cervical back: Normal range of motion. No rigidity.      Right lower leg: No edema.      Left lower leg: No edema.   Lymphadenopathy:      Cervical: No cervical adenopathy.   Skin:     General: Skin is warm and dry.      Coloration: Skin is not jaundiced.      Findings: No rash.   Neurological:      General: No focal deficit present.      Mental Status: She is alert and oriented to person, place, and time. Mental status is at baseline.      Motor: No weakness.      Gait: Gait normal.   Psychiatric:         Mood and Affect: Mood normal.         Behavior: Behavior normal.         Thought Content: Thought content normal.         Judgment: Judgment normal.     FIONA Morales

## 2024-03-21 ENCOUNTER — TELEPHONE (OUTPATIENT)
Dept: PSYCHIATRY | Facility: CLINIC | Age: 49
End: 2024-03-21

## 2024-03-21 DIAGNOSIS — R82.998 CALCIUM OXALATE CRYSTALS IN URINE: Primary | ICD-10-CM

## 2024-03-21 LAB — BACTERIA UR CULT: NORMAL

## 2024-03-21 NOTE — TELEPHONE ENCOUNTER
Patient called the office and left a voicemail seeking services. Writer returned the call to patient and had to leave a voicemail for patient to call the office back.

## 2024-03-25 DIAGNOSIS — M54.9 MID BACK PAIN: ICD-10-CM

## 2024-03-25 DIAGNOSIS — M79.18 MYOFASCIAL PAIN SYNDROME: ICD-10-CM

## 2024-03-25 RX ORDER — BACLOFEN 20 MG/1
20 TABLET ORAL 3 TIMES DAILY
Qty: 90 TABLET | Refills: 0 | Status: SHIPPED | OUTPATIENT
Start: 2024-03-25

## 2024-03-28 ENCOUNTER — TELEPHONE (OUTPATIENT)
Dept: PSYCHIATRY | Facility: CLINIC | Age: 49
End: 2024-03-28

## 2024-03-28 NOTE — TELEPHONE ENCOUNTER
Patient called regarding [facility] Neuro Psychology Referral. Writer confirmed referral in chart and will forward request to  for next steps.

## 2024-03-28 NOTE — TELEPHONE ENCOUNTER
Spoke with pt regarding Neuropsychology referral, completed questionnaire, informed of the process, and of wait-list for scheduling. Patient verbalized understood and okay with being placed on wait-list. Writer also provided outside resources via [employer].      Forwarded to provider.

## 2024-04-10 ENCOUNTER — TELEPHONE (OUTPATIENT)
Age: 49
End: 2024-04-10

## 2024-04-10 ENCOUNTER — OFFICE VISIT (OUTPATIENT)
Age: 49
End: 2024-04-10
Payer: COMMERCIAL

## 2024-04-10 VITALS
DIASTOLIC BLOOD PRESSURE: 77 MMHG | HEART RATE: 94 BPM | SYSTOLIC BLOOD PRESSURE: 145 MMHG | WEIGHT: 113 LBS | HEIGHT: 65 IN | BODY MASS INDEX: 18.83 KG/M2

## 2024-04-10 DIAGNOSIS — G89.29 CHRONIC BILATERAL LOW BACK PAIN WITHOUT SCIATICA: ICD-10-CM

## 2024-04-10 DIAGNOSIS — F11.20 UNCOMPLICATED OPIOID DEPENDENCE (HCC): ICD-10-CM

## 2024-04-10 DIAGNOSIS — Z98.1 HISTORY OF FUSION OF CERVICAL SPINE: ICD-10-CM

## 2024-04-10 DIAGNOSIS — M54.12 CERVICAL RADICULOPATHY: ICD-10-CM

## 2024-04-10 DIAGNOSIS — M54.81 BILATERAL OCCIPITAL NEURALGIA: ICD-10-CM

## 2024-04-10 DIAGNOSIS — M47.812 CERVICAL SPONDYLOSIS: ICD-10-CM

## 2024-04-10 DIAGNOSIS — M54.2 NECK PAIN: ICD-10-CM

## 2024-04-10 DIAGNOSIS — G89.4 CHRONIC PAIN SYNDROME: Primary | ICD-10-CM

## 2024-04-10 DIAGNOSIS — M54.50 CHRONIC BILATERAL LOW BACK PAIN WITHOUT SCIATICA: ICD-10-CM

## 2024-04-10 DIAGNOSIS — M79.18 MYOFASCIAL PAIN SYNDROME: ICD-10-CM

## 2024-04-10 DIAGNOSIS — M54.6 MIDLINE THORACIC BACK PAIN, UNSPECIFIED CHRONICITY: ICD-10-CM

## 2024-04-10 DIAGNOSIS — G89.4 CHRONIC PAIN SYNDROME: ICD-10-CM

## 2024-04-10 DIAGNOSIS — M54.9 MID BACK PAIN: ICD-10-CM

## 2024-04-10 DIAGNOSIS — Z79.891 LONG-TERM CURRENT USE OF OPIATE ANALGESIC: ICD-10-CM

## 2024-04-10 PROCEDURE — 99214 OFFICE O/P EST MOD 30 MIN: CPT | Performed by: NURSE PRACTITIONER

## 2024-04-10 RX ORDER — HYDROCODONE BITARTRATE AND ACETAMINOPHEN 5; 325 MG/1; MG/1
1 TABLET ORAL EVERY 8 HOURS PRN
Qty: 60 TABLET | Refills: 0 | Status: SHIPPED | OUTPATIENT
Start: 2024-04-10 | End: 2024-04-10 | Stop reason: SDUPTHER

## 2024-04-10 RX ORDER — GABAPENTIN 800 MG/1
800 TABLET ORAL 3 TIMES DAILY
Qty: 90 TABLET | Refills: 1 | Status: SHIPPED | OUTPATIENT
Start: 2024-04-10

## 2024-04-10 RX ORDER — BACLOFEN 20 MG/1
20 TABLET ORAL 3 TIMES DAILY
Qty: 90 TABLET | Refills: 1 | Status: SHIPPED | OUTPATIENT
Start: 2024-04-10

## 2024-04-10 RX ORDER — HYDROCODONE BITARTRATE AND ACETAMINOPHEN 5; 325 MG/1; MG/1
1 TABLET ORAL EVERY 8 HOURS PRN
Qty: 39 TABLET | Refills: 0 | Status: SHIPPED | OUTPATIENT
Start: 2024-04-10

## 2024-04-10 NOTE — TELEPHONE ENCOUNTER
S/w [facility] pharm who states that since pt has not rec'd Norco in the past 90 days they are only able to provide 7 day script and will need another script for the remainder. Pharm also wanted to make provider aware that pt is currently on ambien as well. RN advised that pt will be advised to refrain from taking the Norco and ambien together.     Pls advise. Thank you!
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
## 2024-04-10 NOTE — TELEPHONE ENCOUNTER
Caller: Ericka    Doctor: Kocher    Reason for call: made patient aware we got message as well from pharmacy and we will be in contact     Call back#: [phone]

## 2024-04-10 NOTE — TELEPHONE ENCOUNTER
Caller: [facility] pharmacist    Doctor: Kocher    Reason for call: pharmacist has some questions about the pt's script for Gray.    Call back#: [phone]

## 2024-04-13 LAB
6MAM UR QL CFM: NEGATIVE NG/ML
7AMINOCLONAZEPAM UR QL CFM: NEGATIVE NG/ML
A-OH ALPRAZ UR QL CFM: NEGATIVE NG/ML
AMPHET UR QL CFM: NEGATIVE NG/ML
AMPHET UR QL CFM: NEGATIVE NG/ML
BUPRENORPHINE UR QL CFM: NEGATIVE NG/ML
BUTALBITAL UR QL CFM: NEGATIVE NG/ML
BZE UR QL CFM: NEGATIVE NG/ML
CODEINE UR QL CFM: NEGATIVE NG/ML
DESIPRAMINE UR QL CFM: NEGATIVE NG/ML
DESIPRAMINE UR QL CFM: NEGATIVE NG/ML
EDDP UR QL CFM: NEGATIVE NG/ML
ETHYL GLUCURONIDE UR QL CFM: NEGATIVE NG/ML
ETHYL SULFATE UR QL SCN: NEGATIVE NG/ML
EUTYLONE UR QL: NEGATIVE NG/ML
FENTANYL UR QL CFM: NEGATIVE NG/ML
GLIADIN IGG SER IA-ACNC: NEGATIVE NG/ML
GLUCOSE 30M P 50 G LAC PO SERPL-MCNC: NORMAL NG/ML
HYDROCODONE UR QL CFM: ABNORMAL NG/ML
HYDROCODONE UR QL CFM: ABNORMAL NG/ML
HYDROMORPHONE UR QL CFM: ABNORMAL NG/ML
IMIPRAMINE UR QL CFM: NEGATIVE NG/ML
LORAZEPAM UR QL CFM: NEGATIVE NG/ML
MDMA UR QL CFM: NEGATIVE NG/ML
ME-PHENIDATE UR QL CFM: NEGATIVE NG/ML
MEPERIDINE UR QL CFM: NEGATIVE NG/ML
METHADONE UR QL CFM: NEGATIVE NG/ML
METHAMPHET UR QL CFM: NEGATIVE NG/ML
MORPHINE UR QL CFM: NEGATIVE NG/ML
MORPHINE UR QL CFM: NEGATIVE NG/ML
NORBUPRENORPHINE UR QL CFM: NEGATIVE NG/ML
NORDIAZEPAM UR QL CFM: NEGATIVE NG/ML
NORFENTANYL UR QL CFM: NEGATIVE NG/ML
NORHYDROCODONE UR QL CFM: ABNORMAL NG/ML
NORHYDROCODONE UR QL CFM: ABNORMAL NG/ML
NORMEPERIDINE UR QL CFM: NEGATIVE NG/ML
NOROXYCODONE UR QL CFM: NEGATIVE NG/ML
OLANZAPINE QUANTIFICATION: NEGATIVE NG/ML
OPC-3373 QUANTIFICATION: NORMAL
OXAZEPAM UR QL CFM: ABNORMAL NG/ML
OXYCODONE UR QL CFM: NEGATIVE NG/ML
OXYMORPHONE UR QL CFM: NEGATIVE NG/ML
OXYMORPHONE UR QL CFM: NEGATIVE NG/ML
PARA-FLUOROFENTANYL QUANTIFICATION: NORMAL NG/ML
PCP UR QL CFM: NEGATIVE NG/ML
PHENOBARB UR QL CFM: NEGATIVE NG/ML
RESULT ALL_PRESCRIBED MEDS AND SPECIAL INSTRUCTIONS: NORMAL
SECOBARBITAL UR QL CFM: NEGATIVE NG/ML
SL AMB 4-ANPP QUANTIFICATION: NORMAL NG/ML
SL AMB 5F-ADB-M7 METABOLITE QUANTIFICATION: NEGATIVE NG/ML
SL AMB 7-OH-MITRAGYNINE (KRATOM ALKALOID) QUANTIFICATION: NEGATIVE NG/ML
SL AMB AB-FUBINACA-M3 METABOLITE QUANTIFICATION: NEGATIVE NG/ML
SL AMB ACETYL FENTANYL QUANTIFICATION: NORMAL NG/ML
SL AMB ACETYL NORFENTANYL QUANTIFICATION: NORMAL NG/ML
SL AMB ACRYL FENTANYL QUANTIFICATION: NORMAL NG/ML
SL AMB CARFENTANIL QUANTIFICATION: NORMAL NG/ML
SL AMB CLOZAPINE QUANTIFICATION: NEGATIVE NG/ML
SL AMB CTHC (MARIJUANA METABOLITE) QUANTIFICATION: ABNORMAL NG/ML
SL AMB DEXTROMETHORPHAN QUANTIFICATION: NEGATIVE NG/ML
SL AMB DEXTRORPHAN (DEXTROMETHORPHAN METABOLITE) QUANT: NEGATIVE NG/ML
SL AMB DEXTRORPHAN (DEXTROMETHORPHAN METABOLITE) QUANT: NEGATIVE NG/ML
SL AMB HALOPERIDOL  QUANTIFICATION: NEGATIVE NG/ML
SL AMB HALOPERIDOL METABOLITE QUANTIFICATION: NEGATIVE NG/ML
SL AMB HYDROXYRISPERIDONE QUANTIFICATION: NEGATIVE NG/ML
SL AMB JWH018 METABOLITE QUANTIFICATION: NEGATIVE NG/ML
SL AMB JWH073 METABOLITE QUANTIFICATION: NEGATIVE NG/ML
SL AMB MDMB-FUBINACA-M1 METABOLITE QUANTIFICATION: NEGATIVE NG/ML
SL AMB METHYLONE QUANTIFICATION: NORMAL NG/ML
SL AMB N-DESMETHYL-TRAMADOL QUANTIFICATION: NEGATIVE NG/ML
SL AMB N-DESMETHYLCLOZAPINE QUANTIFICATION: NEGATIVE NG/ML
SL AMB NORQUETIAPINE QUANTIFICATION: NEGATIVE NG/ML
SL AMB PHENTERMINE QUANTIFICATION: NEGATIVE NG/ML
SL AMB QUETIAPINE QUANTIFICATION: NEGATIVE NG/ML
SL AMB RCS4 METABOLITE QUANTIFICATION: NEGATIVE NG/ML
SL AMB RISPERIDONE QUANTIFICATION: NEGATIVE NG/ML
SL AMB RITALINIC ACID QUANTIFICATION: NEGATIVE NG/ML
SPECIMEN DRAWN SERPL: NEGATIVE NG/ML
TAPENTADOL UR QL CFM: NEGATIVE NG/ML
TEMAZEPAM UR QL CFM: NEGATIVE NG/ML
TEMAZEPAM UR QL CFM: NEGATIVE NG/ML
TRAMADOL UR QL CFM: NEGATIVE NG/ML
URATE/CREAT 24H UR: NEGATIVE NG/ML

## 2024-04-16 ENCOUNTER — OFFICE VISIT (OUTPATIENT)
Dept: OBGYN CLINIC | Facility: CLINIC | Age: 49
End: 2024-04-16
Payer: COMMERCIAL

## 2024-04-16 VITALS
SYSTOLIC BLOOD PRESSURE: 119 MMHG | HEIGHT: 65 IN | HEART RATE: 118 BPM | BODY MASS INDEX: 18.83 KG/M2 | WEIGHT: 113 LBS | DIASTOLIC BLOOD PRESSURE: 71 MMHG

## 2024-04-16 DIAGNOSIS — M77.02 MEDIAL EPICONDYLITIS OF LEFT ELBOW: Primary | ICD-10-CM

## 2024-04-16 PROCEDURE — 20605 DRAIN/INJ JOINT/BURSA W/O US: CPT | Performed by: ORTHOPAEDIC SURGERY

## 2024-04-16 PROCEDURE — 99213 OFFICE O/P EST LOW 20 MIN: CPT | Performed by: ORTHOPAEDIC SURGERY

## 2024-04-16 RX ORDER — BUPIVACAINE HYDROCHLORIDE 5 MG/ML
2 INJECTION, SOLUTION EPIDURAL; INTRACAUDAL
Status: COMPLETED | OUTPATIENT
Start: 2024-04-16 | End: 2024-04-16

## 2024-04-16 RX ORDER — TRIAMCINOLONE ACETONIDE 40 MG/ML
80 INJECTION, SUSPENSION INTRA-ARTICULAR; INTRAMUSCULAR
Status: COMPLETED | OUTPATIENT
Start: 2024-04-16 | End: 2024-04-16

## 2024-04-16 RX ADMIN — TRIAMCINOLONE ACETONIDE 80 MG: 40 INJECTION, SUSPENSION INTRA-ARTICULAR; INTRAMUSCULAR at 08:00

## 2024-04-16 RX ADMIN — BUPIVACAINE HYDROCHLORIDE 2 ML: 5 INJECTION, SOLUTION EPIDURAL; INTRACAUDAL at 08:00
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
## 2024-04-20 ENCOUNTER — HOSPITAL ENCOUNTER (OUTPATIENT)
Dept: ULTRASOUND IMAGING | Facility: HOSPITAL | Age: 49
Discharge: HOME | End: 2024-04-20
Payer: COMMERCIAL

## 2024-04-20 DIAGNOSIS — R82.998 CALCIUM OXALATE CRYSTALS IN URINE: ICD-10-CM

## 2024-04-20 PROCEDURE — 76775 US EXAM ABDO BACK WALL LIM: CPT

## 2024-05-10 DIAGNOSIS — M47.812 CERVICAL SPONDYLOSIS: ICD-10-CM

## 2024-05-10 DIAGNOSIS — G89.4 CHRONIC PAIN SYNDROME: ICD-10-CM

## 2024-05-10 RX ORDER — NABUMETONE 500 MG/1
500 TABLET, FILM COATED ORAL 2 TIMES DAILY
Qty: 60 TABLET | Refills: 0 | Status: SHIPPED | OUTPATIENT
Start: 2024-05-10

## 2024-05-14 ENCOUNTER — TELEPHONE (OUTPATIENT)
Age: 49
End: 2024-05-14

## 2024-05-14 NOTE — TELEPHONE ENCOUNTER
S/ pt and confirmed that she is scheduled for ultrasound-guided cervical paraspinal muscles and bilateral greater occipital nerve blocks on 5/21. Pt verbalized understanding.

## 2024-05-14 NOTE — TELEPHONE ENCOUNTER
Caller: patient    Doctor: FRANCES    Reason for call: schedule procedure for neck and back     Call back#:

## 2024-05-20 ENCOUNTER — HOSPITAL ENCOUNTER (EMERGENCY)
Facility: HOSPITAL | Age: 49
Discharge: HOME | End: 2024-05-20
Attending: EMERGENCY MEDICINE
Payer: COMMERCIAL

## 2024-05-20 ENCOUNTER — APPOINTMENT (EMERGENCY)
Dept: CT IMAGING | Facility: HOSPITAL | Age: 49
End: 2024-05-20
Payer: COMMERCIAL

## 2024-05-20 VITALS
RESPIRATION RATE: 16 BRPM | SYSTOLIC BLOOD PRESSURE: 122 MMHG | TEMPERATURE: 98 F | DIASTOLIC BLOOD PRESSURE: 77 MMHG | HEART RATE: 90 BPM | WEIGHT: 123.68 LBS | OXYGEN SATURATION: 95 % | BODY MASS INDEX: 20.58 KG/M2

## 2024-05-20 DIAGNOSIS — R44.3 HALLUCINATIONS: Primary | ICD-10-CM

## 2024-05-20 DIAGNOSIS — R03.0 ELEVATED BLOOD PRESSURE READING: ICD-10-CM

## 2024-05-20 DIAGNOSIS — R41.0 CONFUSION: ICD-10-CM

## 2024-05-20 LAB
ALBUMIN SERPL BCP-MCNC: 4.2 G/DL (ref 3.5–5)
ALP SERPL-CCNC: 60 U/L (ref 34–104)
ALT SERPL W P-5'-P-CCNC: 17 U/L (ref 7–52)
AMPHETAMINES SERPL QL SCN: NEGATIVE
ANION GAP SERPL CALCULATED.3IONS-SCNC: 4 MMOL/L (ref 4–13)
APAP SERPL-MCNC: <2 UG/ML (ref 10–20)
APTT PPP: 28 SECONDS (ref 23–37)
AST SERPL W P-5'-P-CCNC: 16 U/L (ref 13–39)
ATRIAL RATE: 110 BPM
BARBITURATES UR QL: NEGATIVE
BASOPHILS # BLD AUTO: 0.06 THOUSANDS/ÂΜL (ref 0–0.1)
BASOPHILS NFR BLD AUTO: 0 % (ref 0–1)
BENZODIAZ UR QL: POSITIVE
BILIRUB SERPL-MCNC: 0.55 MG/DL (ref 0.2–1)
BILIRUB UR QL STRIP: NEGATIVE
BUN SERPL-MCNC: 21 MG/DL (ref 5–25)
CALCIUM SERPL-MCNC: 8.6 MG/DL (ref 8.4–10.2)
CHLORIDE SERPL-SCNC: 107 MMOL/L (ref 96–108)
CK SERPL-CCNC: 115 U/L (ref 26–192)
CLARITY UR: CLEAR
CO2 SERPL-SCNC: 29 MMOL/L (ref 21–32)
COCAINE UR QL: NEGATIVE
COLOR UR: YELLOW
CREAT SERPL-MCNC: 1.27 MG/DL (ref 0.6–1.3)
EOSINOPHIL # BLD AUTO: 0.11 THOUSAND/ÂΜL (ref 0–0.61)
EOSINOPHIL NFR BLD AUTO: 1 % (ref 0–6)
ERYTHROCYTE [DISTWIDTH] IN BLOOD BY AUTOMATED COUNT: 11.9 % (ref 11.6–15.1)
ETHANOL SERPL-MCNC: <10 MG/DL
FENTANYL UR QL SCN: NEGATIVE
FLUAV RNA RESP QL NAA+PROBE: NEGATIVE
FLUBV RNA RESP QL NAA+PROBE: NEGATIVE
GFR SERPL CREATININE-BSD FRML MDRD: 49 ML/MIN/1.73SQ M
GLUCOSE SERPL-MCNC: 100 MG/DL (ref 65–140)
GLUCOSE UR STRIP-MCNC: NEGATIVE MG/DL
HCT VFR BLD AUTO: 36 % (ref 34.8–46.1)
HGB BLD-MCNC: 12.3 G/DL (ref 11.5–15.4)
HGB UR QL STRIP.AUTO: NEGATIVE
HYDROCODONE UR QL SCN: POSITIVE
IMM GRANULOCYTES # BLD AUTO: 0.07 THOUSAND/UL (ref 0–0.2)
IMM GRANULOCYTES NFR BLD AUTO: 0 % (ref 0–2)
INR PPP: 0.97 (ref 0.84–1.19)
KETONES UR STRIP-MCNC: NEGATIVE MG/DL
LACTATE SERPL-SCNC: 0.7 MMOL/L (ref 0.5–2)
LEUKOCYTE ESTERASE UR QL STRIP: NEGATIVE
LYMPHOCYTES # BLD AUTO: 1.02 THOUSANDS/ÂΜL (ref 0.6–4.47)
LYMPHOCYTES NFR BLD AUTO: 6 % (ref 14–44)
MAGNESIUM SERPL-MCNC: 2.4 MG/DL (ref 1.9–2.7)
MCH RBC QN AUTO: 34.5 PG (ref 26.8–34.3)
MCHC RBC AUTO-ENTMCNC: 34.2 G/DL (ref 31.4–37.4)
MCV RBC AUTO: 101 FL (ref 82–98)
METHADONE UR QL: NEGATIVE
MONOCYTES # BLD AUTO: 0.73 THOUSAND/ÂΜL (ref 0.17–1.22)
MONOCYTES NFR BLD AUTO: 4 % (ref 4–12)
NEUTROPHILS # BLD AUTO: 14.44 THOUSANDS/ÂΜL (ref 1.85–7.62)
NEUTS SEG NFR BLD AUTO: 89 % (ref 43–75)
NITRITE UR QL STRIP: NEGATIVE
NRBC BLD AUTO-RTO: 0 /100 WBCS
OPIATES UR QL SCN: POSITIVE
OXYCODONE+OXYMORPHONE UR QL SCN: NEGATIVE
P AXIS: 74 DEGREES
PCP UR QL: NEGATIVE
PH UR STRIP.AUTO: 7 [PH]
PLATELET # BLD AUTO: 257 THOUSANDS/UL (ref 149–390)
PMV BLD AUTO: 10.6 FL (ref 8.9–12.7)
POTASSIUM SERPL-SCNC: 4.1 MMOL/L (ref 3.5–5.3)
PROCALCITONIN SERPL-MCNC: 0.19 NG/ML
PROT SERPL-MCNC: 6.2 G/DL (ref 6.4–8.4)
PROT UR STRIP-MCNC: NEGATIVE MG/DL
PROTHROMBIN TIME: 13.1 SECONDS (ref 11.6–14.5)
QRS AXIS: 51 DEGREES
QRSD INTERVAL: 74 MS
QT INTERVAL: 328 MS
QTC INTERVAL: 443 MS
RBC # BLD AUTO: 3.57 MILLION/UL (ref 3.81–5.12)
RSV RNA RESP QL NAA+PROBE: NEGATIVE
SALICYLATES SERPL-MCNC: <5 MG/DL (ref 3–20)
SARS-COV-2 RNA RESP QL NAA+PROBE: NEGATIVE
SODIUM SERPL-SCNC: 140 MMOL/L (ref 135–147)
SP GR UR STRIP.AUTO: 1.02 (ref 1–1.03)
T WAVE AXIS: 67 DEGREES
THC UR QL: NEGATIVE
UROBILINOGEN UR STRIP-ACNC: <2 MG/DL
VENTRICULAR RATE: 110 BPM
WBC # BLD AUTO: 16.43 THOUSAND/UL (ref 4.31–10.16)

## 2024-05-20 PROCEDURE — 80143 DRUG ASSAY ACETAMINOPHEN: CPT | Performed by: EMERGENCY MEDICINE

## 2024-05-20 PROCEDURE — 83735 ASSAY OF MAGNESIUM: CPT | Performed by: EMERGENCY MEDICINE

## 2024-05-20 PROCEDURE — 93010 ELECTROCARDIOGRAM REPORT: CPT | Performed by: INTERNAL MEDICINE

## 2024-05-20 PROCEDURE — 84145 PROCALCITONIN (PCT): CPT | Performed by: EMERGENCY MEDICINE

## 2024-05-20 PROCEDURE — 83605 ASSAY OF LACTIC ACID: CPT | Performed by: EMERGENCY MEDICINE

## 2024-05-20 PROCEDURE — 87040 BLOOD CULTURE FOR BACTERIA: CPT | Performed by: EMERGENCY MEDICINE

## 2024-05-20 PROCEDURE — 80053 COMPREHEN METABOLIC PANEL: CPT | Performed by: EMERGENCY MEDICINE

## 2024-05-20 PROCEDURE — 85025 COMPLETE CBC W/AUTO DIFF WBC: CPT | Performed by: EMERGENCY MEDICINE

## 2024-05-20 PROCEDURE — 85610 PROTHROMBIN TIME: CPT | Performed by: EMERGENCY MEDICINE

## 2024-05-20 PROCEDURE — 80179 DRUG ASSAY SALICYLATE: CPT | Performed by: EMERGENCY MEDICINE

## 2024-05-20 PROCEDURE — 99285 EMERGENCY DEPT VISIT HI MDM: CPT

## 2024-05-20 PROCEDURE — 80307 DRUG TEST PRSMV CHEM ANLYZR: CPT | Performed by: EMERGENCY MEDICINE

## 2024-05-20 PROCEDURE — 70450 CT HEAD/BRAIN W/O DYE: CPT

## 2024-05-20 PROCEDURE — 99285 EMERGENCY DEPT VISIT HI MDM: CPT | Performed by: EMERGENCY MEDICINE

## 2024-05-20 PROCEDURE — 93005 ELECTROCARDIOGRAM TRACING: CPT

## 2024-05-20 PROCEDURE — 36415 COLL VENOUS BLD VENIPUNCTURE: CPT | Performed by: EMERGENCY MEDICINE

## 2024-05-20 PROCEDURE — 82550 ASSAY OF CK (CPK): CPT | Performed by: EMERGENCY MEDICINE

## 2024-05-20 PROCEDURE — 81003 URINALYSIS AUTO W/O SCOPE: CPT | Performed by: EMERGENCY MEDICINE

## 2024-05-20 PROCEDURE — 85730 THROMBOPLASTIN TIME PARTIAL: CPT | Performed by: EMERGENCY MEDICINE

## 2024-05-20 PROCEDURE — 82077 ASSAY SPEC XCP UR&BREATH IA: CPT | Performed by: EMERGENCY MEDICINE

## 2024-05-20 PROCEDURE — 0241U HB NFCT DS VIR RESP RNA 4 TRGT: CPT | Performed by: EMERGENCY MEDICINE

## 2024-05-20 RX ORDER — ACETAMINOPHEN 325 MG/1
650 TABLET ORAL ONCE
Status: COMPLETED | OUTPATIENT
Start: 2024-05-20 | End: 2024-05-20

## 2024-05-20 RX ORDER — DIAZEPAM 5 MG/1
5 TABLET ORAL ONCE
Status: COMPLETED | OUTPATIENT
Start: 2024-05-20 | End: 2024-05-20

## 2024-05-20 RX ADMIN — DIAZEPAM 5 MG: 5 TABLET ORAL at 14:33

## 2024-05-20 RX ADMIN — ACETAMINOPHEN 650 MG: 325 TABLET, FILM COATED ORAL at 14:33

## 2024-05-20 RX ADMIN — SODIUM CHLORIDE 1000 ML: 0.9 INJECTION, SOLUTION INTRAVENOUS at 14:15

## 2024-05-20 NOTE — ED PROVIDER NOTES
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 but patient declined and wishes to go home.  At this time I cannot have patient kept against her will.  She is hemodynamically stable alert and oriented and neurologically intact.    Discussed with mother at bedside who is agreeable to take patient home and strict instructions for ER is a still concern for anticholinergic    Counseling: I had a detailed discussion with the patient and/or guardian regarding: the historical points, exam findings, and any diagnostic results supporting the discharge diagnosis, lab results, radiology results, discharge instructions reviewed with patient and/or family/caregiver and understanding was verbalized. Instructions given to return to the emergency department if symptoms worsen or persist, or if there are any questions or concerns that arise at home.     All imaging and/or lab testing discussed with patient, strict return to ED precautions discussed. Patient recommended to follow up promptly with appropriate outpatient provider. Patient and/or family members verbalizes understanding and agrees with plan. Patient and/or family members were given opportunity to ask questions, all questions were answered at this time. Patient is stable for discharge                                           Medical Decision  Making  DDx including but not limited to: metabolic abnormality, intracranial etiology, cardiac etiology, substance abuse, infectious etiology including UTI, thyroid disease, hyperammonemia, seizures, hypertensive encephalopathy, hypoglycemia, DKA, HHNK, dementia, Zana's paralysis, hypoglycemia/hyperglycemia, hypocalcemia/hypercalcemia, hypothermia, hyperthermia, hypoxia, hypercarbia, overdose (opioids, barbiturates, alcohol, benzodiazepines, Etc), meningitis, abscess, encephalitis, complicated migraine, shock, uremia      Concern for drug ingestion versus cranial etiology versus infectious etiology versus hypertensive encephalopathy versus anticholinergic syndrome    Problems Addressed:  Confusion: acute illness or injury  Elevated blood pressure reading: acute illness or injury  Hallucinations: acute illness or injury    Amount and/or Complexity of Data Reviewed  Independent Historian: parent     Details: Mother at bedside  External Data Reviewed: notes.     Details:  review shows that patient was given hydrocodone/acetaminophen 5/325 39 tablets on April 30; Ambien 10 mg quantity 30th on April 28; Valium 5 mg quantity 60 filled on April 28    Patient is also on Pristiq, Wellbutrin as well as Neurontin Atarax.    Labs: ordered. Decision-making details documented in ED Course.     Details: Mild leukocytosis without any anemia or thrombocytopenia  No acute kidney injury or electrolyte dysfunction  No evidence of urine infection  CK within normal range  Lactic acid within normal range  Urine without infection  Drug screen shows benzos and opioids which she is prescribed    Radiology: ordered. Decision-making details documented in ED Course.     Details: CT head interpreted by radiologist as no acute findings  ECG/medicine tests: ordered and independent interpretation performed. Decision-making details documented in ED Course.     Details: No ischemia or arrhythmia    Risk  OTC drugs.  Prescription drug  management.             Disposition  Final diagnoses:   Hallucinations   Elevated blood pressure reading   Confusion     Time reflects when diagnosis was documented in both MDM as applicable and the Disposition within this note       Time User Action Codes Description Comment    5/20/2024  5:19 PM Ronit Moreno Add [R44.3] Hallucinations     5/20/2024  5:19 PM Ronit Moreno Add [R03.0] Elevated blood pressure reading     5/20/2024  5:19 PM Ronit Moreno Add [R41.0] Confusion           ED Disposition       ED Disposition   Discharge    Condition   Stable    Date/Time   Mon May 20, 2024  5:18 PM    Comment   Ericka Castillo discharge to home/self care.                   Follow-up Information       Follow up With Specialties Details Why Contact Info    [facility], Nurse Practitioner Schedule an appointment as soon as possible for a visit in 1 week  [address] [place] [address] [place] [address]  [phone]              Discharge Medication List as of 5/20/2024  5:19 PM        CONTINUE these medications which have NOT CHANGED    Details   ARIPiprazole (ABILIFY) 10 mg tablet Take 10 mg by mouth daily at bedtime, Starting Tue 11/8/2022, Historical Med      baclofen 20 mg tablet Take 1 tablet (20 mg total) by mouth 3 (three) times a day, Starting Wed 4/10/2024, Normal      !! buPROPion (WELLBUTRIN XL) 150 mg 24 hr tablet in the morning, Starting Fri 11/19/2021, Historical Med      !! buPROPion (WELLBUTRIN XL) 300 mg 24 hr tablet Take 300 mg by mouth daily , Starting Fri 8/30/2019, Historical Med      Cyanocobalamin (B-12) 1000 MCG SUBL Place 1 tablet (1,000 mcg total) under the tongue in the morning, Starting Thu 2/29/2024, Normal      desvenlafaxine (PRISTIQ) 100 mg 24 hr tablet Take 100 mg by mouth daily at bedtime, Starting Sat 4/2/2022, Historical Med      famotidine (PEPCID) 20 mg tablet Take 1 tablet (20 mg total) by mouth 2 (two) times a day, Starting Tue 7/20/2021, Normal       gabapentin (NEURONTIN) 800 mg tablet Take 1 tablet (800 mg total) by mouth 3 (three) times a day, Starting Wed 4/10/2024, Normal      HYDROcodone-acetaminophen (NORCO) 5-325 mg per tablet Take 1 tablet by mouth every 8 (eight) hours as needed for pain Max Daily Amount: 3 tablets, Starting Tue 4/30/2024, Normal      hydrOXYzine HCL (ATARAX) 25 mg tablet Take 1 tablet (25 mg total) by mouth every 6 (six) hours as needed for anxiety, Starting Wed 12/20/2023, Normal      lansoprazole (PREVACID) 30 mg capsule Take 30 mg by mouth 2 (two) times a day, Starting Mon 10/4/2021, Historical Med      levonorgestrel (MIRENA) 20 MCG/24HR IUD 1 each by Intrauterine route once, Starting Fri 5/3/2019, Historical Med      levothyroxine 50 mcg tablet Take 1 tablet (50 mcg total) by mouth daily, Starting Wed 12/20/2023, Normal      lidocaine (XYLOCAINE) 5 % ointment Apply topically as needed for mild pain, Starting Wed 7/26/2023, Normal      lisinopril (ZESTRIL) 10 mg tablet Take 0.5 tablets (5 mg total) by mouth daily, Starting Wed 12/20/2023, Normal      nabumetone (RELAFEN) 500 mg tablet Take 1 tablet by mouth twice daily, Starting Fri 5/10/2024, Normal      ondansetron (ZOFRAN) 4 mg tablet Take 1 tablet (4 mg total) by mouth every 8 (eight) hours as needed for nausea or vomiting, Starting Wed 3/20/2024, Normal      Probiotic Product (VSL#3) CAPS Starting Mon 11/29/2021, Historical Med      zolpidem (AMBIEN) 10 mg tablet Take 1 tablet (10 mg total) by mouth daily at bedtime as needed for sleep for up to 10 days, Starting Mon 2/3/2020, Until Tue 4/16/2024 at 2359, No Print       !! - Potential duplicate medications found. Please discuss with provider.          No discharge procedures on file.    PDMP Review         Value Time User    PDMP Reviewed  Yes 5/20/2024  6:13 PM Ronit Moreno DO            ED Provider  Electronically Signed by             Ronit Moreno DO  05/20/24 1831       Ronit Moreno DO  05/20/24 1831

## 2024-05-20 NOTE — ED NOTES
Pt ambulated without assistance, even steady gait noted, denies any dizziness. Pt given cracker and soda for PO challenge Dr. Moreno aware.     Zuleyma Augustin RN  05/20/24 [id number]

## 2024-05-20 NOTE — Clinical Note
Ericka Castillo was seen and treated in our emergency department on 5/20/2024.                Diagnosis:     Ericka ANDREW  may return to work on return date.    She may return on this date: 05/22/2024         If you have any questions or concerns, please don't hesitate to call.      Ronit Moreno, DO    ______________________________           _______________          _______________  Hospital Representative                              Date                                Time

## 2024-05-20 NOTE — DISCHARGE INSTRUCTIONS
If you start developing any hallucinations or any other changes please return to the ER 9 call 911 immediately

## 2024-05-21 ENCOUNTER — PROCEDURE VISIT (OUTPATIENT)
Age: 49
End: 2024-05-21
Payer: COMMERCIAL

## 2024-05-21 ENCOUNTER — TELEPHONE (OUTPATIENT)
Age: 49
End: 2024-05-21

## 2024-05-21 VITALS
WEIGHT: 123 LBS | HEIGHT: 65 IN | HEART RATE: 102 BPM | SYSTOLIC BLOOD PRESSURE: 147 MMHG | DIASTOLIC BLOOD PRESSURE: 83 MMHG | BODY MASS INDEX: 20.49 KG/M2

## 2024-05-21 DIAGNOSIS — F11.20 UNCOMPLICATED OPIOID DEPENDENCE (HCC): ICD-10-CM

## 2024-05-21 DIAGNOSIS — G89.29 CHRONIC BILATERAL LOW BACK PAIN WITHOUT SCIATICA: ICD-10-CM

## 2024-05-21 DIAGNOSIS — M54.50 CHRONIC BILATERAL LOW BACK PAIN WITHOUT SCIATICA: ICD-10-CM

## 2024-05-21 DIAGNOSIS — Z79.891 LONG-TERM CURRENT USE OF OPIATE ANALGESIC: ICD-10-CM

## 2024-05-21 DIAGNOSIS — M54.12 CERVICAL RADICULOPATHY: ICD-10-CM

## 2024-05-21 DIAGNOSIS — G89.4 CHRONIC PAIN SYNDROME: ICD-10-CM

## 2024-05-21 DIAGNOSIS — M54.81 BILATERAL OCCIPITAL NEURALGIA: Primary | ICD-10-CM

## 2024-05-21 PROCEDURE — 64405 NJX AA&/STRD GR OCPL NRV: CPT | Performed by: ANESTHESIOLOGY

## 2024-05-21 PROCEDURE — 99214 OFFICE O/P EST MOD 30 MIN: CPT | Performed by: ANESTHESIOLOGY

## 2024-05-21 PROCEDURE — 20553 NJX 1/MLT TRIGGER POINTS 3/>: CPT | Performed by: ANESTHESIOLOGY

## 2024-05-21 PROCEDURE — 76942 ECHO GUIDE FOR BIOPSY: CPT | Performed by: ANESTHESIOLOGY

## 2024-05-21 RX ORDER — BUPIVACAINE HYDROCHLORIDE 2.5 MG/ML
10 INJECTION, SOLUTION EPIDURAL; INFILTRATION; INTRACAUDAL
Status: COMPLETED | OUTPATIENT
Start: 2024-05-21 | End: 2024-05-21

## 2024-05-21 RX ORDER — TRIAMCINOLONE ACETONIDE 40 MG/ML
40 INJECTION, SUSPENSION INTRA-ARTICULAR; INTRAMUSCULAR
Status: COMPLETED | OUTPATIENT
Start: 2024-05-21 | End: 2024-05-21

## 2024-05-21 RX ORDER — HYDROCODONE BITARTRATE AND ACETAMINOPHEN 5; 325 MG/1; MG/1
1 TABLET ORAL 2 TIMES DAILY PRN
Qty: 60 TABLET | Refills: 0 | Status: SHIPPED | OUTPATIENT
Start: 2024-05-21 | End: 2024-06-20

## 2024-05-21 RX ORDER — GABAPENTIN 800 MG/1
800 TABLET ORAL 3 TIMES DAILY
Qty: 90 TABLET | Refills: 1 | Status: SHIPPED | OUTPATIENT
Start: 2024-05-21

## 2024-05-21 RX ADMIN — BUPIVACAINE HYDROCHLORIDE 10 ML: 2.5 INJECTION, SOLUTION EPIDURAL; INFILTRATION; INTRACAUDAL at 14:40

## 2024-05-21 RX ADMIN — TRIAMCINOLONE ACETONIDE 40 MG: 40 INJECTION, SUSPENSION INTRA-ARTICULAR; INTRAMUSCULAR at 14:40
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
## 2024-05-21 NOTE — PATIENT INSTRUCTIONS
Do not apply heat to any area that is numb. If you have discomfort or soreness at the injection site, you may apply ice today, 20 minutes on and 20 minutes off. Tomorrow you may use ice or warm, moist heat. Do not apply ice or heat directly to the skin.  If you experience severe shortness of breath, go to the Emergency Room.  You may have numbness for several hours from the local anesthetic. Please use caution and common sense, especially with weight-bearing activities.  You may have an increase or change in the discomfort for 36-48 hours after your treatment. Apply ice and continue with any pain medicine you have been prescribed.  Do not do anything strenuous today. You may shower, but no tub baths or hot tubs today. You may resume your normal activities tomorrow, but do not “overdo it”. Resume normal activities slowly when you are feeling better.  If you experience redness, drainage or swelling at the injection site, or if you develop a fever above 100 degrees, please call [facility] at [phone] or go to the Emergency Room.  Continue to take all routine medicines prescribed by your primary care physician unless otherwise instructed by our staff. Most blood thinners should be started again according to your regularly scheduled dosing. If you have any questions, please give our office a call.    As no general anesthesia was used in today's procedure, you should not experience any side effects related to anesthesia.       If you have a problem specifically related to your procedure, please call our office at [phone].  Problems not related to your procedure should be directed to your primary care physician.

## 2024-05-21 NOTE — TELEPHONE ENCOUNTER
Caller: pharmacy    Doctor: nona    Reason for call: pt is also taking a benzodiazapine and it could interact with hydrocodone so the pharmacy needs clearance for that.    Call back#: [phone]

## 2024-05-22 NOTE — TELEPHONE ENCOUNTER
S/w [facility] pharm who stated pt has hx of diazepam and zolpidem fills. Pharm asking if ok to fill Hydrocodone script. RN advised pharm that pt has been also filling Hydrocodone script for extended prd of time. RN advised pharm that she will ro to pt for f/u on daily use vs PRN use.     S/w pt who states she very rarely utilized the diazepam unless it is in severe anxiety instances. Pt states she has been prescribed ambien for sleep and takes nightly. Pt states she has never taken the Hydrocodone (which she also takes sparingly) w/ the other two meds in question.     Are you ok to provide clearance for Hydrocodone fill?  Pls advise. Thank you!

## 2024-05-22 NOTE — TELEPHONE ENCOUNTER
Caller: carlos Barnett     Doctor: Harjeet    Reason for call: pt calling about the status of her medication( Hydrocodone) pt would like a call back with the status.     Call back#: [phone]

## 2024-05-22 NOTE — TELEPHONE ENCOUNTER
Task sent to RM and waiting advisement. Pt advised by phone room that RN will call once task is sent back from .

## 2024-05-22 NOTE — TELEPHONE ENCOUNTER
"S/w pharmacist (Luis Eduardo) @ [facility]. Advised on \"ok\" to fill hydrocodone. Provided LOV and NOV for doc.     S/w pt and advised med will be filled by pharm. Pt verbslized understanding and apprec of call.       "

## 2024-05-25 LAB
7AMINOCLONAZEPAM SAL QL CFM: NEGATIVE NG/ML
AMPHET SAL QL CFM: NEGATIVE NG/ML
BACTERIA BLD CULT: NORMAL
BACTERIA BLD CULT: NORMAL
BUPRENORPHINE SAL QL SCN: NEGATIVE NG/ML
CARBOXYTHC SAL QL CFM: NEGATIVE NG/ML
CCP IGG SERPL-ACNC: NEGATIVE NG/ML
COCAINE SAL QL CFM: NEGATIVE NG/ML
CODEINE SAL QL CFM: NEGATIVE NG/ML
DXO+LEVORPHANOL SAL QL CFM: NEGATIVE NG/ML
EDDP SAL QL CFM: NEGATIVE NG/ML
GABAPENTIN SAL QL CFM: NORMAL NG/ML
HYDROCODONE SAL QL CFM: ABNORMAL NG/ML
HYDROCODONE SAL QL CFM: ABNORMAL NG/ML
HYDROMORPHONE SAL QL CFM: ABNORMAL NG/ML
LEUKEMIA MARKERS BLD-IMP: NEGATIVE NG/ML
M PROTEIN 3 UR ELPH-MCNC: NEGATIVE NG/ML
M TB TUBERC IGNF/MITOGEN IGNF CONTROL: NEGATIVE NG/ML
METHADONE SAL QL CFM: NEGATIVE NG/ML
MORPHINE SAL QL CFM: NEGATIVE NG/ML
MORPHINE SAL QL CFM: NEGATIVE NG/ML
NALTREXOL SAL QL CFM: NEGATIVE NG/ML
NALTREXONE SAL QL CFM: NEGATIVE NG/ML
OXYMORPHONE SAL QL CFM: NEGATIVE NG/ML
OXYMORPHONE SAL QL CFM: NEGATIVE NG/ML
PCP SAL QL CFM: NEGATIVE NG/ML
PREGABALIN SAL QL CFM: NEGATIVE NG/ML
SL AMB 6-MAM (HEROIN METABOLITE) QUANTIFICATION: NEGATIVE NG/ML
SL AMB ALPRAZOLAM QUANTIFICATION: NEGATIVE NG/ML
SL AMB CARISOPRODOL QUANTIFICATION: NEGATIVE NG/ML
SL AMB CLONAZEPAM QUANTIFICATION: NEGATIVE NG/ML
SL AMB DIAZEPAM QUANTIFICATION: NEGATIVE NG/ML
SL AMB FENTANYL QUANTIFICATION: NEGATIVE NG/ML
SL AMB MDMA QUANTIFICATION: NEGATIVE NG/ML
SL AMB MEPROBAMATE QUANTIFICATION: NEGATIVE NG/ML
SL AMB N-DESMETHYL-TRAMADOL QUANTIFICATION SALIVA: NEGATIVE NG/ML
SL AMB NORBUPRENORPHINE QUANTIFICATION: NEGATIVE NG/ML
SL AMB NORDIAZEPAM QUANTIFICATION: ABNORMAL NG/ML
SL AMB NORFENTANYL QUANTIFICATION: NEGATIVE NG/ML
SL AMB NORHYDROCODONE QUANTIFICATION: ABNORMAL NG/ML
SL AMB NORHYDROCODONE QUANTIFICATION: ABNORMAL NG/ML
SL AMB NORMEPERIDINE QUANTIFICATION: NEGATIVE NG/ML
SL AMB NOROXYCODONE QUANTIFICATION: NEGATIVE NG/ML
SL AMB OXAZEPAM QUANTIFICATION: NEGATIVE NG/ML
SL AMB RITALINIC ACID QUANTIFICATION: NEGATIVE NG/ML
SL AMB TEMAZEPAM QUANTIFICATION: NEGATIVE NG/ML
SL AMB TEMAZEPAM QUANTIFICATION: NEGATIVE NG/ML
SL AMB TRAMADOL QUANTIFICATION: NEGATIVE NG/ML
SQUAMOUS #/AREA URNS HPF: NEGATIVE NG/ML
TAPENTADOL SAL QL CFM: NEGATIVE NG/ML

## 2024-06-05 ENCOUNTER — TELEPHONE (OUTPATIENT)
Age: 49
End: 2024-06-05

## 2024-06-05 DIAGNOSIS — M54.9 MID BACK PAIN: ICD-10-CM

## 2024-06-05 DIAGNOSIS — M79.18 MYOFASCIAL PAIN SYNDROME: ICD-10-CM

## 2024-06-06 NOTE — TELEPHONE ENCOUNTER
Can you refill pts Baclofen 10mg?     LOV 4/10/24 BK  NOV 8/8/24 BK  Lf 4/10/24 w/ 1 rf     Thank you!

## 2024-06-07 DIAGNOSIS — G89.4 CHRONIC PAIN SYNDROME: ICD-10-CM

## 2024-06-07 DIAGNOSIS — M47.812 CERVICAL SPONDYLOSIS: ICD-10-CM

## 2024-06-07 RX ORDER — BACLOFEN 20 MG/1
20 TABLET ORAL 3 TIMES DAILY
Qty: 90 TABLET | Refills: 0 | Status: SHIPPED | OUTPATIENT
Start: 2024-06-07 | End: 2024-07-07

## 2024-06-07 NOTE — TELEPHONE ENCOUNTER
RN s/w pt and advised Baclofen refill sent to pharm. Pt verbalized understanding and apprec of call.

## 2024-06-09 DIAGNOSIS — G89.4 CHRONIC PAIN SYNDROME: ICD-10-CM

## 2024-06-09 DIAGNOSIS — M47.812 CERVICAL SPONDYLOSIS: ICD-10-CM

## 2024-06-10 DIAGNOSIS — K21.9 GASTROESOPHAGEAL REFLUX DISEASE WITHOUT ESOPHAGITIS: ICD-10-CM

## 2024-06-10 DIAGNOSIS — M79.18 MYOFASCIAL PAIN SYNDROME: ICD-10-CM

## 2024-06-10 DIAGNOSIS — G89.4 CHRONIC PAIN SYNDROME: ICD-10-CM

## 2024-06-10 DIAGNOSIS — F41.9 ANXIETY: ICD-10-CM

## 2024-06-10 RX ORDER — HYDROXYZINE HYDROCHLORIDE 25 MG/1
25 TABLET, FILM COATED ORAL EVERY 6 HOURS PRN
Qty: 60 TABLET | Refills: 2 | Status: SHIPPED | OUTPATIENT
Start: 2024-06-10

## 2024-06-10 RX ORDER — LIDOCAINE 50 MG/G
OINTMENT TOPICAL AS NEEDED
Qty: 35.44 G | Refills: 0 | Status: SHIPPED | OUTPATIENT
Start: 2024-06-10

## 2024-06-10 RX ORDER — NABUMETONE 500 MG/1
500 TABLET, FILM COATED ORAL 2 TIMES DAILY
Qty: 60 TABLET | Refills: 0 | Status: SHIPPED | OUTPATIENT
Start: 2024-06-10

## 2024-06-10 RX ORDER — FAMOTIDINE 20 MG/1
20 TABLET, FILM COATED ORAL 2 TIMES DAILY
Qty: 60 TABLET | Refills: 5 | Status: SHIPPED | OUTPATIENT
Start: 2024-06-10

## 2024-06-15 ENCOUNTER — HOSPITAL ENCOUNTER (OUTPATIENT)
Dept: MAMMOGRAPHY | Facility: HOSPITAL | Age: 49
Discharge: HOME | End: 2024-06-15
Payer: COMMERCIAL

## 2024-06-15 DIAGNOSIS — Z12.31 BREAST CANCER SCREENING BY MAMMOGRAM: ICD-10-CM

## 2024-06-15 PROCEDURE — 77063 BREAST TOMOSYNTHESIS BI: CPT

## 2024-06-15 PROCEDURE — 77067 SCR MAMMO BI INCL CAD: CPT

## 2024-06-17 ENCOUNTER — TELEPHONE (OUTPATIENT)
Dept: NEUROLOGY | Facility: CLINIC | Age: 49
End: 2024-06-17

## 2024-06-17 NOTE — TELEPHONE ENCOUNTER
Spoke with patient and confirmed appointment with Dr. Waters 6/20 at [facility].

## 2024-06-20 ENCOUNTER — OFFICE VISIT (OUTPATIENT)
Dept: NEUROLOGY | Facility: CLINIC | Age: 49
End: 2024-06-20

## 2024-06-20 VITALS
BODY MASS INDEX: 19.59 KG/M2 | SYSTOLIC BLOOD PRESSURE: 100 MMHG | HEART RATE: 100 BPM | WEIGHT: 117.6 LBS | DIASTOLIC BLOOD PRESSURE: 80 MMHG | HEIGHT: 65 IN

## 2024-06-20 DIAGNOSIS — E51.9 THIAMINE DEFICIENCY: ICD-10-CM

## 2024-06-20 DIAGNOSIS — E53.8 B12 DEFICIENCY: ICD-10-CM

## 2024-06-20 DIAGNOSIS — G47.00 INSOMNIA: ICD-10-CM

## 2024-06-20 DIAGNOSIS — R41.3 MEMORY DISORDER: Primary | ICD-10-CM

## 2024-06-20 NOTE — PATIENT INSTRUCTIONS
Please continue B12 supplementation - 1000 micrograms daily    Check your B12 level as well as thyroid and thiamine    See neuropsychology for formal neurocognitive testing. If you can't get in with [facility], you can see any of the below providers in the area  - [facility] ([place]): [phone]  - [facility] ([place]): [phone]  - [facility] ([place]): [phone]  - [facility] ([place]): [phone]    Please see Sleep Medicine at least for a consult to see if they can help with your insomnia/sleep problems. We are also including some sleep recommendations below      Sleep Recommendations  1. Wake up at the same time every morning (7 a.m. or 8 a.m. is best) and do something active in the light.  2. No sleep when the sun is up.   3. No caffeine past noon (including soda), and no fluids after dinner.   4. Allow yourself at least an 8 hour window for sleep.   5. No TV, phones, or computers in the bedroom. Looking at a screen disrupts the function of the brain's normal clock - using a radio or music or tv with the screen covered up is not as much of a problem  6. Go to bed at the same time every night and do the same things before you go to bed.  7. If you don't fall asleep within 15-30 minutes then get up and do something for 5 minutes then go back to bed.
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
## 2024-07-02 DIAGNOSIS — M79.18 MYOFASCIAL PAIN SYNDROME: ICD-10-CM

## 2024-07-02 DIAGNOSIS — M54.9 MID BACK PAIN: ICD-10-CM

## 2024-07-02 RX ORDER — BACLOFEN 20 MG/1
20 TABLET ORAL 3 TIMES DAILY
Qty: 90 TABLET | Refills: 0 | Status: SHIPPED | OUTPATIENT
Start: 2024-07-02

## 2024-07-03 ENCOUNTER — TELEPHONE (OUTPATIENT)
Age: 49
End: 2024-07-03

## 2024-07-03 DIAGNOSIS — M54.50 CHRONIC BILATERAL LOW BACK PAIN WITHOUT SCIATICA: ICD-10-CM

## 2024-07-03 DIAGNOSIS — G89.29 CHRONIC BILATERAL LOW BACK PAIN WITHOUT SCIATICA: ICD-10-CM

## 2024-07-03 DIAGNOSIS — G89.4 CHRONIC PAIN SYNDROME: ICD-10-CM

## 2024-07-03 RX ORDER — HYDROCODONE BITARTRATE AND ACETAMINOPHEN 5; 325 MG/1; MG/1
1 TABLET ORAL 2 TIMES DAILY PRN
Qty: 60 TABLET | Refills: 0 | Status: SHIPPED | OUTPATIENT
Start: 2024-07-03 | End: 2024-08-02

## 2024-07-03 NOTE — TELEPHONE ENCOUNTER
Sorry, cannot fill until she is seen.  Unfortunately, patient is being prescribed opiates, need to be seen every 8 weeks, sometimes can stretch it out to 12 weeks.  In her case, it has been 16 weeks since last follow-up. opiates need to be filled in person.

## 2024-07-03 NOTE — TELEPHONE ENCOUNTER
Handy, previous message said that she was last seen in April.  Prescription for hydrocodone was sent to her pharmacy.

## 2024-07-03 NOTE — TELEPHONE ENCOUNTER
Caller: patient    Doctor: FRANCES    Reason for call: patient would like a script for hydrocodone  [facility] [address] [place] [address] - [address]   Call back#:

## 2024-07-03 NOTE — TELEPHONE ENCOUNTER
Left a detailed message fot pt tht Rx was sent and she should try to get her office appointment moved to July. Suggested she call and attempt to reschedule

## 2024-07-08 ENCOUNTER — APPOINTMENT (OUTPATIENT)
Dept: LAB | Facility: CLINIC | Age: 49
End: 2024-07-08
Payer: COMMERCIAL

## 2024-07-08 DIAGNOSIS — E53.8 B12 DEFICIENCY: ICD-10-CM

## 2024-07-08 DIAGNOSIS — N18.31 STAGE 3A CHRONIC KIDNEY DISEASE (HCC): ICD-10-CM

## 2024-07-08 DIAGNOSIS — R41.3 MEMORY DISORDER: ICD-10-CM

## 2024-07-08 DIAGNOSIS — E67.3 HIGH VITAMIN D LEVEL: ICD-10-CM

## 2024-07-08 LAB
25(OH)D3 SERPL-MCNC: 85 NG/ML (ref 30–100)
ALBUMIN SERPL BCG-MCNC: 4.7 G/DL (ref 3.5–5)
ALP SERPL-CCNC: 57 U/L (ref 34–104)
ALT SERPL W P-5'-P-CCNC: 17 U/L (ref 7–52)
AMORPH URATE CRY URNS QL MICRO: ABNORMAL
ANION GAP SERPL CALCULATED.3IONS-SCNC: 13 MMOL/L (ref 4–13)
AST SERPL W P-5'-P-CCNC: 19 U/L (ref 13–39)
BACTERIA UR QL AUTO: ABNORMAL /HPF
BASOPHILS # BLD AUTO: 0.05 THOUSANDS/ÂΜL (ref 0–0.1)
BASOPHILS NFR BLD AUTO: 1 % (ref 0–1)
BILIRUB SERPL-MCNC: 0.44 MG/DL (ref 0.2–1)
BILIRUB UR QL STRIP: NEGATIVE
BUN SERPL-MCNC: 22 MG/DL (ref 5–25)
CALCIUM SERPL-MCNC: 9.7 MG/DL (ref 8.4–10.2)
CAOX CRY URNS QL MICRO: ABNORMAL /HPF
CHLORIDE SERPL-SCNC: 103 MMOL/L (ref 96–108)
CLARITY UR: ABNORMAL
CO2 SERPL-SCNC: 26 MMOL/L (ref 21–32)
COLOR UR: YELLOW
CREAT SERPL-MCNC: 1.24 MG/DL (ref 0.6–1.3)
CREAT UR-MCNC: 268.4 MG/DL
EOSINOPHIL # BLD AUTO: 0.13 THOUSAND/ÂΜL (ref 0–0.61)
EOSINOPHIL NFR BLD AUTO: 2 % (ref 0–6)
ERYTHROCYTE [DISTWIDTH] IN BLOOD BY AUTOMATED COUNT: 11.4 % (ref 11.6–15.1)
FERRITIN SERPL-MCNC: 230 NG/ML (ref 11–307)
FOLATE SERPL-MCNC: >22.3 NG/ML
GFR SERPL CREATININE-BSD FRML MDRD: 50 ML/MIN/1.73SQ M
GLUCOSE P FAST SERPL-MCNC: 71 MG/DL (ref 65–99)
GLUCOSE UR STRIP-MCNC: NEGATIVE MG/DL
HCT VFR BLD AUTO: 37.5 % (ref 34.8–46.1)
HGB BLD-MCNC: 12.8 G/DL (ref 11.5–15.4)
HGB UR QL STRIP.AUTO: NEGATIVE
IMM GRANULOCYTES # BLD AUTO: 0.01 THOUSAND/UL (ref 0–0.2)
IMM GRANULOCYTES NFR BLD AUTO: 0 % (ref 0–2)
IRON SATN MFR SERPL: 38 % (ref 15–50)
IRON SERPL-MCNC: 122 UG/DL (ref 50–212)
KETONES UR STRIP-MCNC: NEGATIVE MG/DL
LEUKOCYTE ESTERASE UR QL STRIP: ABNORMAL
LYMPHOCYTES # BLD AUTO: 1.47 THOUSANDS/ÂΜL (ref 0.6–4.47)
LYMPHOCYTES NFR BLD AUTO: 23 % (ref 14–44)
MCH RBC QN AUTO: 35.3 PG (ref 26.8–34.3)
MCHC RBC AUTO-ENTMCNC: 34.1 G/DL (ref 31.4–37.4)
MCV RBC AUTO: 103 FL (ref 82–98)
MICROALBUMIN UR-MCNC: 14.7 MG/L
MICROALBUMIN/CREAT 24H UR: 5 MG/G CREATININE (ref 0–30)
MONOCYTES # BLD AUTO: 0.41 THOUSAND/ÂΜL (ref 0.17–1.22)
MONOCYTES NFR BLD AUTO: 6 % (ref 4–12)
MUCOUS THREADS UR QL AUTO: ABNORMAL
NEUTROPHILS # BLD AUTO: 4.34 THOUSANDS/ÂΜL (ref 1.85–7.62)
NEUTS SEG NFR BLD AUTO: 68 % (ref 43–75)
NITRITE UR QL STRIP: NEGATIVE
NON-SQ EPI CELLS URNS QL MICRO: ABNORMAL /HPF
NRBC BLD AUTO-RTO: 0 /100 WBCS
PH UR STRIP.AUTO: 6 [PH]
PLATELET # BLD AUTO: 265 THOUSANDS/UL (ref 149–390)
PMV BLD AUTO: 12.1 FL (ref 8.9–12.7)
POTASSIUM SERPL-SCNC: 3.7 MMOL/L (ref 3.5–5.3)
PROT SERPL-MCNC: 7.3 G/DL (ref 6.4–8.4)
PROT UR STRIP-MCNC: ABNORMAL MG/DL
RBC # BLD AUTO: 3.63 MILLION/UL (ref 3.81–5.12)
RBC #/AREA URNS AUTO: ABNORMAL /HPF
SODIUM SERPL-SCNC: 142 MMOL/L (ref 135–147)
SP GR UR STRIP.AUTO: 1.03 (ref 1–1.03)
TIBC SERPL-MCNC: 319 UG/DL (ref 250–450)
TSH SERPL DL<=0.05 MIU/L-ACNC: 1 UIU/ML (ref 0.45–4.5)
UIBC SERPL-MCNC: 197 UG/DL (ref 155–355)
UROBILINOGEN UR STRIP-ACNC: <2 MG/DL
VIT B12 SERPL-MCNC: 826 PG/ML (ref 180–914)
WBC # BLD AUTO: 6.41 THOUSAND/UL (ref 4.31–10.16)
WBC #/AREA URNS AUTO: ABNORMAL /HPF

## 2024-07-08 PROCEDURE — 80053 COMPREHEN METABOLIC PANEL: CPT

## 2024-07-08 PROCEDURE — 82607 VITAMIN B-12: CPT

## 2024-07-08 PROCEDURE — 82043 UR ALBUMIN QUANTITATIVE: CPT

## 2024-07-08 PROCEDURE — 81001 URINALYSIS AUTO W/SCOPE: CPT

## 2024-07-08 PROCEDURE — 84425 ASSAY OF VITAMIN B-1: CPT

## 2024-07-08 PROCEDURE — 85025 COMPLETE CBC W/AUTO DIFF WBC: CPT

## 2024-07-08 PROCEDURE — 82728 ASSAY OF FERRITIN: CPT

## 2024-07-08 PROCEDURE — 82306 VITAMIN D 25 HYDROXY: CPT

## 2024-07-08 PROCEDURE — 82570 ASSAY OF URINE CREATININE: CPT

## 2024-07-08 PROCEDURE — 84443 ASSAY THYROID STIM HORMONE: CPT

## 2024-07-08 PROCEDURE — 83550 IRON BINDING TEST: CPT

## 2024-07-08 PROCEDURE — 83540 ASSAY OF IRON: CPT

## 2024-07-08 PROCEDURE — 82746 ASSAY OF FOLIC ACID SERUM: CPT

## 2024-07-08 PROCEDURE — 36415 COLL VENOUS BLD VENIPUNCTURE: CPT

## 2024-07-09 ENCOUNTER — TELEPHONE (OUTPATIENT)
Dept: NEPHROLOGY | Facility: CLINIC | Age: 49
End: 2024-07-09

## 2024-07-09 ENCOUNTER — TELEPHONE (OUTPATIENT)
Age: 49
End: 2024-07-09

## 2024-07-09 DIAGNOSIS — G89.4 CHRONIC PAIN SYNDROME: ICD-10-CM

## 2024-07-09 DIAGNOSIS — M47.812 CERVICAL SPONDYLOSIS: ICD-10-CM

## 2024-07-09 RX ORDER — NABUMETONE 500 MG/1
500 TABLET, FILM COATED ORAL 2 TIMES DAILY
Qty: 60 TABLET | Refills: 2 | Status: SHIPPED | OUTPATIENT
Start: 2024-07-09

## 2024-07-09 NOTE — TELEPHONE ENCOUNTER
Lov 4/10 RM  Proc 5/21/24 RM  NOV 8/8/24 BK  Are you able to fill Nabumetone 500mg lf on 6/10? Thank you!

## 2024-07-09 NOTE — TELEPHONE ENCOUNTER
Caller: Patient     Doctor:      Reason for call: Patient is calling to schedule injections for her neck and back .     Please advise     Call back#: [phone]

## 2024-07-09 NOTE — TELEPHONE ENCOUNTER
Pt contacted Call Center requested refill of their medication.        Doctor Name: Harjeet       Medication Name: nabumetone (RELAFEN) 500 mg tablet             Frequency of Med: Take 1 tablet (500 mg total) by mouth 2 (two) times a day           Pharmacy and Location: [facility] [address] [place] [address] [address] [phone]         Pt. Preferred Callback Phone Number: [phone]       Thank you.

## 2024-07-09 NOTE — TELEPHONE ENCOUNTER
S/w pt. Pt requested repeat ONB & Paracervical TPI lc 5/21/24 RM    Current pain level: 10/10 intermit sharp pain and HA    Percentage relief from previous inj: 60-70%    Duration of pain relief from previous inj: 1.5 mo pain returned approx 1 wk ago    Same pain as prior to inj: yes    LOV 4/10/24BK  NOV 8/8/24 BK    Please advise. Thank you!

## 2024-07-09 NOTE — TELEPHONE ENCOUNTER
I called and left a detailed message for patient of Kidney function is stable and within typical baseline. Iron panel okay. If any questions or concerns to call office.       ----- Message from FIONA Byrnes sent at 7/9/2024 12:31 PM EDT -----  Kidney function is stable and within typical baseline. Iron panel okay.

## 2024-07-10 ENCOUNTER — TELEPHONE (OUTPATIENT)
Age: 49
End: 2024-07-10

## 2024-07-10 LAB — VIT B1 BLD-SCNC: 160.3 NMOL/L (ref 66.5–200)

## 2024-07-10 NOTE — TELEPHONE ENCOUNTER
Okay to repeat ultrasound-guided cervical paraspinal muscle trigger point injections and bilateral greater occipital nerve blocks

## 2024-07-11 NOTE — LETTER
February 19, 2020     Patient: Lisa Castillo   YOB: 1975   Date of Visit: 2/19/2020       To Whom it May Concern:    Lisa Castillo is under my professional care  She was seen in my office on 2/19/2020  She may return to work on 2/21/20  If you have any questions or concerns, please don't hesitate to call           Sincerely,          Pepe Bernal PA-C        CC: Lisa Castillo Lab work dated 05 January 2023 demonstrates the following abnormalities: , MCH 33.9, RDW 11.6, cholesterol 233, .  All remaining lab values of CBC, CMP and lipid panel are within normal limits. ********** Lab work dated 17 June 2022 demonstrates the following abnormalities: Hemoglobin 16.1, , MCH 35.1, RDW 11.3%, glucose 120, CO2 18.  All remaining lab values of CBC, CMP and C. difficile stool study are negative or within normal limits. ********** Lab work dated 30 January 2021 demonstrates the following abnormalities: WBC 13.5, hemoglobin 16.4, hematocrit 47.3%, .1, MCH 35.0, glucose 108.  All remaining lab values of CBC, CMP, amylase, lipase, troponins, serum EtOH and blood culture are negative or within normal limits.

## 2024-07-11 NOTE — TELEPHONE ENCOUNTER
Caller: patient    Doctor: FRANCES    Reason for call: calling back for her procedure    Call back#:

## 2024-07-12 NOTE — TELEPHONE ENCOUNTER
Caller: patient    Doctor/Office: RM    Call regarding :  schedule procedure     Call was transferred to:

## 2024-07-13 ENCOUNTER — APPOINTMENT (OUTPATIENT)
Dept: LAB | Facility: CLINIC | Age: 49
End: 2024-07-13
Payer: COMMERCIAL

## 2024-07-13 DIAGNOSIS — Z79.899 ENCOUNTER FOR LONG-TERM (CURRENT) USE OF OTHER MEDICATIONS: ICD-10-CM

## 2024-07-13 LAB
ALBUMIN SERPL BCG-MCNC: 4.3 G/DL (ref 3.5–5)
ALP SERPL-CCNC: 55 U/L (ref 34–104)
ALT SERPL W P-5'-P-CCNC: 14 U/L (ref 7–52)
AST SERPL W P-5'-P-CCNC: 15 U/L (ref 13–39)
BASOPHILS # BLD AUTO: 0.06 THOUSANDS/ÂΜL (ref 0–0.1)
BASOPHILS NFR BLD AUTO: 1 % (ref 0–1)
BILIRUB DIRECT SERPL-MCNC: 0.07 MG/DL (ref 0–0.2)
BILIRUB SERPL-MCNC: 0.41 MG/DL (ref 0.2–1)
EOSINOPHIL # BLD AUTO: 0.11 THOUSAND/ÂΜL (ref 0–0.61)
EOSINOPHIL NFR BLD AUTO: 1 % (ref 0–6)
ERYTHROCYTE [DISTWIDTH] IN BLOOD BY AUTOMATED COUNT: 11.4 % (ref 11.6–15.1)
HCT VFR BLD AUTO: 35.8 % (ref 34.8–46.1)
HGB BLD-MCNC: 12.3 G/DL (ref 11.5–15.4)
IMM GRANULOCYTES # BLD AUTO: 0.03 THOUSAND/UL (ref 0–0.2)
IMM GRANULOCYTES NFR BLD AUTO: 0 % (ref 0–2)
LYMPHOCYTES # BLD AUTO: 1.72 THOUSANDS/ÂΜL (ref 0.6–4.47)
LYMPHOCYTES NFR BLD AUTO: 21 % (ref 14–44)
MCH RBC QN AUTO: 34.9 PG (ref 26.8–34.3)
MCHC RBC AUTO-ENTMCNC: 34.4 G/DL (ref 31.4–37.4)
MCV RBC AUTO: 102 FL (ref 82–98)
MONOCYTES # BLD AUTO: 0.5 THOUSAND/ÂΜL (ref 0.17–1.22)
MONOCYTES NFR BLD AUTO: 6 % (ref 4–12)
NEUTROPHILS # BLD AUTO: 5.6 THOUSANDS/ÂΜL (ref 1.85–7.62)
NEUTS SEG NFR BLD AUTO: 71 % (ref 43–75)
NRBC BLD AUTO-RTO: 0 /100 WBCS
PLATELET # BLD AUTO: 257 THOUSANDS/UL (ref 149–390)
PMV BLD AUTO: 11.4 FL (ref 8.9–12.7)
PROT SERPL-MCNC: 6.6 G/DL (ref 6.4–8.4)
RBC # BLD AUTO: 3.52 MILLION/UL (ref 3.81–5.12)
VALPROATE SERPL-MCNC: 81 UG/ML (ref 50–100)
WBC # BLD AUTO: 8.02 THOUSAND/UL (ref 4.31–10.16)

## 2024-07-13 PROCEDURE — 36415 COLL VENOUS BLD VENIPUNCTURE: CPT

## 2024-07-13 PROCEDURE — 80164 ASSAY DIPROPYLACETIC ACD TOT: CPT

## 2024-07-13 PROCEDURE — 85025 COMPLETE CBC W/AUTO DIFF WBC: CPT

## 2024-07-13 PROCEDURE — 80076 HEPATIC FUNCTION PANEL: CPT

## 2024-07-22 ENCOUNTER — OFFICE VISIT (OUTPATIENT)
Dept: OBGYN CLINIC | Facility: CLINIC | Age: 49
End: 2024-07-22
Payer: COMMERCIAL

## 2024-07-22 VITALS
BODY MASS INDEX: 19.69 KG/M2 | WEIGHT: 118.2 LBS | HEIGHT: 65 IN | HEART RATE: 82 BPM | DIASTOLIC BLOOD PRESSURE: 83 MMHG | SYSTOLIC BLOOD PRESSURE: 134 MMHG

## 2024-07-22 DIAGNOSIS — M77.02 MEDIAL EPICONDYLITIS OF LEFT ELBOW: Primary | ICD-10-CM

## 2024-07-22 PROCEDURE — 99213 OFFICE O/P EST LOW 20 MIN: CPT | Performed by: ORTHOPAEDIC SURGERY

## 2024-07-22 PROCEDURE — 20605 DRAIN/INJ JOINT/BURSA W/O US: CPT | Performed by: ORTHOPAEDIC SURGERY

## 2024-07-22 RX ORDER — BETAMETHASONE SODIUM PHOSPHATE AND BETAMETHASONE ACETATE 3; 3 MG/ML; MG/ML
6 INJECTION, SUSPENSION INTRA-ARTICULAR; INTRALESIONAL; INTRAMUSCULAR; SOFT TISSUE
Status: COMPLETED | OUTPATIENT
Start: 2024-07-22 | End: 2024-07-22

## 2024-07-22 RX ORDER — BUPIVACAINE HYDROCHLORIDE 2.5 MG/ML
1 INJECTION, SOLUTION INFILTRATION; PERINEURAL
Status: COMPLETED | OUTPATIENT
Start: 2024-07-22 | End: 2024-07-22

## 2024-07-22 RX ADMIN — BUPIVACAINE HYDROCHLORIDE 1 ML: 2.5 INJECTION, SOLUTION INFILTRATION; PERINEURAL at 08:00

## 2024-07-22 RX ADMIN — BETAMETHASONE SODIUM PHOSPHATE AND BETAMETHASONE ACETATE 6 MG: 3; 3 INJECTION, SUSPENSION INTRA-ARTICULAR; INTRALESIONAL; INTRAMUSCULAR; SOFT TISSUE at 08:00
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
## 2024-07-27 DIAGNOSIS — M79.18 MYOFASCIAL PAIN SYNDROME: ICD-10-CM

## 2024-07-27 DIAGNOSIS — M54.9 MID BACK PAIN: ICD-10-CM

## 2024-07-29 ENCOUNTER — TELEPHONE (OUTPATIENT)
Age: 49
End: 2024-07-29

## 2024-07-29 RX ORDER — BACLOFEN 20 MG/1
20 TABLET ORAL 3 TIMES DAILY
Qty: 90 TABLET | Refills: 1 | Status: SHIPPED | OUTPATIENT
Start: 2024-07-29

## 2024-07-29 NOTE — TELEPHONE ENCOUNTER
ABHI...    S/w pt and advised to drop ppw off at [facility] office to be sent to to [facility] leave team for review and completion. Pt states she will drop ppw off prior to appt on 8/8/24 CATA.

## 2024-07-29 NOTE — TELEPHONE ENCOUNTER
Patient states her [facility] paperwork runs out on 8/10. She is wondering if Ruth would fill the paperwork out and she her after as Ruth's first available isn't until 8/27. Patient requests call back to advise.

## 2024-07-29 NOTE — TELEPHONE ENCOUNTER
Caller: Patient     Doctor: Kocher     Reason for call: Has appointment on 8/8. Wanting to know if provider will fill out FMLA paperwork at that time ?     Please advise     Call back#: [phone]      4 = No assist / stand by assistance

## 2024-07-31 DIAGNOSIS — G89.4 CHRONIC PAIN SYNDROME: ICD-10-CM

## 2024-07-31 DIAGNOSIS — M47.812 CERVICAL SPONDYLOSIS: ICD-10-CM

## 2024-07-31 RX ORDER — NABUMETONE 500 MG/1
500 TABLET, FILM COATED ORAL 2 TIMES DAILY
Qty: 60 TABLET | Refills: 5 | Status: SHIPPED | OUTPATIENT
Start: 2024-07-31

## 2024-08-01 DIAGNOSIS — M54.50 CHRONIC BILATERAL LOW BACK PAIN WITHOUT SCIATICA: ICD-10-CM

## 2024-08-01 DIAGNOSIS — G89.29 CHRONIC BILATERAL LOW BACK PAIN WITHOUT SCIATICA: ICD-10-CM

## 2024-08-01 DIAGNOSIS — G89.4 CHRONIC PAIN SYNDROME: ICD-10-CM

## 2024-08-01 RX ORDER — HYDROCODONE BITARTRATE AND ACETAMINOPHEN 5; 325 MG/1; MG/1
1 TABLET ORAL 2 TIMES DAILY PRN
Qty: 14 TABLET | Refills: 0 | Status: SHIPPED | OUTPATIENT
Start: 2024-08-01 | End: 2024-08-31

## 2024-08-01 NOTE — TELEPHONE ENCOUNTER
Patient called to check status of fax of [facility] paperwork she sent yesterday to the Centralized Fax.  Due to the time issue of it needing to be done before 8/10/24, I provided patient with direct fax to office number from Monica in clerical.  Patient will resend asap.    Patient is also requesting once paperwork is completed, if someone could please send it to her via e-mail, if possible, so she has it for her records.

## 2024-08-01 NOTE — TELEPHONE ENCOUNTER
Paperwork was received and filled out by Ruth. I am e-mailing it to the patient.    I left a voicemail for her asking her if she would like me to fax it to the company as well or just email it to her?

## 2024-08-01 NOTE — TELEPHONE ENCOUNTER
Patient called back and would like the forms faxed as well to the fax number on the form. Form was faxed and will be scanned into the chart.    When to a party on Saturday, marijuana infused in food, states feels fuzzy head and dizzy. Denies pain anywhere. Denies any drug or alcohol use.

## 2024-08-01 NOTE — TELEPHONE ENCOUNTER
Pt's LOV was 4/10  Last proc visit was 5/21  Last fill was 7/3  Has f/u scheduled 8/8  Please advise on refill

## 2024-08-06 ENCOUNTER — VBI (OUTPATIENT)
Dept: ADMINISTRATIVE | Facility: OTHER | Age: 49
End: 2024-08-06

## 2024-08-06 NOTE — TELEPHONE ENCOUNTER
08/06/24 1:22 PM     Chart reviewed for Pap Smear (HPV) aka Cervical Cancer Screening was/were not submitted to the patient's insurance.     Berkley Vaca MA   [facility]

## 2024-08-08 ENCOUNTER — OFFICE VISIT (OUTPATIENT)
Age: 49
End: 2024-08-08
Payer: COMMERCIAL

## 2024-08-08 VITALS
DIASTOLIC BLOOD PRESSURE: 89 MMHG | HEART RATE: 89 BPM | BODY MASS INDEX: 19.66 KG/M2 | HEIGHT: 65 IN | WEIGHT: 118 LBS | SYSTOLIC BLOOD PRESSURE: 149 MMHG

## 2024-08-08 DIAGNOSIS — Z98.1 HISTORY OF FUSION OF CERVICAL SPINE: ICD-10-CM

## 2024-08-08 DIAGNOSIS — M54.12 CERVICAL RADICULOPATHY: ICD-10-CM

## 2024-08-08 DIAGNOSIS — M79.18 MYOFASCIAL PAIN SYNDROME: ICD-10-CM

## 2024-08-08 DIAGNOSIS — M47.812 CERVICAL SPONDYLOSIS: ICD-10-CM

## 2024-08-08 DIAGNOSIS — M54.2 NECK PAIN: ICD-10-CM

## 2024-08-08 DIAGNOSIS — G89.4 CHRONIC PAIN SYNDROME: Primary | ICD-10-CM

## 2024-08-08 PROCEDURE — 99214 OFFICE O/P EST MOD 30 MIN: CPT | Performed by: NURSE PRACTITIONER

## 2024-08-08 RX ORDER — HYDROCODONE BITARTRATE AND ACETAMINOPHEN 5; 325 MG/1; MG/1
1 TABLET ORAL 2 TIMES DAILY PRN
Qty: 60 TABLET | Refills: 0 | Status: SHIPPED | OUTPATIENT
Start: 2024-08-08 | End: 2024-09-07

## 2024-08-08 RX ORDER — GABAPENTIN 800 MG/1
800 TABLET ORAL 3 TIMES DAILY
Qty: 90 TABLET | Refills: 2 | Status: SHIPPED | OUTPATIENT
Start: 2024-08-08
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
## 2024-08-22 ENCOUNTER — PROCEDURE VISIT (OUTPATIENT)
Age: 49
End: 2024-08-22
Payer: COMMERCIAL

## 2024-08-22 VITALS
HEART RATE: 87 BPM | HEIGHT: 65 IN | SYSTOLIC BLOOD PRESSURE: 139 MMHG | BODY MASS INDEX: 19.66 KG/M2 | WEIGHT: 118 LBS | DIASTOLIC BLOOD PRESSURE: 79 MMHG

## 2024-08-22 DIAGNOSIS — M54.12 CERVICAL RADICULOPATHY: ICD-10-CM

## 2024-08-22 DIAGNOSIS — M54.9 MID BACK PAIN: ICD-10-CM

## 2024-08-22 DIAGNOSIS — G89.4 CHRONIC PAIN SYNDROME: ICD-10-CM

## 2024-08-22 DIAGNOSIS — M79.18 MYOFASCIAL PAIN SYNDROME: ICD-10-CM

## 2024-08-22 PROCEDURE — 20553 NJX 1/MLT TRIGGER POINTS 3/>: CPT | Performed by: ANESTHESIOLOGY

## 2024-08-22 PROCEDURE — 76942 ECHO GUIDE FOR BIOPSY: CPT | Performed by: ANESTHESIOLOGY

## 2024-08-22 RX ORDER — LIDOCAINE 50 MG/G
OINTMENT TOPICAL AS NEEDED
Qty: 35.44 G | Refills: 0 | Status: SHIPPED | OUTPATIENT
Start: 2024-08-22

## 2024-08-22 RX ORDER — GABAPENTIN 800 MG/1
800 TABLET ORAL 3 TIMES DAILY
Qty: 90 TABLET | Refills: 2 | Status: SHIPPED | OUTPATIENT
Start: 2024-08-22

## 2024-08-22 RX ORDER — TRIAMCINOLONE ACETONIDE 40 MG/ML
80 INJECTION, SUSPENSION INTRA-ARTICULAR; INTRAMUSCULAR
Status: COMPLETED | OUTPATIENT
Start: 2024-08-22 | End: 2024-08-22

## 2024-08-22 RX ORDER — BUPIVACAINE HYDROCHLORIDE 2.5 MG/ML
10 INJECTION, SOLUTION EPIDURAL; INFILTRATION; INTRACAUDAL
Status: COMPLETED | OUTPATIENT
Start: 2024-08-22 | End: 2024-08-22

## 2024-08-22 RX ORDER — BACLOFEN 20 MG/1
20 TABLET ORAL 3 TIMES DAILY
Qty: 90 TABLET | Refills: 1 | Status: SHIPPED | OUTPATIENT
Start: 2024-08-22

## 2024-08-22 RX ADMIN — TRIAMCINOLONE ACETONIDE 80 MG: 40 INJECTION, SUSPENSION INTRA-ARTICULAR; INTRAMUSCULAR at 16:00

## 2024-08-22 RX ADMIN — BUPIVACAINE HYDROCHLORIDE 10 ML: 2.5 INJECTION, SOLUTION EPIDURAL; INFILTRATION; INTRACAUDAL at 16:00

## 2024-08-22 NOTE — PATIENT INSTRUCTIONS
Do not apply heat to any area that is numb. If you have discomfort or soreness at the injection site, you may apply ice today, 20 minutes on and 20 minutes off. Tomorrow you may use ice or warm, moist heat. Do not apply ice or heat directly to the skin.  If you experience severe shortness of breath, go to the Emergency Room.  You may have numbness for several hours from the local anesthetic. Please use caution and common sense, especially with weight-bearing activities.  You may have an increase or change in the discomfort for 36-48 hours after your treatment. Apply ice and continue with any pain medicine you have been prescribed.  Do not do anything strenuous today. You may shower, but no tub baths or hot tubs today. You may resume your normal activities tomorrow, but do not “overdo it”. Resume normal activities slowly when you are feeling better.  If you experience redness, drainage or swelling at the injection site, or if you develop a fever above 100 degrees, please call [facility] at [phone] or go to the Emergency Room.  Continue to take all routine medicines prescribed by your primary care physician unless otherwise instructed by our staff. Most blood thinners should be started again according to your regularly scheduled dosing. If you have any questions, please give our office a call.    As no general anesthesia was used in today's procedure, you should not experience any side effects related to anesthesia.       If you have a problem specifically related to your procedure, please call our office at [phone].  Problems not related to your procedure should be directed to your primary care physician.

## 2024-08-22 NOTE — PROGRESS NOTES
" Universal Protocol:  procedure performed by consultantConsent: Verbal consent obtained. Written consent obtained.  Risks and benefits: risks, benefits and alternatives were discussed  Consent given by: patient  Time out: Immediately prior to procedure a \"time out\" was called to verify the correct patient, procedure, equipment, support staff and site/side marked as required.  Timeout called at: 8/22/2024 4:47 PM.  Patient understanding: patient states understanding of the procedure being performed  Patient consent: the patient's understanding of the procedure matches consent given  Procedure consent: procedure consent matches procedure scheduled  Relevant documents: relevant documents present and verified  Test results: test results available and properly labeled  Site marked: the operative site was marked  Radiology Images displayed and confirmed. If images not available, report reviewed: imaging studies not available  Required items: required blood products, implants, devices, and special equipment available  Patient identity confirmed: verbally with patient  Supporting Documentation  Indications: pain   Trigger Point Injections: multiple trigger points: 3 or more muscle groups    Injection site identified by: ultrasound  Procedure Details  Location(s):    Neck/Upper Back: L occipital ridge, R occipital ridge, R cervical paraspinals, L cervical paraspinals, L levator scapulae and R levator scapulae     Prep: patient was prepped and draped in usual sterile fashion  Needle size: 22 G  Medications: 10 mL bupivacaine (PF) 0.25 %; 80 mg triamcinolone acetonide 40 mg/mL  Patient tolerance: patient tolerated the procedure well with no immediate complications          "

## 2024-08-23 ENCOUNTER — TELEPHONE (OUTPATIENT)
Dept: FAMILY MEDICINE CLINIC | Facility: CLINIC | Age: 49
End: 2024-08-23

## 2024-08-26 RX ORDER — LACTOBACIL 2/BIFIDO 1/S.THERMO 450B CELL
PACKET (EA) ORAL
Refills: 0 | OUTPATIENT
Start: 2024-08-26

## 2024-08-27 ENCOUNTER — TELEPHONE (OUTPATIENT)
Dept: PAIN MEDICINE | Facility: CLINIC | Age: 49
End: 2024-08-27

## 2024-08-27 ENCOUNTER — TELEPHONE (OUTPATIENT)
Age: 49
End: 2024-08-27

## 2024-08-27 NOTE — TELEPHONE ENCOUNTER
RN s/w pharm who stated Lido 5% ointment needs PA.     Pls advise. Thank you!    Pt does not need Nabumetone rf.

## 2024-08-27 NOTE — TELEPHONE ENCOUNTER
PA for lido ointment 5%SUBMITTED     via    [x]CMM-KEY [id number]:   []Surescripts-Case ID #   []Faxed to plan   []Other website   []Phone call Case ID #     Office notes sent, clinical questions answered. Awaiting determination    Turnaround time for your insurance to make a decision on your Prior Authorization can take 7-21 business days.

## 2024-08-27 NOTE — TELEPHONE ENCOUNTER
Patient returning call.  Patient states the refill for the probiotic should have gone to her stomach doctor, she is not sure why it came to our office.  She was getting another call and had to hang up, she states she will call back.

## 2024-08-28 ENCOUNTER — TELEPHONE (OUTPATIENT)
Dept: PAIN MEDICINE | Facility: CLINIC | Age: 49
End: 2024-08-28

## 2024-08-29 ENCOUNTER — TELEPHONE (OUTPATIENT)
Age: 49
End: 2024-08-29

## 2024-08-29 NOTE — TELEPHONE ENCOUNTER
PA for LIDO OINTMENT  DENIED    Reason        Message sent to office clinical pool Yes    Denial letter scanned into Media Yes    Appeal started No (Provider will need to decide if appeal is warranted and send clinical documentation to Prior Authorization Team for initiation.)    **Please follow up with your patient regarding denial and next steps**

## 2024-08-29 NOTE — TELEPHONE ENCOUNTER
PA for LIDO OINTMENT 5%SUBMITTED     via  Key: [id number]  [x]CMM-KEY  []Surescripts-Case ID #   []Faxed to plan   []Other website   []Phone call Case ID #     A 2ND PA was submitted         Office notes sent, clinical questions answered. Awaiting determination    Turnaround time for your insurance to make a decision on your Prior Authorization can take 7-21 business days.

## 2024-08-29 NOTE — TELEPHONE ENCOUNTER
Denied on August 28 by [employer]  I have resubmitted a new PA hoping it will be approved with [employer]   Medication lido ointment

## 2024-09-04 NOTE — TELEPHONE ENCOUNTER
PA for LIDO 5% OINTMENT DENIED    Reason:(Screenshot if applicable)        Message sent to office clinical pool Yes    Denial letter scanned into Media Yes      **Please follow up with your patient regarding denial and next steps**

## 2024-09-04 NOTE — TELEPHONE ENCOUNTER
Pts Lidocaine 5% ointment denied due to unsupported dx code.     Pls advise any addit med recs. Thank you!

## 2024-09-05 ENCOUNTER — TELEPHONE (OUTPATIENT)
Dept: PAIN MEDICINE | Facility: MEDICAL CENTER | Age: 49
End: 2024-09-05

## 2024-09-05 DIAGNOSIS — G89.4 CHRONIC PAIN SYNDROME: ICD-10-CM

## 2024-09-06 DIAGNOSIS — M47.812 CERVICAL SPONDYLOSIS: Primary | ICD-10-CM

## 2024-09-06 DIAGNOSIS — M79.18 MYOFASCIAL PAIN SYNDROME: ICD-10-CM

## 2024-09-06 DIAGNOSIS — M54.6 CHRONIC BILATERAL THORACIC BACK PAIN: ICD-10-CM

## 2024-09-06 DIAGNOSIS — M54.2 NECK PAIN: ICD-10-CM

## 2024-09-06 DIAGNOSIS — G89.29 CHRONIC BILATERAL THORACIC BACK PAIN: ICD-10-CM

## 2024-09-06 DIAGNOSIS — G89.4 CHRONIC PAIN SYNDROME: ICD-10-CM

## 2024-09-06 RX ORDER — LIDOCAINE 50 MG/G
OINTMENT TOPICAL AS NEEDED
Qty: 35.44 G | Refills: 0 | Status: SHIPPED | OUTPATIENT
Start: 2024-09-06

## 2024-09-06 RX ORDER — HYDROCODONE BITARTRATE AND ACETAMINOPHEN 5; 325 MG/1; MG/1
1 TABLET ORAL 2 TIMES DAILY PRN
Qty: 60 TABLET | Refills: 0 | Status: SHIPPED | OUTPATIENT
Start: 2024-09-06 | End: 2024-10-06

## 2024-09-06 NOTE — TELEPHONE ENCOUNTER
08/08/2024 08/08/2024 HYDROcodone BITARTRATE 5 MG ORAL TABLET/ACETAMINOPHEN 325 MG (Tablet) 60.0 30 325 MG-5 MG 10.0 [facility]  Commercial Insurance 0 / 0 PA   08/01/2024 08/01/2024 HYDROcodone BITARTRATE 5 MG ORAL TABLET/ACETAMINOPHEN 325 MG (Tablet) 14.0 7 325 MG-5 MG 10.0 [facility]

## 2024-09-06 NOTE — TELEPHONE ENCOUNTER
PA for LIDO 5% OINT SUBMITTED WITH UPDATED DX CODES    via    [x]CMM-KEY: [id number]  []Surescripts-Case ID #   []Faxed to plan   []Other website   []Phone call Case ID #     Office notes sent, clinical questions answered. Awaiting determination    Turnaround time for your insurance to make a decision on your Prior Authorization can take 7-21 business days.

## 2024-09-11 ENCOUNTER — APPOINTMENT (OUTPATIENT)
Dept: LAB | Facility: CLINIC | Age: 49
End: 2024-09-11
Payer: COMMERCIAL

## 2024-09-11 DIAGNOSIS — R19.5 OTHER FECAL ABNORMALITIES: ICD-10-CM

## 2024-09-11 DIAGNOSIS — K21.9 GASTRO-ESOPHAGEAL REFLUX DISEASE WITHOUT ESOPHAGITIS: ICD-10-CM

## 2024-09-11 DIAGNOSIS — R10.9 ABDOMINAL PAIN, UNSPECIFIED ABDOMINAL LOCATION: ICD-10-CM

## 2024-09-11 DIAGNOSIS — R19.7 DIARRHEA, UNSPECIFIED TYPE: ICD-10-CM

## 2024-09-11 DIAGNOSIS — K59.00 CONSTIPATION, UNSPECIFIED CONSTIPATION TYPE: ICD-10-CM

## 2024-09-11 PROCEDURE — 83993 ASSAY FOR CALPROTECTIN FECAL: CPT

## 2024-09-12 LAB — CALPROTECTIN STL-MCNC: 16.6 ΜG/G

## 2024-09-20 ENCOUNTER — TELEPHONE (OUTPATIENT)
Dept: OTHER | Facility: OTHER | Age: 49
End: 2024-09-20

## 2024-09-20 NOTE — TELEPHONE ENCOUNTER
Patient is calling regarding cancelling an appointment.    Date/Time: 9/20 7 AM    Patient was rescheduled: YES [] NO [x]    Patient requesting call back to reschedule: YES [] NO [x]    Son is in the hospital, will call back to reschedule.

## 2024-09-24 ENCOUNTER — TELEPHONE (OUTPATIENT)
Age: 49
End: 2024-09-24

## 2024-09-25 ENCOUNTER — APPOINTMENT (EMERGENCY)
Dept: CT IMAGING | Facility: HOSPITAL | Age: 49
End: 2024-09-25
Payer: COMMERCIAL

## 2024-09-25 ENCOUNTER — APPOINTMENT (EMERGENCY)
Dept: RADIOLOGY | Facility: HOSPITAL | Age: 49
End: 2024-09-25
Payer: COMMERCIAL

## 2024-09-25 ENCOUNTER — HOSPITAL ENCOUNTER (EMERGENCY)
Facility: HOSPITAL | Age: 49
Discharge: HOME | End: 2024-09-25
Attending: EMERGENCY MEDICINE
Payer: COMMERCIAL

## 2024-09-25 VITALS
DIASTOLIC BLOOD PRESSURE: 87 MMHG | TEMPERATURE: 97.5 F | HEIGHT: 65 IN | HEART RATE: 78 BPM | BODY MASS INDEX: 20.02 KG/M2 | OXYGEN SATURATION: 100 % | SYSTOLIC BLOOD PRESSURE: 153 MMHG | WEIGHT: 120.15 LBS | RESPIRATION RATE: 18 BRPM

## 2024-09-25 DIAGNOSIS — G89.29 CHRONIC NECK PAIN: ICD-10-CM

## 2024-09-25 DIAGNOSIS — M54.2 CHRONIC NECK PAIN: ICD-10-CM

## 2024-09-25 DIAGNOSIS — R11.0 NAUSEA: ICD-10-CM

## 2024-09-25 DIAGNOSIS — R10.84 GENERALIZED ABDOMINAL PAIN: Primary | ICD-10-CM

## 2024-09-25 DIAGNOSIS — R20.2 PARESTHESIAS: ICD-10-CM

## 2024-09-25 DIAGNOSIS — R63.0 ANOREXIA: ICD-10-CM

## 2024-09-25 LAB
ALBUMIN SERPL BCG-MCNC: 4.4 G/DL (ref 3.5–5)
ALP SERPL-CCNC: 47 U/L (ref 34–104)
ALT SERPL W P-5'-P-CCNC: 15 U/L (ref 7–52)
ANION GAP SERPL CALCULATED.3IONS-SCNC: 10 MMOL/L (ref 4–13)
APTT PPP: 28 SECONDS (ref 23–34)
AST SERPL W P-5'-P-CCNC: 14 U/L (ref 13–39)
BASOPHILS # BLD AUTO: 0.03 THOUSANDS/ΜL (ref 0–0.1)
BASOPHILS NFR BLD AUTO: 0 % (ref 0–1)
BILIRUB SERPL-MCNC: 0.31 MG/DL (ref 0.2–1)
BILIRUB UR QL STRIP: NEGATIVE
BUN SERPL-MCNC: 18 MG/DL (ref 5–25)
CALCIUM SERPL-MCNC: 9.1 MG/DL (ref 8.4–10.2)
CHLORIDE SERPL-SCNC: 108 MMOL/L (ref 96–108)
CLARITY UR: CLEAR
CO2 SERPL-SCNC: 20 MMOL/L (ref 21–32)
COLOR UR: YELLOW
CREAT SERPL-MCNC: 1.23 MG/DL (ref 0.6–1.3)
EOSINOPHIL # BLD AUTO: 0.11 THOUSAND/ΜL (ref 0–0.61)
EOSINOPHIL NFR BLD AUTO: 1 % (ref 0–6)
ERYTHROCYTE [DISTWIDTH] IN BLOOD BY AUTOMATED COUNT: 12.6 % (ref 11.6–15.1)
FLUAV AG UPPER RESP QL IA.RAPID: NEGATIVE
FLUBV AG UPPER RESP QL IA.RAPID: NEGATIVE
GFR SERPL CREATININE-BSD FRML MDRD: 51 ML/MIN/1.73SQ M
GLUCOSE SERPL-MCNC: 88 MG/DL (ref 65–140)
GLUCOSE UR STRIP-MCNC: NEGATIVE MG/DL
HCT VFR BLD AUTO: 33.6 % (ref 34.8–46.1)
HGB BLD-MCNC: 11.6 G/DL (ref 11.5–15.4)
HGB UR QL STRIP.AUTO: NEGATIVE
IMM GRANULOCYTES # BLD AUTO: 0.01 THOUSAND/UL (ref 0–0.2)
IMM GRANULOCYTES NFR BLD AUTO: 0 % (ref 0–2)
INR PPP: 0.98 (ref 0.85–1.19)
KETONES UR STRIP-MCNC: NEGATIVE MG/DL
LEUKOCYTE ESTERASE UR QL STRIP: NEGATIVE
LIPASE SERPL-CCNC: 50 U/L (ref 11–82)
LYMPHOCYTES # BLD AUTO: 2.1 THOUSANDS/ΜL (ref 0.6–4.47)
LYMPHOCYTES NFR BLD AUTO: 28 % (ref 14–44)
MAGNESIUM SERPL-MCNC: 1.9 MG/DL (ref 1.9–2.7)
MCH RBC QN AUTO: 34.9 PG (ref 26.8–34.3)
MCHC RBC AUTO-ENTMCNC: 34.5 G/DL (ref 31.4–37.4)
MCV RBC AUTO: 101 FL (ref 82–98)
MONOCYTES # BLD AUTO: 0.49 THOUSAND/ΜL (ref 0.17–1.22)
MONOCYTES NFR BLD AUTO: 6 % (ref 4–12)
NEUTROPHILS # BLD AUTO: 4.87 THOUSANDS/ΜL (ref 1.85–7.62)
NEUTS SEG NFR BLD AUTO: 65 % (ref 43–75)
NITRITE UR QL STRIP: NEGATIVE
NRBC BLD AUTO-RTO: 0 /100 WBCS
PH UR STRIP.AUTO: 6 [PH]
PHOSPHATE SERPL-MCNC: 3.9 MG/DL (ref 2.7–4.5)
PLATELET # BLD AUTO: 256 THOUSANDS/UL (ref 149–390)
PMV BLD AUTO: 10.1 FL (ref 8.9–12.7)
POTASSIUM SERPL-SCNC: 3.2 MMOL/L (ref 3.5–5.3)
PROT SERPL-MCNC: 6.9 G/DL (ref 6.4–8.4)
PROT UR STRIP-MCNC: NEGATIVE MG/DL
PROTHROMBIN TIME: 13.5 SECONDS (ref 12.3–15)
RBC # BLD AUTO: 3.32 MILLION/UL (ref 3.81–5.12)
SARS-COV+SARS-COV-2 AG RESP QL IA.RAPID: NEGATIVE
SODIUM SERPL-SCNC: 138 MMOL/L (ref 135–147)
SP GR UR STRIP.AUTO: 1.01
UROBILINOGEN UR QL STRIP.AUTO: 0.2 E.U./DL
WBC # BLD AUTO: 7.61 THOUSAND/UL (ref 4.31–10.16)

## 2024-09-25 PROCEDURE — 96375 TX/PRO/DX INJ NEW DRUG ADDON: CPT

## 2024-09-25 PROCEDURE — 87811 SARS-COV-2 COVID19 W/OPTIC: CPT | Performed by: EMERGENCY MEDICINE

## 2024-09-25 PROCEDURE — 96361 HYDRATE IV INFUSION ADD-ON: CPT

## 2024-09-25 PROCEDURE — 83735 ASSAY OF MAGNESIUM: CPT | Performed by: EMERGENCY MEDICINE

## 2024-09-25 PROCEDURE — 83690 ASSAY OF LIPASE: CPT | Performed by: EMERGENCY MEDICINE

## 2024-09-25 PROCEDURE — 71045 X-RAY EXAM CHEST 1 VIEW: CPT

## 2024-09-25 PROCEDURE — 36415 COLL VENOUS BLD VENIPUNCTURE: CPT | Performed by: EMERGENCY MEDICINE

## 2024-09-25 PROCEDURE — 87804 INFLUENZA ASSAY W/OPTIC: CPT | Performed by: EMERGENCY MEDICINE

## 2024-09-25 PROCEDURE — 99285 EMERGENCY DEPT VISIT HI MDM: CPT

## 2024-09-25 PROCEDURE — 81003 URINALYSIS AUTO W/O SCOPE: CPT | Performed by: EMERGENCY MEDICINE

## 2024-09-25 PROCEDURE — 96374 THER/PROPH/DIAG INJ IV PUSH: CPT

## 2024-09-25 PROCEDURE — 99285 EMERGENCY DEPT VISIT HI MDM: CPT | Performed by: EMERGENCY MEDICINE

## 2024-09-25 PROCEDURE — 84100 ASSAY OF PHOSPHORUS: CPT | Performed by: EMERGENCY MEDICINE

## 2024-09-25 PROCEDURE — 85610 PROTHROMBIN TIME: CPT | Performed by: EMERGENCY MEDICINE

## 2024-09-25 PROCEDURE — 80053 COMPREHEN METABOLIC PANEL: CPT | Performed by: EMERGENCY MEDICINE

## 2024-09-25 PROCEDURE — 85730 THROMBOPLASTIN TIME PARTIAL: CPT | Performed by: EMERGENCY MEDICINE

## 2024-09-25 PROCEDURE — 85025 COMPLETE CBC W/AUTO DIFF WBC: CPT | Performed by: EMERGENCY MEDICINE

## 2024-09-25 PROCEDURE — 70450 CT HEAD/BRAIN W/O DYE: CPT

## 2024-09-25 PROCEDURE — 74177 CT ABD & PELVIS W/CONTRAST: CPT

## 2024-09-25 RX ORDER — DIAZEPAM 10 MG/2ML
5 INJECTION, SOLUTION INTRAMUSCULAR; INTRAVENOUS ONCE
Status: COMPLETED | OUTPATIENT
Start: 2024-09-25 | End: 2024-09-25

## 2024-09-25 RX ORDER — POTASSIUM CHLORIDE 1500 MG/1
40 TABLET, EXTENDED RELEASE ORAL ONCE
Status: COMPLETED | OUTPATIENT
Start: 2024-09-25 | End: 2024-09-25

## 2024-09-25 RX ORDER — PANTOPRAZOLE SODIUM 40 MG/10ML
40 INJECTION, POWDER, LYOPHILIZED, FOR SOLUTION INTRAVENOUS ONCE
Status: COMPLETED | OUTPATIENT
Start: 2024-09-25 | End: 2024-09-25

## 2024-09-25 RX ORDER — METOCLOPRAMIDE 10 MG/1
10 TABLET ORAL EVERY 6 HOURS
Qty: 10 TABLET | Refills: 0 | Status: SHIPPED | OUTPATIENT
Start: 2024-09-25 | End: 2024-09-28

## 2024-09-25 RX ORDER — TOPIRAMATE 100 MG/1
100 TABLET, FILM COATED ORAL DAILY
COMMUNITY
Start: 2024-09-11

## 2024-09-25 RX ORDER — METOCLOPRAMIDE HYDROCHLORIDE 5 MG/ML
10 INJECTION INTRAMUSCULAR; INTRAVENOUS ONCE
Status: COMPLETED | OUTPATIENT
Start: 2024-09-25 | End: 2024-09-25

## 2024-09-25 RX ADMIN — DIAZEPAM 5 MG: 5 INJECTION INTRAMUSCULAR; INTRAVENOUS at 20:24

## 2024-09-25 RX ADMIN — POTASSIUM CHLORIDE 40 MEQ: 1500 TABLET, EXTENDED RELEASE ORAL at 21:29

## 2024-09-25 RX ADMIN — METOCLOPRAMIDE 10 MG: 5 INJECTION, SOLUTION INTRAMUSCULAR; INTRAVENOUS at 20:22

## 2024-09-25 RX ADMIN — PANTOPRAZOLE SODIUM 40 MG: 40 INJECTION, POWDER, FOR SOLUTION INTRAVENOUS at 20:19

## 2024-09-25 RX ADMIN — SODIUM CHLORIDE 1000 ML: 0.9 INJECTION, SOLUTION INTRAVENOUS at 20:12

## 2024-09-25 RX ADMIN — IOHEXOL 100 ML: 350 INJECTION, SOLUTION INTRAVENOUS at 20:49

## 2024-09-25 NOTE — ED PROVIDER NOTES
1. Generalized abdominal pain    2. Nausea    3. Anorexia    4. Paresthesias    5. Chronic neck pain      ED Disposition       ED Disposition   Discharge    Condition   Stable    Date/Time   Wed Sep 25, 2024 10:11 PM    Comment   Ericka Castillo discharge to home/self care.                   Assessment & Plan       Medical Decision Making  48-year-old female presents emergency department complaining of abdominal discomfort paresthesias in her hands and feet as well as nausea, anorexia as well as headache and neck pain.  Patient notes that she has had chronic issues with her neck pain but feels that she has not been able to take her medication as she should due to anorexia.  Patient notes he is felt like this for a few days but also notes that her doctor recently increased her Topamax.  The patient notes that she has had no fever or chills and had 1 episode of diarrhea.  The patient is on chronic pain medicine, notably oral muscle relaxants as well as opiates however she has not been able to take them over the past few days because the GI issues began.  Differential diagnosis based on my evaluation is withdrawal, medication side effects, electrolyte abnormalities infection constipation bowel obstruction as well as cervical radiculopathy.  Lab test show mildly low potassium however no evidence of leukocytosis or obvious infection based on chest x-ray white count and abdominal CT.  Head CT is also negative.  The patient was given Reglan in the ER with some improvement in symptoms.  Patient's medications were reviewed at the bedside and it appears that the Topamax, which was recently increased may be causing the patient's symptoms.  Patient will be placed on Reglan as needed.  Patient has an appointment with her psychiatry team tomorrow at 12 noon via Zoom call.  The patient was told to return to the ER for any new or concerning issues and discharged.    Amount and/or Complexity of Data Reviewed  Labs:  "ordered.  Radiology: ordered and independent interpretation performed.    Risk  Prescription drug management.                ED Course as of 09/25/24 2219   Wed Sep 25, 2024   2153 Patient notes improvement with Reglan given IV.  Upon looking at the patient's medical records, the patient recently had an increase in her Topamax.  Paresthesias GI upset nausea anorexia can all be caused by this medication.  Perhaps this may be the cause of her symptomatology.  The patient has a Zoom meeting with her psychiatry team tomorrow where this will be brought up.   2204 Patient was able to tolerate the K-Dur in the emergency department.       Medications   diazepam (VALIUM) injection 5 mg (5 mg Intravenous Given 9/25/24 2024)   pantoprazole (PROTONIX) injection 40 mg (40 mg Intravenous Given 9/25/24 2019)   metoclopramide (REGLAN) injection 10 mg (10 mg Intravenous Given 9/25/24 2022)   sodium chloride 0.9 % bolus 1,000 mL (0 mL Intravenous Stopped 9/25/24 2112)   iohexol (OMNIPAQUE) 350 MG/ML injection (MULTI-DOSE) 100 mL (100 mL Intravenous Given 9/25/24 2049)   potassium chloride (Klor-Con M20) CR tablet 40 mEq (40 mEq Oral Given 9/25/24 2129)       History of Present Illness       48-year-old female presents emergency department complaining of multiple issues.  The patient notes that for multiple days she just \"cannot eat.\"  The patient appears to have difficulty further elaborating on her issues while in the ER.  She states that she has nausea and diarrhea and aversion to food at this time.  Patient is on chronic Percocet for pain as well as muscle relaxants and shots into her neck by pain management for her chronic neck pain but she notes that her abdominal complaints started prior to decreasing her p.o. intake and thus pain medicines other than stopping pain medicines and then the GI issues started.  Patient denies fever chills trauma or acute shortness of breath.  Patient notes she has headache and paresthesias in her " hands.  Patient notes that her symptoms are getting worse and she decided to come to the hospital.  The patient notes that she is also under tremendous amount of stress and is not sure if that is all that the symptoms are caused by.  Patient also complains of paresthesias in her toes. No trauma history.        Review of Systems   Constitutional:  Positive for activity change. Negative for chills and fever.   HENT:  Negative for ear pain and sore throat.    Eyes:  Negative for pain and visual disturbance.   Respiratory:  Negative for cough and shortness of breath.    Cardiovascular:  Negative for chest pain and palpitations.   Gastrointestinal:  Negative for abdominal pain and vomiting.   Genitourinary:  Negative for dysuria and hematuria.   Musculoskeletal:  Positive for myalgias and neck pain. Negative for arthralgias and back pain.   Skin:  Negative for color change and rash.   Neurological:  Positive for numbness. Negative for seizures and syncope.   Psychiatric/Behavioral:  The patient is nervous/anxious.    All other systems reviewed and are negative.          Objective     ED Triage Vitals [09/25/24 1932]   Temperature Pulse Blood Pressure Respirations SpO2 Patient Position - Orthostatic VS   97.5 °F (36.4 °C) 81 153/87 18 98 % --      Temp Source Heart Rate Source BP Location FiO2 (%) Pain Score    Tympanic Monitor Right arm -- 7        Physical Exam  Vitals and nursing note reviewed.   Constitutional:       General: She is not in acute distress.     Appearance: Normal appearance. She is well-developed. She is ill-appearing.   HENT:      Head: Normocephalic and atraumatic.      Right Ear: External ear normal.      Left Ear: External ear normal.      Nose: Nose normal.      Mouth/Throat:      Mouth: Mucous membranes are moist.   Eyes:      Conjunctiva/sclera: Conjunctivae normal.   Cardiovascular:      Rate and Rhythm: Normal rate and regular rhythm.      Pulses: Normal pulses.      Heart sounds: Normal heart  sounds. No murmur heard.  Pulmonary:      Effort: Pulmonary effort is normal. No respiratory distress.      Breath sounds: Normal breath sounds.   Abdominal:      General: Bowel sounds are normal. There is no distension.      Palpations: Abdomen is soft.      Tenderness: There is abdominal tenderness. There is no guarding or rebound.   Musculoskeletal:         General: Tenderness present. No swelling or deformity.      Cervical back: Neck supple. Tenderness present.      Comments: Patient with tenderness to palpation of the paracervical musculature.  There is also some mild tenderness to the C1-C3 in the midline.   Skin:     General: Skin is warm and dry.      Capillary Refill: Capillary refill takes less than 2 seconds.      Coloration: Skin is not pale.   Neurological:      General: No focal deficit present.      Mental Status: She is alert and oriented to person, place, and time. Mental status is at baseline.      Sensory: Sensory deficit present.      Motor: No weakness.         Labs Reviewed   CBC AND DIFFERENTIAL - Abnormal       Result Value    WBC 7.61      RBC 3.32 (*)     Hemoglobin 11.6      Hematocrit 33.6 (*)      (*)     MCH 34.9 (*)     MCHC 34.5      RDW 12.6      MPV 10.1      Platelets 256      nRBC 0      Segmented % 65      Immature Grans % 0      Lymphocytes % 28      Monocytes % 6      Eosinophils Relative 1      Basophils Relative 0      Absolute Neutrophils 4.87      Absolute Immature Grans 0.01      Absolute Lymphocytes 2.10      Absolute Monocytes 0.49      Eosinophils Absolute 0.11      Basophils Absolute 0.03     COMPREHENSIVE METABOLIC PANEL - Abnormal    Sodium 138      Potassium 3.2 (*)     Chloride 108      CO2 20 (*)     ANION GAP 10      BUN 18      Creatinine 1.23      Glucose 88      Calcium 9.1      AST 14      ALT 15      Alkaline Phosphatase 47      Total Protein 6.9      Albumin 4.4      Total Bilirubin 0.31      eGFR 51      Narrative:     National Kidney Disease  Foundation guidelines for Chronic Kidney Disease (CKD):     Stage 1 with normal or high GFR (GFR > 90 mL/min/1.73 square meters)    Stage 2 Mild CKD (GFR = 60-89 mL/min/1.73 square meters)    Stage 3A Moderate CKD (GFR = 45-59 mL/min/1.73 square meters)    Stage 3B Moderate CKD (GFR = 30-44 mL/min/1.73 square meters)    Stage 4 Severe CKD (GFR = 15-29 mL/min/1.73 square meters)    Stage 5 End Stage CKD (GFR <15 mL/min/1.73 square meters)  Note: GFR calculation is accurate only with a steady state creatinine   COVID-19/INFLUENZA A/B RAPID ANTIGEN (30 MIN.TAT) - Normal    SARS COV Rapid Antigen Negative      Influenza A Rapid Antigen Negative      Influenza B Rapid Antigen Negative      Narrative:     This test has been performed using the 24Symbolsidel Vilma 2 FLU+SARS Antigen test under the Emergency Use Authorization (EUA). This test has been validated by the  and verified by the performing laboratory. The Vilma uses lateral flow immunofluorescent sandwich assay to detect SARS-COV, Influenza A and Influenza B Antigen.     The Quidel Vilma 2 SARS Antigen test does not differentiate between SARS-CoV and SARS-CoV-2.     Negative results are presumptive and may be confirmed with a molecular assay, if necessary, for patient management. Negative results do not rule out SARS-CoV-2 or influenza infection and should not be used as the sole basis for treatment or patient management decisions. A negative test result may occur if the level of antigen in a sample is below the limit of detection of this test.     Positive results are indicative of the presence of viral antigens, but do not rule out bacterial infection or co-infection with other viruses.     All test results should be used as an adjunct to clinical observations and other information available to the provider.    FOR PEDIATRIC PATIENTS - copy/paste COVID Guidelines URL to browser: [email or web address]    PROTIME-INR - Normal    Protime 13.5      INR 0.98      Narrative:     INR Therapeutic Range    Indication                                             INR Range      Atrial Fibrillation                                               2.0-3.0  Hypercoagulable State                                    2.0.2.3  Left Ventricular Asist Device                            2.0-3.0  Mechanical Heart Valve                                  -    Aortic(with afib, MI, embolism, HF, LA enlargement,    and/or coagulopathy)                                     2.0-3.0 (2.5-3.5)     Mitral                                                             2.5-3.5  Prosthetic/Bioprosthetic Heart Valve               2.0-3.0  Venous thromboembolism (VTE: VT, PE        2.0-3.0   APTT - Normal    PTT 28     LIPASE - Normal    Lipase 50     UA W REFLEX TO MICROSCOPIC WITH REFLEX TO CULTURE - Normal    Color, UA Yellow      Clarity, UA Clear      Specific Gravity, UA 1.015      pH, UA 6.0      Leukocytes, UA Negative      Nitrite, UA Negative      Protein, UA Negative      Glucose, UA Negative      Ketones, UA Negative      Urobilinogen, UA 0.2      Bilirubin, UA Negative      Occult Blood, UA Negative     PHOSPHORUS - Normal    Phosphorus 3.9     MAGNESIUM - Normal    Magnesium 1.9       XR chest 1 view portable   ED Interpretation by Anders Selby Jr., DO (09/25 2127)   No definitive acute pulmonary pathology.      CT head without contrast   Final Interpretation by Crow Chang MD (09/25 2052)      No acute intracranial abnormality.                  Workstation performed: [id number]         CT abdomen pelvis with contrast   Final Interpretation by Rey Fuller MD (09/25 2107)      No acute findings in the abdomen or pelvis. No evidence of obstruction or other etiology to explain patient's symptoms.         Workstation performed: [id number]             Procedures    ED Medication and Procedure Management   Prior to Admission Medications    Prescriptions Last Dose Informant Patient Reported? Taking?   ARIPiprazole (ABILIFY) 10 mg tablet  Self Yes No   Sig: Take 10 mg by mouth daily at bedtime   Cyanocobalamin (B-12) 1000 MCG SUBL   No No   Sig: Place 1 tablet (1,000 mcg total) under the tongue in the morning   HYDROcodone-acetaminophen (NORCO) 5-325 mg per tablet   No No   Sig: Take 1 tablet by mouth 2 (two) times a day as needed for pain Max Daily Amount: 2 tablets   Probiotic Product (VSL#3) CAPS  Self Yes No   baclofen 20 mg tablet   No No   Sig: Take 1 tablet (20 mg total) by mouth 3 (three) times a day   buPROPion (WELLBUTRIN XL) 150 mg 24 hr tablet  Self Yes No   Sig: in the morning   buPROPion (WELLBUTRIN XL) 300 mg 24 hr tablet  Self Yes No   Sig: Take 300 mg by mouth daily    desvenlafaxine (PRISTIQ) 100 mg 24 hr tablet  Self Yes No   Sig: Take 100 mg by mouth daily at bedtime   famotidine (PEPCID) 20 mg tablet   No No   Sig: Take 1 tablet (20 mg total) by mouth 2 (two) times a day   gabapentin (NEURONTIN) 800 mg tablet   No No   Sig: Take 1 tablet (800 mg total) by mouth 3 (three) times a day   hydrOXYzine HCL (ATARAX) 25 mg tablet   No No   Sig: Take 1 tablet (25 mg total) by mouth every 6 (six) hours as needed for anxiety   lansoprazole (PREVACID) 30 mg capsule  Self Yes No   Sig: Take 30 mg by mouth 2 (two) times a day   levonorgestrel (MIRENA) 20 MCG/24HR IUD  Self Yes No   Si each by Intrauterine route once   levothyroxine 50 mcg tablet   No No   Sig: Take 1 tablet (50 mcg total) by mouth daily   lidocaine (XYLOCAINE) 5 % ointment   No No   Sig: Apply topically as needed for mild pain   lisinopril (ZESTRIL) 10 mg tablet   No No   Sig: Take 0.5 tablets (5 mg total) by mouth daily   Patient taking differently: Take 5 mg by mouth daily Take 5mg daily.   nabumetone (RELAFEN) 500 mg tablet   No No   Sig: Take 1 tablet (500 mg total) by mouth 2 (two) times a day   ondansetron (ZOFRAN) 4 mg tablet   No No   Sig: Take 1 tablet (4 mg total)  by mouth every 8 (eight) hours as needed for nausea or vomiting   topiramate (TOPAMAX) 100 mg tablet   Yes Yes   Sig: Take 100 mg by mouth daily   zolpidem (AMBIEN) 10 mg tablet  Self No No   Sig: Take 1 tablet (10 mg total) by mouth daily at bedtime as needed for sleep for up to 10 days      Facility-Administered Medications: None     Patient's Medications   Discharge Prescriptions    METOCLOPRAMIDE (REGLAN) 10 MG TABLET    Take 1 tablet (10 mg total) by mouth every 6 (six) hours for 10 doses       Start Date: 9/25/2024 End Date: 9/28/2024       Order Dose: 10 mg       Quantity: 10 tablet    Refills: 0     No discharge procedures on file.     Anders Selby Jr., DO  09/28/24 [id number]

## 2024-09-26 NOTE — DISCHARGE INSTRUCTIONS
Contact your psychiatry team for repeat evaluation tomorrow.  Return to the ER with any new, concerning, or worsening issues.      For increasing nausea use Reglan 1 pill every 6 hours as needed.  Contact your pain management doctor as well regarding your chronic neck issues.

## 2024-09-30 ENCOUNTER — TELEPHONE (OUTPATIENT)
Age: 49
End: 2024-09-30

## 2024-09-30 NOTE — TELEPHONE ENCOUNTER
Patient has been experiencing a lot of stress.  She has a lot of burning irritation in her stomach and she cannot eat.  She called her GI doctor but they suggested she call PCP for something to increase her appetite because she is already on medication for the acid in her stomach.  Please contact patient and advise.  Thank you.

## 2024-10-01 ENCOUNTER — TELEPHONE (OUTPATIENT)
Age: 49
End: 2024-10-01

## 2024-10-01 ENCOUNTER — TELEPHONE (OUTPATIENT)
Dept: FAMILY MEDICINE CLINIC | Facility: CLINIC | Age: 49
End: 2024-10-01

## 2024-10-01 DIAGNOSIS — G89.4 CHRONIC PAIN SYNDROME: ICD-10-CM

## 2024-10-01 NOTE — TELEPHONE ENCOUNTER
Pt contacted Call Center requested refill of their medication.        Doctor Name: CATA      Medication Name:     HYDROcodone-acetaminophen     Dosage of Med:   5-325 mg    Frequency of Med:   2 (two) times    Remaining Medication: patient has enough for a couple day      Pharmacy and Location:   Pharmacy  [facility] [address] [place] [address] - [address]  [address], [facility] [address]      Pt. Preferred Callback Phone Number:       Thank you.       PLEASE ADVISE PATIENTS:    REFILL REQUESTS WILL BE PROCESSED WITHIN 24-48 HOURS

## 2024-10-02 DIAGNOSIS — N18.31 STAGE 3A CHRONIC KIDNEY DISEASE (HCC): Primary | ICD-10-CM

## 2024-10-02 RX ORDER — HYDROCODONE BITARTRATE AND ACETAMINOPHEN 5; 325 MG/1; MG/1
1 TABLET ORAL 2 TIMES DAILY PRN
Qty: 60 TABLET | Refills: 0 | Status: SHIPPED | OUTPATIENT
Start: 2024-10-04 | End: 2024-11-03

## 2024-10-02 NOTE — TELEPHONE ENCOUNTER
Prescription was sent to her pharmacy with a do not fill date of 10/4/2024.  This will be the last refill authorization of this medication until she is seen for follow-up visit.  Looks like she is not scheduled to be seen again until 11/7/2024.  Please advise her that she should keep this in mind as she is taking this medication.  If she takes 2 pills a day every day, she will be cutting it extremely close.  May not be a bad idea to move the appointment up a week.

## 2024-10-07 NOTE — TELEPHONE ENCOUNTER
S/w pt and advised of rf. Pt states she p/u script on 10/4/24 30 day which would fill until 11/2/24. Pt states she has been taking Norcon BID routinely. RN reviewed avail @ [facility] to move pt to sooner appt. Pt needs first avail due to work sched. There is no first avail sooner then her original 11/7/24 date.     Would a short script be able to be written if needed w/f DNF 11/2/24-11/7/24?    Pls advise. Thank you!

## 2024-10-14 ENCOUNTER — OFFICE VISIT (OUTPATIENT)
Dept: OBGYN CLINIC | Facility: CLINIC | Age: 49
End: 2024-10-14
Payer: COMMERCIAL

## 2024-10-14 VITALS
DIASTOLIC BLOOD PRESSURE: 80 MMHG | SYSTOLIC BLOOD PRESSURE: 135 MMHG | BODY MASS INDEX: 19.99 KG/M2 | HEART RATE: 67 BPM | HEIGHT: 65 IN | WEIGHT: 120 LBS

## 2024-10-14 DIAGNOSIS — M18.0 OSTEOARTHRITIS OF CARPOMETACARPAL (CMC) JOINTS OF BOTH THUMBS, UNSPECIFIED OSTEOARTHRITIS TYPE: Primary | ICD-10-CM

## 2024-10-14 DIAGNOSIS — M79.642 BILATERAL HAND PAIN: ICD-10-CM

## 2024-10-14 DIAGNOSIS — M79.641 BILATERAL HAND PAIN: ICD-10-CM

## 2024-10-14 PROCEDURE — 20600 DRAIN/INJ JOINT/BURSA W/O US: CPT | Performed by: ORTHOPAEDIC SURGERY

## 2024-10-14 PROCEDURE — 99213 OFFICE O/P EST LOW 20 MIN: CPT | Performed by: ORTHOPAEDIC SURGERY

## 2024-10-14 RX ORDER — BETAMETHASONE SODIUM PHOSPHATE AND BETAMETHASONE ACETATE 3; 3 MG/ML; MG/ML
3 INJECTION, SUSPENSION INTRA-ARTICULAR; INTRALESIONAL; INTRAMUSCULAR; SOFT TISSUE
Status: COMPLETED | OUTPATIENT
Start: 2024-10-14 | End: 2024-10-14

## 2024-10-14 RX ORDER — BUPIVACAINE HYDROCHLORIDE 2.5 MG/ML
0.5 INJECTION, SOLUTION INFILTRATION; PERINEURAL
Status: COMPLETED | OUTPATIENT
Start: 2024-10-14 | End: 2024-10-14

## 2024-10-14 RX ADMIN — BETAMETHASONE SODIUM PHOSPHATE AND BETAMETHASONE ACETATE 3 MG: 3; 3 INJECTION, SUSPENSION INTRA-ARTICULAR; INTRALESIONAL; INTRAMUSCULAR; SOFT TISSUE at 08:00

## 2024-10-14 RX ADMIN — BUPIVACAINE HYDROCHLORIDE 0.5 ML: 2.5 INJECTION, SOLUTION INFILTRATION; PERINEURAL at 08:00
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
## 2024-10-24 ENCOUNTER — OFFICE VISIT (OUTPATIENT)
Age: 49
End: 2024-10-24
Payer: COMMERCIAL

## 2024-10-24 VITALS
DIASTOLIC BLOOD PRESSURE: 68 MMHG | WEIGHT: 120 LBS | HEIGHT: 65 IN | SYSTOLIC BLOOD PRESSURE: 103 MMHG | HEART RATE: 92 BPM | BODY MASS INDEX: 19.99 KG/M2

## 2024-10-24 DIAGNOSIS — M47.812 CERVICAL SPONDYLOSIS: ICD-10-CM

## 2024-10-24 DIAGNOSIS — F11.20 UNCOMPLICATED OPIOID DEPENDENCE (HCC): ICD-10-CM

## 2024-10-24 DIAGNOSIS — M79.18 MYOFASCIAL PAIN SYNDROME: ICD-10-CM

## 2024-10-24 DIAGNOSIS — G89.4 CHRONIC PAIN SYNDROME: Primary | ICD-10-CM

## 2024-10-24 DIAGNOSIS — F11.20 CONTINUOUS OPIOID DEPENDENCE (HCC): ICD-10-CM

## 2024-10-24 DIAGNOSIS — M54.2 NECK PAIN: ICD-10-CM

## 2024-10-24 DIAGNOSIS — Z79.891 LONG-TERM CURRENT USE OF OPIATE ANALGESIC: ICD-10-CM

## 2024-10-24 DIAGNOSIS — M54.6 CHRONIC BILATERAL THORACIC BACK PAIN: ICD-10-CM

## 2024-10-24 DIAGNOSIS — Z98.1 HISTORY OF FUSION OF CERVICAL SPINE: ICD-10-CM

## 2024-10-24 DIAGNOSIS — G89.29 CHRONIC BILATERAL THORACIC BACK PAIN: ICD-10-CM

## 2024-10-24 PROCEDURE — 99214 OFFICE O/P EST MOD 30 MIN: CPT | Performed by: NURSE PRACTITIONER

## 2024-10-24 RX ORDER — HYDROCODONE BITARTRATE AND ACETAMINOPHEN 5; 325 MG/1; MG/1
1 TABLET ORAL 2 TIMES DAILY PRN
Qty: 60 TABLET | Refills: 0 | Status: SHIPPED | OUTPATIENT
Start: 2024-11-01

## 2024-10-24 RX ORDER — LIDOCAINE 50 MG/G
OINTMENT TOPICAL AS NEEDED
Qty: 35.44 G | Refills: 5 | Status: SHIPPED | OUTPATIENT
Start: 2024-10-24

## 2024-10-24 RX ORDER — HYDROCODONE BITARTRATE AND ACETAMINOPHEN 5; 325 MG/1; MG/1
1 TABLET ORAL 2 TIMES DAILY PRN
Qty: 60 TABLET | Refills: 0 | Status: SHIPPED | OUTPATIENT
Start: 2024-11-29 | End: 2024-12-29

## 2024-10-24 RX ORDER — BACLOFEN 20 MG/1
20 TABLET ORAL 3 TIMES DAILY
Qty: 90 TABLET | Refills: 1 | Status: SHIPPED | OUTPATIENT
Start: 2024-10-24
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
## 2024-10-26 ENCOUNTER — APPOINTMENT (OUTPATIENT)
Dept: LAB | Facility: CLINIC | Age: 49
End: 2024-10-26
Payer: COMMERCIAL

## 2024-10-26 LAB
ANION GAP SERPL CALCULATED.3IONS-SCNC: 7 MMOL/L (ref 4–13)
BUN SERPL-MCNC: 26 MG/DL (ref 5–25)
CALCIUM SERPL-MCNC: 9.5 MG/DL (ref 8.4–10.2)
CHLORIDE SERPL-SCNC: 103 MMOL/L (ref 96–108)
CO2 SERPL-SCNC: 30 MMOL/L (ref 21–32)
CREAT SERPL-MCNC: 1.14 MG/DL (ref 0.6–1.3)
GFR SERPL CREATININE-BSD FRML MDRD: 56 ML/MIN/1.73SQ M
GLUCOSE P FAST SERPL-MCNC: 80 MG/DL (ref 65–99)
POTASSIUM SERPL-SCNC: 4.7 MMOL/L (ref 3.5–5.3)
SODIUM SERPL-SCNC: 140 MMOL/L (ref 135–147)

## 2024-10-27 LAB
6MAM UR QL CFM: NEGATIVE NG/ML
7AMINOCLONAZEPAM UR QL CFM: NEGATIVE NG/ML
A-OH ALPRAZ UR QL CFM: NEGATIVE NG/ML
AMPHET UR QL CFM: NEGATIVE NG/ML
AMPHET UR QL CFM: NEGATIVE NG/ML
BUPRENORPHINE UR QL CFM: NEGATIVE NG/ML
BUTALBITAL UR QL CFM: NEGATIVE NG/ML
BZE UR QL CFM: NEGATIVE NG/ML
CODEINE UR QL CFM: NEGATIVE NG/ML
DESIPRAMINE UR QL CFM: NEGATIVE NG/ML
DESIPRAMINE UR QL CFM: NEGATIVE NG/ML
EDDP UR QL CFM: NEGATIVE NG/ML
ETHYL GLUCURONIDE UR QL CFM: NEGATIVE NG/ML
ETHYL SULFATE UR QL SCN: NEGATIVE NG/ML
EUTYLONE UR QL: NEGATIVE NG/ML
FENTANYL UR QL CFM: NEGATIVE NG/ML
GLIADIN IGG SER IA-ACNC: NEGATIVE NG/ML
GLUCOSE 30M P 50 G LAC PO SERPL-MCNC: NEGATIVE NG/ML
HYDROCODONE UR QL CFM: ABNORMAL NG/ML
HYDROCODONE UR QL CFM: ABNORMAL NG/ML
HYDROMORPHONE UR QL CFM: ABNORMAL NG/ML
IMIPRAMINE UR QL CFM: NEGATIVE NG/ML
LORAZEPAM UR QL CFM: NEGATIVE NG/ML
MDMA UR QL CFM: NEGATIVE NG/ML
ME-PHENIDATE UR QL CFM: NEGATIVE NG/ML
MEPERIDINE UR QL CFM: NEGATIVE NG/ML
METHADONE UR QL CFM: NEGATIVE NG/ML
METHAMPHET UR QL CFM: NEGATIVE NG/ML
MORPHINE UR QL CFM: NEGATIVE NG/ML
MORPHINE UR QL CFM: NEGATIVE NG/ML
NORBUPRENORPHINE UR QL CFM: NEGATIVE NG/ML
NORDIAZEPAM UR QL CFM: ABNORMAL NG/ML
NORFENTANYL UR QL CFM: NEGATIVE NG/ML
NORHYDROCODONE UR QL CFM: ABNORMAL NG/ML
NORHYDROCODONE UR QL CFM: ABNORMAL NG/ML
NORMEPERIDINE UR QL CFM: NEGATIVE NG/ML
NOROXYCODONE UR QL CFM: NEGATIVE NG/ML
OLANZAPINE QUANTIFICATION: NEGATIVE NG/ML
OPC-3373 QUANTIFICATION: NEGATIVE NG/ML
OXAZEPAM UR QL CFM: ABNORMAL NG/ML
OXYCODONE UR QL CFM: NEGATIVE NG/ML
OXYMORPHONE UR QL CFM: NEGATIVE NG/ML
OXYMORPHONE UR QL CFM: NEGATIVE NG/ML
PARA-FLUOROFENTANYL QUANTIFICATION: NORMAL NG/ML
PCP UR QL CFM: NEGATIVE NG/ML
PHENOBARB UR QL CFM: NEGATIVE NG/ML
RESULT ALL_PRESCRIBED MEDS AND SPECIAL INSTRUCTIONS: NORMAL
SECOBARBITAL UR QL CFM: NEGATIVE NG/ML
SL AMB 5F-ADB-M7 METABOLITE QUANTIFICATION: NEGATIVE NG/ML
SL AMB 7-OH-MITRAGYNINE (KRATOM ALKALOID) QUANTIFICATION: NEGATIVE NG/ML
SL AMB AB-FUBINACA-M3 METABOLITE QUANTIFICATION: NEGATIVE NG/ML
SL AMB ACETYL FENTANYL QUANTIFICATION: NORMAL NG/ML
SL AMB ACETYL NORFENTANYL QUANTIFICATION: NORMAL NG/ML
SL AMB ACRYL FENTANYL QUANTIFICATION: NORMAL NG/ML
SL AMB CARFENTANIL QUANTIFICATION: NORMAL NG/ML
SL AMB CLOZAPINE QUANTIFICATION: NEGATIVE NG/ML
SL AMB CTHC (MARIJUANA METABOLITE) QUANTIFICATION: NEGATIVE NG/ML
SL AMB DEXTROMETHORPHAN QUANTIFICATION: NEGATIVE NG/ML
SL AMB DEXTRORPHAN (DEXTROMETHORPHAN METABOLITE) QUANT: NEGATIVE NG/ML
SL AMB DEXTRORPHAN (DEXTROMETHORPHAN METABOLITE) QUANT: NEGATIVE NG/ML
SL AMB HALOPERIDOL  QUANTIFICATION: NEGATIVE NG/ML
SL AMB HALOPERIDOL METABOLITE QUANTIFICATION: NEGATIVE NG/ML
SL AMB HYDROXYRISPERIDONE QUANTIFICATION: NEGATIVE NG/ML
SL AMB JWH018 METABOLITE QUANTIFICATION: NEGATIVE NG/ML
SL AMB JWH073 METABOLITE QUANTIFICATION: NEGATIVE NG/ML
SL AMB MDMB-FUBINACA-M1 METABOLITE QUANTIFICATION: NEGATIVE NG/ML
SL AMB METHYLONE QUANTIFICATION: NEGATIVE NG/ML
SL AMB N-DESMETHYL-TRAMADOL QUANTIFICATION: NEGATIVE NG/ML
SL AMB N-DESMETHYLCLOZAPINE QUANTIFICATION: NEGATIVE NG/ML
SL AMB NORQUETIAPINE QUANTIFICATION: NEGATIVE NG/ML
SL AMB PHENTERMINE QUANTIFICATION: NEGATIVE NG/ML
SL AMB QUETIAPINE QUANTIFICATION: NEGATIVE NG/ML
SL AMB RCS4 METABOLITE QUANTIFICATION: NEGATIVE NG/ML
SL AMB RISPERIDONE QUANTIFICATION: NEGATIVE NG/ML
SL AMB RITALINIC ACID QUANTIFICATION: NEGATIVE NG/ML
SPECIMEN DRAWN SERPL: NEGATIVE NG/ML
TAPENTADOL UR QL CFM: NEGATIVE NG/ML
TEMAZEPAM UR QL CFM: ABNORMAL NG/ML
TEMAZEPAM UR QL CFM: ABNORMAL NG/ML
TRAMADOL UR QL CFM: NEGATIVE NG/ML
URATE/CREAT 24H UR: NEGATIVE NG/ML

## 2024-10-28 ENCOUNTER — TELEPHONE (OUTPATIENT)
Age: 49
End: 2024-10-28

## 2024-10-28 NOTE — TELEPHONE ENCOUNTER
I called and spoke with Ericka regarding the following:    ----- Message from Slade Hood DO sent at 10/28/2024 10:42 AM EDT -----  Blood work reviewed, kidney function stable, potassium level is excellent.  Will be reviewed in detail at her next visit with Dr. Holder in early November      She is aware of lab results. She had no questions or concerns.

## 2024-11-01 NOTE — ADDENDUM NOTE
Addended byBora Harry on: 8/11/2022 04:03 PM     Modules accepted: Orders negative no gross abnormalities

## 2024-11-04 ENCOUNTER — OFFICE VISIT (OUTPATIENT)
Age: 49
End: 2024-11-04
Payer: COMMERCIAL

## 2024-11-04 VITALS
BODY MASS INDEX: 20.66 KG/M2 | HEART RATE: 72 BPM | WEIGHT: 124 LBS | DIASTOLIC BLOOD PRESSURE: 70 MMHG | HEIGHT: 65 IN | SYSTOLIC BLOOD PRESSURE: 108 MMHG | OXYGEN SATURATION: 97 % | TEMPERATURE: 97.3 F

## 2024-11-04 DIAGNOSIS — E87.6 HYPOKALEMIA: ICD-10-CM

## 2024-11-04 DIAGNOSIS — N18.31 STAGE 3A CHRONIC KIDNEY DISEASE (HCC): Primary | ICD-10-CM

## 2024-11-04 DIAGNOSIS — N18.30 BENIGN HYPERTENSION WITH CKD (CHRONIC KIDNEY DISEASE) STAGE III (HCC): ICD-10-CM

## 2024-11-04 DIAGNOSIS — I12.9 BENIGN HYPERTENSION WITH CKD (CHRONIC KIDNEY DISEASE) STAGE III (HCC): ICD-10-CM

## 2024-11-04 PROCEDURE — 99214 OFFICE O/P EST MOD 30 MIN: CPT | Performed by: INTERNAL MEDICINE

## 2024-11-04 RX ORDER — DIAZEPAM 10 MG/1
TABLET ORAL
COMMUNITY
Start: 2024-10-29

## 2024-11-04 RX ORDER — LAMOTRIGINE 25 MG/1
TABLET ORAL
COMMUNITY
Start: 2024-10-29 | End: 2024-11-13 | Stop reason: ALTCHOICE

## 2024-11-04 RX ORDER — DICYCLOMINE HCL 20 MG
TABLET ORAL
COMMUNITY
Start: 2024-10-08
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
## 2024-11-04 NOTE — PATIENT INSTRUCTIONS
Your kidney function is stable at 56%. You have stable stage 3 kidney disease attributed to previous NSAID use and severe kidney event back in 2021.  You are on kidney protective lisinopril. We discussed that your blood pressure is on the lower side of normal. If you get dizzy or lightheaded, would avoid taking lisinopril. We may consider stopping this medication or decreasing it to 2.5mg/day.   . Recommend to start increasing fluid intake 8-16 ounces of fluid per day. If you feel dehydrated you can drink 8-16 ounces of gatorade per day. Goal for hydration in general is 60 ounces of fluid per day.   Advise Ensure or other protein drink, even one every day can be helpful.

## 2024-11-11 ENCOUNTER — OFFICE VISIT (OUTPATIENT)
Dept: OBGYN CLINIC | Facility: CLINIC | Age: 49
End: 2024-11-11
Payer: COMMERCIAL

## 2024-11-11 VITALS
HEIGHT: 65 IN | WEIGHT: 124 LBS | DIASTOLIC BLOOD PRESSURE: 66 MMHG | HEART RATE: 83 BPM | SYSTOLIC BLOOD PRESSURE: 113 MMHG | BODY MASS INDEX: 20.66 KG/M2

## 2024-11-11 DIAGNOSIS — M77.02 MEDIAL EPICONDYLITIS OF LEFT ELBOW: Primary | ICD-10-CM

## 2024-11-11 PROCEDURE — 20605 DRAIN/INJ JOINT/BURSA W/O US: CPT | Performed by: ORTHOPAEDIC SURGERY

## 2024-11-11 PROCEDURE — 99213 OFFICE O/P EST LOW 20 MIN: CPT | Performed by: ORTHOPAEDIC SURGERY

## 2024-11-11 RX ORDER — BETAMETHASONE SODIUM PHOSPHATE AND BETAMETHASONE ACETATE 3; 3 MG/ML; MG/ML
6 INJECTION, SUSPENSION INTRA-ARTICULAR; INTRALESIONAL; INTRAMUSCULAR; SOFT TISSUE
Status: COMPLETED | OUTPATIENT
Start: 2024-11-11 | End: 2024-11-11

## 2024-11-11 RX ORDER — BUPIVACAINE HYDROCHLORIDE 2.5 MG/ML
1 INJECTION, SOLUTION INFILTRATION; PERINEURAL
Status: COMPLETED | OUTPATIENT
Start: 2024-11-11 | End: 2024-11-11

## 2024-11-11 RX ADMIN — BETAMETHASONE SODIUM PHOSPHATE AND BETAMETHASONE ACETATE 6 MG: 3; 3 INJECTION, SUSPENSION INTRA-ARTICULAR; INTRALESIONAL; INTRAMUSCULAR; SOFT TISSUE at 08:00

## 2024-11-11 RX ADMIN — BUPIVACAINE HYDROCHLORIDE 1 ML: 2.5 INJECTION, SOLUTION INFILTRATION; PERINEURAL at 08:00
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
## 2024-11-13 ENCOUNTER — OFFICE VISIT (OUTPATIENT)
Dept: FAMILY MEDICINE CLINIC | Facility: CLINIC | Age: 49
End: 2024-11-13
Payer: COMMERCIAL

## 2024-11-13 VITALS
BODY MASS INDEX: 20.12 KG/M2 | HEIGHT: 65 IN | SYSTOLIC BLOOD PRESSURE: 122 MMHG | WEIGHT: 120.8 LBS | HEART RATE: 95 BPM | DIASTOLIC BLOOD PRESSURE: 74 MMHG | TEMPERATURE: 97.1 F | OXYGEN SATURATION: 99 %

## 2024-11-13 DIAGNOSIS — E78.00 HYPERCHOLESTEROLEMIA: ICD-10-CM

## 2024-11-13 DIAGNOSIS — Z00.00 ANNUAL PHYSICAL EXAM: Primary | ICD-10-CM

## 2024-11-13 DIAGNOSIS — E21.3 HYPERPARATHYROIDISM (HCC): ICD-10-CM

## 2024-11-13 DIAGNOSIS — E03.9 ACQUIRED HYPOTHYROIDISM: ICD-10-CM

## 2024-11-13 DIAGNOSIS — Z23 ENCOUNTER FOR IMMUNIZATION: ICD-10-CM

## 2024-11-13 DIAGNOSIS — I10 ESSENTIAL HYPERTENSION: ICD-10-CM

## 2024-11-13 PROCEDURE — 99396 PREV VISIT EST AGE 40-64: CPT | Performed by: NURSE PRACTITIONER

## 2024-11-13 PROCEDURE — 99214 OFFICE O/P EST MOD 30 MIN: CPT | Performed by: NURSE PRACTITIONER

## 2024-11-13 RX ORDER — LAMOTRIGINE 100 MG/1
100 TABLET ORAL DAILY
COMMUNITY
Start: 2024-11-05

## 2024-11-13 NOTE — PROGRESS NOTES
Adult Annual Physical  Name: Ericka Castillo      : 1975      MRN: [id number]  Encounter Provider: FIONA Hawkins  Encounter Date: 2024   Encounter department: [facility]    Assessment & Plan  Annual physical exam         Hyperparathyroidism (HCC)    Orders:    DXA bone density spine hip and pelvis; Future    Encounter for immunization         Essential hypertension  Well controlled at this time.   Orders:    CBC and differential; Future    Comprehensive metabolic panel; Future    Acquired hypothyroidism  Stable on levothyroxine   Orders:    TSH, 3rd generation with Free T4 reflex; Future    Hypercholesterolemia  Have labs completed in the next 1 month.   Orders:    Lipid panel; Future    Immunizations and preventive care screenings were discussed with patient today. Appropriate education was printed on patient's after visit summary.    Counseling:  Dental Health: discussed importance of regular tooth brushing, flossing, and dental visits.  Exercise: the importance of regular exercise/physical activity was discussed. Recommend exercise 3-5 times per week for at least 30 minutes.          History of Present Illness     Adult Annual Physical:  Patient presents for annual physical.     Diet and Physical Activity:  - Diet/Nutrition: poor diet. tries to have lunch and dinner but sometimes not always two meals. followng with GI, had recent EGD.  - Exercise: no formal exercise.    Depression Screening:    - PHQ-9 Score: 12    General Health:  - Sleep: sleeps well and 7-8 hours of sleep on average.  - Hearing: normal hearing bilateral ears.  - Vision: wears contacts and goes for regular eye exams.  - Dental: regular dental visits and brushes teeth twice daily.    /GYN Health:    - Contraception: IUD placement.      Review of Systems   Constitutional:  Negative for chills and fever.   HENT:  Negative for ear pain and sore throat.    Eyes:  Negative for pain and  "visual disturbance.   Respiratory:  Negative for cough and shortness of breath.    Cardiovascular:  Negative for chest pain and palpitations.   Gastrointestinal:  Negative for abdominal pain, constipation, diarrhea, nausea and vomiting.   Genitourinary:  Negative for dysuria and hematuria.   Musculoskeletal:  Negative for arthralgias and back pain.   Skin:  Negative for color change and rash.   Neurological:  Negative for seizures and syncope.   All other systems reviewed and are negative.        Objective     /74   Pulse 95   Temp (!) 97.1 °F (36.2 °C) (Tympanic)   Ht 5' 5\" (1.651 m)   Wt 54.8 kg (120 lb 12.8 oz)   SpO2 99%   BMI 20.10 kg/m²     Physical Exam  Vitals and nursing note reviewed.   Constitutional:       General: She is not in acute distress.     Appearance: Normal appearance. She is well-developed and normal weight.   HENT:      Head: Normocephalic and atraumatic.      Right Ear: Tympanic membrane, ear canal and external ear normal. There is no impacted cerumen.      Left Ear: Tympanic membrane, ear canal and external ear normal. There is no impacted cerumen.      Nose: Nose normal. No congestion or rhinorrhea.      Mouth/Throat:      Mouth: Mucous membranes are moist.      Pharynx: Oropharynx is clear. No oropharyngeal exudate or posterior oropharyngeal erythema.   Eyes:      General: No scleral icterus.        Right eye: No discharge.         Left eye: No discharge.      Extraocular Movements: Extraocular movements intact.      Conjunctiva/sclera: Conjunctivae normal.      Pupils: Pupils are equal, round, and reactive to light.   Cardiovascular:      Rate and Rhythm: Normal rate and regular rhythm.      Pulses: Normal pulses.      Heart sounds: Normal heart sounds. No murmur heard.  Pulmonary:      Effort: Pulmonary effort is normal. No respiratory distress.      Breath sounds: Normal breath sounds. No wheezing or rales.   Abdominal:      General: Abdomen is flat. Bowel sounds are " normal. There is no distension.      Palpations: Abdomen is soft.      Tenderness: There is no abdominal tenderness. There is no guarding.   Musculoskeletal:         General: No swelling. Normal range of motion.      Cervical back: Normal range of motion and neck supple. No rigidity or tenderness.      Right lower leg: No edema.      Left lower leg: No edema.   Lymphadenopathy:      Cervical: No cervical adenopathy.   Skin:     General: Skin is warm and dry.      Capillary Refill: Capillary refill takes less than 2 seconds.      Findings: No bruising, erythema or lesion.   Neurological:      General: No focal deficit present.      Mental Status: She is alert and oriented to person, place, and time. Mental status is at baseline.      Motor: No weakness.      Coordination: Coordination normal.      Gait: Gait normal.   Psychiatric:         Mood and Affect: Mood normal.         Behavior: Behavior normal.         Thought Content: Thought content normal.         Judgment: Judgment normal.

## 2024-11-13 NOTE — ASSESSMENT & PLAN NOTE
Well controlled at this time.   Orders:    CBC and differential; Future    Comprehensive metabolic panel; Future

## 2024-11-29 ENCOUNTER — TELEPHONE (OUTPATIENT)
Age: 49
End: 2024-11-29

## 2024-11-29 DIAGNOSIS — G89.4 CHRONIC PAIN SYNDROME: ICD-10-CM

## 2024-11-29 RX ORDER — HYDROCODONE BITARTRATE AND ACETAMINOPHEN 5; 325 MG/1; MG/1
1 TABLET ORAL 2 TIMES DAILY PRN
Qty: 60 TABLET | Refills: 0 | Status: SHIPPED | OUTPATIENT
Start: 2024-11-29 | End: 2024-12-29

## 2024-11-29 RX ORDER — HYDROCODONE BITARTRATE AND ACETAMINOPHEN 5; 325 MG/1; MG/1
1 TABLET ORAL 2 TIMES DAILY PRN
Qty: 60 TABLET | Refills: 0 | Status: CANCELLED | OUTPATIENT
Start: 2024-11-29 | End: 2024-12-29

## 2024-11-29 NOTE — TELEPHONE ENCOUNTER
Caller: Miri at [facility]    Doctor: Kocher    Reason for call: [facility] is requiring a more detailed diagnosis aside form chronic pain in order to process this prescription. Would like a call back from clinical team to clarify.    Pleas assist.    Call back#: [phone]

## 2024-11-29 NOTE — TELEPHONE ENCOUNTER
Patient called requesting refill for hydrocodone-acetaminophen. During call, patient was informed that a refill would be forwarded with high priority for review. However, after call ended, it was noticed that the refill should already be at [facility] pharmacy. Prescription dated 10/24/24, with a fill date of 11/29/24, shows confirmed receipt. Patient was called back and a voicemail was left relaying this information.

## 2024-11-29 NOTE — TELEPHONE ENCOUNTER
Patient stated she called rx refill line stating she called the pharmacy back and they insist they never received the script sent in oct for a fill date of 11/29/24  Please call pharmacy and or patient to discuss further

## 2024-12-09 ENCOUNTER — OFFICE VISIT (OUTPATIENT)
Dept: OBGYN CLINIC | Facility: CLINIC | Age: 49
End: 2024-12-09
Payer: COMMERCIAL

## 2024-12-09 VITALS
HEART RATE: 81 BPM | HEIGHT: 65 IN | SYSTOLIC BLOOD PRESSURE: 144 MMHG | BODY MASS INDEX: 19.99 KG/M2 | DIASTOLIC BLOOD PRESSURE: 86 MMHG | WEIGHT: 120 LBS

## 2024-12-09 DIAGNOSIS — M18.0 OSTEOARTHRITIS OF CARPOMETACARPAL (CMC) JOINTS OF BOTH THUMBS, UNSPECIFIED OSTEOARTHRITIS TYPE: Primary | ICD-10-CM

## 2024-12-09 PROCEDURE — 99213 OFFICE O/P EST LOW 20 MIN: CPT | Performed by: ORTHOPAEDIC SURGERY

## 2024-12-09 PROCEDURE — 20600 DRAIN/INJ JOINT/BURSA W/O US: CPT | Performed by: ORTHOPAEDIC SURGERY

## 2024-12-09 RX ORDER — BUPIVACAINE HYDROCHLORIDE 2.5 MG/ML
0.5 INJECTION, SOLUTION INFILTRATION; PERINEURAL
Status: COMPLETED | OUTPATIENT
Start: 2024-12-09 | End: 2024-12-09

## 2024-12-09 RX ORDER — BETAMETHASONE SODIUM PHOSPHATE AND BETAMETHASONE ACETATE 3; 3 MG/ML; MG/ML
3 INJECTION, SUSPENSION INTRA-ARTICULAR; INTRALESIONAL; INTRAMUSCULAR; SOFT TISSUE
Status: COMPLETED | OUTPATIENT
Start: 2024-12-09 | End: 2024-12-09

## 2024-12-09 RX ADMIN — BETAMETHASONE SODIUM PHOSPHATE AND BETAMETHASONE ACETATE 3 MG: 3; 3 INJECTION, SUSPENSION INTRA-ARTICULAR; INTRALESIONAL; INTRAMUSCULAR; SOFT TISSUE at 08:15

## 2024-12-09 RX ADMIN — BUPIVACAINE HYDROCHLORIDE 0.5 ML: 2.5 INJECTION, SOLUTION INFILTRATION; PERINEURAL at 08:15

## 2024-12-09 NOTE — PROGRESS NOTES
Assessment/Plan:  Assessment & Plan   Diagnoses and all orders for this visit:    Osteoarthritis of carpometacarpal (CMC) joints of both thumbs, unspecified osteoarthritis type        Both thumbs were injected with Celestone and Marcaine.  She tolerated the procedures well.  Return back in 3 months for reevaluation    Subjective:   Patient ID: Ericka Castillo is a 49 y.o. female.    HPI    The patient has a history of bilateral CMC arthritis of her thumbs.  Her last injection was 2 months ago.  She desires to have another set today.  She denies any numbness or tingling.  She denies any fever or chills    The following portions of the patient's history were reviewed and updated as appropriate: allergies, current medications, past family history, past medical history, past social history, past surgical history, and problem list.    Review of Systems   Constitutional:  Negative for chills, fever and unexpected weight change.   HENT:  Negative for hearing loss, nosebleeds and sore throat.    Eyes:  Negative for pain, redness and visual disturbance.   Respiratory:  Negative for cough, shortness of breath and wheezing.    Cardiovascular:  Negative for chest pain, palpitations and leg swelling.   Gastrointestinal:  Negative for abdominal pain, nausea and vomiting.   Endocrine: Negative for polydipsia and polyuria.   Genitourinary:  Negative for dysuria and hematuria.   Musculoskeletal:  Positive for arthralgias, joint swelling and myalgias. Negative for back pain, gait problem, neck pain and neck stiffness.        As noted in HPI   Skin:  Negative for rash and wound.   Neurological:  Negative for dizziness, numbness and headaches.   Psychiatric/Behavioral:  Negative for decreased concentration and suicidal ideas. The patient is not nervous/anxious.        Objective:  Ortho Exam    Neck was soft and supple.  There is a negative axial compression test in her neck.  Bilateral upper extremities are neurovascularly intact.   "Fingers are pink and mobile.  Compartments are soft.  There is hypertrophy along the CMC joints.  There is grinding present.  Negative Finkelstein test.  Negative Tinel's    Small joint arthrocentesis: bilateral thumb CMC  [facility] [place] Protocol:  procedure performed by consultantConsent: Verbal consent obtained. Written consent not obtained.  Risks and benefits: risks, benefits and alternatives were discussed  Consent given by: patient  Time out: Immediately prior to procedure a \"time out\" was called to verify the correct patient, procedure, equipment, support staff and site/side marked as required.  Patient understanding: patient states understanding of the procedure being performed  Test results: test results available and properly labeled  Site marked: the operative site was marked  Radiology Images displayed and confirmed. If images not available, report reviewed: imaging studies available  Patient identity confirmed: verbally with patient  Supporting Documentation  Indications: pain   Procedure Details  Location: thumb - bilateral thumb CMC  Preparation: Patient was prepped and draped in the usual sterile fashion  Needle size: 25 G  Ultrasound guidance: no  Approach: dorsal    Medications (Right): 0.5 mL bupivacaine 0.25 %; 3 mg betamethasone acetate-betamethasone sodium phosphate 6 (3-3) mg/mLMedications (Left): 0.5 mL bupivacaine 0.25 %; 3 mg betamethasone acetate-betamethasone sodium phosphate 6 (3-3) mg/mL   Patient tolerance: patient tolerated the procedure well with no immediate complications  Dressing:  Sterile dressing applied                          "

## 2024-12-12 ENCOUNTER — PROCEDURE VISIT (OUTPATIENT)
Age: 49
End: 2024-12-12
Payer: COMMERCIAL

## 2024-12-12 ENCOUNTER — VBI (OUTPATIENT)
Dept: ADMINISTRATIVE | Facility: OTHER | Age: 49
End: 2024-12-12

## 2024-12-12 VITALS
BODY MASS INDEX: 19.99 KG/M2 | SYSTOLIC BLOOD PRESSURE: 128 MMHG | DIASTOLIC BLOOD PRESSURE: 79 MMHG | HEART RATE: 91 BPM | WEIGHT: 120 LBS | HEIGHT: 65 IN

## 2024-12-12 DIAGNOSIS — M54.81 BILATERAL OCCIPITAL NEURALGIA: ICD-10-CM

## 2024-12-12 DIAGNOSIS — G89.4 CHRONIC PAIN SYNDROME: ICD-10-CM

## 2024-12-12 DIAGNOSIS — E03.9 HYPOTHYROIDISM, UNSPECIFIED TYPE: ICD-10-CM

## 2024-12-12 DIAGNOSIS — M79.18 MYOFASCIAL PAIN SYNDROME: ICD-10-CM

## 2024-12-12 DIAGNOSIS — M54.2 NECK PAIN: Primary | ICD-10-CM

## 2024-12-12 PROCEDURE — 20553 NJX 1/MLT TRIGGER POINTS 3/>: CPT | Performed by: ANESTHESIOLOGY

## 2024-12-12 PROCEDURE — 76942 ECHO GUIDE FOR BIOPSY: CPT | Performed by: ANESTHESIOLOGY

## 2024-12-12 RX ORDER — TRIAMCINOLONE ACETONIDE 40 MG/ML
40 INJECTION, SUSPENSION INTRA-ARTICULAR; INTRAMUSCULAR
Status: COMPLETED | OUTPATIENT
Start: 2024-12-12 | End: 2024-12-12

## 2024-12-12 RX ORDER — BUPIVACAINE HYDROCHLORIDE 2.5 MG/ML
10 INJECTION, SOLUTION EPIDURAL; INFILTRATION; INTRACAUDAL
Status: COMPLETED | OUTPATIENT
Start: 2024-12-12 | End: 2024-12-12

## 2024-12-12 RX ADMIN — BUPIVACAINE HYDROCHLORIDE 10 ML: 2.5 INJECTION, SOLUTION EPIDURAL; INFILTRATION; INTRACAUDAL at 16:40

## 2024-12-12 RX ADMIN — TRIAMCINOLONE ACETONIDE 40 MG: 40 INJECTION, SUSPENSION INTRA-ARTICULAR; INTRAMUSCULAR at 16:40

## 2024-12-12 NOTE — PATIENT INSTRUCTIONS
Do not apply heat to any area that is numb. If you have discomfort or soreness at the injection site, you may apply ice today, 20 minutes on and 20 minutes off. Tomorrow you may use ice or warm, moist heat. Do not apply ice or heat directly to the skin.  If you experience severe shortness of breath, go to the Emergency Room.  You may have numbness for several hours from the local anesthetic. Please use caution and common sense, especially with weight-bearing activities.  You may have an increase or change in the discomfort for 36-48 hours after your treatment. Apply ice and continue with any pain medicine you have been prescribed.  Do not do anything strenuous today. You may shower, but no tub baths or hot tubs today. You may resume your normal activities tomorrow, but do not “overdo it”. Resume normal activities slowly when you are feeling better.  If you experience redness, drainage or swelling at the injection site, or if you develop a fever above 100 degrees, please call [facility] at [phone] or go to the Emergency Room.  Continue to take all routine medicines prescribed by your primary care physician unless otherwise instructed by our staff. Most blood thinners should be started again according to your regularly scheduled dosing. If you have any questions, please give our office a call.    As no general anesthesia was used in today's procedure, you should not experience any side effects related to anesthesia.       If you have a problem specifically related to your procedure, please call our office at [phone].  Problems not related to your procedure should be directed to your primary care physician.

## 2024-12-12 NOTE — PROGRESS NOTES
" Universal Protocol:  procedure performed by consultantConsent: Verbal consent obtained. Written consent obtained.  Risks and benefits: risks, benefits and alternatives were discussed  Consent given by: patient  Time out: Immediately prior to procedure a \"time out\" was called to verify the correct patient, procedure, equipment, support staff and site/side marked as required.  Timeout called at: 12/12/2024 4:40 PM.  Patient understanding: patient states understanding of the procedure being performed  Patient consent: the patient's understanding of the procedure matches consent given  Procedure consent: procedure consent matches procedure scheduled  Relevant documents: relevant documents present and verified  Test results: test results available and properly labeled  Site marked: the operative site was marked  Radiology Images displayed and confirmed. If images not available, report reviewed: imaging studies not available  Required items: required blood products, implants, devices, and special equipment available  Patient identity confirmed: verbally with patient  Supporting Documentation  Indications: pain   Trigger Point Injections: multiple trigger points: 3 or more muscle groups    Injection site identified by: ultrasound  Procedure Details  Location(s):    Neck/Upper Back: L cervical paraspinals, R cervical paraspinals, R occipital ridge, L occipital ridge, L rhomboid and R rhomboid     Prep: patient was prepped and draped in usual sterile fashion  Needle size: 22 G  Medications: 10 mL bupivacaine (PF) 0.25 %; 40 mg triamcinolone acetonide 40 mg/mL  Patient tolerance: patient tolerated the procedure well with no immediate complications          "

## 2024-12-12 NOTE — TELEPHONE ENCOUNTER
12/12/24 1:14 PM     Chart reviewed for Pap Smear (HPV) aka Cervical Cancer Screening was/were not submitted to the patient's insurance.     Berkley Vaca MA   [facility]

## 2024-12-13 RX ORDER — LEVOTHYROXINE SODIUM 50 UG/1
50 TABLET ORAL DAILY
Qty: 90 TABLET | Refills: 1 | Status: ON HOLD | OUTPATIENT
Start: 2024-12-13

## 2024-12-20 ENCOUNTER — HOSPITAL ENCOUNTER (OUTPATIENT)
Dept: RADIOLOGY | Facility: HOSPITAL | Age: 49
End: 2024-12-20
Payer: COMMERCIAL

## 2024-12-20 ENCOUNTER — TELEPHONE (OUTPATIENT)
Dept: RADIOLOGY | Facility: HOSPITAL | Age: 49
End: 2024-12-20

## 2024-12-20 VITALS
TEMPERATURE: 97.8 F | HEART RATE: 105 BPM | OXYGEN SATURATION: 100 % | RESPIRATION RATE: 18 BRPM | DIASTOLIC BLOOD PRESSURE: 61 MMHG | SYSTOLIC BLOOD PRESSURE: 128 MMHG

## 2024-12-20 DIAGNOSIS — M47.812 CERVICAL SPONDYLOSIS: ICD-10-CM

## 2024-12-20 DIAGNOSIS — Z98.1 HISTORY OF FUSION OF CERVICAL SPINE: ICD-10-CM

## 2024-12-20 DIAGNOSIS — G89.4 CHRONIC PAIN SYNDROME: ICD-10-CM

## 2024-12-20 DIAGNOSIS — M54.2 NECK PAIN: ICD-10-CM

## 2024-12-20 PROCEDURE — 64633 DESTROY CERV/THOR FACET JNT: CPT | Performed by: ANESTHESIOLOGY

## 2024-12-20 PROCEDURE — 64634 DESTROY C/TH FACET JNT ADDL: CPT | Performed by: ANESTHESIOLOGY

## 2024-12-20 RX ORDER — LIDOCAINE HYDROCHLORIDE 10 MG/ML
5 INJECTION, SOLUTION EPIDURAL; INFILTRATION; INTRACAUDAL; PERINEURAL ONCE
Status: COMPLETED | OUTPATIENT
Start: 2024-12-20 | End: 2024-12-20

## 2024-12-20 RX ADMIN — Medication 4 ML: at 09:30

## 2024-12-20 RX ADMIN — LIDOCAINE HYDROCHLORIDE 5 ML: 10 INJECTION, SOLUTION EPIDURAL; INFILTRATION; INTRACAUDAL; PERINEURAL at 09:24
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
## 2024-12-20 NOTE — TELEPHONE ENCOUNTER
Right C2-3 and C3-4 RFA at  with Dr Cosby 12/20    Next procedure is 1/7 for left side with 9:10 arrival

## 2024-12-20 NOTE — DISCHARGE INSTR - LAB

## 2024-12-21 ENCOUNTER — OFFICE VISIT (OUTPATIENT)
Dept: URGENT CARE | Facility: CLINIC | Age: 49
End: 2024-12-21
Payer: COMMERCIAL

## 2024-12-21 ENCOUNTER — APPOINTMENT (OUTPATIENT)
Dept: RADIOLOGY | Facility: CLINIC | Age: 49
End: 2024-12-21
Payer: COMMERCIAL

## 2024-12-21 VITALS
HEIGHT: 65 IN | HEART RATE: 116 BPM | WEIGHT: 126 LBS | RESPIRATION RATE: 24 BRPM | OXYGEN SATURATION: 95 % | BODY MASS INDEX: 20.99 KG/M2 | DIASTOLIC BLOOD PRESSURE: 56 MMHG | TEMPERATURE: 98.7 F | SYSTOLIC BLOOD PRESSURE: 102 MMHG

## 2024-12-21 DIAGNOSIS — R07.1 CHEST PAIN ON BREATHING: ICD-10-CM

## 2024-12-21 DIAGNOSIS — B37.31 YEAST VAGINITIS: ICD-10-CM

## 2024-12-21 DIAGNOSIS — J18.9 PNEUMONIA OF RIGHT UPPER LOBE DUE TO INFECTIOUS ORGANISM: Primary | ICD-10-CM

## 2024-12-21 PROCEDURE — 93005 ELECTROCARDIOGRAM TRACING: CPT | Performed by: STUDENT IN AN ORGANIZED HEALTH CARE EDUCATION/TRAINING PROGRAM

## 2024-12-21 PROCEDURE — 99214 OFFICE O/P EST MOD 30 MIN: CPT | Performed by: STUDENT IN AN ORGANIZED HEALTH CARE EDUCATION/TRAINING PROGRAM

## 2024-12-21 PROCEDURE — 71046 X-RAY EXAM CHEST 2 VIEWS: CPT

## 2024-12-21 RX ORDER — AZITHROMYCIN 250 MG/1
TABLET, FILM COATED ORAL
Qty: 6 TABLET | Refills: 0 | Status: SHIPPED | OUTPATIENT
Start: 2024-12-21 | End: 2024-12-23

## 2024-12-21 RX ORDER — FLUCONAZOLE 150 MG/1
150 TABLET ORAL ONCE
Qty: 1 TABLET | Refills: 0 | Status: SHIPPED | OUTPATIENT
Start: 2024-12-21 | End: 2024-12-23
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
## 2024-12-22 ENCOUNTER — APPOINTMENT (INPATIENT)
Dept: CT IMAGING | Facility: HOSPITAL | Age: 49
DRG: 871 | End: 2024-12-22
Payer: COMMERCIAL

## 2024-12-22 ENCOUNTER — APPOINTMENT (EMERGENCY)
Dept: RADIOLOGY | Facility: HOSPITAL | Age: 49
DRG: 871 | End: 2024-12-22
Payer: COMMERCIAL

## 2024-12-22 ENCOUNTER — HOSPITAL ENCOUNTER (INPATIENT)
Facility: HOSPITAL | Age: 49
LOS: 1 days | Discharge: HOME | DRG: 871 | End: 2024-12-23
Attending: EMERGENCY MEDICINE | Admitting: INTERNAL MEDICINE
Payer: COMMERCIAL

## 2024-12-22 DIAGNOSIS — R65.20 SEVERE SEPSIS (HCC): Primary | ICD-10-CM

## 2024-12-22 DIAGNOSIS — J18.9 COMMUNITY ACQUIRED PNEUMONIA OF BOTH LUNGS: ICD-10-CM

## 2024-12-22 DIAGNOSIS — J18.9 PNEUMONIA: ICD-10-CM

## 2024-12-22 DIAGNOSIS — A41.9 SEVERE SEPSIS (HCC): Primary | ICD-10-CM

## 2024-12-22 DIAGNOSIS — K86.89 PANCREATIC DUCT DILATED: ICD-10-CM

## 2024-12-22 PROBLEM — R73.9 HYPERGLYCEMIA: Status: ACTIVE | Noted: 2024-12-22

## 2024-12-22 LAB
2HR DELTA HS TROPONIN: -1 NG/L
ALBUMIN SERPL BCG-MCNC: 3.8 G/DL (ref 3.5–5)
ALP SERPL-CCNC: 66 U/L (ref 34–104)
ALT SERPL W P-5'-P-CCNC: 17 U/L (ref 7–52)
ANION GAP SERPL CALCULATED.3IONS-SCNC: 10 MMOL/L (ref 4–13)
APTT PPP: 40 SECONDS (ref 23–34)
AST SERPL W P-5'-P-CCNC: 14 U/L (ref 13–39)
ATRIAL RATE: 108 BPM
ATRIAL RATE: 113 BPM
BASOPHILS # BLD MANUAL: 0 THOUSAND/UL (ref 0–0.1)
BASOPHILS NFR MAR MANUAL: 0 % (ref 0–1)
BILIRUB SERPL-MCNC: 0.42 MG/DL (ref 0.2–1)
BILIRUB UR QL STRIP: NEGATIVE
BUN SERPL-MCNC: 22 MG/DL (ref 5–25)
CALCIUM SERPL-MCNC: 9.1 MG/DL (ref 8.4–10.2)
CARDIAC TROPONIN I PNL SERPL HS: 4 NG/L (ref ?–50)
CARDIAC TROPONIN I PNL SERPL HS: 5 NG/L (ref ?–50)
CHLORIDE SERPL-SCNC: 106 MMOL/L (ref 96–108)
CLARITY UR: CLEAR
CO2 SERPL-SCNC: 23 MMOL/L (ref 21–32)
COLOR UR: YELLOW
CREAT SERPL-MCNC: 1.21 MG/DL (ref 0.6–1.3)
DOHLE BOD BLD QL SMEAR: PRESENT
EOSINOPHIL # BLD MANUAL: 0.18 THOUSAND/UL (ref 0–0.4)
EOSINOPHIL NFR BLD MANUAL: 1 % (ref 0–6)
ERYTHROCYTE [DISTWIDTH] IN BLOOD BY AUTOMATED COUNT: 11.6 % (ref 11.6–15.1)
FLUAV AG UPPER RESP QL IA.RAPID: NEGATIVE
FLUBV AG UPPER RESP QL IA.RAPID: NEGATIVE
GFR SERPL CREATININE-BSD FRML MDRD: 51 ML/MIN/1.73SQ M
GLUCOSE SERPL-MCNC: 185 MG/DL (ref 65–140)
GLUCOSE UR STRIP-MCNC: NEGATIVE MG/DL
HCT VFR BLD AUTO: 35.1 % (ref 34.8–46.1)
HGB BLD-MCNC: 11.5 G/DL (ref 11.5–15.4)
HGB UR QL STRIP.AUTO: NEGATIVE
INR PPP: 1.07 (ref 0.85–1.19)
KETONES UR STRIP-MCNC: NEGATIVE MG/DL
L PNEUMO1 AG UR QL IA.RAPID: NEGATIVE
LACTATE SERPL-SCNC: 1.5 MMOL/L (ref 0.5–2)
LACTATE SERPL-SCNC: 2.5 MMOL/L (ref 0.5–2)
LEUKOCYTE ESTERASE UR QL STRIP: NEGATIVE
LG PLATELETS BLD QL SMEAR: PRESENT
LYMPHOCYTES # BLD AUTO: 1.79 THOUSAND/UL (ref 0.6–4.47)
LYMPHOCYTES # BLD AUTO: 10 % (ref 14–44)
MCH RBC QN AUTO: 34.4 PG (ref 26.8–34.3)
MCHC RBC AUTO-ENTMCNC: 32.8 G/DL (ref 31.4–37.4)
MCV RBC AUTO: 105 FL (ref 82–98)
MONOCYTES # BLD AUTO: 0.54 THOUSAND/UL (ref 0–1.22)
MONOCYTES NFR BLD: 3 % (ref 4–12)
NEUTROPHILS # BLD MANUAL: 15.37 THOUSAND/UL (ref 1.85–7.62)
NEUTS BAND NFR BLD MANUAL: 19 % (ref 0–8)
NEUTS SEG NFR BLD AUTO: 67 % (ref 43–75)
NITRITE UR QL STRIP: NEGATIVE
P AXIS: 36 DEGREES
P AXIS: 42 DEGREES
PH UR STRIP.AUTO: 6 [PH]
PLATELET # BLD AUTO: 293 THOUSANDS/UL (ref 149–390)
PLATELET BLD QL SMEAR: ADEQUATE
PMV BLD AUTO: 9.5 FL (ref 8.9–12.7)
POTASSIUM SERPL-SCNC: 3.6 MMOL/L (ref 3.5–5.3)
PR INTERVAL: 134 MS
PR INTERVAL: 138 MS
PROCALCITONIN SERPL-MCNC: 24.83 NG/ML
PROT SERPL-MCNC: 6.9 G/DL (ref 6.4–8.4)
PROT UR STRIP-MCNC: NEGATIVE MG/DL
PROTHROMBIN TIME: 14.5 SECONDS (ref 12.3–15)
QRS AXIS: 71 DEGREES
QRS AXIS: 72 DEGREES
QRSD INTERVAL: 72 MS
QRSD INTERVAL: 76 MS
QT INTERVAL: 304 MS
QT INTERVAL: 312 MS
QTC INTERVAL: 416 MS
QTC INTERVAL: 418 MS
RBC # BLD AUTO: 3.34 MILLION/UL (ref 3.81–5.12)
RBC MORPH BLD: NORMAL
S PNEUM AG UR QL: NEGATIVE
SARS-COV+SARS-COV-2 AG RESP QL IA.RAPID: NEGATIVE
SODIUM SERPL-SCNC: 139 MMOL/L (ref 135–147)
SP GR UR STRIP.AUTO: 1.01
T WAVE AXIS: -19 DEGREES
T WAVE AXIS: 15 DEGREES
UROBILINOGEN UR QL STRIP.AUTO: 0.2 E.U./DL
VENTRICULAR RATE: 108 BPM
VENTRICULAR RATE: 113 BPM
WBC # BLD AUTO: 17.87 THOUSAND/UL (ref 4.31–10.16)

## 2024-12-22 PROCEDURE — 81003 URINALYSIS AUTO W/O SCOPE: CPT | Performed by: INTERNAL MEDICINE

## 2024-12-22 PROCEDURE — 83605 ASSAY OF LACTIC ACID: CPT | Performed by: EMERGENCY MEDICINE

## 2024-12-22 PROCEDURE — 85730 THROMBOPLASTIN TIME PARTIAL: CPT | Performed by: EMERGENCY MEDICINE

## 2024-12-22 PROCEDURE — 96365 THER/PROPH/DIAG IV INF INIT: CPT

## 2024-12-22 PROCEDURE — 93005 ELECTROCARDIOGRAM TRACING: CPT

## 2024-12-22 PROCEDURE — 84145 PROCALCITONIN (PCT): CPT | Performed by: EMERGENCY MEDICINE

## 2024-12-22 PROCEDURE — 87811 SARS-COV-2 COVID19 W/OPTIC: CPT | Performed by: EMERGENCY MEDICINE

## 2024-12-22 PROCEDURE — 99223 1ST HOSP IP/OBS HIGH 75: CPT | Performed by: INTERNAL MEDICINE

## 2024-12-22 PROCEDURE — 85007 BL SMEAR W/DIFF WBC COUNT: CPT | Performed by: EMERGENCY MEDICINE

## 2024-12-22 PROCEDURE — 87449 NOS EACH ORGANISM AG IA: CPT | Performed by: INTERNAL MEDICINE

## 2024-12-22 PROCEDURE — 85027 COMPLETE CBC AUTOMATED: CPT | Performed by: EMERGENCY MEDICINE

## 2024-12-22 PROCEDURE — 87040 BLOOD CULTURE FOR BACTERIA: CPT | Performed by: EMERGENCY MEDICINE

## 2024-12-22 PROCEDURE — 80053 COMPREHEN METABOLIC PANEL: CPT | Performed by: EMERGENCY MEDICINE

## 2024-12-22 PROCEDURE — 83605 ASSAY OF LACTIC ACID: CPT | Performed by: INTERNAL MEDICINE

## 2024-12-22 PROCEDURE — 99285 EMERGENCY DEPT VISIT HI MDM: CPT

## 2024-12-22 PROCEDURE — 85610 PROTHROMBIN TIME: CPT | Performed by: EMERGENCY MEDICINE

## 2024-12-22 PROCEDURE — 84484 ASSAY OF TROPONIN QUANT: CPT | Performed by: EMERGENCY MEDICINE

## 2024-12-22 PROCEDURE — 99285 EMERGENCY DEPT VISIT HI MDM: CPT | Performed by: EMERGENCY MEDICINE

## 2024-12-22 PROCEDURE — 84484 ASSAY OF TROPONIN QUANT: CPT | Performed by: INTERNAL MEDICINE

## 2024-12-22 PROCEDURE — 94760 N-INVAS EAR/PLS OXIMETRY 1: CPT

## 2024-12-22 PROCEDURE — 94640 AIRWAY INHALATION TREATMENT: CPT

## 2024-12-22 PROCEDURE — 71045 X-RAY EXAM CHEST 1 VIEW: CPT

## 2024-12-22 PROCEDURE — 71260 CT THORAX DX C+: CPT

## 2024-12-22 PROCEDURE — 93010 ELECTROCARDIOGRAM REPORT: CPT | Performed by: INTERNAL MEDICINE

## 2024-12-22 PROCEDURE — 36415 COLL VENOUS BLD VENIPUNCTURE: CPT | Performed by: EMERGENCY MEDICINE

## 2024-12-22 PROCEDURE — 87804 INFLUENZA ASSAY W/OPTIC: CPT | Performed by: EMERGENCY MEDICINE

## 2024-12-22 RX ORDER — KETOROLAC TROMETHAMINE 30 MG/ML
15 INJECTION, SOLUTION INTRAMUSCULAR; INTRAVENOUS ONCE
Status: COMPLETED | OUTPATIENT
Start: 2024-12-22 | End: 2024-12-22

## 2024-12-22 RX ORDER — BACLOFEN 10 MG/1
20 TABLET ORAL 2 TIMES DAILY
Status: DISCONTINUED | OUTPATIENT
Start: 2024-12-22 | End: 2024-12-23 | Stop reason: HOSPADM

## 2024-12-22 RX ORDER — ALBUTEROL SULFATE 0.83 MG/ML
2.5 SOLUTION RESPIRATORY (INHALATION) EVERY 4 HOURS PRN
Status: DISCONTINUED | OUTPATIENT
Start: 2024-12-22 | End: 2024-12-22

## 2024-12-22 RX ORDER — BUPROPION HYDROCHLORIDE 150 MG/1
150 TABLET ORAL DAILY
Status: DISCONTINUED | OUTPATIENT
Start: 2024-12-23 | End: 2024-12-23 | Stop reason: HOSPADM

## 2024-12-22 RX ORDER — OLANZAPINE 10 MG/1
10 TABLET ORAL
Status: DISCONTINUED | OUTPATIENT
Start: 2024-12-22 | End: 2024-12-23 | Stop reason: HOSPADM

## 2024-12-22 RX ORDER — GABAPENTIN 400 MG/1
800 CAPSULE ORAL 3 TIMES DAILY
Status: DISCONTINUED | OUTPATIENT
Start: 2024-12-22 | End: 2024-12-23 | Stop reason: HOSPADM

## 2024-12-22 RX ORDER — ZOLPIDEM TARTRATE 5 MG/1
10 TABLET ORAL
Status: DISCONTINUED | OUTPATIENT
Start: 2024-12-22 | End: 2024-12-23 | Stop reason: HOSPADM

## 2024-12-22 RX ORDER — ALBUTEROL SULFATE 0.83 MG/ML
SOLUTION RESPIRATORY (INHALATION)
Status: COMPLETED
Start: 2024-12-22 | End: 2024-12-22

## 2024-12-22 RX ORDER — BUPROPION HYDROCHLORIDE 150 MG/1
300 TABLET ORAL DAILY
Status: DISCONTINUED | OUTPATIENT
Start: 2024-12-23 | End: 2024-12-23 | Stop reason: HOSPADM

## 2024-12-22 RX ORDER — ACETAMINOPHEN 325 MG/1
650 TABLET ORAL EVERY 6 HOURS PRN
Status: DISCONTINUED | OUTPATIENT
Start: 2024-12-22 | End: 2024-12-23 | Stop reason: HOSPADM

## 2024-12-22 RX ORDER — PANTOPRAZOLE SODIUM 40 MG/1
40 TABLET, DELAYED RELEASE ORAL
Status: DISCONTINUED | OUTPATIENT
Start: 2024-12-22 | End: 2024-12-23 | Stop reason: HOSPADM

## 2024-12-22 RX ORDER — LAMOTRIGINE 100 MG/1
100 TABLET ORAL DAILY
Status: DISCONTINUED | OUTPATIENT
Start: 2024-12-22 | End: 2024-12-23 | Stop reason: HOSPADM

## 2024-12-22 RX ORDER — DIAZEPAM 5 MG/1
10 TABLET ORAL DAILY PRN
Status: DISCONTINUED | OUTPATIENT
Start: 2024-12-22 | End: 2024-12-23 | Stop reason: HOSPADM

## 2024-12-22 RX ORDER — SODIUM CHLORIDE, SODIUM GLUCONATE, SODIUM ACETATE, POTASSIUM CHLORIDE, MAGNESIUM CHLORIDE, SODIUM PHOSPHATE, DIBASIC, AND POTASSIUM PHOSPHATE .53; .5; .37; .037; .03; .012; .00082 G/100ML; G/100ML; G/100ML; G/100ML; G/100ML; G/100ML; G/100ML
125 INJECTION, SOLUTION INTRAVENOUS CONTINUOUS
Status: DISCONTINUED | OUTPATIENT
Start: 2024-12-22 | End: 2024-12-23 | Stop reason: HOSPADM

## 2024-12-22 RX ORDER — OXYCODONE HYDROCHLORIDE 5 MG/1
5 TABLET ORAL EVERY 6 HOURS PRN
Refills: 0 | Status: DISCONTINUED | OUTPATIENT
Start: 2024-12-22 | End: 2024-12-22

## 2024-12-22 RX ORDER — OLANZAPINE 10 MG/1
10 TABLET ORAL
COMMUNITY
End: 2025-01-07

## 2024-12-22 RX ORDER — FAMOTIDINE 20 MG/1
20 TABLET, FILM COATED ORAL
Status: DISCONTINUED | OUTPATIENT
Start: 2024-12-22 | End: 2024-12-23 | Stop reason: HOSPADM

## 2024-12-22 RX ORDER — GUAIFENESIN 600 MG/1
600 TABLET, EXTENDED RELEASE ORAL EVERY 12 HOURS SCHEDULED
Status: DISCONTINUED | OUTPATIENT
Start: 2024-12-22 | End: 2024-12-23 | Stop reason: HOSPADM

## 2024-12-22 RX ORDER — ONDANSETRON 2 MG/ML
4 INJECTION INTRAMUSCULAR; INTRAVENOUS EVERY 6 HOURS PRN
Status: DISCONTINUED | OUTPATIENT
Start: 2024-12-22 | End: 2024-12-23 | Stop reason: HOSPADM

## 2024-12-22 RX ORDER — OXYCODONE HYDROCHLORIDE 10 MG/1
10 TABLET ORAL EVERY 6 HOURS PRN
Refills: 0 | Status: DISCONTINUED | OUTPATIENT
Start: 2024-12-22 | End: 2024-12-23 | Stop reason: HOSPADM

## 2024-12-22 RX ORDER — OXYCODONE HYDROCHLORIDE 5 MG/1
5 TABLET ORAL EVERY 6 HOURS PRN
Refills: 0 | Status: DISCONTINUED | OUTPATIENT
Start: 2024-12-22 | End: 2024-12-23 | Stop reason: HOSPADM

## 2024-12-22 RX ORDER — CEFTRIAXONE 2 G/50ML
2000 INJECTION, SOLUTION INTRAVENOUS EVERY 24 HOURS
Status: DISCONTINUED | OUTPATIENT
Start: 2024-12-23 | End: 2024-12-23 | Stop reason: HOSPADM

## 2024-12-22 RX ORDER — CEFTRIAXONE 1 G/50ML
1000 INJECTION, SOLUTION INTRAVENOUS ONCE
Status: COMPLETED | OUTPATIENT
Start: 2024-12-22 | End: 2024-12-22

## 2024-12-22 RX ORDER — LEVOTHYROXINE SODIUM 50 UG/1
50 TABLET ORAL
Status: DISCONTINUED | OUTPATIENT
Start: 2024-12-22 | End: 2024-12-23 | Stop reason: HOSPADM

## 2024-12-22 RX ADMIN — SODIUM CHLORIDE, SODIUM GLUCONATE, SODIUM ACETATE, POTASSIUM CHLORIDE, MAGNESIUM CHLORIDE, SODIUM PHOSPHATE, DIBASIC, AND POTASSIUM PHOSPHATE 125 ML/HR: .53; .5; .37; .037; .03; .012; .00082 INJECTION, SOLUTION INTRAVENOUS at 10:19

## 2024-12-22 RX ADMIN — BACLOFEN 20 MG: 10 TABLET ORAL at 17:59

## 2024-12-22 RX ADMIN — KETOROLAC TROMETHAMINE 15 MG: 30 INJECTION, SOLUTION INTRAMUSCULAR at 12:23

## 2024-12-22 RX ADMIN — GABAPENTIN 800 MG: 400 CAPSULE ORAL at 20:29

## 2024-12-22 RX ADMIN — ZOLPIDEM TARTRATE 10 MG: 5 TABLET ORAL at 21:21

## 2024-12-22 RX ADMIN — BACLOFEN 20 MG: 10 TABLET ORAL at 10:18

## 2024-12-22 RX ADMIN — OXYCODONE HYDROCHLORIDE 5 MG: 5 TABLET ORAL at 10:27

## 2024-12-22 RX ADMIN — CEFTRIAXONE 1000 MG: 1 INJECTION, SOLUTION INTRAVENOUS at 07:31

## 2024-12-22 RX ADMIN — SODIUM CHLORIDE, SODIUM GLUCONATE, SODIUM ACETATE, POTASSIUM CHLORIDE, MAGNESIUM CHLORIDE, SODIUM PHOSPHATE, DIBASIC, AND POTASSIUM PHOSPHATE 125 ML/HR: .53; .5; .37; .037; .03; .012; .00082 INJECTION, SOLUTION INTRAVENOUS at 19:17

## 2024-12-22 RX ADMIN — MORPHINE SULFATE 2 MG: 2 INJECTION, SOLUTION INTRAMUSCULAR; INTRAVENOUS at 19:23

## 2024-12-22 RX ADMIN — LEVOTHYROXINE SODIUM 50 MCG: 50 TABLET ORAL at 21:21

## 2024-12-22 RX ADMIN — LAMOTRIGINE 100 MG: 100 TABLET ORAL at 21:20

## 2024-12-22 RX ADMIN — OXYCODONE HYDROCHLORIDE 10 MG: 10 TABLET ORAL at 15:53

## 2024-12-22 RX ADMIN — AZITHROMYCIN MONOHYDRATE 500 MG: 500 INJECTION, POWDER, LYOPHILIZED, FOR SOLUTION INTRAVENOUS at 08:16

## 2024-12-22 RX ADMIN — SODIUM CHLORIDE 1000 ML: 0.9 INJECTION, SOLUTION INTRAVENOUS at 07:28

## 2024-12-22 RX ADMIN — GABAPENTIN 800 MG: 400 CAPSULE ORAL at 15:53

## 2024-12-22 RX ADMIN — SODIUM CHLORIDE 1000 ML: 0.9 INJECTION, SOLUTION INTRAVENOUS at 08:14

## 2024-12-22 RX ADMIN — GUAIFENESIN 600 MG: 600 TABLET ORAL at 20:29

## 2024-12-22 RX ADMIN — GUAIFENESIN 600 MG: 600 TABLET ORAL at 12:22

## 2024-12-22 RX ADMIN — IOHEXOL 85 ML: 350 INJECTION, SOLUTION INTRAVENOUS at 08:38

## 2024-12-22 RX ADMIN — DIAZEPAM 10 MG: 5 TABLET ORAL at 21:21

## 2024-12-22 RX ADMIN — ALBUTEROL SULFATE 2.5 MG: 2.5 SOLUTION RESPIRATORY (INHALATION) at 14:31

## 2024-12-22 RX ADMIN — ALBUTEROL SULFATE 2.5 MG: 0.83 SOLUTION RESPIRATORY (INHALATION) at 14:31

## 2024-12-22 RX ADMIN — OLANZAPINE 10 MG: 10 TABLET, FILM COATED ORAL at 21:21

## 2024-12-22 RX ADMIN — FAMOTIDINE 20 MG: 20 TABLET, FILM COATED ORAL at 21:20

## 2024-12-22 NOTE — ASSESSMENT & PLAN NOTE
Lab Results   Component Value Date    EGFR 51 12/22/2024    EGFR 56 10/26/2024    EGFR 51 09/25/2024    CREATININE 1.21 12/22/2024    CREATININE 1.14 10/26/2024    CREATININE 1.23 09/25/2024     Creatinine appears to be around baseline.  Will monitor BMP

## 2024-12-22 NOTE — ASSESSMENT & PLAN NOTE
CT imaging shows dilated pancreatic duct  Reviewed finding with patient.  She understands need for follow-up.  Follow-up with PCP as outpatient for MRCP  Ambulatory referral to GI  Incidental finding report completed
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
## 2024-12-22 NOTE — NURSING NOTE
Pt is currently sleeping in bed at present time with HOB elevated at 45 degrees.  Call bell is within reach and bed is in the lowest position.  Earlier was verbalizing 9/10 pain in chest with inspiration.  Provider aware and new orders noted.  Administered Ketorolac 15mg IV.  Will monitor.

## 2024-12-22 NOTE — ASSESSMENT & PLAN NOTE
Multifocal pneumonia noted on CT imaging, patient with tachypnea, leukocytosis, and tachycardia on admission  Continue IV antibiotics with ceftriaxone  Check sputum culture  Strep pneumo/urine Legionella  Procalcitonin elevated, will continue to trend  Lactate elevated, will follow-up repeat  IV fluids per protocol

## 2024-12-22 NOTE — PLAN OF CARE
Problem: Potential for Falls  Goal: Patient will remain free of falls  Description: INTERVENTIONS:  - Educate patient/family on patient safety including physical limitations  - Instruct patient to call for assistance with activity   - Consult OT/PT to assist with strengthening/mobility   - Keep Call bell within reach  - Keep bed low and locked with side rails adjusted as appropriate  - Keep care items and personal belongings within reach  - Initiate and maintain comfort rounds  - Make Fall Risk Sign visible to staff  - Offer Toileting every 2 Hours, in advance of need  - Initiate/Maintain bed alarm  - Obtain necessary fall risk management equipment: non skid socks  - Apply yellow socks and bracelet for high fall risk patients  - Consider moving patient to room near nurses station  Outcome: Progressing     Problem: PAIN - ADULT  Goal: Verbalizes/displays adequate comfort level or baseline comfort level  Description: Interventions:  - Encourage patient to monitor pain and request assistance  - Assess pain using appropriate pain scale  - Administer analgesics based on type and severity of pain and evaluate response  - Implement non-pharmacological measures as appropriate and evaluate response  - Consider cultural and social influences on pain and pain management  - Notify physician/advanced practitioner if interventions unsuccessful or patient reports new pain  Outcome: Progressing     Problem: INFECTION - ADULT  Goal: Absence or prevention of progression during hospitalization  Description: INTERVENTIONS:  - Assess and monitor for signs and symptoms of infection  - Monitor lab/diagnostic results  - Monitor all insertion sites, i.e. indwelling lines, tubes, and drains  - Monitor endotracheal if appropriate and nasal secretions for changes in amount and color  - [place] appropriate cooling/warming therapies per order  - Administer medications as ordered  - Instruct and encourage patient and family to use good hand  hygiene technique  - Identify and instruct in appropriate isolation precautions for identified infection/condition  Outcome: Progressing  Goal: Absence of fever/infection during neutropenic period  Description: INTERVENTIONS:  - Monitor WBC    Outcome: Progressing     Problem: SAFETY ADULT  Goal: Patient will remain free of falls  Description: INTERVENTIONS:  - Educate patient/family on patient safety including physical limitations  - Instruct patient to call for assistance with activity   - Consult OT/PT to assist with strengthening/mobility   - Keep Call bell within reach  - Keep bed low and locked with side rails adjusted as appropriate  - Keep care items and personal belongings within reach  - Initiate and maintain comfort rounds  - Make Fall Risk Sign visible to staff  - Offer Toileting every 2 Hours, in advance of need  - Initiate/Maintain bed alarm  - Obtain necessary fall risk management equipment: non skid socks  - Apply yellow socks and bracelet for high fall risk patients  - Consider moving patient to room near nurses station  Outcome: Progressing  Goal: Maintain or return to baseline ADL function  Description: INTERVENTIONS:  -  Assess patient's ability to carry out ADLs; assess patient's baseline for ADL function and identify physical deficits which impact ability to perform ADLs (bathing, care of mouth/teeth, toileting, grooming, dressing, etc.)  - Assess/evaluate cause of self-care deficits   - Assess range of motion  - Assess patient's mobility; develop plan if impaired  - Assess patient's need for assistive devices and provide as appropriate  - Encourage maximum independence but intervene and supervise when necessary  - Involve family in performance of ADLs  - Assess for home care needs following discharge   - Consider OT consult to assist with ADL evaluation and planning for discharge  - Provide patient education as appropriate  Outcome: Progressing  Goal: Maintains/Returns to pre admission  functional level  Description: INTERVENTIONS:  - Perform AM-PAC 6 Click Basic Mobility/ Daily Activity assessment daily.  - Set and communicate daily mobility goal to care team and patient/family/caregiver.   - Collaborate with rehabilitation services on mobility goals if consulted  - Perform Range of Motion 2 times a day.  - Reposition patient every 2 hours.  - Dangle patient 2 times a day  - Stand patient 2 times a day  - Ambulate patient 2 times a day  - Out of bed to chair 2 times a day   - Out of bed for meals 3 times a day  - Out of bed for toileting  - Record patient progress and toleration of activity level   Outcome: Progressing     Problem: DISCHARGE PLANNING  Goal: Discharge to home or other facility with appropriate resources  Description: INTERVENTIONS:  - Identify barriers to discharge w/patient and caregiver  - Arrange for needed discharge resources and transportation as appropriate  - Identify discharge learning needs (meds, wound care, etc.)  - Refer to Case Management Department for coordinating discharge planning if the patient needs post-hospital services based on physician/advanced practitioner order or complex needs related to functional status, cognitive ability, or social support system  Outcome: Progressing     Problem: Knowledge Deficit  Goal: Patient/family/caregiver demonstrates understanding of disease process, treatment plan, medications, and discharge instructions  Description: Complete learning assessment and assess knowledge base.  Interventions:  - Provide teaching at level of understanding  - Provide teaching via preferred learning methods  Outcome: Progressing     Problem: CARDIOVASCULAR - ADULT  Goal: Maintains optimal cardiac output and hemodynamic stability  Description: INTERVENTIONS:  - Monitor I/O, vital signs and rhythm  - Monitor for S/S and trends of decreased cardiac output  - Administer and titrate ordered vasoactive medications to optimize hemodynamic stability  -  Assess quality of pulses, skin color and temperature  - Assess for signs of decreased coronary artery perfusion  - Instruct patient to report change in severity of symptoms  Outcome: Progressing  Goal: Absence of cardiac dysrhythmias or at baseline rhythm  Description: INTERVENTIONS:  - Continuous cardiac monitoring, vital signs, obtain 12 lead EKG if ordered  - Administer antiarrhythmic and heart rate control medications as ordered  - Monitor electrolytes and administer replacement therapy as ordered  Outcome: Progressing     Problem: RESPIRATORY - ADULT  Goal: Achieves optimal ventilation and oxygenation  Description: INTERVENTIONS:  - Assess for changes in respiratory status  - Assess for changes in mentation and behavior  - Position to facilitate oxygenation and minimize respiratory effort  - Oxygen administered by appropriate delivery if ordered  - Initiate smoking cessation education as indicated  - Encourage broncho-pulmonary hygiene including cough, deep breathe, Incentive Spirometry  - Assess the need for suctioning and aspirate as needed  - Assess and instruct to report SOB or any respiratory difficulty  - Respiratory Therapy support as indicated  Outcome: Progressing

## 2024-12-22 NOTE — Clinical Note
accompanied Ericka Castillo to the emergency department on 12/22/2024.    Return date if applicable:         If you have any questions or concerns, please don't hesitate to call.      Martin García MD
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
## 2024-12-22 NOTE — INCIDENTAL FINDINGS
The following findings require follow up:  Radiographic finding   Finding: Redemonstrated nonspecific pancreatic duct dilation at the level of the pancreatic head    Follow up required: MRCP, GI follow-up   Follow up should be done within 1 month(s)    Please notify the following clinician to assist with the follow up:   Dr. Bloom or Ruth Valdez    Incidental finding results were discussed with the Patient by Renetta Chaparro MD on 12/22/24.   They expressed understanding and all questions answered.

## 2024-12-22 NOTE — Clinical Note
Nadia Castillo accompanied Ericka LORRIE JonesJonathan to the emergency department on 12/22/2024.    Return date if applicable: 12/23/2024        If you have any questions or concerns, please don't hesitate to call.      Monica Alfaro RN

## 2024-12-22 NOTE — Clinical Note
Ericka Castillo was seen and treated in our emergency department on 12/22/2024.                Diagnosis:     Ericka ANDREW  .    She may return on this date:          If you have any questions or concerns, please don't hesitate to call.      Martin García MD    ______________________________           _______________          _______________  Hospital Representative                              Date                                Time

## 2024-12-23 VITALS
SYSTOLIC BLOOD PRESSURE: 119 MMHG | BODY MASS INDEX: 19.36 KG/M2 | WEIGHT: 116.18 LBS | HEIGHT: 65 IN | RESPIRATION RATE: 17 BRPM | DIASTOLIC BLOOD PRESSURE: 80 MMHG | OXYGEN SATURATION: 94 % | TEMPERATURE: 98.1 F | HEART RATE: 98 BPM

## 2024-12-23 PROBLEM — J18.9 COMMUNITY ACQUIRED PNEUMONIA OF BOTH LUNGS: Status: ACTIVE | Noted: 2024-12-23

## 2024-12-23 LAB
ALBUMIN SERPL BCG-MCNC: 3.6 G/DL (ref 3.5–5)
ALP SERPL-CCNC: 65 U/L (ref 34–104)
ALT SERPL W P-5'-P-CCNC: 14 U/L (ref 7–52)
ANION GAP SERPL CALCULATED.3IONS-SCNC: 10 MMOL/L (ref 4–13)
AST SERPL W P-5'-P-CCNC: 12 U/L (ref 13–39)
ATRIAL RATE: 115 BPM
BASOPHILS # BLD AUTO: 0.03 THOUSANDS/ÂΜL (ref 0–0.1)
BASOPHILS NFR BLD AUTO: 0 % (ref 0–1)
BILIRUB SERPL-MCNC: 0.32 MG/DL (ref 0.2–1)
BUN SERPL-MCNC: 13 MG/DL (ref 5–25)
CALCIUM SERPL-MCNC: 8.9 MG/DL (ref 8.4–10.2)
CHLORIDE SERPL-SCNC: 109 MMOL/L (ref 96–108)
CO2 SERPL-SCNC: 23 MMOL/L (ref 21–32)
CREAT SERPL-MCNC: 1.08 MG/DL (ref 0.6–1.3)
EOSINOPHIL # BLD AUTO: 0.28 THOUSAND/ÂΜL (ref 0–0.61)
EOSINOPHIL NFR BLD AUTO: 2 % (ref 0–6)
ERYTHROCYTE [DISTWIDTH] IN BLOOD BY AUTOMATED COUNT: 11.9 % (ref 11.6–15.1)
EST. AVERAGE GLUCOSE BLD GHB EST-MCNC: 100 MG/DL
GFR SERPL CREATININE-BSD FRML MDRD: 59 ML/MIN/1.73SQ M
GLUCOSE SERPL-MCNC: 119 MG/DL (ref 65–140)
HBA1C MFR BLD: 5.1 %
HCT VFR BLD AUTO: 33.1 % (ref 34.8–46.1)
HGB BLD-MCNC: 10.6 G/DL (ref 11.5–15.4)
IMM GRANULOCYTES # BLD AUTO: 0.07 THOUSAND/UL (ref 0–0.2)
IMM GRANULOCYTES NFR BLD AUTO: 1 % (ref 0–2)
LYMPHOCYTES # BLD AUTO: 1.23 THOUSANDS/ÂΜL (ref 0.6–4.47)
LYMPHOCYTES NFR BLD AUTO: 9 % (ref 14–44)
MCH RBC QN AUTO: 33.9 PG (ref 26.8–34.3)
MCHC RBC AUTO-ENTMCNC: 32 G/DL (ref 31.4–37.4)
MCV RBC AUTO: 106 FL (ref 82–98)
MONOCYTES # BLD AUTO: 0.53 THOUSAND/ÂΜL (ref 0.17–1.22)
MONOCYTES NFR BLD AUTO: 4 % (ref 4–12)
NEUTROPHILS # BLD AUTO: 12.29 THOUSANDS/ÂΜL (ref 1.85–7.62)
NEUTS SEG NFR BLD AUTO: 84 % (ref 43–75)
NRBC BLD AUTO-RTO: 0 /100 WBCS
P AXIS: 41 DEGREES
PLATELET # BLD AUTO: 284 THOUSANDS/UL (ref 149–390)
PMV BLD AUTO: 9.3 FL (ref 8.9–12.7)
POTASSIUM SERPL-SCNC: 3.7 MMOL/L (ref 3.5–5.3)
PR INTERVAL: 118 MS
PROCALCITONIN SERPL-MCNC: 14.6 NG/ML
PROT SERPL-MCNC: 6.8 G/DL (ref 6.4–8.4)
QRS AXIS: 58 DEGREES
QRSD INTERVAL: 62 MS
QT INTERVAL: 384 MS
QTC INTERVAL: 532 MS
RBC # BLD AUTO: 3.13 MILLION/UL (ref 3.81–5.12)
SODIUM SERPL-SCNC: 142 MMOL/L (ref 135–147)
T WAVE AXIS: 42 DEGREES
VENTRICULAR RATE: 115 BPM
WBC # BLD AUTO: 14.43 THOUSAND/UL (ref 4.31–10.16)

## 2024-12-23 PROCEDURE — 85025 COMPLETE CBC W/AUTO DIFF WBC: CPT | Performed by: INTERNAL MEDICINE

## 2024-12-23 PROCEDURE — 99239 HOSP IP/OBS DSCHRG MGMT >30: CPT

## 2024-12-23 PROCEDURE — 93010 ELECTROCARDIOGRAM REPORT: CPT | Performed by: INTERNAL MEDICINE

## 2024-12-23 PROCEDURE — 83036 HEMOGLOBIN GLYCOSYLATED A1C: CPT | Performed by: INTERNAL MEDICINE

## 2024-12-23 PROCEDURE — 80053 COMPREHEN METABOLIC PANEL: CPT | Performed by: INTERNAL MEDICINE

## 2024-12-23 PROCEDURE — 99223 1ST HOSP IP/OBS HIGH 75: CPT | Performed by: INTERNAL MEDICINE

## 2024-12-23 PROCEDURE — 84145 PROCALCITONIN (PCT): CPT | Performed by: INTERNAL MEDICINE

## 2024-12-23 RX ORDER — GUAIFENESIN/DEXTROMETHORPHAN 100-10MG/5
10 SYRUP ORAL EVERY 4 HOURS PRN
Status: DISCONTINUED | OUTPATIENT
Start: 2024-12-23 | End: 2024-12-23 | Stop reason: HOSPADM

## 2024-12-23 RX ORDER — CEFDINIR 300 MG/1
300 CAPSULE ORAL EVERY 12 HOURS SCHEDULED
Qty: 20 CAPSULE | Refills: 0 | Status: SHIPPED | OUTPATIENT
Start: 2024-12-23 | End: 2024-12-27

## 2024-12-23 RX ORDER — AZITHROMYCIN 250 MG/1
250 TABLET, FILM COATED ORAL EVERY 24 HOURS
Qty: 4 TABLET | Refills: 0 | Status: SHIPPED | OUTPATIENT
Start: 2024-12-23 | End: 2024-12-27

## 2024-12-23 RX ADMIN — BUPROPION HYDROCHLORIDE 300 MG: 150 TABLET, EXTENDED RELEASE ORAL at 08:46

## 2024-12-23 RX ADMIN — OXYCODONE HYDROCHLORIDE 5 MG: 5 TABLET ORAL at 08:46

## 2024-12-23 RX ADMIN — GUAIFENESIN 600 MG: 600 TABLET ORAL at 08:46

## 2024-12-23 RX ADMIN — PANTOPRAZOLE SODIUM 40 MG: 40 TABLET, DELAYED RELEASE ORAL at 05:07

## 2024-12-23 RX ADMIN — BUPROPION HYDROCHLORIDE 150 MG: 150 TABLET, EXTENDED RELEASE ORAL at 08:46

## 2024-12-23 RX ADMIN — CEFTRIAXONE 2000 MG: 2 INJECTION, SOLUTION INTRAVENOUS at 08:47

## 2024-12-23 RX ADMIN — GUAIFENESIN AND DEXTROMETHORPHAN 10 ML: 20; 200 SYRUP ORAL at 09:55

## 2024-12-23 RX ADMIN — ONDANSETRON 4 MG: 2 INJECTION INTRAMUSCULAR; INTRAVENOUS at 05:12

## 2024-12-23 RX ADMIN — BACLOFEN 20 MG: 10 TABLET ORAL at 08:45

## 2024-12-23 RX ADMIN — GABAPENTIN 800 MG: 400 CAPSULE ORAL at 08:46

## 2024-12-23 RX ADMIN — SODIUM CHLORIDE, SODIUM GLUCONATE, SODIUM ACETATE, POTASSIUM CHLORIDE, MAGNESIUM CHLORIDE, SODIUM PHOSPHATE, DIBASIC, AND POTASSIUM PHOSPHATE 125 ML/HR: .53; .5; .37; .037; .03; .012; .00082 INJECTION, SOLUTION INTRAVENOUS at 03:21

## 2024-12-23 NOTE — PLAN OF CARE
Problem: SAFETY ADULT  Goal: Patient will remain free of falls  Description: INTERVENTIONS:  - Educate patient/family on patient safety including physical limitations  - Instruct patient to call for assistance with activity   - Consult OT/PT to assist with strengthening/mobility   - Keep Call bell within reach  - Keep bed low and locked with side rails adjusted as appropriate  - Keep care items and personal belongings within reach  - Initiate and maintain comfort rounds  - Make Fall Risk Sign visible to staff  - Offer Toileting every 2 Hours, in advance of need  - Initiate/Maintain bed alarm  - Obtain necessary fall risk management equipment: non skid socks  - Apply yellow socks and bracelet for high fall risk patients  - Consider moving patient to room near nurses station  Outcome: Progressing  Goal: Maintain or return to baseline ADL function  Description: INTERVENTIONS:  -  Assess patient's ability to carry out ADLs; assess patient's baseline for ADL function and identify physical deficits which impact ability to perform ADLs (bathing, care of mouth/teeth, toileting, grooming, dressing, etc.)  - Assess/evaluate cause of self-care deficits   - Assess range of motion  - Assess patient's mobility; develop plan if impaired  - Assess patient's need for assistive devices and provide as appropriate  - Encourage maximum independence but intervene and supervise when necessary  - Involve family in performance of ADLs  - Assess for home care needs following discharge   - Consider OT consult to assist with ADL evaluation and planning for discharge  - Provide patient education as appropriate  Outcome: Progressing  Goal: Maintains/Returns to pre admission functional level  Description: INTERVENTIONS:  - Perform AM-PAC 6 Click Basic Mobility/ Daily Activity assessment daily.  - Set and communicate daily mobility goal to care team and patient/family/caregiver.   - Collaborate with rehabilitation services on mobility goals if  consulted  - Perform Range of Motion 2 times a day.  - Reposition patient every 2 hours.  - Dangle patient 2 times a day  - Stand patient 2 times a day  - Ambulate patient 2 times a day  - Out of bed to chair 2 times a day   - Out of bed for meals 3 times a day  - Out of bed for toileting  - Record patient progress and toleration of activity level   Outcome: Progressing

## 2024-12-23 NOTE — NURSING NOTE
Discharge instructions reviewed with pt and mother.  Verbalized understanding. Left in stable condition with all belongings.
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
## 2024-12-23 NOTE — ASSESSMENT & PLAN NOTE
Lab Results   Component Value Date    EGFR 59 12/23/2024    EGFR 51 12/22/2024    EGFR 56 10/26/2024    CREATININE 1.08 12/23/2024    CREATININE 1.21 12/22/2024    CREATININE 1.14 10/26/2024     - creatinine at baseline, avoid nephrotoxins

## 2024-12-23 NOTE — ASSESSMENT & PLAN NOTE
Multifocal pneumonia noted on CT imaging, patient with tachypnea, leukocytosis, and tachycardia on admission  Recent Labs     12/22/24  0725 12/23/24  0504   WBC 17.87* 14.43*      sputum culture- negative   Strep pneumo/urine Legionella- negative   Procalcitonin down-trending   Lactate resolved with IV fluids   Blood cultures- pending   Discussed with pulm- transition to PO antibiotics- patient is stable for discharge

## 2024-12-23 NOTE — PLAN OF CARE
Problem: Potential for Falls  Goal: Patient will remain free of falls  Description: INTERVENTIONS:  - Educate patient/family on patient safety including physical limitations  - Instruct patient to call for assistance with activity   - Consult OT/PT to assist with strengthening/mobility   - Keep Call bell within reach  - Keep bed low and locked with side rails adjusted as appropriate  - Keep care items and personal belongings within reach  - Initiate and maintain comfort rounds  - Make Fall Risk Sign visible to staff  - Offer Toileting every 2 Hours, in advance of need  - Initiate/Maintain bed alarm  - Obtain necessary fall risk management equipment: non skid socks  - Apply yellow socks and bracelet for high fall risk patients  - Consider moving patient to room near nurses station  Outcome: Adequate for Discharge     Problem: PAIN - ADULT  Goal: Verbalizes/displays adequate comfort level or baseline comfort level  Description: Interventions:  - Encourage patient to monitor pain and request assistance  - Assess pain using appropriate pain scale  - Administer analgesics based on type and severity of pain and evaluate response  - Implement non-pharmacological measures as appropriate and evaluate response  - Consider cultural and social influences on pain and pain management  - Notify physician/advanced practitioner if interventions unsuccessful or patient reports new pain  Outcome: Adequate for Discharge     Problem: INFECTION - ADULT  Goal: Absence or prevention of progression during hospitalization  Description: INTERVENTIONS:  - Assess and monitor for signs and symptoms of infection  - Monitor lab/diagnostic results  - Monitor all insertion sites, i.e. indwelling lines, tubes, and drains  - Monitor endotracheal if appropriate and nasal secretions for changes in amount and color  - [place] appropriate cooling/warming therapies per order  - Administer medications as ordered  - Instruct and encourage patient and  family to use good hand hygiene technique  - Identify and instruct in appropriate isolation precautions for identified infection/condition  Outcome: Adequate for Discharge  Goal: Absence of fever/infection during neutropenic period  Description: INTERVENTIONS:  - Monitor WBC    Outcome: Adequate for Discharge     Problem: SAFETY ADULT  Goal: Patient will remain free of falls  Description: INTERVENTIONS:  - Educate patient/family on patient safety including physical limitations  - Instruct patient to call for assistance with activity   - Consult OT/PT to assist with strengthening/mobility   - Keep Call bell within reach  - Keep bed low and locked with side rails adjusted as appropriate  - Keep care items and personal belongings within reach  - Initiate and maintain comfort rounds  - Make Fall Risk Sign visible to staff  - Offer Toileting every 2 Hours, in advance of need  - Initiate/Maintain bed alarm  - Obtain necessary fall risk management equipment: non skid socks  - Apply yellow socks and bracelet for high fall risk patients  - Consider moving patient to room near nurses station  Outcome: Adequate for Discharge  Goal: Maintain or return to baseline ADL function  Description: INTERVENTIONS:  -  Assess patient's ability to carry out ADLs; assess patient's baseline for ADL function and identify physical deficits which impact ability to perform ADLs (bathing, care of mouth/teeth, toileting, grooming, dressing, etc.)  - Assess/evaluate cause of self-care deficits   - Assess range of motion  - Assess patient's mobility; develop plan if impaired  - Assess patient's need for assistive devices and provide as appropriate  - Encourage maximum independence but intervene and supervise when necessary  - Involve family in performance of ADLs  - Assess for home care needs following discharge   - Consider OT consult to assist with ADL evaluation and planning for discharge  - Provide patient education as appropriate  Outcome:  Adequate for Discharge  Goal: Maintains/Returns to pre admission functional level  Description: INTERVENTIONS:  - Perform AM-PAC 6 Click Basic Mobility/ Daily Activity assessment daily.  - Set and communicate daily mobility goal to care team and patient/family/caregiver.   - Collaborate with rehabilitation services on mobility goals if consulted  - Perform Range of Motion 2 times a day.  - Reposition patient every 2 hours.  - Dangle patient 2 times a day  - Stand patient 2 times a day  - Ambulate patient 2 times a day  - Out of bed to chair 2 times a day   - Out of bed for meals 3 times a day  - Out of bed for toileting  - Record patient progress and toleration of activity level   Outcome: Adequate for Discharge     Problem: DISCHARGE PLANNING  Goal: Discharge to home or other facility with appropriate resources  Description: INTERVENTIONS:  - Identify barriers to discharge w/patient and caregiver  - Arrange for needed discharge resources and transportation as appropriate  - Identify discharge learning needs (meds, wound care, etc.)  - Refer to Case Management Department for coordinating discharge planning if the patient needs post-hospital services based on physician/advanced practitioner order or complex needs related to functional status, cognitive ability, or social support system  Outcome: Adequate for Discharge     Problem: Knowledge Deficit  Goal: Patient/family/caregiver demonstrates understanding of disease process, treatment plan, medications, and discharge instructions  Description: Complete learning assessment and assess knowledge base.  Interventions:  - Provide teaching at level of understanding  - Provide teaching via preferred learning methods  Outcome: Adequate for Discharge     Problem: CARDIOVASCULAR - ADULT  Goal: Maintains optimal cardiac output and hemodynamic stability  Description: INTERVENTIONS:  - Monitor I/O, vital signs and rhythm  - Monitor for S/S and trends of decreased cardiac  output  - Administer and titrate ordered vasoactive medications to optimize hemodynamic stability  - Assess quality of pulses, skin color and temperature  - Assess for signs of decreased coronary artery perfusion  - Instruct patient to report change in severity of symptoms  Outcome: Adequate for Discharge  Goal: Absence of cardiac dysrhythmias or at baseline rhythm  Description: INTERVENTIONS:  - Continuous cardiac monitoring, vital signs, obtain 12 lead EKG if ordered  - Administer antiarrhythmic and heart rate control medications as ordered  - Monitor electrolytes and administer replacement therapy as ordered  Outcome: Adequate for Discharge     Problem: RESPIRATORY - ADULT  Goal: Achieves optimal ventilation and oxygenation  Description: INTERVENTIONS:  - Assess for changes in respiratory status  - Assess for changes in mentation and behavior  - Position to facilitate oxygenation and minimize respiratory effort  - Oxygen administered by appropriate delivery if ordered  - Initiate smoking cessation education as indicated  - Encourage broncho-pulmonary hygiene including cough, deep breathe, Incentive Spirometry  - Assess the need for suctioning and aspirate as needed  - Assess and instruct to report SOB or any respiratory difficulty  - Respiratory Therapy support as indicated  Outcome: Adequate for Discharge

## 2024-12-23 NOTE — ASSESSMENT & PLAN NOTE
- diffuse b/l CAP, on room air oxygen and feels better. On room air oxygen  - Ok to change to oral Cedinir and Azithromycin  - needs f/u CT in 4-6 weeks.   - F/U with PCP as already scheduled in 1 week  - I counseled pt to return to ER if fevers, malaise, loss of appetite, worsened pleuritic pain, etc.     Discussed with pt. Concerns addressed.  Discussed with Dr. Mcwilliams.    Ok to discharge from pulmonary standpoint.

## 2024-12-23 NOTE — ASSESSMENT & PLAN NOTE
CT chest with contrast: 1. Multifocal pneumonia involving the right greater than left lungs, with probable reactive lymphadenopathy.   Currently on room air, remains afebrile   Strep pneumon/urine legionella negative   Transitioned to Cefdinir and Azithromycin PO   Repeat CT chest within 4-6 weeks

## 2024-12-23 NOTE — CONSULTS
Consultation - Pulmonology   Name: Ericka Castillo 49 y.o. female I MRN: [id number]  Unit/Bed#: -01 I Date of Admission: 12/22/2024   Date of Service: 12/23/2024 I Hospital Day: 1   Inpatient consult to Pulmonology  Consult performed by: Estela Britt DO  Consult ordered by: Renetta Chaparro MD        Physician Requesting Evaluation: Clotilde Mcwilliams DO   Reason for Evaluation / Principal Problem: pneumonia    Assessment & Plan  Sepsis (Formerly [facility])  - improved, due to pneumonia  Bipolar 2 disorder (Formerly [facility])    Acquired hypothyroidism    CKD (chronic kidney disease) stage 3, GFR 30-59 ml/min (Formerly [facility])  Lab Results   Component Value Date    EGFR 59 12/23/2024    EGFR 51 12/22/2024    EGFR 56 10/26/2024    CREATININE 1.08 12/23/2024    CREATININE 1.21 12/22/2024    CREATININE 1.14 10/26/2024     - creatinine at baseline, avoid nephrotoxins  Hyperglycemia    Pancreatic duct dilated    Community acquired pneumonia of both lungs  - diffuse b/l CAP, on room air oxygen and feels better. On room air oxygen  - Ok to change to oral Cedinir and Azithromycin  - needs f/u CT in 4-6 weeks.   - F/U with PCP as already scheduled in 1 week  - I counseled pt to return to ER if fevers, malaise, loss of appetite, worsened pleuritic pain, etc.     Discussed with pt. Concerns addressed.  Discussed with Dr. Mcwilliams.    Ok to discharge from pulmonary standpoint.  I have discussed the above management plan in detail with the primary service.     History of Present Illness   Ericka Castillo is a 49 y.o. female with a history of bipolar disorder, CKD stage III, hypothyroidism who presented who presented with acute onset shortness of breath.  Also had night sweats and cough producing yellow-green sputum.  Received outpatient antibiotics and then felt worse and came to the emergency room.      In the ER, patient met sepsis criteria.  Labs revealed leukocytosis with elevated procalcitonin.  Imaging reveals multifocal infiltrates  "and pancreatic duct dilation.  Started on ceftriaxone for CAP.  We are consulted to help with management.    Found lying in bed. States she has some pain when she takes a deep breath and it's unchanged. She is better overall though, less pain in her chest with breathing. States she walked to the bathroom and her breathing was \"not bad\", feels about same as it did at home.     \"I didn't even know I had pneumonia., I just didn't understand why I had severe pain when I took a deep breath.\"    No known lung disease like COPD or asthma. No home inhalers or oxygen.    Has a 12 year old daughter and a 10 year old daughter. Has 18 year old twins.    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     12/23/24  0504   WBC 14.43*   HGB 10.6*   HCT 33.1*      SODIUM 142   K 3.7   *   CO2 23   BUN 13   CREATININE 1.08   GLUC 119   AST 12*   ALT 14   ALB 3.6   TBILI 0.32   ALKPHOS 65     Labs per my review reveal normal creatinine, leukocytosis improved, anemia new    Bandemia of 19% on admission noted and now resolved today    12/22 blood cultures x 2 pending  COVID/flu negative  Strep pneumo urine antigen " negative  Legionella urine antigen negative      Imaging Results Review: I personally reviewed the following image studies in PACS and associated radiology reports: chest xray. My interpretation of the radiology images/reports is: CXR per my review shows multifocal diffuse infiltrates.    CTA chest per my review shows patchy and consolidative opacities to the bilateral lungs worst in the right lung    Other Study Results Review: Other studies reviewed include: Echo 1/23: EF normal    VTE Prophylaxis: VTE covered by:    None

## 2024-12-23 NOTE — ASSESSMENT & PLAN NOTE
Lab Results   Component Value Date    EGFR 59 12/23/2024    EGFR 51 12/22/2024    EGFR 56 10/26/2024    CREATININE 1.08 12/23/2024    CREATININE 1.21 12/22/2024    CREATININE 1.14 10/26/2024     Creatinine appears to be around baseline.  Will monitor BMP

## 2024-12-23 NOTE — CASE MANAGEMENT
Case Management Assessment & Discharge Planning Note    Patient name Ericka Castillo  Location /-01 MRN [id number]  : 1975 Date 2024       Current Admission Date: 2024  Current Admission Diagnosis:Sepsis (Formerly [facility])   Patient Active Problem List    Diagnosis Date Noted Date Diagnosed    Sepsis (Formerly [facility]) 2024     Hyperglycemia 2024     Pancreatic duct dilated 2024     Bilateral occipital neuralgia 04/10/2024     Secondary hyperparathyroidism (Formerly [facility]) 2024     Cervical radiculopathy 2023     Acute right-sided weakness 2023     Cervical spondylosis 10/27/2022     History of fusion of cervical spine 10/27/2022     Neck pain 2022     Thoracic back pain 2022     Myofascial pain syndrome 2022     Continuous opioid dependence (Formerly [facility]) 2022     Chronic pain syndrome 2021     Intestinal metaplasia of stomach 10/05/2021     Irregular bowel habits 2021     Headache, tension-type 2021     PTSD (post-traumatic stress disorder) 2021     Chronic tubulointerstitial nephritis 2021     Benign hypertension with CKD (chronic kidney disease) stage III (Formerly [facility]) 2021     Grade A2 albuminuria 2021     High vitamin D level 2021     CKD (chronic kidney disease) stage 3, GFR 30-59 ml/min (Formerly [facility]) 2021     Stage 3b chronic kidney disease (Formerly [facility]) 2021     IUD (intrauterine device) in place 2021     Hypercholesterolemia 2021     Depression 2020     Essential hypertension 2020     Lower abdominal pain 2020     Bilateral ankle pain 2019     Positive depression screening 2019     BMI 25.0-25.9,adult 10/19/2019     Tendinitis of right ankle 2019     Chronic bilateral low back pain without sciatica 12/15/2018     Bipolar 2 disorder (Formerly [facility]) 10/23/2018     Acquired hypothyroidism 10/23/2018     Arthritis 10/23/2018     Anxiety 2016       LOS (days): 1  Geometric Mean  LOS (GMLOS) (days):   Days to GMLOS:     OBJECTIVE:    Risk of Unplanned Readmission Score: 13.61         Current admission status: Inpatient  Referral Reason: Other (Discharge planning)    Preferred Pharmacy:   [id number]  [address]  [address] [place] [address]  Phone: [phone] Fax: [phone]    Primary Care Provider: FIONA Hawkins    Primary Insurance: BLUE CROSS  Secondary Insurance: [employer]    ASSESSMENT:  Active Health Care Proxies    There are no active Health Care Proxies on file.                 Readmission Root Cause  30 Day Readmission: No    Patient Information  Admitted from:: Home  Mental Status: Alert  During Assessment patient was accompanied by: Not accompanied during assessment  Assessment information provided by:: Patient  Primary Caregiver: Self  Support Systems: Family members  County of Residence: [place]  What city do you live in?: [place]  Home entry access options. Select all that apply.: Stairs  Type of Current Residence: 2 story home  Living Arrangements: Lives w/ Children, Lives w/ Family members  Is patient a ?: No    Activities of Daily Living Prior to Admission  Functional Status: Independent  Completes ADLs independently?: Yes  Ambulates independently?: Yes  Does patient use assisted devices?: No  Does patient currently own DME?: No  Does patient have a history of Outpatient Therapy (PT/OT)?: No  Does the patient have a history of Short-Term Rehab?: No  Does patient have a history of HHC?: No  Does patient currently have HHC?: No         Patient Information Continued  Income Source: Employed  Does patient have prescription coverage?: Yes  Does patient receive dialysis treatments?: No  Does patient have a history of substance abuse?: No  Does patient have a history of Mental Health Diagnosis?: Yes (Bipolar Disorder)  Is patient receiving treatment for mental health?: Yes          Means of Transportation  Means of Transport to Appts:: Drives Self          DISCHARGE DETAILS:    Discharge planning discussed with:: Pt  Freedom of Choice: Yes     Pt has no identified CM discharge needs at this time. Pt will return home with family when stable. CM to follow.    Requested Home Health Care         Is the patient interested in HHC at discharge?: No    DME Referral Provided  Referral made for DME?: No         Would you like to participate in our [facility] service program?  : No - Declined    Treatment Team Recommendation: Home  Discharge Destination Plan:: Home  Transport at Discharge : Family

## 2024-12-23 NOTE — DISCHARGE INSTR - AVS FIRST PAGE
Dear Ericka Castillo,     It was our pleasure to care for you here at [facility].  It is our hope that we were always able to meet and exceed the expected standards for your care during your stay.  You were hospitalized due to community acquired pneumonia sespis .  You were cared for on the 2nd floor under the service of Flavia Da Silva PA-C with the [facility] who covers for your primary care physician (PCP), FIONA Hawkins, while you were hospitalized.  If you have any questions or concerns related to this hospitalization, you may contact us at .  For follow up, we recommend that you follow up with your primary care physician.  Please review this entire discharge summary as additional general instructions may be provided later as well.  However, at this time we provide for you here, the most important instructions / recommendations at discharge:     Continue antibiotics of Cefdinir 300mg every 12 hours ( twice daily) and Azithromycin 250 mg (once daily) sent to preferred pharmacy  Please follow up with PCP for hospital follow up- will need repeat CT scan in 4-6 weeks   Outpatient referral was made to GI for outpatient MRI abdomen        Sincerely,     Flavia Da Silva PA-C

## 2024-12-23 NOTE — DISCHARGE SUMMARY
Discharge Summary - Hospitalist   Name: Ericka Castillo 49 y.o. female I MRN: [id number]  Unit/Bed#: -01 I Date of Admission: 12/22/2024   Date of Service: 12/23/2024 I Hospital Day: 1     Assessment & Plan  Sepsis (HCC)  Multifocal pneumonia noted on CT imaging, patient with tachypnea, leukocytosis, and tachycardia on admission  Recent Labs     12/22/24  0725 12/23/24  0504   WBC 17.87* 14.43*      sputum culture- negative   Strep pneumo/urine Legionella- negative   Procalcitonin down-trending   Lactate resolved with IV fluids   Blood cultures- pending   Discussed with pulm- transition to PO antibiotics- patient is stable for discharge   Community acquired pneumonia of both lungs  CT chest with contrast: 1. Multifocal pneumonia involving the right greater than left lungs, with probable reactive lymphadenopathy.   Currently on room air, remains afebrile   Strep pneumon/urine legionella negative   Transitioned to Cefdinir and Azithromycin PO   Repeat CT chest within 4-6 weeks   Bipolar 2 disorder ([facility])  Continue home Zyprexa, Wellbutrin, Lamictal   Valium as needed  Acquired hypothyroidism  Continue levothyroxine  CKD (chronic kidney disease) stage 3, GFR 30-59 ml/min ([facility])  Lab Results   Component Value Date    EGFR 59 12/23/2024    EGFR 51 12/22/2024    EGFR 56 10/26/2024    CREATININE 1.08 12/23/2024    CREATININE 1.21 12/22/2024    CREATININE 1.14 10/26/2024     Creatinine appears to be around baseline.  Will monitor BMP  Hyperglycemia  Patient with hyperglycemia noted on labs in the ER  A1C 5.1   Pancreatic duct dilated  CT imaging shows dilated pancreatic duct  Reviewed finding with patient.  She understands need for follow-up.  Follow-up with PCP as outpatient for MRCP  Ambulatory referral to GI  Incidental finding report completed     Medical Problems       Resolved Problems  Date Reviewed: 12/12/2024   None       Discharging Physician / Practitioner: Flavia Da Silva PA-C  PCP: Ruth Garcia  FIONA Valdez  Admission Date:   Admission Orders (From admission, onward)       Ordered        12/22/24 0815  INPATIENT ADMISSION  Once                          Discharge Date: 12/23/24    Consultations During Hospital Stay:  Pulmonology      Procedures Performed:   None     Significant Findings / Test Results:   None     Incidental Findings:   -CT chest, abdomen, pelvis:   Multifocal pneumonia involving the right greater than left lungs, with probable reactive lymphadenopathy. Recommendation: Follow-up chest CT pending resolution of the patient's symptoms . Redemonstrated nonspecific pancreatic duct dilation at the level of the pancreatic head. Recommendation: Outpatient follow-up MRI/MRCP, if not previously performed, to exclude an underlying lesion.  I reviewed the above mentioned incidental findings with the patient and/or family and they expressed understanding.    Test Results Pending at Discharge (will require follow up):   Blood Cultures      Outpatient Tests Requested:  MRCP  CT Chest 4-6 weeks     Complications:  none    Reason for Admission: sepsis secondary to pneumonia     Hospital Course:   Ericka Castillo is a 49 y.o. female patient who originally presented to the hospital on 12/22/2024 due to evaluation of cough, chest pain and shortness of breath. Patient meet sepsis criteria with elevated lactic acid, leukocytosis, elevated pro calcitonin with suspected source of pneumonia that was confirmed on CT imaging. Patient received IV fluids with lactic acidosis improving. She was initiated on IV Ceftriaxone and Azithromycin for pneumonia. AM lab studies showed down trending of leukocytosis and procal with initiation of antibiotics.  Vital signs stabilized; patient did not require oxygen supplementation during acute hospitalization.  Pulmonology was consulted during this hospitalization: as labs are improving and patient is not requiring oxygen supplementation she can be transitioned to oral  "antibiotics and discharged from a pulmonary standpoint. Will continue Cefdinir and Azithromycin upon discharge.   Incidental findings reviewed with patient- she verbalized understanding of outpatient GI follow up for MRI/MRCP and outpatient CT chest follow up within 4-6 weeks.     Please see above list of diagnoses and related plan for additional information.     Condition at Discharge: stable    Discharge Day Visit / Exam:   Subjective:  Patient seen and examined at bedside. Reports improvement of symptoms- still endorses productive cough. Denies any fever or chills. Tolerating PO diet. Requesting discharge.     Vitals: Blood Pressure: 119/80 (12/23/24 0736)  Pulse: 98 (12/23/24 0736)  Temperature: 98.1 °F (36.7 °C) (12/23/24 0736)  Temp Source: Temporal (12/22/24 0718)  Respirations: 17 (12/23/24 0736)  Height: 5' 5\" (165.1 cm) (12/22/24 0906)  Weight - Scale: 52.7 kg (116 lb 2.9 oz) (12/22/24 0906)  SpO2: 94 % (12/23/24 0900)  Physical Exam  Vitals reviewed.   Constitutional:       General: She is not in acute distress.     Appearance: She is not diaphoretic.   Eyes:      General: No scleral icterus.     Pupils: Pupils are equal, round, and reactive to light.   Cardiovascular:      Rate and Rhythm: Normal rate and regular rhythm.   Pulmonary:      Effort: No respiratory distress.      Breath sounds: Normal breath sounds.   Abdominal:      General: Bowel sounds are normal. There is no distension.      Palpations: Abdomen is soft.      Tenderness: There is no abdominal tenderness.   Neurological:      Mental Status: She is alert and oriented to person, place, and time.   Psychiatric:         Mood and Affect: Mood is anxious.          Discussion with Family: Updated  (mother) at bedside.    Discharge instructions/Information to patient and family:   See after visit summary for information provided to patient and family.      Provisions for Follow-Up Care:  See after visit summary for information " related to follow-up care and any pertinent home health orders.      Mobility at time of Discharge:   Basic Mobility Inpatient Raw Score: 24  JH-HLM Goal: 8: Walk 250 feet or more  JH-HLM Achieved: 7: Walk 25 feet or more  HLM Goal achieved. Continue to encourage appropriate mobility.     Disposition:   Home    Planned Readmission: none     Discharge Medications:  See after visit summary for reconciled discharge medications provided to patient and/or family.      Administrative Statements   Discharge Statement:  I have spent a total time of 32 minutes in caring for this patient on the day of the visit/encounter. .    **Please Note: This note may have been constructed using a voice recognition system**

## 2024-12-23 NOTE — INCIDENTAL FINDINGS
The following findings require follow up:  Radiographic finding   Finding: . Multifocal pneumonia involving the right greater than left lungs, with probable reactive lymphadenopathy. Recommendation: Follow-up chest CT pending resolution of the patient's symptoms. 2. Redemonstrated nonspecific pancreatic duct dilation at the level of the pancreatic head. Recommendation: Outpatient follow-up MRI/MRCP, if not previously performed, to exclude an underlying lesion.   Follow up required: Follow up outpatient MRI.MRCP for nonspecific pancreatic duct dilation    Follow up with CT imaging in 4-6 weeks    Follow up should be done within 1   month(s)    Please notify the following clinician to assist with the follow up:      Incidental finding results were discussed with the Patient by Flavia Da Silva PA-C on 12/23/24.   They expressed understanding and all questions answered.

## 2024-12-23 NOTE — PLAN OF CARE
Problem: Potential for Falls  Goal: Patient will remain free of falls  Description: INTERVENTIONS:  - Educate patient/family on patient safety including physical limitations  - Instruct patient to call for assistance with activity   - Consult OT/PT to assist with strengthening/mobility   - Keep Call bell within reach  - Keep bed low and locked with side rails adjusted as appropriate  - Keep care items and personal belongings within reach  - Initiate and maintain comfort rounds  - Make Fall Risk Sign visible to staff  - Offer Toileting every 2 Hours, in advance of need  - Initiate/Maintain bed alarm  - Obtain necessary fall risk management equipment: non skid socks  - Apply yellow socks and bracelet for high fall risk patients  - Consider moving patient to room near nurses station  Outcome: Progressing     Problem: PAIN - ADULT  Goal: Verbalizes/displays adequate comfort level or baseline comfort level  Description: Interventions:  - Encourage patient to monitor pain and request assistance  - Assess pain using appropriate pain scale  - Administer analgesics based on type and severity of pain and evaluate response  - Implement non-pharmacological measures as appropriate and evaluate response  - Consider cultural and social influences on pain and pain management  - Notify physician/advanced practitioner if interventions unsuccessful or patient reports new pain  Outcome: Progressing     Problem: SAFETY ADULT  Goal: Maintain or return to baseline ADL function  Description: INTERVENTIONS:  -  Assess patient's ability to carry out ADLs; assess patient's baseline for ADL function and identify physical deficits which impact ability to perform ADLs (bathing, care of mouth/teeth, toileting, grooming, dressing, etc.)  - Assess/evaluate cause of self-care deficits   - Assess range of motion  - Assess patient's mobility; develop plan if impaired  - Assess patient's need for assistive devices and provide as appropriate  -  Encourage maximum independence but intervene and supervise when necessary  - Involve family in performance of ADLs  - Assess for home care needs following discharge   - Consider OT consult to assist with ADL evaluation and planning for discharge  - Provide patient education as appropriate  Outcome: Progressing     Problem: CARDIOVASCULAR - ADULT  Goal: Maintains optimal cardiac output and hemodynamic stability  Description: INTERVENTIONS:  - Monitor I/O, vital signs and rhythm  - Monitor for S/S and trends of decreased cardiac output  - Administer and titrate ordered vasoactive medications to optimize hemodynamic stability  - Assess quality of pulses, skin color and temperature  - Assess for signs of decreased coronary artery perfusion  - Instruct patient to report change in severity of symptoms  Outcome: Progressing     Problem: Knowledge Deficit  Goal: Patient/family/caregiver demonstrates understanding of disease process, treatment plan, medications, and discharge instructions  Description: Complete learning assessment and assess knowledge base.  Interventions:  - Provide teaching at level of understanding  - Provide teaching via preferred learning methods  Outcome: Progressing     Problem: DISCHARGE PLANNING  Goal: Discharge to home or other facility with appropriate resources  Description: INTERVENTIONS:  - Identify barriers to discharge w/patient and caregiver  - Arrange for needed discharge resources and transportation as appropriate  - Identify discharge learning needs (meds, wound care, etc.)  - Refer to Case Management Department for coordinating discharge planning if the patient needs post-hospital services based on physician/advanced practitioner order or complex needs related to functional status, cognitive ability, or social support system  Outcome: Progressing

## 2024-12-24 ENCOUNTER — TRANSITIONAL CARE MANAGEMENT (OUTPATIENT)
Dept: FAMILY MEDICINE CLINIC | Facility: CLINIC | Age: 49
End: 2024-12-24

## 2024-12-25 ENCOUNTER — APPOINTMENT (EMERGENCY)
Dept: CT IMAGING | Facility: HOSPITAL | Age: 49
DRG: 194 | End: 2024-12-25
Payer: COMMERCIAL

## 2024-12-25 ENCOUNTER — HOSPITAL ENCOUNTER (INPATIENT)
Facility: HOSPITAL | Age: 49
LOS: 1 days | Discharge: HOME | DRG: 194 | End: 2024-12-27
Attending: EMERGENCY MEDICINE | Admitting: INTERNAL MEDICINE
Payer: COMMERCIAL

## 2024-12-25 DIAGNOSIS — J18.9 COMMUNITY ACQUIRED PNEUMONIA OF BOTH LUNGS: ICD-10-CM

## 2024-12-25 DIAGNOSIS — L03.113 CELLULITIS OF RIGHT WRIST: ICD-10-CM

## 2024-12-25 DIAGNOSIS — B37.9 YEAST INFECTION: ICD-10-CM

## 2024-12-25 DIAGNOSIS — J18.9 MULTIFOCAL PNEUMONIA: Primary | ICD-10-CM

## 2024-12-25 PROBLEM — F31.81 BIPOLAR 2 DISORDER (HCC): Chronic | Status: ACTIVE | Noted: 2018-10-23

## 2024-12-25 PROBLEM — N18.30 CKD (CHRONIC KIDNEY DISEASE) STAGE 3, GFR 30-59 ML/MIN (HCC): Status: RESOLVED | Noted: 2021-08-24 | Resolved: 2024-12-25

## 2024-12-25 PROBLEM — M25.572 BILATERAL ANKLE PAIN: Status: RESOLVED | Noted: 2019-12-03 | Resolved: 2024-12-25

## 2024-12-25 PROBLEM — N18.31 STAGE 3A CHRONIC KIDNEY DISEASE (HCC): Chronic | Status: ACTIVE | Noted: 2021-07-16

## 2024-12-25 PROBLEM — R10.30 LOWER ABDOMINAL PAIN: Status: RESOLVED | Noted: 2020-02-03 | Resolved: 2024-12-25

## 2024-12-25 PROBLEM — A41.9 SEPSIS (HCC): Status: RESOLVED | Noted: 2024-12-22 | Resolved: 2024-12-25

## 2024-12-25 PROBLEM — M54.2 NECK PAIN: Status: RESOLVED | Noted: 2022-09-14 | Resolved: 2024-12-25

## 2024-12-25 PROBLEM — F11.20 CONTINUOUS OPIOID DEPENDENCE (HCC): Chronic | Status: ACTIVE | Noted: 2022-02-24

## 2024-12-25 PROBLEM — I10 ESSENTIAL HYPERTENSION: Chronic | Status: ACTIVE | Noted: 2020-06-23

## 2024-12-25 PROBLEM — E03.9 ACQUIRED HYPOTHYROIDISM: Chronic | Status: ACTIVE | Noted: 2018-10-23

## 2024-12-25 PROBLEM — M25.571 BILATERAL ANKLE PAIN: Status: RESOLVED | Noted: 2019-12-03 | Resolved: 2024-12-25

## 2024-12-25 PROBLEM — R73.9 HYPERGLYCEMIA: Status: RESOLVED | Noted: 2024-12-22 | Resolved: 2024-12-25

## 2024-12-25 PROBLEM — Z13.31 POSITIVE DEPRESSION SCREENING: Status: RESOLVED | Noted: 2019-12-03 | Resolved: 2024-12-25

## 2024-12-25 LAB
ALBUMIN SERPL BCG-MCNC: 3.7 G/DL (ref 3.5–5)
ALP SERPL-CCNC: 73 U/L (ref 34–104)
ALT SERPL W P-5'-P-CCNC: 34 U/L (ref 7–52)
ANION GAP SERPL CALCULATED.3IONS-SCNC: 8 MMOL/L (ref 4–13)
AST SERPL W P-5'-P-CCNC: 27 U/L (ref 13–39)
BASOPHILS # BLD AUTO: 0.02 THOUSANDS/ÂΜL (ref 0–0.1)
BASOPHILS NFR BLD AUTO: 0 % (ref 0–1)
BILIRUB SERPL-MCNC: 0.33 MG/DL (ref 0.2–1)
BUN SERPL-MCNC: 18 MG/DL (ref 5–25)
CALCIUM SERPL-MCNC: 9.1 MG/DL (ref 8.4–10.2)
CARDIAC TROPONIN I PNL SERPL HS: <2 NG/L (ref ?–50)
CHLORIDE SERPL-SCNC: 104 MMOL/L (ref 96–108)
CO2 SERPL-SCNC: 27 MMOL/L (ref 21–32)
CREAT SERPL-MCNC: 1.23 MG/DL (ref 0.6–1.3)
EOSINOPHIL # BLD AUTO: 0.25 THOUSAND/ÂΜL (ref 0–0.61)
EOSINOPHIL NFR BLD AUTO: 4 % (ref 0–6)
ERYTHROCYTE [DISTWIDTH] IN BLOOD BY AUTOMATED COUNT: 11.6 % (ref 11.6–15.1)
GFR SERPL CREATININE-BSD FRML MDRD: 50 ML/MIN/1.73SQ M
GLUCOSE SERPL-MCNC: 100 MG/DL (ref 65–140)
HCT VFR BLD AUTO: 31.7 % (ref 34.8–46.1)
HGB BLD-MCNC: 10.2 G/DL (ref 11.5–15.4)
IMM GRANULOCYTES # BLD AUTO: 0.06 THOUSAND/UL (ref 0–0.2)
IMM GRANULOCYTES NFR BLD AUTO: 1 % (ref 0–2)
LACTATE SERPL-SCNC: 0.7 MMOL/L (ref 0.5–2)
LYMPHOCYTES # BLD AUTO: 0.75 THOUSANDS/ÂΜL (ref 0.6–4.47)
LYMPHOCYTES NFR BLD AUTO: 11 % (ref 14–44)
MCH RBC QN AUTO: 34 PG (ref 26.8–34.3)
MCHC RBC AUTO-ENTMCNC: 32.2 G/DL (ref 31.4–37.4)
MCV RBC AUTO: 106 FL (ref 82–98)
MONOCYTES # BLD AUTO: 0.68 THOUSAND/ÂΜL (ref 0.17–1.22)
MONOCYTES NFR BLD AUTO: 10 % (ref 4–12)
NEUTROPHILS # BLD AUTO: 5 THOUSANDS/ÂΜL (ref 1.85–7.62)
NEUTS SEG NFR BLD AUTO: 74 % (ref 43–75)
NRBC BLD AUTO-RTO: 0 /100 WBCS
PLATELET # BLD AUTO: 312 THOUSANDS/UL (ref 149–390)
PMV BLD AUTO: 9 FL (ref 8.9–12.7)
POTASSIUM SERPL-SCNC: 4 MMOL/L (ref 3.5–5.3)
PROCALCITONIN SERPL-MCNC: 3.89 NG/ML
PROT SERPL-MCNC: 7.2 G/DL (ref 6.4–8.4)
RBC # BLD AUTO: 3 MILLION/UL (ref 3.81–5.12)
SODIUM SERPL-SCNC: 139 MMOL/L (ref 135–147)
WBC # BLD AUTO: 6.76 THOUSAND/UL (ref 4.31–10.16)

## 2024-12-25 PROCEDURE — 87804 INFLUENZA ASSAY W/OPTIC: CPT | Performed by: PHYSICIAN ASSISTANT

## 2024-12-25 PROCEDURE — 71275 CT ANGIOGRAPHY CHEST: CPT

## 2024-12-25 PROCEDURE — 74177 CT ABD & PELVIS W/CONTRAST: CPT

## 2024-12-25 PROCEDURE — 87811 SARS-COV-2 COVID19 W/OPTIC: CPT | Performed by: PHYSICIAN ASSISTANT

## 2024-12-25 PROCEDURE — 96365 THER/PROPH/DIAG IV INF INIT: CPT

## 2024-12-25 PROCEDURE — 84145 PROCALCITONIN (PCT): CPT | Performed by: EMERGENCY MEDICINE

## 2024-12-25 PROCEDURE — 83605 ASSAY OF LACTIC ACID: CPT | Performed by: EMERGENCY MEDICINE

## 2024-12-25 PROCEDURE — 93005 ELECTROCARDIOGRAM TRACING: CPT

## 2024-12-25 PROCEDURE — 36415 COLL VENOUS BLD VENIPUNCTURE: CPT | Performed by: EMERGENCY MEDICINE

## 2024-12-25 PROCEDURE — 99285 EMERGENCY DEPT VISIT HI MDM: CPT | Performed by: EMERGENCY MEDICINE

## 2024-12-25 PROCEDURE — 96375 TX/PRO/DX INJ NEW DRUG ADDON: CPT

## 2024-12-25 PROCEDURE — 84484 ASSAY OF TROPONIN QUANT: CPT | Performed by: EMERGENCY MEDICINE

## 2024-12-25 PROCEDURE — 99223 1ST HOSP IP/OBS HIGH 75: CPT | Performed by: PHYSICIAN ASSISTANT

## 2024-12-25 PROCEDURE — 80053 COMPREHEN METABOLIC PANEL: CPT | Performed by: EMERGENCY MEDICINE

## 2024-12-25 PROCEDURE — 99285 EMERGENCY DEPT VISIT HI MDM: CPT

## 2024-12-25 PROCEDURE — 85025 COMPLETE CBC W/AUTO DIFF WBC: CPT | Performed by: EMERGENCY MEDICINE

## 2024-12-25 RX ORDER — FAMOTIDINE 20 MG/1
20 TABLET, FILM COATED ORAL 2 TIMES DAILY
Status: DISCONTINUED | OUTPATIENT
Start: 2024-12-26 | End: 2024-12-25

## 2024-12-25 RX ORDER — CEFTRIAXONE 2 G/50ML
2000 INJECTION, SOLUTION INTRAVENOUS ONCE
Status: COMPLETED | OUTPATIENT
Start: 2024-12-25 | End: 2024-12-25

## 2024-12-25 RX ORDER — ACETAMINOPHEN 325 MG/1
650 TABLET ORAL EVERY 4 HOURS PRN
Status: DISCONTINUED | OUTPATIENT
Start: 2024-12-25 | End: 2024-12-26

## 2024-12-25 RX ORDER — ONDANSETRON 2 MG/ML
4 INJECTION INTRAMUSCULAR; INTRAVENOUS EVERY 6 HOURS PRN
Status: DISCONTINUED | OUTPATIENT
Start: 2024-12-25 | End: 2024-12-27 | Stop reason: HOSPADM

## 2024-12-25 RX ORDER — LISINOPRIL 5 MG/1
5 TABLET ORAL DAILY
Status: DISCONTINUED | OUTPATIENT
Start: 2024-12-26 | End: 2024-12-27 | Stop reason: HOSPADM

## 2024-12-25 RX ORDER — OLANZAPINE 10 MG/1
10 TABLET ORAL
Status: DISCONTINUED | OUTPATIENT
Start: 2024-12-25 | End: 2024-12-27 | Stop reason: HOSPADM

## 2024-12-25 RX ORDER — SODIUM CHLORIDE 9 MG/ML
125 INJECTION, SOLUTION INTRAVENOUS CONTINUOUS
Status: DISPENSED | OUTPATIENT
Start: 2024-12-25 | End: 2024-12-26

## 2024-12-25 RX ORDER — ZOLPIDEM TARTRATE 5 MG/1
10 TABLET ORAL
Status: DISCONTINUED | OUTPATIENT
Start: 2024-12-25 | End: 2024-12-27 | Stop reason: HOSPADM

## 2024-12-25 RX ORDER — LAMOTRIGINE 100 MG/1
200 TABLET ORAL DAILY
Status: DISCONTINUED | OUTPATIENT
Start: 2024-12-26 | End: 2024-12-26

## 2024-12-25 RX ORDER — LEVOTHYROXINE SODIUM 50 UG/1
50 TABLET ORAL
Status: DISCONTINUED | OUTPATIENT
Start: 2024-12-26 | End: 2024-12-27 | Stop reason: HOSPADM

## 2024-12-25 RX ORDER — HYDROMORPHONE HCL/PF 1 MG/ML
0.5 SYRINGE (ML) INJECTION ONCE
Refills: 0 | Status: COMPLETED | OUTPATIENT
Start: 2024-12-25 | End: 2024-12-25

## 2024-12-25 RX ORDER — HYDROXYZINE HYDROCHLORIDE 25 MG/1
25 TABLET, FILM COATED ORAL EVERY 6 HOURS PRN
Status: DISCONTINUED | OUTPATIENT
Start: 2024-12-25 | End: 2024-12-27 | Stop reason: HOSPADM

## 2024-12-25 RX ORDER — BACLOFEN 10 MG/1
20 TABLET ORAL 3 TIMES DAILY
Status: DISCONTINUED | OUTPATIENT
Start: 2024-12-25 | End: 2024-12-27 | Stop reason: HOSPADM

## 2024-12-25 RX ORDER — GABAPENTIN 400 MG/1
800 CAPSULE ORAL 3 TIMES DAILY
Status: DISCONTINUED | OUTPATIENT
Start: 2024-12-25 | End: 2024-12-27 | Stop reason: HOSPADM

## 2024-12-25 RX ORDER — ENOXAPARIN SODIUM 100 MG/ML
40 INJECTION SUBCUTANEOUS DAILY
Status: DISCONTINUED | OUTPATIENT
Start: 2024-12-26 | End: 2024-12-27 | Stop reason: HOSPADM

## 2024-12-25 RX ORDER — CEFTRIAXONE 1 G/50ML
1000 INJECTION, SOLUTION INTRAVENOUS EVERY 24 HOURS
Status: DISCONTINUED | OUTPATIENT
Start: 2024-12-26 | End: 2024-12-26

## 2024-12-25 RX ORDER — FAMOTIDINE 20 MG/1
20 TABLET, FILM COATED ORAL DAILY
Status: DISCONTINUED | OUTPATIENT
Start: 2024-12-26 | End: 2024-12-27 | Stop reason: HOSPADM

## 2024-12-25 RX ORDER — DIAZEPAM 10 MG/2ML
5 INJECTION, SOLUTION INTRAMUSCULAR; INTRAVENOUS ONCE
Status: COMPLETED | OUTPATIENT
Start: 2024-12-25 | End: 2024-12-25

## 2024-12-25 RX ORDER — AZITHROMYCIN 250 MG/1
250 TABLET, FILM COATED ORAL EVERY 24 HOURS
Status: COMPLETED | OUTPATIENT
Start: 2024-12-25 | End: 2024-12-26

## 2024-12-25 RX ADMIN — SODIUM CHLORIDE 125 ML/HR: 0.9 INJECTION, SOLUTION INTRAVENOUS at 23:22

## 2024-12-25 RX ADMIN — HYDROMORPHONE HYDROCHLORIDE 0.5 MG: 1 INJECTION, SOLUTION INTRAMUSCULAR; INTRAVENOUS; SUBCUTANEOUS at 23:28

## 2024-12-25 RX ADMIN — BACLOFEN 20 MG: 10 TABLET ORAL at 23:30

## 2024-12-25 RX ADMIN — OLANZAPINE 10 MG: 10 TABLET, FILM COATED ORAL at 23:30

## 2024-12-25 RX ADMIN — GABAPENTIN 800 MG: 400 CAPSULE ORAL at 23:30

## 2024-12-25 RX ADMIN — CEFTRIAXONE 2000 MG: 2 INJECTION, SOLUTION INTRAVENOUS at 20:32

## 2024-12-25 RX ADMIN — AZITHROMYCIN DIHYDRATE 250 MG: 250 TABLET ORAL at 23:30

## 2024-12-25 RX ADMIN — DIAZEPAM 5 MG: 5 INJECTION INTRAMUSCULAR; INTRAVENOUS at 21:42

## 2024-12-25 RX ADMIN — IOHEXOL 100 ML: 350 INJECTION, SOLUTION INTRAVENOUS at 20:26

## 2024-12-25 NOTE — LETTER
[facility]  [address] [place] [address]  Dept: [phone]    December 27, 2024     Patient: Ericka Castillo   YOB: 1975   Date of Visit: 12/25/2024       To Whom it May Concern:    Ericka Castillo is under my professional care. She was seen in the hospital from 12/25/2024 to 12/27/24. She may return to work on 12/30/2024 without limitations.    If you have any questions or concerns, please don't hesitate to call.         Sincerely,          Rosalio Dexter, DO

## 2024-12-26 ENCOUNTER — TELEPHONE (OUTPATIENT)
Dept: PAIN MEDICINE | Facility: CLINIC | Age: 49
End: 2024-12-26

## 2024-12-26 PROBLEM — R10.11 RIGHT UPPER QUADRANT PAIN: Status: ACTIVE | Noted: 2024-12-26

## 2024-12-26 LAB
ANION GAP SERPL CALCULATED.3IONS-SCNC: 8 MMOL/L (ref 4–13)
ATRIAL RATE: 102 BPM
BUN SERPL-MCNC: 13 MG/DL (ref 5–25)
CALCIUM SERPL-MCNC: 8.9 MG/DL (ref 8.4–10.2)
CHLORIDE SERPL-SCNC: 108 MMOL/L (ref 96–108)
CO2 SERPL-SCNC: 25 MMOL/L (ref 21–32)
CREAT SERPL-MCNC: 1.03 MG/DL (ref 0.6–1.3)
ERYTHROCYTE [DISTWIDTH] IN BLOOD BY AUTOMATED COUNT: 11.7 % (ref 11.6–15.1)
FLUAV AG UPPER RESP QL IA.RAPID: NEGATIVE
FLUBV AG UPPER RESP QL IA.RAPID: NEGATIVE
GFR SERPL CREATININE-BSD FRML MDRD: 63 ML/MIN/1.73SQ M
GLUCOSE SERPL-MCNC: 95 MG/DL (ref 65–140)
HCT VFR BLD AUTO: 30.3 % (ref 34.8–46.1)
HGB BLD-MCNC: 9.9 G/DL (ref 11.5–15.4)
MCH RBC QN AUTO: 34.7 PG (ref 26.8–34.3)
MCHC RBC AUTO-ENTMCNC: 32.7 G/DL (ref 31.4–37.4)
MCV RBC AUTO: 106 FL (ref 82–98)
P AXIS: 39 DEGREES
PLATELET # BLD AUTO: 309 THOUSANDS/UL (ref 149–390)
PMV BLD AUTO: 9.2 FL (ref 8.9–12.7)
POTASSIUM SERPL-SCNC: 4.2 MMOL/L (ref 3.5–5.3)
PR INTERVAL: 138 MS
PROCALCITONIN SERPL-MCNC: 3.01 NG/ML
QRS AXIS: 53 DEGREES
QRSD INTERVAL: 82 MS
QT INTERVAL: 336 MS
QTC INTERVAL: 437 MS
RBC # BLD AUTO: 2.85 MILLION/UL (ref 3.81–5.12)
SARS-COV+SARS-COV-2 AG RESP QL IA.RAPID: NEGATIVE
SODIUM SERPL-SCNC: 141 MMOL/L (ref 135–147)
T WAVE AXIS: 49 DEGREES
VENTRICULAR RATE: 102 BPM
WBC # BLD AUTO: 4.4 THOUSAND/UL (ref 4.31–10.16)

## 2024-12-26 PROCEDURE — 80048 BASIC METABOLIC PNL TOTAL CA: CPT | Performed by: PHYSICIAN ASSISTANT

## 2024-12-26 PROCEDURE — 90677 PCV20 VACCINE IM: CPT | Performed by: INTERNAL MEDICINE

## 2024-12-26 PROCEDURE — 85027 COMPLETE CBC AUTOMATED: CPT | Performed by: PHYSICIAN ASSISTANT

## 2024-12-26 PROCEDURE — 90471 IMMUNIZATION ADMIN: CPT | Performed by: INTERNAL MEDICINE

## 2024-12-26 PROCEDURE — 93010 ELECTROCARDIOGRAM REPORT: CPT | Performed by: INTERNAL MEDICINE

## 2024-12-26 PROCEDURE — 84145 PROCALCITONIN (PCT): CPT | Performed by: PHYSICIAN ASSISTANT

## 2024-12-26 PROCEDURE — 99232 SBSQ HOSP IP/OBS MODERATE 35: CPT | Performed by: INTERNAL MEDICINE

## 2024-12-26 RX ORDER — LAMOTRIGINE 100 MG/1
100 TABLET ORAL 2 TIMES DAILY
Status: DISCONTINUED | OUTPATIENT
Start: 2024-12-26 | End: 2024-12-27 | Stop reason: HOSPADM

## 2024-12-26 RX ORDER — PANTOPRAZOLE SODIUM 20 MG/1
20 TABLET, DELAYED RELEASE ORAL
Status: DISCONTINUED | OUTPATIENT
Start: 2024-12-26 | End: 2024-12-27 | Stop reason: HOSPADM

## 2024-12-26 RX ORDER — OXYCODONE HYDROCHLORIDE 10 MG/1
10 TABLET ORAL EVERY 6 HOURS PRN
Refills: 0 | Status: DISCONTINUED | OUTPATIENT
Start: 2024-12-26 | End: 2024-12-27

## 2024-12-26 RX ORDER — OXYCODONE HYDROCHLORIDE 5 MG/1
5 TABLET ORAL EVERY 6 HOURS PRN
Refills: 0 | Status: DISCONTINUED | OUTPATIENT
Start: 2024-12-26 | End: 2024-12-27 | Stop reason: HOSPADM

## 2024-12-26 RX ORDER — CEFTRIAXONE 2 G/50ML
2000 INJECTION, SOLUTION INTRAVENOUS EVERY 24 HOURS
Status: DISCONTINUED | OUTPATIENT
Start: 2024-12-26 | End: 2024-12-27 | Stop reason: HOSPADM

## 2024-12-26 RX ORDER — BUPROPION HYDROCHLORIDE 150 MG/1
450 TABLET ORAL DAILY
Status: DISCONTINUED | OUTPATIENT
Start: 2024-12-26 | End: 2024-12-27 | Stop reason: HOSPADM

## 2024-12-26 RX ORDER — IBUPROFEN 800 MG/1
800 TABLET, FILM COATED ORAL EVERY 6 HOURS PRN
Status: DISCONTINUED | OUTPATIENT
Start: 2024-12-26 | End: 2024-12-27 | Stop reason: HOSPADM

## 2024-12-26 RX ORDER — POLYETHYLENE GLYCOL 3350 17 G/17G
17 POWDER, FOR SOLUTION ORAL DAILY
Status: DISCONTINUED | OUTPATIENT
Start: 2024-12-26 | End: 2024-12-27 | Stop reason: HOSPADM

## 2024-12-26 RX ORDER — BUPROPION HYDROCHLORIDE 150 MG/1
150 TABLET ORAL DAILY
Status: DISCONTINUED | OUTPATIENT
Start: 2024-12-26 | End: 2024-12-26

## 2024-12-26 RX ADMIN — GABAPENTIN 800 MG: 400 CAPSULE ORAL at 20:56

## 2024-12-26 RX ADMIN — BACLOFEN 20 MG: 10 TABLET ORAL at 17:09

## 2024-12-26 RX ADMIN — ENOXAPARIN SODIUM 40 MG: 40 INJECTION SUBCUTANEOUS at 09:09

## 2024-12-26 RX ADMIN — OXYCODONE HYDROCHLORIDE 10 MG: 10 TABLET ORAL at 18:35

## 2024-12-26 RX ADMIN — BACLOFEN 20 MG: 10 TABLET ORAL at 20:56

## 2024-12-26 RX ADMIN — OLANZAPINE 10 MG: 10 TABLET, FILM COATED ORAL at 22:19

## 2024-12-26 RX ADMIN — POLYETHYLENE GLYCOL 3350 17 G: 17 POWDER, FOR SOLUTION ORAL at 09:09

## 2024-12-26 RX ADMIN — BUPROPION HYDROCHLORIDE 450 MG: 150 TABLET, EXTENDED RELEASE ORAL at 09:10

## 2024-12-26 RX ADMIN — IBUPROFEN 800 MG: 800 TABLET, FILM COATED ORAL at 18:09

## 2024-12-26 RX ADMIN — PANTOPRAZOLE SODIUM 20 MG: 20 TABLET, DELAYED RELEASE ORAL at 06:45

## 2024-12-26 RX ADMIN — PNEUMOCOCCAL 20-VALENT CONJUGATE VACCINE 0.5 ML
2.2; 2.2; 2.2; 2.2; 2.2; 2.2; 2.2; 2.2; 2.2; 2.2; 2.2; 2.2; 2.2; 2.2; 2.2; 2.2; 4.4; 2.2; 2.2; 2.2 INJECTION, SUSPENSION INTRAMUSCULAR at 06:45

## 2024-12-26 RX ADMIN — GABAPENTIN 800 MG: 400 CAPSULE ORAL at 09:10

## 2024-12-26 RX ADMIN — MORPHINE SULFATE 1 MG: 2 INJECTION, SOLUTION INTRAMUSCULAR; INTRAVENOUS at 22:20

## 2024-12-26 RX ADMIN — LISINOPRIL 5 MG: 5 TABLET ORAL at 09:10

## 2024-12-26 RX ADMIN — LAMOTRIGINE 100 MG: 100 TABLET ORAL at 17:09

## 2024-12-26 RX ADMIN — AZITHROMYCIN DIHYDRATE 250 MG: 250 TABLET ORAL at 23:32

## 2024-12-26 RX ADMIN — LAMOTRIGINE 100 MG: 100 TABLET ORAL at 09:10

## 2024-12-26 RX ADMIN — ZOLPIDEM TARTRATE 10 MG: 5 TABLET ORAL at 23:32

## 2024-12-26 RX ADMIN — OXYCODONE HYDROCHLORIDE 5 MG: 5 TABLET ORAL at 15:32

## 2024-12-26 RX ADMIN — OXYCODONE HYDROCHLORIDE 5 MG: 5 TABLET ORAL at 08:45

## 2024-12-26 RX ADMIN — CEFTRIAXONE 2000 MG: 2 INJECTION, SOLUTION INTRAVENOUS at 20:56

## 2024-12-26 RX ADMIN — LEVOTHYROXINE SODIUM 50 MCG: 50 TABLET ORAL at 06:50

## 2024-12-26 RX ADMIN — PANTOPRAZOLE SODIUM 20 MG: 20 TABLET, DELAYED RELEASE ORAL at 17:09

## 2024-12-26 RX ADMIN — BACLOFEN 20 MG: 10 TABLET ORAL at 09:10

## 2024-12-26 RX ADMIN — GABAPENTIN 800 MG: 400 CAPSULE ORAL at 17:09

## 2024-12-26 RX ADMIN — FAMOTIDINE 20 MG: 20 TABLET, FILM COATED ORAL at 09:10

## 2024-12-26 NOTE — ASSESSMENT & PLAN NOTE
Secondary to chronic pain  PDMP reviewed  Norco 5/325 filled 11/29/2024 with 60 tabs  Continue pain meds  Follow-up pain management as recommend

## 2024-12-26 NOTE — PLAN OF CARE
Problem: PAIN - ADULT  Goal: Verbalizes/displays adequate comfort level or baseline comfort level  Description: Interventions:  - Encourage patient to monitor pain and request assistance  - Assess pain using appropriate pain scale  - Administer analgesics based on type and severity of pain and evaluate response  - Implement non-pharmacological measures as appropriate and evaluate response  - Consider cultural and social influences on pain and pain management  - Notify physician/advanced practitioner if interventions unsuccessful or patient reports new pain  Outcome: Progressing     Problem: INFECTION - ADULT  Goal: Absence or prevention of progression during hospitalization  Description: INTERVENTIONS:  - Assess and monitor for signs and symptoms of infection  - Monitor lab/diagnostic results  - Monitor all insertion sites, i.e. indwelling lines, tubes, and drains  - Monitor endotracheal if appropriate and nasal secretions for changes in amount and color  - [place] appropriate cooling/warming therapies per order  - Administer medications as ordered  - Instruct and encourage patient and family to use good hand hygiene technique  - Identify and instruct in appropriate isolation precautions for identified infection/condition  Outcome: Progressing  Goal: Absence of fever/infection during neutropenic period  Description: INTERVENTIONS:  - Monitor WBC    Outcome: Progressing     Problem: SAFETY ADULT  Goal: Patient will remain free of falls  Description: INTERVENTIONS:  - Educate patient/family on patient safety including physical limitations  - Instruct patient to call for assistance with activity   - Consult OT/PT to assist with strengthening/mobility   - Keep Call bell within reach  - Keep bed low and locked with side rails adjusted as appropriate  - Keep care items and personal belongings within reach  - Initiate and maintain comfort rounds  - Make Fall Risk Sign visible to staff  - Offer Toileting every 2 Hours,  in advance of need  - Obtain necessary fall risk management equipment: yellow socks  - Apply yellow socks and bracelet for high fall risk patients  - Consider moving patient to room near nurses station  Outcome: Progressing  Goal: Maintain or return to baseline ADL function  Description: INTERVENTIONS:  -  Assess patient's ability to carry out ADLs; assess patient's baseline for ADL function and identify physical deficits which impact ability to perform ADLs (bathing, care of mouth/teeth, toileting, grooming, dressing, etc.)  - Assess/evaluate cause of self-care deficits   - Assess range of motion  - Assess patient's mobility; develop plan if impaired  - Assess patient's need for assistive devices and provide as appropriate  - Encourage maximum independence but intervene and supervise when necessary  - Involve family in performance of ADLs  - Assess for home care needs following discharge   - Consider OT consult to assist with ADL evaluation and planning for discharge  - Provide patient education as appropriate  Outcome: Progressing  Goal: Maintains/Returns to pre admission functional level  Description: INTERVENTIONS:  - Perform AM-PAC 6 Click Basic Mobility/ Daily Activity assessment daily.  - Set and communicate daily mobility goal to care team and patient/family/caregiver.   - Collaborate with rehabilitation services on mobility goals if consulted  - Perform Range of Motion 3 times a day.  - Reposition patient every 2 hours.  - Dangle patient 3 times a day  - Stand patient 3 times a day  - Ambulate patient 3 times a day  - Out of bed to chair 3 times a day   - Out of bed for meals 3 times a day  - Out of bed for toileting  - Record patient progress and toleration of activity level   Outcome: Progressing     Problem: DISCHARGE PLANNING  Goal: Discharge to home or other facility with appropriate resources  Description: INTERVENTIONS:  - Identify barriers to discharge w/patient and caregiver  - Arrange for needed  discharge resources and transportation as appropriate  - Identify discharge learning needs (meds, wound care, etc.)  - Arrange for interpretive services to assist at discharge as needed  - Refer to Case Management Department for coordinating discharge planning if the patient needs post-hospital services based on physician/advanced practitioner order or complex needs related to functional status, cognitive ability, or social support system  Outcome: Progressing     Problem: Knowledge Deficit  Goal: Patient/family/caregiver demonstrates understanding of disease process, treatment plan, medications, and discharge instructions  Description: Complete learning assessment and assess knowledge base.  Interventions:  - Provide teaching at level of understanding  - Provide teaching via preferred learning methods  Outcome: Progressing

## 2024-12-26 NOTE — ASSESSMENT & PLAN NOTE
Patient with recent admission with multifocal pneumonia, reports worsening symptoms cough, myalgias, chest congestion  98% on RA  Afebrile and without leukocytosis  Ceftriaxone 2000 mg IV daily  De-escalate antibiotics as appropriate  CT: Stable multifocal pneumonia primarily involving the right lung. No effusion. No evidence of pulmonary embolus.

## 2024-12-26 NOTE — ASSESSMENT & PLAN NOTE
Patient with recent IV  Redness noted at site  Ceftriaxone 2000 mg IV daily  Serial exam  Trend fever curve/WBC count/procalcitonin

## 2024-12-26 NOTE — ASSESSMENT & PLAN NOTE
Lab Results   Component Value Date    EGFR 63 12/26/2024    EGFR 50 12/25/2024    EGFR 59 12/23/2024    CREATININE 1.03 12/26/2024    CREATININE 1.23 12/25/2024    CREATININE 1.08 12/23/2024   Creatinine stable

## 2024-12-26 NOTE — PROGRESS NOTES
Progress Note - Hospitalist   Name: Ericka Castillo 49 y.o. female I MRN: [id number]  Unit/Bed#: -01 I Date of Admission: 12/25/2024   Date of Service: 12/26/2024 I Hospital Day: 0     Assessment & Plan  Right arm cellulitis  Patient with recent IV  Redness noted at site  Ceftriaxone 2000 mg IV daily  Serial exam  Trend fever curve/WBC count/procalcitonin  Community acquired pneumonia of both lungs  Patient with recent admission with multifocal pneumonia, reports worsening symptoms cough, myalgias, chest congestion  98% on RA  Afebrile and without leukocytosis  Ceftriaxone 2000 mg IV daily  De-escalate antibiotics as appropriate  CT: Stable multifocal pneumonia primarily involving the right lung. No effusion. No evidence of pulmonary embolus.   Bipolar 2 disorder (HCC)  Continue home Zyprexa, Wellbutrin, Lamictal  Atarax as needed  Acquired hypothyroidism  Continue home levothyroxine  Essential hypertension  Continue home meds with hold parameter  Stage 3a chronic kidney disease (HCC)  Lab Results   Component Value Date    EGFR 63 12/26/2024    EGFR 50 12/25/2024    EGFR 59 12/23/2024    CREATININE 1.03 12/26/2024    CREATININE 1.23 12/25/2024    CREATININE 1.08 12/23/2024   Creatinine stable  Continuous opioid dependence (HCC)  Secondary to chronic pain  PDMP reviewed  Norco 5/325 filled 11/29/2024 with 60 tabs  Continue pain meds  Follow-up pain management as recommend  Right upper quadrant pain  Patient reported intermittent right upper quadrant pain  CT status post cholecystectomy.  Constipation noted  Liver enzymes normal   monitor    VTE Pharmacologic Prophylaxis: VTE Score: 2 Moderate Risk (Score 3-4) - Pharmacological DVT Prophylaxis Ordered: enoxaparin (Lovenox).    Mobility:   Basic Mobility Inpatient Raw Score: 23  JH-HLM Goal: 7: Walk 25 feet or more  JH-HLM Achieved: 7: Walk 25 feet or more  JH-HLM Goal achieved. Continue to encourage appropriate mobility.    Patient Centered Rounds: I  performed bedside rounds with nursing staff today.     Discussions with Specialists or Other Care Team Provider: Nursing    Education and Discussions with Family / Patient: Patient declined call to .     Current Length of Stay: 0 day(s)  Current Patient Status: Observation   Certification Statement: The patient will continue to require additional inpatient hospital stay due to community-acquired pneumonia  Discharge Plan: Anticipate discharge tomorrow to home.    Code Status: Level 1 - Full Code    Subjective   Patient seen and examined at bedside.  No acute events overnight.  Patient endorses cough and musculoskeletal chest pain.  Currently on IV antibiotics for multifocal pneumonia.  Plan for discharge to home on oral antibiotics on 12/27/2024.    Objective :  Temp:  [97.7 °F (36.5 °C)-98.7 °F (37.1 °C)] 97.9 °F (36.6 °C)  HR:  [] 82  BP: ()/(55-72) 98/59  Resp:  [16-19] 19  SpO2:  [97 %-100 %] 98 %  O2 Device: None (Room air)    Body mass index is 21.26 kg/m².     Input and Output Summary (last 24 hours):     Intake/Output Summary (Last 24 hours) at 12/26/2024 0808  Last data filed at 12/25/2024 2102  Gross per 24 hour   Intake 50 ml   Output --   Net 50 ml       Physical Exam  Vitals and nursing note reviewed.   Constitutional:       General: She is not in acute distress.     Appearance: She is well-developed.   HENT:      Head: Normocephalic and atraumatic.   Eyes:      Conjunctiva/sclera: Conjunctivae normal.   Cardiovascular:      Rate and Rhythm: Normal rate and regular rhythm.      Heart sounds: No murmur heard.  Pulmonary:      Effort: Pulmonary effort is normal. No respiratory distress.      Breath sounds: Normal breath sounds.   Abdominal:      Palpations: Abdomen is soft.      Tenderness: There is no abdominal tenderness.   Musculoskeletal:         General: No swelling.      Cervical back: Neck supple.   Skin:     General: Skin is warm and dry.      Capillary Refill: Capillary  refill takes less than 2 seconds.   Neurological:      Mental Status: She is alert.   Psychiatric:         Mood and Affect: Mood normal.         Lab Results: I have reviewed the following results:   Results from last 7 days   Lab Units 12/26/24 0448 12/25/24 2000 12/23/24  0504 12/22/24  0725   WBC Thousand/uL 4.40 6.76   < > 17.87*   HEMOGLOBIN g/dL 9.9* 10.2*   < > 11.5   HEMATOCRIT % 30.3* 31.7*   < > 35.1   PLATELETS Thousands/uL 309 312   < > 293   BANDS PCT %  --   --   --  19*   SEGS PCT %  --  74   < >  --    LYMPHO PCT %  --  11*   < > 10*   MONO PCT %  --  10   < > 3*   EOS PCT %  --  4   < > 1    < > = values in this interval not displayed.     Results from last 7 days   Lab Units 12/26/24 0448 12/25/24 2000   SODIUM mmol/L 141 139   POTASSIUM mmol/L 4.2 4.0   CHLORIDE mmol/L 108 104   CO2 mmol/L 25 27   BUN mg/dL 13 18   CREATININE mg/dL 1.03 1.23   ANION GAP mmol/L 8 8   CALCIUM mg/dL 8.9 9.1   ALBUMIN g/dL  --  3.7   TOTAL BILIRUBIN mg/dL  --  0.33   ALK PHOS U/L  --  73   ALT U/L  --  34   AST U/L  --  27   GLUCOSE RANDOM mg/dL 95 100     Results from last 7 days   Lab Units 12/22/24  0725   INR  1.07         Results from last 7 days   Lab Units 12/23/24  0504   HEMOGLOBIN A1C % 5.1     Results from last 7 days   Lab Units 12/26/24 0448 12/25/24 2000 12/23/24  0504 12/22/24  0938 12/22/24  0725   LACTIC ACID mmol/L  --  0.7  --  1.5 2.5*   PROCALCITONIN ng/ml 3.01* 3.89* 14.60*  --  24.83*       Recent Cultures (last 7 days):   Results from last 7 days   Lab Units 12/22/24  1016 12/22/24  0729 12/22/24  0725   BLOOD CULTURE   --  No Growth at 72 hrs. No Growth at 72 hrs.   LEGIONELLA URINARY ANTIGEN  Negative  --   --        Imaging Results Review: I reviewed radiology reports from this admission including: CT chest.  Other Study Results Review: No additional pertinent studies reviewed.    Last 24 Hours Medication List:     Current Facility-Administered Medications:     acetaminophen (TYLENOL)  tablet 650 mg, Q4H PRN    azithromycin (ZITHROMAX) tablet 250 mg, Q24H    baclofen tablet 20 mg, TID    buPROPion (WELLBUTRIN XL) 24 hr tablet 450 mg, Daily    cefTRIAXone (ROCEPHIN) IVPB (premix in dextrose) 2,000 mg 50 mL, Q24H    enoxaparin (LOVENOX) subcutaneous injection 40 mg, Daily    famotidine (PEPCID) tablet 20 mg, Daily    gabapentin (NEURONTIN) capsule 800 mg, TID    hydrOXYzine HCL (ATARAX) tablet 25 mg, Q6H PRN    lamoTRIgine (LaMICtal) tablet 100 mg, BID    levothyroxine tablet 50 mcg, Early Morning    lisinopril (ZESTRIL) tablet 5 mg, Daily    OLANZapine (ZyPREXA) tablet 10 mg, HS    ondansetron (ZOFRAN) injection 4 mg, Q6H PRN    oxyCODONE (ROXICODONE) IR tablet 5 mg, Q6H PRN    pantoprazole (PROTONIX) EC tablet 20 mg, BID AC    polyethylene glycol (MIRALAX) packet 17 g, Daily    zolpidem (AMBIEN) tablet 10 mg, HS PRN    Administrative Statements   Today, Patient Was Seen By: Rosalio Dexter, DO  I have spent a total time of 35 minutes in caring for this patient on the day of the visit/encounter including Diagnostic results, Prognosis, Risks and benefits of tx options, Instructions for management, Patient and family education, Importance of tx compliance, Risk factor reductions, Impressions, Counseling / Coordination of care, Documenting in the medical record, Reviewing / ordering tests, medicine, procedures  , Obtaining or reviewing history  , and Communicating with other healthcare professionals .    **Please Note: This note may have been constructed using a voice recognition system.**

## 2024-12-26 NOTE — ASSESSMENT & PLAN NOTE
Patient with recent IV, noted redness at site  IV abx  Serial exam  Procal 3.89, continue to trend  Lactic acid 0.7
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
## 2024-12-26 NOTE — ASSESSMENT & PLAN NOTE
Lab Results   Component Value Date    EGFR 50 12/25/2024    EGFR 59 12/23/2024    EGFR 51 12/22/2024    CREATININE 1.23 12/25/2024    CREATININE 1.08 12/23/2024    CREATININE 1.21 12/22/2024   Creatinine stable

## 2024-12-26 NOTE — PLAN OF CARE
Problem: PAIN - ADULT  Goal: Verbalizes/displays adequate comfort level or baseline comfort level  Description: Interventions:  - Encourage patient to monitor pain and request assistance  - Assess pain using appropriate pain scale  - Administer analgesics based on type and severity of pain and evaluate response  - Implement non-pharmacological measures as appropriate and evaluate response  - Consider cultural and social influences on pain and pain management  - Notify physician/advanced practitioner if interventions unsuccessful or patient reports new pain  Outcome: Progressing     Problem: INFECTION - ADULT  Goal: Absence or prevention of progression during hospitalization  Description: INTERVENTIONS:  - Assess and monitor for signs and symptoms of infection  - Monitor lab/diagnostic results  - Monitor all insertion sites, i.e. indwelling lines, tubes, and drains  - Monitor endotracheal if appropriate and nasal secretions for changes in amount and color  - [place] appropriate cooling/warming therapies per order  - Administer medications as ordered  - Instruct and encourage patient and family to use good hand hygiene technique  - Identify and instruct in appropriate isolation precautions for identified infection/condition  Outcome: Progressing     Problem: SAFETY ADULT  Goal: Maintain or return to baseline ADL function  Description: INTERVENTIONS:  -  Assess patient's ability to carry out ADLs; assess patient's baseline for ADL function and identify physical deficits which impact ability to perform ADLs (bathing, care of mouth/teeth, toileting, grooming, dressing, etc.)  - Assess/evaluate cause of self-care deficits   - Assess range of motion  - Assess patient's mobility; develop plan if impaired  - Assess patient's need for assistive devices and provide as appropriate  - Encourage maximum independence but intervene and supervise when necessary  - Involve family in performance of ADLs  - Assess for home care  needs following discharge   - Consider OT consult to assist with ADL evaluation and planning for discharge  - Provide patient education as appropriate  Outcome: Progressing     Problem: RESPIRATORY - ADULT  Goal: Achieves optimal ventilation and oxygenation  Description: INTERVENTIONS:  - Assess for changes in respiratory status  - Assess for changes in mentation and behavior  - Position to facilitate oxygenation and minimize respiratory effort  - Oxygen administered by appropriate delivery if ordered  - Initiate smoking cessation education as indicated  - Encourage broncho-pulmonary hygiene including cough, deep breathe, Incentive Spirometry  - Assess the need for suctioning and aspirate as needed  - Assess and instruct to report SOB or any respiratory difficulty  - Respiratory Therapy support as indicated  Outcome: Progressing
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
## 2024-12-26 NOTE — ED NOTES
Ambulatory pulse ox completed. Pt ambulated with a steady gait down the hallway and maintained an O2 saturation of 97-98%     Georgina Santos RN  12/25/24 [id number]

## 2024-12-26 NOTE — ASSESSMENT & PLAN NOTE
Patient reported intermittent right upper quadrant pain  CT status post cholecystectomy.  Constipation noted  Liver enzymes normal   monitor

## 2024-12-26 NOTE — ASSESSMENT & PLAN NOTE
Patient with recent admission with multifocal pneumonia, reports worsening symptoms cough, myalgias, chest congestion  98% on RA  Wbc normal  monitor  CT: Stable multifocal pneumonia primarily involving the right lung. No effusion. No evidence of pulmonary embolus.

## 2024-12-26 NOTE — TELEPHONE ENCOUNTER
S/W pt, she is currently admitted with pneumonia.  Will need to be recovered for 1/7 procedure    Pain rating today:  states she is feeling miserable but overall her cervical pain has improved.  Declines rating cervical pain at this time due to her current illness  Advised pt if he/she does get pain to take prescribed or OTC pain medications and/or use ice/heat and it will take 4-6 weeks to take full effect. Pt verbalized understanding.  Confirmed next appt with pt.

## 2024-12-27 ENCOUNTER — TELEPHONE (OUTPATIENT)
Dept: FAMILY MEDICINE CLINIC | Facility: CLINIC | Age: 49
End: 2024-12-27

## 2024-12-27 ENCOUNTER — TRANSITIONAL CARE MANAGEMENT (OUTPATIENT)
Dept: FAMILY MEDICINE CLINIC | Facility: CLINIC | Age: 49
End: 2024-12-27

## 2024-12-27 ENCOUNTER — TELEPHONE (OUTPATIENT)
Age: 49
End: 2024-12-27

## 2024-12-27 VITALS
WEIGHT: 127.76 LBS | SYSTOLIC BLOOD PRESSURE: 112 MMHG | BODY MASS INDEX: 21.29 KG/M2 | RESPIRATION RATE: 16 BRPM | TEMPERATURE: 98.5 F | HEART RATE: 86 BPM | DIASTOLIC BLOOD PRESSURE: 67 MMHG | HEIGHT: 65 IN | OXYGEN SATURATION: 95 %

## 2024-12-27 DIAGNOSIS — G89.4 CHRONIC PAIN SYNDROME: ICD-10-CM

## 2024-12-27 LAB
ANION GAP SERPL CALCULATED.3IONS-SCNC: 6 MMOL/L (ref 4–13)
BACTERIA BLD CULT: NORMAL
BACTERIA BLD CULT: NORMAL
BASOPHILS # BLD AUTO: 0.02 THOUSANDS/ÂΜL (ref 0–0.1)
BASOPHILS NFR BLD AUTO: 0 % (ref 0–1)
BUN SERPL-MCNC: 16 MG/DL (ref 5–25)
CALCIUM SERPL-MCNC: 9.4 MG/DL (ref 8.4–10.2)
CHLORIDE SERPL-SCNC: 104 MMOL/L (ref 96–108)
CO2 SERPL-SCNC: 30 MMOL/L (ref 21–32)
CREAT SERPL-MCNC: 1.04 MG/DL (ref 0.6–1.3)
EOSINOPHIL # BLD AUTO: 0.27 THOUSAND/ÂΜL (ref 0–0.61)
EOSINOPHIL NFR BLD AUTO: 5 % (ref 0–6)
ERYTHROCYTE [DISTWIDTH] IN BLOOD BY AUTOMATED COUNT: 11.9 % (ref 11.6–15.1)
GFR SERPL CREATININE-BSD FRML MDRD: 62 ML/MIN/1.73SQ M
GLUCOSE SERPL-MCNC: 97 MG/DL (ref 65–140)
HCT VFR BLD AUTO: 35.6 % (ref 34.8–46.1)
HGB BLD-MCNC: 11.2 G/DL (ref 11.5–15.4)
IMM GRANULOCYTES # BLD AUTO: 0.05 THOUSAND/UL (ref 0–0.2)
IMM GRANULOCYTES NFR BLD AUTO: 1 % (ref 0–2)
LYMPHOCYTES # BLD AUTO: 0.91 THOUSANDS/ÂΜL (ref 0.6–4.47)
LYMPHOCYTES NFR BLD AUTO: 17 % (ref 14–44)
MAGNESIUM SERPL-MCNC: 2.2 MG/DL (ref 1.9–2.7)
MCH RBC QN AUTO: 33.8 PG (ref 26.8–34.3)
MCHC RBC AUTO-ENTMCNC: 31.5 G/DL (ref 31.4–37.4)
MCV RBC AUTO: 108 FL (ref 82–98)
MONOCYTES # BLD AUTO: 0.47 THOUSAND/ÂΜL (ref 0.17–1.22)
MONOCYTES NFR BLD AUTO: 9 % (ref 4–12)
NEUTROPHILS # BLD AUTO: 3.66 THOUSANDS/ÂΜL (ref 1.85–7.62)
NEUTS SEG NFR BLD AUTO: 68 % (ref 43–75)
NRBC BLD AUTO-RTO: 0 /100 WBCS
PHOSPHATE SERPL-MCNC: 5 MG/DL (ref 2.7–4.5)
PLATELET # BLD AUTO: 385 THOUSANDS/UL (ref 149–390)
PMV BLD AUTO: 9 FL (ref 8.9–12.7)
POTASSIUM SERPL-SCNC: 4.8 MMOL/L (ref 3.5–5.3)
PROCALCITONIN SERPL-MCNC: 1.88 NG/ML
RBC # BLD AUTO: 3.31 MILLION/UL (ref 3.81–5.12)
SODIUM SERPL-SCNC: 140 MMOL/L (ref 135–147)
WBC # BLD AUTO: 5.38 THOUSAND/UL (ref 4.31–10.16)

## 2024-12-27 PROCEDURE — 83735 ASSAY OF MAGNESIUM: CPT | Performed by: INTERNAL MEDICINE

## 2024-12-27 PROCEDURE — 99239 HOSP IP/OBS DSCHRG MGMT >30: CPT | Performed by: INTERNAL MEDICINE

## 2024-12-27 PROCEDURE — 85025 COMPLETE CBC W/AUTO DIFF WBC: CPT | Performed by: INTERNAL MEDICINE

## 2024-12-27 PROCEDURE — 84100 ASSAY OF PHOSPHORUS: CPT | Performed by: INTERNAL MEDICINE

## 2024-12-27 PROCEDURE — 84145 PROCALCITONIN (PCT): CPT | Performed by: INTERNAL MEDICINE

## 2024-12-27 PROCEDURE — 80048 BASIC METABOLIC PNL TOTAL CA: CPT | Performed by: INTERNAL MEDICINE

## 2024-12-27 RX ORDER — FLUCONAZOLE 200 MG/1
200 TABLET ORAL ONCE
Qty: 1 TABLET | Refills: 0 | Status: SHIPPED | OUTPATIENT
Start: 2024-12-27 | End: 2024-12-27

## 2024-12-27 RX ORDER — HYDROCODONE BITARTRATE AND ACETAMINOPHEN 5; 325 MG/1; MG/1
1 TABLET ORAL 2 TIMES DAILY PRN
Qty: 60 TABLET | Refills: 0 | Status: SHIPPED | OUTPATIENT
Start: 2024-12-27 | End: 2025-01-26

## 2024-12-27 RX ADMIN — PANTOPRAZOLE SODIUM 20 MG: 20 TABLET, DELAYED RELEASE ORAL at 06:09

## 2024-12-27 RX ADMIN — OXYCODONE HYDROCHLORIDE 10 MG: 10 TABLET ORAL at 06:09

## 2024-12-27 RX ADMIN — LAMOTRIGINE 100 MG: 100 TABLET ORAL at 09:18

## 2024-12-27 RX ADMIN — LISINOPRIL 5 MG: 5 TABLET ORAL at 09:19

## 2024-12-27 RX ADMIN — MORPHINE SULFATE 1 MG: 2 INJECTION, SOLUTION INTRAMUSCULAR; INTRAVENOUS at 09:31

## 2024-12-27 RX ADMIN — FAMOTIDINE 20 MG: 20 TABLET, FILM COATED ORAL at 09:18

## 2024-12-27 RX ADMIN — OXYCODONE HYDROCHLORIDE 5 MG: 5 TABLET ORAL at 12:12

## 2024-12-27 RX ADMIN — BACLOFEN 20 MG: 10 TABLET ORAL at 09:18

## 2024-12-27 RX ADMIN — LEVOTHYROXINE SODIUM 50 MCG: 50 TABLET ORAL at 06:09

## 2024-12-27 RX ADMIN — GABAPENTIN 800 MG: 400 CAPSULE ORAL at 09:18

## 2024-12-27 RX ADMIN — BUPROPION HYDROCHLORIDE 450 MG: 150 TABLET, EXTENDED RELEASE ORAL at 09:17

## 2024-12-27 NOTE — CASE MANAGEMENT
Case Management Assessment & Discharge Planning Note    Patient name Ericka Castillo  Location /-01 MRN [id number]  : 1975 Date 2024       Current Admission Date: 2024  Current Admission Diagnosis:Right arm cellulitis   Patient Active Problem List    Diagnosis Date Noted Date Diagnosed    Right upper quadrant pain 2024     Right arm cellulitis 2024     Community acquired pneumonia of both lungs 2024     Pancreatic duct dilated 2024     Bilateral occipital neuralgia 04/10/2024     Secondary hyperparathyroidism (HCC) 2024     Cervical radiculopathy 2023     Acute right-sided weakness 2023     Cervical spondylosis 10/27/2022     History of fusion of cervical spine 10/27/2022     Thoracic back pain 2022     Myofascial pain syndrome 2022     Continuous opioid dependence (HCC) 2022     Chronic pain syndrome 2021     Intestinal metaplasia of stomach 10/05/2021     Irregular bowel habits 2021     Headache, tension-type 2021     PTSD (post-traumatic stress disorder) 2021     Chronic tubulointerstitial nephritis 2021     Benign hypertension with CKD (chronic kidney disease) stage III (HCC) 2021     Grade A2 albuminuria 2021     High vitamin D level 2021     Stage 3a chronic kidney disease (HCC) 2021     IUD (intrauterine device) in place 2021     Hypercholesterolemia 2021     Depression 2020     Essential hypertension 2020     Tendinitis of right ankle 2019     Chronic bilateral low back pain without sciatica 12/15/2018     Bipolar 2 disorder (HCC) 10/23/2018     Acquired hypothyroidism 10/23/2018     Arthritis 10/23/2018     Anxiety 2016       LOS (days): 0  Geometric Mean LOS (GMLOS) (days):   Days to GMLOS:     OBJECTIVE:  PATIENT READMITTED TO HOSPITAL  Risk of Unplanned Readmission Score: 22.04         Current admission status:  Inpatient  Referral Reason: Other (d/c planning)    Preferred Pharmacy:   [facility] [employer] [employer] [facility] - [address]  [address] [place] [address]  Phone: [phone] Fax: [phone]    Primary Care Provider: FIONA Hawkins    Primary Insurance: BLUE CROSS  Secondary Insurance: [employer]    ASSESSMENT:  Active Health Care Proxies    There are no active Health Care Proxies on file.       Advance Directives  Does patient have a Health Care POA?: No  Was patient offered paperwork?: Yes (paperwork was given)  Does patient have Advance Directives?: No  Was patient offered paperwork?: Yes (paperwork was given)         Readmission [facility]  30 Day Readmission: Yes  During your hospital stay, did someone (provider, nurse, ) explain your care to you in a way you could understand?: Yes  Did you feel medically stable to leave the hospital?: Yes (pt stated she really wanted to be home for [place])  Were you able to pay for your medication at the pharmacy?: Yes  Did you have reliable transportation to take you to your appointments?: Yes  During previous admission, was a post-acute recommendation made?: No  Patient was readmitted due to: right arm cellulitis  Action Plan: folow up care    Patient Information  Admitted from:: Home  Mental Status: Alert  During Assessment patient was accompanied by: Parent, Other-Comment (cory)  Assessment information provided by:: Patient  Primary Caregiver: Self  Support Systems: Children, Parent  County of Residence: [place]  What [facility] do you live in?: [place]  Home entry access options. Select all that apply.: Stairs  Number of steps to enter home.: 5  Do the steps have railings?: Yes  Type of Current Residence: [facility] (12 steps to the basement)  Living Arrangements: Lives w/ Children, Lives w/ Parent(s) (lives with mother & children)  Is patient a ?: No    Activities of Daily Living Prior to  Admission  Functional Status: Independent  Completes ADLs independently?: Yes  Ambulates independently?: Yes  Does patient use assisted devices?: No  Does patient currently own DME?: No  Does patient have a history of Outpatient Therapy (PT/OT)?: Yes  Does the patient have a history of Short-Term Rehab?: No  Does patient have a history of HHC?: No  Does patient currently have HHC?: No         Patient Information Continued  Income Source: Employed  Does patient have prescription coverage?: Yes ([employer] [place])  Does patient receive dialysis treatments?: No  Does patient have a history of substance abuse?: No  Does patient have a history of Mental Health Diagnosis?: Yes (depression  bipolar)  Is patient receiving treatment for mental health?: Yes (medication- Dr. Cruz Leon)  Has patient received inpatient treatment related to mental health in the last 2 years?: No         Means of Transportation  Means of Transport to Appts:: Drives Self          DISCHARGE DETAILS:    Discharge planning discussed with:: pt's mother & her children at the bedside  Freedom of Choice: Yes  Comments - Freedom of Choice: pt denies any d/c needs - cm will continue to follow and assess for any additonal d/c needs  CM contacted family/caregiver?: Yes             Contacts  Patient Contacts: Yamilka Montano  Relationship to Patient:: Family (mother)  Contact Method: In Person  Reason/Outcome: Discharge Planning    Requested Home Health Care         Is the patient interested in HHC at discharge?: No    DME Referral Provided  Referral made for DME?: No    Other Referral/Resources/Interventions Provided:  Referral Comments: ct- IV antibiotic- pt nto stable for d/c         Treatment Team Recommendation: Home (home with family & outpt follow up- family)

## 2024-12-27 NOTE — ASSESSMENT & PLAN NOTE
Lab Results   Component Value Date    EGFR 62 12/27/2024    EGFR 63 12/26/2024    EGFR 50 12/25/2024    CREATININE 1.04 12/27/2024    CREATININE 1.03 12/26/2024    CREATININE 1.23 12/25/2024   Creatinine stable

## 2024-12-27 NOTE — DISCHARGE SUMMARY
Discharge Summary - Hospitalist   Name: Ericka Castillo 49 y.o. female I MRN: [id number]  Unit/Bed#: -01 I Date of Admission: 12/25/2024   Date of Service: 12/27/2024 I Hospital Day: 1     Assessment & Plan  Right arm cellulitis  Patient with recent IV  Redness noted at site  Ceftriaxone 2000 mg IV daily  Serial exam  Trend fever curve/WBC count/procalcitonin  Community acquired pneumonia of both lungs  Patient with recent admission with multifocal pneumonia, reports worsening symptoms cough, myalgias, chest congestion  98% on RA  Afebrile and without leukocytosis  Ceftriaxone 2000 mg IV daily  De-escalate antibiotics as appropriate  CT: Stable multifocal pneumonia primarily involving the right lung. No effusion. No evidence of pulmonary embolus.   Bipolar 2 disorder (HCC)  Continue home Zyprexa, Wellbutrin, Lamictal  Atarax as needed  Acquired hypothyroidism  Continue home levothyroxine  Essential hypertension  Continue home meds with hold parameter  Stage 3a chronic kidney disease (HCC)  Lab Results   Component Value Date    EGFR 62 12/27/2024    EGFR 63 12/26/2024    EGFR 50 12/25/2024    CREATININE 1.04 12/27/2024    CREATININE 1.03 12/26/2024    CREATININE 1.23 12/25/2024   Creatinine stable  Continuous opioid dependence (HCC)  Secondary to chronic pain  PDMP reviewed  Norco 5/325 filled 11/29/2024 with 60 tabs  Continue pain meds  Follow-up pain management as recommend  Right upper quadrant pain  Patient reported intermittent right upper quadrant pain  CT status post cholecystectomy.  Constipation noted  Liver enzymes normal   monitor     Medical Problems       Resolved Problems  Date Reviewed: 12/12/2024   None       Discharging Physician / Practitioner: Rosalio Dexter DO  PCP: FIONA Hawkins  Admission Date:   Admission Orders (From admission, onward)       Ordered        12/26/24 0812  INPATIENT ADMISSION  Once            12/25/24 2254  Place in Observation  Once                           Discharge Date: 12/27/24    Consultations During Hospital Stay:  Not applicable    Procedures Performed:   Not applicable    Significant Findings / Test Results:   Not applicable    Incidental Findings:   Not applicable    Test Results Pending at Discharge (will require follow up):   Not applicable     Outpatient Tests Requested:  Not applicable    Complications: Not applicable    Reason for Admission: Shortness of breath    Hospital Course:   Ericka Castillo is a 49 y.o. female with a PMH of bipolar disorder with depression and anxiety, GERD, hypertension, hypothyroidism, chronic pain with continuous opioid dependence, chronic kidney disease stage IIIa, recent admission for multifocal pneumonia who presents with worsening symptoms with cough, congestion and myalgias.  Patient with recent hospitalization 12/22-12/23/24 secondary to multifocal pneumonia and was discharged home on IV antibiotics with cefdinir and azithromycin with a repeat CT chest recommended in 4 to 6 weeks.  She returns secondary to worsening symptoms with cough, congestion and myalgias.  She was scanned with his repeat CT that noted stable multifocal pneumonia, patient is on room air.  Of note she did have some right arm redness consistent with cellulitis and was started on IV antibiotics and admission was requested. Patient reports RUQ pain, that comes and goes, was given IV dose dilaudid, very lethargic after.     The patient remained hemodynamically stable and saturating well on room air throughout her hospital course.  Infection numbers have completely resolved.  The patient will be prescribed a 7-day course of Augmentin in the setting of community-acquired pneumonia.  She was strongly encouraged to resume all PTA medications and to follow-up with her PCP within 7 days 14 days.  This serves as a brief discharge summary.  Please see full medical record for more details.    Condition at Discharge: stable    Discharge Day Visit / Exam:  "  Subjective:  Patient seen and examined at bedside. No acute events overnight. Denies chest pain, SOB, diaphoresis, nausea/vomiting/diarrhea, fevers/chills.     Vitals: Blood Pressure: 108/70 (12/27/24 0746)  Pulse: 86 (12/27/24 0746)  Temperature: 98.5 °F (36.9 °C) (12/26/24 2243)  Temp Source: Oral (12/26/24 1456)  Respirations: 16 (12/27/24 0746)  Height: 5' 5\" (165.1 cm) (12/25/24 2318)  Weight - Scale: 58 kg (127 lb 12.1 oz) (12/25/24 2313)  SpO2: 95 % (12/27/24 0746)    Physical Exam  Vitals and nursing note reviewed.   Constitutional:       General: She is not in acute distress.     Appearance: She is well-developed.   HENT:      Head: Normocephalic and atraumatic.   Eyes:      Conjunctiva/sclera: Conjunctivae normal.   Cardiovascular:      Rate and Rhythm: Normal rate and regular rhythm.      Heart sounds: No murmur heard.  Pulmonary:      Effort: Pulmonary effort is normal. No respiratory distress.      Breath sounds: Normal breath sounds.   Abdominal:      Palpations: Abdomen is soft.      Tenderness: There is no abdominal tenderness.   Musculoskeletal:         General: No swelling.      Cervical back: Neck supple.   Skin:     General: Skin is warm and dry.      Capillary Refill: Capillary refill takes less than 2 seconds.   Neurological:      Mental Status: She is alert.   Psychiatric:         Mood and Affect: Mood normal.       Discussion with Family: Patient declined call to .     Discharge instructions/Information to patient and family:   See after visit summary for information provided to patient and family.      Provisions for Follow-Up Care:  See after visit summary for information related to follow-up care and any pertinent home health orders.      Mobility at time of Discharge:   Basic Mobility Inpatient Raw Score: 23  JH-HLM Goal: 7: Walk 25 feet or more  JH-HLM Achieved: 7: Walk 25 feet or more  HLM Goal achieved. Continue to encourage appropriate mobility.     Disposition: "   Home    Planned Readmission: Not applicable    Discharge Medications:  See after visit summary for reconciled discharge medications provided to patient and/or family.      Administrative Statements   Discharge Statement:  I have spent a total time of 65 minutes in caring for this patient on the day of the visit/encounter. >30 minutes of time was spent on: Diagnostic results, Prognosis, Risks and benefits of tx options, Instructions for management, Patient and family education, Importance of tx compliance, Risk factor reductions, Impressions, Counseling / Coordination of care, Documenting in the medical record, Reviewing / ordering tests, medicine, procedures  , and Communicating with other healthcare professionals .    **Please Note: This note may have been constructed using a voice recognition system**

## 2024-12-27 NOTE — PLAN OF CARE
Problem: PAIN - ADULT  Goal: Verbalizes/displays adequate comfort level or baseline comfort level  Description: Interventions:  - Encourage patient to monitor pain and request assistance  - Assess pain using appropriate pain scale  - Administer analgesics based on type and severity of pain and evaluate response  - Implement non-pharmacological measures as appropriate and evaluate response  - Consider cultural and social influences on pain and pain management  - Notify physician/advanced practitioner if interventions unsuccessful or patient reports new pain  Outcome: Progressing     Problem: INFECTION - ADULT  Goal: Absence or prevention of progression during hospitalization  Description: INTERVENTIONS:  - Assess and monitor for signs and symptoms of infection  - Monitor lab/diagnostic results  - Monitor all insertion sites, i.e. indwelling lines, tubes, and drains  - Monitor endotracheal if appropriate and nasal secretions for changes in amount and color  - [place] appropriate cooling/warming therapies per order  - Administer medications as ordered  - Instruct and encourage patient and family to use good hand hygiene technique  - Identify and instruct in appropriate isolation precautions for identified infection/condition  Outcome: Progressing  Goal: Absence of fever/infection during neutropenic period  Description: INTERVENTIONS:  - Monitor WBC    Outcome: Progressing     Problem: SAFETY ADULT  Goal: Maintain or return to baseline ADL function  Description: INTERVENTIONS:  -  Assess patient's ability to carry out ADLs; assess patient's baseline for ADL function and identify physical deficits which impact ability to perform ADLs (bathing, care of mouth/teeth, toileting, grooming, dressing, etc.)  - Assess/evaluate cause of self-care deficits   - Assess range of motion  - Assess patient's mobility; develop plan if impaired  - Assess patient's need for assistive devices and provide as appropriate  - Encourage  maximum independence but intervene and supervise when necessary  - Involve family in performance of ADLs  - Assess for home care needs following discharge   - Consider OT consult to assist with ADL evaluation and planning for discharge  - Provide patient education as appropriate  Outcome: Progressing     Problem: DISCHARGE PLANNING  Goal: Discharge to home or other facility with appropriate resources  Description: INTERVENTIONS:  - Identify barriers to discharge w/patient and caregiver  - Arrange for needed discharge resources and transportation as appropriate  - Identify discharge learning needs (meds, wound care, etc.)  - Arrange for interpretive services to assist at discharge as needed  - Refer to Case Management Department for coordinating discharge planning if the patient needs post-hospital services based on physician/advanced practitioner order or complex needs related to functional status, cognitive ability, or social support system  Outcome: Progressing     Problem: Knowledge Deficit  Goal: Patient/family/caregiver demonstrates understanding of disease process, treatment plan, medications, and discharge instructions  Description: Complete learning assessment and assess knowledge base.  Interventions:  - Provide teaching at level of understanding  - Provide teaching via preferred learning methods  Outcome: Progressing     Problem: CARDIOVASCULAR - ADULT  Goal: Maintains optimal cardiac output and hemodynamic stability  Description: INTERVENTIONS:  - Monitor I/O, vital signs and rhythm  - Monitor for S/S and trends of decreased cardiac output  - Administer and titrate ordered vasoactive medications to optimize hemodynamic stability  - Assess quality of pulses, skin color and temperature  - Assess for signs of decreased coronary artery perfusion  - Instruct patient to report change in severity of symptoms  Outcome: Progressing  Goal: Absence of cardiac dysrhythmias or at baseline rhythm  Description:  INTERVENTIONS:  - Continuous cardiac monitoring, vital signs, obtain 12 lead EKG if ordered  - Administer antiarrhythmic and heart rate control medications as ordered  - Monitor electrolytes and administer replacement therapy as ordered  Outcome: Progressing     Problem: RESPIRATORY - ADULT  Goal: Achieves optimal ventilation and oxygenation  Description: INTERVENTIONS:  - Assess for changes in respiratory status  - Assess for changes in mentation and behavior  - Position to facilitate oxygenation and minimize respiratory effort  - Oxygen administered by appropriate delivery if ordered  - Initiate smoking cessation education as indicated  - Encourage broncho-pulmonary hygiene including cough, deep breathe, Incentive Spirometry  - Assess the need for suctioning and aspirate as needed  - Assess and instruct to report SOB or any respiratory difficulty  - Respiratory Therapy support as indicated  Outcome: Progressing

## 2024-12-27 NOTE — UTILIZATION REVIEW
Continued Stay Review    Date: 12/27 Day 2                          Current Patient Class: Inpatient  Current Level of Care: MS    HPI:49 y.o. female initially admitted on 12/26     Assessment/Plan: No acute ovn events.  Pt's symptoms improved. Remained satting well on RA. Cont IV CTX as IP. Cont pain control prn. Will be prescribed 7-day course of Augmentin at [place]. Resume all PTA med. F/u w/ PCP.         Medications:   Scheduled Medications:  baclofen, 20 mg, Oral, TID  buPROPion, 450 mg, Oral, Daily  cefTRIAXone, 2,000 mg, Intravenous, Q24H  enoxaparin, 40 mg, Subcutaneous, Daily  famotidine, 20 mg, Oral, Daily  gabapentin, 800 mg, Oral, TID  lamoTRIgine, 100 mg, Oral, BID  levothyroxine, 50 mcg, Oral, Early Morning  lisinopril, 5 mg, Oral, Daily  OLANZapine, 10 mg, Oral, HS  pantoprazole, 20 mg, Oral, BID AC  polyethylene glycol, 17 g, Oral, Daily      Continuous IV Infusions:     PRN Meds:  hydrOXYzine HCL, 25 mg, Oral, Q6H PRN  ibuprofen, 800 mg, Oral, Q6H PRN  morphine injection, 1 mg, Intravenous, Q4H PRN 12/27 x 1  ondansetron, 4 mg, Intravenous, Q6H PRN  oxyCODONE, 10 mg, Oral, Q6H PRN 12/27 x 1  oxyCODONE, 5 mg, Oral, Q6H PRN  zolpidem, 10 mg, Oral, HS PRN      Discharge Plan: TBD    Vital Signs (last 3 days)       Date/Time Temp Pulse Resp BP MAP (mmHg) SpO2 O2 Device Patient Position - Orthostatic VS Benedict Coma Scale Score Pain    12/27/24 0919 -- -- -- 112/67 -- -- -- -- -- --    12/27/24 07:46:22 -- 86 16 108/70 83 95 % -- -- -- --    12/27/24 0609 -- -- -- -- -- -- -- -- -- 10 - Worst Possible Pain    12/26/24 22:43:27 98.5 °F (36.9 °C) 86 -- 97/62 74 94 % -- -- -- --    12/26/24 2220 -- -- -- -- -- -- -- -- -- 9    12/26/24 1835 -- -- -- -- -- -- -- -- -- 8    12/26/24 1809 -- -- -- -- -- -- -- -- -- 4    12/26/24 1532 -- -- -- -- -- -- -- -- -- 6    12/26/24 14:56:03 99 °F (37.2 °C) 105 18 112/66 81 97 % None (Room air) Lying -- --    12/26/24 0910 -- -- -- 111/63 -- -- -- -- -- --    12/26/24 0845  -- -- -- -- -- -- -- -- -- 6    12/26/24 0839 -- -- -- -- -- 98 % None (Room air) -- 15 10 - Worst Possible Pain    12/26/24 07:02:11 97.9 °F (36.6 °C) 82 19 98/59 72 98 % None (Room air) Lying -- --    12/25/24 2328 -- -- -- -- -- 100 % None (Room air) -- 15 10 - Worst Possible Pain    12/25/24 23:18:01 97.7 °F (36.5 °C) 99 17 122/69 87 -- None (Room air) Lying -- --    12/25/24 2145 -- 99 17 118/55 79 97 % None (Room air) Lying -- --    12/25/24 2125 -- -- -- -- -- 98 % None (Room air) -- -- --    12/25/24 2026 -- -- -- -- -- -- None (Room air) -- 15 --    12/25/24 1930 -- 101 17 145/72 97 98 % -- -- -- --    12/25/24 1929 98.7 °F (37.1 °C) 105 16 121/71 -- 98 % None (Room air) Sitting -- 10 - Worst Possible Pain          Weight (last 2 days)       Date/Time Weight    12/25/24 2313 58 (127.76)    12/25/24 1929 52.6 (116)            Pertinent Labs/Diagnostic Results:   Radiology:  CT pe study w abdomen pelvis w contrast   Final Interpretation by Rey Fuller MD (12/25 2048)      Stable multifocal pneumonia primarily involving the right lung. No effusion. No evidence of pulmonary embolus.      Constipation                     Workstation performed: [id number]           Cardiology:  ECG 12 lead   Final Result by Devon Gracia MD (12/26 0920)   Sinus tachycardia   Otherwise normal ECG   When compared with ECG of 22-Dec-2024 07:25,   No significant change was found      Confirmed by Devon Gracia ([id number]) on 12/26/2024 9:20:06 AM        GI:  No orders to display           Results from last 7 days   Lab Units 12/27/24  0558 12/26/24  0448 12/25/24 2000 12/23/24  0504 12/22/24  0725   WBC Thousand/uL 5.38 4.40 6.76 14.43* 17.87*   HEMOGLOBIN g/dL 11.2* 9.9* 10.2* 10.6* 11.5   HEMATOCRIT % 35.6 30.3* 31.7* 33.1* 35.1   PLATELETS Thousands/uL 385 309 312 284 293   TOTAL NEUT ABS Thousands/µL 3.66  --  5.00 12.29*  --    BANDS PCT %  --   --   --   --  19*         Results from last 7 days   Lab Units 12/27/24  0558  "12/26/24 0448 12/25/24 2000 12/23/24  0504 12/22/24  0725   SODIUM mmol/L 140 141 139 142 139   POTASSIUM mmol/L 4.8 4.2 4.0 3.7 3.6   CHLORIDE mmol/L 104 108 104 109* 106   CO2 mmol/L 30 25 27 23 23   ANION GAP mmol/L 6 8 8 10 10   BUN mg/dL 16 13 18 13 22   CREATININE mg/dL 1.04 1.03 1.23 1.08 1.21   EGFR ml/min/1.73sq m 62 63 50 59 51   CALCIUM mg/dL 9.4 8.9 9.1 8.9 9.1   MAGNESIUM mg/dL 2.2  --   --   --   --    PHOSPHORUS mg/dL 5.0*  --   --   --   --      Results from last 7 days   Lab Units 12/25/24 2000 12/23/24  0504 12/22/24  0725   AST U/L 27 12* 14   ALT U/L 34 14 17   ALK PHOS U/L 73 65 66   TOTAL PROTEIN g/dL 7.2 6.8 6.9   ALBUMIN g/dL 3.7 3.6 3.8   TOTAL BILIRUBIN mg/dL 0.33 0.32 0.42         Results from last 7 days   Lab Units 12/27/24  0558 12/26/24 0448 12/25/24 2000 12/23/24  0504 12/22/24  0725   GLUCOSE RANDOM mg/dL 97 95 100 119 185*         Results from last 7 days   Lab Units 12/23/24  0504   HEMOGLOBIN A1C % 5.1   EAG mg/dl 100     No results found for: \"BETA-HYDROXYBUTYRATE\"                   Results from last 7 days   Lab Units 12/25/24 2000 12/22/24  0938 12/22/24  0725   HS TNI 0HR ng/L <2  --  5   HS TNI 2HR ng/L  --  4  --    HSTNI D2 ng/L  --  -1  --          Results from last 7 days   Lab Units 12/22/24  0725   PROTIME seconds 14.5   INR  1.07   PTT seconds 40*         Results from last 7 days   Lab Units 12/27/24  0558 12/26/24 0448 12/25/24 2000 12/23/24  0504 12/22/24  0725   PROCALCITONIN ng/ml 1.88* 3.01* 3.89* 14.60* 24.83*     Results from last 7 days   Lab Units 12/25/24 2000 12/22/24  0938 12/22/24  0725   LACTIC ACID mmol/L 0.7 1.5 2.5*                                                 Results from last 7 days   Lab Units 12/22/24  1015   CLARITY UA  Clear   COLOR UA  Yellow   SPEC GRAV UA  1.015   PH UA  6.0   GLUCOSE UA mg/dl Negative   KETONES UA mg/dl Negative   BLOOD UA  Negative   PROTEIN UA mg/dl Negative   NITRITE UA  Negative   BILIRUBIN UA  Negative "   UROBILINOGEN UA E.U./dl 0.2   LEUKOCYTES UA  Negative     Results from last 7 days   Lab Units 12/22/24  1017 12/22/24  1016   STREP PNEUMONIAE ANTIGEN, URINE  Negative  --    LEGIONELLA URINARY ANTIGEN   --  Negative                             Results from last 7 days   Lab Units 12/22/24  0729 12/22/24  0725   BLOOD CULTURE  No Growth After 4 Days. No Growth After 4 Days.                   Network Utilization Review Department  ATTENTION: Please call with any questions or concerns to [phone] and carefully listen to the prompts so that you are directed to the right person. All voicemails are confidential.   For Discharge needs, contact Care Management DC Support Team at [phone] opt. [phone]  Send all requests for admission clinical reviews, approved or denied determinations and any other requests to dedicated fax number below belonging to the campus where the patient is receiving treatment. List of dedicated fax numbers for the Facilities:  FACILITY NAME UR FAX NUMBER   ADMISSION DENIALS (Administrative/Medical Necessity) [phone]   DISCHARGE SUPPORT TEAM (NETWORK) [phone]   PARENT CHILD HEALTH (Maternity/NICU/Pediatrics) [phone]   [facility] [phone]   [facility] [phone]   [facility] [phone]   [facility] [phone]   [facility] [phone]   [facility] [phone]   [facility] [phone]   [facility] [phone]   [facility] [phone]   [facility] [phone]   [facility] [phone]   [facility] [phone]

## 2024-12-27 NOTE — TELEPHONE ENCOUNTER
Patient called Rx refill line, stating she is getting released from the hospital right now and was checking the status of oxycodone being sent to her pharmacy. She did not know the dosage and if it was immediate or extended release. Please contact patient.

## 2024-12-27 NOTE — TELEPHONE ENCOUNTER
S/w pt. She is being discharged from hospital.  Notes it is time for her Norco Rx to be refilled.  Appears last fill was 11/27. Please advise on refill now.  Pt wants a call when sent so she can pick it up on her way home

## 2024-12-27 NOTE — CASE MANAGEMENT
Case Management Discharge Planning Note    Patient name Ericka Castillo  Location /-01 MRN [id number]  : 1975 Date 2024       Current Admission Date: 2024  Current Admission Diagnosis:Right arm cellulitis   Patient Active Problem List    Diagnosis Date Noted Date Diagnosed    Right upper quadrant pain 2024     Right arm cellulitis 2024     Community acquired pneumonia of both lungs 2024     Pancreatic duct dilated 2024     Bilateral occipital neuralgia 04/10/2024     Secondary hyperparathyroidism (HCC) 2024     Cervical radiculopathy 2023     Acute right-sided weakness 2023     Cervical spondylosis 10/27/2022     History of fusion of cervical spine 10/27/2022     Thoracic back pain 2022     Myofascial pain syndrome 2022     Continuous opioid dependence (HCC) 2022     Chronic pain syndrome 2021     Intestinal metaplasia of stomach 10/05/2021     Irregular bowel habits 2021     Headache, tension-type 2021     PTSD (post-traumatic stress disorder) 2021     Chronic tubulointerstitial nephritis 2021     Benign hypertension with CKD (chronic kidney disease) stage III (HCC) 2021     Grade A2 albuminuria 2021     High vitamin D level 2021     Stage 3a chronic kidney disease (HCC) 2021     IUD (intrauterine device) in place 2021     Hypercholesterolemia 2021     Depression 2020     Essential hypertension 2020     Tendinitis of right ankle 2019     Chronic bilateral low back pain without sciatica 12/15/2018     Bipolar 2 disorder (HCC) 10/23/2018     Acquired hypothyroidism 10/23/2018     Arthritis 10/23/2018     Anxiety 2016       LOS (days): 1  Geometric Mean LOS (GMLOS) (days):   Days to GMLOS:     OBJECTIVE:  Risk of Unplanned Readmission Score: 17.56         Current admission status: Inpatient   Preferred Pharmacy:   [facility]  "[id number]  [address] [address]  Phone: [phone] Fax: [phone]    Primary Care Provider: FIONA Hawkins    Primary Insurance: BLUE CROSS  Secondary Insurance: [employer]    DISCHARGE DETAILS:    Discharge planning discussed with:: pateitn & mother was called at 9:21 am  Freedom of Choice: Yes  Comments - Freedom of Choice: pt denies any d/c needs-  pt & family are in agreement with the d/c & d/c plan  CM contacted family/caregiver?: Yes  Were Treatment Team discharge recommendations reviewed with patient/caregiver?: Yes  Did patient/caregiver verbalize understanding of patient care needs?: Yes  Were patient/caregiver advised of the risks associated with not following Treatment Team discharge recommendations?: Yes    Contacts  Patient Contacts: Yamilka Montano  Relationship to Patient:: Family  Contact Method: Phone  Phone Number: [phone]  Reason/Outcome: Discharge Planning    Requested Home Health Care         Is the patient interested in HHC at discharge?: No    DME Referral Provided  Referral made for DME?: No    Other Referral/Resources/Interventions Provided:  Interventions: Other (Specify)  Referral Comments: cm sent an \"in basket\" message for a follow up appointmennt    Would you like to participate in our [facility] service program?  : No - Declined    Treatment Team Recommendation: Home (home with family & outpt follow up- family)  Discharge Destination Plan:: Home (home with family & outpt follow up- family)                                      Family notified:: mother was called                         "

## 2024-12-28 ENCOUNTER — HOSPITAL ENCOUNTER (EMERGENCY)
Facility: HOSPITAL | Age: 49
Discharge: HOME | End: 2024-12-28
Attending: EMERGENCY MEDICINE
Payer: COMMERCIAL

## 2024-12-28 VITALS
OXYGEN SATURATION: 96 % | TEMPERATURE: 98.8 F | DIASTOLIC BLOOD PRESSURE: 64 MMHG | HEART RATE: 81 BPM | SYSTOLIC BLOOD PRESSURE: 98 MMHG | RESPIRATION RATE: 18 BRPM

## 2024-12-28 DIAGNOSIS — R07.81 RIB PAIN: ICD-10-CM

## 2024-12-28 DIAGNOSIS — T14.8XXA BRUISING: ICD-10-CM

## 2024-12-28 DIAGNOSIS — M54.10 RADICULAR LEG PAIN: Primary | ICD-10-CM

## 2024-12-28 LAB
ANION GAP SERPL CALCULATED.3IONS-SCNC: 12 MMOL/L (ref 4–13)
APTT PPP: 34 SECONDS (ref 23–34)
ATRIAL RATE: 94 BPM
BASOPHILS # BLD AUTO: 0.03 THOUSANDS/ÂΜL (ref 0–0.1)
BASOPHILS NFR BLD AUTO: 0 % (ref 0–1)
BUN SERPL-MCNC: 30 MG/DL (ref 5–25)
CALCIUM SERPL-MCNC: 8.9 MG/DL (ref 8.4–10.2)
CHLORIDE SERPL-SCNC: 101 MMOL/L (ref 96–108)
CO2 SERPL-SCNC: 25 MMOL/L (ref 21–32)
CREAT SERPL-MCNC: 1.35 MG/DL (ref 0.6–1.3)
EOSINOPHIL # BLD AUTO: 0.25 THOUSAND/ÂΜL (ref 0–0.61)
EOSINOPHIL NFR BLD AUTO: 3 % (ref 0–6)
ERYTHROCYTE [DISTWIDTH] IN BLOOD BY AUTOMATED COUNT: 11.7 % (ref 11.6–15.1)
GFR SERPL CREATININE-BSD FRML MDRD: 45 ML/MIN/1.73SQ M
GLUCOSE SERPL-MCNC: 135 MG/DL (ref 65–140)
HCT VFR BLD AUTO: 32.6 % (ref 34.8–46.1)
HGB BLD-MCNC: 10.7 G/DL (ref 11.5–15.4)
IMM GRANULOCYTES # BLD AUTO: 0.06 THOUSAND/UL (ref 0–0.2)
IMM GRANULOCYTES NFR BLD AUTO: 1 % (ref 0–2)
INR PPP: 0.94 (ref 0.85–1.19)
LYMPHOCYTES # BLD AUTO: 1.43 THOUSANDS/ÂΜL (ref 0.6–4.47)
LYMPHOCYTES NFR BLD AUTO: 14 % (ref 14–44)
MAGNESIUM SERPL-MCNC: 2 MG/DL (ref 1.9–2.7)
MCH RBC QN AUTO: 34.5 PG (ref 26.8–34.3)
MCHC RBC AUTO-ENTMCNC: 32.8 G/DL (ref 31.4–37.4)
MCV RBC AUTO: 105 FL (ref 82–98)
MONOCYTES # BLD AUTO: 0.84 THOUSAND/ÂΜL (ref 0.17–1.22)
MONOCYTES NFR BLD AUTO: 8 % (ref 4–12)
NEUTROPHILS # BLD AUTO: 7.5 THOUSANDS/ÂΜL (ref 1.85–7.62)
NEUTS SEG NFR BLD AUTO: 74 % (ref 43–75)
NRBC BLD AUTO-RTO: 0 /100 WBCS
P AXIS: 36 DEGREES
PLATELET # BLD AUTO: 386 THOUSANDS/UL (ref 149–390)
PMV BLD AUTO: 9.2 FL (ref 8.9–12.7)
POTASSIUM SERPL-SCNC: 3.8 MMOL/L (ref 3.5–5.3)
PR INTERVAL: 132 MS
PROTHROMBIN TIME: 13.1 SECONDS (ref 12.3–15)
QRS AXIS: 39 DEGREES
QRSD INTERVAL: 80 MS
QT INTERVAL: 354 MS
QTC INTERVAL: 442 MS
RBC # BLD AUTO: 3.1 MILLION/UL (ref 3.81–5.12)
SODIUM SERPL-SCNC: 138 MMOL/L (ref 135–147)
T WAVE AXIS: 44 DEGREES
VENTRICULAR RATE: 94 BPM
WBC # BLD AUTO: 10.11 THOUSAND/UL (ref 4.31–10.16)

## 2024-12-28 PROCEDURE — 93010 ELECTROCARDIOGRAM REPORT: CPT | Performed by: INTERNAL MEDICINE

## 2024-12-28 PROCEDURE — 96360 HYDRATION IV INFUSION INIT: CPT

## 2024-12-28 PROCEDURE — 83735 ASSAY OF MAGNESIUM: CPT | Performed by: EMERGENCY MEDICINE

## 2024-12-28 PROCEDURE — 85730 THROMBOPLASTIN TIME PARTIAL: CPT | Performed by: EMERGENCY MEDICINE

## 2024-12-28 PROCEDURE — 85025 COMPLETE CBC W/AUTO DIFF WBC: CPT | Performed by: EMERGENCY MEDICINE

## 2024-12-28 PROCEDURE — 80048 BASIC METABOLIC PNL TOTAL CA: CPT | Performed by: EMERGENCY MEDICINE

## 2024-12-28 PROCEDURE — 93005 ELECTROCARDIOGRAM TRACING: CPT

## 2024-12-28 PROCEDURE — 99285 EMERGENCY DEPT VISIT HI MDM: CPT

## 2024-12-28 PROCEDURE — 85610 PROTHROMBIN TIME: CPT | Performed by: EMERGENCY MEDICINE

## 2024-12-28 PROCEDURE — 99284 EMERGENCY DEPT VISIT MOD MDM: CPT | Performed by: EMERGENCY MEDICINE

## 2024-12-28 PROCEDURE — 36415 COLL VENOUS BLD VENIPUNCTURE: CPT | Performed by: EMERGENCY MEDICINE

## 2024-12-28 RX ADMIN — SODIUM CHLORIDE 1000 ML: 0.9 INJECTION, SOLUTION INTRAVENOUS at 02:14
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
## 2024-12-28 NOTE — DISCHARGE INSTRUCTIONS
Your workup today was reassuring.  Your chest discomfort is likely due to inflammation from your recent pneumonia.  Your arm discomfort is due to bruising from an IV insertion however does not appear to be infected at this time.  Bilyk pain that you are having is consistent with radicular pain, AKA nerve pain.  I recommend you follow-up with your pain management specialist regarding this.

## 2024-12-30 NOTE — UTILIZATION REVIEW
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 respiratory distress.      Breath sounds: Normal breath sounds.   Abdominal:      Palpations: Abdomen is soft.      Tenderness: There is no abdominal tenderness.   Musculoskeletal:         General: No swelling.      Cervical back: Neck supple.   Skin:     General: Skin is warm and dry.      Capillary Refill: Capillary refill takes less than 2 seconds.   Neurological:      Mental Status: She is alert.   Psychiatric:         Mood and Affect: Mood normal.         Discussion with Family: Patient declined call to .      Discharge instructions/Information to patient and family:   See after visit summary for information provided to patient and family.       Provisions for Follow-Up Care:  See after visit summary for information related to follow-up care and any pertinent home health orders.       Mobility at time of " Discharge:   Basic Mobility Inpatient Raw Score: 23  JH-HLM Goal: 7: Walk 25 feet or more  JH-HLM Achieved: 7: Walk 25 feet or more  HLM Goal achieved. Continue to encourage appropriate mobility.     Disposition:   Home     Planned Readmission: Not applicable     Discharge Medications:  See after visit summary for reconciled discharge medications provided to patient and/or family.

## 2024-12-30 NOTE — UTILIZATION REVIEW
NOTIFICATION OF ADMISSION DISCHARGE   This is a Notification of Discharge from [facility]. Please be advised that this patient has been discharge from our facility. Below you will find the admission and discharge date and time including the patient’s disposition.   UTILIZATION REVIEW CONTACT:  Brianna Zamora  Utilization   Network Utilization Review Department  Phone: [phone] x carefully listen to the prompts. All voicemails are confidential.  Email: [email or web address]     ADMISSION INFORMATION  PRESENTATION DATE: 12/22/2024  7:14 AM  OBERVATION ADMISSION DATE: N/A  INPATIENT ADMISSION DATE: 12/22/24  8:15 AM   DISCHARGE DATE: 12/23/2024  3:30 PM   DISPOSITION:Home/Self Care    Network Utilization Review Department  ATTENTION: Please call with any questions or concerns to [phone] and carefully listen to the prompts so that you are directed to the right person. All voicemails are confidential.   For Discharge needs, contact Care Management DC Support Team at [phone] opt. 2  Send all requests for admission clinical reviews, approved or denied determinations and any other requests to dedicated fax number below belonging to the campus where the patient is receiving treatment. List of dedicated fax numbers for the Facilities:  FACILITY NAME UR FAX NUMBER   ADMISSION DENIALS (Administrative/Medical Necessity) [phone]   DISCHARGE SUPPORT TEAM ([facility]) [phone]   PARENT CHILD HEALTH (Maternity/NICU/Pediatrics) [phone]   [facility] [phone]   [facility] [phone]   [facility] [phone]   [facility] [phone]   [facility] [phone]   [facility] [phone]   [facility] [phone]   [facility]  [phone]   [facility] [phone]   [facility] [phone]   [facility] [phone]   [facility] [phone]        Discharge Summary - Hospitalist   Name: Ericka Castillo 49 y.o. female I MRN: [id number]  Unit/Bed#: -01 I Date of Admission: 12/22/2024   Date of Service: 12/23/2024 I Hospital Day: 1      Assessment & Plan  Sepsis ([facility])  Multifocal pneumonia noted on CT imaging, patient with tachypnea, leukocytosis, and tachycardia on admission       Recent Labs     12/22/24  0725 12/23/24  0504   WBC 17.87* 14.43*      sputum culture- negative   Strep pneumo/urine Legionella- negative   Procalcitonin down-trending   Lactate resolved with IV fluids   Blood cultures- pending   Discussed with pulm- transition to PO antibiotics- patient is stable for discharge   Community acquired pneumonia of both lungs  CT chest with contrast: 1. Multifocal pneumonia involving the right greater than left lungs, with probable reactive lymphadenopathy.   Currently on room air, remains afebrile   Strep pneumon/urine legionella negative   Transitioned to Cefdinir and Azithromycin PO   Repeat CT chest within 4-6 weeks   Bipolar 2 disorder ([facility])  Continue home Zyprexa, Wellbutrin, Lamictal   Valium as needed  Acquired hypothyroidism  Continue levothyroxine  CKD (chronic kidney disease) stage 3, GFR 30-59 ml/min ([facility])        Lab Results   Component Value Date     EGFR 59 12/23/2024     EGFR 51 12/22/2024     EGFR 56 10/26/2024     CREATININE 1.08 12/23/2024     CREATININE 1.21 12/22/2024     CREATININE 1.14 10/26/2024      Creatinine appears to be around baseline.  Will monitor BMP  Hyperglycemia  Patient with hyperglycemia noted on labs in the ER  A1C 5.1   Pancreatic duct dilated  CT imaging shows dilated pancreatic duct  Reviewed finding with patient.  She understands need for follow-up.  Follow-up  with PCP as outpatient for MRCP  Ambulatory referral to GI  Incidental finding report completed     Medical Problems         Resolved Problems  Date Reviewed: 12/12/2024   None         Discharging Physician / Practitioner: Flavia Da Silva PA-C  PCP: FIONA Hawkins  Admission Date:   Admission Orders (From admission, onward)          Ordered         12/22/24 0815   INPATIENT ADMISSION  Once                               Discharge Date: 12/23/24     Consultations During Hospital Stay:  Pulmonology       Procedures Performed:   None      Significant Findings / Test Results:   None      Incidental Findings:   -CT chest, abdomen, pelvis:   Multifocal pneumonia involving the right greater than left lungs, with probable reactive lymphadenopathy. Recommendation: Follow-up chest CT pending resolution of the patient's symptoms . Redemonstrated nonspecific pancreatic duct dilation at the level of the pancreatic head. Recommendation: Outpatient follow-up MRI/MRCP, if not previously performed, to exclude an underlying lesion.  I reviewed the above mentioned incidental findings with the patient and/or family and they expressed understanding.     Test Results Pending at Discharge (will require follow up):   Blood Cultures      Outpatient Tests Requested:  MRCP  CT Chest 4-6 weeks      Complications:  none     Reason for Admission: sepsis secondary to pneumonia      Hospital Course:   Ericka Castillo is a 49 y.o. female patient who originally presented to the hospital on 12/22/2024 due to evaluation of cough, chest pain and shortness of breath. Patient meet sepsis criteria with elevated lactic acid, leukocytosis, elevated pro calcitonin with suspected source of pneumonia that was confirmed on CT imaging. Patient received IV fluids with lactic acidosis improving. She was initiated on IV Ceftriaxone and Azithromycin for pneumonia. AM lab studies showed down trending of leukocytosis and procal with initiation of  "antibiotics.  Vital signs stabilized; patient did not require oxygen supplementation during acute hospitalization.  Pulmonology was consulted during this hospitalization: as labs are improving and patient is not requiring oxygen supplementation she can be transitioned to oral antibiotics and discharged from a pulmonary standpoint. Will continue Cefdinir and Azithromycin upon discharge.   Incidental findings reviewed with patient- she verbalized understanding of outpatient GI follow up for MRI/MRCP and outpatient CT chest follow up within 4-6 weeks.      Please see above list of diagnoses and related plan for additional information.      Condition at Discharge: stable     Discharge Day Visit / Exam:   Subjective:  Patient seen and examined at bedside. Reports improvement of symptoms- still endorses productive cough. Denies any fever or chills. Tolerating PO diet. Requesting discharge.      Vitals: Blood Pressure: 119/80 (12/23/24 0736)  Pulse: 98 (12/23/24 0736)  Temperature: 98.1 °F (36.7 °C) (12/23/24 0736)  Temp Source: Temporal (12/22/24 0718)  Respirations: 17 (12/23/24 0736)  Height: 5' 5\" (165.1 cm) (12/22/24 0906)  Weight - Scale: 52.7 kg (116 lb 2.9 oz) (12/22/24 0906)  SpO2: 94 % (12/23/24 0900)  Physical Exam  Vitals reviewed.   Constitutional:       General: She is not in acute distress.     Appearance: She is not diaphoretic.   Eyes:      General: No scleral icterus.     Pupils: Pupils are equal, round, and reactive to light.   Cardiovascular:      Rate and Rhythm: Normal rate and regular rhythm.   Pulmonary:      Effort: No respiratory distress.      Breath sounds: Normal breath sounds.   Abdominal:      General: Bowel sounds are normal. There is no distension.      Palpations: Abdomen is soft.      Tenderness: There is no abdominal tenderness.   Neurological:      Mental Status: She is alert and oriented to person, place, and time.   Psychiatric:         Mood and Affect: Mood is anxious.          "   Discussion with Family: Updated  (mother) at bedside.     Discharge instructions/Information to patient and family:   See after visit summary for information provided to patient and family.       Provisions for Follow-Up Care:  See after visit summary for information related to follow-up care and any pertinent home health orders.       Mobility at time of Discharge:   Basic Mobility Inpatient Raw Score: 24  JH-HLM Goal: 8: Walk 250 feet or more  JH-HLM Achieved: 7: Walk 25 feet or more  HLM Goal achieved. Continue to encourage appropriate mobility.     Disposition:   Home     Planned Readmission: none      Discharge Medications:  See after visit summary for reconciled discharge medications provided to patient and/or family.

## 2025-01-01 ENCOUNTER — HOSPITAL ENCOUNTER (EMERGENCY)
Facility: HOSPITAL | Age: 50
Discharge: HOME | End: 2025-01-01
Attending: INTERNAL MEDICINE
Payer: COMMERCIAL

## 2025-01-01 ENCOUNTER — APPOINTMENT (EMERGENCY)
Dept: RADIOLOGY | Facility: HOSPITAL | Age: 50
End: 2025-01-01
Payer: COMMERCIAL

## 2025-01-01 VITALS
RESPIRATION RATE: 18 BRPM | HEART RATE: 89 BPM | TEMPERATURE: 98.3 F | WEIGHT: 127.87 LBS | OXYGEN SATURATION: 99 % | HEIGHT: 65 IN | DIASTOLIC BLOOD PRESSURE: 78 MMHG | BODY MASS INDEX: 21.3 KG/M2 | SYSTOLIC BLOOD PRESSURE: 112 MMHG

## 2025-01-01 DIAGNOSIS — R07.89 CHEST WALL PAIN: Primary | ICD-10-CM

## 2025-01-01 DIAGNOSIS — R07.81 PLEURITIC CHEST PAIN: ICD-10-CM

## 2025-01-01 LAB
ANION GAP SERPL CALCULATED.3IONS-SCNC: 8 MMOL/L (ref 4–13)
BASOPHILS # BLD AUTO: 0.06 THOUSANDS/ΜL (ref 0–0.1)
BASOPHILS NFR BLD AUTO: 1 % (ref 0–1)
BUN SERPL-MCNC: 21 MG/DL (ref 5–25)
CALCIUM SERPL-MCNC: 9.2 MG/DL (ref 8.4–10.2)
CHLORIDE SERPL-SCNC: 103 MMOL/L (ref 96–108)
CO2 SERPL-SCNC: 26 MMOL/L (ref 21–32)
CREAT SERPL-MCNC: 0.95 MG/DL (ref 0.6–1.3)
CRP SERPL QL: 12.8 MG/L
EOSINOPHIL # BLD AUTO: 0.13 THOUSAND/ΜL (ref 0–0.61)
EOSINOPHIL NFR BLD AUTO: 1 % (ref 0–6)
ERYTHROCYTE [DISTWIDTH] IN BLOOD BY AUTOMATED COUNT: 11.3 % (ref 11.6–15.1)
GFR SERPL CREATININE-BSD FRML MDRD: 69 ML/MIN/1.73SQ M
GLUCOSE SERPL-MCNC: 94 MG/DL (ref 65–140)
HCT VFR BLD AUTO: 33.1 % (ref 34.8–46.1)
HGB BLD-MCNC: 11.1 G/DL (ref 11.5–15.4)
IMM GRANULOCYTES # BLD AUTO: 0.03 THOUSAND/UL (ref 0–0.2)
IMM GRANULOCYTES NFR BLD AUTO: 0 % (ref 0–2)
LYMPHOCYTES # BLD AUTO: 1.83 THOUSANDS/ΜL (ref 0.6–4.47)
LYMPHOCYTES NFR BLD AUTO: 18 % (ref 14–44)
MCH RBC QN AUTO: 34.3 PG (ref 26.8–34.3)
MCHC RBC AUTO-ENTMCNC: 33.5 G/DL (ref 31.4–37.4)
MCV RBC AUTO: 102 FL (ref 82–98)
MONOCYTES # BLD AUTO: 0.51 THOUSAND/ΜL (ref 0.17–1.22)
MONOCYTES NFR BLD AUTO: 5 % (ref 4–12)
NEUTROPHILS # BLD AUTO: 7.56 THOUSANDS/ΜL (ref 1.85–7.62)
NEUTS SEG NFR BLD AUTO: 75 % (ref 43–75)
NRBC BLD AUTO-RTO: 0 /100 WBCS
PLATELET # BLD AUTO: 498 THOUSANDS/UL (ref 149–390)
PMV BLD AUTO: 9 FL (ref 8.9–12.7)
POTASSIUM SERPL-SCNC: 3.9 MMOL/L (ref 3.5–5.3)
RBC # BLD AUTO: 3.24 MILLION/UL (ref 3.81–5.12)
SODIUM SERPL-SCNC: 137 MMOL/L (ref 135–147)
WBC # BLD AUTO: 10.12 THOUSAND/UL (ref 4.31–10.16)

## 2025-01-01 PROCEDURE — 86140 C-REACTIVE PROTEIN: CPT | Performed by: INTERNAL MEDICINE

## 2025-01-01 PROCEDURE — 96374 THER/PROPH/DIAG INJ IV PUSH: CPT

## 2025-01-01 PROCEDURE — 36415 COLL VENOUS BLD VENIPUNCTURE: CPT | Performed by: INTERNAL MEDICINE

## 2025-01-01 PROCEDURE — 85025 COMPLETE CBC W/AUTO DIFF WBC: CPT | Performed by: INTERNAL MEDICINE

## 2025-01-01 PROCEDURE — 96361 HYDRATE IV INFUSION ADD-ON: CPT

## 2025-01-01 PROCEDURE — 80048 BASIC METABOLIC PNL TOTAL CA: CPT | Performed by: INTERNAL MEDICINE

## 2025-01-01 PROCEDURE — 71045 X-RAY EXAM CHEST 1 VIEW: CPT

## 2025-01-01 PROCEDURE — 99284 EMERGENCY DEPT VISIT MOD MDM: CPT | Performed by: INTERNAL MEDICINE

## 2025-01-01 PROCEDURE — 96375 TX/PRO/DX INJ NEW DRUG ADDON: CPT

## 2025-01-01 PROCEDURE — 99285 EMERGENCY DEPT VISIT HI MDM: CPT

## 2025-01-01 RX ORDER — PREDNISONE 10 MG/1
TABLET ORAL
Qty: 20 TABLET | Refills: 0 | Status: SHIPPED | OUTPATIENT
Start: 2025-01-01 | End: 2025-01-07

## 2025-01-01 RX ORDER — KETOROLAC TROMETHAMINE 30 MG/ML
30 INJECTION, SOLUTION INTRAMUSCULAR; INTRAVENOUS ONCE
Status: COMPLETED | OUTPATIENT
Start: 2025-01-01 | End: 2025-01-01

## 2025-01-01 RX ORDER — METHYLPREDNISOLONE SODIUM SUCCINATE 40 MG/ML
40 INJECTION, POWDER, LYOPHILIZED, FOR SOLUTION INTRAMUSCULAR; INTRAVENOUS ONCE
Status: COMPLETED | OUTPATIENT
Start: 2025-01-01 | End: 2025-01-01

## 2025-01-01 RX ADMIN — SODIUM CHLORIDE 1000 ML: 0.9 INJECTION, SOLUTION INTRAVENOUS at 13:19

## 2025-01-01 RX ADMIN — KETOROLAC TROMETHAMINE 30 MG: 30 INJECTION, SOLUTION INTRAMUSCULAR; INTRAVENOUS at 13:50

## 2025-01-01 RX ADMIN — METHYLPREDNISOLONE SODIUM SUCCINATE 40 MG: 40 INJECTION, POWDER, FOR SOLUTION INTRAMUSCULAR; INTRAVENOUS at 15:01
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
## 2025-01-01 NOTE — Clinical Note
Ericka ANDREW Jonathan was seen and treated in our emergency department on 1/1/2025.                Diagnosis:     Ericka ANDREW  .    She may return on this date:          If you have any questions or concerns, please don't hesitate to call.      Kade Lane, DO    ______________________________           _______________          _______________  Hospital Representative                              Date                                Time

## 2025-01-02 ENCOUNTER — TELEPHONE (OUTPATIENT)
Age: 50
End: 2025-01-02

## 2025-01-02 ENCOUNTER — OFFICE VISIT (OUTPATIENT)
Age: 50
End: 2025-01-02
Payer: COMMERCIAL

## 2025-01-02 ENCOUNTER — TELEPHONE (OUTPATIENT)
Dept: PAIN MEDICINE | Facility: CLINIC | Age: 50
End: 2025-01-02

## 2025-01-02 ENCOUNTER — TELEPHONE (OUTPATIENT)
Dept: FAMILY MEDICINE CLINIC | Facility: CLINIC | Age: 50
End: 2025-01-02

## 2025-01-02 VITALS — BODY MASS INDEX: 21.16 KG/M2 | WEIGHT: 127 LBS | HEIGHT: 65 IN

## 2025-01-02 DIAGNOSIS — M54.2 NECK PAIN: ICD-10-CM

## 2025-01-02 DIAGNOSIS — M79.18 MYOFASCIAL PAIN SYNDROME: ICD-10-CM

## 2025-01-02 DIAGNOSIS — Z98.1 HISTORY OF FUSION OF CERVICAL SPINE: ICD-10-CM

## 2025-01-02 DIAGNOSIS — G89.4 CHRONIC PAIN SYNDROME: Primary | ICD-10-CM

## 2025-01-02 DIAGNOSIS — M47.812 CERVICAL SPONDYLOSIS: ICD-10-CM

## 2025-01-02 PROCEDURE — 99214 OFFICE O/P EST MOD 30 MIN: CPT | Performed by: NURSE PRACTITIONER

## 2025-01-02 RX ORDER — HYDROCODONE BITARTRATE AND ACETAMINOPHEN 5; 325 MG/1; MG/1
1 TABLET ORAL 2 TIMES DAILY PRN
Qty: 60 TABLET | Refills: 0 | Status: SHIPPED | OUTPATIENT
Start: 2025-01-24

## 2025-01-02 RX ORDER — NABUMETONE 500 MG/1
500 TABLET, FILM COATED ORAL 2 TIMES DAILY
Qty: 60 TABLET | Refills: 5 | Status: SHIPPED | OUTPATIENT
Start: 2025-01-02 | End: 2025-01-07

## 2025-01-02 RX ORDER — LIDOCAINE 50 MG/G
OINTMENT TOPICAL AS NEEDED
Qty: 35.44 G | Refills: 5 | Status: SHIPPED | OUTPATIENT
Start: 2025-01-02

## 2025-01-02 NOTE — TELEPHONE ENCOUNTER
----- Message from Barbara Kocher, CRNP sent at 1/2/2025  7:11 AM EST -----  Regarding: RFA on 1/7/2025  Pt is on antibiotics for recent pneumonia.  She takes last dose dose tonight.  She is scheduled for RFA on 1/7/2025.  Is this ok.  I can't remember the time frame for antibiotics and procedure.  Please reach out to pt and let her know either way.  Thanks!!  Cuca

## 2025-01-02 NOTE — TELEPHONE ENCOUNTER
Pls see CATA msg below and advise. Pt taking ld 1/2/25 and proc on 1/7/25...ok to proceed?    Thank you!
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
## 2025-01-02 NOTE — TELEPHONE ENCOUNTER
Called patient to resc. TCM that is on monday 1/6/25 but patient needs first thing in morning at 7 am     please advise on where to put her

## 2025-01-05 DIAGNOSIS — N18.30 BENIGN HYPERTENSION WITH CKD (CHRONIC KIDNEY DISEASE) STAGE III (HCC): ICD-10-CM

## 2025-01-05 DIAGNOSIS — I12.9 BENIGN HYPERTENSION WITH CKD (CHRONIC KIDNEY DISEASE) STAGE III (HCC): ICD-10-CM

## 2025-01-06 RX ORDER — LISINOPRIL 10 MG/1
TABLET ORAL
Qty: 15 TABLET | Refills: 5 | Status: SHIPPED | OUTPATIENT
Start: 2025-01-06

## 2025-01-07 ENCOUNTER — TELEPHONE (OUTPATIENT)
Dept: RADIOLOGY | Facility: HOSPITAL | Age: 50
End: 2025-01-07

## 2025-01-07 ENCOUNTER — HOSPITAL ENCOUNTER (OUTPATIENT)
Dept: RADIOLOGY | Facility: HOSPITAL | Age: 50
Discharge: HOME | End: 2025-01-07
Payer: COMMERCIAL

## 2025-01-07 ENCOUNTER — TELEPHONE (OUTPATIENT)
Dept: OTHER | Facility: OTHER | Age: 50
End: 2025-01-07

## 2025-01-07 ENCOUNTER — OFFICE VISIT (OUTPATIENT)
Dept: FAMILY MEDICINE CLINIC | Facility: CLINIC | Age: 50
End: 2025-01-07
Payer: COMMERCIAL

## 2025-01-07 VITALS
DIASTOLIC BLOOD PRESSURE: 72 MMHG | HEART RATE: 90 BPM | TEMPERATURE: 98.4 F | SYSTOLIC BLOOD PRESSURE: 151 MMHG | RESPIRATION RATE: 18 BRPM | OXYGEN SATURATION: 98 %

## 2025-01-07 VITALS
HEART RATE: 91 BPM | BODY MASS INDEX: 20.49 KG/M2 | WEIGHT: 123 LBS | TEMPERATURE: 97.4 F | HEIGHT: 65 IN | OXYGEN SATURATION: 99 % | DIASTOLIC BLOOD PRESSURE: 72 MMHG | SYSTOLIC BLOOD PRESSURE: 118 MMHG

## 2025-01-07 DIAGNOSIS — Z23 ENCOUNTER FOR IMMUNIZATION: ICD-10-CM

## 2025-01-07 DIAGNOSIS — F11.20 OPIOID DEPENDENCE, UNCOMPLICATED (HCC): ICD-10-CM

## 2025-01-07 DIAGNOSIS — G89.4 CHRONIC PAIN SYNDROME: ICD-10-CM

## 2025-01-07 DIAGNOSIS — Z76.89 ENCOUNTER FOR SUPPORT AND COORDINATION OF TRANSITION OF CARE: Primary | ICD-10-CM

## 2025-01-07 DIAGNOSIS — F31.81 BIPOLAR II DISORDER (HCC): ICD-10-CM

## 2025-01-07 DIAGNOSIS — E21.3 HYPERPARATHYROIDISM (HCC): ICD-10-CM

## 2025-01-07 DIAGNOSIS — M47.812 CERVICAL SPONDYLOSIS: ICD-10-CM

## 2025-01-07 DIAGNOSIS — Z98.1 HISTORY OF FUSION OF CERVICAL SPINE: ICD-10-CM

## 2025-01-07 DIAGNOSIS — J18.9 COMMUNITY ACQUIRED PNEUMONIA OF BOTH LUNGS: ICD-10-CM

## 2025-01-07 DIAGNOSIS — M54.2 NECK PAIN: ICD-10-CM

## 2025-01-07 DIAGNOSIS — L03.113 RIGHT ARM CELLULITIS: ICD-10-CM

## 2025-01-07 PROCEDURE — 64634 DESTROY C/TH FACET JNT ADDL: CPT | Performed by: ANESTHESIOLOGY

## 2025-01-07 PROCEDURE — 64633 DESTROY CERV/THOR FACET JNT: CPT | Performed by: ANESTHESIOLOGY

## 2025-01-07 PROCEDURE — 99495 TRANSJ CARE MGMT MOD F2F 14D: CPT | Performed by: NURSE PRACTITIONER

## 2025-01-07 RX ORDER — LIDOCAINE HYDROCHLORIDE 10 MG/ML
5 INJECTION, SOLUTION EPIDURAL; INFILTRATION; INTRACAUDAL; PERINEURAL ONCE
Status: COMPLETED | OUTPATIENT
Start: 2025-01-07 | End: 2025-01-07

## 2025-01-07 RX ORDER — LUMATEPERONE 21 MG/1
21 CAPSULE ORAL DAILY
COMMUNITY
Start: 2025-01-06

## 2025-01-07 RX ADMIN — LIDOCAINE HYDROCHLORIDE 5 ML: 10 INJECTION, SOLUTION EPIDURAL; INFILTRATION; INTRACAUDAL; PERINEURAL at 09:51

## 2025-01-07 RX ADMIN — Medication 4 ML: at 09:57

## 2025-01-07 NOTE — PROGRESS NOTES
Transition of Care Visit  Name: Ericka Castillo      : 1975      MRN: [id number]  Encounter Provider: FIONA Hawkins  Encounter Date: 2025   Encounter department: [facility]    Assessment & Plan  Encounter for support and coordination of transition of care         Encounter for immunization         Community acquired pneumonia of both lungs  Patient with recent admission with multifocal pneumonia, reports worsening symptoms cough, myalgias, chest congestion  98% on RA  Afebrile and without leukocytosis  Ceftriaxone 2000 mg IV daily  De-escalate antibiotics as appropriate  CT: Stable multifocal pneumonia primarily involving the right lung. No effusion. No evidence of pulmonary embolus.   Recommend repeat chest x-ray in 1 month.   No symptoms at this time.   Orders:  •  XR chest pa and lateral; Future    Hyperparathyroidism (HCC)  Continue f/u with neprhology        Bipolar II disorder (HCC)  Continue f/u with specialist        Opioid dependence, uncomplicated (HCC)  Continue f/u with specialist        Right arm cellulitis  Resolved with Cetriaxone IV. No symptoms at this time.             History of Present Illness     Transitional Care Management Review:   Ericka Castillo is a 49 y.o. female here for TCM follow up.     During the TCM phone call patient stated:  TCM Call     Date and time call was made  2024 10:25 AM    Hospital care reviewed  Records reviewed    Patient was hospitialized at  [facility]    Date of Admission  24    Date of discharge  24    Diagnosis  Right arm cellulitis    Disposition  Home    Were the patients medications reviewed and updated  Yes    Current Symptoms  None      TCM Call     Post hospital issues  None    Should patient be enrolled in anticoag monitoring?  No    Scheduled for follow up?  Yes    Did you obtain your prescribed medications  Yes    Do you need help managing your prescriptions or  "medications  No    Is transportation to your appointment needed  No    I have advised the patient to call PCP with any new or worsening symptoms  lilliam wood        [facility] [place]  Review of Systems   Constitutional:  Negative for chills and fever.   Eyes:  Negative for pain and visual disturbance.   Respiratory:  Negative for cough, chest tightness, shortness of breath and wheezing.    Cardiovascular:  Negative for chest pain and palpitations.   Gastrointestinal:  Negative for abdominal pain, constipation, diarrhea, nausea and vomiting.   Skin:  Negative for color change and rash.   Neurological:  Negative for seizures, syncope and headaches.   All other systems reviewed and are negative.    Objective   /72 (Patient Position: Sitting)   Pulse 91   Temp (!) 97.4 °F (36.3 °C) (Tympanic)   Ht 5' 5\" (1.651 m)   Wt 55.8 kg (123 lb)   SpO2 99%   BMI 20.47 kg/m²     Physical Exam  Vitals and nursing note reviewed.   Constitutional:       General: She is not in acute distress.     Appearance: She is well-developed.   HENT:      Head: Normocephalic and atraumatic.   Cardiovascular:      Rate and Rhythm: Normal rate and regular rhythm.      Pulses: Normal pulses.      Heart sounds: Normal heart sounds. No murmur heard.  Pulmonary:      Effort: Pulmonary effort is normal. No respiratory distress.      Breath sounds: Normal breath sounds. No wheezing or rhonchi.   Musculoskeletal:      Cervical back: Neck supple.   Skin:     General: Skin is warm and dry.      Capillary Refill: Capillary refill takes less than 2 seconds.   Neurological:      Mental Status: She is alert.   Psychiatric:         Mood and Affect: Mood normal.       Medications have been reviewed by provider in current encounter      "

## 2025-01-07 NOTE — INTERVAL H&P NOTE
Update: (This section must be completed if the H&P was completed greater than 24 hrs to procedure or admission)    H&P reviewed. After examining the patient, I find no changed to the H&P since it had been written.    Patient re-evaluated. Accept as history and physical.    Stephanie Cosby MD/January 7, 2025/9:41 AM

## 2025-01-07 NOTE — DISCHARGE INSTRUCTIONS

## 2025-01-07 NOTE — ASSESSMENT & PLAN NOTE
Patient with recent admission with multifocal pneumonia, reports worsening symptoms cough, myalgias, chest congestion  98% on RA  Afebrile and without leukocytosis  Ceftriaxone 2000 mg IV daily  De-escalate antibiotics as appropriate  CT: Stable multifocal pneumonia primarily involving the right lung. No effusion. No evidence of pulmonary embolus.   Recommend repeat chest x-ray in 1 month.   No symptoms at this time.   Orders:  •  XR chest pa and lateral; Future

## 2025-01-08 ENCOUNTER — TELEPHONE (OUTPATIENT)
Age: 50
End: 2025-01-08

## 2025-01-08 NOTE — TELEPHONE ENCOUNTER
Patient is calling because she needs [place] paper work  [place] out for her employer .Patient does have an appointment scheduled but has question and concerns and would like an earlier appointment if possible. Please follow up and advise. Thank you in advance.

## 2025-01-08 NOTE — TELEPHONE ENCOUNTER
Patient called - she has new paperwork to be completed for her FMLA.  Turns out that a new company has taken over and her previous FMLA  on 24.  She will be faxing this over today and will need this completed as soon as possible and faxed back to her employer.  She will need a copy for her records as well (you may email it to her). Please look out for this fax.  Thank you.

## 2025-01-10 ENCOUNTER — TELEPHONE (OUTPATIENT)
Age: 50
End: 2025-01-10

## 2025-01-10 NOTE — TELEPHONE ENCOUNTER
Fyi...    S/w pt s/p Lt cervical RFA on 1/8/25 RM @ . Pt stated needle sites look good, denies S/S of infect, denies fever, denies soreness and denies sun burn like sensation. Pt states no discernable pain at this time unless stretching where then she has tight feeling on lt side of neck. Advised pt if they have any further pain to take OTC/prescribed meds and/or use ice/heat and that it can take 4-6wks to see full effect. Confirmed nxt appt w/ pt 3/6/25 0700 w/ BK in [place]. Pt verbalized understanding.

## 2025-01-10 NOTE — TELEPHONE ENCOUNTER
Caller: Patient    Doctor/Office: Dr Cosby    Call regarding :  Patient returning call in regards to post procedure follow up of her ablation     Call was transferred to: triage nurse

## 2025-01-13 ENCOUNTER — TELEPHONE (OUTPATIENT)
Dept: NEUROLOGY | Facility: CLINIC | Age: 50
End: 2025-01-13

## 2025-01-16 DIAGNOSIS — M79.18 MYOFASCIAL PAIN SYNDROME: ICD-10-CM

## 2025-01-17 ENCOUNTER — TELEPHONE (OUTPATIENT)
Dept: PAIN MEDICINE | Facility: CLINIC | Age: 50
End: 2025-01-17

## 2025-01-17 DIAGNOSIS — M47.812 CERVICAL SPONDYLOSIS: ICD-10-CM

## 2025-01-17 DIAGNOSIS — G89.4 CHRONIC PAIN SYNDROME: ICD-10-CM

## 2025-01-17 RX ORDER — BACLOFEN 20 MG/1
20 TABLET ORAL 3 TIMES DAILY
Qty: 90 TABLET | Refills: 1 | Status: SHIPPED | OUTPATIENT
Start: 2025-01-17

## 2025-01-17 RX ORDER — NABUMETONE 500 MG/1
500 TABLET, FILM COATED ORAL 2 TIMES DAILY
Qty: 60 TABLET | Refills: 5 | Status: SHIPPED | OUTPATIENT
Start: 2025-01-17

## 2025-01-17 NOTE — TELEPHONE ENCOUNTER
Caller: Ericka    Doctor: Kocher    Reason for call: patient needs med refill for   Nabumetone 500 mg   Takes twice daily   Has a few days left   No side effects   Works well     [facility] [address]  [address], [facility] [address]  Phone: [phone]  Fax: [phone]  LETICIA #: --     If approved please call her with He[employer] It will be sent to pharmacy     Call back#: [phone]

## 2025-01-23 ENCOUNTER — TELEPHONE (OUTPATIENT)
Age: 50
End: 2025-01-23

## 2025-01-23 NOTE — TELEPHONE ENCOUNTER
Patient says that she is producing a lot of saliva.  She is not sure why it's happening and it's making her crazy.  She says she knows that there is medication to help with this.  She is asking if Ruth can please send something in for her.  Please contact patient and advise.  Thank you.

## 2025-01-24 PROBLEM — J18.9 COMMUNITY ACQUIRED PNEUMONIA OF BOTH LUNGS: Status: RESOLVED | Noted: 2024-12-23 | Resolved: 2025-01-24

## 2025-01-24 NOTE — TELEPHONE ENCOUNTER
ABHI..    S/w Pharmacist, Luis Eduardo, at [facility] who stated poss drug interaction between Ambien and Diazepam. RN advised Luis Eduardo that BK/RM aware and pt takes PRN and does not take concurrently w/ opioid. Luis Eduardo verbalized understanding and apprec of RN.

## 2025-01-24 NOTE — TELEPHONE ENCOUNTER
Spoke to patient she is unable to come in due to can not get off work stated she will let it go for now advised to call back if needed and we would get her in

## 2025-01-28 ENCOUNTER — APPOINTMENT (OUTPATIENT)
Dept: LAB | Facility: CLINIC | Age: 50
End: 2025-01-28
Payer: COMMERCIAL

## 2025-01-28 ENCOUNTER — TELEPHONE (OUTPATIENT)
Age: 50
End: 2025-01-28

## 2025-01-28 DIAGNOSIS — R63.0 ANOREXIA: ICD-10-CM

## 2025-01-28 DIAGNOSIS — E78.00 HYPERCHOLESTEROLEMIA: ICD-10-CM

## 2025-01-28 DIAGNOSIS — I10 ESSENTIAL HYPERTENSION: ICD-10-CM

## 2025-01-28 DIAGNOSIS — R10.9 ABDOMINAL PAIN, UNSPECIFIED ABDOMINAL LOCATION: ICD-10-CM

## 2025-01-28 DIAGNOSIS — R93.3 ABNORMAL FINDINGS ON DIAGNOSTIC IMAGING OF OTHER PARTS OF DIGESTIVE TRACT: ICD-10-CM

## 2025-01-28 DIAGNOSIS — E03.9 ACQUIRED HYPOTHYROIDISM: ICD-10-CM

## 2025-01-28 LAB
ALBUMIN SERPL BCG-MCNC: 4.3 G/DL (ref 3.5–5)
ALP SERPL-CCNC: 82 U/L (ref 34–104)
ALT SERPL W P-5'-P-CCNC: 17 U/L (ref 7–52)
AMYLASE SERPL-CCNC: 28 IU/L (ref 29–103)
ANION GAP SERPL CALCULATED.3IONS-SCNC: 7 MMOL/L (ref 4–13)
AST SERPL W P-5'-P-CCNC: 22 U/L (ref 13–39)
BASOPHILS # BLD AUTO: 0.02 THOUSANDS/ΜL (ref 0–0.1)
BASOPHILS NFR BLD AUTO: 0 % (ref 0–1)
BILIRUB SERPL-MCNC: 0.41 MG/DL (ref 0.2–1)
BUN SERPL-MCNC: 10 MG/DL (ref 5–25)
CALCIUM SERPL-MCNC: 9.1 MG/DL (ref 8.4–10.2)
CHLORIDE SERPL-SCNC: 104 MMOL/L (ref 96–108)
CHOLEST SERPL-MCNC: 228 MG/DL (ref ?–200)
CO2 SERPL-SCNC: 29 MMOL/L (ref 21–32)
CREAT SERPL-MCNC: 1.07 MG/DL (ref 0.6–1.3)
CRP SERPL QL: 1.6 MG/L
EOSINOPHIL # BLD AUTO: 0.04 THOUSAND/ΜL (ref 0–0.61)
EOSINOPHIL NFR BLD AUTO: 1 % (ref 0–6)
ERYTHROCYTE [DISTWIDTH] IN BLOOD BY AUTOMATED COUNT: 12.6 % (ref 11.6–15.1)
GFR SERPL CREATININE-BSD FRML MDRD: 60 ML/MIN/1.73SQ M
GLUCOSE SERPL-MCNC: 94 MG/DL (ref 65–140)
HCT VFR BLD AUTO: 33.9 % (ref 34.8–46.1)
HDLC SERPL-MCNC: 89 MG/DL
HGB BLD-MCNC: 11.3 G/DL (ref 11.5–15.4)
IMM GRANULOCYTES # BLD AUTO: 0.01 THOUSAND/UL (ref 0–0.2)
IMM GRANULOCYTES NFR BLD AUTO: 0 % (ref 0–2)
LDLC SERPL CALC-MCNC: 126 MG/DL (ref 0–100)
LIPASE SERPL-CCNC: 12 U/L (ref 11–82)
LYMPHOCYTES # BLD AUTO: 1.16 THOUSANDS/ΜL (ref 0.6–4.47)
LYMPHOCYTES NFR BLD AUTO: 17 % (ref 14–44)
MCH RBC QN AUTO: 34.1 PG (ref 26.8–34.3)
MCHC RBC AUTO-ENTMCNC: 33.3 G/DL (ref 31.4–37.4)
MCV RBC AUTO: 102 FL (ref 82–98)
MONOCYTES # BLD AUTO: 0.26 THOUSAND/ΜL (ref 0.17–1.22)
MONOCYTES NFR BLD AUTO: 4 % (ref 4–12)
NEUTROPHILS # BLD AUTO: 5.2 THOUSANDS/ΜL (ref 1.85–7.62)
NEUTS SEG NFR BLD AUTO: 78 % (ref 43–75)
NONHDLC SERPL-MCNC: 139 MG/DL
NRBC BLD AUTO-RTO: 0 /100 WBCS
PLATELET # BLD AUTO: 255 THOUSANDS/UL (ref 149–390)
PMV BLD AUTO: 10.9 FL (ref 8.9–12.7)
POTASSIUM SERPL-SCNC: 3.9 MMOL/L (ref 3.5–5.3)
PROT SERPL-MCNC: 6.6 G/DL (ref 6.4–8.4)
RBC # BLD AUTO: 3.31 MILLION/UL (ref 3.81–5.12)
SODIUM SERPL-SCNC: 140 MMOL/L (ref 135–147)
TRIGL SERPL-MCNC: 67 MG/DL (ref ?–150)
TSH SERPL DL<=0.05 MIU/L-ACNC: 1.33 UIU/ML (ref 0.45–4.5)
WBC # BLD AUTO: 6.69 THOUSAND/UL (ref 4.31–10.16)

## 2025-01-28 PROCEDURE — 83690 ASSAY OF LIPASE: CPT

## 2025-01-28 PROCEDURE — 82150 ASSAY OF AMYLASE: CPT

## 2025-01-28 PROCEDURE — 36415 COLL VENOUS BLD VENIPUNCTURE: CPT

## 2025-01-28 PROCEDURE — 80053 COMPREHEN METABOLIC PANEL: CPT

## 2025-01-28 PROCEDURE — 84443 ASSAY THYROID STIM HORMONE: CPT

## 2025-01-28 PROCEDURE — 85025 COMPLETE CBC W/AUTO DIFF WBC: CPT

## 2025-01-28 PROCEDURE — 80061 LIPID PANEL: CPT

## 2025-01-28 PROCEDURE — 86140 C-REACTIVE PROTEIN: CPT

## 2025-01-28 NOTE — TELEPHONE ENCOUNTER
Caller: pt    Doctor: kocher    Reason for call: pt needs auth for her hydrocodone.    Call back#: [phone]

## 2025-01-28 NOTE — TELEPHONE ENCOUNTER
PA for HYDRO-ACET 5-325 MG SUBMITTED to [employer]    via    [x]CMM-KEY: [id number]      [x]PA sent as URGENT    All office notes, labs and other pertaining documents and studies sent. Clinical questions answered. Awaiting determination from insurance company.     Turnaround time for your insurance to make a decision on your Prior Authorization can take 7-21 business days.

## 2025-01-29 ENCOUNTER — RESULTS FOLLOW-UP (OUTPATIENT)
Dept: FAMILY MEDICINE CLINIC | Facility: CLINIC | Age: 50
End: 2025-01-29

## 2025-01-29 NOTE — TELEPHONE ENCOUNTER
PA for HYDRO-ACRT 5-325 MG  APPROVED     Date(s) approved UNTIL 07/28/2025        Patient advised by          []MyChart Message  []Phone call   [x]LMOM  []L/M to call office as no active Communication consent on file  []Unable to leave detailed message as VM not approved on Communication consent       Pharmacy advised by    [x]Fax  []Phone call    Approval letter scanned into [employer] Yes

## 2025-01-30 NOTE — TELEPHONE ENCOUNTER
Caller: Patient     Doctor: Dr. Cosby/Barbara Kocher    Reason for call: Patient calling because she was previously told her medication was approved/authorized however the pharmacy stated the medication was not yet approved.  Patient has both [employer] and [employer] (secondary) and needs prior auth for both to cover medications.  Patient is requesting a call back once this has been authorized.    Please assist.    Call back#: [phone]

## 2025-01-30 NOTE — TELEPHONE ENCOUNTER
PA for  hydro APPROVED     Date(s) approved 11/29/2025 7/28/205  Case #    Patient advised by          [x]ZAINA PHARMAhart Message  []Phone call   [x]LMOM  []L/M to call office as no active Communication consent on file  []Unable to leave detailed message as VM not approved on Communication consent       Pharmacy advised by    [x]Fax  []Phone call    Approval letter scanned into [employer] Yes

## 2025-01-30 NOTE — TELEPHONE ENCOUNTER
Pt called asking for her provider to go over her TSH and T$ as her GI doctor told her to reach out as they told her that there was a problem, please advise.

## 2025-01-31 DIAGNOSIS — E03.9 HYPOTHYROIDISM, UNSPECIFIED TYPE: Primary | ICD-10-CM

## 2025-01-31 NOTE — TELEPHONE ENCOUNTER
PA for HYDRO-ACET 5-325 MG SUBMITTED to [employer]    via    [x]CMM-KEY: [id number]      [x]PA sent as URGENT    All office notes, labs and other pertaining documents and studies sent. Clinical questions answered. Awaiting determination from insurance company.     Turnaround time for your insurance to make a decision on your Prior Authorization can take 7-21 business days.

## 2025-01-31 NOTE — TELEPHONE ENCOUNTER
I spoke to [facility] pharmacy and they do have a paid claim from [employer]. Pt has a $15.00 copay. Medicayion is being prepared for pt.
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
## 2025-01-31 NOTE — TELEPHONE ENCOUNTER
Per [employer] pt has alternative benefits ([employer]) who is the primary payor. [employer] has approved the hydro-acet 5-325 mg. That approval [facility]. [employer] does not require Prior Auth.    Pt notified

## 2025-01-31 NOTE — TELEPHONE ENCOUNTER
Caller: Patient    Doctor: Dr. IVY    Reason for call: Patient is asking if Pharmacy can be called back, They are still not receive insurance approval, Patient stated she called [facility] 5 min again    Call back#:

## 2025-01-31 NOTE — TELEPHONE ENCOUNTER
----- Message from FIONA Stanton sent at 1/31/2025  9:03 AM EST -----  Her Tsh is normal so I will not be making adjustment to her TSH can repeat in 3 months or sooner prn. Thanks  ----- Message -----  From: Latrice Irving MA  Sent: 1/30/2025   3:37 PM EST  To: FIONA Hawkins      ----- Message -----  From: Jamila Guevara MA  Sent: 1/30/2025   3:25 PM EST  To: [facility]

## 2025-02-03 ENCOUNTER — ANNUAL EXAM (OUTPATIENT)
Dept: OBGYN CLINIC | Facility: CLINIC | Age: 50
End: 2025-02-03
Payer: COMMERCIAL

## 2025-02-03 VITALS
HEIGHT: 65 IN | WEIGHT: 122 LBS | DIASTOLIC BLOOD PRESSURE: 74 MMHG | SYSTOLIC BLOOD PRESSURE: 126 MMHG | BODY MASS INDEX: 20.33 KG/M2

## 2025-02-03 DIAGNOSIS — Z30.431 IUD CHECK UP: ICD-10-CM

## 2025-02-03 DIAGNOSIS — Z12.31 ENCOUNTER FOR SCREENING MAMMOGRAM FOR BREAST CANCER: ICD-10-CM

## 2025-02-03 DIAGNOSIS — Z01.419 WELL WOMAN EXAM WITH ROUTINE GYNECOLOGICAL EXAM: Primary | ICD-10-CM

## 2025-02-03 PROCEDURE — G0476 HPV COMBO ASSAY CA SCREEN: HCPCS | Performed by: ADVANCED PRACTICE MIDWIFE

## 2025-02-03 PROCEDURE — S0612 ANNUAL GYNECOLOGICAL EXAMINA: HCPCS | Performed by: ADVANCED PRACTICE MIDWIFE

## 2025-02-03 PROCEDURE — G0145 SCR C/V CYTO,THINLAYER,RESCR: HCPCS | Performed by: ADVANCED PRACTICE MIDWIFE

## 2025-02-03 RX ORDER — SUCRALFATE 1 G/1
1 TABLET ORAL 3 TIMES DAILY
COMMUNITY
Start: 2025-01-28

## 2025-02-03 RX ORDER — HYOSCYAMINE SULFATE 0.125 MG
125 TABLET ORAL EVERY 12 HOURS PRN
COMMUNITY
Start: 2025-01-31

## 2025-02-04 LAB
HPV HR 12 DNA CVX QL NAA+PROBE: POSITIVE
HPV16 DNA CVX QL NAA+PROBE: NEGATIVE
HPV18 DNA CVX QL NAA+PROBE: NEGATIVE

## 2025-02-10 ENCOUNTER — RESULTS FOLLOW-UP (OUTPATIENT)
Dept: OBGYN CLINIC | Facility: CLINIC | Age: 50
End: 2025-02-10

## 2025-02-10 LAB
LAB AP GYN PRIMARY INTERPRETATION: NORMAL
Lab: NORMAL

## 2025-02-19 ENCOUNTER — TELEPHONE (OUTPATIENT)
Age: 50
End: 2025-02-19

## 2025-02-19 NOTE — TELEPHONE ENCOUNTER
Caller: pt    Doctor: Dr. castellano    Reason for call: pt would like to get neck injections.    Call back#: [phone]

## 2025-02-19 NOTE — TELEPHONE ENCOUNTER
"Injection type: B/L Cervical TPI (paraspinals, rhomboid, occipital ridge)  Last injection date: 12/12/24  Last office visit: 1/2/25 BK  Where is pain located: neck into occiput  Is the pain the same area as before: Yes  Current pain level: 10/10 due to persistent HA from neck pain  How much % of relief did the last injection provide: 80%  Duration of relief from last injection: 2mo  When did pain return: \"past few days\"  Is the pain effecting your ADLs: yes    Blood thinners/NSAIDS? N/A  Diabetes? No                   CGM? No    Pt states taking all pres meds w/o relief of neck pain and HA.   Pls advise on repeat cervical TPI. Thank you!  "

## 2025-02-20 DIAGNOSIS — M54.2 NECK PAIN: ICD-10-CM

## 2025-02-20 DIAGNOSIS — M79.18 MYOFASCIAL PAIN SYNDROME: ICD-10-CM

## 2025-02-20 DIAGNOSIS — M47.812 CERVICAL SPONDYLOSIS: ICD-10-CM

## 2025-02-20 DIAGNOSIS — G89.4 CHRONIC PAIN SYNDROME: ICD-10-CM

## 2025-02-20 RX ORDER — HYDROCODONE BITARTRATE AND ACETAMINOPHEN 5; 325 MG/1; MG/1
1 TABLET ORAL 2 TIMES DAILY PRN
Qty: 60 TABLET | Refills: 0 | Status: SHIPPED | OUTPATIENT
Start: 2025-02-28

## 2025-02-22 ENCOUNTER — HOSPITAL ENCOUNTER (OUTPATIENT)
Dept: MRI IMAGING | Facility: HOSPITAL | Age: 50
Discharge: HOME | End: 2025-02-22
Payer: COMMERCIAL

## 2025-02-22 DIAGNOSIS — R93.3 ABNORMAL FINDINGS ON DIAGNOSTIC IMAGING OF OTHER PARTS OF DIGESTIVE TRACT: ICD-10-CM

## 2025-02-22 PROCEDURE — 74183 MRI ABD W/O CNTR FLWD CNTR: CPT

## 2025-02-22 PROCEDURE — A9585 GADOBUTROL INJECTION: HCPCS | Performed by: RADIOLOGY

## 2025-02-22 RX ORDER — GADOBUTROL 604.72 MG/ML
5 INJECTION INTRAVENOUS
Status: COMPLETED | OUTPATIENT
Start: 2025-02-22 | End: 2025-02-22

## 2025-02-22 RX ADMIN — GADOBUTROL 5 ML: 604.72 INJECTION INTRAVENOUS at 10:50

## 2025-02-28 ENCOUNTER — OFFICE VISIT (OUTPATIENT)
Dept: OBGYN CLINIC | Facility: CLINIC | Age: 50
End: 2025-02-28
Payer: COMMERCIAL

## 2025-02-28 VITALS — BODY MASS INDEX: 20.33 KG/M2 | HEIGHT: 65 IN | WEIGHT: 122 LBS

## 2025-02-28 DIAGNOSIS — M77.02 MEDIAL EPICONDYLITIS OF LEFT ELBOW: ICD-10-CM

## 2025-02-28 DIAGNOSIS — M18.0 OSTEOARTHRITIS OF CARPOMETACARPAL (CMC) JOINTS OF BOTH THUMBS, UNSPECIFIED OSTEOARTHRITIS TYPE: Primary | ICD-10-CM

## 2025-02-28 PROCEDURE — 20600 DRAIN/INJ JOINT/BURSA W/O US: CPT | Performed by: ORTHOPAEDIC SURGERY

## 2025-02-28 PROCEDURE — 99213 OFFICE O/P EST LOW 20 MIN: CPT | Performed by: ORTHOPAEDIC SURGERY

## 2025-02-28 PROCEDURE — 20551 NJX 1 TENDON ORIGIN/INSJ: CPT | Performed by: ORTHOPAEDIC SURGERY

## 2025-02-28 RX ORDER — BETAMETHASONE SODIUM PHOSPHATE AND BETAMETHASONE ACETATE 3; 3 MG/ML; MG/ML
6 INJECTION, SUSPENSION INTRA-ARTICULAR; INTRALESIONAL; INTRAMUSCULAR; SOFT TISSUE
Status: COMPLETED | OUTPATIENT
Start: 2025-02-28 | End: 2025-02-28

## 2025-02-28 RX ORDER — BETAMETHASONE SODIUM PHOSPHATE AND BETAMETHASONE ACETATE 3; 3 MG/ML; MG/ML
3 INJECTION, SUSPENSION INTRA-ARTICULAR; INTRALESIONAL; INTRAMUSCULAR; SOFT TISSUE
Status: COMPLETED | OUTPATIENT
Start: 2025-02-28 | End: 2025-02-28

## 2025-02-28 RX ORDER — BUPIVACAINE HYDROCHLORIDE 2.5 MG/ML
1 INJECTION, SOLUTION EPIDURAL; INFILTRATION; INTRACAUDAL
Status: COMPLETED | OUTPATIENT
Start: 2025-02-28 | End: 2025-02-28

## 2025-02-28 RX ORDER — BUPIVACAINE HYDROCHLORIDE 2.5 MG/ML
0.5 INJECTION, SOLUTION INFILTRATION; PERINEURAL
Status: COMPLETED | OUTPATIENT
Start: 2025-02-28 | End: 2025-02-28

## 2025-02-28 RX ADMIN — BETAMETHASONE SODIUM PHOSPHATE AND BETAMETHASONE ACETATE 3 MG: 3; 3 INJECTION, SUSPENSION INTRA-ARTICULAR; INTRALESIONAL; INTRAMUSCULAR; SOFT TISSUE at 08:00

## 2025-02-28 RX ADMIN — BUPIVACAINE HYDROCHLORIDE 0.5 ML: 2.5 INJECTION, SOLUTION INFILTRATION; PERINEURAL at 08:00

## 2025-02-28 RX ADMIN — BETAMETHASONE SODIUM PHOSPHATE AND BETAMETHASONE ACETATE 6 MG: 3; 3 INJECTION, SUSPENSION INTRA-ARTICULAR; INTRALESIONAL; INTRAMUSCULAR; SOFT TISSUE at 08:00

## 2025-02-28 RX ADMIN — BUPIVACAINE HYDROCHLORIDE 1 ML: 2.5 INJECTION, SOLUTION EPIDURAL; INFILTRATION; INTRACAUDAL at 08:00
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
## 2025-03-06 ENCOUNTER — OFFICE VISIT (OUTPATIENT)
Age: 50
End: 2025-03-06
Payer: COMMERCIAL

## 2025-03-06 ENCOUNTER — TELEPHONE (OUTPATIENT)
Age: 50
End: 2025-03-06

## 2025-03-06 VITALS — HEIGHT: 65 IN | BODY MASS INDEX: 20.33 KG/M2 | WEIGHT: 122 LBS

## 2025-03-06 DIAGNOSIS — M54.2 NECK PAIN: ICD-10-CM

## 2025-03-06 DIAGNOSIS — F11.20 UNCOMPLICATED OPIOID DEPENDENCE (HCC): ICD-10-CM

## 2025-03-06 DIAGNOSIS — G89.4 CHRONIC PAIN SYNDROME: Primary | ICD-10-CM

## 2025-03-06 DIAGNOSIS — Z98.1 HISTORY OF FUSION OF CERVICAL SPINE: ICD-10-CM

## 2025-03-06 DIAGNOSIS — Z79.891 LONG-TERM CURRENT USE OF OPIATE ANALGESIC: ICD-10-CM

## 2025-03-06 DIAGNOSIS — M54.81 BILATERAL OCCIPITAL NEURALGIA: ICD-10-CM

## 2025-03-06 DIAGNOSIS — M47.812 CERVICAL SPONDYLOSIS: ICD-10-CM

## 2025-03-06 DIAGNOSIS — M79.18 MYOFASCIAL PAIN SYNDROME: ICD-10-CM

## 2025-03-06 PROCEDURE — 99214 OFFICE O/P EST MOD 30 MIN: CPT | Performed by: NURSE PRACTITIONER

## 2025-03-06 RX ORDER — HYDROCODONE BITARTRATE AND ACETAMINOPHEN 5; 325 MG/1; MG/1
1 TABLET ORAL 2 TIMES DAILY PRN
Qty: 60 TABLET | Refills: 0 | Status: SHIPPED | OUTPATIENT
Start: 2025-03-28

## 2025-03-06 RX ORDER — HYDROCODONE BITARTRATE AND ACETAMINOPHEN 5; 325 MG/1; MG/1
1 TABLET ORAL 2 TIMES DAILY PRN
Qty: 60 TABLET | Refills: 0 | Status: SHIPPED | OUTPATIENT
Start: 2025-04-25

## 2025-03-06 RX ORDER — BACLOFEN 20 MG/1
20 TABLET ORAL 3 TIMES DAILY
Qty: 90 TABLET | Refills: 1 | Status: SHIPPED | OUTPATIENT
Start: 2025-03-06
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
## 2025-03-06 NOTE — TELEPHONE ENCOUNTER
Patient states she is not feeling right. Her psychiatrist would like her to get a full workup. Patient requests call back to advise.

## 2025-03-07 NOTE — TELEPHONE ENCOUNTER
Bed: 17  Expected date:   Expected time:   Means of arrival:   Comments:  DW/chest pain   Spoke with patient, she is aware that she nees to be seen for an appt. Asked Ruth and Naveed if they would do a virtual for this, both stated that she would need to be seen in office.    I did ask patient what was going on, she states that she sees psychiatry they are having trouble getting her on the correct dose of medications and thought that maybe her vitamin levels are off.  Her symptoms are irritable, fatigue, no motivation, biting tongue and lip.Patient will go to er if worsens or to the walk in clinic. Patient would like to be put on the cancellation list as well.

## 2025-03-08 ENCOUNTER — APPOINTMENT (OUTPATIENT)
Dept: RADIOLOGY | Facility: CLINIC | Age: 50
End: 2025-03-08
Payer: COMMERCIAL

## 2025-03-08 DIAGNOSIS — M54.2 NECK PAIN: ICD-10-CM

## 2025-03-08 DIAGNOSIS — Z98.1 HISTORY OF FUSION OF CERVICAL SPINE: ICD-10-CM

## 2025-03-08 DIAGNOSIS — M47.812 CERVICAL SPONDYLOSIS: ICD-10-CM

## 2025-03-08 DIAGNOSIS — J18.9 COMMUNITY ACQUIRED PNEUMONIA OF BOTH LUNGS: ICD-10-CM

## 2025-03-08 DIAGNOSIS — G89.4 CHRONIC PAIN SYNDROME: ICD-10-CM

## 2025-03-08 PROCEDURE — 72050 X-RAY EXAM NECK SPINE 4/5VWS: CPT

## 2025-03-08 PROCEDURE — 71046 X-RAY EXAM CHEST 2 VIEWS: CPT

## 2025-03-09 LAB
6MAM UR QL CFM: NEGATIVE NG/ML
7AMINOCLONAZEPAM UR QL CFM: NEGATIVE NG/ML
A-OH ALPRAZ UR QL CFM: NEGATIVE NG/ML
ACCEPTABLE CREAT UR QL: NORMAL MG/DL
ACCEPTIBLE SP GR UR QL: NORMAL
AMPHET UR QL CFM: NEGATIVE NG/ML
AMPHET UR QL CFM: NEGATIVE NG/ML
BUPRENORPHINE UR QL CFM: NEGATIVE NG/ML
BUTALBITAL UR QL CFM: NEGATIVE NG/ML
BZE UR QL CFM: NEGATIVE NG/ML
CODEINE UR QL CFM: NEGATIVE NG/ML
DESIPRAMINE UR QL CFM: NEGATIVE NG/ML
EDDP UR QL CFM: NEGATIVE NG/ML
ETHYL GLUCURONIDE UR QL CFM: NEGATIVE NG/ML
ETHYL SULFATE UR QL SCN: NEGATIVE NG/ML
FENTANYL UR QL CFM: NEGATIVE NG/ML
GLIADIN IGG SER IA-ACNC: NEGATIVE NG/ML
GLUCOSE 30M P 50 G LAC PO SERPL-MCNC: NEGATIVE NG/ML
HYDROCODONE UR QL CFM: ABNORMAL NG/ML
HYDROCODONE UR QL CFM: ABNORMAL NG/ML
HYDROMORPHONE UR QL CFM: ABNORMAL NG/ML
LORAZEPAM UR QL CFM: ABNORMAL NG/ML
MDMA UR QL CFM: NEGATIVE NG/ML
ME-PHENIDATE UR QL CFM: NEGATIVE NG/ML
MEPERIDINE UR QL CFM: NEGATIVE NG/ML
METHADONE UR QL CFM: NEGATIVE NG/ML
METHAMPHET UR QL CFM: NEGATIVE NG/ML
MORPHINE UR QL CFM: NEGATIVE NG/ML
MORPHINE UR QL CFM: NEGATIVE NG/ML
NITRITE UR QL: NORMAL UG/ML
NORBUPRENORPHINE UR QL CFM: NEGATIVE NG/ML
NORDIAZEPAM UR QL CFM: ABNORMAL NG/ML
NORFENTANYL UR QL CFM: NEGATIVE NG/ML
NORHYDROCODONE UR QL CFM: ABNORMAL NG/ML
NORHYDROCODONE UR QL CFM: ABNORMAL NG/ML
NORMEPERIDINE UR QL CFM: NEGATIVE NG/ML
NOROXYCODONE UR QL CFM: NEGATIVE NG/ML
OLANZAPINE QUANTIFICATION: NEGATIVE NG/ML
OPC-3373 QUANTIFICATION: NEGATIVE NG/ML
OXAZEPAM UR QL CFM: ABNORMAL NG/ML
OXYCODONE UR QL CFM: NEGATIVE NG/ML
OXYMORPHONE UR QL CFM: NEGATIVE NG/ML
OXYMORPHONE UR QL CFM: NEGATIVE NG/ML
PARA-FLUOROFENTANYL QUANTIFICATION: NORMAL NG/ML
PCP UR QL CFM: NEGATIVE NG/ML
PHENOBARB UR QL CFM: NEGATIVE NG/ML
RESULT ALL_PRESCRIBED MEDS AND SPECIAL INSTRUCTIONS: NORMAL
SECOBARBITAL UR QL CFM: NEGATIVE NG/ML
SL AMB 7-OH-MITRAGYNINE (KRATOM ALKALOID) QUANTIFICATION: NEGATIVE NG/ML
SL AMB ACETYL FENTANYL QUANTIFICATION: NORMAL NG/ML
SL AMB ACETYL NORFENTANYL QUANTIFICATION: NORMAL NG/ML
SL AMB ACRYL FENTANYL QUANTIFICATION: NORMAL NG/ML
SL AMB CARFENTANIL QUANTIFICATION: NORMAL NG/ML
SL AMB CLOZAPINE QUANTIFICATION: NEGATIVE NG/ML
SL AMB CTHC (MARIJUANA METABOLITE) QUANTIFICATION: NEGATIVE NG/ML
SL AMB DEXTRORPHAN (DEXTROMETHORPHAN METABOLITE) QUANT: ABNORMAL NG/ML
SL AMB HALOPERIDOL  QUANTIFICATION: NEGATIVE NG/ML
SL AMB HALOPERIDOL METABOLITE QUANTIFICATION: NEGATIVE NG/ML
SL AMB HYDROXYRISPERIDONE QUANTIFICATION: NEGATIVE NG/ML
SL AMB N-DESMETHYL-TRAMADOL QUANTIFICATION: NEGATIVE NG/ML
SL AMB N-DESMETHYLCLOZAPINE QUANTIFICATION: NEGATIVE NG/ML
SL AMB NORQUETIAPINE QUANTIFICATION: NEGATIVE NG/ML
SL AMB PHENTERMINE QUANTIFICATION: NEGATIVE NG/ML
SL AMB PREGABALIN QUANTIFICATION: NEGATIVE NG/ML
SL AMB QUETIAPINE QUANTIFICATION: NEGATIVE NG/ML
SL AMB RISPERIDONE QUANTIFICATION: NEGATIVE NG/ML
SL AMB RITALINIC ACID QUANTIFICATION: NEGATIVE NG/ML
SPECIMEN PH ACCEPTABLE UR: NORMAL
TAPENTADOL UR QL CFM: NEGATIVE NG/ML
TEMAZEPAM UR QL CFM: ABNORMAL NG/ML
TEMAZEPAM UR QL CFM: ABNORMAL NG/ML
TRAMADOL UR QL CFM: NEGATIVE NG/ML
URATE/CREAT 24H UR: NEGATIVE NG/ML

## 2025-03-09 NOTE — PROGRESS NOTES
"Assessment/Plan:    No problem-specific Assessment & Plan notes found for this encounter  Diagnoses and all orders for this visit:    Liver laceration, grade III, without open wound into cavity, sequela    Closed fracture of multiple ribs of right side, sequela    Anxiety    Bipolar 2 disorder (HCC)    Other orders  -     pantoprazole (PROTONIX) 40 mg tablet; Take 40 mg by mouth daily          Subjective:      Patient ID: Meghan Faulkner is a 50 y o  female  Patient presents for follow up on pain in the right ribs and diarrhea  Patient states that she has been in the ER twice for dehydration and seen by trauma and GI  Patient states that she has been having abdominal pain- lower, cramping and pain  Patient states that she has been having chronic diarrhea ongoing for months and worsening since her MVC  Patient denies any blood, mucus or pus in the stool  Patient states that she has had a decreased appetite and she has lost almost 16 pounds since her accident  She has been eating one small meal a day and drinking two boosts a day  She states that she also has been spitting up "white foam", on examination appears to be saliva  Patient states that she was seen by GI and was given bentyl and omeprazole for her pain  Patient is scheduled for a colonoscopy on 10/7/21  She states that she has been having increase in burning and reflux symptoms in her stomach  She states that she does not think that she is having an endoscopy at this time  Instructed patient to speak to GI to see if they feel endoscopy is necessary at this time  Patient states that she has been having an increase in her anxiety symptoms  Patient states she is currently taking Wellbutrin for depression, clonopin prn for anxiety but has not been taking it recently  Patient is following with a psychiatrist once a month- instructed to call them to discuss other options for treatment of anxiety     Patient continues to have rib pain- she had a CT scan in the ER - liver laceration is improving, rib fractures are healing well  She is taking oxycodone prn for pain and it does help her symptoms  She denies coughing, shortness of breath, fevers, chills or chest pains  She has been revaluated by trauma surgeon  Patient states that due to her pain and diarrhea she is unable to return to work at this time  Will stay off work until next f/u visit  The following portions of the patient's history were reviewed and updated as appropriate: allergies, current medications, past family history, past medical history, past social history, past surgical history and problem list     Review of Systems   Constitutional: Positive for appetite change  Negative for fatigue and unexpected weight change  HENT: Negative for congestion, rhinorrhea, sneezing and sore throat  Eyes: Negative for photophobia and visual disturbance  Respiratory: Negative for cough, chest tightness, shortness of breath and wheezing  Cardiovascular: Negative for chest pain, palpitations and leg swelling  Gastrointestinal: Positive for abdominal distention, abdominal pain and diarrhea  Negative for blood in stool, constipation, nausea and vomiting  Genitourinary: Negative for difficulty urinating, dysuria and urgency  Musculoskeletal: Negative for arthralgias, back pain and joint swelling  Skin: Negative for color change and rash  Neurological: Negative for dizziness, weakness and headaches  Hematological: Negative for adenopathy  Does not bruise/bleed easily  Objective:      /78 (BP Location: Left arm, Patient Position: Sitting, Cuff Size: Standard)   Pulse 103   Temp 97 8 °F (36 6 °C) (Tympanic)   Ht 5' 5" (1 651 m)   Wt 58 7 kg (129 lb 6 oz)   SpO2 98%   BMI 21 53 kg/m²          Physical Exam  Constitutional:       General: She is not in acute distress  Appearance: Normal appearance  She is not ill-appearing  HENT:      Head: Normocephalic and atraumatic  Right Ear: Tympanic membrane, ear canal and external ear normal  There is no impacted cerumen  Left Ear: Tympanic membrane, ear canal and external ear normal  There is no impacted cerumen  Nose: Nose normal  No congestion or rhinorrhea  Mouth/Throat:      Mouth: Mucous membranes are moist       Pharynx: Oropharynx is clear  No oropharyngeal exudate or posterior oropharyngeal erythema  Eyes:      General: No scleral icterus  Right eye: No discharge  Left eye: No discharge  Conjunctiva/sclera: Conjunctivae normal       Pupils: Pupils are equal, round, and reactive to light  Cardiovascular:      Rate and Rhythm: Normal rate and regular rhythm  Pulses: Normal pulses  Heart sounds: Normal heart sounds  No murmur heard  No friction rub  Pulmonary:      Effort: Pulmonary effort is normal  No respiratory distress  Breath sounds: Normal breath sounds  No wheezing  Chest:      Chest wall: No tenderness  Abdominal:      General: Abdomen is flat  Bowel sounds are normal  There is no distension  Palpations: Abdomen is soft  There is no mass  Tenderness: There is abdominal tenderness (lower abdominal tenderness b/l )  There is no guarding or rebound  Musculoskeletal:      Cervical back: Normal range of motion  No rigidity or tenderness  Right lower leg: No edema  Left lower leg: No edema  Comments: Right side rib tenderness    Lymphadenopathy:      Cervical: No cervical adenopathy  Skin:     General: Skin is warm and dry  Findings: No bruising, lesion or rash  Neurological:      General: No focal deficit present  Mental Status: She is alert and oriented to person, place, and time  Mental status is at baseline  Sensory: No sensory deficit  Motor: No weakness  Gait: Gait normal    Psychiatric:         Mood and Affect: Mood normal          Behavior: Behavior normal          Thought Content:  Thought content normal  Judgment: Judgment normal

## 2025-03-10 ENCOUNTER — RESULTS FOLLOW-UP (OUTPATIENT)
Dept: PAIN MEDICINE | Facility: CLINIC | Age: 50
End: 2025-03-10

## 2025-03-11 ENCOUNTER — RESULTS FOLLOW-UP (OUTPATIENT)
Dept: FAMILY MEDICINE CLINIC | Facility: CLINIC | Age: 50
End: 2025-03-11

## 2025-03-11 ENCOUNTER — RESULTS FOLLOW-UP (OUTPATIENT)
Dept: PAIN MEDICINE | Facility: MEDICAL CENTER | Age: 50
End: 2025-03-11

## 2025-03-11 NOTE — TELEPHONE ENCOUNTER
Pt called asking if Ruth can take a look at her xray of the spine and have someone call her back to better explain the results.

## 2025-03-12 ENCOUNTER — OFFICE VISIT (OUTPATIENT)
Dept: OBGYN CLINIC | Facility: CLINIC | Age: 50
End: 2025-03-12
Payer: COMMERCIAL

## 2025-03-12 ENCOUNTER — APPOINTMENT (OUTPATIENT)
Dept: LAB | Facility: CLINIC | Age: 50
End: 2025-03-12
Payer: COMMERCIAL

## 2025-03-12 VITALS
HEIGHT: 65 IN | WEIGHT: 120 LBS | DIASTOLIC BLOOD PRESSURE: 82 MMHG | SYSTOLIC BLOOD PRESSURE: 130 MMHG | BODY MASS INDEX: 19.99 KG/M2

## 2025-03-12 DIAGNOSIS — E03.9 HYPOTHYROIDISM, UNSPECIFIED TYPE: ICD-10-CM

## 2025-03-12 DIAGNOSIS — N95.1 MENOPAUSAL SYMPTOM: ICD-10-CM

## 2025-03-12 DIAGNOSIS — R53.82 CHRONIC FATIGUE: ICD-10-CM

## 2025-03-12 DIAGNOSIS — F41.9 ANXIETY: ICD-10-CM

## 2025-03-12 DIAGNOSIS — E55.9 VITAMIN D DEFICIENCY: ICD-10-CM

## 2025-03-12 DIAGNOSIS — N95.1 MENOPAUSAL SYMPTOM: Primary | ICD-10-CM

## 2025-03-12 DIAGNOSIS — Z97.5 PRESENCE OF MIRENA IUD: ICD-10-CM

## 2025-03-12 PROCEDURE — 82670 ASSAY OF TOTAL ESTRADIOL: CPT

## 2025-03-12 PROCEDURE — 99214 OFFICE O/P EST MOD 30 MIN: CPT | Performed by: ADVANCED PRACTICE MIDWIFE

## 2025-03-12 PROCEDURE — 83002 ASSAY OF GONADOTROPIN (LH): CPT

## 2025-03-12 PROCEDURE — 82607 VITAMIN B-12: CPT

## 2025-03-12 PROCEDURE — 36415 COLL VENOUS BLD VENIPUNCTURE: CPT

## 2025-03-12 PROCEDURE — 83001 ASSAY OF GONADOTROPIN (FSH): CPT

## 2025-03-12 PROCEDURE — 84443 ASSAY THYROID STIM HORMONE: CPT

## 2025-03-12 PROCEDURE — 82306 VITAMIN D 25 HYDROXY: CPT

## 2025-03-12 RX ORDER — ESTRADIOL 0.06 MG/D
1 PATCH TRANSDERMAL WEEKLY
Qty: 4 PATCH | Refills: 4 | Status: SHIPPED | OUTPATIENT
Start: 2025-03-12

## 2025-03-12 RX ORDER — BUPROPION HYDROCHLORIDE 150 MG/1
150 TABLET ORAL
COMMUNITY
Start: 2025-03-02

## 2025-03-12 NOTE — PROGRESS NOTES
Name: Ericka Castillo      : 1975      MRN: [id number]  Encounter Provider: Marcelina Najera CNM  Encounter Date: 3/12/2025   Encounter department: [facility] [place]  :  Assessment & Plan  Menopausal symptom  Discussed option of adding estrogen as hormone replacement.  Recommend having labs done before starting  Currently has IUD which will serve as progestin component  Patient under extreme stress  Reviewed risk/benefit, side effect, administration  Will take 8-12 weeks to determine full effect,   to contact office with any questions or concerns   Follow-up in 3 months  Orders:    Estradiol; Future    Follicle stimulating hormone; Future    Luteinizing hormone; Future    estradiol (Climara) 0.06 MG/24HR; Place 1 patch on the skin over 7 days once a week    Anxiety  Patient under extreme stress having difficulty dealing with activities of daily living  Orders:    Vitamin B12; Future    Vitamin D deficiency    Orders:    Vitamin D 25 hydroxy; Future    Chronic fatigue    Orders:    Vitamin B12; Future    Presence of Mirena IUD  Will use estrogen patch for hormone replacement, and IUD for progesterone component       Depression Screening Follow-up Plan: Patient's depression screening was positive with a PHQ-9 score of 12. Patient with underlying depression and was advised to continue current medications as prescribed. Will try hormonal replacement due to menopause.       History of Present Illness   Ericka states that she is very anxious and this started when they took her off some of her anxiety meds. She just wants to spend time with her children. Feels that she is unable to get control of her emotions. She has a Mirena IUD. Feels that she is going through a lot of emotional changes. Notes that she is feeling forgetful. Vacillating from irritability one day to weepy the next. She is having night sweats- which interrupt sleep, no leaking of urine. Drinks very little water. Notes  that she is biting her lip and tongue. Notes palpitations with anxiety. Has not found an even balance. We discussed effect of Mirena as she is reaching menopause and at this time will try an estrogen patch to see if we can balance out changes that may be occurring.       Ericka Castillo is a 49 y.o. female who presents hormonal imbalance, menopause  History obtained from: patient    Review of Systems   Constitutional:  Positive for fatigue. Negative for chills, diaphoresis and fever.   Cardiovascular:  Positive for palpitations. Negative for chest pain.        With anxiety notes palpitations   Gastrointestinal:  Negative for constipation and diarrhea.   Genitourinary:  Negative for difficulty urinating, dysuria, frequency, pelvic pain, urgency, vaginal bleeding, vaginal discharge and vaginal pain.   Psychiatric/Behavioral:  Positive for agitation and sleep disturbance. Negative for self-injury. The patient is nervous/anxious.      Medical History Reviewed by provider this encounter:     .  Current Outpatient Medications on File Prior to Visit   Medication Sig Dispense Refill    baclofen 20 mg tablet Take 1 tablet (20 mg total) by mouth 3 (three) times a day 90 tablet 1    buPROPion (WELLBUTRIN XL) 150 mg 24 hr tablet Take 150 mg by mouth      buPROPion (WELLBUTRIN XL) 300 mg 24 hr tablet Take 300 mg by mouth daily   1    famotidine (PEPCID) 20 mg tablet Take 1 tablet (20 mg total) by mouth 2 (two) times a day 60 tablet 5    gabapentin (NEURONTIN) 800 mg tablet Take 1 tablet (800 mg total) by mouth 3 (three) times a day 90 tablet 2    [START ON 4/25/2025] HYDROcodone-acetaminophen (NORCO) 5-325 mg per tablet Take 1 tablet by mouth 2 (two) times a day as needed for pain Max Daily Amount: 2 tablets Do not start before April 25, 2025. 60 tablet 0    hydrOXYzine HCL (ATARAX) 25 mg tablet Take 1 tablet (25 mg total) by mouth every 6 (six) hours as needed for anxiety 60 tablet 2    hyoscyamine (ANASPAZ,LEVSIN) 0.125 MG  tablet Take 125 mcg by mouth every 12 (twelve) hours as needed      lamoTRIgine (LaMICtal) 100 mg tablet Take 100 mg by mouth daily      lansoprazole (PREVACID) 30 mg capsule Take 30 mg by mouth 2 (two) times a day      levothyroxine 50 mcg tablet Take 1 tablet by mouth once daily 90 tablet 1    lidocaine (XYLOCAINE) 5 % ointment Apply topically as needed for mild pain 35.44 g 5    lisinopril (ZESTRIL) 10 mg tablet Take 1/2 (one-half) tablet by mouth once daily 15 tablet 5    nabumetone (RELAFEN) 500 mg tablet Take 1 tablet (500 mg total) by mouth 2 (two) times a day 60 tablet 5    ondansetron (ZOFRAN) 4 mg tablet Take 1 tablet (4 mg total) by mouth every 8 (eight) hours as needed for nausea or vomiting 20 tablet 0    Probiotic Product (VSL#3) CAPS       zolpidem (AMBIEN) 10 mg tablet Take 1 tablet (10 mg total) by mouth daily at bedtime as needed for sleep for up to 10 days 10 tablet 0    Caplyta 21 MG CAPS capsule Take 21 mg by mouth daily (Patient not taking: Reported on 2/3/2025)      diazepam (VALIUM) 10 mg tablet  (Patient not taking: Reported on 3/12/2025)      dicyclomine (BENTYL) 20 mg tablet TAKE 1 TABLET BY MOUTH EVERY 12 HOURS AS NEEDED FOR PAIN OR DIARRHEA      [START ON 3/28/2025] HYDROcodone-acetaminophen (NORCO) 5-325 mg per tablet Take 1 tablet by mouth 2 (two) times a day as needed for pain Max Daily Amount: 2 tablets Do not start before March 28, 2025. (Patient not taking: Reported on 3/13/2025) 60 tablet 0    naloxone (NARCAN) 4 mg/0.1 mL nasal spray Administer 1 spray into a nostril. If no response after 2-3 minutes, give another dose in the other nostril using a new spray. (Patient not taking: Reported on 3/13/2025) 1 each 1    sertraline (ZOLOFT) 50 mg tablet Take 50 mg by mouth daily (Patient not taking: Reported on 3/12/2025)      sucralfate (CARAFATE) 1 g tablet Take 1 g by mouth 3 (three) times a day (Patient not taking: Reported on 3/12/2025)       No current facility-administered  "medications on file prior to visit.      Social History     Tobacco Use    Smoking status: Former     Current packs/day: 0.00     Types: Cigarettes     Quit date: 8/23/2021     Years since quitting: 3.5    Smokeless tobacco: Never   Vaping Use    Vaping status: Former   Substance and Sexual Activity    Alcohol use: Not Currently    Drug use: Never    Sexual activity: Yes     Partners: Male     Birth control/protection: I.U.D.        Objective   /82   Ht 5' 5\" (1.651 m)   Wt 54.4 kg (120 lb)   LMP  (LMP Unknown)   BMI 19.97 kg/m²      Physical Exam  Vitals reviewed.   Constitutional:       Appearance: Normal appearance.   Neck:      Thyroid: No thyroid mass or thyroid tenderness.   Cardiovascular:      Rate and Rhythm: Normal rate.   Pulmonary:      Effort: Pulmonary effort is normal.   Abdominal:      Palpations: There is no mass.      Tenderness: There is no abdominal tenderness. There is no guarding or rebound.   Musculoskeletal:         General: Normal range of motion.   Skin:     General: Skin is warm and dry.      Capillary Refill: Capillary refill takes less than 2 seconds.   Neurological:      Mental Status: She is alert and oriented to person, place, and time.   Psychiatric:         Mood and Affect: Mood is anxious. Affect is tearful.         Speech: Speech normal.         Behavior: Behavior is not agitated, aggressive or hyperactive. Behavior is cooperative.           "

## 2025-03-12 NOTE — ASSESSMENT & PLAN NOTE
Patient under extreme stress having difficulty dealing with activities of daily living  Orders:    Vitamin B12; Future

## 2025-03-13 ENCOUNTER — RESULTS FOLLOW-UP (OUTPATIENT)
Dept: OBGYN CLINIC | Facility: MEDICAL CENTER | Age: 50
End: 2025-03-13

## 2025-03-13 ENCOUNTER — TELEPHONE (OUTPATIENT)
Age: 50
End: 2025-03-13

## 2025-03-13 ENCOUNTER — PROCEDURE VISIT (OUTPATIENT)
Age: 50
End: 2025-03-13
Payer: COMMERCIAL

## 2025-03-13 ENCOUNTER — NURSE TRIAGE (OUTPATIENT)
Age: 50
End: 2025-03-13

## 2025-03-13 VITALS — HEIGHT: 65 IN | BODY MASS INDEX: 19.99 KG/M2 | WEIGHT: 120 LBS

## 2025-03-13 DIAGNOSIS — M54.2 NECK PAIN: Primary | ICD-10-CM

## 2025-03-13 DIAGNOSIS — G89.4 CHRONIC PAIN SYNDROME: ICD-10-CM

## 2025-03-13 DIAGNOSIS — M54.9 MID BACK PAIN: ICD-10-CM

## 2025-03-13 DIAGNOSIS — M79.18 MYOFASCIAL PAIN SYNDROME: ICD-10-CM

## 2025-03-13 LAB
25(OH)D3 SERPL-MCNC: 70.3 NG/ML (ref 30–100)
ESTRADIOL SERPL-MCNC: <15 PG/ML
FSH SERPL-ACNC: 136.6 MIU/ML
LH SERPL-ACNC: 53.8 MIU/ML
TSH SERPL DL<=0.05 MIU/L-ACNC: 1.93 UIU/ML (ref 0.45–4.5)
VIT B12 SERPL-MCNC: 3028 PG/ML (ref 180–914)

## 2025-03-13 PROCEDURE — 20553 NJX 1/MLT TRIGGER POINTS 3/>: CPT | Performed by: ANESTHESIOLOGY

## 2025-03-13 RX ORDER — TRIAMCINOLONE ACETONIDE 40 MG/ML
40 INJECTION, SUSPENSION INTRA-ARTICULAR; INTRAMUSCULAR
Status: COMPLETED | OUTPATIENT
Start: 2025-03-13 | End: 2025-03-13

## 2025-03-13 RX ORDER — BUPIVACAINE HYDROCHLORIDE 2.5 MG/ML
10 INJECTION, SOLUTION EPIDURAL; INFILTRATION; INTRACAUDAL
Status: COMPLETED | OUTPATIENT
Start: 2025-03-13 | End: 2025-03-13

## 2025-03-13 RX ADMIN — BUPIVACAINE HYDROCHLORIDE 10 ML: 2.5 INJECTION, SOLUTION EPIDURAL; INFILTRATION; INTRACAUDAL at 16:20

## 2025-03-13 RX ADMIN — TRIAMCINOLONE ACETONIDE 40 MG: 40 INJECTION, SUSPENSION INTRA-ARTICULAR; INTRAMUSCULAR at 16:20

## 2025-03-13 NOTE — PROGRESS NOTES
" Universal Protocol:  procedure performed by consultantConsent: Verbal consent obtained. Written consent obtained.  Risks and benefits: risks, benefits and alternatives were discussed  Consent given by: patient  Time out: Immediately prior to procedure a \"time out\" was called to verify the correct patient, procedure, equipment, support staff and site/side marked as required.  Timeout called at: 3/13/2025 4:31 PM.  Patient understanding: patient states understanding of the procedure being performed  Patient consent: the patient's understanding of the procedure matches consent given  Procedure consent: procedure consent matches procedure scheduled  Relevant documents: relevant documents present and verified  Test results: test results available and properly labeled  Site marked: the operative site was marked  Radiology Images displayed and confirmed. If images not available, report reviewed: imaging studies not available  Required items: required blood products, implants, devices, and special equipment available  Patient identity confirmed: verbally with patient  Supporting Documentation  Indications: pain   Trigger Point Injections: multiple trigger points: 3 or more muscle groups    Injection site identified by: ultrasound  Procedure Details  Location(s):    Neck/Upper Back: L upper trapezius, R upper trapezius, R levator scapulae, L levator scapulae, L cervical paraspinals and R cervical paraspinals     Prep: patient was prepped and draped in usual sterile fashion  Needle size: 22 G  Medications: 10 mL bupivacaine (PF) 0.25 %; 40 mg triamcinolone acetonide 40 mg/mL  Patient tolerance: patient tolerated the procedure well with no immediate complications          "

## 2025-03-13 NOTE — TELEPHONE ENCOUNTER
Pt called. Stated she had a GYN visit and lab work was order. Results showing her B12 is elevated. GYN recommended to follow up with PCP. Pt verbalized frustration. Stated she has not been feeling well, biting her lips and tongue at night, therapist is changing her medications and now she is having this issue. Pt requested a call back as soon as possible.     Please advise, thank you.

## 2025-03-13 NOTE — TELEPHONE ENCOUNTER
Spoke with patient, she said she is taking a supplement/ vitamin with b12. She is aware that she needs to stop taking it, as well as follow up with the provider who is making medication changes. She states that she has been following up with therapist. Patient does have a follow up here in a few weeks.

## 2025-03-13 NOTE — PATIENT INSTRUCTIONS
Do not apply heat to any area that is numb. If you have discomfort or soreness at the injection site, you may apply ice today, 20 minutes on and 20 minutes off. Tomorrow you may use ice or warm, moist heat. Do not apply ice or heat directly to the skin.  If you experience severe shortness of breath, go to the Emergency Room.  You may have numbness for several hours from the local anesthetic. Please use caution and common sense, especially with weight-bearing activities.  You may have an increase or change in the discomfort for 36-48 hours after your treatment. Apply ice and continue with any pain medicine you have been prescribed.  Do not do anything strenuous today. You may shower, but no tub baths or hot tubs today. You may resume your normal activities tomorrow, but do not “overdo it”. Resume normal activities slowly when you are feeling better.  If you experience redness, drainage or swelling at the injection site, or if you develop a fever above 100 degrees, please call [facility] at [phone] or go to the Emergency Room.  Continue to take all routine medicines prescribed by your primary care physician unless otherwise instructed by our staff. Most blood thinners should be started again according to your regularly scheduled dosing. If you have any questions, please give our office a call.    As no general anesthesia was used in today's procedure, you should not experience any side effects related to anesthesia.       If you have a problem specifically related to your procedure, please call our office at [phone].  Problems not related to your procedure should be directed to your primary care physician.

## 2025-03-13 NOTE — TELEPHONE ENCOUNTER
Regarding: CTS callback about results.  ----- Message from Nikki RUIZ sent at 3/13/2025 10:06 AM EDT -----  Pt has questions about results. Requesting call back

## 2025-03-13 NOTE — TELEPHONE ENCOUNTER
"Answer Assessment - Initial Assessment Questions  1. REASON FOR CALL: \"What is the main reason for your call?\" or \"How can I best help you?\"      Patient returned call for test results. See result management encounter.    Protocols used: Information Only Call - No Triage-Adult-[place]    "

## 2025-03-14 DIAGNOSIS — E53.8 VITAMIN B 12 DEFICIENCY: Primary | ICD-10-CM

## 2025-03-17 ENCOUNTER — TELEPHONE (OUTPATIENT)
Age: 50
End: 2025-03-17

## 2025-03-17 NOTE — TELEPHONE ENCOUNTER
Spoke with patient who reports she started estrogen patch yesterday at 4pm.  She advised she was crying, but this has been an ongoing issue.  Just feels blah.  She was advised to give it a few more days.  She denies SI.  Has a therapist and a psychiatrist.

## 2025-03-24 ENCOUNTER — TELEPHONE (OUTPATIENT)
Age: 50
End: 2025-03-24

## 2025-03-24 NOTE — TELEPHONE ENCOUNTER
Pt called in regard to calling medical records and asking which provider ordered test. Informed pt to call PCP office.

## 2025-03-25 ENCOUNTER — TELEPHONE (OUTPATIENT)
Age: 50
End: 2025-03-25

## 2025-03-25 NOTE — TELEPHONE ENCOUNTER
S/w pt who had questions regarding poss cervical SCS. Pt asking if RM feels the SCS would aid in reducing her neck pain that rad into occiput? Pt asked about steps regarding SCS and whether or not numerous appts would be necessary. RN was able to successfully answer questions for pt.    Pls advise whether or not the SCS would aid in reducing pt's neck pain?  Pls advise whether pt would need SCS appt w/ RM to discuss?  If no appt necessary, are you able to order starting imaging for SCS?  Pt trying to reduce the amt of appts needed due to no PTO avail.     3/13/25 RM for proc cervical/trap TPI  3/6/25 BK

## 2025-03-25 NOTE — TELEPHONE ENCOUNTER
Caller: Patient    Doctor: Dr. Cosby     Reason for call: patient calling to discuss the spinal cord stim packet provided by Dr Cosby    Transferred to: triage nurse

## 2025-03-27 ENCOUNTER — TELEPHONE (OUTPATIENT)
Dept: PAIN MEDICINE | Facility: CLINIC | Age: 50
End: 2025-03-27

## 2025-03-28 ENCOUNTER — TELEPHONE (OUTPATIENT)
Age: 50
End: 2025-03-28

## 2025-03-28 NOTE — TELEPHONE ENCOUNTER
Caller: [facility]    Doctor: Dr. IVY    Reason for call: diagnosis - for hydro   Drug interaction benzodiaepine and clonaepam and Hydrocodone   Pharmacy would like to know if provider aware   Call back#:

## 2025-03-28 NOTE — TELEPHONE ENCOUNTER
Caller: Patient    Doctor: Barbara Kocher    Reason for call: patient calling because the pharmacy will not fill until they speak with clinical staff ok'ing since she is on anxiety meds and the narco refill.  She states she goes through this every month.  Please assist as she needs to pickup medications by 5 pm.    Call back#: [phone]

## 2025-04-01 NOTE — TELEPHONE ENCOUNTER
I called and left detailed message for patient to call office to schedule visit to see RM to discuss SCS trial. CB# provided
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
## 2025-04-11 ENCOUNTER — OFFICE VISIT (OUTPATIENT)
Dept: FAMILY MEDICINE CLINIC | Facility: CLINIC | Age: 50
End: 2025-04-11
Payer: COMMERCIAL

## 2025-04-11 VITALS
TEMPERATURE: 96.7 F | HEART RATE: 83 BPM | WEIGHT: 118.4 LBS | OXYGEN SATURATION: 99 % | BODY MASS INDEX: 19.73 KG/M2 | DIASTOLIC BLOOD PRESSURE: 74 MMHG | SYSTOLIC BLOOD PRESSURE: 132 MMHG | HEIGHT: 65 IN

## 2025-04-11 DIAGNOSIS — L97.501 SKIN ULCER OF TOE, LIMITED TO BREAKDOWN OF SKIN, UNSPECIFIED LATERALITY (HCC): ICD-10-CM

## 2025-04-11 DIAGNOSIS — R53.83 OTHER FATIGUE: Primary | ICD-10-CM

## 2025-04-11 DIAGNOSIS — F31.81 BIPOLAR II DISORDER (HCC): ICD-10-CM

## 2025-04-11 DIAGNOSIS — Z12.11 SCREENING FOR COLON CANCER: ICD-10-CM

## 2025-04-11 PROCEDURE — 99214 OFFICE O/P EST MOD 30 MIN: CPT

## 2025-04-11 RX ORDER — ALPRAZOLAM 0.5 MG
0.5 TABLET ORAL 4 TIMES DAILY PRN
COMMUNITY
Start: 2025-04-08

## 2025-04-11 NOTE — PROGRESS NOTES
Name: Ericka Castillo      : 1975      MRN: [id number]  Encounter Provider: FIONA Rubalcava  Encounter Date: 2025   Encounter department: [facility]  :  Assessment & Plan  Other fatigue    Presents for lack of motivation & fatigue  Previously discussed with our office 3/13/25 - patient instructed to follow back up with psychiatry for lip-biting, abnormal tongue movements    Patient states she saw Dr. Guardado last week  She is unable to tell me what network this person works for or if they are a private practice  She states her medications were changed again  She is unsure if the medication or the dosing was changed  States she sees him every 2 weeks    Denies CP, SOB, numbness, weakness, confusion, palpitations, headaches, visual changes, arthralgias    PDMP also reviewed - patient taking Norco, Xanax, Prozac, Baclofen, Lamictal, Ambien, Clonazepam, Ativan, Valium.  Counseled.    States she has no motivation and feels tired all the time  I reviewed recent labs from this year with the patient  Hormonal labs WNL; B12 high - repeat lab ordered; thyroid WNL; electrolytes WNL; kidney & liver function stable    Discussed with patient it is also my recommendation to continuing following with psychiatry as her symptoms appear 2/2 her various psychiatric medications and dose/drug changes    I discussed I can order a few more labs to help find an alternative cause for her fatigue    Counseled on hydration & diet, good sleep hygiene    F/u in 1 mo as scheduled    ER precautions provided    Orders:    Hemoglobin A1C; Future    Lyme Total AB W Reflex to IGM/IGG; Future    EBV Acute Panel; Future    Urinalysis with microscopic; Future    Iron Panel (Includes Ferritin, Iron Sat%, Iron, and TIBC); Future    Bipolar II disorder (HCC)    Follows with psychiatry  Notes unavailable for review         Skin ulcer of toe, limited to breakdown of skin, unspecified laterality  "(HCC)    Patient also complaining of ulcer between 2nd and 3rd toes  Discussed this is a short, 20 min OV to discuss her fatigue and she may happily schedule a follow-up appointment with myself of [facility]  Patient proceeds to take her stocking and shoe off and hold her foot up stating \"can't you just look at this?\"    Appears to have a small, dried callus between the 2 toes - likely from friction  No drainage, erythema or warmth  Counseled         Screening for colon cancer    Orders:    Ambulatory Referral to Gastroenterology; Future           History of Present Illness     Review of Systems   Constitutional:  Negative for chills, fatigue and fever.   HENT:  Negative for congestion, ear pain, facial swelling, hearing loss, rhinorrhea, sinus pressure, sneezing, sore throat and trouble swallowing.    Eyes:  Negative for pain, redness and visual disturbance.   Respiratory:  Negative for cough, chest tightness, shortness of breath and wheezing.    Cardiovascular:  Negative for chest pain and palpitations.   Gastrointestinal:  Negative for abdominal pain, diarrhea, nausea and vomiting.   Genitourinary:  Negative for dysuria, flank pain, hematuria and pelvic pain.   Musculoskeletal:  Negative for arthralgias, back pain and myalgias.   Skin:  Negative for color change and rash.   Neurological:  Negative for dizziness, seizures, syncope, weakness, light-headedness and headaches.   Psychiatric/Behavioral:  Negative for confusion, hallucinations and sleep disturbance. The patient is not nervous/anxious.    All other systems reviewed and are negative.      Objective   /74 (Patient Position: Sitting)   Pulse 83   Temp (!) 96.7 °F (35.9 °C) (Tympanic)   Ht 5' 5\" (1.651 m)   Wt 53.7 kg (118 lb 6.4 oz)   LMP  (LMP Unknown)   SpO2 99%   BMI 19.70 kg/m²      Physical Exam  Vitals and nursing note reviewed.   Constitutional:       General: She is not in acute distress.     Appearance: She is well-developed.   HENT:      " Head: Normocephalic and atraumatic.      Right Ear: Hearing and tympanic membrane normal.      Left Ear: Hearing and tympanic membrane normal.      Nose: Nose normal.      Mouth/Throat:      Mouth: Mucous membranes are moist.      Dentition: Normal dentition.      Tongue: No lesions.      Pharynx: Oropharynx is clear. Uvula midline. No oropharyngeal exudate.      Tonsils: No tonsillar exudate.   Eyes:      Extraocular Movements: Extraocular movements intact.      Conjunctiva/sclera: Conjunctivae normal.   Neck:      Vascular: No carotid bruit or JVD.   Cardiovascular:      Rate and Rhythm: Normal rate and regular rhythm.      Heart sounds: S1 normal and S2 normal. No murmur heard.  Pulmonary:      Effort: Pulmonary effort is normal. No tachypnea or respiratory distress.      Breath sounds: Normal breath sounds and air entry. No decreased breath sounds.   Chest:      Chest wall: No deformity or tenderness.   Abdominal:      General: Abdomen is flat. Bowel sounds are normal. There is no distension.      Palpations: Abdomen is soft.      Tenderness: There is no abdominal tenderness. There is no right CVA tenderness, left CVA tenderness or guarding.   Musculoskeletal:         General: No swelling.      Cervical back: Full passive range of motion without pain and neck supple.      Right lower leg: No edema.      Left lower leg: No edema.   Lymphadenopathy:      Cervical: No cervical adenopathy.   Skin:     General: Skin is warm and dry.      Capillary Refill: Capillary refill takes less than 2 seconds.      Findings: No rash.   Neurological:      Mental Status: She is alert and oriented to person, place, and time.      Cranial Nerves: Cranial nerves 2-12 are intact.      Sensory: Sensation is intact.      Motor: Motor function is intact.      Coordination: Coordination is intact.      Gait: Gait is intact.   Psychiatric:         Mood and Affect: Mood normal.         Behavior: Behavior is cooperative.

## 2025-04-22 ENCOUNTER — OFFICE VISIT (OUTPATIENT)
Age: 50
End: 2025-04-22
Payer: COMMERCIAL

## 2025-04-22 ENCOUNTER — TELEPHONE (OUTPATIENT)
Dept: PAIN MEDICINE | Facility: CLINIC | Age: 50
End: 2025-04-22

## 2025-04-22 VITALS — WEIGHT: 118 LBS | HEIGHT: 65 IN | BODY MASS INDEX: 19.66 KG/M2

## 2025-04-22 DIAGNOSIS — M79.18 MYOFASCIAL PAIN SYNDROME: ICD-10-CM

## 2025-04-22 DIAGNOSIS — M47.812 CERVICAL SPONDYLOSIS: ICD-10-CM

## 2025-04-22 DIAGNOSIS — M54.12 CERVICAL RADICULOPATHY: ICD-10-CM

## 2025-04-22 DIAGNOSIS — Z79.891 LONG-TERM CURRENT USE OF OPIATE ANALGESIC: ICD-10-CM

## 2025-04-22 DIAGNOSIS — F11.20 CONTINUOUS OPIOID DEPENDENCE (HCC): Chronic | ICD-10-CM

## 2025-04-22 DIAGNOSIS — M54.50 CHRONIC BILATERAL LOW BACK PAIN WITHOUT SCIATICA: ICD-10-CM

## 2025-04-22 DIAGNOSIS — G89.29 CHRONIC BILATERAL THORACIC BACK PAIN: ICD-10-CM

## 2025-04-22 DIAGNOSIS — M54.2 NECK PAIN: ICD-10-CM

## 2025-04-22 DIAGNOSIS — G89.29 CHRONIC BILATERAL LOW BACK PAIN WITHOUT SCIATICA: ICD-10-CM

## 2025-04-22 DIAGNOSIS — M19.90 ARTHRITIS: Primary | ICD-10-CM

## 2025-04-22 DIAGNOSIS — M54.6 CHRONIC BILATERAL THORACIC BACK PAIN: ICD-10-CM

## 2025-04-22 DIAGNOSIS — G89.4 CHRONIC PAIN SYNDROME: ICD-10-CM

## 2025-04-22 PROCEDURE — 99214 OFFICE O/P EST MOD 30 MIN: CPT | Performed by: ANESTHESIOLOGY

## 2025-04-22 RX ORDER — GABAPENTIN 800 MG/1
800 TABLET ORAL 3 TIMES DAILY
Qty: 90 TABLET | Refills: 2 | Status: SHIPPED | OUTPATIENT
Start: 2025-04-22

## 2025-04-22 RX ORDER — HYDROCODONE BITARTRATE AND ACETAMINOPHEN 5; 325 MG/1; MG/1
1 TABLET ORAL 2 TIMES DAILY PRN
Qty: 60 TABLET | Refills: 0 | Status: SHIPPED | OUTPATIENT
Start: 2025-05-25 | End: 2025-06-24

## 2025-04-22 RX ORDER — CHLORZOXAZONE 500 MG/1
500 TABLET ORAL 3 TIMES DAILY PRN
Qty: 90 TABLET | Refills: 1 | Status: SHIPPED | OUTPATIENT
Start: 2025-04-22 | End: 2025-05-22
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
## 2025-04-22 NOTE — PATIENT INSTRUCTIONS
Patient Education     Neck Pain Exercises   About this topic   The neck or cervical spine has 7 spinal bones that run from the base of your skull to the upper back. These spinal bones have discs in between them. Discs act as shock absorbers. Ligaments are strong bands of tissue that hold the bones together. Many muscles surround and attach on these bones. Nerves come off of the spinal cord and exit out of small spaces in between the spinal bones. You can have neck pain if any of these are injured or damaged. Exercises may help to make this problem better.  General   Before starting with a program, ask your doctor if you are healthy enough to do these exercises. Your doctor may have you work with a  or physical therapist to make a safe exercise program to meet your needs. You should not do the exercises if they cause sharp pains, if you feel dizzy, or if you have vision changes.  Stretching Exercises   Stretching exercises keep your muscles flexible. They also stop them from getting tight. Start by doing each of these stretches 2 to 3 times. In order for your body to make changes, you will need to hold these stretches for 20 to 30 seconds. Try to do the stretches 2 to 3 times each day. Do all exercises slowly.  Passive neck stretches:  Put your left hand on top of your head. Your other arm can be at your side or behind your back. Pull your head toward your left shoulder until you feel a gentle stretch on the right side of your neck. Repeat on the other side using your other hand.  Also, try this stretch by pulling in a diagonal direction. With your left hand on top of your head, pull your head down towards the direction of your left knee. You should feel this stretch toward the back on the right side of your neck. Repeat on the other side.  Active neck stretches:  Neck front-to-back motion ? Look down to the floor until you feel a stretch in the back of your neck. Hold. Next, look up to the ceiling until you  feel a stretch in the front of your neck. Hold.  Neck side-to-side motion ? Tilt your head to the side and bring your ear to your shoulder until you feel a stretch on the other side of your neck. Hold. Next, tilt your head to the other side until you feel a stretch. Hold.  Neck turning ? Turn only your head and look over your left shoulder until you feel stretching in the right side of your neck. Hold. Now turn only your head and look over your right shoulder until you feel a stretch in the left side of your neck. Hold.  Scalene stretches ? Grasp your head with the hand opposite the side you want to stretch. Pull your head to the side until you feel a stretch. Now, slowly turn your head so your chin is pointed upwards.  Chin tucks ? Stand straight or lie down on your back. Tuck your chin in and lengthen the back of your neck. Return to the starting position and repeat. It may help to stand up against a wall during this exercise. Try gently pushing your chin with two fingers while trying to flatten your neck against the wall. If you do this exercise lying down, try using a small rolled up washcloth under your neck. Push down into the washcloth when tucking in your chin.  Strengthening Exercises   Strengthening exercises keep your muscles firm and strong. Start by repeating each exercise 2 to 3 times. Work up to doing each exercise 10 times. Try to do the exercises 2 to 3 times each day. Hold each exercise for 3 to 5 seconds. Do all exercises slowly.  Shoulder blade squeezes ? Pinch your shoulder blades together on your upper back and hold 3 to 5 seconds. Relax.             What will the results be?   Less pain and stiffness  Better range of motion  Increased strength  Help you heal faster after an injury or surgery  Increase blood flow to a body part  Help you feel better and more relaxed  Give you more energy  More toned looking muscles  Better posture  Easier to do daily activities  Helpful tips   Stay active and  work out to keep your muscles strong and flexible.  Be sure you do not hold your breath when exercising. This can raise your blood pressure. If you tend to hold your breath, try counting out loud when exercising. If any exercise bothers you, stop right away.  Try swinging your arms at an easy pace for a few minutes to warm up your muscles. Do this again after exercising.  Doing exercises before a meal may be a good way to get into a routine.  Exercise may be slightly uncomfortable, but you should not have sharp pains. If you do get sharp pains, stop what you are doing. If the sharp pains continue, call your doctor.  Last Reviewed Date   2020-03-10  Consumer Information Use and Disclaimer   This generalized information is a limited summary of diagnosis, treatment, and/or medication information. It is not meant to be comprehensive and should be used as a tool to help the user understand and/or assess potential diagnostic and treatment options. It does NOT include all information about conditions, treatments, medications, side effects, or risks that may apply to a specific patient. It is not intended to be medical advice or a substitute for the medical advice, diagnosis, or treatment of a health care provider based on the health care provider's examination and assessment of a patient’s specific and unique circumstances. Patients must speak with a health care provider for complete information about their health, medical questions, and treatment options, including any risks or benefits regarding use of medications. This information does not endorse any treatments or medications as safe, effective, or approved for treating a specific patient. [employer] and its affiliates disclaim any warranty or liability relating to this information or the use thereof. The use of this information is governed by the Terms of Use, available at https://www.w[employer]ters[employer] ASuwer.com/en/know/clinical-effectiveness-terms   Copyright   Copyright ©  2024 [employer] and its affiliates and/or licensors. All rights reserved.

## 2025-04-22 NOTE — TELEPHONE ENCOUNTER
Pt going to proceed w [facility] for psych eval. Referral emailed and - Hanna- confirmed receipt.     Pt denies blood thinners.    Pt is currently going through a divorce, so her insurance is eventually going to be [employer] through her employer. I advised we can proceed w psych eval as long as the [employer] plan is still active and then use the [employer] for the procedure approval, just asked pt to keep me updated when things change.    Email sent to Edwin for [facility] education.

## 2025-04-24 ENCOUNTER — TELEPHONE (OUTPATIENT)
Dept: GASTROENTEROLOGY | Facility: CLINIC | Age: 50
End: 2025-04-24

## 2025-04-24 NOTE — TELEPHONE ENCOUNTER
Patient follows with [facility] in [place] and wants to continue following with them. Patient asked to cancel apt.

## 2025-04-26 ENCOUNTER — APPOINTMENT (OUTPATIENT)
Dept: LAB | Facility: CLINIC | Age: 50
End: 2025-04-26
Payer: COMMERCIAL

## 2025-04-26 DIAGNOSIS — R53.83 OTHER FATIGUE: ICD-10-CM

## 2025-04-26 DIAGNOSIS — N18.31 STAGE 3A CHRONIC KIDNEY DISEASE (HCC): ICD-10-CM

## 2025-04-26 DIAGNOSIS — E53.8 VITAMIN B 12 DEFICIENCY: ICD-10-CM

## 2025-04-26 LAB
ALBUMIN SERPL BCG-MCNC: 4.8 G/DL (ref 3.5–5)
ALP SERPL-CCNC: 58 U/L (ref 34–104)
ALT SERPL W P-5'-P-CCNC: 14 U/L (ref 7–52)
ANION GAP SERPL CALCULATED.3IONS-SCNC: 12 MMOL/L (ref 4–13)
AST SERPL W P-5'-P-CCNC: 15 U/L (ref 13–39)
BASOPHILS # BLD AUTO: 0.03 THOUSANDS/ÂΜL (ref 0–0.1)
BASOPHILS NFR BLD AUTO: 1 % (ref 0–1)
BILIRUB SERPL-MCNC: 0.61 MG/DL (ref 0.2–1)
BUN SERPL-MCNC: 13 MG/DL (ref 5–25)
CALCIUM SERPL-MCNC: 9.8 MG/DL (ref 8.4–10.2)
CHLORIDE SERPL-SCNC: 103 MMOL/L (ref 96–108)
CO2 SERPL-SCNC: 25 MMOL/L (ref 21–32)
CREAT SERPL-MCNC: 1.07 MG/DL (ref 0.6–1.3)
EOSINOPHIL # BLD AUTO: 0.08 THOUSAND/ÂΜL (ref 0–0.61)
EOSINOPHIL NFR BLD AUTO: 2 % (ref 0–6)
ERYTHROCYTE [DISTWIDTH] IN BLOOD BY AUTOMATED COUNT: 11.9 % (ref 11.6–15.1)
FERRITIN SERPL-MCNC: 173 NG/ML (ref 30–307)
GFR SERPL CREATININE-BSD FRML MDRD: 60 ML/MIN/1.73SQ M
GLUCOSE P FAST SERPL-MCNC: 97 MG/DL (ref 65–99)
HCT VFR BLD AUTO: 43.1 % (ref 34.8–46.1)
HGB BLD-MCNC: 14.7 G/DL (ref 11.5–15.4)
IMM GRANULOCYTES # BLD AUTO: 0.01 THOUSAND/UL (ref 0–0.2)
IMM GRANULOCYTES NFR BLD AUTO: 0 % (ref 0–2)
IRON SATN MFR SERPL: 48 % (ref 15–50)
IRON SERPL-MCNC: 130 UG/DL (ref 50–212)
LYMPHOCYTES # BLD AUTO: 1.09 THOUSANDS/ÂΜL (ref 0.6–4.47)
LYMPHOCYTES NFR BLD AUTO: 21 % (ref 14–44)
MAGNESIUM SERPL-MCNC: 2.1 MG/DL (ref 1.9–2.7)
MCH RBC QN AUTO: 33.9 PG (ref 26.8–34.3)
MCHC RBC AUTO-ENTMCNC: 34.1 G/DL (ref 31.4–37.4)
MCV RBC AUTO: 99 FL (ref 82–98)
MONOCYTES # BLD AUTO: 0.25 THOUSAND/ÂΜL (ref 0.17–1.22)
MONOCYTES NFR BLD AUTO: 5 % (ref 4–12)
NEUTROPHILS # BLD AUTO: 3.69 THOUSANDS/ÂΜL (ref 1.85–7.62)
NEUTS SEG NFR BLD AUTO: 71 % (ref 43–75)
NRBC BLD AUTO-RTO: 0 /100 WBCS
PHOSPHATE SERPL-MCNC: 2.7 MG/DL (ref 2.7–4.5)
PLATELET # BLD AUTO: 273 THOUSANDS/UL (ref 149–390)
PMV BLD AUTO: 11.7 FL (ref 8.9–12.7)
POTASSIUM SERPL-SCNC: 3.9 MMOL/L (ref 3.5–5.3)
PROT SERPL-MCNC: 7 G/DL (ref 6.4–8.4)
PTH-INTACT SERPL-MCNC: 48 PG/ML (ref 12–88)
RBC # BLD AUTO: 4.34 MILLION/UL (ref 3.81–5.12)
SODIUM SERPL-SCNC: 140 MMOL/L (ref 135–147)
TIBC SERPL-MCNC: 268.8 UG/DL (ref 250–450)
TRANSFERRIN SERPL-MCNC: 192 MG/DL (ref 203–362)
UIBC SERPL-MCNC: 139 UG/DL (ref 155–355)
URATE SERPL-MCNC: 4.9 MG/DL (ref 2–7.5)
VIT B12 SERPL-MCNC: 847 PG/ML (ref 180–914)
WBC # BLD AUTO: 5.15 THOUSAND/UL (ref 4.31–10.16)

## 2025-04-26 PROCEDURE — 82607 VITAMIN B-12: CPT

## 2025-04-26 PROCEDURE — 83970 ASSAY OF PARATHORMONE: CPT

## 2025-04-26 PROCEDURE — 84100 ASSAY OF PHOSPHORUS: CPT

## 2025-04-26 PROCEDURE — 86664 EPSTEIN-BARR NUCLEAR ANTIGEN: CPT

## 2025-04-26 PROCEDURE — 80053 COMPREHEN METABOLIC PANEL: CPT

## 2025-04-26 PROCEDURE — 82728 ASSAY OF FERRITIN: CPT

## 2025-04-26 PROCEDURE — 84550 ASSAY OF BLOOD/URIC ACID: CPT

## 2025-04-26 PROCEDURE — 86665 EPSTEIN-BARR CAPSID VCA: CPT

## 2025-04-26 PROCEDURE — 85025 COMPLETE CBC W/AUTO DIFF WBC: CPT

## 2025-04-26 PROCEDURE — 86618 LYME DISEASE ANTIBODY: CPT

## 2025-04-26 PROCEDURE — 36415 COLL VENOUS BLD VENIPUNCTURE: CPT

## 2025-04-26 PROCEDURE — 86663 EPSTEIN-BARR ANTIBODY: CPT

## 2025-04-26 PROCEDURE — 83550 IRON BINDING TEST: CPT

## 2025-04-26 PROCEDURE — 83540 ASSAY OF IRON: CPT

## 2025-04-26 PROCEDURE — 83735 ASSAY OF MAGNESIUM: CPT

## 2025-04-27 LAB — B BURGDOR IGG+IGM SER QL IA: NEGATIVE

## 2025-04-28 ENCOUNTER — RESULTS FOLLOW-UP (OUTPATIENT)
Dept: FAMILY MEDICINE CLINIC | Facility: CLINIC | Age: 50
End: 2025-04-28

## 2025-04-28 ENCOUNTER — RESULTS FOLLOW-UP (OUTPATIENT)
Age: 50
End: 2025-04-28

## 2025-04-28 LAB
EBV EA IGG SER QL IA: POSITIVE
EBV NA IGG SER QL IA: POSITIVE
EBV VCA IGG SER QL IA: POSITIVE
EBV VCA IGM SER QL IA: NEGATIVE

## 2025-04-28 NOTE — RESULT ENCOUNTER NOTE
I called and left a detailed VM for Ericka with her lab results. I requested a call back if she has any questions or concerns.

## 2025-04-30 ENCOUNTER — OFFICE VISIT (OUTPATIENT)
Dept: FAMILY MEDICINE CLINIC | Facility: CLINIC | Age: 50
End: 2025-04-30
Payer: COMMERCIAL

## 2025-04-30 VITALS
DIASTOLIC BLOOD PRESSURE: 74 MMHG | HEART RATE: 96 BPM | BODY MASS INDEX: 19.33 KG/M2 | WEIGHT: 116 LBS | TEMPERATURE: 97 F | SYSTOLIC BLOOD PRESSURE: 122 MMHG | HEIGHT: 65 IN | OXYGEN SATURATION: 100 %

## 2025-04-30 DIAGNOSIS — E28.39 PRIMARY OVARIAN FAILURE: ICD-10-CM

## 2025-04-30 DIAGNOSIS — F31.81 BIPOLAR 2 DISORDER (HCC): Chronic | ICD-10-CM

## 2025-04-30 DIAGNOSIS — Z12.11 SCREENING FOR COLON CANCER: ICD-10-CM

## 2025-04-30 DIAGNOSIS — F33.2 SEVERE EPISODE OF RECURRENT MAJOR DEPRESSIVE DISORDER, WITHOUT PSYCHOTIC FEATURES (HCC): ICD-10-CM

## 2025-04-30 DIAGNOSIS — G24.01 TARDIVE DYSKINESIA: Primary | ICD-10-CM

## 2025-04-30 DIAGNOSIS — B35.3 TINEA PEDIS OF LEFT FOOT: ICD-10-CM

## 2025-04-30 DIAGNOSIS — F41.9 ANXIETY: ICD-10-CM

## 2025-04-30 PROCEDURE — 99214 OFFICE O/P EST MOD 30 MIN: CPT | Performed by: NURSE PRACTITIONER

## 2025-04-30 RX ORDER — INSULIN,PORK PURIFIED 100/ML
VIAL (ML) INJECTION
COMMUNITY
Start: 2025-04-19

## 2025-04-30 RX ORDER — LAMOTRIGINE 200 MG/1
1 TABLET ORAL DAILY
COMMUNITY
Start: 2025-04-24

## 2025-04-30 RX ORDER — SUCRALFATE 1 G/1
1 TABLET ORAL 2 TIMES DAILY
COMMUNITY
Start: 2025-04-19

## 2025-04-30 RX ORDER — CLOTRIMAZOLE 1 %
CREAM (GRAM) TOPICAL 2 TIMES DAILY
Qty: 28 G | Refills: 0 | Status: SHIPPED | OUTPATIENT
Start: 2025-04-30

## 2025-04-30 NOTE — ASSESSMENT & PLAN NOTE
Depression Screening Follow-up Plan: Patient's depression screening was positive with a PHQ-9 score of 14. Continue regular follow-up with their psychologist/therapist/psychiatrist who is managing their mental health condition(s).

## 2025-04-30 NOTE — PROGRESS NOTES
"Name: Ericka Castillo      : 1975      MRN: [id number]  Encounter Provider: FIONA Hawkins  Encounter Date: 2025   Encounter department: [facility]  :  Assessment & Plan  Tardive dyskinesia  Patient has been having involunary movement of her tongue for several months. She was on Effexor which they stoppped to see if it helped with this. Unsure if it has improved, needs to f/u with psychiatry regarding this.        Bipolar 2 disorder (HCC)  Symptoms of bipolar and anxiety are uncontrolled. She is overwhelmed with stress, anxiety nervousness. Lacking motivation. Does not feel like her treatment is working. She sees psychiatry on routine basis but was told, \"we tried every medication we do not know what else to try.\" She sees a therapist every other week. No SI/HI.   She is lacking appetite and not eating, thinks it is related to how she is feeling but she is unsure. Recommend f/u with GI for this as well.   She is losing weight. 123 in January. Recommend she start a daily boost or ensure to help with weight.   I strongly recommend the partial program for patient. Her symptoms are not controlled and she is not progressing over time. She needs a more intensive approach to help manage her psychiatric conditions. Will think about it and call when she  gets her new insurance in about 1 week.        Severe episode of recurrent major depressive disorder, without psychotic features (HCC)  Depression Screening Follow-up Plan: Patient's depression screening was positive with a PHQ-9 score of 14. Continue regular follow-up with their psychologist/therapist/psychiatrist who is managing their mental health condition(s).         Anxiety         Primary ovarian failure  Has a DXA scan ordered        Tinea pedis of left foot  Feeling and erythema between the toe but also a callus from second and third toes rubbing. Use lotrimin and f/u with podiatry if needed. Use protection " "between toes to keep from rubbing.   Orders:  •  clotrimazole (LOTRIMIN) 1 % cream; Apply topically 2 (two) times a day    Screening for colon cancer  Patient had a colonoscopy in 3/24 and is due for repeat. She follow with [facility]               History of Present Illness   HPI  Review of Systems   Constitutional:  Positive for appetite change. Negative for activity change, chills and fever.   HENT:  Negative for ear pain and sore throat.    Eyes:  Negative for pain and visual disturbance.   Respiratory:  Negative for cough and shortness of breath.    Cardiovascular:  Negative for chest pain and palpitations.   Gastrointestinal:  Positive for constipation and diarrhea. Negative for abdominal pain, nausea and vomiting.   Genitourinary:  Negative for dysuria and hematuria.   Musculoskeletal:  Negative for arthralgias and back pain.   Skin:  Negative for color change and rash.   Neurological:  Negative for seizures and syncope.   Psychiatric/Behavioral:  Positive for dysphoric mood. Negative for self-injury and suicidal ideas. The patient is nervous/anxious.    All other systems reviewed and are negative.      Objective   /74   Pulse 96   Temp (!) 97 °F (36.1 °C)   Ht 5' 5\" (1.651 m)   Wt 52.6 kg (116 lb)   SpO2 100%   BMI 19.30 kg/m²      Physical Exam  Vitals and nursing note reviewed.   Constitutional:       General: She is not in acute distress.     Appearance: Normal appearance. She is not ill-appearing or toxic-appearing.   Cardiovascular:      Rate and Rhythm: Normal rate.      Heart sounds: No murmur heard.  Pulmonary:      Effort: No respiratory distress.   Neurological:      General: No focal deficit present.      Mental Status: She is alert and oriented to person, place, and time. Mental status is at baseline.      Motor: No weakness.      Coordination: Coordination normal.      Gait: Gait normal.   Psychiatric:         Mood and Affect: Mood is anxious.         Behavior: Behavior normal.    "      Thought Content: Thought content normal.         Judgment: Judgment normal.

## 2025-04-30 NOTE — PATIENT INSTRUCTIONS
Call your GI Doctor about your weight loss and appetite changes.   Call when you get your new insurance so I can place the referral for the partial program

## 2025-04-30 NOTE — ASSESSMENT & PLAN NOTE
"Symptoms of bipolar and anxiety are uncontrolled. She is overwhelmed with stress, anxiety nervousness. Lacking motivation. Does not feel like her treatment is working. She sees psychiatry on routine basis but was told, \"we tried every medication we do not know what else to try.\" She sees a therapist every other week. No SI/HI.   She is lacking appetite and not eating, thinks it is related to how she is feeling but she is unsure. Recommend f/u with GI for this as well.   She is losing weight. 123 in January. Recommend she start a daily boost or ensure to help with weight.   I strongly recommend the partial program for patient. Her symptoms are not controlled and she is not progressing over time. She needs a more intensive approach to help manage her psychiatric conditions. Will think about it and call when she  gets her new insurance in about 1 week.      "

## 2025-04-30 NOTE — ASSESSMENT & PLAN NOTE
Patient has been having involunary movement of her tongue for several months. She was on Effexor which they stoppped to see if it helped with this. Unsure if it has improved, needs to f/u with psychiatry regarding this.

## 2025-05-07 ENCOUNTER — TELEPHONE (OUTPATIENT)
Dept: OTHER | Facility: OTHER | Age: 50
End: 2025-05-07

## 2025-05-07 NOTE — TELEPHONE ENCOUNTER
Patient is calling regarding cancelling an appointment.    Date/Time: 5/7/2025 7:00 AM     Patient was rescheduled: YES [] NO [x]    Patient requesting call back to reschedule: YES [x] NO []    Patient woke up sick and would like to reschedule.

## 2025-05-19 ENCOUNTER — OFFICE VISIT (OUTPATIENT)
Age: 50
End: 2025-05-19
Payer: COMMERCIAL

## 2025-05-19 VITALS
DIASTOLIC BLOOD PRESSURE: 62 MMHG | OXYGEN SATURATION: 99 % | TEMPERATURE: 97.8 F | HEART RATE: 51 BPM | BODY MASS INDEX: 18.8 KG/M2 | SYSTOLIC BLOOD PRESSURE: 110 MMHG | WEIGHT: 113 LBS

## 2025-05-19 DIAGNOSIS — R63.4 WEIGHT LOSS, UNINTENTIONAL: ICD-10-CM

## 2025-05-19 DIAGNOSIS — N18.31 STAGE 3A CHRONIC KIDNEY DISEASE (HCC): ICD-10-CM

## 2025-05-19 DIAGNOSIS — Q64.9 URINARY ANOMALY: ICD-10-CM

## 2025-05-19 DIAGNOSIS — R11.0 NAUSEA: Primary | ICD-10-CM

## 2025-05-19 DIAGNOSIS — R19.7 DIARRHEA, UNSPECIFIED TYPE: ICD-10-CM

## 2025-05-19 PROCEDURE — 99214 OFFICE O/P EST MOD 30 MIN: CPT | Performed by: NURSE PRACTITIONER

## 2025-05-19 NOTE — ASSESSMENT & PLAN NOTE
Lab Results   Component Value Date    EGFR 60 04/26/2025    EGFR 60 01/28/2025    EGFR 69 01/01/2025    CREATININE 1.07 04/26/2025    CREATININE 1.07 01/28/2025    CREATININE 0.95 01/01/2025   Baseline creatinine around 1.2 mg/dL.  Estimated GFR may be overestimated given weight loss, muscle mass loss.  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  Recommend cutting back lisinopril to 5 mg daily if symptoms consistent with low blood pressure.  She does not check blood pressure at home  Continue to hold vitamin D supplementation  Hydration and nutrition improvement imperative.  Refer to [facility].  Will speak to PCP about appetite stimulant

## 2025-05-19 NOTE — PATIENT INSTRUCTIONS
See gastroenterology    Can cut lisinopril in half (5 mg daily) if you feel lightheaded, dizzy    Consider talking to family doctor about appetite stimulant- I will try to speak to Ruth
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
## 2025-05-22 ENCOUNTER — TELEPHONE (OUTPATIENT)
Age: 50
End: 2025-05-22

## 2025-05-22 DIAGNOSIS — F33.2 SEVERE EPISODE OF RECURRENT MAJOR DEPRESSIVE DISORDER, WITHOUT PSYCHOTIC FEATURES (HCC): ICD-10-CM

## 2025-05-22 DIAGNOSIS — F31.81 BIPOLAR 2 DISORDER (HCC): Primary | Chronic | ICD-10-CM

## 2025-05-22 DIAGNOSIS — F41.9 ANXIETY: ICD-10-CM

## 2025-05-22 NOTE — TELEPHONE ENCOUNTER
Patient called with a psychiatric question?  When she was in for her last visit, provider mentioned a program the patient could do once she received her new insurance.  Patient has received her new insurance.    Patient is depressed and anxious.  She states she is really struggling and at this point she is afraid she will lose her job.  She is having a hard time getting up in the morning.  Patient states she is losing a lot of weight as well.  She said she feels as if she is having a nervous breakdown and can't get back up.    Patient said she can not do an in patient program because of her kids but would be willing to do an out patient program.  She would need FMLA so she would not lose her job.    I offered to send her to the nurse line and patient said she is at work.    Please advise and notify.    Thank you.    Ericka- [phone]

## 2025-05-27 ENCOUNTER — TELEPHONE (OUTPATIENT)
Age: 50
End: 2025-05-27

## 2025-05-27 ENCOUNTER — TELEPHONE (OUTPATIENT)
Dept: PSYCHOLOGY | Facility: CLINIC | Age: 50
End: 2025-05-27

## 2025-05-27 NOTE — TELEPHONE ENCOUNTER
Caller: carlos Barnett    Doctor: Dr. Cosby    Reason for call: pt called stating she needs a PA for her medication Norco.  Pt has new prescription insurance called [employer]    Call back#: [phone]       [employer] sent to PA team

## 2025-05-27 NOTE — TELEPHONE ENCOUNTER
Caller: carlos    Doctor: kocher    Reason for call: pt is only getting a script for 7 days and then needs a new script of her hydrocodone. Per insurance    Call back#: [phone]

## 2025-05-27 NOTE — TELEPHONE ENCOUNTER
PA for HYDRO-ACET 5-325 MG  APPROVED     Date(s) approved         Patient advised by          [x]MyChart Message  []Phone call   [x]LMOM  []L/M to call office as no active Communication consent on file  []Unable to leave detailed message as VM not approved on Communication consent       Pharmacy advised by      [x]Phone call  []Secure Chat    Specialty Pharmacy    []     Approval letter scanned into Media Yes

## 2025-05-27 NOTE — TELEPHONE ENCOUNTER
PA for HYDRO-ACET 5-325 MG SUBMITTED to [employer]    via      [x][email or web address]-Case ID # [id number]       [x]PA sent as URGENT    All office notes, labs and other pertaining documents and studies sent. Clinical questions answered. Awaiting determination from insurance company.     Turnaround time for your insurance to make a decision on your Prior Authorization can take 7-21 business days.

## 2025-05-28 ENCOUNTER — TELEPHONE (OUTPATIENT)
Dept: PAIN MEDICINE | Facility: CLINIC | Age: 50
End: 2025-05-28

## 2025-05-28 DIAGNOSIS — Z79.891 LONG-TERM CURRENT USE OF OPIATE ANALGESIC: ICD-10-CM

## 2025-05-28 DIAGNOSIS — G89.29 CHRONIC BILATERAL LOW BACK PAIN WITHOUT SCIATICA: ICD-10-CM

## 2025-05-28 DIAGNOSIS — M54.50 CHRONIC BILATERAL LOW BACK PAIN WITHOUT SCIATICA: ICD-10-CM

## 2025-05-28 DIAGNOSIS — M54.12 CERVICAL RADICULOPATHY: ICD-10-CM

## 2025-05-28 DIAGNOSIS — M79.18 MYOFASCIAL PAIN SYNDROME: ICD-10-CM

## 2025-05-28 DIAGNOSIS — M54.6 CHRONIC BILATERAL THORACIC BACK PAIN: ICD-10-CM

## 2025-05-28 DIAGNOSIS — M19.90 ARTHRITIS: ICD-10-CM

## 2025-05-28 DIAGNOSIS — M54.2 NECK PAIN: ICD-10-CM

## 2025-05-28 DIAGNOSIS — G89.4 CHRONIC PAIN SYNDROME: ICD-10-CM

## 2025-05-28 DIAGNOSIS — F11.20 CONTINUOUS OPIOID DEPENDENCE (HCC): Chronic | ICD-10-CM

## 2025-05-28 DIAGNOSIS — G89.29 CHRONIC BILATERAL THORACIC BACK PAIN: ICD-10-CM

## 2025-05-28 DIAGNOSIS — M47.812 CERVICAL SPONDYLOSIS: ICD-10-CM

## 2025-05-28 RX ORDER — HYDROCODONE BITARTRATE AND ACETAMINOPHEN 5; 325 MG/1; MG/1
1 TABLET ORAL 2 TIMES DAILY PRN
Qty: 60 TABLET | Refills: 0 | Status: SHIPPED | OUTPATIENT
Start: 2025-05-28 | End: 2025-06-05 | Stop reason: SDUPTHER

## 2025-05-28 NOTE — TELEPHONE ENCOUNTER
Caller: Ericka     Doctor: Dr. Cosby     Reason for call: Patient calling stating per pharmacy only given 7 day supplies for medication   HYDROcodone-acetaminophen (NORCO) 5-325 mg per tablet  Patient states per [facility] pharmacy new script needed for the 30day supplies please advise     Call back#: [phone]

## 2025-05-28 NOTE — TELEPHONE ENCOUNTER
Ericka Castillo [employer] (supporting Jaqueline Lopez MA) Sent  5/27/2025  6:08 PM       The pharmacy told me that the insurance is only approving for a seven day supply I guess because I have new insurance. I don't know. I don't understand all that but they said the doctor has to send in a new script for the 30 day supply and then she usually puts on there when it can be refilled again. If you could just let me know when that's taken care of and the dates so I can put them in my calendar I would really appreciate it thank you.

## 2025-05-28 NOTE — TELEPHONE ENCOUNTER
Pls see mcm below.   Pharm can only fill 7 day supply due to insur needing new auth since prev had .     Can you pls send script for remaining amt or new 30 day w/ fill date 7 days from 25 DNF?    Thank you!

## 2025-05-30 ENCOUNTER — OFFICE VISIT (OUTPATIENT)
Dept: OBGYN CLINIC | Facility: CLINIC | Age: 50
End: 2025-05-30
Payer: COMMERCIAL

## 2025-05-30 VITALS — WEIGHT: 113 LBS | HEIGHT: 65 IN | BODY MASS INDEX: 18.83 KG/M2

## 2025-05-30 DIAGNOSIS — M77.02 MEDIAL EPICONDYLITIS OF LEFT ELBOW: ICD-10-CM

## 2025-05-30 DIAGNOSIS — M18.0 OSTEOARTHRITIS OF CARPOMETACARPAL (CMC) JOINTS OF BOTH THUMBS, UNSPECIFIED OSTEOARTHRITIS TYPE: Primary | ICD-10-CM

## 2025-05-30 PROCEDURE — 20551 NJX 1 TENDON ORIGIN/INSJ: CPT | Performed by: ORTHOPAEDIC SURGERY

## 2025-05-30 PROCEDURE — 99213 OFFICE O/P EST LOW 20 MIN: CPT | Performed by: ORTHOPAEDIC SURGERY

## 2025-05-30 PROCEDURE — 20600 DRAIN/INJ JOINT/BURSA W/O US: CPT | Performed by: ORTHOPAEDIC SURGERY

## 2025-05-30 RX ORDER — BETAMETHASONE SODIUM PHOSPHATE AND BETAMETHASONE ACETATE 3; 3 MG/ML; MG/ML
3 INJECTION, SUSPENSION INTRA-ARTICULAR; INTRALESIONAL; INTRAMUSCULAR; SOFT TISSUE
Status: COMPLETED | OUTPATIENT
Start: 2025-05-30 | End: 2025-05-30

## 2025-05-30 RX ORDER — BUPIVACAINE HYDROCHLORIDE 2.5 MG/ML
0.5 INJECTION, SOLUTION INFILTRATION; PERINEURAL
Status: COMPLETED | OUTPATIENT
Start: 2025-05-30 | End: 2025-05-30

## 2025-05-30 RX ORDER — BETAMETHASONE SODIUM PHOSPHATE AND BETAMETHASONE ACETATE 3; 3 MG/ML; MG/ML
6 INJECTION, SUSPENSION INTRA-ARTICULAR; INTRALESIONAL; INTRAMUSCULAR; SOFT TISSUE
Status: COMPLETED | OUTPATIENT
Start: 2025-05-30 | End: 2025-05-30

## 2025-05-30 RX ORDER — BUPIVACAINE HYDROCHLORIDE 2.5 MG/ML
1 INJECTION, SOLUTION INFILTRATION; PERINEURAL
Status: COMPLETED | OUTPATIENT
Start: 2025-05-30 | End: 2025-05-30

## 2025-05-30 RX ADMIN — BUPIVACAINE HYDROCHLORIDE 0.5 ML: 2.5 INJECTION, SOLUTION INFILTRATION; PERINEURAL at 08:15

## 2025-05-30 RX ADMIN — BETAMETHASONE SODIUM PHOSPHATE AND BETAMETHASONE ACETATE 3 MG: 3; 3 INJECTION, SUSPENSION INTRA-ARTICULAR; INTRALESIONAL; INTRAMUSCULAR; SOFT TISSUE at 08:15

## 2025-05-30 RX ADMIN — BETAMETHASONE SODIUM PHOSPHATE AND BETAMETHASONE ACETATE 6 MG: 3; 3 INJECTION, SUSPENSION INTRA-ARTICULAR; INTRALESIONAL; INTRAMUSCULAR; SOFT TISSUE at 08:15

## 2025-05-30 RX ADMIN — BUPIVACAINE HYDROCHLORIDE 1 ML: 2.5 INJECTION, SOLUTION INFILTRATION; PERINEURAL at 08:15
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
## 2025-05-31 ENCOUNTER — TELEPHONE (OUTPATIENT)
Dept: PAIN MEDICINE | Facility: CLINIC | Age: 50
End: 2025-05-31

## 2025-05-31 DIAGNOSIS — M54.50 CHRONIC BILATERAL LOW BACK PAIN WITHOUT SCIATICA: ICD-10-CM

## 2025-05-31 DIAGNOSIS — M54.6 CHRONIC BILATERAL THORACIC BACK PAIN: ICD-10-CM

## 2025-05-31 DIAGNOSIS — M54.12 CERVICAL RADICULOPATHY: ICD-10-CM

## 2025-05-31 DIAGNOSIS — Z79.891 LONG-TERM CURRENT USE OF OPIATE ANALGESIC: ICD-10-CM

## 2025-05-31 DIAGNOSIS — G89.4 CHRONIC PAIN SYNDROME: ICD-10-CM

## 2025-05-31 DIAGNOSIS — G89.29 CHRONIC BILATERAL LOW BACK PAIN WITHOUT SCIATICA: ICD-10-CM

## 2025-05-31 DIAGNOSIS — M47.812 CERVICAL SPONDYLOSIS: ICD-10-CM

## 2025-05-31 DIAGNOSIS — G89.29 CHRONIC BILATERAL THORACIC BACK PAIN: ICD-10-CM

## 2025-05-31 DIAGNOSIS — F11.20 CONTINUOUS OPIOID DEPENDENCE (HCC): Chronic | ICD-10-CM

## 2025-05-31 DIAGNOSIS — M79.18 MYOFASCIAL PAIN SYNDROME: ICD-10-CM

## 2025-05-31 DIAGNOSIS — M19.90 ARTHRITIS: ICD-10-CM

## 2025-05-31 DIAGNOSIS — M54.2 NECK PAIN: ICD-10-CM

## 2025-05-31 RX ORDER — HYDROCODONE BITARTRATE AND ACETAMINOPHEN 5; 325 MG/1; MG/1
1 TABLET ORAL 2 TIMES DAILY PRN
Qty: 60 TABLET | Refills: 0 | Status: CANCELLED | OUTPATIENT
Start: 2025-05-31 | End: 2025-06-30

## 2025-06-02 NOTE — TELEPHONE ENCOUNTER
Looks to me like  sent a 7-day supply over dated 5/27/2025 and then a 30-day supply dated for 6/1/2025 (per PDMP)

## 2025-06-04 ENCOUNTER — NURSE TRIAGE (OUTPATIENT)
Age: 50
End: 2025-06-04

## 2025-06-04 DIAGNOSIS — E03.9 HYPOTHYROIDISM, UNSPECIFIED TYPE: ICD-10-CM

## 2025-06-04 NOTE — TELEPHONE ENCOUNTER
"REASON FOR CONVERSATION: Anxiety    SYMPTOMS: persistent anxiety and feeling overwhelmed. States that \"nothing goes right, don't know what to do and wants the feeling to go away\" lack of motivation/energy, lack of appetite. Denies thoughts of SI/HI, chest pain, shortness of breath, shaking.     OTHER HEALTH INFORMATION: patient called feeling overwhelmed without an option as she called the partial program and its during the day and she can't take off of work anymore. Patient wants something that could be done in the evening. Patient has a therapy session with her psychiatrist department today at 2 pm. Last saw her counselor 3 weeks ago but states every time its $35 and she doesn't really have a coping mechanism. Patient takes medications managed by her psychiatrist and she is compliant with them.     PROTOCOL DISPOSITION: Discuss With PCP and Callback by Nurse Today (overriding See Within 3 Days in Office)    CARE ADVICE PROVIDED: CTS did some breathing exercise and advise to listening to calm music, essential oils and doing imagery therapy. Patient was much calmer from how she was transferred by pep. Advised to call back if she symptoms and to make sure she had her therapy session this afternoon.    PRACTICE FOLLOW-UP: follow up with alternative option to partial program              Reason for Disposition   MODERATE anxiety (e.g., persistent or frequent anxiety symptoms; interferes with sleep, school, or work)    Answer Assessment - Initial Assessment Questions  1. CONCERN: \"Did anything happen that prompted you to call today?\"       Patient states that she called to set up for the partial program that was refer but they told her its during the day and she can't miss anymore out of work and can't take FMLA .    2. ANXIETY SYMPTOMS: \"Can you describe how you (your loved one; patient) have been feeling?\" (e.g., tense, restless, panicky, anxious, keyed up, overwhelmed, sense of impending doom).       Overwhelmed, " "feels like nothing goes right, patient states \"I want it to go away\"     3. ONSET: \"How long have you been feeling this way?\" (e.g., hours, days, weeks)      Patient states that she has been struggling with this for a while and always feels this way     4. SEVERITY: \"How would you rate the level of anxiety?\" (e.g., 0 - 10; or mild, moderate, severe).      States that currently its feeling overwhelmed and not an anxiety attack    5. FUNCTIONAL IMPAIRMENT: \"How have these feelings affected your ability to do daily activities?\" \"Have you had more difficulty than usual doing your normal daily activities?\" (e.g., getting better, same, worse; self-care, school, work, interactions)      Patient states lacking energy states \" don't want to get out of bed\" doesn't have appetite.    6. HISTORY: \"Have you felt this way before?\" \"Have you ever been diagnosed with an anxiety problem in the past?\" (e.g., generalized anxiety disorder, panic attacks, PTSD). If Yes, ask: \"How was this problem treated?\" (e.g., medicines, counseling, etc.)      Patient has history of anxiety and bipolar 2 disorder     7. RISK OF HARM - SUICIDAL IDEATION: \"Do you ever have thoughts of hurting or killing yourself?\" If Yes, ask:  \"Do you have these feelings now?\" \"Do you have a plan on how you would do this?\"      Denies     8. TREATMENT:  \"What has been done so far to treat this anxiety?\" (e.g., medicines, relaxation strategies). \"What has helped?\"      Patient currently on medications by her psychiatrist and sees a counselor through psychologist     9. THERAPIST: \"Do you have a counselor or therapist?\" If Yes, ask: \"What is their name?\"      States that she does see a counselor and last time she saw her was 3 weeks ago but every time she has a copay of $35    10. POTENTIAL TRIGGERS: \"Do you drink caffeinated beverages (e.g., coffee, gonsalo, teas), and how much daily?\" \"Do you drink alcohol or use any drugs?\" \"Have you started any new medicines " "recently?\"        Denies     11. PATIENT SUPPORT: \"Who is with you now?\" \"Who do you live with?\" \"Do you have family or friends who you can talk to?\"         Currently patient is at work. States that she lives with her children (4) and she doesn't have a close friend to talk to and her mom doesn't give her support. States that she went through a divorce and doesn't have a partner currently     12. OTHER SYMPTOMS: \"Do you have any other symptoms?\" (e.g., feeling depressed, trouble concentrating, trouble sleeping, trouble breathing, palpitations or fast heartbeat, chest pain, sweating, nausea, or diarrhea)        Feeling overwhelmed, lack of motivation, doesn't have appetite    Protocols used: Anxiety and Panic Attack-Adult-[place]    "

## 2025-06-04 NOTE — TELEPHONE ENCOUNTER
ABHI  Spoke to patient and provided her the walk in information and notified of Ruth's Message also she can take FMLA but she does not have any PTO time so she can not take it due to can not afford to     She stated she will most likely go to walk in center

## 2025-06-05 ENCOUNTER — OFFICE VISIT (OUTPATIENT)
Age: 50
End: 2025-06-05
Payer: COMMERCIAL

## 2025-06-05 ENCOUNTER — OFFICE VISIT (OUTPATIENT)
Dept: BEHAVIORAL/MENTAL HEALTH CLINIC | Facility: CLINIC | Age: 50
End: 2025-06-05
Payer: COMMERCIAL

## 2025-06-05 VITALS — BODY MASS INDEX: 18.33 KG/M2 | WEIGHT: 110 LBS | HEIGHT: 65 IN

## 2025-06-05 DIAGNOSIS — M54.12 CERVICAL RADICULOPATHY: ICD-10-CM

## 2025-06-05 DIAGNOSIS — Z79.891 LONG-TERM CURRENT USE OF OPIATE ANALGESIC: ICD-10-CM

## 2025-06-05 DIAGNOSIS — F11.20 CONTINUOUS OPIOID DEPENDENCE (HCC): Chronic | ICD-10-CM

## 2025-06-05 DIAGNOSIS — G89.4 CHRONIC PAIN SYNDROME: Primary | ICD-10-CM

## 2025-06-05 DIAGNOSIS — F11.20 UNCOMPLICATED OPIOID DEPENDENCE (HCC): ICD-10-CM

## 2025-06-05 DIAGNOSIS — M54.2 NECK PAIN: ICD-10-CM

## 2025-06-05 DIAGNOSIS — F31.81 BIPOLAR 2 DISORDER (HCC): Primary | Chronic | ICD-10-CM

## 2025-06-05 DIAGNOSIS — M79.18 MYOFASCIAL PAIN SYNDROME: ICD-10-CM

## 2025-06-05 DIAGNOSIS — M47.812 CERVICAL SPONDYLOSIS: ICD-10-CM

## 2025-06-05 PROCEDURE — H2021 COM WRAP-AROUND SV, 15 MIN: HCPCS

## 2025-06-05 PROCEDURE — 99214 OFFICE O/P EST MOD 30 MIN: CPT | Performed by: NURSE PRACTITIONER

## 2025-06-05 RX ORDER — CHLORZOXAZONE 500 MG/1
500 TABLET ORAL 3 TIMES DAILY PRN
Qty: 90 TABLET | Refills: 1 | Status: ON HOLD | OUTPATIENT
Start: 2025-06-21 | End: 2025-07-21

## 2025-06-05 RX ORDER — HYDROCODONE BITARTRATE AND ACETAMINOPHEN 5; 325 MG/1; MG/1
1 TABLET ORAL 2 TIMES DAILY PRN
Qty: 60 TABLET | Refills: 0 | Status: ON HOLD | OUTPATIENT
Start: 2025-06-29 | End: 2025-07-29

## 2025-06-05 RX ORDER — HYDROCODONE BITARTRATE AND ACETAMINOPHEN 5; 325 MG/1; MG/1
1 TABLET ORAL 2 TIMES DAILY PRN
Qty: 60 TABLET | Refills: 0 | Status: ON HOLD | OUTPATIENT
Start: 2025-07-27

## 2025-06-05 RX ORDER — LEVOTHYROXINE SODIUM 50 UG/1
50 TABLET ORAL DAILY
Qty: 90 TABLET | Refills: 1 | Status: ON HOLD | OUTPATIENT
Start: 2025-06-05

## 2025-06-05 NOTE — PROGRESS NOTES
Name: Ericka Castillo      : 1975      MRN: [id number]  Encounter Provider: Barbara Kocher, CRNP  Encounter Date: 2025   Encounter department: [facility] [place]  :  Assessment & Plan  Chronic pain syndrome  Neck pain  Cervical spondylosis  Cervical radiculopathy  Continuous opioid dependence (HCC)  Myofascial pain syndrome  Long-term current use of opiate analgesic      Orders:    HYDROcodone-acetaminophen (NORCO) 5-325 mg per tablet; Take 1 tablet by mouth 2 (two) times a day as needed for pain Max Daily Amount: 2 tablets Do not start before 2025.    HYDROcodone-acetaminophen (NORCO) 5-325 mg per tablet; Take 1 tablet by mouth 2 (two) times a day as needed for pain Max Daily Amount: 2 tablets Do not start before 2025.    chlorzoxazone (PARAFON FORTE) 500 mg tablet; Take 1 tablet (500 mg total) by mouth 3 (three) times a day as needed for muscle spasms Do not start before 2025.    While the patient was in the office today, I did have a thorough conversation regarding their chronic pain syndrome, medication management, and treatment plan options.  Patient is being seen for a follow-up visit.  Overall, her pain is reasonably controlled with current medication regimen.  She denies significant side effects from medications.  She is interested in moving forward with spinal cord stimulator.  However, she is going through divorce and is in between insurances at this time.  She plans to proceed with the trial spinal cord stimulator as soon as she knows for sure which insurance she will be using.    In the meantime, she is verbalizing significant anxiety related to divorce and financial uncertainty regarding her current life circumstances.  She appears very nervous and anxious.  I advised that it may not be a bad idea for her to seek care at the [facility] walk-in clinic in [place].  I provided her with location and contact  information.    Continue hydrocodone 5/325 twice daily if needed for pain.  The patient's opioid scripts were sent to their pharmacy electronically and was given a 2 month supply of prescriptions with a Do Not Fill date(s) of 6/29/2025, 7/27/2025    Continue Parafon forte 3 times daily if needed for spasms.  A prescription was sent to her pharmacy with a do not fill date of 6/21/2025 with 1 refill.    Continue nabumetone 500 mg twice daily.  She did not require refill of this medication during today's visit.    Continue gabapentin 800 mg 3 times daily.  She did not require refill of this medication during today's visit.    There are risks associated with opioid medications, including dependence, addiction and tolerance. The patient understands and agrees to use these medications only as prescribed. Potential side effects of the medications include, but are not limited to, constipation, drowsiness, addiction, impaired judgment and risk of fatal overdose if not taken as prescribed. The patient was warned against driving while taking sedation medications.  Sharing medications is a felony. At this point in time, the patient is showing no signs of addiction, abuse, diversion or suicidal ideation.  A urine drug screen was collected at today's office visit as part of our medication management protocol. The point of care testing results were appropriate for what was being prescribed. The specimen will be sent for confirmatory testing. The drug screen is medically necessary because the patient is either dependent on opioid medication or is being considered for opioid medication therapy and the results could impact ongoing or future treatment. The drug screen is to evaluate for the presences or absence of prescribed, non-prescribed, and/or illicit drugs/substances.  [place] Prescription Drug Monitoring Program report was reviewed and was appropriate      My impressions and treatment recommendations were discussed in  "detail with the patient who verbalized understanding and had no further questions.  Discharge instructions were provided. I personally saw and examined the patient and I agree with the above discussed plan of care.    History of Present Illness     Ericka Castillo is a 49 y.o. female who presents for a follow up office visit in regards to Neck Pain and Back Pain. The patient’s current symptoms include complaints of neck pain, headaches.  Current pain level is a 7/10.  Quality of pain is described as intermittent, dull, aching, throbbing, pressure-like.    Current pain medications includes:  Hydrocodone 5/325 twice daily if needed for pain, Parafon forte 500 mg 3 times daily if needed for spasms, nabumetone 500 mg twice daily, gabapentin 800 mg 3 times daily.  The patient reports that this regimen is providing 50% pain relief.  The patient is reporting no side effects from this pain medication regimen.    Opioid contract date 10/24/2024    Last UDS Date: 6/5/2025                       Review of Systems   Constitutional:  Negative for fever.   HENT:  Negative for sore throat.    Eyes:  Negative for visual disturbance.   Respiratory:  Negative for shortness of breath.    Cardiovascular:  Negative for chest pain.   Gastrointestinal:  Negative for constipation, diarrhea and nausea.   Endocrine: Negative for polyuria.   Musculoskeletal:  Positive for neck pain. Negative for arthralgias, gait problem, joint swelling and myalgias.        Decreased range in motion, joint stiffness and pain in arms   Skin:  Negative for rash.   Neurological:  Positive for weakness. Negative for dizziness and seizures.       Medical History Reviewed by provider this encounter:  Tobacco  Allergies  Meds  Problems  Med Hx  Surg Hx  Fam Hx     .  Medications Ordered Prior to Encounter[1]      Objective   Ht 5' 5\" (1.651 m)   Wt 49.9 kg (110 lb)   BMI 18.30 kg/m²      Pain Score:   7  Physical Exam  Constitutional: Patient appears " anxious  Eyes: anicteric  HEENT: grossly intact  Neck: supple, symmetric, trachea midline and no masses   Pulmonary: even and unlabored  Cardiovascular: No edema or pitting edema present  Skin: Normal without rashes or lesions and well hydrated  Psychiatric: Mood and affect appropriate  Neurologic: Cranial Nerves II-XII grossly intact  Musculoskeletal: normal               [1]   Current Outpatient Medications on File Prior to Visit   Medication Sig Dispense Refill    ALPRAZolam (XANAX) 0.5 mg tablet Take 0.5 mg by mouth as needed in the morning and 0.5 mg as needed at noon and 0.5 mg as needed in the evening and 0.5 mg as needed before bedtime for anxiety.      buPROPion (WELLBUTRIN XL) 150 mg 24 hr tablet Take 150 mg by mouth      buPROPion (WELLBUTRIN XL) 300 mg 24 hr tablet Take 300 mg by mouth in the morning.  1    clotrimazole (LOTRIMIN) 1 % cream Apply topically 2 (two) times a day 28 g 0    dicyclomine (BENTYL) 20 mg tablet       estradiol (Climara) 0.06 MG/24HR Place 1 patch on the skin over 7 days once a week 4 patch 4    famotidine (PEPCID) 20 mg tablet Take 1 tablet (20 mg total) by mouth 2 (two) times a day 60 tablet 5    FLUoxetine (PROzac) 20 mg capsule Take 1 capsule by mouth in the morning      gabapentin (NEURONTIN) 800 mg tablet Take 1 tablet (800 mg total) by mouth 3 (three) times a day 90 tablet 2    hydrOXYzine HCL (ATARAX) 25 mg tablet Take 1 tablet (25 mg total) by mouth every 6 (six) hours as needed for anxiety 60 tablet 2    hyoscyamine (ANASPAZ,LEVSIN) 0.125 MG tablet Take 125 mcg by mouth every 12 (twelve) hours as needed      lamoTRIgine (LaMICtal) 100 mg tablet Take 100 mg by mouth in the morning.      lamoTRIgine (LaMICtal) 200 MG tablet Take 1 tablet by mouth in the morning      lansoprazole (PREVACID) 30 mg capsule Take 30 mg by mouth in the morning and 30 mg in the evening.      levothyroxine 50 mcg tablet Take 1 tablet by mouth once daily 90 tablet 1    lidocaine (XYLOCAINE) 5 %  ointment Apply topically as needed for mild pain 35.44 g 5    lisinopril (ZESTRIL) 10 mg tablet Take 1/2 (one-half) tablet by mouth once daily 15 tablet 5    nabumetone (RELAFEN) 500 mg tablet Take 1 tablet (500 mg total) by mouth 2 (two) times a day 60 tablet 5    naloxone (NARCAN) 4 mg/0.1 mL nasal spray Administer 1 spray into a nostril. If no response after 2-3 minutes, give another dose in the other nostril using a new spray. 1 each 1    ondansetron (ZOFRAN) 4 mg tablet Take 1 tablet (4 mg total) by mouth every 8 (eight) hours as needed for nausea or vomiting 20 tablet 0    Probiotic Product (VSL#3) CAPS       sucralfate (CARAFATE) 1 g tablet Take 1 tablet by mouth in the morning and 1 tablet before bedtime.      [DISCONTINUED] HYDROcodone-acetaminophen (NORCO) 5-325 mg per tablet Take 1 tablet by mouth 2 (two) times a day as needed for pain Max Daily Amount: 2 tablets Do not start before April 25, 2025. 60 tablet 0    [DISCONTINUED] HYDROcodone-acetaminophen (NORCO) 5-325 mg per tablet Take 1 tablet by mouth 2 (two) times a day as needed for pain Max Daily Amount: 2 tablets 60 tablet 0    Cobenfy 100-20 MG CAPS TAKE 1 CAPSULE BY MOUTH IN THE EVENING. TAKE MEDICATION AT LEAST 1 HOUR BEFORE MEALS OR 2 HOURS AFTER (Patient not taking: Reported on 5/19/2025)      zolpidem (AMBIEN) 10 mg tablet Take 1 tablet (10 mg total) by mouth daily at bedtime as needed for sleep for up to 10 days 10 tablet 0    [DISCONTINUED] chlorzoxazone (PARAFON FORTE) 500 mg tablet Take 1 tablet (500 mg total) by mouth 3 (three) times a day as needed for muscle spasms 90 tablet 1     No current facility-administered medications on file prior to visit.

## 2025-06-05 NOTE — TELEPHONE ENCOUNTER
Caller: Ericka ELMORE    Doctor: Dr. Ghazala ALFONSO     Reason for call: Pt called in to schedule an injection. Neck injection      Call back#: [phone]

## 2025-06-05 NOTE — TELEPHONE ENCOUNTER
Caller: Ericka ELMORE    Doctor: Dr. Ghazala ALFONSO    Reason for call: Pt would like to schedule an injection     Call back#: [phone]

## 2025-06-05 NOTE — PROGRESS NOTES
Completed FMLA paper work for patient and given to staff to finish completing and to get to patient.

## 2025-06-05 NOTE — ASSESSMENT & PLAN NOTE
Orders:    HYDROcodone-acetaminophen (NORCO) 5-325 mg per tablet; Take 1 tablet by mouth 2 (two) times a day as needed for pain Max Daily Amount: 2 tablets Do not start before June 29, 2025.    HYDROcodone-acetaminophen (NORCO) 5-325 mg per tablet; Take 1 tablet by mouth 2 (two) times a day as needed for pain Max Daily Amount: 2 tablets Do not start before July 27, 2025.    chlorzoxazone (PARAFON FORTE) 500 mg tablet; Take 1 tablet (500 mg total) by mouth 3 (three) times a day as needed for muscle spasms Do not start before June 21, 2025.

## 2025-06-05 NOTE — PROGRESS NOTES
"Assessment      Crisis Intake  Patient Intake  Special Needs: N/A  Living Arrangement: Lives with someone (lives at mom's home with 4 kidscand newphew with Aserger;s.)  Can patient return home?: Yes  Address to be Discharge to:: see chart  Patient's Telephone Number: see chart  Access to Firearms: No  Type of Work: [employer] in [place]  Work History: Full-time  Patient History  Presenting Problems: Patient presents to the walk-in center with increased anxiety and depression. Patient denies SI/HI/AV. Patient is experiencing a great deal of stress in her personal life. Patient is eating very minimal and reports \"it is a daily struggle to get out of bed.\" Patient has been isolating and lacking motivation to engage in social activities with her children. Patient is recently , her four children all have medical issues, her oldest child has legal issues. Patient was recently informed that her children received overpayments from social security and that she now must pay back over $9,000. Patient also is dealing with chronic pain disorder. Patient is treated by Dr. Guardado for medication management and meets with psychologist Sania Foote bi-weekly. Patient was referred to the partial hospital program but is struggling to complete FMLA paperwork. Patient is employed at [employer] in [place]. Employer is reportedly not supportive of mental health needs of patient. Patient struggled to identify supportive friends or associates. Patient's psychologist encourages her to \"go for rides or listen to a podcast but I just can't do it.\" CIS assisted patient with contacting [employer], Ruth Valdez(PCP), and Jaylan Harris(partial program.) Patient's will start the partial program on Monday 6/9. Ruth Valdez will finish medical section of FMLA paperwork and fax to [employer]. CIS helped patient review and sign all necessary [place] paperwork. Patient will call employer to inform of change of start date. " Patient provided information about partial program and given copies of signed [facility] paperwork.  Treatment History: No inpatient history. Has been treated by psychiatrist since a teenager.  Currently in Treatment: Yes  Current Psychiatrist/Therapist: Psychiatry (Dr. Guardado)   Psychologist (Sania Foote)  Current Treatment Appt Info: Patient to start Partial Hospital Program on 6/9.  Name of ICM:: N/A  ICM Phone Number:: N/A  Community Agency Supports: N/A  Medical Problems: Chronic Pain, Neck Pain  Legal Issues: denies  Probation/ Name (if applicable): N/A  Substance Abuse: Yes (See BH History section for detail) (went to rehab for percocet addiction in approximately 2010)  Mental Status Exam  Orientation Level: Oriented X4  Affect: Appropriate  Speech: Soft, Slow  Mood: Anxious, Depressed, Despairing, Fearful, Manic  Thought Content: Appropriate  Hallucination Type: No problems reported or observed.  Judgement: Poor  Impulse Control: Poor  Attention Span: Appropriate for age  Memory: Intact  Appetite: Poor  Weight Changes: lost 10lbs in last two months  Sleep: Poor  Total Hours of Sleep: 8 hours with Ambien, 2-4 hours without  Specific Sleep Problem: patient only able to sleep when taking Ambien.  Appear/Hygiene: Disheveled  ADL Comments: Independent  Strengths and Limitations  Patient Strengths: Good past treatment response, Negotiates basic needs, Insightful  Patient Limitations: Difficulty adapting, Limited motivation, Poor physical health, Uncooperative, Unresourceful, Non-compliance, Limited support system, Poor reasoning ability  Ideations  Current Self Harm/Suicidal Ideation: No  Previous Self Harm/Suicidal Ideation: No  Description of self harming behaviors or thoughts:: N/A  Violence Risk to Self: Denies ideation within past 6 months  Current homicidal or violent thoughts toward another: Denies ideations within past 6 months  Description of current thoughts/plans:: N/A  Previous Plans to  Harm Another Person: No  Description of violent thoughts or behaviors toward others:: N/A  Violence Risk to Others: Denies within past 6 months  Previous History of Violence to Others: No  Provisional Diagnosis  Axis I: Bipolar 2 disorder (F31.81)    C-SSRS  [place] Suicide Severity Rating Scale  C-SSRS Q1. Wish to be Dead: No - In the Past Month  *Risk Level*: Low Risk      Patient was referred by: Other Barb Kocher(pain mgmt)    Visit start and stop times:    06/05/25  Start Time: (P) 0800  Stop Time: (P) 0930  Total Visit Time: (P) 90 minutes      The patient was Instructed to follow up with partial program on 6/9. Patient also will contact employer to update change in FMLA request.   The patient was provided with referral information for:   Partial program, [facility] case mgmt.     This writer and the patient completed a safety plan.  The patient was provided with a copy of their safety plan with encouragement to utilize the plan following discharge.     In addition, the patient was instructed to call local [facility] crisis, other crisis services, [facility] or to go to the nearest ER immediately if their situation changes at any time.     This writer discussed discharge plans with the patient who agrees with and understands the discharge plans.         SAFETY PLAN  Warning Signs (thoughts, images, mood, behavior, situations) of a potential crisis: isolation, panic attacks, missing work.      Coping Skills (what can I do to take my mind off the problem, or to keep myself safe): spend time in nature, go on outings with children      Outside Support (who can I reach out to for support and help): [facility] crisis.        National Suicide Prevention Hotline:  988      [facility] [phone] - Crisis   [facility] [phone] - [place] Crisis/Mobile Crisis   [phone] - [facility]   [facility]: [phone]  [facility]: [phone]   [facility] [phone] - Crisis   [facility] [phone] - Crisis     [place]  [facility] [phone] - Crisis   [facility] [phone] - Crisis   [phone] - Peer Support Talk Line (1-9pm daily)  [phone] - Teen Support Talk Line (1-9pm daily)  [phone] - [facility]   [facility] [phone]- Crisis    [facility] [phone] - Crisis   [facility] [phone] - Crisis    [facility] [phone] - [facility] Crisis

## 2025-06-06 ENCOUNTER — TELEPHONE (OUTPATIENT)
Dept: BEHAVIORAL/MENTAL HEALTH CLINIC | Facility: CLINIC | Age: 50
End: 2025-06-06

## 2025-06-06 ENCOUNTER — TELEPHONE (OUTPATIENT)
Dept: PSYCHOLOGY | Facility: CLINIC | Age: 50
End: 2025-06-06

## 2025-06-06 NOTE — TELEPHONE ENCOUNTER
S/W pt and apologized for not being called as previous calls were not sent to the nurse.  Pt is asking for repeat TPI  Last injected 3/13. Notes pain had become worse in the past 2-3 weeks. Rating it a 8/10  Please advise on scheduling

## 2025-06-06 NOTE — TELEPHONE ENCOUNTER
Caller: carlos Barnett     Doctor: Kocher     Reason for call: pt calling following up to see if the order was placed for her to be schedule for he neck injection. Please Advise     Call back#: [phone]

## 2025-06-06 NOTE — TELEPHONE ENCOUNTER
Telephone call to patient as follow up from Walk-in Center visit. Patient is beginning [facility] Program (PHP) on 6/9/25. Educated patient on PHP services.     Discussion held with patient to call [facility] crisis, return to walk-in center, call 911 or go to the nearest emergency room immediately if her situation changes/worsens.

## 2025-06-06 NOTE — TELEPHONE ENCOUNTER
Spoke with patient 6/6/25 to confirm appointments & details with patient before they start our program on Monday, 6/9/25. Patient was provided with instructions of 8:00am arrival, to bring insurance card & photo ID, as well as their own lunch for the first day. Patient was provided with our address and phone number. No questions at the time of my call.

## 2025-06-08 LAB
4OH-XYLAZINE UR QL CFM: NEGATIVE NG/ML
6MAM UR QL CFM: NEGATIVE NG/ML
7AMINOCLONAZEPAM UR QL CFM: NEGATIVE NG/ML
A-OH ALPRAZ UR QL CFM: NORMAL NG/ML
ACCEPTABLE CREAT UR QL: NORMAL MG/DL
ACCEPTIBLE SP GR UR QL: NORMAL
AMPHET UR QL CFM: NEGATIVE NG/ML
BUPRENORPHINE UR QL CFM: NEGATIVE NG/ML
BUTALBITAL UR QL CFM: NEGATIVE NG/ML
BZE UR QL CFM: NEGATIVE NG/ML
CODEINE UR QL CFM: NEGATIVE NG/ML
EDDP UR QL CFM: NEGATIVE NG/ML
ETHYL GLUCURONIDE UR QL CFM: NEGATIVE NG/ML
ETHYL SULFATE UR QL SCN: NEGATIVE NG/ML
EUTYLONE UR QL: NEGATIVE NG/ML
FENTANYL UR QL CFM: NEGATIVE NG/ML
GLIADIN IGG SER IA-ACNC: NEGATIVE NG/ML
HYDROCODONE UR QL CFM: ABNORMAL NG/ML
HYDROMORPHONE UR QL CFM: ABNORMAL NG/ML
LORAZEPAM UR QL CFM: NEGATIVE NG/ML
ME-PHENIDATE UR QL CFM: NEGATIVE NG/ML
MEPERIDINE UR QL CFM: NEGATIVE NG/ML
METHADONE UR QL CFM: NEGATIVE NG/ML
METHAMPHET UR QL CFM: NEGATIVE NG/ML
MORPHINE UR QL CFM: NEGATIVE NG/ML
NALTREXONE UR QL CFM: NEGATIVE NG/ML
NITRITE UR QL: NORMAL UG/ML
NORBUPRENORPHINE UR QL CFM: NEGATIVE NG/ML
NORDIAZEPAM UR QL CFM: NEGATIVE NG/ML
NORFENTANYL UR QL CFM: NEGATIVE NG/ML
NORHYDROCODONE UR QL CFM: ABNORMAL NG/ML
NORMEPERIDINE UR QL CFM: NEGATIVE NG/ML
NOROXYCODONE UR QL CFM: NEGATIVE NG/ML
OXAZEPAM UR QL CFM: NEGATIVE NG/ML
OXYCODONE UR QL CFM: NEGATIVE NG/ML
OXYMORPHONE UR QL CFM: NEGATIVE NG/ML
PARA-FLUOROFENTANYL QUANTIFICATION: NORMAL NG/ML
PHENOBARB UR QL CFM: NEGATIVE NG/ML
RESULT ALL_PRESCRIBED MEDS AND SPECIAL INSTRUCTIONS: NORMAL
SECOBARBITAL UR QL CFM: NEGATIVE NG/ML
SL AMB 5F-ADB-M7 METABOLITE QUANTIFICATION: NEGATIVE NG/ML
SL AMB 7-OH-MITRAGYNINE (KRATOM ALKALOID) QUANTIFICATION: NEGATIVE NG/ML
SL AMB AB-FUBINACA-M3 METABOLITE QUANTIFICATION: NEGATIVE NG/ML
SL AMB ACETYL FENTANYL QUANTIFICATION: NORMAL NG/ML
SL AMB ACETYL NORFENTANYL QUANTIFICATION: NORMAL NG/ML
SL AMB ACRYL FENTANYL QUANTIFICATION: NORMAL NG/ML
SL AMB CARFENTANIL QUANTIFICATION: NORMAL NG/ML
SL AMB CTHC (MARIJUANA METABOLITE) QUANTIFICATION: NEGATIVE NG/ML
SL AMB DEXTRORPHAN (DEXTROMETHORPHAN METABOLITE) QUANT: ABNORMAL NG/ML
SL AMB GABAPENTIN QUANTIFICATION: NORMAL NG/ML
SL AMB JWH018 METABOLITE QUANTIFICATION: NEGATIVE NG/ML
SL AMB JWH073 METABOLITE QUANTIFICATION: NEGATIVE NG/ML
SL AMB MDMB-FUBINACA-M1 METABOLITE QUANTIFICATION: NEGATIVE NG/ML
SL AMB METHYLONE QUANTIFICATION: NEGATIVE NG/ML
SL AMB N-DESMETHYL-TRAMADOL QUANTIFICATION: NEGATIVE NG/ML
SL AMB PHENTERMINE QUANTIFICATION: NEGATIVE NG/ML
SL AMB PREGABALIN QUANTIFICATION: NEGATIVE NG/ML
SL AMB RCS4 METABOLITE QUANTIFICATION: NEGATIVE NG/ML
SL AMB RITALINIC ACID QUANTIFICATION: NEGATIVE NG/ML
SMOOTH MUSCLE AB TITR SER IF: NEGATIVE NG/ML
SPECIMEN DRAWN SERPL: NEGATIVE NG/ML
SPECIMEN PH ACCEPTABLE UR: NORMAL
TAPENTADOL UR QL CFM: NEGATIVE NG/ML
TEMAZEPAM UR QL CFM: NEGATIVE NG/ML
TRAMADOL UR QL CFM: NEGATIVE NG/ML
URATE/CREAT 24H UR: NEGATIVE NG/ML
XYLAZINE UR QL CFM: NEGATIVE NG/ML

## 2025-06-09 ENCOUNTER — OFFICE VISIT (OUTPATIENT)
Dept: PSYCHOLOGY | Facility: CLINIC | Age: 50
End: 2025-06-09
Payer: COMMERCIAL

## 2025-06-09 VITALS
SYSTOLIC BLOOD PRESSURE: 103 MMHG | DIASTOLIC BLOOD PRESSURE: 63 MMHG | BODY MASS INDEX: 21.3 KG/M2 | HEART RATE: 79 BPM | WEIGHT: 128 LBS

## 2025-06-09 DIAGNOSIS — F41.1 GAD (GENERALIZED ANXIETY DISORDER): ICD-10-CM

## 2025-06-09 DIAGNOSIS — Z59.86 DIFFICULTY WITH MONEY MANAGEMENT: ICD-10-CM

## 2025-06-09 DIAGNOSIS — F33.2 SEVERE EPISODE OF RECURRENT MAJOR DEPRESSIVE DISORDER, WITHOUT PSYCHOTIC FEATURES (HCC): Primary | ICD-10-CM

## 2025-06-09 PROBLEM — F32.2 CURRENT SEVERE EPISODE OF MAJOR DEPRESSIVE DISORDER WITHOUT PSYCHOTIC FEATURES (HCC): Status: ACTIVE | Noted: 2025-06-09

## 2025-06-09 PROCEDURE — 90792 PSYCH DIAG EVAL W/MED SRVCS: CPT | Performed by: STUDENT IN AN ORGANIZED HEALTH CARE EDUCATION/TRAINING PROGRAM

## 2025-06-09 PROCEDURE — G0410 GRP PSYCH PARTIAL HOSP 45-50: HCPCS

## 2025-06-09 PROCEDURE — 90791 PSYCH DIAGNOSTIC EVALUATION: CPT

## 2025-06-09 PROCEDURE — G0176 OPPS/PHP;ACTIVITY THERAPY: HCPCS

## 2025-06-09 RX ORDER — ESCITALOPRAM OXALATE 20 MG/1
1 TABLET ORAL DAILY
Status: ON HOLD | COMMUNITY
Start: 2025-05-27

## 2025-06-09 RX ORDER — CHLORPROMAZINE HYDROCHLORIDE 50 MG/1
50 TABLET, FILM COATED ORAL 2 TIMES DAILY PRN
Status: ON HOLD | COMMUNITY
Start: 2025-05-15

## 2025-06-09 RX ORDER — ARIPIPRAZOLE 5 MG/1
5 TABLET ORAL DAILY
Qty: 30 TABLET | Refills: 0 | Status: ON HOLD | OUTPATIENT
Start: 2025-06-09

## 2025-06-09 SDOH — ECONOMIC STABILITY - INCOME SECURITY: FINANCIAL INSECURITY: Z59.86

## 2025-06-09 NOTE — GROUP NOTE
Visit Time:    Visit Start Time: 930  Visit Stop Time: 1030  Total Visit Duration: 25 minutes    Subjective:     Patient ID: Ericka Castillo is a 49 y.o. female    Specialized Services Documentation  Therapist must complete separate progress note for each specific clinical activity in which the individual participated during the day      [facility] Clinical Progress Note    Group Psychotherapy          Subjective:        This group was facilitated in a private office.   During this session, they participated actively in a psychotherapy group focused on setting boundaries. The group focused on the interpersonal effectiveness pillar of DBT; specifically, looking at the KESHAWN acronym. Participants followed a step by step breakdown of the KESHAWN process and were encouraged to engage in open discussion throughout. Group members were given a KESHAWN practice worksheet and time to practice. Processing during the course of treatment, they took notes and filled out the practice sheet. Continue to note progress towards goals. Continue with psychotherapy to encourage further development of interpersonal effectiveness skills.     Ericka ANDREW was in intake for a portion of this session. Ericka actively participated in the DEAR MAN exercise   .Positive progress toward treatment objective  Tx Plan Objective 1.1, 1.2, 1.4 Therapist Arun ALFORD Ed

## 2025-06-09 NOTE — GROUP NOTE
Visit Time    Visit Start Time: 1215  Visit Stop Time: 1315  Total Visit Duration: 60 minutes    Subjective:     Patient ID: Ericka Castillo is a 49 y.o. female.    [facility] Clinical Progress Notes      Specialized Services Documentation  Therapist must complete separate progress note for each specific clinical activity in which the individual participated during the day.       Group Psychotherapy Life Skills ([id number])- Ericka Castillo actively engaged in group focused on attachment styles. Group members learned about the four different types of attachments styles secure, anxious-preoccupied,dismissive avoidant, and fearful avoidant . The group watched the short video, The 4 Attachment Styles. Group members shared which attachment style they identified with the most and attachment styles of other people in their lives. Participants gave examples of how their attachment style affects their behaviors in relationships. The group reviewed strategies for handling different types of attachment and how they could work towards having a more secure attachment.Group member identified ways their behavior could improve and how improving their behavior could effect relationships. Ericka Castillo continues to make progress towards goals through verbal participation in group; to accomplish long term goals continue to utilize skills learned in programming. Continue with psychotherapy to educate and encourage use of wellness tools. Tx Plan Objective: 1.1,1.2 Therapist: SAGRARIO Hess ,POWER      EDUCATION THERAPY    Visit Time    Visit Start Time: 0830  Visit Stop Time: 0930  Total Visit Duration: 0 minutes      [id number]    rEicka Castillo was not present for group due to intake.    Tx Plan Objective: 1.1,1.2, Therapist: POWER Hess
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
## 2025-06-09 NOTE — PSYCH
[facility] Insurance Authorization for Treatment       Call Start Time: 1504  Call Stop Time: 1526  Total Visit Duration: 22 minutes     Subjective:      Patient ID: Ericka Castillo is a 49 y.o. female.     Phone call placed to [facility]  Phone number: [phone]  Tax ID and/or NPI used NPI [id number] (Adult [place]/[place])  Location: [address] [place] [address]  Spoke to Love s  Code Used for Authorization: [id number] &  ([facility]. Edy-ALL/Cigna/C/UUMPC/Aetna/UMR)  30 Days Approved 6/9/25 through 7/8/25    Level of Care PHP  Review on 7/8/25  Authorization # [id number]  Call Reference # [id number]     Care manager assigned: Mike BRAGA [phone]  Therapist: Ashley Costenbader MA LMT [place] Plan Objective: 1.0

## 2025-06-09 NOTE — GROUP NOTE
"Visit Time    Visit Start Time: 1045  Visit Stop Time: 1145  Total Visit Duration: 60 minutes    Subjective:     Patient ID: Ericka Castillo is a 49 y.o. female.    Specialized Services Documentation  Therapist must complete separate progress note for each specific clinical activity in which the individual participated during the day.     [facility] Clinical Progress Note    Group member actively participated in music therapy group regarding grief and loss. The group participated in a discussion about their own experiences with grief and loss. The group then participated in a jacquelin discussion on the songs \"Super[employer] Flowers\" and \"Because of You\" and discussed how each song approaches the topic of grief. The group read and discussed handouts regarding the stages of grief and general facts about grief.  Group members were encouraged to share methods of coping with grief that have been successful for them in the past.  Continue AT to encourage the development and proactive use of coping techniques.       Ericka ANDREW participated. She shared minimally but appeared attentive throughout. Some progress toward treatment objective.   Tx Plan Objective: 1.1,1.2,1.4, Therapist:  Bradly Chavez, PATO, MT-BC    " HX OF ASTHMA

## 2025-06-09 NOTE — GROUP NOTE
Visit Time    Visit Start Time: 1330  Visit Stop Time: 1430  Total Visit Duration: 60 minutes    Subjective:     Patient ID: Ericka Castillo is a 49 y.o. female.    Specialized Services Documentation  Therapist must complete separate progress note for each specific clinical activity in which the individual participated during the day.     [facility] Clinical Progress Note     GROUP PSYCHOTHERAPY    Participated actively in psychotherapy group. Group explored treatment day leaning and staff utilized Verbal, A/V, and Demonstration teaching methods. Members shared areas of learning and set a goal outside of program. Time spent encouraging use of WRAP, skill review, and exploring resources in the community.      She actively shared. She engaged in learning related to wellness tools. Ericka Castillo identified spending time with her children as goal from the evening and to be patient with herself in easing into group as a take away from program. Some progress toward treatment objective. Continue group psychotherapy to actively practice learned skills and encourage transfer of knowledge to unstructured time.   Tx Plan Objective: 1.0, Therapist:  PATO Gooden, [facility]
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
## 2025-06-09 NOTE — PSYCH
Subjective:     Patient ID: Ericka Castillo is a 49 y.o. female and uses she/her pronouns     [facility] Clinical Progress Notes      Specialized Services Documentation  Therapist must complete separate progress note for each specific clinical activity in which the individual participated during the day.     Case Management Note    Ashley Costenbader MA LMT    Current suicide risk : low    [id number] Met with Ericka Castillo at [facility]. Reviewed program, initial paperwork reviewed: Consent for Treatment, PHP handbook, HIPPA, General Consent, Client Bill of Rights, and Smoking/Drug and Alcohol Policy. Release of Information obtained for emergency contact - Pari (Mother) and PCP and Health Care Coordination Form. Ericka Castillo denied copies of all paperwork.  PCP notified of admission and health care coordination form sent. Completed initial psycho-social evaluation and initial treatment goals discussed.    Medications changes/added/denied? Yes  - See Dr. Jersey Miramontes's Note     Treatment session number: 1    Individual Case Management Visit provided today? yes    [facility] follow up physician's orders: Admit to PHP - See Dr. Jersey Miramontes's note

## 2025-06-10 ENCOUNTER — OFFICE VISIT (OUTPATIENT)
Dept: PSYCHOLOGY | Facility: CLINIC | Age: 50
End: 2025-06-10
Payer: COMMERCIAL

## 2025-06-10 DIAGNOSIS — F41.1 GAD (GENERALIZED ANXIETY DISORDER): ICD-10-CM

## 2025-06-10 DIAGNOSIS — F33.2 SEVERE EPISODE OF RECURRENT MAJOR DEPRESSIVE DISORDER, WITHOUT PSYCHOTIC FEATURES (HCC): Primary | ICD-10-CM

## 2025-06-10 DIAGNOSIS — Z59.86 DIFFICULTY WITH MONEY MANAGEMENT: ICD-10-CM

## 2025-06-10 PROCEDURE — G0177 OPPS/PHP; TRAIN & EDUC SERV: HCPCS

## 2025-06-10 PROCEDURE — G0410 GRP PSYCH PARTIAL HOSP 45-50: HCPCS

## 2025-06-10 SDOH — ECONOMIC STABILITY - INCOME SECURITY: FINANCIAL INSECURITY: Z59.86

## 2025-06-10 NOTE — GROUP NOTE
Visit Time    Visit Start Time: 1045  Visit Stop Time: 1145  Total Visit Duration: 60 minutes    Subjective:     Patient ID: Ericka Castillo is a 49 y.o. female.    [facility] Clinical Progress Notes      Specialized Services Documentation  Therapist must complete separate progress note for each specific clinical activity in which the individual participated during the day.       Group Psychotherapy   Life Skills ([id number])  Ericka hampton minimally engaged in group focused on core beliefs. Group members watched a short video, Healing Your Negative Core Beliefs. Group members learned and share about their core beliefs. The group discussed how core beliefs influence their thoughts and behaviors. During the activity the group learned about cognitive reframing and explored ways to reshape their negative core beliefs into positive beliefs. Group members wrote one of their negative core beliefs and had to provide three pieces of evidence that prove the negative core belief untrue.The group learned how to re-frame their irrational thoughts and turn them into positive thoughts.Discussed thought records and how to use them. Ericka ANDREW did not share how she re-framed her irrational thought. Ericka reported struggling with re-framing her thoughts and asked for support from the group with it.Group members were asked to challenge one negative thought per day. Ericka ANDREW continues to make progress towards goals through verbal participation in group; to accomplish long term goals continue to utilize skills learned in programming. Continue with psychotherapy to educate and encourage use of wellness tools. Tx Plan Objective: 1.1,1.2 Therapist: Ariela Negro,MSW,LSW

## 2025-06-10 NOTE — PSYCH
Subjective:     Patient ID: Ericka Castillo 49 y.o. Female    [facility] Clinical Progress Notes      Specialized Services Documentation  Therapist must complete separate progress note for each specific clinical activity in which the individual participated during the day.     Case Management    ([id number]) Spoke with Ericka Castillo for case management. Treatment  plan was reviewed. Ericka Castillo was very tearful. Reported she feels she doesn't know if this program will work for her. Reported she does not know what will help her with all her stressors. CM reviewed the benefits of the program and explained the importance of giving the program a chance. Ericka Castillo was agreeable to stay in program. Ericka Castillo is aware of next scheduled 1:1 meeting.     Current suicide risk : Low      Medications changes/added/denied? None at this time     Treatment session number: 2     Individual Case Management Visit provided today? Yes      [facility] follow up physician's orders: Continue to follow orders.     Therapist: Ashley Costenbader MA LMT  Tx Plan Objective: 1.0

## 2025-06-10 NOTE — GROUP NOTE
Visit Time    Visit Start Time: 1215  Visit Stop Time: 1230  Total Visit Duration: 60 minutes     Subjective:     Patient ID: Ericka Castillo is a 49 y.o. y.o. female.     Specialized Services Documentation  Therapist must complete separate progress note for each specific clinical activity in which the individual participated during the day.        Group Psychotherapy - Anxiety    Ericka Castillo was present in psychoeducational group about anxiety. The group followed a slide show presentation on Anxiety and the Brain. Group was educated on:  Signs and symptoms of anxiety   Causes and treatments of anxiety  How the brain processes anxiety  Group members then discussed anxiety causing situations and positive ways to cope with these stressors.   Coping skills discussed were breathing paired with sitting/noticing with our thoughts and emotions exercise, challenging irrational thoughts, progressive muscle relaxation, Dropping anchor, deep breathing, and imagery.    Question and answer time allowed. Some progress towards goal noted through participation in group. Continue psychoeducational groups on anxiety and teach the value of learning about diagnosis and treatments available.     Tx Plan Objective:  1.1, 1.2, 1.3, 1.4 Therapist:  Laura HERNANDEZ RN

## 2025-06-10 NOTE — GROUP NOTE
Visit Time    Visit Start Time: 0830  Visit Stop Time: 0930  Total Visit Duration: 60 minutes    Subjective:     Patient ID: Ericka Castillo is a 49 y.o. female.     [facility] Clinical Progress Notes      Specialized Services Documentation  Therapist must complete separate progress note for each specific clinical activity in which the individual participated during the day.       EDUCATION THERAPY    Morning group focused on establishing routine and goal review.  Members assessed related to readiness for learning and potential barriers.  Transfer of skills outside of program explored through goal sharing and connection back to the Recovery Model 5 key facets of recovery.  Throughout session, skills introduced, practiced reflecting mindfulness, meditation, movement, and connecting peers in this community.      Ericka Castillo actively shared.  Identified that they did not complete goal from last treatment day identifying  spending time with her children,gaining hope and responsibility.  Presented as Receptive related to readiness to learn and did not present with learning barriers. Ericka Castillo participated in mindfulness exercise and gratitude practice. Ericka Castillo made positivie progress toward goal. Continue education group to assess willingness to engage, assess transfer of knowledge, and participate in skill development.  Tx Plan Objective: 1.0, Therapist:  Arun DELGADO Ed.  Visit Time    Visit Start Time: 1330  Visit Stop Time: 1430  Total Visit Duration: 60 minutes    Subjective:     Patient ID: Ericka Castillo is a 49 y.o. female.    Specialized Services Documentation  Therapist must complete separate progress note for each specific clinical activity in which the individual participated during the day.     [facility] Clinical Progress Note     GROUP PSYCHOTHERAPY    Participated actively in psychotherapy group. Group explored treatment day leaning and staff utilized Verbal, A/V,  and Demonstration teaching methods. Members shared areas of learning and set a goal outside of program. Time spent encouraging use of WRAP, skill review, and exploring resources in the community.      Ericka Castillo actively shared. They engaged in learning related to wellness tools. Ericka Castillo identified taking a walk with her children as goal for the evening . Positive progress toward treatment objective. Continue group psychotherapy to actively practice learned skills and encourage transfer of knowledge to unstructured time.   Tx Plan Objective: 1.0, Therapist:  Arun DELGAOD Ed.  Visit Time:    Visit Start Time: 930  Visit Stop Time: 1030  Total Visit Duration: 45 minutes    Subjective:     Patient ID: Ericka Castillo is a 49 y.o. female    Specialized Services Documentation  Therapist must complete separate progress note for each specific clinical activity in which the individual participated during the day      [facility]    Group Psychotherapy      Subjective:        This group was facilitated in a private office.  During this session, they,  actively engaged in psychoeducational group about the cognitive triad that involves cognitive bias and limiting thoughts that are based on a negative perception of ones self.The skill helps to identify negative thoughts and cognitive biases. The outcome being that negative thoughts are challenged in the thought process and regulation of emotion. Group discovered strategies that help them to manage negative thoughts and rethink the negative thoughts when faced with a negative challenge wether the thought is perceived in the outside environment, or internally as negative self-talk. The group talked about understanding the purpose of challenging negative thoughts on a personal and social level and how it affects themselves and others..Teaching on the emphasis of self monitoring and self-care, the group focus is geared how to go for  help when the negative thoughts become overly intrusive. Group was encouraged to ask questions in an open forum at the end of session. Measurable  progress displayed through engagement in topic. Processing in the course of treatment  will continue to engage in psychotherapy to encourage positive self realization.     Ericka LORRIE was in case management for a portion of this session. actively participated in the group activity and group discussions  .Positive progress toward treatment objective  Tx Plan Objective 1.1, 1.2, 1.4 Therapist Arun ALFORD Ed

## 2025-06-11 ENCOUNTER — OFFICE VISIT (OUTPATIENT)
Dept: PSYCHOLOGY | Facility: CLINIC | Age: 50
End: 2025-06-11
Payer: COMMERCIAL

## 2025-06-11 DIAGNOSIS — F41.1 GAD (GENERALIZED ANXIETY DISORDER): ICD-10-CM

## 2025-06-11 DIAGNOSIS — Z59.86 DIFFICULTY WITH MONEY MANAGEMENT: ICD-10-CM

## 2025-06-11 DIAGNOSIS — F33.2 SEVERE EPISODE OF RECURRENT MAJOR DEPRESSIVE DISORDER, WITHOUT PSYCHOTIC FEATURES (HCC): Primary | ICD-10-CM

## 2025-06-11 PROCEDURE — G0177 OPPS/PHP; TRAIN & EDUC SERV: HCPCS

## 2025-06-11 PROCEDURE — G0410 GRP PSYCH PARTIAL HOSP 45-50: HCPCS

## 2025-06-11 PROCEDURE — G0176 OPPS/PHP;ACTIVITY THERAPY: HCPCS

## 2025-06-11 SDOH — ECONOMIC STABILITY - INCOME SECURITY: FINANCIAL INSECURITY: Z59.86

## 2025-06-11 NOTE — PSYCH
Subjective:     Patient ID: Ericka Castillo 49 y.o. Female    [facility] Clinical Progress Notes      Specialized Services Documentation  Therapist must complete separate progress note for each specific clinical activity in which the individual participated during the day.     Case Management Note    Ashley Costenbader MA LMT    Current suicide risk : Low     A case management session is not scheduled today with Ericka Castillo ; additionally, they did not request a CM meeting.  Ericka Castillo is aware of next scheduled 1:1.    Medications changes/added/denied? None at this time     Treatment session number: 3    Individual Case Management Visit provided today? No    [facility] follow up physician's orders: None at this time

## 2025-06-11 NOTE — GROUP NOTE
Visit Time:    Visit Start Time: 930  Visit Stop Time: 1030  Total Visit Duration: 60 minutes    Subjective:     Patient ID: Ericka Castillo is a 49 y.o. female    Specialized Services Documentation  Therapist must complete separate progress note for each specific clinical activity in which the individual participated during the day      [facility] Clinical Progress Note    Group Psychotherapy      Subjective:  This group was facilitated in a private office.  During this session, they,  actively engaged in psychoeducational group about employment stressors. The skill helps to create effective work relationships. The group discovered strategies that help them to manage work relationships on a short term and long term basis. The group talked about understanding the purpose, and meaning of work stressors  in intellectual and emotional expression, regulation , and recognition, and how it affects themselves and others.. Teaching on the emphasis of self monitoring , suggestive solutions, verbal and non-verbal communication,and keeping commitments, strategies were discusses to reduce work stressors.  Group was encouraged to ask questions in an open forum at the end of group. Measurable progress displayed through engagement in topic. Processing in the course of treatment will continue to engage in psychotherapy to encourage positive self realization.     Ericka ANDREW actively participated in the group discussion and role play activities  .Positivie progress toward treatment objective  Tx Plan Objective 1.1, 1.2, 1.4 Therapist Arun ALFORD Ed

## 2025-06-11 NOTE — GROUP NOTE
Visit Time    Visit Start Time: 1215  Visit Stop Time: 1315  Total Visit Duration: 60 minutes      Subjective:     Patient ID: Ericka Castillo is a 49 y.o. female.     [facility] Clinical Progress Notes      Specialized Services Documentation  Therapist must complete separate progress note for each specific clinical activity in which the individual participated during the day.      Group Psychotherapy - Roadblocks   Ericka Castillo participated with prompts in a psychotherapy group focused on roadblocks to wellness. Today group members focused on the roadblocks and solutions that can be inquired for the mental health wellness. Group members also were to complete their own road map that has their own roadblocks and then identify solutions to overcome their roadblocks. The goal of today was for group members to participate in groups discussion on roadblocks and solutions of their anxiety. This writer encouraged members to openly share and integrate coping skills into their daily routine. Ericka Castillo made fair efforts towards progress goals which were displayed through participation, notetaking, and engagement in topic.    Tx Plan Objective: 1.1,1.2,1.4 Therapist: Ashley Costenbader MA LMT

## 2025-06-11 NOTE — GROUP NOTE
Visit Start Time: 0830  Visit Stop Time: 0930  Total Visit Duration: 60 minutes    Subjective:     Patient ID: Ericka Castillo is a 49 y.o. female.    [facility] Clinical Progress Notes      Specialized Services Documentation  Therapist must complete separate progress note for each specific clinical activity in which the individual participated during the day.       EDUCATION THERAPY      Ericka Castillo actively shared in morning assessment and goal review. Presented as Receptive related to readiness to learn. Ericka Castillo  did complete goal from last treatment day identifying gaining hope, responsibility, and support. Ericka Castillo did not present with any barriers to learning. Throughout morning group, Ericka Castillo participated in mindfulness exercise. Ericka Castillo made some progress toward goal. Continue education group to assess willingness to engage, assess transfer of knowledge, and participate in skill development.    Tx Plan Objective: 1.1, 1.2, 1.3, 1.4 , Therapist: Laura HERNANDEZ RN

## 2025-06-11 NOTE — GROUP NOTE
Subjective:     Patient ID: Ericka Castillo is a 49 y.o. female.    [facility] Clinical Progress Notes      Specialized Services Documentation  Therapist must complete separate progress note for each specific clinical activity in which the individual participated during the day.     GROUP PSYCHOTHERAPY    Visit Time    Visit Start Time: 1330  Visit Stop Time: 1430  Total Visit Duration: 60 minutes      [id number]    Ericka Castillo participated actively in psychotherapy group.  This was observedConsistent throughout the treatment day. They were engaged in learning related to Illness, Medication, Aftercare, and Wellness Tools. Staff utilized Verbal, Written, A/V, and Demonstration teaching methods.  Ericka Castillo shared area of learning and set a goal for outside of program to go to a diner with her mom.  Ericka Castillo was able to share items explored during the day.  Ended session with staff led exercise mindfulness  Ericka Castillo demonstrated good progress toward goal.  Continue group psychotherapy to actively practice learned skills and encourage transfer of knowledge to unstructured time.    Tx Plan Objective: 1.1,1.2,1.3,1.4 Therapist: POWER Hess

## 2025-06-11 NOTE — GROUP NOTE
"Visit Time    Visit Start Time: 1045  Visit Stop Time: 1145  Total Visit Duration: 60 minutes    Subjective:     Patient ID: Ericka Castillo is a 49 y.o. female.    Specialized Services Documentation  Therapist must complete separate progress note for each specific clinical activity in which the individual participated during the day.     [facility] Clinical Progress Note    Group member participated in music therapy group on destructive vs. healthy behaviors. The group began with a jacquelin discussion on the song \"Hurt\" and the concept of self-destructive actions. The group then engaged in a discussion on self-destructive actions in their lives and where they hoped to grow. The group then read and discussed the handout Building New Habits and discussed how they could implement the concepts in to building healthy habits in their lives. The group then participated in a jacquelin replacement for the song \"Flowers\" identifying healthy habits they could use to support themselves in building healthy habits. Continue AT to encourage the formation of healthy habits.        Ericka ANDREW actively participated. She shared how she wants to be more active in interacting with her children bet feels she doesn't have the energy to get herself to engage. She engaged in discussion about identifying small actions she could take to move towards her goal. Some progress toward treatment objective.   Tx Plan Objective: 1.1,1.2,1.4, Therapist:  Bradly Chavez, PATO, MT-BC    "

## 2025-06-12 ENCOUNTER — HOSPITAL ENCOUNTER (INPATIENT)
Facility: HOSPITAL | Age: 50
LOS: 12 days | Discharge: HOME | DRG: 885 | End: 2025-06-24
Attending: PSYCHIATRY & NEUROLOGY | Admitting: PSYCHIATRY & NEUROLOGY
Payer: COMMERCIAL

## 2025-06-12 ENCOUNTER — OFFICE VISIT (OUTPATIENT)
Dept: PSYCHOLOGY | Facility: CLINIC | Age: 50
End: 2025-06-12
Payer: COMMERCIAL

## 2025-06-12 ENCOUNTER — TELEPHONE (OUTPATIENT)
Dept: PSYCHOLOGY | Facility: CLINIC | Age: 50
End: 2025-06-12

## 2025-06-12 ENCOUNTER — HOSPITAL ENCOUNTER (EMERGENCY)
Facility: HOSPITAL | Age: 50
End: 2025-06-12
Attending: EMERGENCY MEDICINE
Payer: COMMERCIAL

## 2025-06-12 VITALS
HEART RATE: 98 BPM | SYSTOLIC BLOOD PRESSURE: 106 MMHG | OXYGEN SATURATION: 100 % | TEMPERATURE: 99.1 F | RESPIRATION RATE: 18 BRPM | DIASTOLIC BLOOD PRESSURE: 79 MMHG

## 2025-06-12 DIAGNOSIS — F41.9 ANXIETY: Primary | ICD-10-CM

## 2025-06-12 DIAGNOSIS — R11.2 NAUSEA & VOMITING: ICD-10-CM

## 2025-06-12 DIAGNOSIS — E03.9 HYPOTHYROIDISM: ICD-10-CM

## 2025-06-12 DIAGNOSIS — F41.9 ANXIETY: ICD-10-CM

## 2025-06-12 DIAGNOSIS — R14.3 FLATUS: ICD-10-CM

## 2025-06-12 DIAGNOSIS — F33.2 SEVERE EPISODE OF RECURRENT MAJOR DEPRESSIVE DISORDER, WITHOUT PSYCHOTIC FEATURES (HCC): ICD-10-CM

## 2025-06-12 DIAGNOSIS — Z59.86 DIFFICULTY WITH MONEY MANAGEMENT: ICD-10-CM

## 2025-06-12 DIAGNOSIS — Z78.0 MENOPAUSE: ICD-10-CM

## 2025-06-12 DIAGNOSIS — R19.7 DIARRHEA: ICD-10-CM

## 2025-06-12 DIAGNOSIS — F11.20 CONTINUOUS OPIOID DEPENDENCE (HCC): Chronic | ICD-10-CM

## 2025-06-12 DIAGNOSIS — R10.13 DYSPEPSIA: ICD-10-CM

## 2025-06-12 DIAGNOSIS — F33.2 SEVERE EPISODE OF RECURRENT MAJOR DEPRESSIVE DISORDER, WITHOUT PSYCHOTIC FEATURES (HCC): Primary | ICD-10-CM

## 2025-06-12 DIAGNOSIS — K21.9 GERD (GASTROESOPHAGEAL REFLUX DISEASE): ICD-10-CM

## 2025-06-12 DIAGNOSIS — G89.4 CHRONIC PAIN SYNDROME: ICD-10-CM

## 2025-06-12 DIAGNOSIS — F41.1 GAD (GENERALIZED ANXIETY DISORDER): ICD-10-CM

## 2025-06-12 DIAGNOSIS — M62.838 MUSCLE SPASM: ICD-10-CM

## 2025-06-12 DIAGNOSIS — R10.9 ABDOMINAL CRAMPS: ICD-10-CM

## 2025-06-12 DIAGNOSIS — F43.10 PTSD (POST-TRAUMATIC STRESS DISORDER): ICD-10-CM

## 2025-06-12 DIAGNOSIS — F31.81 BIPOLAR 2 DISORDER (HCC): Primary | Chronic | ICD-10-CM

## 2025-06-12 LAB
AMPHETAMINES SERPL QL SCN: NEGATIVE
BARBITURATES UR QL: NEGATIVE
BENZODIAZ UR QL: POSITIVE
COCAINE UR QL: NEGATIVE
ETHANOL EXG-MCNC: 0 MG/DL
FENTANYL UR QL SCN: NEGATIVE
HYDROCODONE UR QL SCN: NEGATIVE
METHADONE UR QL: NEGATIVE
OPIATES UR QL SCN: NEGATIVE
OXYCODONE+OXYMORPHONE UR QL SCN: NEGATIVE
PCP UR QL: NEGATIVE
THC UR QL: NEGATIVE

## 2025-06-12 PROCEDURE — 99284 EMERGENCY DEPT VISIT MOD MDM: CPT | Performed by: EMERGENCY MEDICINE

## 2025-06-12 PROCEDURE — 80307 DRUG TEST PRSMV CHEM ANLYZR: CPT

## 2025-06-12 PROCEDURE — G0177 OPPS/PHP; TRAIN & EDUC SERV: HCPCS

## 2025-06-12 PROCEDURE — G0410 GRP PSYCH PARTIAL HOSP 45-50: HCPCS

## 2025-06-12 PROCEDURE — 99283 EMERGENCY DEPT VISIT LOW MDM: CPT

## 2025-06-12 PROCEDURE — 82075 ASSAY OF BREATH ETHANOL: CPT

## 2025-06-12 RX ORDER — HYDROXYZINE HYDROCHLORIDE 25 MG/1
25 TABLET, FILM COATED ORAL
Status: DISCONTINUED | OUTPATIENT
Start: 2025-06-12 | End: 2025-06-24 | Stop reason: HOSPADM

## 2025-06-12 RX ORDER — HYDROXYZINE HYDROCHLORIDE 50 MG/1
50 TABLET, FILM COATED ORAL
Status: CANCELLED | OUTPATIENT
Start: 2025-06-12

## 2025-06-12 RX ORDER — METHOCARBAMOL 500 MG/1
500 TABLET, FILM COATED ORAL ONCE
Status: COMPLETED | OUTPATIENT
Start: 2025-06-12 | End: 2025-06-12

## 2025-06-12 RX ORDER — BISACODYL 10 MG
10 SUPPOSITORY, RECTAL RECTAL DAILY PRN
Status: DISCONTINUED | OUTPATIENT
Start: 2025-06-12 | End: 2025-06-24 | Stop reason: HOSPADM

## 2025-06-12 RX ORDER — LORAZEPAM 1 MG/1
2 TABLET ORAL ONCE
Status: COMPLETED | OUTPATIENT
Start: 2025-06-12 | End: 2025-06-12

## 2025-06-12 RX ORDER — BISACODYL 10 MG
10 SUPPOSITORY, RECTAL RECTAL DAILY PRN
Status: CANCELLED | OUTPATIENT
Start: 2025-06-12

## 2025-06-12 RX ORDER — ESCITALOPRAM OXALATE 10 MG/1
20 TABLET ORAL DAILY
Status: DISCONTINUED | OUTPATIENT
Start: 2025-06-12 | End: 2025-06-12 | Stop reason: HOSPADM

## 2025-06-12 RX ORDER — HYDROXYZINE HYDROCHLORIDE 50 MG/1
50 TABLET, FILM COATED ORAL
Status: DISCONTINUED | OUTPATIENT
Start: 2025-06-12 | End: 2025-06-24 | Stop reason: HOSPADM

## 2025-06-12 RX ORDER — BENZTROPINE MESYLATE 1 MG/ML
1 INJECTION, SOLUTION INTRAMUSCULAR; INTRAVENOUS
Status: DISCONTINUED | OUTPATIENT
Start: 2025-06-12 | End: 2025-06-24 | Stop reason: HOSPADM

## 2025-06-12 RX ORDER — BENZTROPINE MESYLATE 1 MG/ML
0.5 INJECTION, SOLUTION INTRAMUSCULAR; INTRAVENOUS
Status: CANCELLED | OUTPATIENT
Start: 2025-06-12

## 2025-06-12 RX ORDER — LORAZEPAM 2 MG/ML
1 INJECTION INTRAMUSCULAR
Status: CANCELLED | OUTPATIENT
Start: 2025-06-12

## 2025-06-12 RX ORDER — IBUPROFEN 400 MG/1
400 TABLET, FILM COATED ORAL EVERY 4 HOURS PRN
Status: DISCONTINUED | OUTPATIENT
Start: 2025-06-12 | End: 2025-06-13

## 2025-06-12 RX ORDER — LORAZEPAM 2 MG/ML
2 INJECTION INTRAMUSCULAR ONCE
Status: DISCONTINUED | OUTPATIENT
Start: 2025-06-12 | End: 2025-06-12

## 2025-06-12 RX ORDER — IBUPROFEN 800 MG/1
800 TABLET, FILM COATED ORAL EVERY 8 HOURS PRN
Status: DISCONTINUED | OUTPATIENT
Start: 2025-06-12 | End: 2025-06-13

## 2025-06-12 RX ORDER — TRAZODONE HYDROCHLORIDE 50 MG/1
50 TABLET ORAL
Status: DISCONTINUED | OUTPATIENT
Start: 2025-06-12 | End: 2025-06-13

## 2025-06-12 RX ORDER — IBUPROFEN 400 MG/1
400 TABLET, FILM COATED ORAL EVERY 4 HOURS PRN
Status: CANCELLED | OUTPATIENT
Start: 2025-06-12

## 2025-06-12 RX ORDER — BUPROPION HYDROCHLORIDE 150 MG/1
300 TABLET ORAL DAILY
Status: DISCONTINUED | OUTPATIENT
Start: 2025-06-12 | End: 2025-06-12 | Stop reason: HOSPADM

## 2025-06-12 RX ORDER — BENZTROPINE MESYLATE 0.5 MG/1
1 TABLET ORAL
Status: CANCELLED | OUTPATIENT
Start: 2025-06-12

## 2025-06-12 RX ORDER — LORAZEPAM 1 MG/1
1 TABLET ORAL ONCE
Status: COMPLETED | OUTPATIENT
Start: 2025-06-12 | End: 2025-06-12

## 2025-06-12 RX ORDER — HALOPERIDOL 5 MG/1
2.5 TABLET ORAL
Status: CANCELLED | OUTPATIENT
Start: 2025-06-12

## 2025-06-12 RX ORDER — PROPRANOLOL HYDROCHLORIDE 10 MG/1
10 TABLET ORAL EVERY 8 HOURS PRN
Status: DISCONTINUED | OUTPATIENT
Start: 2025-06-12 | End: 2025-06-24 | Stop reason: HOSPADM

## 2025-06-12 RX ORDER — DIPHENHYDRAMINE HYDROCHLORIDE 50 MG/ML
50 INJECTION, SOLUTION INTRAMUSCULAR; INTRAVENOUS EVERY 6 HOURS PRN
Status: DISCONTINUED | OUTPATIENT
Start: 2025-06-12 | End: 2025-06-24 | Stop reason: HOSPADM

## 2025-06-12 RX ORDER — BENZTROPINE MESYLATE 1 MG/1
1 TABLET ORAL
Status: DISCONTINUED | OUTPATIENT
Start: 2025-06-12 | End: 2025-06-24 | Stop reason: HOSPADM

## 2025-06-12 RX ORDER — LORAZEPAM 2 MG/ML
2 INJECTION INTRAMUSCULAR
Status: CANCELLED | OUTPATIENT
Start: 2025-06-12

## 2025-06-12 RX ORDER — ARIPIPRAZOLE 5 MG/1
5 TABLET ORAL DAILY
Status: DISCONTINUED | OUTPATIENT
Start: 2025-06-12 | End: 2025-06-12 | Stop reason: HOSPADM

## 2025-06-12 RX ORDER — TRAZODONE HYDROCHLORIDE 50 MG/1
50 TABLET ORAL
Status: CANCELLED | OUTPATIENT
Start: 2025-06-12

## 2025-06-12 RX ORDER — AMOXICILLIN 250 MG
1 CAPSULE ORAL DAILY PRN
Status: CANCELLED | OUTPATIENT
Start: 2025-06-12

## 2025-06-12 RX ORDER — IBUPROFEN 600 MG/1
600 TABLET, FILM COATED ORAL EVERY 6 HOURS PRN
Status: DISCONTINUED | OUTPATIENT
Start: 2025-06-12 | End: 2025-06-13

## 2025-06-12 RX ORDER — HALOPERIDOL 5 MG/1
5 TABLET ORAL
Status: DISCONTINUED | OUTPATIENT
Start: 2025-06-12 | End: 2025-06-24 | Stop reason: HOSPADM

## 2025-06-12 RX ORDER — PROPRANOLOL HCL 20 MG
10 TABLET ORAL EVERY 8 HOURS PRN
Status: CANCELLED | OUTPATIENT
Start: 2025-06-12

## 2025-06-12 RX ORDER — HALOPERIDOL 0.5 MG/1
1 TABLET ORAL EVERY 6 HOURS PRN
Status: CANCELLED | OUTPATIENT
Start: 2025-06-12

## 2025-06-12 RX ORDER — HALOPERIDOL 5 MG/ML
5 INJECTION INTRAMUSCULAR
Status: CANCELLED | OUTPATIENT
Start: 2025-06-12

## 2025-06-12 RX ORDER — LEVOTHYROXINE SODIUM 25 UG/1
50 TABLET ORAL
Status: DISCONTINUED | OUTPATIENT
Start: 2025-06-13 | End: 2025-06-12 | Stop reason: HOSPADM

## 2025-06-12 RX ORDER — HYDROCODONE BITARTRATE AND ACETAMINOPHEN 5; 325 MG/1; MG/1
1 TABLET ORAL EVERY 12 HOURS PRN
Refills: 0 | Status: DISCONTINUED | OUTPATIENT
Start: 2025-06-12 | End: 2025-06-12 | Stop reason: HOSPADM

## 2025-06-12 RX ORDER — MAGNESIUM HYDROXIDE/ALUMINUM HYDROXICE/SIMETHICONE 120; 1200; 1200 MG/30ML; MG/30ML; MG/30ML
30 SUSPENSION ORAL EVERY 4 HOURS PRN
Status: CANCELLED | OUTPATIENT
Start: 2025-06-12

## 2025-06-12 RX ORDER — HALOPERIDOL 5 MG/ML
2.5 INJECTION INTRAMUSCULAR
Status: DISCONTINUED | OUTPATIENT
Start: 2025-06-12 | End: 2025-06-24 | Stop reason: HOSPADM

## 2025-06-12 RX ORDER — HYDROXYZINE HYDROCHLORIDE 50 MG/1
100 TABLET, FILM COATED ORAL
Status: CANCELLED | OUTPATIENT
Start: 2025-06-12

## 2025-06-12 RX ORDER — MAGNESIUM HYDROXIDE/ALUMINUM HYDROXICE/SIMETHICONE 120; 1200; 1200 MG/30ML; MG/30ML; MG/30ML
30 SUSPENSION ORAL EVERY 4 HOURS PRN
Status: DISCONTINUED | OUTPATIENT
Start: 2025-06-12 | End: 2025-06-24 | Stop reason: HOSPADM

## 2025-06-12 RX ORDER — BENZTROPINE MESYLATE 1 MG/ML
1 INJECTION, SOLUTION INTRAMUSCULAR; INTRAVENOUS
Status: CANCELLED | OUTPATIENT
Start: 2025-06-12

## 2025-06-12 RX ORDER — HALOPERIDOL 5 MG/ML
2.5 INJECTION INTRAMUSCULAR
Status: CANCELLED | OUTPATIENT
Start: 2025-06-12

## 2025-06-12 RX ORDER — HYDROXYZINE HYDROCHLORIDE 50 MG/1
100 TABLET, FILM COATED ORAL
Status: DISCONTINUED | OUTPATIENT
Start: 2025-06-12 | End: 2025-06-24 | Stop reason: HOSPADM

## 2025-06-12 RX ORDER — HYDROXYZINE HYDROCHLORIDE 25 MG/1
25 TABLET, FILM COATED ORAL
Status: CANCELLED | OUTPATIENT
Start: 2025-06-12

## 2025-06-12 RX ORDER — LORAZEPAM 2 MG/ML
2 INJECTION INTRAMUSCULAR EVERY 6 HOURS PRN
Status: CANCELLED | OUTPATIENT
Start: 2025-06-12

## 2025-06-12 RX ORDER — HALOPERIDOL 5 MG/1
5 TABLET ORAL
Status: CANCELLED | OUTPATIENT
Start: 2025-06-12

## 2025-06-12 RX ORDER — HALOPERIDOL 0.5 MG/1
1 TABLET ORAL EVERY 6 HOURS PRN
Status: DISCONTINUED | OUTPATIENT
Start: 2025-06-12 | End: 2025-06-24 | Stop reason: HOSPADM

## 2025-06-12 RX ORDER — HALOPERIDOL 5 MG/ML
5 INJECTION INTRAMUSCULAR
Status: DISCONTINUED | OUTPATIENT
Start: 2025-06-12 | End: 2025-06-24 | Stop reason: HOSPADM

## 2025-06-12 RX ORDER — LORAZEPAM 2 MG/ML
1 INJECTION INTRAMUSCULAR
Status: DISCONTINUED | OUTPATIENT
Start: 2025-06-12 | End: 2025-06-24 | Stop reason: HOSPADM

## 2025-06-12 RX ORDER — IBUPROFEN 400 MG/1
800 TABLET, FILM COATED ORAL EVERY 8 HOURS PRN
Status: CANCELLED | OUTPATIENT
Start: 2025-06-12

## 2025-06-12 RX ORDER — DIPHENHYDRAMINE HYDROCHLORIDE 50 MG/ML
50 INJECTION, SOLUTION INTRAMUSCULAR; INTRAVENOUS EVERY 6 HOURS PRN
Status: CANCELLED | OUTPATIENT
Start: 2025-06-12

## 2025-06-12 RX ORDER — GABAPENTIN 400 MG/1
800 CAPSULE ORAL 3 TIMES DAILY
Status: DISCONTINUED | OUTPATIENT
Start: 2025-06-12 | End: 2025-06-12 | Stop reason: HOSPADM

## 2025-06-12 RX ORDER — POLYETHYLENE GLYCOL 3350 17 G/17G
17 POWDER, FOR SOLUTION ORAL DAILY PRN
Status: CANCELLED | OUTPATIENT
Start: 2025-06-12

## 2025-06-12 RX ORDER — BENZTROPINE MESYLATE 1 MG/ML
0.5 INJECTION, SOLUTION INTRAMUSCULAR; INTRAVENOUS
Status: DISCONTINUED | OUTPATIENT
Start: 2025-06-12 | End: 2025-06-24 | Stop reason: HOSPADM

## 2025-06-12 RX ORDER — LORAZEPAM 2 MG/ML
2 INJECTION INTRAMUSCULAR
Status: DISCONTINUED | OUTPATIENT
Start: 2025-06-12 | End: 2025-06-24 | Stop reason: HOSPADM

## 2025-06-12 RX ORDER — IBUPROFEN 600 MG/1
600 TABLET, FILM COATED ORAL EVERY 6 HOURS PRN
Status: CANCELLED | OUTPATIENT
Start: 2025-06-12

## 2025-06-12 RX ORDER — POLYETHYLENE GLYCOL 3350 17 G/17G
17 POWDER, FOR SOLUTION ORAL DAILY PRN
Status: DISCONTINUED | OUTPATIENT
Start: 2025-06-12 | End: 2025-06-24 | Stop reason: HOSPADM

## 2025-06-12 RX ORDER — OXYCODONE HYDROCHLORIDE 5 MG/1
5 TABLET ORAL ONCE
Refills: 0 | Status: COMPLETED | OUTPATIENT
Start: 2025-06-12 | End: 2025-06-12

## 2025-06-12 RX ORDER — LAMOTRIGINE 100 MG/1
200 TABLET ORAL DAILY
Status: DISCONTINUED | OUTPATIENT
Start: 2025-06-12 | End: 2025-06-12 | Stop reason: HOSPADM

## 2025-06-12 RX ORDER — FAMOTIDINE 20 MG/1
20 TABLET, FILM COATED ORAL 2 TIMES DAILY
Status: DISCONTINUED | OUTPATIENT
Start: 2025-06-12 | End: 2025-06-12 | Stop reason: HOSPADM

## 2025-06-12 RX ORDER — LORAZEPAM 2 MG/ML
2 INJECTION INTRAMUSCULAR EVERY 6 HOURS PRN
Status: DISCONTINUED | OUTPATIENT
Start: 2025-06-12 | End: 2025-06-17

## 2025-06-12 RX ORDER — AMOXICILLIN 250 MG
1 CAPSULE ORAL DAILY PRN
Status: DISCONTINUED | OUTPATIENT
Start: 2025-06-12 | End: 2025-06-24 | Stop reason: HOSPADM

## 2025-06-12 RX ADMIN — GABAPENTIN 800 MG: 400 CAPSULE ORAL at 15:33

## 2025-06-12 RX ADMIN — LAMOTRIGINE 200 MG: 100 TABLET ORAL at 15:33

## 2025-06-12 RX ADMIN — HYDROCODONE BITARTRATE AND ACETAMINOPHEN 1 TABLET: 5; 325 TABLET ORAL at 17:55

## 2025-06-12 RX ADMIN — ARIPIPRAZOLE 5 MG: 5 TABLET ORAL at 15:33

## 2025-06-12 RX ADMIN — OXYCODONE 5 MG: 5 TABLET ORAL at 21:02

## 2025-06-12 RX ADMIN — GABAPENTIN 800 MG: 400 CAPSULE ORAL at 20:23

## 2025-06-12 RX ADMIN — LORAZEPAM 1 MG: 1 TABLET ORAL at 14:15

## 2025-06-12 RX ADMIN — ESCITALOPRAM OXALATE 20 MG: 10 TABLET ORAL at 15:33

## 2025-06-12 RX ADMIN — FAMOTIDINE 20 MG: 20 TABLET, FILM COATED ORAL at 17:28

## 2025-06-12 RX ADMIN — LORAZEPAM 2 MG: 1 TABLET ORAL at 16:35

## 2025-06-12 RX ADMIN — METHOCARBAMOL 500 MG: 500 TABLET ORAL at 17:26

## 2025-06-12 RX ADMIN — HYDROCODONE BITARTRATE AND ACETAMINOPHEN 1 TABLET: 5; 325 TABLET ORAL at 20:22

## 2025-06-12 SDOH — ECONOMIC STABILITY - INCOME SECURITY: FINANCIAL INSECURITY: Z59.86

## 2025-06-12 NOTE — ED NOTES
Crisis prepared hospital packet, copies obtained for the record, ALEJANDRO.    Intake updated about ETA.

## 2025-06-12 NOTE — TELEPHONE ENCOUNTER
This writer was requested by the , Nadia HUTCHINSON, to evaluate the patient due to clinical concerns suggesting the patient may benefit from an inpatient level of care. CM reported that the patient appeared increasingly overwhelmed and had not demonstrated improvement during her time in PHP.    The patient presented tearful throughout the discussion and appeared visibly distressed. She expressed feeling extremely overwhelmed and reported that, despite her participation in the program, she does not feel she is making progress.    When asked directly about thoughts of self-harm or suicide, the patient stated, “I just want things to stop.” She denied current suicidal ideation but acknowledged an extensive history of ruminative thoughts. She denied any history of suicide attempts and reported she has never received inpatient psychiatric treatment. The patient shared that she has been involved in psychiatric care since childhood, specifically for medication management.    Based on the patient's presentation and reported lack of progress in PHP, this writer discussed the potential benefit of an inpatient level of care for further medication evaluation and stabilization in a structured environment. The patient was receptive to this recommendation and expressed willingness to go to the emergency room for further assessment.     This writer explained the process of needing an emergency room evaluation to begin the process of being admitted to inpatient treatment. The patient verbalized understanding and agreed to present to the emergency department immediately after packing a bag, stating that she would be there by 2pm. It was explained that if she did not arrive by that time, we would attempt to contact her and should she be unreachable, [facility] would be notified to ensure her safety. She requested [facility] as her preferred location for treatment, although understands that beds are not  held.    Outreach was made to the on-site crisis worker at [facility], as well as the , making them aware that the patient will be arriving to the ER for an evaluation.     This writer will also contact [facility] Psychiatrist to inform.    Es Rivero, PATY  06/12/25  [id number]

## 2025-06-12 NOTE — GROUP NOTE
"Visit Time    Visit Start Time: 0830  Visit Stop Time: 0930  Total Visit Duration: 60 minutes    Subjective:     Patient ID: Ericka Castillo is a 49 y.o. female.    [facility] Clinical Progress Notes      Specialized Services Documentation  Therapist must complete separate progress note for each specific clinical activity in which the individual participated during the day.       EDUCATION THERAPY      Ericka Castillo actively shared in morning assessment and goal review. Presented as Receptive related to readiness to learn. Ericka Castillo  did complete goal from last treatment day identifying gaining advocacy. Ericka Castillo did not present with any barriers to learning. Throughout morning group, Ericka Castillo participated in mindfulness exercise. Ericka Castillo made some positive progress toward goal. Continue education group to assess willingness to engage, assess transfer of knowledge, and participate in skill development.    Tx Plan Objective: 1.1, 1.2, 1.3, 1.4 , Therapist: Laura HERNANDEZ RN              Visit Time    Visit Start Time: 0930  Visit Stop Time: 1030  Total Visit Duration: 60 minutes          Group Psychotherapy    Ericka Castillo actively  shared in psychoeducational group which focused on nutrition to improve physical and mental wellbeing. Topics included:  How our diet affects our mental health  Mental health benefits of healthy eating  Barriers to eating healthy  Ways to overcome barriers  Macro and Micro nutrients   Appropriate serving sizes for all food groups as well as benefits to eating for health       The group then viewed a DON talk video titled \"The Brain Intervention at the End of our Rock\" by Dr Arron Moise. They then discussed ideas on how to improve on their nutrition.  Some progress toward goal noted.  Continue psychoeducation to educate on the importance of making nutrition a priority.  Tx Plan Objectives: 1.1,1.2    Therapist: " Laura LACKEYN

## 2025-06-12 NOTE — LETTER
ID # [id number]    [facility] # [id number]    [place] Phone # [phone]    DISCHARGE SUMMARY    ATT: MAURA HIGUERA

## 2025-06-12 NOTE — PSYCH
"Subjective:     Patient ID: Ericka Castillo 49 y.o. Female    [facility] Clinical Progress Notes      Specialized Services Documentation  Therapist must complete separate progress note for each specific clinical activity in which the individual participated during the day.     Case Management    ([id number]) Spoke with Ericka Castillo for case management per her request. Ericka Castillo reported she does not feel like she is getting better since being in the program. Reported she is questioning if she needs a complete medication management adjustment. Ericka Castillo very distressed during session. Reported having thoughts that \" I just want things to stop\". Reported she has been thinking more her children would be fine if she would not be here anymore. Reported these thoughts have been more persistent. Ericka Castillo requesting more education on inpatient and what care could be provided for her. CM educated the level of care she would be provided at an inpatient level. CM brought  Es Rivero in cm session to speak with Ericka Castillo. After great discussion and presentation of Ericka Castillo symptoms it was determined that Ericka Castillo would need a higher level of care and inpatient would be needed. Ericka Castillo was agreeable to go to the ED. Dr. Jersey Miramontes was notify as well and agreed on this decision.  Ericka Castillo is aware of next scheduled 1:1 meeting.     Current suicide risk : Moderate     Medications changes/added/denied? None at this time     Treatment session number: 4     Individual Case Management Visit provided today? Yes      [facility] follow up physician's orders: Continue to follow orders.     Therapist: Ashley Costenbader MA LMT  Tx Plan Objective: 1.0     "

## 2025-06-12 NOTE — ED NOTES
"Pt is a 49 year old female presenting to the ED following a recommendation from the partial hospitalization program due to increased depression and SI without a plan. Pt is diagnosed with depression. Pt is calm and cooperative. Pt is alert and oriented x4. Patients daughter present in room during assessment. Pt lives with her mother, her three daughters, and her nephew. Pt also has a son that lives with his father.     Pt reports that this past Monday, 6/9, she started the PHP program in order to treat her depression and receive medication management support. Pt reports that over the past year she has become increasingly overwhelmed due to a divorce and financial issues. Pt states that the PHP was not intensive enough and she believes she requires more intensive care at this time. Pt denies SI however she states that she does occasionally have thoughts that she would be better off dead. Pt states \"I would never kill myself because I have my kids, but I have these thoughts that I am afraid will get worse\".     Pt reports that she has been on various different anti-depressants throughout her life. Pt states that her outpatient psychiatrist briefly took her off of anti-depressants and instead prescribed only mood stabilizers. Pt reported that after a couple of months she was put back on the anti-depressants. Pt reports that she believes she may need a combination of the two but she in unsure.     Pt denies HI. No psychosis or paranoia observed. Pt reports to having issues with sleep for which she requires a sleep aid.     Pt willing to sign a 201 at this time for further treatment   "
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
## 2025-06-12 NOTE — GROUP NOTE
Subjective:     Patient ID: Ericka Castillo is a 49 y.o. female.    [facility] Clinical Progress Notes      Specialized Services Documentation  Therapist must complete separate progress note for each specific clinical activity in which the individual participated during the day.     Visit Time    Visit Start Time: 1215  Visit Stop Time: 1315  Total Visit Duration: 0 minutes    Group Psychotherapy Life Skills ([id number]) Ericka Castillo was not present for group focused on cognitive distortions. Tx Plan Objective: 1.1,1.2 Therapist: SAGRARIO Hess ,LSW

## 2025-06-12 NOTE — ED NOTES
Patient is accepted at [facility].  Patient is accepted by Dr. Ibarra per Intake/Crow.     Transportation is arranged with [facility].     Transportation is scheduled for pending.   Patient may go to the floor at anytime after 2200.          Nurse report is to be called to [phone] prior to patient transfer.

## 2025-06-12 NOTE — ED NOTES
Pts daughter requests an update on 201 bed search. Pt in agreement.   Nadia Castillo- [phone]

## 2025-06-12 NOTE — LETTER
[facility] EMERGENCY DEPARTMENT  [address] [place] [address]  Dept: [phone]      EMTALA TRANSFER CONSENT    NAME Ericka Castillo                                         1975                              MRN [id number]    I have been informed of my rights regarding examination, treatment, and transfer   by Dr. Yobani Lanier DO    Benefits: Continuity of care    Risks: Potential for delay in receiving treatment      Consent for Transfer:  I acknowledge that my medical condition has been evaluated and explained to me by the emergency department physician or other qualified medical person and/or my attending physician, who has recommended that I be transferred to the service of  Accepting Physician:  at Accepting Facility Name, City & State : [facility], [place]. The above potential benefits of such transfer, the potential risks associated with such transfer, and the probable risks of not being transferred have been explained to me, and I fully understand them.  The doctor has explained that, in my case, the benefits of transfer outweigh the risks.  I agree to be transferred.    I authorize the performance of emergency medical procedures and treatments upon me in both transit and upon arrival at the receiving facility.  Additionally, I authorize the release of any and all medical records to the receiving facility and request they be transported with me, if possible.  I understand that the safest mode of transportation during a medical emergency is an ambulance and that the Hospital advocates the use of this mode of transport. Risks of traveling to the receiving facility by car, including absence of medical control, life sustaining equipment, such as oxygen, and medical personnel has been explained to me and I fully understand them.    (RUDDY CORRECT BOX BELOW)  [ X ]  I consent to the stated transfer and to be transported by ambulance/helicopter.  [  ]  I  consent to the stated transfer, but refuse transportation by ambulance and accept full responsibility for my transportation by car.  I understand the risks of non-ambulance transfers and I exonerate the Hospital and its staff from any deterioration in my condition that results from this refusal.    X___________________________________________    DATE  25  TIME________  Signature of patient or legally responsible individual signing on patient behalf           RELATIONSHIP TO PATIENT___SELF______________________                    Provider Certification    NAME Ericka Castillo                                         1975                              MRN [id number]    A medical screening exam was performed on the above named patient.  Based on the examination:    Condition Necessitating Transfer The encounter diagnosis was Anxiety.    Patient Condition: The patient has been stabilized such that within reasonable medical probability, no material deterioration of the patient condition or the condition of the unborn child(keyana) is likely to result from the transfer    Reason for Transfer: Level of Care needed not available at this facility    Transfer Requirements: [place], [place]   Space available and qualified personnel available for treatment as acknowledged by Beth Pritchard [phone]  Agreed to accept transfer and to provide appropriate medical treatment as acknowledged by         Appropriate medical records of the examination and treatment of the patient are provided at the time of transfer   STAFF INITIAL WHEN COMPLETED ____IK___  Transfer will be performed by qualified personnel from [facility]  and appropriate transfer equipment as required, including the use of necessary and appropriate life support measures.    Provider Certification: I have examined the patient and explained the following risks and benefits of being transferred/refusing transfer to the  patient/family:  The patient is stable for psychiatric transfer because they are medically stable, and is protected from harming him/herself or others during transport      Based on these reasonable risks and benefits to the patient and/or the unborn child(keyana), and based upon the information available at the time of the patient’s examination, I certify that the medical benefits reasonably to be expected from the provision of appropriate medical treatments at another medical facility outweigh the increasing risks, if any, to the individual’s medical condition, and in the case of labor to the unborn child, from effecting the transfer.    X____________________________________________ DATE 06/12/25        TIME_______      ORIGINAL - SEND TO MEDICAL RECORDS   COPY - SEND WITH PATIENT DURING TRANSFER

## 2025-06-12 NOTE — ED NOTES
The [facility] Transport you requested for Ericka HUTCHINSON in unit/room ED 27 on 06/12/2025 is scheduled to arrive at 11:00pm EDT! [facility].

## 2025-06-12 NOTE — GROUP NOTE
Visit Time    Visit Start Time: 1045  Visit Stop Time: 1145  Total Visit Duration: 60 minutes    Subjective:     Patient ID: Ericka Castillo is a 49 y.o. female.    Specialized Services Documentation  Therapist must complete separate progress note for each specific clinical activity in which the individual participated during the day.     [facility] Clinical Progress Note    Group members participated in music therapy group regarding healthy and unhealthy relationships. The group participated in a conversation about the health of the relationships in their life. The group then participated in a series of jacquelin discussions on songs demonstrating healthy and unhealthy relationships. The group read and discussed two handouts regarding relationship green flags and destructive/interfering relationships. Continue AT to encourage the development and maintenance of healthy relationships.       Ericka ANDREW actively participated. She shared minimally but appeared attentive throughout. Some progress toward treatment objective.   Tx Plan Objective: 1.1,1.2,1.4, Therapist:  PATO Gooden MT-BC    Visit Time    Visit Start Time: 1330  Visit Stop Time: 1430  Total Visit Duration: 0 minutes    Subjective:     Patient ID: Ericka Castillo is a 49 y.o. female.    Specialized Services Documentation  Therapist must complete separate progress note for each specific clinical activity in which the individual participated during the day.     [facility] Clinical Progress Note     GROUP PSYCHOTHERAPY    Was absent from wrap up due to leaving program early to go to the ED.  aware  Tx Plan Objective: 1.0, Therapist:  PATO Gooden MT-BC

## 2025-06-13 ENCOUNTER — APPOINTMENT (OUTPATIENT)
Dept: PSYCHOLOGY | Facility: CLINIC | Age: 50
End: 2025-06-13
Payer: COMMERCIAL

## 2025-06-13 ENCOUNTER — TELEPHONE (OUTPATIENT)
Age: 50
End: 2025-06-13

## 2025-06-13 ENCOUNTER — DOCUMENTATION (OUTPATIENT)
Dept: PSYCHOLOGY | Facility: CLINIC | Age: 50
End: 2025-06-13

## 2025-06-13 LAB
25(OH)D3 SERPL-MCNC: 56.2 NG/ML (ref 30–100)
ALBUMIN SERPL BCG-MCNC: 3.8 G/DL (ref 3.5–5)
ALP SERPL-CCNC: 47 U/L (ref 34–104)
ALT SERPL W P-5'-P-CCNC: 16 U/L (ref 7–52)
ANION GAP SERPL CALCULATED.3IONS-SCNC: 4 MMOL/L (ref 4–13)
AST SERPL W P-5'-P-CCNC: 13 U/L (ref 13–39)
BASOPHILS # BLD AUTO: 0.04 THOUSANDS/ÂΜL (ref 0–0.1)
BASOPHILS NFR BLD AUTO: 1 % (ref 0–1)
BILIRUB SERPL-MCNC: 0.24 MG/DL (ref 0.2–1)
BUN SERPL-MCNC: 16 MG/DL (ref 5–25)
CALCIUM SERPL-MCNC: 8.6 MG/DL (ref 8.4–10.2)
CHLORIDE SERPL-SCNC: 106 MMOL/L (ref 96–108)
CHOLEST SERPL-MCNC: 189 MG/DL (ref ?–200)
CO2 SERPL-SCNC: 31 MMOL/L (ref 21–32)
CREAT SERPL-MCNC: 1.13 MG/DL (ref 0.6–1.3)
EOSINOPHIL # BLD AUTO: 0.17 THOUSAND/ÂΜL (ref 0–0.61)
EOSINOPHIL NFR BLD AUTO: 3 % (ref 0–6)
ERYTHROCYTE [DISTWIDTH] IN BLOOD BY AUTOMATED COUNT: 12 % (ref 11.6–15.1)
FOLATE SERPL-MCNC: >22.3 NG/ML
GFR SERPL CREATININE-BSD FRML MDRD: 56 ML/MIN/1.73SQ M
GLUCOSE P FAST SERPL-MCNC: 87 MG/DL (ref 65–99)
GLUCOSE SERPL-MCNC: 87 MG/DL (ref 65–140)
HCG SERPL QL: NEGATIVE
HCT VFR BLD AUTO: 34.9 % (ref 34.8–46.1)
HDLC SERPL-MCNC: 55 MG/DL
HGB BLD-MCNC: 11.3 G/DL (ref 11.5–15.4)
IMM GRANULOCYTES # BLD AUTO: 0.01 THOUSAND/UL (ref 0–0.2)
IMM GRANULOCYTES NFR BLD AUTO: 0 % (ref 0–2)
LDLC SERPL CALC-MCNC: 104 MG/DL (ref 0–100)
LYMPHOCYTES # BLD AUTO: 1.9 THOUSANDS/ÂΜL (ref 0.6–4.47)
LYMPHOCYTES NFR BLD AUTO: 37 % (ref 14–44)
MCH RBC QN AUTO: 34 PG (ref 26.8–34.3)
MCHC RBC AUTO-ENTMCNC: 32.4 G/DL (ref 31.4–37.4)
MCV RBC AUTO: 105 FL (ref 82–98)
MONOCYTES # BLD AUTO: 0.39 THOUSAND/ÂΜL (ref 0.17–1.22)
MONOCYTES NFR BLD AUTO: 8 % (ref 4–12)
NEUTROPHILS # BLD AUTO: 2.68 THOUSANDS/ÂΜL (ref 1.85–7.62)
NEUTS SEG NFR BLD AUTO: 51 % (ref 43–75)
NONHDLC SERPL-MCNC: 134 MG/DL
NRBC BLD AUTO-RTO: 0 /100 WBCS
PLATELET # BLD AUTO: 222 THOUSANDS/UL (ref 149–390)
PMV BLD AUTO: 10.4 FL (ref 8.9–12.7)
POTASSIUM SERPL-SCNC: 4 MMOL/L (ref 3.5–5.3)
PROT SERPL-MCNC: 5.5 G/DL (ref 6.4–8.4)
RBC # BLD AUTO: 3.32 MILLION/UL (ref 3.81–5.12)
SODIUM SERPL-SCNC: 141 MMOL/L (ref 135–147)
TREPONEMA PALLIDUM IGG+IGM AB [PRESENCE] IN SERUM OR PLASMA BY IMMUNOASSAY: NORMAL
TRIGL SERPL-MCNC: 152 MG/DL (ref ?–150)
TSH SERPL DL<=0.05 MIU/L-ACNC: 1.77 UIU/ML (ref 0.45–4.5)
VIT B12 SERPL-MCNC: 364 PG/ML (ref 180–914)
WBC # BLD AUTO: 5.19 THOUSAND/UL (ref 4.31–10.16)

## 2025-06-13 PROCEDURE — 84443 ASSAY THYROID STIM HORMONE: CPT | Performed by: PSYCHIATRY & NEUROLOGY

## 2025-06-13 PROCEDURE — 82746 ASSAY OF FOLIC ACID SERUM: CPT | Performed by: PSYCHIATRY & NEUROLOGY

## 2025-06-13 PROCEDURE — 99223 1ST HOSP IP/OBS HIGH 75: CPT | Performed by: PSYCHIATRY & NEUROLOGY

## 2025-06-13 PROCEDURE — 86780 TREPONEMA PALLIDUM: CPT | Performed by: PSYCHIATRY & NEUROLOGY

## 2025-06-13 PROCEDURE — GZHZZZZ GROUP PSYCHOTHERAPY: ICD-10-PCS | Performed by: PSYCHIATRY & NEUROLOGY

## 2025-06-13 PROCEDURE — 80053 COMPREHEN METABOLIC PANEL: CPT | Performed by: PSYCHIATRY & NEUROLOGY

## 2025-06-13 PROCEDURE — 82607 VITAMIN B-12: CPT | Performed by: PSYCHIATRY & NEUROLOGY

## 2025-06-13 PROCEDURE — 84703 CHORIONIC GONADOTROPIN ASSAY: CPT | Performed by: PSYCHIATRY & NEUROLOGY

## 2025-06-13 PROCEDURE — 85025 COMPLETE CBC W/AUTO DIFF WBC: CPT | Performed by: PSYCHIATRY & NEUROLOGY

## 2025-06-13 PROCEDURE — 82306 VITAMIN D 25 HYDROXY: CPT | Performed by: PSYCHIATRY & NEUROLOGY

## 2025-06-13 PROCEDURE — 80061 LIPID PANEL: CPT | Performed by: PSYCHIATRY & NEUROLOGY

## 2025-06-13 RX ORDER — LEVOTHYROXINE SODIUM 50 UG/1
50 TABLET ORAL
Status: DISCONTINUED | OUTPATIENT
Start: 2025-06-13 | End: 2025-06-24 | Stop reason: HOSPADM

## 2025-06-13 RX ORDER — FAMOTIDINE 20 MG/1
20 TABLET, FILM COATED ORAL 2 TIMES DAILY
Status: DISCONTINUED | OUTPATIENT
Start: 2025-06-13 | End: 2025-06-24 | Stop reason: HOSPADM

## 2025-06-13 RX ORDER — HYOSCYAMINE SULFATE 0.38 MG/1
0.38 TABLET, EXTENDED RELEASE ORAL 2 TIMES DAILY PRN
Status: DISCONTINUED | OUTPATIENT
Start: 2025-06-13 | End: 2025-06-13

## 2025-06-13 RX ORDER — SACCHAROMYCES BOULARDII 250 MG
250 CAPSULE ORAL 2 TIMES DAILY
Status: DISCONTINUED | OUTPATIENT
Start: 2025-06-13 | End: 2025-06-24 | Stop reason: HOSPADM

## 2025-06-13 RX ORDER — ONDANSETRON 4 MG/1
4 TABLET, ORALLY DISINTEGRATING ORAL EVERY 6 HOURS PRN
Status: DISCONTINUED | OUTPATIENT
Start: 2025-06-13 | End: 2025-06-24 | Stop reason: HOSPADM

## 2025-06-13 RX ORDER — HYDROXYZINE HYDROCHLORIDE 25 MG/1
25 TABLET, FILM COATED ORAL 3 TIMES DAILY
Status: DISCONTINUED | OUTPATIENT
Start: 2025-06-13 | End: 2025-06-17

## 2025-06-13 RX ORDER — HYDROCODONE BITARTRATE AND ACETAMINOPHEN 5; 325 MG/1; MG/1
1 TABLET ORAL EVERY 12 HOURS PRN
Refills: 0 | Status: DISCONTINUED | OUTPATIENT
Start: 2025-06-13 | End: 2025-06-13

## 2025-06-13 RX ORDER — LOPERAMIDE HYDROCHLORIDE 2 MG/1
2 CAPSULE ORAL 3 TIMES DAILY PRN
Status: DISCONTINUED | OUTPATIENT
Start: 2025-06-13 | End: 2025-06-24 | Stop reason: HOSPADM

## 2025-06-13 RX ORDER — HYOSCYAMINE SULFATE 0.12 MG/1
0.12 TABLET SUBLINGUAL EVERY 4 HOURS PRN
Status: DISCONTINUED | OUTPATIENT
Start: 2025-06-13 | End: 2025-06-24 | Stop reason: HOSPADM

## 2025-06-13 RX ORDER — LISINOPRIL 5 MG/1
5 TABLET ORAL DAILY
Status: DISCONTINUED | OUTPATIENT
Start: 2025-06-13 | End: 2025-06-14

## 2025-06-13 RX ORDER — BUPROPION HYDROCHLORIDE 150 MG/1
300 TABLET ORAL DAILY
Status: DISCONTINUED | OUTPATIENT
Start: 2025-06-13 | End: 2025-06-24 | Stop reason: HOSPADM

## 2025-06-13 RX ORDER — ACETAMINOPHEN 325 MG/1
650 TABLET ORAL EVERY 4 HOURS PRN
Status: DISCONTINUED | OUTPATIENT
Start: 2025-06-13 | End: 2025-06-13

## 2025-06-13 RX ORDER — CYCLOBENZAPRINE HCL 10 MG
5 TABLET ORAL 3 TIMES DAILY PRN
Status: DISCONTINUED | OUTPATIENT
Start: 2025-06-21 | End: 2025-06-13

## 2025-06-13 RX ORDER — CLONAZEPAM 0.5 MG/1
0.5 TABLET ORAL 3 TIMES DAILY
Status: DISCONTINUED | OUTPATIENT
Start: 2025-06-13 | End: 2025-06-14

## 2025-06-13 RX ORDER — CYCLOBENZAPRINE HCL 10 MG
10 TABLET ORAL 3 TIMES DAILY PRN
Status: DISCONTINUED | OUTPATIENT
Start: 2025-06-21 | End: 2025-06-13

## 2025-06-13 RX ORDER — ACETAMINOPHEN 325 MG/1
650 TABLET ORAL EVERY 6 HOURS PRN
Status: DISCONTINUED | OUTPATIENT
Start: 2025-06-13 | End: 2025-06-13

## 2025-06-13 RX ORDER — GABAPENTIN 400 MG/1
800 CAPSULE ORAL 3 TIMES DAILY
Status: DISCONTINUED | OUTPATIENT
Start: 2025-06-13 | End: 2025-06-24 | Stop reason: HOSPADM

## 2025-06-13 RX ORDER — LIDOCAINE 40 MG/G
CREAM TOPICAL 4 TIMES DAILY PRN
Status: DISCONTINUED | OUTPATIENT
Start: 2025-06-13 | End: 2025-06-24 | Stop reason: HOSPADM

## 2025-06-13 RX ORDER — TRAZODONE HYDROCHLORIDE 50 MG/1
50 TABLET ORAL
Status: DISCONTINUED | OUTPATIENT
Start: 2025-06-13 | End: 2025-06-17

## 2025-06-13 RX ORDER — HYDROCODONE BITARTRATE AND ACETAMINOPHEN 5; 325 MG/1; MG/1
1 TABLET ORAL 2 TIMES DAILY PRN
Status: DISCONTINUED | OUTPATIENT
Start: 2025-06-13 | End: 2025-06-24 | Stop reason: HOSPADM

## 2025-06-13 RX ORDER — PANTOPRAZOLE SODIUM 40 MG/1
40 TABLET, DELAYED RELEASE ORAL
Status: DISCONTINUED | OUTPATIENT
Start: 2025-06-13 | End: 2025-06-24 | Stop reason: HOSPADM

## 2025-06-13 RX ORDER — CYCLOBENZAPRINE HCL 10 MG
10 TABLET ORAL 3 TIMES DAILY PRN
Status: DISCONTINUED | OUTPATIENT
Start: 2025-06-13 | End: 2025-06-15

## 2025-06-13 RX ORDER — ZOLPIDEM TARTRATE 5 MG/1
10 TABLET ORAL
Status: DISCONTINUED | OUTPATIENT
Start: 2025-06-13 | End: 2025-06-24 | Stop reason: HOSPADM

## 2025-06-13 RX ORDER — CELECOXIB 100 MG/1
100 CAPSULE ORAL 2 TIMES DAILY PRN
Status: DISCONTINUED | OUTPATIENT
Start: 2025-06-13 | End: 2025-06-13

## 2025-06-13 RX ORDER — CLOTRIMAZOLE 1 %
CREAM (GRAM) TOPICAL 2 TIMES DAILY
Status: DISCONTINUED | OUTPATIENT
Start: 2025-06-13 | End: 2025-06-13

## 2025-06-13 RX ORDER — ESTRADIOL 0.06 MG/D
1 PATCH TRANSDERMAL WEEKLY
Status: DISCONTINUED | OUTPATIENT
Start: 2025-06-14 | End: 2025-06-24 | Stop reason: HOSPADM

## 2025-06-13 RX ORDER — ACETAMINOPHEN 325 MG/1
975 TABLET ORAL EVERY 6 HOURS PRN
Status: DISCONTINUED | OUTPATIENT
Start: 2025-06-13 | End: 2025-06-24 | Stop reason: HOSPADM

## 2025-06-13 RX ORDER — LURASIDONE HYDROCHLORIDE 20 MG/1
20 TABLET, FILM COATED ORAL
Status: DISCONTINUED | OUTPATIENT
Start: 2025-06-13 | End: 2025-06-17

## 2025-06-13 RX ADMIN — HYDROXYZINE HYDROCHLORIDE 25 MG: 25 TABLET, FILM COATED ORAL at 13:57

## 2025-06-13 RX ADMIN — ACETAMINOPHEN 975 MG: 325 TABLET ORAL at 19:35

## 2025-06-13 RX ADMIN — CLONAZEPAM 0.5 MG: 0.5 TABLET ORAL at 21:29

## 2025-06-13 RX ADMIN — Medication 250 MG: at 17:16

## 2025-06-13 RX ADMIN — PANTOPRAZOLE SODIUM 40 MG: 40 TABLET, DELAYED RELEASE ORAL at 15:52

## 2025-06-13 RX ADMIN — HYDROXYZINE HYDROCHLORIDE 100 MG: 50 TABLET ORAL at 09:45

## 2025-06-13 RX ADMIN — FAMOTIDINE 20 MG: 20 TABLET, FILM COATED ORAL at 17:16

## 2025-06-13 RX ADMIN — CYCLOBENZAPRINE HYDROCHLORIDE 10 MG: 10 TABLET, FILM COATED ORAL at 17:16

## 2025-06-13 RX ADMIN — LURASIDONE HYDROCHLORIDE 20 MG: 20 TABLET, FILM COATED ORAL at 15:52

## 2025-06-13 RX ADMIN — HYDROCODONE BITARTRATE AND ACETAMINOPHEN 1 TABLET: 5; 325 TABLET ORAL at 21:32

## 2025-06-13 RX ADMIN — Medication 3 MG: at 00:03

## 2025-06-13 RX ADMIN — TRAZODONE HYDROCHLORIDE 50 MG: 50 TABLET ORAL at 00:04

## 2025-06-13 RX ADMIN — ZOLPIDEM TARTRATE 10 MG: 5 TABLET, FILM COATED ORAL at 21:28

## 2025-06-13 RX ADMIN — HYDROXYZINE HYDROCHLORIDE 50 MG: 50 TABLET ORAL at 00:03

## 2025-06-13 RX ADMIN — LIDOCAINE: 40 CREAM TOPICAL at 19:38

## 2025-06-13 RX ADMIN — LEVOTHYROXINE SODIUM 50 MCG: 0.05 TABLET ORAL at 09:45

## 2025-06-13 RX ADMIN — GABAPENTIN 800 MG: 400 CAPSULE ORAL at 21:29

## 2025-06-13 RX ADMIN — HYDROCODONE BITARTRATE AND ACETAMINOPHEN 1 TABLET: 5; 325 TABLET ORAL at 15:52

## 2025-06-13 RX ADMIN — LISINOPRIL 5 MG: 5 TABLET ORAL at 09:45

## 2025-06-13 RX ADMIN — CLONAZEPAM 0.5 MG: 0.5 TABLET ORAL at 13:58

## 2025-06-13 RX ADMIN — Medication 3 MG: at 21:28

## 2025-06-13 RX ADMIN — HYDROXYZINE HYDROCHLORIDE 25 MG: 25 TABLET, FILM COATED ORAL at 21:28

## 2025-06-13 RX ADMIN — HYDROCODONE BITARTRATE AND ACETAMINOPHEN 1 TABLET: 5; 325 TABLET ORAL at 09:43

## 2025-06-13 RX ADMIN — ESCITALOPRAM OXALATE 15 MG: 5 TABLET, FILM COATED ORAL at 13:57

## 2025-06-13 RX ADMIN — GABAPENTIN 800 MG: 400 CAPSULE ORAL at 09:45

## 2025-06-13 RX ADMIN — Medication 250 MG: at 09:45

## 2025-06-13 RX ADMIN — BUPROPION HYDROCHLORIDE 300 MG: 150 TABLET, EXTENDED RELEASE ORAL at 13:58

## 2025-06-13 RX ADMIN — GABAPENTIN 800 MG: 400 CAPSULE ORAL at 15:52

## 2025-06-13 RX ADMIN — LAMOTRIGINE 250 MG: 100 TABLET ORAL at 13:58

## 2025-06-13 RX ADMIN — CYCLOBENZAPRINE HYDROCHLORIDE 10 MG: 10 TABLET, FILM COATED ORAL at 21:31

## 2025-06-13 RX ADMIN — FAMOTIDINE 20 MG: 20 TABLET, FILM COATED ORAL at 09:44

## 2025-06-13 NOTE — PLAN OF CARE
Problem: Ineffective Coping  Goal: Participates in unit activities  Description: Interventions:  - Provide therapeutic environment   - Provide required programming   - Redirect inappropriate behaviors   Outcome: Progressing     Problem: Risk for Self Injury/Neglect  Goal: Attend and participate in unit activities, including therapeutic, recreational, and educational groups  Description: Interventions:  - Provide therapeutic and educational activities daily, encourage attendance and participation, and document same in the medical record  - Obtain collateral information, encourage visitation and family involvement in care   Outcome: Progressing     Problem: Depression  Goal: Attend and participate in unit activities, including therapeutic, recreational, and educational groups  Description: Interventions:  - Provide therapeutic and educational activities daily, encourage attendance and participation, and document same in the medical record   Outcome: Progressing   New patient will be encouraged to attend groups

## 2025-06-13 NOTE — CASE MANAGEMENT
"JORGE LUIS placed call to patient's mother, Yamilka: [phone] for family notification. Rea's mother is supportive of patient's treatment and in agreement with the treatment plan as discussed. She stated that she has no major concerns other than feeling the patient \"doesn't give her medication a long enough time to work\"; calls her provider and is prescribed an alternate medication. \"I think she is too dependent on her pills.\" \"She's always tired and says she can't manage anything.\" The mother stated that she has no safety concerns re: the patient's return home; \"she only talks about not wanting to be here, but hasn't ever done anything.\"   The mother had no additional questions or concerns. JORGE LUIS provided relevant phone numbers for patient/ staff contact. The mother or a family member will provide d/c transportation. The call ended mutually.    JORGE LUIS placed call to patient's PCP: Dr. Valdez: [phone] for admission notification. Office staff will schedule a EMIR appt at the time of D/C.    JORGE LUIS placed call to patient's Psychiatric Medication Management provider, Dr. Guardado's office: [phone] for admission notification. Spoke with office staff who reported patient's next appt. Is scheduled for Thursday, July 10, 2025 at 1:45 PM. Appt will be kept unless rescheduling required due to continued stay or alternate aftercare planning.     JORGE LUIS placed call to the office of patient's OP therapist, Sania: [phone] for admission notification. Spoke with  who confirmed practice affiliation. She stated that the patient has no currently scheduled f/u appt and will schedule a EMIR appt. As recommended at the time of d/c or post PHP completion.     "

## 2025-06-13 NOTE — PROGRESS NOTES
06/13/25 0900   Team Meeting   Meeting Type Daily Rounds   Team Members Present   Team Members Present Nurse;Physician;;Occupational Therapist   Physician Team Member MD Pete; FIONA Wheeler   Nursing Team Member MARY Oliveros   Care Management Team Member MS Ilya   OT Team Member SUZIE Capps   Patient/Family Present   Patient Present No   Patient's Family Present No   201. New admit. Focused on meds. Attending PHP prior to being admitted. Chronic opiate use  Denied SI/HI. Med compliant. Eating/sleeping well. Attending groups. D/C not known due to med adjustment.

## 2025-06-13 NOTE — PROGRESS NOTES
06/13/25 1300   Team Meeting   Meeting Type Tx Team Meeting   Initial Conference Date 06/13/25   Next Conference Date 07/13/25   Team Members Present   Team Members Present Physician;Nurse;   Physician Team Member Dr. Freeman   Nursing Team Member Sabina Oliveros RN   Care Management Team Member Gemma Stafford RN   Patient/Family Present   Patient Present Yes   Patient's Family Present No     Initial Plan  Treatment Team met and reviewed patient strengths, limitations, coping skills, Treatment Plan and Goals; patient reported understanding and agreement and signed the Treatment Plan document. A copy was placed on the chart.

## 2025-06-13 NOTE — PROGRESS NOTES
Pt admitted to the unit with the following belongings:      Remains with pt:  Contact case x 1  Contact solution x 1  Glasses w/case x1  Shoes x 2 pair  Sweatshirt x 1  Socks x 5 pair  Shirt x 3  Zip up jacket x 1      Pt storage:  Pink bag x 1  Black bag x 1  Bag w/hygiene products   Shoes x 1 pair  Shorts x 4   Headphones x 1  Bra w/wires x 2  Shirt x 5  Wallet x 1  Pj set x 5  Pants w/strings x 3      Contraband:  Phone x 1  Charging cord x 2      Hospital safe - bag #:  N/a      Medications sent to pharmacy - bag #: [id number]

## 2025-06-13 NOTE — NURSING NOTE
Patient arrived to unit from [facility] ED on a 201. UDS: + Benzo. Prescribed Xanax. Patient was sent to ED from partial program. Patient wants medication adjusted been on prescribed medications since teenage years. Believes medications are not working. C/o Increased anxiety and depression. Triggers: divorce, financial and son's legal issues.  Medical Hx:  Hypothyroidism, HTN, arthritis, chronic back pain, chronic opioid use etc. Psych hx: Bipolar 2 d/o, anxiety and depression. Patient was pleasant and cooperative during the admission process.  Patient was anxious and shaking. Appears flat and depressed. Endorses severe anxiety and minimal depression.  Patient states feels nervous and can't control self.  Denies any SI/HI or AV/H. Patient was offered showered. Declined til the morning. Offered snack and drink. Given a cup of water. Skin assessment completed by two nurses. Skin was unremarkable. Notified ROCÍO Parekh and ROCÍO Ibarra of new admission, PTA medication list completed,  C-SSRS Life: low Present: Low  @ 0230.

## 2025-06-13 NOTE — NURSING NOTE
Patient was administered Atarax 50 mg PO for anxiety and Trazodone 50 mg PO @ 0004. Hope score: 19. Patient was very anxious and even shaking during admission.  States very anxious and worried that she will not receive the correct medications. Patient stated that she takes Ambien every night before bed. Reassured patient that everything will work out. Patient was ok with trying the Trazodone with the atarax for help to relax and sleep. Both medication were effective. Patient has been sleeping.
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
## 2025-06-13 NOTE — TELEPHONE ENCOUNTER
Gemma called from intake at [facility] to let us know pt is currently admitted to [facility] unit. They will call when pt is being dc'd for EMIR.    Statement Selected
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
## 2025-06-13 NOTE — ED NOTES
Insurance Authorization for admission:   Phone call placed to [employer].  Phone number: [phone].     Spoke to Jody.     6 days approved.  Level of care: 201.  Review on 6/17/2025.   Authorization # [id number].      Concurrent review with Mike CORNEJO  [phone]

## 2025-06-13 NOTE — PROGRESS NOTES
06/13/25 0900   Hope Anxiety Scale   Anxious Mood 3   Tension 3   Fears 3   Insomnia 3   Intellectual 2   Depressed Mood 2   Somatic Complaints: Muscular 2   Somatic Complaints: Sensory 0   Cardiovascular Symptoms 0   Respiratory Symptoms 0   Gastrointestinal Symptoms 0   Genitourinary Symptoms 0   Autonomic Symptoms 3   Behavior at Interview 4   Hope Anxiety Score 25     Patient endorsed severe anxiety related to not having her medications ordered, not having her meal ordered, not knowing the group schedule, waiting for clothing, and being on the unit in general. She states that she is always anxious and cannot identify and exact cause. Patient's respirations were elevated and she appeared to be in distress. Reassurance provided as well as Atarax 100mg PRN. Patient looking for Xanax in addition to Atarax. This writer educated her on speaking with a provider before that is ordered.

## 2025-06-13 NOTE — PROGRESS NOTES
Subjective:     Patient ID: Ericka Castillo 49 y.o. Female    [facility] Clinical Progress Notes      Specialized Services Documentation  Therapist must complete separate progress note for each specific clinical activity in which the individual participated during the day.     Case Management    Ericka Castillo discharged due to needing higher level of care. Unable to complete safety plan, PhQ9 and  GAD7.     Current suicide risk : Moderate     Medications changes/added/denied? No     Treatment session number: 4     Individual Case Management Visit provided today? No     [facility] follow up physician's orders: Proceed with discharge per Dr. Jersey Miramontes to an higher level of care.
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
## 2025-06-13 NOTE — PLAN OF CARE
Problem: DISCHARGE PLANNING - CARE MANAGEMENT  Goal: Discharge to post-acute care or home with appropriate resources  Description: INTERVENTIONS:  - Conduct assessment to determine patient/family and health care team treatment goals, and need for post-acute services based on payer coverage, community resources, and patient preferences, and barriers to discharge  - Address psychosocial, clinical, and financial barriers to discharge as identified in assessment in conjunction with the patient/family and health care team  - Arrange appropriate level of post-acute services according to patient’s   needs and preference and payer coverage in collaboration with the physician and health care team  - Communicate with and update the patient/family, physician, and health care team regarding progress on the discharge plan  - Arrange appropriate transportation to post-acute venues  Outcome: Progressing   New goal initiated 201.

## 2025-06-13 NOTE — PROGRESS NOTES
Met with Ericka completed her admission self assessment. She has many stressors including divorce, 2 children on disability, financial issues and her son's legal issues. She does have some interests but her focus in on taking care of her family. She would like to learn assertiveness, self esteem, coping skills, having her family understand her struggles, relationships, relaxation, healthy lifestyle and community supports. She appears overwhelmed with everything going on. She is concerned about med changes since she has been on medications since she was a teenager.

## 2025-06-13 NOTE — NURSING NOTE
Pt visible on unit. Social with select peers. Appears visibly anxious. Nervous. Focused on PTA medications and currently prescribed medications. Reviewed all medication changes and current medication schedule including times and doses. Pt verbalized understanding of education provided. Positive outlook on treatment. Safety precautions maintained. Will continue to monitor and assess .

## 2025-06-14 PROCEDURE — 99232 SBSQ HOSP IP/OBS MODERATE 35: CPT

## 2025-06-14 RX ORDER — CLONAZEPAM 1 MG/1
1 TABLET ORAL 2 TIMES DAILY
Status: DISCONTINUED | OUTPATIENT
Start: 2025-06-14 | End: 2025-06-24 | Stop reason: HOSPADM

## 2025-06-14 RX ORDER — CLONAZEPAM 0.5 MG/1
0.5 TABLET ORAL ONCE
Status: COMPLETED | OUTPATIENT
Start: 2025-06-14 | End: 2025-06-14

## 2025-06-14 RX ORDER — ESCITALOPRAM OXALATE 10 MG/1
20 TABLET ORAL DAILY
Status: DISCONTINUED | OUTPATIENT
Start: 2025-06-15 | End: 2025-06-17

## 2025-06-14 RX ADMIN — HYDROXYZINE HYDROCHLORIDE 25 MG: 25 TABLET, FILM COATED ORAL at 08:37

## 2025-06-14 RX ADMIN — Medication 250 MG: at 17:24

## 2025-06-14 RX ADMIN — PANTOPRAZOLE SODIUM 40 MG: 40 TABLET, DELAYED RELEASE ORAL at 15:08

## 2025-06-14 RX ADMIN — LEVOTHYROXINE SODIUM 50 MCG: 0.05 TABLET ORAL at 05:57

## 2025-06-14 RX ADMIN — GABAPENTIN 800 MG: 400 CAPSULE ORAL at 15:08

## 2025-06-14 RX ADMIN — CYCLOBENZAPRINE HYDROCHLORIDE 10 MG: 10 TABLET, FILM COATED ORAL at 13:51

## 2025-06-14 RX ADMIN — PANTOPRAZOLE SODIUM 40 MG: 40 TABLET, DELAYED RELEASE ORAL at 08:40

## 2025-06-14 RX ADMIN — CYCLOBENZAPRINE HYDROCHLORIDE 10 MG: 10 TABLET, FILM COATED ORAL at 08:44

## 2025-06-14 RX ADMIN — ESCITALOPRAM OXALATE 15 MG: 5 TABLET, FILM COATED ORAL at 08:36

## 2025-06-14 RX ADMIN — FAMOTIDINE 20 MG: 20 TABLET, FILM COATED ORAL at 17:24

## 2025-06-14 RX ADMIN — GABAPENTIN 800 MG: 400 CAPSULE ORAL at 19:41

## 2025-06-14 RX ADMIN — CLONAZEPAM 1 MG: 1 TABLET ORAL at 20:31

## 2025-06-14 RX ADMIN — LAMOTRIGINE 250 MG: 100 TABLET ORAL at 08:36

## 2025-06-14 RX ADMIN — LURASIDONE HYDROCHLORIDE 20 MG: 20 TABLET, FILM COATED ORAL at 17:24

## 2025-06-14 RX ADMIN — HYDROCODONE BITARTRATE AND ACETAMINOPHEN 1 TABLET: 5; 325 TABLET ORAL at 20:31

## 2025-06-14 RX ADMIN — CLONAZEPAM 0.5 MG: 0.5 TABLET ORAL at 08:37

## 2025-06-14 RX ADMIN — ZOLPIDEM TARTRATE 10 MG: 5 TABLET, FILM COATED ORAL at 20:31

## 2025-06-14 RX ADMIN — HYDROCODONE BITARTRATE AND ACETAMINOPHEN 1 TABLET: 5; 325 TABLET ORAL at 15:08

## 2025-06-14 RX ADMIN — CYCLOBENZAPRINE HYDROCHLORIDE 10 MG: 10 TABLET, FILM COATED ORAL at 19:42

## 2025-06-14 RX ADMIN — ESTRADIOL 1 PATCH: 0.06 PATCH TRANSDERMAL at 08:42

## 2025-06-14 RX ADMIN — CLONAZEPAM 0.5 MG: 0.5 TABLET ORAL at 10:56

## 2025-06-14 RX ADMIN — BUPROPION HYDROCHLORIDE 300 MG: 150 TABLET, EXTENDED RELEASE ORAL at 08:36

## 2025-06-14 RX ADMIN — HYDROXYZINE HYDROCHLORIDE 25 MG: 25 TABLET, FILM COATED ORAL at 15:08

## 2025-06-14 RX ADMIN — Medication 3 MG: at 20:31

## 2025-06-14 RX ADMIN — FAMOTIDINE 20 MG: 20 TABLET, FILM COATED ORAL at 08:40

## 2025-06-14 RX ADMIN — Medication 250 MG: at 08:37

## 2025-06-14 RX ADMIN — GABAPENTIN 800 MG: 400 CAPSULE ORAL at 08:36

## 2025-06-14 RX ADMIN — HYDROXYZINE HYDROCHLORIDE 25 MG: 25 TABLET, FILM COATED ORAL at 19:42

## 2025-06-14 NOTE — PROGRESS NOTES
Progress Note - Behavioral Health   Name: Ericka Castillo 49 y.o. female I MRN: [id number]  Unit/Bed#: -01 I Date of Admission: 6/12/2025   Date of Service: 6/14/2025 I Hospital Day: 2    Assessment & Plan  Bipolar 2 disorder (HCC)  Increase Klonopin to 1 mg twice daily for generalized anxiety and mood stability  Increased Lexapro to 20 mg daily for symptoms of depression and anxiety  Continue Wellbutrin  mg daily for symptoms of depression  Continue gabapentin 800 mg 3 times daily for chronic pain as well as mood stability  Continue Lamictal 250 mg daily for mood stability  Continue Latuda 20 mg daily with dinner for mood stability    Current Medications:    Current Facility-Administered Medications:     buPROPion (WELLBUTRIN XL) 24 hr tablet 300 mg, Oral, Daily    clonazePAM (KlonoPIN) tablet 1 mg, Oral, BID    [START ON 6/15/2025] escitalopram (LEXAPRO) tablet 20 mg, Oral, Daily    estradiol (CLIMARA) 0.06 MG/24HR 1 patch, Transdermal, Weekly    famotidine (PEPCID) tablet 20 mg, Oral, BID    gabapentin (NEURONTIN) capsule 800 mg, Oral, TID    hydrOXYzine HCL (ATARAX) tablet 25 mg, Oral, TID    lamoTRIgine (LaMICtal) tablet 250 mg, Oral, Daily    levothyroxine tablet 50 mcg, Oral, Early Morning    lurasidone (LATUDA) tablet 20 mg, Oral, Daily With Dinner    melatonin tablet 3 mg, Oral, HS    pantoprazole (PROTONIX) EC tablet 40 mg, Oral, BID AC    saccharomyces boulardii (FLORASTOR) capsule 250 mg, Oral, BID    Risks/Benefits of Treatment:     Risks, benefits, and possible side effects of medications explained to patient and patient verbalizes understanding and agreement for treatment.    Progress Toward Goals: Limited progress    Treatment Planning:     All current active medications have been reviewed.  Continue to monitor response to treatment and assess for potential side effects of medications.  Encourage group therapy, milieu therapy and occupational therapy.  Collaboration with medical  "service for medical comorbidities as indicated.  Behavioral Health checks for safety monitoring.  Estimated Discharge Day: 6/19/2025   Legal Status : Voluntary 201 commitment.  Long Stay Certification : Not Applicable    Subjective     Behavior over the last 24 hours: limited improvement    Today, Ericka ANDREW reports feeling \"anxious.\"  At the time of interview, she was noted to be preoccupied with chronic pain medications, specifically the dosing timing of hydrocodone-acetaminophen.  She reported severe neck pain that she rated as a 9 out of 10 in terms of severity.    Outside of this ongoing struggle with chronic pain, Ericka ANDREW did not identify any acute stressors exacerbating her psychiatric symptoms.  Outside of the ongoing struggles with chronic pain, Ericka ANDREW voiced no other issues or concerns at the time of interview.    Ericka ANDREW stated her goal for today is to stay active in the unit and attend groups on the unit.    Ericka ANDREW reports ongoing struggles with sleeping and reports difficulty falling asleep at night and staying asleep at night in the context of chronic pain.  She reports eating well in the hospital.  Ericka denies side effects to her current psychiatric medication regimen.    At the time of interview, Ericka ANDREW denies suicidal ideation, homicidal ideation, visual and auditory hallucinations.    Sleep: difficulty falling asleep, frequent awakenings  Appetite: normal  Medication side effects: No  ROS: review of systems as noted above in HPI/Subjective report, all other systems are negative    Objective :  Temp:  [96.9 °F (36.1 °C)-97.9 °F (36.6 °C)] 96.9 °F (36.1 °C)  HR:  [71-82] 71  BP: ()/(60-62) 87/60  Resp:  [18] 18  SpO2:  [98 %-100 %] 100 %  O2 Device: None (Room air)    Mental Status Evaluation:    Appearance:  Alert, appears stated age, casually dressed, fair grooming hygiene, fair eye contact, no acute distress   Behavior:  Polite, cooperative   Speech:  Increased rate, normal " "volume, fluent, clear, coherent   Mood:  \"Anxious\"   Affect:  Mood congruent, anxious   Thought Process:  Organized, linear, goal-directed   Thought Content:  Preoccupied with pain medications, negative thinking   Perceptual Disturbances: Denies visual and auditory hallucinations, does not appear responding to internal stimuli   Risk Potential: Suicidal Ideation --denies  Homicidal Ideation --denies  Potential for Aggression --no, not at present   Sensorium:  oriented to person, place, and time/date   Memory:  recent and remote memory grossly intact   Consciousness:  alert and awake   Attention/Concentration: attention span and concentration are age appropriate   Insight:  limited   Judgment: limited   Gait/Station: normal gait/station, normal balance   Motor Activity: no abnormal movements       Lab Results: I have reviewed the following results:  Results from the past 24 hours: No results found for this or any previous visit (from the past 24 hours).    Administrative Statements     Counseling / Coordination of Care:   Patient's progress discussed with staff in treatment team meeting.  Medication changes reviewed with staff in treatment team meeting.    Portions of the record may have been created with voice recognition software. Occasional wrong word or \"sound a like\" substitutions may have occurred due to the inherent limitations of voice recognition software. Read the chart carefully and recognize, using context, where substitutions have occurred.    Joe Joseph MD 06/14/25  "
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
## 2025-06-14 NOTE — ASSESSMENT & PLAN NOTE
Increase Klonopin to 1 mg twice daily for generalized anxiety and mood stability  Increased Lexapro to 20 mg daily for symptoms of depression and anxiety  Continue Wellbutrin  mg daily for symptoms of depression  Continue gabapentin 800 mg 3 times daily for chronic pain as well as mood stability  Continue Lamictal 250 mg daily for mood stability  Continue Latuda 20 mg daily with dinner for mood stability

## 2025-06-14 NOTE — PLAN OF CARE
Problem: Ineffective Coping  Goal: Understands least restrictive measures  Description: Interventions:  - Utilize least restrictive behavior  Outcome: Progressing  Goal: Free from restraint events  Description: - Utilize least restrictive measures   - Provide behavioral interventions   - Redirect inappropriate behaviors   Outcome: Progressing

## 2025-06-14 NOTE — NURSING NOTE
Pt visible on unit. Social with select peers. Complaints of anxiety. Fixated on PRN medications and requesting sooner than prescribed. Safety precautions maintained. Will continue to monitor and assess .

## 2025-06-14 NOTE — PROGRESS NOTES
"Progress Note - Ericka Castillo 49 y.o. female MRN: [id number]    Unit/Bed#: [facility] 222-01 Encounter: [id number]        Subjective:   Patient seen and examined at bedside after reviewing the chart and discussing the case with the caring staff.      Patient examined at bedside.  Patient has no acute symptoms other than ongoing pain.  Requesting changes in medications.     Physical Exam   Vitals: Blood pressure (!) 87/60, pulse 71, temperature (!) 96.9 °F (36.1 °C), temperature source Temporal, resp. rate 18, height 5' 5\" (1.651 m), weight 52.3 kg (115 lb 6.4 oz), SpO2 100%, not currently breastfeeding.,Body mass index is 19.2 kg/m².  Constitutional: Patient in no acute distress.  HEENT: PERR, EOMI, MMM.  Cardiovascular: Normal rate and regular rhythm.    Pulmonary/Chest: Effort normal and breath sounds normal.   Abdomen: Soft, + BS, NT.    Assessment/Plan:  Ericka Castillo is a(n) 49 y.o. female with MDD.      Hypertension.  D/c lisinopril 5 mg 6/14 due to low BP.  Hypothyroidism.  Continue Synthroid 50 mcg daily. TSH WNL 6/13.   GERD.  Continue Protonix 40 mg daily (Prevacid nonform) and Pepcid 20 mg twice daily.   CKD.  Avoid nephrotoxins.  Patient takes NSAID as needed outpatient for pain.  Celebrex as needed here with close monitoring of kidneys.   HRT.  Continue estradoil 0.06 mg/24 hr patch weekly (Sat at 4 pm).   Chronic pain syndrome w/ neck pain, cervical spondylosis, cervical radiculopathy, myofascial pain syndrome, and continuous opioid dependence.  Patient follows with pain management.  Continue gabapentin 800 mg TID, Norco 5-325 mg twice daily as needed for moderate pain.  Home Parafon forte 500 mg 3 times daily prn and nabumetone 500 mg twice daily prn non formulary.   Celebrex 100 mg twice daily as needed for mild pain, Tylenol 975 mg q6h as needed for severe pain, Flexeril 10 mg TID as needed for muscle spasms.  Lidocaine cream as needed -declines lidocaine patch, Voltaren, Beng[place].   Diarrhea.  " Continue probiotic daily.  Imodium as needed.     The patient was discussed with Dr. Gabriel and he is in agreement with the above note.

## 2025-06-15 PROCEDURE — 99232 SBSQ HOSP IP/OBS MODERATE 35: CPT

## 2025-06-15 RX ADMIN — LURASIDONE HYDROCHLORIDE 20 MG: 20 TABLET, FILM COATED ORAL at 16:15

## 2025-06-15 RX ADMIN — CLONAZEPAM 1 MG: 1 TABLET ORAL at 08:44

## 2025-06-15 RX ADMIN — FAMOTIDINE 20 MG: 20 TABLET, FILM COATED ORAL at 08:44

## 2025-06-15 RX ADMIN — TIZANIDINE 4 MG: 4 TABLET ORAL at 13:30

## 2025-06-15 RX ADMIN — LEVOTHYROXINE SODIUM 50 MCG: 0.05 TABLET ORAL at 05:51

## 2025-06-15 RX ADMIN — HYDROCODONE BITARTRATE AND ACETAMINOPHEN 1 TABLET: 5; 325 TABLET ORAL at 16:15

## 2025-06-15 RX ADMIN — Medication 250 MG: at 17:24

## 2025-06-15 RX ADMIN — Medication 3 MG: at 20:39

## 2025-06-15 RX ADMIN — GABAPENTIN 800 MG: 400 CAPSULE ORAL at 20:39

## 2025-06-15 RX ADMIN — HYDROXYZINE HYDROCHLORIDE 100 MG: 50 TABLET ORAL at 13:30

## 2025-06-15 RX ADMIN — TRAZODONE HYDROCHLORIDE 50 MG: 50 TABLET ORAL at 23:12

## 2025-06-15 RX ADMIN — HYDROXYZINE HYDROCHLORIDE 25 MG: 25 TABLET, FILM COATED ORAL at 20:39

## 2025-06-15 RX ADMIN — ZOLPIDEM TARTRATE 10 MG: 5 TABLET, FILM COATED ORAL at 20:40

## 2025-06-15 RX ADMIN — CYCLOBENZAPRINE HYDROCHLORIDE 10 MG: 10 TABLET, FILM COATED ORAL at 03:52

## 2025-06-15 RX ADMIN — HYDROXYZINE HYDROCHLORIDE 25 MG: 25 TABLET, FILM COATED ORAL at 16:14

## 2025-06-15 RX ADMIN — GABAPENTIN 800 MG: 400 CAPSULE ORAL at 08:43

## 2025-06-15 RX ADMIN — HYDROXYZINE HYDROCHLORIDE 25 MG: 25 TABLET, FILM COATED ORAL at 08:43

## 2025-06-15 RX ADMIN — HYDROCODONE BITARTRATE AND ACETAMINOPHEN 1 TABLET: 5; 325 TABLET ORAL at 20:40

## 2025-06-15 RX ADMIN — FAMOTIDINE 20 MG: 20 TABLET, FILM COATED ORAL at 17:23

## 2025-06-15 RX ADMIN — ACETAMINOPHEN 975 MG: 325 TABLET ORAL at 06:34

## 2025-06-15 RX ADMIN — ESCITALOPRAM OXALATE 20 MG: 10 TABLET ORAL at 08:43

## 2025-06-15 RX ADMIN — Medication 250 MG: at 08:43

## 2025-06-15 RX ADMIN — CLONAZEPAM 1 MG: 1 TABLET ORAL at 20:39

## 2025-06-15 RX ADMIN — GABAPENTIN 800 MG: 400 CAPSULE ORAL at 16:15

## 2025-06-15 RX ADMIN — PANTOPRAZOLE SODIUM 40 MG: 40 TABLET, DELAYED RELEASE ORAL at 05:51

## 2025-06-15 RX ADMIN — LAMOTRIGINE 250 MG: 100 TABLET ORAL at 08:43

## 2025-06-15 RX ADMIN — BUPROPION HYDROCHLORIDE 300 MG: 150 TABLET, EXTENDED RELEASE ORAL at 08:43

## 2025-06-15 RX ADMIN — PANTOPRAZOLE SODIUM 40 MG: 40 TABLET, DELAYED RELEASE ORAL at 16:15

## 2025-06-15 NOTE — TREATMENT TEAM
06/15/25 1330   Hope Anxiety Scale   Anxious Mood 4   Tension 3   Fears 3   Insomnia 0   Intellectual 2   Depressed Mood 3   Somatic Complaints: Muscular 2   Somatic Complaints: Sensory 2   Cardiovascular Symptoms 1   Respiratory Symptoms 1   Gastrointestinal Symptoms 0   Genitourinary Symptoms 0   Autonomic Symptoms 3   Behavior at Interview 3   Hope Anxiety Score 27     Patient c/o severe anxiety r/t peer on unit, appears restless and anxious. Administered 100mg atarax po will monitor for effectiveness

## 2025-06-15 NOTE — NURSING NOTE
"Patient observed out in the milieu mostly for needs. Seen selectively socializing. Polite and calm during assessment. Evasive when discussing events leading to admission. Focused on chronic pain. Reports she is anxious due to her pain not being \"completely\" maintained on the unit. She denies SI/HI and hallucinations. Makes needs known. Q15 minute checks ongoing.   "

## 2025-06-15 NOTE — NURSING NOTE
Patient observed sleeping comfortably for majority of q15 minute monitoring throughout the night, without demonstrating any signs or symptoms of distress. Out once for flexeril. Went back to sleep without incident. No acute behaviors overnight. Unlabored even breaths noted. Will remain on safety precautions and continual q15 minute monitoring.

## 2025-06-15 NOTE — NURSING NOTE
Pt visible on unit. Social with select peers. Complaints of anxiety. Fixated on PRN medications and times and doses. Requesting to speak to CM regarding current FMLA in place. Will communicate to oncoming shift. Safety precautions maintained. Will continue to monitor and assess .

## 2025-06-15 NOTE — ASSESSMENT & PLAN NOTE
Continue Klonopin to 1 mg twice daily for generalized anxiety and mood stability  Continue Lexapro to 20 mg daily for symptoms of depression and anxiety  Continue Wellbutrin  mg daily for symptoms of depression  Continue gabapentin 800 mg 3 times daily for chronic pain as well as mood stability  Continue Lamictal 250 mg daily for mood stability  Continue Latuda 20 mg daily with dinner for mood stability

## 2025-06-15 NOTE — PROGRESS NOTES
Will progress Note - Behavioral Health   Name: Ericka Castillo 49 y.o. female I MRN: [id number]  Unit/Bed#: [facility] 222-01 I Date of Admission: 6/12/2025   Date of Service: 6/15/2025 I Hospital Day: 3    Assessment & Plan  Bipolar 2 disorder (HCC)  Continue Klonopin to 1 mg twice daily for generalized anxiety and mood stability  Continue Lexapro to 20 mg daily for symptoms of depression and anxiety  Continue Wellbutrin  mg daily for symptoms of depression  Continue gabapentin 800 mg 3 times daily for chronic pain as well as mood stability  Continue Lamictal 250 mg daily for mood stability  Continue Latuda 20 mg daily with dinner for mood stability    Current Medications:    Current Facility-Administered Medications:     buPROPion (WELLBUTRIN XL) 24 hr tablet 300 mg, Oral, Daily    clonazePAM (KlonoPIN) tablet 1 mg, Oral, BID    escitalopram (LEXAPRO) tablet 20 mg, Oral, Daily    estradiol (CLIMARA) 0.06 MG/24HR 1 patch, Transdermal, Weekly    famotidine (PEPCID) tablet 20 mg, Oral, BID    gabapentin (NEURONTIN) capsule 800 mg, Oral, TID    hydrOXYzine HCL (ATARAX) tablet 25 mg, Oral, TID    lamoTRIgine (LaMICtal) tablet 250 mg, Oral, Daily    levothyroxine tablet 50 mcg, Oral, Early Morning    lurasidone (LATUDA) tablet 20 mg, Oral, Daily With Dinner    melatonin tablet 3 mg, Oral, HS    pantoprazole (PROTONIX) EC tablet 40 mg, Oral, BID AC    saccharomyces boulardii (FLORASTOR) capsule 250 mg, Oral, BID    Risks/Benefits of Treatment:     Risks, benefits, and possible side effects of medications explained to patient and patient verbalizes understanding and agreement for treatment.    Progress Toward Goals: Slow progress    Treatment Planning:     All current active medications have been reviewed.  Continue to monitor response to treatment and assess for potential side effects of medications.  Encourage group therapy, milieu therapy and occupational therapy.  Collaboration with medical service for medical  "comorbidities as indicated.  Behavioral Health checks for safety monitoring.  Estimated Discharge Day: 6/19/2025   Legal Status : Voluntary 201 commitment.  Long Stay Certification : Not Applicable    Subjective     Behavior over the last 24 hours: Unchanged, remains in positive behavioral control    Today, Ericka reports \"I still feel anxious.\"  At this time, she continues to report ongoing struggles with chronic anxiety.  At this time, she continues to report ongoing struggles with chronic pain.  She states that her pain is \"a little bit better\" in comparison to yesterday.  She does report improvements in her anxiety with the increase in Lexapro and the increase in Klonopin.    At the time of interview, Ericka continued to remain preoccupied with chronic pain medications, specifically the dosing timing of hydrocodone acetaminophen and the dosing timing of as needed pain relieving medications. Ericka ANDREW was encouraged to continue discussing her concerns with the medical team.    Ericka ANDREW reports her goal for today is to continue staying active on the unit and attending groups on the unit.    Ericka NADREW continue to report ongoing struggles with sleeping and reports difficulty falling asleep and staying asleep in the context of chronic pain.  She reports eating well in the hospital.  Ericka denies side effects to her current psychiatric medication regimen.    Ericka ANDREW denies suicidal ideation, homicidal ideation, visual auditory hallucinations.    Sleep: Improved, remains with difficulty falling asleep  Appetite: normal  Medication side effects: No  ROS: review of systems as noted above in HPI/Subjective report, all other systems are negative    Objective :  Temp:  [97.5 °F (36.4 °C)-97.9 °F (36.6 °C)] 97.5 °F (36.4 °C)  HR:  [78] 78  BP: (90-99)/(56-62) 90/56  Resp:  [18] 18  SpO2:  [99 %-100 %] 100 %    Mental Status Evaluation:    Appearance:  Alert, appears stated age, casually dressed, fair grooming and " "hygiene, fair eye contact, no acute distress   Behavior:  Polite, cooperative   Speech:  Increased rate, normal volume, fluent, clear, coherent   Mood:  \"Anxious\"   Affect:  Mood congruent, anxious   Thought Process:  Organized, linear, goal-directed   Thought Content:  Preoccupied with pain medications, ongoing negative thinking   Perceptual Disturbances: Denies visual and auditory hallucinations, does not appear responding to internal stimuli   Risk Potential: Suicidal Ideation --denies  Homicidal Ideation --denies  Potential for Aggression --no, not at present   Sensorium:  oriented to person, place, and time/date   Memory:  recent and remote memory grossly intact   Consciousness:  alert and awake   Attention/Concentration: attention span and concentration are age appropriate   Insight:  limited   Judgment: limited   Gait/Station: normal gait/station, normal balance   Motor Activity: no abnormal movements       Lab Results: I have reviewed the following results:  Results from the past 24 hours: No results found for this or any previous visit (from the past 24 hours).    Administrative Statements     Counseling / Coordination of Care:   Patient's progress discussed with staff in treatment team meeting.  Medication changes reviewed with staff in treatment team meeting.    Portions of the record may have been created with voice recognition software. Occasional wrong word or \"sound a like\" substitutions may have occurred due to the inherent limitations of voice recognition software. Read the chart carefully and recognize, using context, where substitutions have occurred.    Joe Joseph MD 06/15/25  "

## 2025-06-15 NOTE — PROGRESS NOTES
"Progress Note - Ericka Castillo 49 y.o. female MRN: [id number]    Unit/Bed#: [facility] 222-01 Encounter: [id number]        Subjective:   Patient seen and examined at bedside after reviewing the chart and discussing the case with the caring staff.      Patient examined at bedside.  Patient denies any acute symptoms.     Physical Exam   Vitals: Blood pressure 90/56, pulse 78, temperature 97.5 °F (36.4 °C), temperature source Temporal, resp. rate 18, height 5' 5\" (1.651 m), weight 52.3 kg (115 lb 6.4 oz), SpO2 100%, not currently breastfeeding.,Body mass index is 19.2 kg/m².  Constitutional: Patient in no acute distress.  HEENT: PERR, EOMI, MMM.  Cardiovascular: Normal rate and regular rhythm.    Pulmonary/Chest: Effort normal and breath sounds normal.   Abdomen: Soft, + BS, NT.    Assessment/Plan:  Ericka Castillo is a(n) 49 y.o. female with MDD.      Hypertension.  D/c lisinopril 5 mg 6/14 due to low BP.  Hypothyroidism.  Continue Synthroid 50 mcg daily. TSH WNL 6/13.   GERD.  Continue Protonix 40 mg daily (Prevacid nonform) and Pepcid 20 mg twice daily.   CKD.  Avoid nephrotoxins.  Patient takes NSAID as needed outpatient for pain.  Celebrex as needed here with close monitoring of kidneys.   HRT.  Continue estradoil 0.06 mg/24 hr patch weekly (Sat at 4 pm).   Chronic pain syndrome w/ neck pain, cervical spondylosis, cervical radiculopathy, myofascial pain syndrome, and continuous opioid dependence.  Patient follows with pain management.  Continue gabapentin 800 mg TID, Norco 5-325 mg twice daily as needed for moderate pain.  Home Parafon forte 500 mg 3 times daily prn and nabumetone 500 mg twice daily prn non formulary.   Celebrex 100 mg twice daily as needed for mild pain, Tylenol 975 mg q6h as needed for severe pain, Flexeril 10 mg TID as needed for muscle spasms.  Lidocaine cream as needed -declines lidocaine patch, Voltaren, Bengay.   Diarrhea.  Continue probiotic daily.  Imodium as needed.     The patient was " discussed with Dr. Gabriel and he is in agreement with the above note.

## 2025-06-16 ENCOUNTER — APPOINTMENT (OUTPATIENT)
Dept: PSYCHOLOGY | Facility: CLINIC | Age: 50
End: 2025-06-16
Payer: COMMERCIAL

## 2025-06-16 PROCEDURE — 99232 SBSQ HOSP IP/OBS MODERATE 35: CPT | Performed by: HOSPITALIST

## 2025-06-16 RX ADMIN — LEVOTHYROXINE SODIUM 50 MCG: 0.05 TABLET ORAL at 05:36

## 2025-06-16 RX ADMIN — HYDROXYZINE HYDROCHLORIDE 25 MG: 25 TABLET, FILM COATED ORAL at 08:43

## 2025-06-16 RX ADMIN — GABAPENTIN 800 MG: 400 CAPSULE ORAL at 17:32

## 2025-06-16 RX ADMIN — TRAZODONE HYDROCHLORIDE 50 MG: 50 TABLET ORAL at 20:35

## 2025-06-16 RX ADMIN — Medication 250 MG: at 17:32

## 2025-06-16 RX ADMIN — FAMOTIDINE 20 MG: 20 TABLET, FILM COATED ORAL at 17:32

## 2025-06-16 RX ADMIN — HYDROXYZINE HYDROCHLORIDE 50 MG: 50 TABLET ORAL at 19:51

## 2025-06-16 RX ADMIN — Medication 250 MG: at 08:43

## 2025-06-16 RX ADMIN — HYDROCODONE BITARTRATE AND ACETAMINOPHEN 1 TABLET: 5; 325 TABLET ORAL at 08:43

## 2025-06-16 RX ADMIN — LAMOTRIGINE 250 MG: 100 TABLET ORAL at 08:42

## 2025-06-16 RX ADMIN — TIZANIDINE 4 MG: 4 TABLET ORAL at 19:51

## 2025-06-16 RX ADMIN — CLONAZEPAM 1 MG: 1 TABLET ORAL at 20:35

## 2025-06-16 RX ADMIN — PANTOPRAZOLE SODIUM 40 MG: 40 TABLET, DELAYED RELEASE ORAL at 17:33

## 2025-06-16 RX ADMIN — GABAPENTIN 800 MG: 400 CAPSULE ORAL at 19:51

## 2025-06-16 RX ADMIN — HYDROXYZINE HYDROCHLORIDE 100 MG: 50 TABLET ORAL at 01:57

## 2025-06-16 RX ADMIN — TIZANIDINE 4 MG: 4 TABLET ORAL at 15:10

## 2025-06-16 RX ADMIN — ESCITALOPRAM OXALATE 20 MG: 10 TABLET ORAL at 08:43

## 2025-06-16 RX ADMIN — PANTOPRAZOLE SODIUM 40 MG: 40 TABLET, DELAYED RELEASE ORAL at 05:36

## 2025-06-16 RX ADMIN — LURASIDONE HYDROCHLORIDE 20 MG: 20 TABLET, FILM COATED ORAL at 17:31

## 2025-06-16 RX ADMIN — CLONAZEPAM 1 MG: 1 TABLET ORAL at 08:43

## 2025-06-16 RX ADMIN — HYDROCODONE BITARTRATE AND ACETAMINOPHEN 1 TABLET: 5; 325 TABLET ORAL at 17:31

## 2025-06-16 RX ADMIN — FAMOTIDINE 20 MG: 20 TABLET, FILM COATED ORAL at 08:43

## 2025-06-16 RX ADMIN — TIZANIDINE 4 MG: 4 TABLET ORAL at 08:44

## 2025-06-16 RX ADMIN — HYDROXYZINE HYDROCHLORIDE 25 MG: 25 TABLET, FILM COATED ORAL at 17:31

## 2025-06-16 RX ADMIN — GABAPENTIN 800 MG: 400 CAPSULE ORAL at 08:43

## 2025-06-16 RX ADMIN — Medication 6 MG: at 22:17

## 2025-06-16 RX ADMIN — HYDROXYZINE HYDROCHLORIDE 50 MG: 50 TABLET ORAL at 12:56

## 2025-06-16 RX ADMIN — BUPROPION HYDROCHLORIDE 300 MG: 150 TABLET, EXTENDED RELEASE ORAL at 08:43

## 2025-06-16 RX ADMIN — ZOLPIDEM TARTRATE 10 MG: 5 TABLET, FILM COATED ORAL at 21:39

## 2025-06-16 NOTE — TREATMENT TEAM
06/16/25 0157   Hope Anxiety Scale   Anxious Mood 4   Tension 4   Fears 4   Insomnia 0   Intellectual 2   Depressed Mood 2   Somatic Complaints: Muscular 0   Somatic Complaints: Sensory 0   Cardiovascular Symptoms 2   Respiratory Symptoms 2   Gastrointestinal Symptoms 0   Genitourinary Symptoms 0   Autonomic Symptoms 1   Behavior at Interview 4   Hope Anxiety Score 25     Patient anxious due to feelings of restlessness and inability to have restful sleep. Received atarax 100 mg for severe anxiety at 0157. Appears to be asleep at this time. Q15 minute checks ongoing.

## 2025-06-16 NOTE — NURSING NOTE
Patient intermittently observed within the milieu. She continues to be focused on medication and physical discomfort. Utilizing her PRN medications. Reports difficulty sleeping through the night and ongoing anxiety due to relationship issues and pain however did have a positive video chat with family this evening. Denies SI/HI and hallucinations. Utilized prn pain medication NORCO and prn insomnia medication AMBIEN with HS medication. Q15 minute checks ongoing.

## 2025-06-16 NOTE — NURSING NOTE
Patient was pleasant and cooperative. Patient social with staff and peers. Staff support provided. Q 15 minute safety checks maintained. Groups encouraged. Patient compliant with medications and groups. Patient denied SI/HI. Patient reports anxiety that comes and goes. Staff will continue to monitor and support.

## 2025-06-16 NOTE — PLAN OF CARE
Problem: Ineffective Coping  Goal: Participates in unit activities  Description: Interventions:  - Provide therapeutic environment   - Provide required programming   - Redirect inappropriate behaviors   Outcome: Progressing     Problem: Risk for Self Injury/Neglect  Goal: Attend and participate in unit activities, including therapeutic, recreational, and educational groups  Description: Interventions:  - Provide therapeutic and educational activities daily, encourage attendance and participation, and document same in the medical record  - Obtain collateral information, encourage visitation and family involvement in care   Outcome: Progressing     Problem: Depression  Goal: Attend and participate in unit activities, including therapeutic, recreational, and educational groups  Description: Interventions:  - Provide therapeutic and educational activities daily, encourage attendance and participation, and document same in the medical record   Outcome: Progressing   She attended groups over the weekend but it has been sparse.

## 2025-06-16 NOTE — PLAN OF CARE
Problem: Ineffective Coping  Goal: Demonstrates healthy coping skills  Outcome: Not Progressing

## 2025-06-16 NOTE — ASSESSMENT & PLAN NOTE
Continue Klonopin to 1 mg twice daily for generalized anxiety and mood stability  Continue Lexapro to 20 mg daily for symptoms of depression and anxiety  Continue Wellbutrin  mg daily for symptoms of depression  Continue gabapentin 800 mg 3 times daily for chronic pain as well as mood stability  Continue Lamictal 250 mg daily for mood stability  Continue Latuda 20 mg daily with dinner for mood stability  Melatonin increase to 6 mg p.o. daily at bedtime for insomnia on 6/16/2025

## 2025-06-16 NOTE — PROGRESS NOTES
"Progress Note - Ericka Castillo 49 y.o. female MRN: [id number]    Unit/Bed#: [email or web address] 222-01 Encounter: [id number]        Subjective:   Patient seen and examined at bedside after reviewing the chart and discussing the case with the caring staff.      Patient examined at bedside.  Patient denies any acute symptoms.  She is requesting to speak to psychiatrist and .     Physical Exam   Vitals: Blood pressure 96/63, pulse 73, temperature 97.5 °F (36.4 °C), temperature source Temporal, resp. rate 18, height 5' 5\" (1.651 m), weight 54.5 kg (120 lb 3.2 oz), SpO2 100%, not currently breastfeeding.,Body mass index is 20 kg/m².  Constitutional: Patient in no acute distress.  HEENT: PERR, EOMI, MMM.  Cardiovascular: Normal rate and regular rhythm.    Pulmonary/Chest: Effort normal and breath sounds normal.   Abdomen: Soft, + BS, NT.    Assessment/Plan:  Ericka Castillo is a(n) 49 y.o. female with MDD.      Hypertension.  D/c lisinopril 5 mg 6/14 due to low BP.  Hypothyroidism.  Continue Synthroid 50 mcg daily. TSH WNL 6/13.   GERD.  Continue Protonix 40 mg daily (Prevacid nonform) and Pepcid 20 mg twice daily.   CKD.  Avoid nephrotoxins.  Patient takes NSAID as needed outpatient for pain.  Celebrex as needed here with close monitoring of kidneys.   HRT.  Continue estradoil 0.06 mg/24 hr patch weekly (Sat at 4 pm).   Chronic pain syndrome w/ neck pain, cervical spondylosis, cervical radiculopathy, myofascial pain syndrome, and continuous opioid dependence.  Patient follows with pain management.  Continue gabapentin 800 mg TID, Norco 5-325 mg twice daily as needed for moderate pain.  Home Parafon forte 500 mg 3 times daily prn and nabumetone 500 mg twice daily prn non formulary.   Celebrex 100 mg twice daily as needed for mild pain, Tylenol 975 mg q6h as needed for severe pain, Flexeril 10 mg TID as needed for muscle spasms.  Lidocaine cream as needed -declines lidocaine patch, Voltaren, Bengay.   Diarrhea.  " Continue probiotic daily.  Imodium as needed.     The patient was discussed with Dr. Gabriel and he is in agreement with the above note.

## 2025-06-16 NOTE — PROGRESS NOTES
Progress Note - Behavioral Health   Name: Ericka Castillo 49 y.o. female I MRN: [id number]  Unit/Bed#: [facility] 222-01 I Date of Admission: 6/12/2025   Date of Service: 6/16/2025 I Hospital Day: 4    Assessment & Plan  Bipolar 2 disorder (HCC)  Continue Klonopin to 1 mg twice daily for generalized anxiety and mood stability  Continue Lexapro to 20 mg daily for symptoms of depression and anxiety  Continue Wellbutrin  mg daily for symptoms of depression  Continue gabapentin 800 mg 3 times daily for chronic pain as well as mood stability  Continue Lamictal 250 mg daily for mood stability  Continue Latuda 20 mg daily with dinner for mood stability  Melatonin increase to 6 mg p.o. daily at bedtime for insomnia on 6/16/2025      Current Medications:    Current Facility-Administered Medications:     buPROPion (WELLBUTRIN XL) 24 hr tablet 300 mg, Oral, Daily    clonazePAM (KlonoPIN) tablet 1 mg, Oral, BID    escitalopram (LEXAPRO) tablet 20 mg, Oral, Daily    estradiol (CLIMARA) 0.06 MG/24HR 1 patch, Transdermal, Weekly    famotidine (PEPCID) tablet 20 mg, Oral, BID    gabapentin (NEURONTIN) capsule 800 mg, Oral, TID    hydrOXYzine HCL (ATARAX) tablet 25 mg, Oral, TID    lamoTRIgine (LaMICtal) tablet 250 mg, Oral, Daily    levothyroxine tablet 50 mcg, Oral, Early Morning    lurasidone (LATUDA) tablet 20 mg, Oral, Daily With Dinner    melatonin tablet 3 mg, Oral, HS    pantoprazole (PROTONIX) EC tablet 40 mg, Oral, BID AC    saccharomyces boulardii (FLORASTOR) capsule 250 mg, Oral, BID    Current Facility-Administered Medications:     acetaminophen (TYLENOL) tablet 975 mg, Oral, Q6H PRN    aluminum-magnesium hydroxide-simethicone (MAALOX) oral suspension 30 mL, Oral, Q4H PRN    haloperidol lactate (HALDOL) injection 2.5 mg, Intramuscular, Q4H PRN Max 4/day **AND** LORazepam (ATIVAN) injection 1 mg, Intramuscular, Q4H PRN Max 4/day **AND** benztropine (COGENTIN) injection 0.5 mg, Intramuscular, Q4H PRN Max 4/day     haloperidol lactate (HALDOL) injection 5 mg, Intramuscular, Q4H PRN Max 4/day **AND** LORazepam (ATIVAN) injection 2 mg, Intramuscular, Q4H PRN Max 4/day **AND** benztropine (COGENTIN) injection 1 mg, Intramuscular, Q4H PRN Max 4/day    benztropine (COGENTIN) injection 1 mg, Intramuscular, Q4H PRN Max 6/day    benztropine (COGENTIN) tablet 1 mg, Oral, Q4H PRN Max 6/day    bisacodyl (DULCOLAX) rectal suppository 10 mg, Rectal, Daily PRN    hydrOXYzine HCL (ATARAX) tablet 50 mg, Oral, Q6H PRN Max 4/day **OR** diphenhydrAMINE (BENADRYL) injection 50 mg, Intramuscular, Q6H PRN    haloperidol (HALDOL) tablet 1 mg, Oral, Q6H PRN    haloperidol (HALDOL) tablet 2.5 mg, Oral, Q4H PRN Max 4/day    haloperidol (HALDOL) tablet 5 mg, Oral, Q4H PRN Max 4/day    HYDROcodone-acetaminophen (NORCO) 5-325 mg per tablet 1 tablet, Oral, BID PRN    hydrOXYzine HCL (ATARAX) tablet 100 mg, Oral, Q6H PRN Max 4/day **OR** LORazepam (ATIVAN) injection 2 mg, Intramuscular, Q6H PRN    hydrOXYzine HCL (ATARAX) tablet 25 mg, Oral, Q6H PRN Max 4/day    hyoscyamine (LEVSIN/SL) SL tablet 0.125 mg, Sublingual, Q4H PRN    lidocaine (LMX) 4 % cream, Topical, 4x Daily PRN    loperamide (IMODIUM) capsule 2 mg, Oral, TID PRN    ondansetron (ZOFRAN-ODT) dispersible tablet 4 mg, Oral, Q6H PRN    polyethylene glycol (MIRALAX) packet 17 g, Oral, Daily PRN    propranolol (INDERAL) tablet 10 mg, Oral, Q8H PRN    senna-docusate sodium (SENOKOT S) 8.6-50 mg per tablet 1 tablet, Oral, Daily PRN    tiZANidine (ZANAFLEX) tablet 4 mg, Oral, TID PRN    traZODone (DESYREL) tablet 50 mg, Oral, HS PRN    zolpidem (AMBIEN) tablet 10 mg, Oral, HS PRN    Risks/Benefits of Treatment:     Risks, benefits, and possible side effects of medications explained to patient and patient verbalizes understanding and agreement for treatment.      Treatment Planning:     All current active medications have been reviewed.  Continue to monitor response to treatment and assess for potential  side effects of medications.  Encourage group therapy, milieu therapy and occupational therapy.  Collaboration with medical service for medical comorbidities as indicated.  Behavioral Health checks for safety monitoring.  Estimated Discharge Day: 6/19/2025 7 days (6/23/2025)  Legal Status : Voluntary 201 commitment.  Long Stay Certification : Not Applicable    Subjective     Behavior over the last 24 hours: javid Barnett was seen today for psychiatric follow-up.  Patient calm, cooperative.  She is visible on the unit intermittently, selectively social with peers.  Patient reports some anxiety related to what is going to happen at home when she gets home.  She is ruminative on her divorce process.  She reports her mood is okay and her motivation is getting better.  Patient endorses broken sleep overnight.  She denied any SI/HI/AVH.  She did not appear internally preoccupied.    Sleep: insomnia  Appetite: normal  Medication side effects: No  ROS: review of systems as noted above in HPI/Subjective report, all other systems are negative    Objective :  Temp:  [97.5 °F (36.4 °C)-98 °F (36.7 °C)] 97.5 °F (36.4 °C)  HR:  [71-73] 73  BP: (95-96)/(58-63) 96/63  Resp:  [18] 18  SpO2:  [97 %-100 %] 100 %    Mental Status Evaluation:    Appearance:  age appropriate, fair hygiene   Behavior:  cooperative   Speech:  normal rate and volume   Mood:  anxious   Affect:  mood-congruent   Thought Process:  goal directed   Thought Content:  negative thinking   Perceptual Disturbances: Denied AVH, did not appear internally preoccupied   Risk Potential: Suicidal Ideation -  None at present  Homicidal Ideation -  None at present  Potential for Aggression - No   Sensorium:  oriented to person, place, and time/date   Memory:  recent and remote memory grossly intact   Consciousness:  alert and awake   Attention/Concentration: attention span and concentration are age appropriate   Insight:  limited   Judgment: limited   Gait/Station:  "normal gait/station   Motor Activity: no abnormal movements       Lab Results: I have reviewed the following results:  Most Recent Labs:   Lab Results   Component Value Date    WBC 5.19 06/13/2025    RBC 3.32 (L) 06/13/2025    HGB 11.3 (L) 06/13/2025    HCT 34.9 06/13/2025     06/13/2025    RDW 12.0 06/13/2025    NEUTROABS 2.68 06/13/2025    TOTANEUTABS 15.37 (H) 12/22/2024    SODIUM 141 06/13/2025    K 4.0 06/13/2025     06/13/2025    CO2 31 06/13/2025    BUN 16 06/13/2025    CREATININE 1.13 06/13/2025    GLUC 87 06/13/2025    CALCIUM 8.6 06/13/2025    AST 13 06/13/2025    ALT 16 06/13/2025    ALKPHOS 47 06/13/2025    TP 5.5 (L) 06/13/2025    ALB 3.8 06/13/2025    TBILI 0.24 06/13/2025    CHOLESTEROL 189 06/13/2025    HDL 55 06/13/2025    TRIG 152 (H) 06/13/2025    LDLCALC 104 (H) 06/13/2025    NONHDLC 134 06/13/2025    VALPROICTOT 81 07/13/2024    LITHIUM 1.4 (H) 10/01/2018    TVT1NJFCEXDA 1.775 06/13/2025    FREET4 1.22 (H) 01/28/2025    PREGUR negative LOT:HCG [id number] EXP: 11/30/22 09/20/2021    PREGSERUM Negative 06/13/2025    HCGQUANT 1.12 02/02/2020    TREPONEMAPA Non-reactive 06/13/2025    HGBA1C 5.1 12/23/2024     12/23/2024       Administrative Statements     Counseling / Coordination of Care:   Patient's progress discussed with staff in treatment team meeting.  Medication changes reviewed with staff in treatment team meeting.    Portions of the record may have been created with voice recognition software. Occasional wrong word or \"sound a like\" substitutions may have occurred due to the inherent limitations of voice recognition software. Read the chart carefully and recognize, using context, where substitutions have occurred.    Stephen Khan, FIONA 06/16/25  "

## 2025-06-16 NOTE — TREATMENT TEAM
06/16/25 1256   Hope Anxiety Scale   Anxious Mood 3   Tension 3   Fears 3   Insomnia 0   Intellectual 3   Depressed Mood 3   Somatic Complaints: Muscular 0   Somatic Complaints: Sensory 0   Cardiovascular Symptoms 2   Respiratory Symptoms 2   Gastrointestinal Symptoms 0   Genitourinary Symptoms 0   Autonomic Symptoms 2   Behavior at Interview 3   Hope Anxiety Score 24     Patient reported feeling anxious. Hope score 24. Atarax 50 mg given.

## 2025-06-17 ENCOUNTER — APPOINTMENT (OUTPATIENT)
Dept: PSYCHOLOGY | Facility: CLINIC | Age: 50
End: 2025-06-17
Payer: COMMERCIAL

## 2025-06-17 PROCEDURE — 99232 SBSQ HOSP IP/OBS MODERATE 35: CPT | Performed by: HOSPITALIST

## 2025-06-17 RX ORDER — HYDROXYZINE HYDROCHLORIDE 25 MG/1
25 TABLET, FILM COATED ORAL 2 TIMES DAILY
Status: DISCONTINUED | OUTPATIENT
Start: 2025-06-17 | End: 2025-06-24 | Stop reason: HOSPADM

## 2025-06-17 RX ORDER — ESCITALOPRAM OXALATE 10 MG/1
10 TABLET ORAL DAILY
Status: DISCONTINUED | OUTPATIENT
Start: 2025-06-18 | End: 2025-06-24 | Stop reason: HOSPADM

## 2025-06-17 RX ORDER — LURASIDONE HYDROCHLORIDE 40 MG/1
40 TABLET, FILM COATED ORAL
Status: DISCONTINUED | OUTPATIENT
Start: 2025-06-17 | End: 2025-06-24 | Stop reason: HOSPADM

## 2025-06-17 RX ORDER — HYDROXYZINE HYDROCHLORIDE 50 MG/1
50 TABLET, FILM COATED ORAL
Status: DISCONTINUED | OUTPATIENT
Start: 2025-06-17 | End: 2025-06-24 | Stop reason: HOSPADM

## 2025-06-17 RX ADMIN — HYDROXYZINE HYDROCHLORIDE 25 MG: 25 TABLET, FILM COATED ORAL at 15:36

## 2025-06-17 RX ADMIN — GABAPENTIN 800 MG: 400 CAPSULE ORAL at 08:26

## 2025-06-17 RX ADMIN — HYDROCODONE BITARTRATE AND ACETAMINOPHEN 1 TABLET: 5; 325 TABLET ORAL at 09:29

## 2025-06-17 RX ADMIN — Medication 250 MG: at 08:27

## 2025-06-17 RX ADMIN — CLONAZEPAM 1 MG: 1 TABLET ORAL at 08:27

## 2025-06-17 RX ADMIN — PANTOPRAZOLE SODIUM 40 MG: 40 TABLET, DELAYED RELEASE ORAL at 08:26

## 2025-06-17 RX ADMIN — GABAPENTIN 800 MG: 400 CAPSULE ORAL at 15:36

## 2025-06-17 RX ADMIN — HYDROXYZINE HYDROCHLORIDE 25 MG: 25 TABLET, FILM COATED ORAL at 08:25

## 2025-06-17 RX ADMIN — LEVOTHYROXINE SODIUM 50 MCG: 0.05 TABLET ORAL at 06:00

## 2025-06-17 RX ADMIN — ESCITALOPRAM OXALATE 20 MG: 10 TABLET ORAL at 08:26

## 2025-06-17 RX ADMIN — Medication 250 MG: at 17:32

## 2025-06-17 RX ADMIN — LIDOCAINE: 40 CREAM TOPICAL at 10:24

## 2025-06-17 RX ADMIN — LURASIDONE HYDROCHLORIDE 40 MG: 40 TABLET, FILM COATED ORAL at 15:36

## 2025-06-17 RX ADMIN — CLONAZEPAM 1 MG: 1 TABLET ORAL at 20:35

## 2025-06-17 RX ADMIN — FAMOTIDINE 20 MG: 20 TABLET, FILM COATED ORAL at 08:25

## 2025-06-17 RX ADMIN — LAMOTRIGINE 250 MG: 100 TABLET ORAL at 08:26

## 2025-06-17 RX ADMIN — BUPROPION HYDROCHLORIDE 300 MG: 150 TABLET, EXTENDED RELEASE ORAL at 08:27

## 2025-06-17 RX ADMIN — FAMOTIDINE 20 MG: 20 TABLET, FILM COATED ORAL at 17:32

## 2025-06-17 RX ADMIN — HYDROXYZINE HYDROCHLORIDE 50 MG: 50 TABLET ORAL at 20:36

## 2025-06-17 RX ADMIN — TIZANIDINE 4 MG: 4 TABLET ORAL at 09:29

## 2025-06-17 RX ADMIN — ZOLPIDEM TARTRATE 10 MG: 5 TABLET, FILM COATED ORAL at 20:39

## 2025-06-17 RX ADMIN — PANTOPRAZOLE SODIUM 40 MG: 40 TABLET, DELAYED RELEASE ORAL at 15:36

## 2025-06-17 RX ADMIN — GABAPENTIN 800 MG: 400 CAPSULE ORAL at 20:34

## 2025-06-17 NOTE — NURSING NOTE
Patient is visible on the unit this shift.  She continues to ruminate on physical ailments and medications.  PRNs utilized.  She endorses anxiety and depression but denies SI/HI and AH/VH this shift.  Medication and meal complaint.  Attending groups.  Safety precautions maintained.

## 2025-06-17 NOTE — PROGRESS NOTES
"Progress Note - Ericka Castillo 49 y.o. female MRN: [id number]    Unit/Bed#: [facility] 222-01 Encounter: [id number]        Subjective:   Patient seen and examined at bedside after reviewing the chart and discussing the case with the caring staff.      Patient examined at bedside.  Patient requesting to increase her pain medication.  She is also upset with her sleeping medications.    BP low this AM.  Patient did not note any symptoms of dizziness or lightheadedness.     Physical Exam   Vitals: Blood pressure (!) 83/50, pulse 76, temperature 97.7 °F (36.5 °C), temperature source Temporal, resp. rate 16, height 5' 5\" (1.651 m), weight 54.5 kg (120 lb 3.2 oz), SpO2 100%, not currently breastfeeding.,Body mass index is 20 kg/m².  Constitutional: Patient in no acute distress.  HEENT: PERR, EOMI, MMM.  Cardiovascular: Normal rate and regular rhythm.    Pulmonary/Chest: Effort normal and breath sounds normal.   Abdomen: Soft, + BS, NT.    Assessment/Plan:  Ericka Castillo is a(n) 49 y.o. female with MDD.      Hypertension.  D/c lisinopril 5 mg 6/14 due to low BP.  Hypothyroidism.  Continue Synthroid 50 mcg daily. TSH WNL 6/13.   GERD.  Continue Protonix 40 mg daily (Prevacid nonform) and Pepcid 20 mg twice daily.   CKD.  Avoid nephrotoxins.  Patient takes NSAID as needed outpatient for pain.  Celebrex as needed here with close monitoring of kidneys.   HRT.  Continue estradoil 0.06 mg/24 hr patch weekly (Sat at 4 pm).   Chronic pain syndrome w/ neck pain, cervical spondylosis, cervical radiculopathy, myofascial pain syndrome, and continuous opioid dependence.  Patient follows with pain management.  Continue gabapentin 800 mg TID, Norco 5-325 mg twice daily as needed for moderate pain.  Home Parafon forte 500 mg 3 times daily prn and nabumetone 500 mg twice daily prn non formulary.   Celebrex 100 mg twice daily as needed for mild pain, Tylenol 975 mg q6h as needed for severe pain, Flexeril 10 mg TID as needed for muscle " spasms.  Lidocaine cream as needed -declines lidocaine patch, Voltaren, Bengay.   Diarrhea.  Continue probiotic daily.  Imodium as needed.     The patient was discussed with Dr. Gabriel and he is in agreement with the above note.

## 2025-06-17 NOTE — PROGRESS NOTES
06/16/25 0900   Team Members Present   Team Members Present Physician;Nurse;;Occupational Therapist   Physician Team Member MD Shreya Caicedo MD Medei CRNP   Nursing Team Member James HERNANDEZ   Care Management Team Member MS Ilya   OT Team Member Pope SUZIE   Patient/Family Present   Patient Present No   Patient's Family Present No   201. Eating well. Broken sleep. Attending groups. Denies all. Med compliant. Mood has improved.  Pain is a trigger. D/C date known due to med adjustment.

## 2025-06-17 NOTE — PROGRESS NOTES
"Progress Note - Behavioral Health   Name: Ericka Castillo 49 y.o. female I MRN: [id number]  Unit/Bed#: -01 I Date of Admission: 6/12/2025   Date of Service: 6/17/2025 I Hospital Day: 5        Assessment & Plan  Bipolar 2 disorder (HCC)  Continue Klonopin to 1 mg twice daily for generalized anxiety and mood stability  Decrease Lexapro 10 mg daily to reduce mood instability in context of bipolar disorder  Continue Wellbutrin  mg daily for symptoms of depression  Continue gabapentin 800 mg 3 times daily for chronic pain as well as mood stability  Continue Lamictal 250 mg daily for mood stability  Increase Latuda 40 mg daily with dinner for ongoing mood stabilization and residual depressive symptoms  Change Atarax 25 mg PO BID and 50 mg PO QHS for anxiety management  Discontinue melatonin 6 mg PO QHS due to ineffectiveness and reduce polypharmacy      Risks / Benefits of Treatment:  Risks, benefits, and possible side effects of medications explained to patient and patient verbalizes understanding and agreement for treatment.      Progress Toward Goals: Unchanged.  Remains anxious and hyperfocused on medication regimen.  Underlying irritability noted.  Ruminating on setting boundaries with her son.  Plan is to taper Lexapro 10 mg daily to reduce mood instability in the context of bipolar disorder.  Increase Latuda 40 mg daily for ongoing treatment of depression and mood stabilization.  Change Atarax 25 mg PO BID and 50 mg PO QHS for anxiety management.  No discharge date this time.      Subjective:    Ericka was seen today for psychiatric follow-up.  Patient was perseverative on psychotropic regimen throughout encounter and consistently trying to \"wheel and deal\" with medication changes.  Poorly receptive to medication education and stated \"I can tolerate a lot of medications.  When I go home, I take the medications as I need them.\"  Presents with mood instability and endorses high degree of anxiety " "regarding psychotropic regimen.  Also states \"I can't sleep. I need something to take with my Ambien to sleep.\"  Denied suicidal ideation.  Has been visible and attending unit activities.      Behavior over the last 24 hours: unchanged.   Sleep: \"I can't sleep\"  Appetite: normal  Medication side effects: No   ROS: no complaints, all other systems are negative    Objective :  Temp:  [97.1 °F (36.2 °C)-97.7 °F (36.5 °C)] 97.7 °F (36.5 °C)  HR:  [76-79] 76  BP: (83-99)/(50-62) 83/50  Resp:  [16-18] 16  SpO2:  [100 %] 100 %  O2 Device: None (Room air)    Mental Status Evaluation:  Appearance:  Older than stated age, blond hair, casually dressed   Behavior:  Cooperative   Speech:  Slightly pressured   Mood:  \"Severely anxious\"   Affect:  Anxious   Thought Process:  Perseverative   Associations: Perseverative   Thought Content:  Negative and ruminating   Perceptual Disturbances: Denied AVH, did not appear internally preoccupied   Risk Potential: Suicidal ideation - None at present  Homicidal ideation - None  Potential for aggression - No   Sensorium:  oriented to person, place, and time/date   Memory:  recent and remote memory grossly intact   Consciousness:  alert and awake   Attention/Concentration: attention span and concentration appear shorter than expected for age   Insight:  limited   Judgment: limited   Gait/Station: normal gait/station, normal balance   Motor Activity: no abnormal movements       Lab Results: I have reviewed the following results:  CMP:   Lab Results   Component Value Date    SODIUM 141 06/13/2025    K 4.0 06/13/2025     06/13/2025    CO2 31 06/13/2025    AGAP 4 06/13/2025    BUN 16 06/13/2025    CREATININE 1.13 06/13/2025    GLUC 87 06/13/2025    GLUF 87 06/13/2025    CALCIUM 8.6 06/13/2025    AST 13 06/13/2025    ALT 16 06/13/2025    ALKPHOS 47 06/13/2025    TP 5.5 (L) 06/13/2025    ALB 3.8 06/13/2025    TBILI 0.24 06/13/2025    EGFR 56 06/13/2025     Drug Screen:   Lab Results "   Component Value Date    AMPMETHUR Negative 06/12/2025    BARBTUR Negative 06/12/2025    BDZUR Positive (A) 06/12/2025    THCUR Negative 06/12/2025    COCAINEUR Negative 06/12/2025    METHADONEUR Negative 06/12/2025    OPIATEUR Negative 06/12/2025    PCPUR Negative 06/12/2025     Counseling / Coordination of Care:    Patient's progress discussed with staff in treatment team meeting.  Medication changes reviewed with staff in treatment team meeting.  Patient's progress reviewed with nursing staff.  Medication changes discussed with patient.  Medication education provided to patient.  Patient's progress reviewed with case management staff.  Patient's diagnosis and treatment indicated reviewed with patient.  Importance of medication and treatment compliance reviewed with patient.  Educated on importance of medication and treatment compliance.  Discussed with patient acceptance of mental illness diagnosis and need for ongoing treatment after discharge.  Importance of follow up for substance abuse issues discussed with patient.  Cognitive techniques utilized during the session.  Reassurance and supportive therapy provided.  Reoriented to reality and reassured.  Group attendance encouraged.  Encouraged participation in milieu and group therapy on the unit.  Crisis/safety plan discussed with patient.      FIONA Walker 06/17/25

## 2025-06-17 NOTE — PLAN OF CARE
Problem: Risk for Self Injury/Neglect  Goal: Verbalize thoughts and feelings  Description: Interventions:  - Assess and re-assess patient's lethality and potential for self-injury  - Engage patient in 1:1 interactions, daily, for a minimum of 15 minutes  - Encourage patient to express feelings, fears, frustrations, hopes  - Establish rapport/trust with patient   Outcome: Progressing     Problem: Depression  Goal: Refrain from harming self  Description: Interventions:  - Monitor patient closely, per order   - Supervise medication ingestion, monitor effects and side effects   Outcome: Progressing

## 2025-06-17 NOTE — ASSESSMENT & PLAN NOTE
Continue Klonopin to 1 mg twice daily for generalized anxiety and mood stability  Decrease Lexapro 10 mg daily to reduce mood instability in context of bipolar disorder  Continue Wellbutrin  mg daily for symptoms of depression  Continue gabapentin 800 mg 3 times daily for chronic pain as well as mood stability  Continue Lamictal 250 mg daily for mood stability  Increase Latuda 40 mg daily with dinner for ongoing mood stabilization and residual depressive symptoms  Change Atarax 25 mg PO BID and 50 mg PO QHS for anxiety management  Discontinue melatonin 6 mg PO QHS due to ineffectiveness and reduce polypharmacy

## 2025-06-17 NOTE — PROGRESS NOTES
Met with Ericka to follow up on an assignment given to her yesterday to help her address her  Guilt and shame.We processed her letter towards her son regarding her hurt and anger for his irresponsible behaviors and how she has taken his responsibilities. He plays the victim card manipulates her thoughts and feelings to the point she believes she is not a good mom because she is unable to do what he wants. He lacks gratitude. These behaviors led to her self destructive thought patterns and behaviors. She did write a letter to him in which she berated herself for not being able to do more; when the truth be told she did too much that he has learned to blame others and continues to be irresponsible. She suppress her hurt and anger with many faulty believes and irrational thoughts. We began to address this issue. She will rewrite this letter identifying her her guilt, hurt and hopefully give herself permission to express her anger. We will explore her distorted thoughts and teach her how to reframe them. We will also identify ways she can set healthier boundaries and coping skills.

## 2025-06-17 NOTE — NURSING NOTE
Patient observed in the milieu this evening. She reports her mood continues to improve however feels her physical ailments are affecting progression. She hopes to speak to providers tomorrow about increasing the frequency of pain medication. She states she has been learning a lot since admission, practicing some coping skills and mental exercises. Denies SI/HI and hallucinations. Q15 minute checks ongoing.

## 2025-06-18 ENCOUNTER — APPOINTMENT (OUTPATIENT)
Dept: PSYCHOLOGY | Facility: CLINIC | Age: 50
End: 2025-06-18
Payer: COMMERCIAL

## 2025-06-18 ENCOUNTER — TELEPHONE (OUTPATIENT)
Age: 50
End: 2025-06-18

## 2025-06-18 ENCOUNTER — TELEPHONE (OUTPATIENT)
Dept: PSYCHOLOGY | Facility: CLINIC | Age: 50
End: 2025-06-18

## 2025-06-18 PROCEDURE — 99232 SBSQ HOSP IP/OBS MODERATE 35: CPT | Performed by: NURSE PRACTITIONER

## 2025-06-18 RX ADMIN — HYDROXYZINE HYDROCHLORIDE 25 MG: 25 TABLET, FILM COATED ORAL at 08:22

## 2025-06-18 RX ADMIN — PANTOPRAZOLE SODIUM 40 MG: 40 TABLET, DELAYED RELEASE ORAL at 08:26

## 2025-06-18 RX ADMIN — GABAPENTIN 800 MG: 400 CAPSULE ORAL at 16:09

## 2025-06-18 RX ADMIN — HYDROXYZINE HYDROCHLORIDE 50 MG: 50 TABLET ORAL at 20:29

## 2025-06-18 RX ADMIN — GABAPENTIN 800 MG: 400 CAPSULE ORAL at 08:22

## 2025-06-18 RX ADMIN — ACETAMINOPHEN 975 MG: 325 TABLET ORAL at 04:38

## 2025-06-18 RX ADMIN — PANTOPRAZOLE SODIUM 40 MG: 40 TABLET, DELAYED RELEASE ORAL at 16:12

## 2025-06-18 RX ADMIN — CLONAZEPAM 1 MG: 1 TABLET ORAL at 20:29

## 2025-06-18 RX ADMIN — TIZANIDINE 4 MG: 4 TABLET ORAL at 21:53

## 2025-06-18 RX ADMIN — CLONAZEPAM 1 MG: 1 TABLET ORAL at 08:22

## 2025-06-18 RX ADMIN — FAMOTIDINE 20 MG: 20 TABLET, FILM COATED ORAL at 18:23

## 2025-06-18 RX ADMIN — LAMOTRIGINE 250 MG: 100 TABLET ORAL at 08:22

## 2025-06-18 RX ADMIN — ESCITALOPRAM OXALATE 10 MG: 10 TABLET ORAL at 08:22

## 2025-06-18 RX ADMIN — HYDROCODONE BITARTRATE AND ACETAMINOPHEN 1 TABLET: 5; 325 TABLET ORAL at 08:22

## 2025-06-18 RX ADMIN — Medication 250 MG: at 18:23

## 2025-06-18 RX ADMIN — HYDROXYZINE HYDROCHLORIDE 100 MG: 50 TABLET ORAL at 13:03

## 2025-06-18 RX ADMIN — LURASIDONE HYDROCHLORIDE 40 MG: 40 TABLET, FILM COATED ORAL at 16:12

## 2025-06-18 RX ADMIN — HYDROCODONE BITARTRATE AND ACETAMINOPHEN 1 TABLET: 5; 325 TABLET ORAL at 16:10

## 2025-06-18 RX ADMIN — BUPROPION HYDROCHLORIDE 300 MG: 150 TABLET, EXTENDED RELEASE ORAL at 08:22

## 2025-06-18 RX ADMIN — ZOLPIDEM TARTRATE 10 MG: 5 TABLET, FILM COATED ORAL at 20:30

## 2025-06-18 RX ADMIN — LOPERAMIDE HYDROCHLORIDE 2 MG: 2 CAPSULE ORAL at 19:40

## 2025-06-18 RX ADMIN — FAMOTIDINE 20 MG: 20 TABLET, FILM COATED ORAL at 08:22

## 2025-06-18 RX ADMIN — LEVOTHYROXINE SODIUM 50 MCG: 0.05 TABLET ORAL at 04:38

## 2025-06-18 RX ADMIN — GABAPENTIN 800 MG: 400 CAPSULE ORAL at 20:30

## 2025-06-18 RX ADMIN — Medication 250 MG: at 08:22

## 2025-06-18 NOTE — NURSING NOTE
Patient noted to have broken sleep x1 throughout the night. Pt stated it's related to pain. Non labored breathing noted while asleep. Q 15 min safety checks maintained.

## 2025-06-18 NOTE — PLAN OF CARE
Problem: Ineffective Coping  Goal: Participates in unit activities  Description: Interventions:  - Provide therapeutic environment   - Provide required programming   - Redirect inappropriate behaviors   Outcome: Progressing     Problem: Risk for Self Injury/Neglect  Goal: Attend and participate in unit activities, including therapeutic, recreational, and educational groups  Description: Interventions:  - Provide therapeutic and educational activities daily, encourage attendance and participation, and document same in the medical record  - Obtain collateral information, encourage visitation and family involvement in care   Outcome: Progressing     Problem: Depression  Goal: Attend and participate in unit activities, including therapeutic, recreational, and educational groups  Description: Interventions:  - Provide therapeutic and educational activities daily, encourage attendance and participation, and document same in the medical record   Outcome: Progressing   Pt has been active in group therapy

## 2025-06-18 NOTE — NURSING NOTE
Upon reassessment, patient reports no relief of anxiety and is asking for more PRN medications. Medication ineffective.

## 2025-06-18 NOTE — NURSING NOTE
Patient visible in milieu with peers, medication compliant and cooperative. Patient attends group therapies and interacts appropriately with peers.Patient has high anxiety and depression, denies SI/HI and hallucinations. Continued care and safety rounds in progress.

## 2025-06-18 NOTE — ASSESSMENT & PLAN NOTE
Continue Klonopin to 1 mg twice daily for generalized anxiety and mood stability  Continue Lexapro 10 mg daily to reduce mood instability in context of bipolar disorder; decreased 6/17/2025  Continue Wellbutrin  mg daily for symptoms of depression  Continue gabapentin 800 mg 3 times daily for chronic pain as well as mood stability  Continue Lamictal 250 mg daily for mood stability  Continue Latuda 40 mg daily with dinner for ongoing mood stabilization and residual depressive symptoms; increase 6/17/2025  Continue Atarax 25 mg PO BID and 50 mg PO QHS for anxiety management; increased 6/17/2025

## 2025-06-18 NOTE — NURSING NOTE
Patient visible on the unit. Calm and cooperative. Denies SI,HI,AVH, does endorse anxiety and depression. Safety checks continue Q 15 minutes.

## 2025-06-18 NOTE — PROGRESS NOTES
06/18/25   Team Members Present   Team Members Present Physician;Nurse;;Occupational Therapist   Physician Team Member MD Shreya Caicedo MD Medei, CRNP   Nursing Team Member Julia ALFONSO RN   Care Management Team Member MS Ilya   OT Team Member SUZIE Capps, SUZIE Galvan   Patient/Family Present   Patient Present No   Patient's Family Present No   201. Still endorsing anxiety and depression. Somatic complaints. Focused on meds.   Spooked when speaking about discharge. Made some progress. Raging inside regarding   her 18 y/o son. Concerned for her safety and everyone else's safety. Eating/sleeping well.  Med compliant. PHP referral. D/C 6-23.

## 2025-06-18 NOTE — PROGRESS NOTES
"Progress Note - Behavioral Health   Name: Ericka Castillo 49 y.o. female I MRN: [id number]  Unit/Bed#: -01 I Date of Admission: 6/12/2025   Date of Service: 6/18/2025 I Hospital Day: 6        Assessment & Plan  Bipolar 2 disorder (HCC)  Continue Klonopin to 1 mg twice daily for generalized anxiety and mood stability  Continue Lexapro 10 mg daily to reduce mood instability in context of bipolar disorder; decreased 6/17/2025  Continue Wellbutrin  mg daily for symptoms of depression  Continue gabapentin 800 mg 3 times daily for chronic pain as well as mood stability  Continue Lamictal 250 mg daily for mood stability  Continue Latuda 40 mg daily with dinner for ongoing mood stabilization and residual depressive symptoms; increase 6/17/2025  Continue Atarax 25 mg PO BID and 50 mg PO QHS for anxiety management; increased 6/17/2025      Risks / Benefits of Treatment:  Risks, benefits, and possible side effects of medications explained to patient and patient verbalizes understanding and agreement for treatment.      Progress Toward Goals: Some improvement.  Endorses improved sleep.  Less anxious.  Visible in the milieu and maintaining safety.  Tolerating medication changes well with no adverse effects.  Expresses interest in restarting PHP upon discharge.  Anticipated discharge early next week.      Subjective:    Ericka remains visible and engaged in unit activities.  States she slept \"much better\" last evening with \"combination of Atarax, Ambien, and Norco.\"  Reports decreasing anxiety and presents with congruent affect.  Remains perseverative on medications.  Continues to ruminate on setting boundaries with her son upon discharge.  Has otherwise been maintaining safety with no SI.      Behavior over the last 24 hours: Some improvement  Sleep: Improved  Appetite: normal  Medication side effects: No   ROS: no complaints, all other systems are negative    Objective :  Temp:  [97.6 °F (36.4 °C)-98.6 °F (37 " "°C)] 98.6 °F (37 °C)  HR:  [74-78] 78  BP: ()/(56-67) 86/67  Resp:  [18] 18  SpO2:  [100 %] 100 %  O2 Device: None (Room air)    Mental Status Evaluation:  Appearance:  Older than stated age, blond hair, casually dressed   Behavior:  Cooperative   Speech:  Normal pitch/volume/rate   Mood:  \"Less anxious\"   Affect:  Mood congruent   Thought Process:  Perseverative   Associations: Perseverative   Thought Content:  Less negative, ruminating thoughts   Perceptual Disturbances: Denied AVH, did not appear internally preoccupied   Risk Potential: Suicidal ideation - None  Homicidal ideation - None  Potential for aggression - No   Sensorium:  oriented to person, place, and time/date   Memory:  recent and remote memory grossly intact   Consciousness:  alert and awake   Attention/Concentration: attention span and concentration appear shorter than expected for age   Insight:  limited   Judgment: limited   Gait/Station: normal gait/station, normal balance   Motor Activity: no abnormal movements       Lab Results: I have reviewed the following results:  CMP:   Lab Results   Component Value Date    SODIUM 141 06/13/2025    K 4.0 06/13/2025     06/13/2025    CO2 31 06/13/2025    AGAP 4 06/13/2025    BUN 16 06/13/2025    CREATININE 1.13 06/13/2025    GLUC 87 06/13/2025    GLUF 87 06/13/2025    CALCIUM 8.6 06/13/2025    AST 13 06/13/2025    ALT 16 06/13/2025    ALKPHOS 47 06/13/2025    TP 5.5 (L) 06/13/2025    ALB 3.8 06/13/2025    TBILI 0.24 06/13/2025    EGFR 56 06/13/2025     Drug Screen:   Lab Results   Component Value Date    AMPMETHUR Negative 06/12/2025    BARBTUR Negative 06/12/2025    BDZUR Positive (A) 06/12/2025    THCUR Negative 06/12/2025    COCAINEUR Negative 06/12/2025    METHADONEUR Negative 06/12/2025    OPIATEUR Negative 06/12/2025    PCPUR Negative 06/12/2025     Counseling / Coordination of Care:    Patient's progress discussed with staff in treatment team meeting.  Medication changes reviewed with " staff in treatment team meeting.  Patient's progress reviewed with nursing staff.  Medication changes discussed with patient.  Medication education provided to patient.  Patient's progress reviewed with case management staff.  Patient's diagnosis and treatment indicated reviewed with patient.  Importance of medication and treatment compliance reviewed with patient.  Educated on importance of medication and treatment compliance.  Discussed with patient acceptance of mental illness diagnosis and need for ongoing treatment after discharge.  Importance of follow up for substance abuse issues discussed with patient.  Cognitive techniques utilized during the session.  Reassurance and supportive therapy provided.  Reoriented to reality and reassured.  Group attendance encouraged.  Encouraged participation in milieu and group therapy on the unit.  Crisis/safety plan discussed with patient.      FIONA Walker 06/18/25

## 2025-06-18 NOTE — PROGRESS NOTES
"Progress Note - Ericka Castillo 49 y.o. female MRN: [id number]    Unit/Bed#: [facility] 247-01 Encounter: [id number]        Subjective:   Patient seen and examined at bedside after reviewing the chart and discussing the case with the caring staff.      Patient examined at bedside.  Patient denies any acute symptoms.     BP low this AM.  Patient did not note any symptoms of dizziness or lightheadedness.     Physical Exam   Vitals: Blood pressure (!) 86/67, pulse 78, temperature 98.6 °F (37 °C), temperature source Temporal, resp. rate 18, height 5' 5\" (1.651 m), weight 54.5 kg (120 lb 3.2 oz), SpO2 100%, not currently breastfeeding.,Body mass index is 20 kg/m².  Constitutional: Patient in no acute distress.  HEENT: PERR, EOMI, MMM.  Cardiovascular: Normal rate and regular rhythm.    Pulmonary/Chest: Effort normal and breath sounds normal.   Abdomen: Soft, + BS, NT.    Assessment/Plan:  Ericka Castillo is a(n) 49 y.o. female with MDD.      Hypertension.  D/c lisinopril 5 mg 6/14 due to low BP.  Hypothyroidism.  Continue Synthroid 50 mcg daily. TSH WNL 6/13.   GERD.  Continue Protonix 40 mg daily (Prevacid nonform) and Pepcid 20 mg twice daily.   CKD.  Avoid nephrotoxins.  Patient takes NSAID as needed outpatient for pain.  Celebrex as needed here with close monitoring of kidneys.   HRT.  Continue estradoil 0.06 mg/24 hr patch weekly (Sat at 4 pm).   Chronic pain syndrome w/ neck pain, cervical spondylosis, cervical radiculopathy, myofascial pain syndrome, and continuous opioid dependence.  Patient follows with pain management.  Continue gabapentin 800 mg TID, Norco 5-325 mg twice daily as needed for moderate pain.  Home Parafon forte 500 mg 3 times daily prn and nabumetone 500 mg twice daily prn non formulary.   Celebrex 100 mg twice daily as needed for mild pain, Tylenol 975 mg q6h as needed for severe pain, Flexeril 10 mg TID as needed for muscle spasms.  Lidocaine cream as needed -declines lidocaine patch, " Voltaren, Bengay.   Diarrhea.  Continue probiotic daily.  Imodium as needed.     The patient was discussed with Dr. Gabriel and he is in agreement with the above note.

## 2025-06-18 NOTE — TELEPHONE ENCOUNTER
Miri from [facility] called because patient has been admitted to behavioral health unit.  Miri is requesting the FMLA forms that patient dropped off for provider to sign so outpatient and inpatient can take care of their part of the paperwork now.    The FMLA form can be faxed to [phone].    Please advise, thank you.

## 2025-06-18 NOTE — CASE MANAGEMENT
[facility] PARTIAL PROGRAM REFERRAL     [facility]    Name and Date of Birth:  Ericka Castillo 49 y.o. 1975    Date of Referral: June 18, 2025    Referral Source (Agency/Name): [facility]    Correct Demographics on file:  Yes     Emergency contact on file: Yes     Insurance on file: Yes     _____________________________________________      Presenting Symptoms and Stressors:   Chronic MH stressors , financial stressors, reported reduced medication efficacy,  relationship concerns, poor past treatment response.     Please describe the reason for Referral: Pt would benefit from a least restrictive environment with peer support , groups, and individual therapies.     Stressors: MH stressors, Financial stressors, Relationship Stressors    Symptoms:  anxiety    Suicidal Ideation: None at present    Homicidal Ideation: None at present    Depressed Mood: Yes    Radha/Hypomania: N/A    Psychosis: N/A    Agitation: No    Appetite Changes: normal appetite    Sleep Disturbance: normal sleep    Access to Weapons:  No    Smoking Status: denies current use    Substance Use:  Hx of Opiate use.  If Use : Last use 25 years ago.  If Use: Current SA treatment: No current treatment.    Current Psychiatrist or Therapist:    Psychiatrist: Dr. Daniel Guardado, monthly    Therapist:  Sania Powell LCSW, [facility]  Past Psychiatric Treatment: PHP    If currently Inpatient - Date of Anticipated discharge: 06/23/2025    Diagnoses:  Bipolar Disorder, type II    Do they Require Ambulatory Assistance: No    Communication Assistance: not required     Legal Issues: None        Lana Caicedo

## 2025-06-18 NOTE — TREATMENT TEAM
06/18/25 1303   Hope Anxiety Scale   Anxious Mood 4   Tension 4   Fears 4   Insomnia 1   Intellectual 3   Depressed Mood 3   Somatic Complaints: Muscular 3   Somatic Complaints: Sensory 0   Cardiovascular Symptoms 0   Respiratory Symptoms 0   Gastrointestinal Symptoms 0   Genitourinary Symptoms 0   Autonomic Symptoms 1   Behavior at Interview 4   Hope Anxiety Score 27     100mg Atarax administered at 1303 for severe anxiety as indicated by Hope score.

## 2025-06-18 NOTE — PROGRESS NOTES
06/17/25 0900   Team Meeting   Meeting Type Daily Rounds   Team Members Present   Team Members Present Physician;Nurse;;Occupational Therapist   Physician Team Member MD Shreya Caicedo MD, FIONA Wheeler   Nursing Team Member MARY Lutz   Care Management Team Member MS Ilya   OT Team Member SUZIE Capps   Patient/Family Present   Patient Present No   Patient's Family Present No   201. Trouble falling asleep. Eating well. Denies all. Med seeking. Attending groups. Med compliant. D/C date not known due to med adjustment.

## 2025-06-19 ENCOUNTER — APPOINTMENT (OUTPATIENT)
Dept: PSYCHOLOGY | Facility: CLINIC | Age: 50
End: 2025-06-19
Payer: COMMERCIAL

## 2025-06-19 DIAGNOSIS — N95.1 MENOPAUSAL SYMPTOM: ICD-10-CM

## 2025-06-19 PROCEDURE — 99232 SBSQ HOSP IP/OBS MODERATE 35: CPT | Performed by: NURSE PRACTITIONER

## 2025-06-19 RX ORDER — ESTRADIOL 0.06 MG/D
1 PATCH TRANSDERMAL WEEKLY
Qty: 4 PATCH | Refills: 4 | Status: SHIPPED | OUTPATIENT
Start: 2025-06-19 | End: 2025-06-20

## 2025-06-19 RX ADMIN — BUPROPION HYDROCHLORIDE 300 MG: 150 TABLET, EXTENDED RELEASE ORAL at 08:24

## 2025-06-19 RX ADMIN — FAMOTIDINE 20 MG: 20 TABLET, FILM COATED ORAL at 17:24

## 2025-06-19 RX ADMIN — GABAPENTIN 800 MG: 400 CAPSULE ORAL at 08:24

## 2025-06-19 RX ADMIN — FAMOTIDINE 20 MG: 20 TABLET, FILM COATED ORAL at 08:24

## 2025-06-19 RX ADMIN — GABAPENTIN 800 MG: 400 CAPSULE ORAL at 15:25

## 2025-06-19 RX ADMIN — LOPERAMIDE HYDROCHLORIDE 2 MG: 2 CAPSULE ORAL at 06:32

## 2025-06-19 RX ADMIN — PANTOPRAZOLE SODIUM 40 MG: 40 TABLET, DELAYED RELEASE ORAL at 15:25

## 2025-06-19 RX ADMIN — HYDROXYZINE HYDROCHLORIDE 50 MG: 50 TABLET ORAL at 20:25

## 2025-06-19 RX ADMIN — Medication 250 MG: at 17:24

## 2025-06-19 RX ADMIN — ZOLPIDEM TARTRATE 10 MG: 5 TABLET, FILM COATED ORAL at 20:25

## 2025-06-19 RX ADMIN — TIZANIDINE 4 MG: 4 TABLET ORAL at 20:25

## 2025-06-19 RX ADMIN — LURASIDONE HYDROCHLORIDE 40 MG: 40 TABLET, FILM COATED ORAL at 15:30

## 2025-06-19 RX ADMIN — CLONAZEPAM 1 MG: 1 TABLET ORAL at 20:25

## 2025-06-19 RX ADMIN — TIZANIDINE 4 MG: 4 TABLET ORAL at 12:56

## 2025-06-19 RX ADMIN — Medication 250 MG: at 08:24

## 2025-06-19 RX ADMIN — HYDROCODONE BITARTRATE AND ACETAMINOPHEN 1 TABLET: 5; 325 TABLET ORAL at 16:18

## 2025-06-19 RX ADMIN — HYDROXYZINE HYDROCHLORIDE 25 MG: 25 TABLET, FILM COATED ORAL at 15:25

## 2025-06-19 RX ADMIN — PANTOPRAZOLE SODIUM 40 MG: 40 TABLET, DELAYED RELEASE ORAL at 08:24

## 2025-06-19 RX ADMIN — CLONAZEPAM 1 MG: 1 TABLET ORAL at 08:24

## 2025-06-19 RX ADMIN — HYDROCODONE BITARTRATE AND ACETAMINOPHEN 1 TABLET: 5; 325 TABLET ORAL at 08:25

## 2025-06-19 RX ADMIN — TIZANIDINE 4 MG: 4 TABLET ORAL at 08:25

## 2025-06-19 RX ADMIN — GABAPENTIN 800 MG: 400 CAPSULE ORAL at 20:25

## 2025-06-19 RX ADMIN — ESCITALOPRAM OXALATE 10 MG: 10 TABLET ORAL at 08:24

## 2025-06-19 RX ADMIN — LEVOTHYROXINE SODIUM 50 MCG: 0.05 TABLET ORAL at 05:19

## 2025-06-19 RX ADMIN — HYDROXYZINE HYDROCHLORIDE 25 MG: 25 TABLET, FILM COATED ORAL at 08:24

## 2025-06-19 RX ADMIN — LAMOTRIGINE 250 MG: 100 TABLET ORAL at 08:24

## 2025-06-19 NOTE — TELEPHONE ENCOUNTER
Patient needs to reschedule 6/25 virtual appointment with Marcelina Najera.  Patient requesting to reschedule to week of 7/7.  Patient is currently in-patient and does not have access to her phone.  Patient states to leave a vm with the new appointment date.

## 2025-06-19 NOTE — PROGRESS NOTES
"Progress Note - Behavioral Health   Name: Ericka Castillo 49 y.o. female I MRN: [id number]  Unit/Bed#: -01 I Date of Admission: 6/12/2025   Date of Service: 6/19/2025 I Hospital Day: 7        Assessment & Plan  Bipolar 2 disorder (HCC)  Continue Klonopin to 1 mg twice daily for generalized anxiety and mood stability  Continue Lexapro 10 mg daily to reduce mood instability in context of bipolar disorder; decreased 6/17/2025  Continue Wellbutrin  mg daily for symptoms of depression  Continue gabapentin 800 mg 3 times daily for chronic pain as well as mood stability  Continue Lamictal 250 mg daily for mood stability  Continue Latuda 40 mg daily with dinner for ongoing mood stabilization and residual depressive symptoms; increase 6/17/2025  Continue Atarax 25 mg PO BID and 50 mg PO QHS for anxiety management; increased 6/17/2025      Risks / Benefits of Treatment:  Risks, benefits, and possible side effects of medications explained to patient and patient verbalizes understanding and agreement for treatment.      Progress Toward Goals: No improvement.  Ruminating and perseverative on medication regimen.  Irritable.  Exhibits low frustration tolerance and continues to endorse ongoing anxiety regarding medication regimen and FMLA papers.  Required significant redirection.  Maintaining safety with no return of SI.  Anticipated discharge early next week with PHP referral.      Subjective:    Ericka is visible in the milieu and attends unit activities.  She endorses a high degree of anxiety today and \"I'm really frustrated.\"  Perseverative on psychotropic regimen and FMLA paperwork.  States \"no one is telling me what medications I'm taking and I have no idea what I'm on.\"  Also states that nurses are giving her as needed medications without her knowing; patient redirected and reality tested.  Educated that she would not be receiving Zanaflex unknowingly unless she had asked for that as a PRN, among others.  " "Irritable edge present.  Otherwise denies SI and has been maintaining safety on unit.  States she is worried she is going to \"fall apart\" when leaving the hospital.  Partially receptive to education regarding safety planning and remain focused on psychotropic regimen.    Behavior over the last 24 hours: Unchanged  Sleep: \"not good\"  Appetite: normal  Medication side effects: No   ROS: no complaints, all other systems are negative    Objective :  Temp:  [97.7 °F (36.5 °C)-98.5 °F (36.9 °C)] 97.7 °F (36.5 °C)  HR:  [72-84] 72  BP: ()/(62-65) 104/65  Resp:  [17-18] 17  SpO2:  [98 %] 98 %  O2 Device: None (Room air)    Mental Status Evaluation:  Appearance:  Older than stated age, blond hair, casually dressed   Behavior:  Cooperative, fidgety   Speech:  Slightly pressured   Mood:  \"Severely anxious\" and \"frustrated\"   Affect:  Superficial, irritable   Thought Process:  Perseverative   Associations: Perseverative   Thought Content:  Negative and ruminating thoughts   Perceptual Disturbances: Denied AVH, did not appear internally preoccupied   Risk Potential: Suicidal ideation - None  Homicidal ideation - None  Potential for aggression - No   Sensorium:  oriented to person, place, and time/date   Memory:  recent and remote memory grossly intact   Consciousness:  alert and awake   Attention/Concentration: attention span and concentration appear shorter than expected for age   Insight:  limited   Judgment: limited   Gait/Station: normal gait/station, normal balance   Motor Activity: no abnormal movements       Lab Results: I have reviewed the following results:  CMP:   Lab Results   Component Value Date    SODIUM 141 06/13/2025    K 4.0 06/13/2025     06/13/2025    CO2 31 06/13/2025    AGAP 4 06/13/2025    BUN 16 06/13/2025    CREATININE 1.13 06/13/2025    GLUC 87 06/13/2025    GLUF 87 06/13/2025    CALCIUM 8.6 06/13/2025    AST 13 06/13/2025    ALT 16 06/13/2025    ALKPHOS 47 06/13/2025    TP 5.5 (L) 06/13/2025 "    ALB 3.8 06/13/2025    TBILI 0.24 06/13/2025    EGFR 56 06/13/2025     Drug Screen:   Lab Results   Component Value Date    AMPMETHUR Negative 06/12/2025    BARBTUR Negative 06/12/2025    BDZUR Positive (A) 06/12/2025    THCUR Negative 06/12/2025    COCAINEUR Negative 06/12/2025    METHADONEUR Negative 06/12/2025    OPIATEUR Negative 06/12/2025    PCPUR Negative 06/12/2025     Counseling / Coordination of Care:    Patient's progress discussed with staff in treatment team meeting.  Medication changes reviewed with staff in treatment team meeting.  Patient's progress reviewed with nursing staff.  Medication changes discussed with patient.  Medication education provided to patient.  Patient's progress reviewed with case management staff.  Patient's diagnosis and treatment indicated reviewed with patient.  Importance of medication and treatment compliance reviewed with patient.  Educated on importance of medication and treatment compliance.  Discussed with patient acceptance of mental illness diagnosis and need for ongoing treatment after discharge.  Importance of follow up for substance abuse issues discussed with patient.  Cognitive techniques utilized during the session.  Reassurance and supportive therapy provided.  Reoriented to reality and reassured.  Group attendance encouraged.  Encouraged participation in milieu and group therapy on the unit.  Crisis/safety plan discussed with patient.      FIONA Walker 06/19/25

## 2025-06-19 NOTE — PROGRESS NOTES
06/19/25 1056   Team Meeting   Meeting Type Daily Rounds   Team Members Present   Team Members Present Physician;Nurse;;   Physician Team Member Stefania/Summer   Nursing Team Member Gilbert   Care Management Team Member Glenny Caicedo   Social Work Team Member    Patient/Family Present   Patient Present No   Patient's Family Present No     Pt is a 201. Pt denies all s/s. Pt reported adequate sleep. Pt is irritable at times. Pt is on PHP wait list. Pt endorses anxiety and depression. Pt discharge projected for 6/23/2025.

## 2025-06-19 NOTE — SOCIAL WORK
CM spoke with pt regarding the conversation that she had with pt's PCP FIONA Hawkins [phone] regarding the [facility] paperwork. CM informed pt that they said they have the paperwork and would fax it over to [facility] either at 4:30 today or tomorrow morning. Pt acknowledged this and CM stated that would discuss more of this when  the paperwork was sent over.

## 2025-06-19 NOTE — TELEPHONE ENCOUNTER
Medication: estradiol (Climara) 0.06 MG/24HR     Dose/Frequency: 1 patch on the skin over 7 days once a week     Quantity: 4    Pharmacy: [id number] - [place] - [address]      Office:   [] PCP/Provider -   [] Speciality/Provider -     Does the patient have enough for 3 days?   [x] Yes   [] No - Send as HP to POD

## 2025-06-19 NOTE — TELEPHONE ENCOUNTER
LVM for the pt. We do not have nothing on the week of 07/07/25. Next available appointment will be around August.

## 2025-06-19 NOTE — PLAN OF CARE
Problem: Risk for Self Injury/Neglect  Goal: Complete daily ADLs, including personal hygiene independently, as able  Description: Interventions:  - Observe, teach, and assist patient with ADLS  - Monitor and promote a balance of rest/activity, with adequate nutrition and elimination  Outcome: Progressing     Problem: Depression  Goal: Verbalize thoughts and feelings  Description: Interventions:  - Assess and re-assess patient's level of risk   - Engage patient in 1:1 interactions, daily, for a minimum of 15 minutes   - Encourage patient to express feelings, fears, frustrations, hopes   Outcome: Progressing

## 2025-06-19 NOTE — NURSING NOTE
Patient was anxious and restless. Patient had an irritable edge at times. Staff support provided. Staff support provided. Q 15 minute safety checks maintained. Groups encouraged. Patient denied SI/HI. Patient reports ongoing anxiety. Patient was tearful this afternoon focused on FMLA paperwork. Patient compliant with medications and groups. Staff will continue to monitor and support.

## 2025-06-19 NOTE — PROGRESS NOTES
"Progress Note - Ericka Castillo 49 y.o. female MRN: [id number]    Unit/Bed#: [facility] 247-01 Encounter: [id number]        Subjective:   Patient seen and examined at bedside after reviewing the chart and discussing the case with the caring staff.      Patient examined at bedside.  Patient denies any acute symptoms.     Physical Exam   Vitals: Blood pressure 104/65, pulse 72, temperature 97.7 °F (36.5 °C), temperature source Temporal, resp. rate 17, height 5' 5\" (1.651 m), weight 54.5 kg (120 lb 3.2 oz), SpO2 98%, not currently breastfeeding.,Body mass index is 20 kg/m².  Constitutional: Patient in no acute distress.  HEENT: PERR, EOMI, MMM.  Cardiovascular: Normal rate and regular rhythm.    Pulmonary/Chest: Effort normal and breath sounds normal.   Abdomen: Soft, + BS, NT.    Assessment/Plan:  Ericka Castillo is a(n) 49 y.o. female with MDD.      Hypertension.  D/c lisinopril 5 mg 6/14 due to low BP.  Hypothyroidism.  Continue Synthroid 50 mcg daily. TSH WNL 6/13.   GERD.  Continue Protonix 40 mg daily (Prevacid nonform) and Pepcid 20 mg twice daily.   CKD.  Avoid nephrotoxins.  Patient takes NSAID as needed outpatient for pain.  Celebrex as needed here with close monitoring of kidneys.   HRT.  Continue estradoil 0.06 mg/24 hr patch weekly (Sat at 4 pm).   Chronic pain syndrome w/ neck pain, cervical spondylosis, cervical radiculopathy, myofascial pain syndrome, and continuous opioid dependence.  Patient follows with pain management.  Continue gabapentin 800 mg TID, Norco 5-325 mg twice daily as needed for moderate pain.  Home Parafon forte 500 mg 3 times daily prn and nabumetone 500 mg twice daily prn non formulary.   Celebrex 100 mg twice daily as needed for mild pain, Tylenol 975 mg q6h as needed for severe pain, Flexeril 10 mg TID as needed for muscle spasms.  Lidocaine cream as needed -declines lidocaine patch, Voltaren, Bengay.   Diarrhea.  Continue probiotic daily.  Imodium as needed.     The patient was " discussed with Dr. Gabriel and he is in agreement with the above note.

## 2025-06-19 NOTE — TELEPHONE ENCOUNTER
Miri called back stating she ever received the forms and is asking if it can be re-faxed to [phone]

## 2025-06-20 ENCOUNTER — APPOINTMENT (OUTPATIENT)
Dept: PSYCHOLOGY | Facility: CLINIC | Age: 50
End: 2025-06-20
Payer: COMMERCIAL

## 2025-06-20 ENCOUNTER — TELEPHONE (OUTPATIENT)
Dept: OBGYN CLINIC | Facility: MEDICAL CENTER | Age: 50
End: 2025-06-20

## 2025-06-20 DIAGNOSIS — N95.1 MENOPAUSAL SYMPTOM: Primary | ICD-10-CM

## 2025-06-20 PROCEDURE — 99232 SBSQ HOSP IP/OBS MODERATE 35: CPT | Performed by: NURSE PRACTITIONER

## 2025-06-20 RX ORDER — ESTRADIOL 0.06 MG/D
1 PATCH TRANSDERMAL WEEKLY
Qty: 12 PATCH | Refills: 1 | Status: SHIPPED | OUTPATIENT
Start: 2025-06-20 | End: 2025-06-21

## 2025-06-20 RX ORDER — SIMETHICONE 80 MG
80 TABLET,CHEWABLE ORAL EVERY 6 HOURS PRN
Status: DISCONTINUED | OUTPATIENT
Start: 2025-06-20 | End: 2025-06-24 | Stop reason: HOSPADM

## 2025-06-20 RX ADMIN — HYDROXYZINE HYDROCHLORIDE 100 MG: 50 TABLET ORAL at 12:33

## 2025-06-20 RX ADMIN — CLONAZEPAM 1 MG: 1 TABLET ORAL at 08:18

## 2025-06-20 RX ADMIN — HYDROCODONE BITARTRATE AND ACETAMINOPHEN 1 TABLET: 5; 325 TABLET ORAL at 08:18

## 2025-06-20 RX ADMIN — HYDROXYZINE HYDROCHLORIDE 25 MG: 25 TABLET, FILM COATED ORAL at 08:18

## 2025-06-20 RX ADMIN — FAMOTIDINE 20 MG: 20 TABLET, FILM COATED ORAL at 08:18

## 2025-06-20 RX ADMIN — Medication 250 MG: at 17:51

## 2025-06-20 RX ADMIN — TIZANIDINE 4 MG: 4 TABLET ORAL at 21:23

## 2025-06-20 RX ADMIN — LAMOTRIGINE 250 MG: 100 TABLET ORAL at 08:18

## 2025-06-20 RX ADMIN — ZOLPIDEM TARTRATE 10 MG: 5 TABLET, FILM COATED ORAL at 21:22

## 2025-06-20 RX ADMIN — Medication 250 MG: at 08:19

## 2025-06-20 RX ADMIN — HYDROXYZINE HYDROCHLORIDE 25 MG: 25 TABLET, FILM COATED ORAL at 16:27

## 2025-06-20 RX ADMIN — GABAPENTIN 800 MG: 400 CAPSULE ORAL at 21:23

## 2025-06-20 RX ADMIN — ESCITALOPRAM OXALATE 10 MG: 10 TABLET ORAL at 08:18

## 2025-06-20 RX ADMIN — CLONAZEPAM 1 MG: 1 TABLET ORAL at 21:23

## 2025-06-20 RX ADMIN — BUPROPION HYDROCHLORIDE 300 MG: 150 TABLET, EXTENDED RELEASE ORAL at 08:19

## 2025-06-20 RX ADMIN — TIZANIDINE 4 MG: 4 TABLET ORAL at 08:18

## 2025-06-20 RX ADMIN — LEVOTHYROXINE SODIUM 50 MCG: 0.05 TABLET ORAL at 04:50

## 2025-06-20 RX ADMIN — PANTOPRAZOLE SODIUM 40 MG: 40 TABLET, DELAYED RELEASE ORAL at 08:18

## 2025-06-20 RX ADMIN — SIMETHICONE 80 MG: 80 TABLET, CHEWABLE ORAL at 21:27

## 2025-06-20 RX ADMIN — PANTOPRAZOLE SODIUM 40 MG: 40 TABLET, DELAYED RELEASE ORAL at 16:27

## 2025-06-20 RX ADMIN — FAMOTIDINE 20 MG: 20 TABLET, FILM COATED ORAL at 17:51

## 2025-06-20 RX ADMIN — GABAPENTIN 800 MG: 400 CAPSULE ORAL at 08:18

## 2025-06-20 RX ADMIN — TIZANIDINE 4 MG: 4 TABLET ORAL at 15:00

## 2025-06-20 RX ADMIN — LURASIDONE HYDROCHLORIDE 40 MG: 40 TABLET, FILM COATED ORAL at 16:27

## 2025-06-20 RX ADMIN — GABAPENTIN 800 MG: 400 CAPSULE ORAL at 16:27

## 2025-06-20 RX ADMIN — HYDROXYZINE HYDROCHLORIDE 50 MG: 50 TABLET ORAL at 21:23

## 2025-06-20 RX ADMIN — HYDROCODONE BITARTRATE AND ACETAMINOPHEN 1 TABLET: 5; 325 TABLET ORAL at 17:51

## 2025-06-20 NOTE — PROGRESS NOTES
06/20/25 0959   Team Meeting   Meeting Type Daily Rounds   Team Members Present   Team Members Present Physician;Nurse;;   Physician Team Member Stefania/Summer   Nursing Team Member Domo   Care Management Team Member Glenny Caicedo   Social Work Team Member    Patient/Family Present   Patient Present No   Patient's Family Present No     Pt is a 201. Pt endorses anxiety. Pt remains restless. Pt is med compliant. Pt intermittently presenting as irritable. Patient discharge is projected for 06/23/2025.

## 2025-06-20 NOTE — PLAN OF CARE
Problem: Risk for Self Injury/Neglect  Goal: Treatment Goal: Remain safe during length of stay, learn and adopt new coping skills, and be free of self-injurious ideation, impulses and acts at the time of discharge  Outcome: Progressing     Problem: Depression  Goal: Treatment Goal: Demonstrate behavioral control of depressive symptoms, verbalize feelings of improved mood/affect, and adopt new coping skills prior to discharge  Outcome: Progressing

## 2025-06-20 NOTE — PROGRESS NOTES
"Progress Note - Behavioral Health   Name: Ericka Castillo 49 y.o. female I MRN: [id number]  Unit/Bed#: -01 I Date of Admission: 6/12/2025   Date of Service: 6/20/2025 I Hospital Day: 8        Assessment & Plan  Bipolar 2 disorder (HCC)  Continue Klonopin to 1 mg twice daily for generalized anxiety and mood stability  Continue Lexapro 10 mg daily to reduce mood instability in context of bipolar disorder; decreased 6/17/2025  Continue Wellbutrin  mg daily for symptoms of depression  Continue gabapentin 800 mg 3 times daily for chronic pain as well as mood stability  Continue Lamictal 250 mg daily for mood stability  Continue Latuda 40 mg daily with dinner for ongoing mood stabilization and residual depressive symptoms; increased 6/17/2025  Continue Atarax 25 mg PO BID and 50 mg PO QHS for anxiety management; increased 6/17/2025      Risks / Benefits of Treatment:  Risks, benefits, and possible side effects of medications explained to patient and patient verbalizes understanding and agreement for treatment.      Progress Toward Goals: Remains anxious and ruminating regarding FMLA paperwork and discharge.  Endorses high degree of anxiety and negative thinking during provider encounter.  Otherwise nursing staff notes patient has calm, cooperative, and appropriate in the milieu.  Agreeable to explore option of disposition to crisis residential early next week until she is able to begin partial hospitalization programming through [facility].  Denies SI.  Identifies children as strong protective factors.  Anticipated discharge early next week.      Subjective:    Ericka was seen today with attending psychiatrist for follow-up.  She continues to endorse a high degree of anxiety during provider encounters and states she does not want to return home \"because I'm lonely and it's negative there.\"  Less perseverative on medications today.  Tearful and endorses frustration.  Required significant " "redirection.  Thoughts are negative, pessimistic, and ruminating.  Despite high degree of anxiety surrounding discharge, patient states she is not having suicidal ideation and identifies her children as strong protective factors against suicide.    Behavior over the last 24 hours: Unchanged  Sleep: improved  Appetite: normal  Medication side effects: No   ROS: no complaints, all other systems are negative    Objective :  Temp:  [98.3 °F (36.8 °C)] 98.3 °F (36.8 °C)  HR:  [70] 70  BP: (102)/(63) 102/63  Resp:  [17] 17  SpO2:  [98 %] 98 %  O2 Device: None (Room air)    Mental Status Evaluation:  Appearance:  Older than stated age, blond hair, casually dressed   Behavior:  Cooperative, fidgety   Speech:  Slightly pressured   Mood:  \"anxious\" and \"frustrated\"   Affect:  Tearful, mood congruent, superficial   Thought Process:  Perseverative   Associations: Perseverative   Thought Content:  Negative and ruminating thoughts   Perceptual Disturbances: Denied AVH, did not appear internally preoccupied   Risk Potential: Suicidal ideation - None  Homicidal ideation - None  Potential for aggression - No   Sensorium:  oriented to person, place, and time/date   Memory:  recent and remote memory grossly intact   Consciousness:  alert and awake   Attention/Concentration: attention span and concentration appear shorter than expected for age   Insight:  limited   Judgment: limited   Gait/Station: normal gait/station, normal balance   Motor Activity: no abnormal movements       Lab Results: I have reviewed the following results:  CMP:   Lab Results   Component Value Date    SODIUM 141 06/13/2025    K 4.0 06/13/2025     06/13/2025    CO2 31 06/13/2025    AGAP 4 06/13/2025    BUN 16 06/13/2025    CREATININE 1.13 06/13/2025    GLUC 87 06/13/2025    GLUF 87 06/13/2025    CALCIUM 8.6 06/13/2025    AST 13 06/13/2025    ALT 16 06/13/2025    ALKPHOS 47 06/13/2025    TP 5.5 (L) 06/13/2025    ALB 3.8 06/13/2025    TBILI 0.24 06/13/2025    " EGFR 56 06/13/2025     Drug Screen:   Lab Results   Component Value Date    AMPMETHUR Negative 06/12/2025    BARBTUR Negative 06/12/2025    BDZUR Positive (A) 06/12/2025    THCUR Negative 06/12/2025    COCAINEUR Negative 06/12/2025    METHADONEUR Negative 06/12/2025    OPIATEUR Negative 06/12/2025    PCPUR Negative 06/12/2025     Counseling / Coordination of Care:    Patient's progress discussed with staff in treatment team meeting.  Medication changes reviewed with staff in treatment team meeting.  Patient's progress reviewed with nursing staff.  Medication changes discussed with patient.  Medication education provided to patient.  Patient's progress reviewed with case management staff.  Patient's diagnosis and treatment indicated reviewed with patient.  Importance of medication and treatment compliance reviewed with patient.  Educated on importance of medication and treatment compliance.  Discussed with patient acceptance of mental illness diagnosis and need for ongoing treatment after discharge.  Importance of follow up for substance abuse issues discussed with patient.  Cognitive techniques utilized during the session.  Reassurance and supportive therapy provided.  Reoriented to reality and reassured.  Group attendance encouraged.  Encouraged participation in milieu and group therapy on the unit.  Crisis/safety plan discussed with patient.      FIONA Walker 06/20/25

## 2025-06-20 NOTE — NURSING NOTE
Patient visible on the unit. Calm and cooperative. Denies SI,HI,AVH, does endorse anxiety. Patient requested Ambien 10 mg and Zanaflex 4 mg at 2025 with positive results.  Safety checks continue Q 15 minutes.

## 2025-06-20 NOTE — TREATMENT TEAM
06/20/25 1200   Hope Anxiety Scale   Anxious Mood 4   Tension 4   Fears 4   Insomnia 1   Intellectual 3   Depressed Mood 3   Somatic Complaints: Muscular 3   Somatic Complaints: Sensory 0   Cardiovascular Symptoms 0   Respiratory Symptoms 0   Gastrointestinal Symptoms 0   Genitourinary Symptoms 0   Autonomic Symptoms 2   Behavior at Interview 3   Hope Anxiety Score 27     Patient reported severe anxiety over her future. Atarax 100mg PO given.

## 2025-06-20 NOTE — PROGRESS NOTES
Met with Ericka she wanted to discuss her emotional breakdown. She states she is fearful that she is not ready to be discharged and is fearful that she will return. It was unclear what her fears are other than loneliness and lack of friendships and support. She was given the assignment to write her fears of what may happen if she is discharged on Monday and how will she know she is ready to go. Will suggest to the treatment team to make a referral for CPS and list of support groups even if they are in [place]. Directed her to discuss her thoughts and feelings with the providers. She knows she will have the support of partial program. Discussed the idea of step down to crisis residential but patient was not open to that thought even though this writer was unsure if it is a possibility. She is still focused on MFLA. We did complete her relapse prevention in which she was able to come up with protective factors, stressors, warning signs, coping skills, unfortunately she lacks a support network.

## 2025-06-20 NOTE — ASSESSMENT & PLAN NOTE
Continue Klonopin to 1 mg twice daily for generalized anxiety and mood stability  Continue Lexapro 10 mg daily to reduce mood instability in context of bipolar disorder; decreased 6/17/2025  Continue Wellbutrin  mg daily for symptoms of depression  Continue gabapentin 800 mg 3 times daily for chronic pain as well as mood stability  Continue Lamictal 250 mg daily for mood stability  Continue Latuda 40 mg daily with dinner for ongoing mood stabilization and residual depressive symptoms; increased 6/17/2025  Continue Atarax 25 mg PO BID and 50 mg PO QHS for anxiety management; increased 6/17/2025

## 2025-06-20 NOTE — PROGRESS NOTES
"Progress Note - Ericka Castillo 49 y.o. female MRN: [id number]    Unit/Bed#: [facility] 247-01 Encounter: [id number]        Subjective:   Patient seen and examined at bedside after reviewing the chart and discussing the case with the caring staff.      Patient examined at bedside.  Patient denies any acute symptoms.     Physical Exam   Vitals: Blood pressure 102/63, pulse 70, temperature 98.3 °F (36.8 °C), temperature source Temporal, resp. rate 17, height 5' 5\" (1.651 m), weight 54.5 kg (120 lb 3.2 oz), SpO2 98%, not currently breastfeeding.,Body mass index is 20 kg/m².  Constitutional: Patient in no acute distress.  HEENT: PERR, EOMI, MMM.  Cardiovascular: Normal rate and regular rhythm.    Pulmonary/Chest: Effort normal and breath sounds normal.   Abdomen: Soft, + BS, NT.    Assessment/Plan:  Ericka Castillo is a(n) 49 y.o. female with MDD.      Hypertension.  D/c lisinopril 5 mg 6/14 due to low BP.  Hypothyroidism.  Continue Synthroid 50 mcg daily. TSH WNL 6/13.   GERD.  Continue Protonix 40 mg daily (Prevacid nonform) and Pepcid 20 mg twice daily.   CKD.  Avoid nephrotoxins.  Patient takes NSAID as needed outpatient for pain.  Celebrex as needed here with close monitoring of kidneys.   HRT.  Continue estradoil 0.06 mg/24 hr patch weekly (Sat at 4 pm).   Chronic pain syndrome w/ neck pain, cervical spondylosis, cervical radiculopathy, myofascial pain syndrome, and continuous opioid dependence.  Patient follows with pain management.  Continue gabapentin 800 mg TID, Norco 5-325 mg twice daily as needed for moderate pain.  Home Parafon forte 500 mg 3 times daily prn and nabumetone 500 mg twice daily prn non formulary.   Celebrex 100 mg twice daily as needed for mild pain, Tylenol 975 mg q6h as needed for severe pain, Flexeril 10 mg TID as needed for muscle spasms.  Lidocaine cream as needed -declines lidocaine patch, Voltaren, Bengay.   Diarrhea.  Continue probiotic daily.  Imodium as needed.     The patient was " discussed with Dr. Gabriel and he is in agreement with the above note.

## 2025-06-20 NOTE — SOCIAL WORK
Victor Hugo spoke with Deanna from [facility] regarding pt's referral for them. Reviewed over pt's referral with [facility]. She stated that pt will have to have all of her meds prior to admission to them. Deanna stated that she would reach out on Monday to talk with the pt to see if she is still interested in coming.

## 2025-06-20 NOTE — PLAN OF CARE
Problem: Ineffective Coping  Goal: Participates in unit activities  Description: Interventions:  - Provide therapeutic environment   - Provide required programming   - Redirect inappropriate behaviors   Outcome: Progressing     Problem: Risk for Self Injury/Neglect  Goal: Attend and participate in unit activities, including therapeutic, recreational, and educational groups  Description: Interventions:  - Provide therapeutic and educational activities daily, encourage attendance and participation, and document same in the medical record  - Obtain collateral information, encourage visitation and family involvement in care   Outcome: Progressing     Problem: Depression  Goal: Attend and participate in unit activities, including therapeutic, recreational, and educational groups  Description: Interventions:  - Provide therapeutic and educational activities daily, encourage attendance and participation, and document same in the medical record   Outcome: Progressing   Pt is active in groups

## 2025-06-20 NOTE — SOCIAL WORK
CM faxed over a referral for pt to [facility] [phone] with pt information.

## 2025-06-20 NOTE — NURSING NOTE
Patient was pleasant and cooperative. Patient social with staff and peers. Staff support provided. Q 15 minute safety checks maintained. Groups encouraged. Patient compliant with medications and groups. Patient reports anxiety that tends to intensify as the day progresses which is relieved by atarax. Patient denied SI/HI. Staff will continue to monitor and support.

## 2025-06-20 NOTE — TELEPHONE ENCOUNTER
Pt may call after discharge for new Rx from [facility] if order was cancelled while hospitalized.        ----- Message from Marcelina Najera CNM sent at 6/20/2025  3:06 PM EDT -----  That is fine. I sent script, but she may need to call after discharge. If the cancel order while she is in the hospital.  ----- Message -----  From: Rufus Hinton MA  Sent: 6/20/2025   2:31 PM EDT  To: Marcelina Najera CNM    Pt cancelled her appt for 6/25(virtual), earliest appt available was 9/4/25, she is asking for refills to hold her over until then and also if  that is too far out for her to wait.

## 2025-06-20 NOTE — DISCHARGE INSTR - OTHER ORDERS
You are being discharged to your [place] [address] at TELEPHONE [phone].    Triggers you have identified during your hospitalization that led to your admission of a regressed mood include ineffective coping skills. Coping skills you have identified during your hospitalization include  If you are unable to deal with your distressed mood alone please contact your. If that is not effective and you continue to have (ex: suicidal ideation, homicidal ideation, distressed mood, overwhelmed, in crisis) please contact Crisis by dialing 525, call [facility] [phone], dial 165 or go to the nearest emergency center.      Roselyn, or Alejandra, our Behavioral Health Nurse Navigators, will be calling you after your discharge, on the phone number that you provided.  They will be available as an additional support, if needed.   If you wish to speak with one of them, you may contact Roselyn at [phone] or Alejandra at [phone]      *[facility] Hotline: 126.627.8269  *[place] Suicide Prevention Lifeline:  1-119.545.5434  *Alcohol Anonymous: 602.427.5461  *[place] Drug & Alcohol Commission: (506) 312-8008  *National [place] on Mental Illness (MADDIE) HELPLINE: 365.255.1468/Website: www.maddie.org  *Substance Abuse and Mental Health Services Administration([facility]) National Helpline, which is a confidential, free, 24-hour-a-day, 365-day-a-year, information service for individuals and family members facing mental health and/or substance use disorders. This service provides referrals to local treatment facilities, support groups, and community-based organizations. Callers can also order free publications and other information.  Call 1-944.704.5611/Website: www.[facility].gov  *[employer] 2-1-1: This is a toll free, confidential, 24-hour-a-day service which connects you to a community  in your area who can help you find services and resources that are available to  you locally and provide critical services that can improve and save lives.  Call: 211  /Website: http://www.echoBase.org/

## 2025-06-21 PROCEDURE — 99232 SBSQ HOSP IP/OBS MODERATE 35: CPT | Performed by: HOSPITALIST

## 2025-06-21 RX ORDER — SACCHAROMYCES BOULARDII 250 MG
250 CAPSULE ORAL 2 TIMES DAILY
Qty: 60 CAPSULE | Refills: 0 | Status: SHIPPED | OUTPATIENT
Start: 2025-06-21 | End: 2025-06-23

## 2025-06-21 RX ORDER — GABAPENTIN 400 MG/1
800 CAPSULE ORAL 3 TIMES DAILY
Qty: 90 CAPSULE | Refills: 0 | Status: SHIPPED | OUTPATIENT
Start: 2025-06-21 | End: 2025-06-23

## 2025-06-21 RX ORDER — LANSOPRAZOLE 30 MG/1
30 CAPSULE, DELAYED RELEASE ORAL 2 TIMES DAILY
Qty: 60 CAPSULE | Refills: 0 | Status: SHIPPED | OUTPATIENT
Start: 2025-06-21 | End: 2025-06-23

## 2025-06-21 RX ORDER — LOPERAMIDE HYDROCHLORIDE 2 MG/1
2 CAPSULE ORAL 3 TIMES DAILY PRN
Qty: 30 CAPSULE | Refills: 0 | Status: SHIPPED | OUTPATIENT
Start: 2025-06-21 | End: 2025-06-23

## 2025-06-21 RX ORDER — ONDANSETRON 4 MG/1
4 TABLET, ORALLY DISINTEGRATING ORAL EVERY 6 HOURS PRN
Qty: 20 TABLET | Refills: 0 | Status: SHIPPED | OUTPATIENT
Start: 2025-06-21 | End: 2025-06-23

## 2025-06-21 RX ORDER — LIDOCAINE 40 MG/G
CREAM TOPICAL 4 TIMES DAILY PRN
Qty: 28 G | Refills: 0 | Status: SHIPPED | OUTPATIENT
Start: 2025-06-21 | End: 2025-06-23

## 2025-06-21 RX ORDER — ESTRADIOL 0.06 MG/D
1 PATCH TRANSDERMAL WEEKLY
Qty: 4 PATCH | Refills: 0 | Status: SHIPPED | OUTPATIENT
Start: 2025-06-28 | End: 2025-06-23

## 2025-06-21 RX ORDER — LEVOTHYROXINE SODIUM 50 UG/1
50 TABLET ORAL
Qty: 30 TABLET | Refills: 0 | Status: SHIPPED | OUTPATIENT
Start: 2025-06-22 | End: 2025-06-23

## 2025-06-21 RX ORDER — HYDROCODONE BITARTRATE AND ACETAMINOPHEN 5; 325 MG/1; MG/1
1 TABLET ORAL 2 TIMES DAILY PRN
Qty: 30 TABLET | Refills: 0 | Status: SHIPPED | OUTPATIENT
Start: 2025-06-21 | End: 2025-07-06

## 2025-06-21 RX ORDER — SIMETHICONE 80 MG
80 TABLET,CHEWABLE ORAL EVERY 6 HOURS PRN
Qty: 30 TABLET | Refills: 0 | Status: SHIPPED | OUTPATIENT
Start: 2025-06-21 | End: 2025-06-23

## 2025-06-21 RX ORDER — HYOSCYAMINE SULFATE 0.12 MG/1
0.12 TABLET SUBLINGUAL EVERY 4 HOURS PRN
Qty: 60 TABLET | Refills: 0 | Status: SHIPPED | OUTPATIENT
Start: 2025-06-21 | End: 2025-06-23

## 2025-06-21 RX ADMIN — ESTRADIOL 1 PATCH: 0.06 PATCH TRANSDERMAL at 08:18

## 2025-06-21 RX ADMIN — CLONAZEPAM 1 MG: 1 TABLET ORAL at 08:14

## 2025-06-21 RX ADMIN — LAMOTRIGINE 250 MG: 100 TABLET ORAL at 08:14

## 2025-06-21 RX ADMIN — HYDROCODONE BITARTRATE AND ACETAMINOPHEN 1 TABLET: 5; 325 TABLET ORAL at 08:15

## 2025-06-21 RX ADMIN — FAMOTIDINE 20 MG: 20 TABLET, FILM COATED ORAL at 08:14

## 2025-06-21 RX ADMIN — TIZANIDINE 4 MG: 4 TABLET ORAL at 13:39

## 2025-06-21 RX ADMIN — HYDROXYZINE HYDROCHLORIDE 50 MG: 50 TABLET ORAL at 20:11

## 2025-06-21 RX ADMIN — HYDROXYZINE HYDROCHLORIDE 25 MG: 25 TABLET, FILM COATED ORAL at 15:55

## 2025-06-21 RX ADMIN — ESCITALOPRAM OXALATE 10 MG: 10 TABLET ORAL at 08:14

## 2025-06-21 RX ADMIN — ACETAMINOPHEN 975 MG: 325 TABLET ORAL at 21:50

## 2025-06-21 RX ADMIN — GABAPENTIN 800 MG: 400 CAPSULE ORAL at 20:11

## 2025-06-21 RX ADMIN — GABAPENTIN 800 MG: 400 CAPSULE ORAL at 08:14

## 2025-06-21 RX ADMIN — HYDROCODONE BITARTRATE AND ACETAMINOPHEN 1 TABLET: 5; 325 TABLET ORAL at 18:42

## 2025-06-21 RX ADMIN — FAMOTIDINE 20 MG: 20 TABLET, FILM COATED ORAL at 17:22

## 2025-06-21 RX ADMIN — PANTOPRAZOLE SODIUM 40 MG: 40 TABLET, DELAYED RELEASE ORAL at 08:14

## 2025-06-21 RX ADMIN — GABAPENTIN 800 MG: 400 CAPSULE ORAL at 15:55

## 2025-06-21 RX ADMIN — LURASIDONE HYDROCHLORIDE 40 MG: 40 TABLET, FILM COATED ORAL at 15:55

## 2025-06-21 RX ADMIN — SIMETHICONE 80 MG: 80 TABLET, CHEWABLE ORAL at 17:40

## 2025-06-21 RX ADMIN — LEVOTHYROXINE SODIUM 50 MCG: 0.05 TABLET ORAL at 06:27

## 2025-06-21 RX ADMIN — PANTOPRAZOLE SODIUM 40 MG: 40 TABLET, DELAYED RELEASE ORAL at 15:55

## 2025-06-21 RX ADMIN — CLONAZEPAM 1 MG: 1 TABLET ORAL at 20:17

## 2025-06-21 RX ADMIN — HYDROXYZINE HYDROCHLORIDE 100 MG: 50 TABLET ORAL at 12:47

## 2025-06-21 RX ADMIN — BUPROPION HYDROCHLORIDE 300 MG: 150 TABLET, EXTENDED RELEASE ORAL at 08:14

## 2025-06-21 RX ADMIN — ZOLPIDEM TARTRATE 10 MG: 5 TABLET, FILM COATED ORAL at 21:03

## 2025-06-21 RX ADMIN — LIDOCAINE: 40 CREAM TOPICAL at 21:50

## 2025-06-21 RX ADMIN — Medication 250 MG: at 08:14

## 2025-06-21 RX ADMIN — Medication 250 MG: at 17:22

## 2025-06-21 RX ADMIN — TIZANIDINE 4 MG: 4 TABLET ORAL at 20:11

## 2025-06-21 RX ADMIN — HYDROXYZINE HYDROCHLORIDE 25 MG: 25 TABLET, FILM COATED ORAL at 08:14

## 2025-06-21 NOTE — PROGRESS NOTES
"Progress Note - Behavioral Health     Ericka Castillo 49 y.o. female MRN: [id number]   Unit/Bed#: [facility] 247-01 Encounter: [id number]    Assessment & Plan  Bipolar 2 disorder (HCC)  Continue Klonopin to 1 mg twice daily for generalized anxiety and mood stability  Continue Lexapro 10 mg daily to reduce mood instability in context of bipolar disorder; decreased 6/17/2025  Continue Wellbutrin  mg daily for symptoms of depression  Continue gabapentin 800 mg 3 times daily for chronic pain as well as mood stability  Continue Lamictal 250 mg daily for mood stability  Continue Latuda 40 mg daily with dinner for ongoing mood stabilization and residual depressive symptoms; increased 6/17/2025  Continue Atarax 25 mg PO BID and 50 mg PO QHS for anxiety management; increased 6/17/2025         Risks / Benefits of Treatment:    Risks, benefits, and possible side effects of medications explained to patient and patient verbalizes understanding and agreement for treatment.  Behavior over the last 24 hours: slowly improving.     Ericka ANDREW seen today, per staff report has been visible and social on the unit.  Patient seen today.  Expresses feelings of loneliness and fears she will be lonely when she is discharged.  Reports \"I have no friends\".  Patient continues to have anxiety regarding discharge.  Appears less anxious knowing she would be stepped down to a crisis residential facility prior to returning home.  Denies thoughts of self-harm or others.  Reports having thoughts of self-harm when at home however has not acted upon them and has referred to getting help when this has occurred.        Mental Status Evaluation:    Appearance:  casually dressed, adequate grooming, looks older than stated age, thin & gaunt looking   Behavior:  cooperative   Speech:  normal rate, normal volume   Mood:  anxious   Affect:  blunted   Thought Process:  linear, perseverative, negative thinking   Associations: intact associations   Thought " Content:  no overt delusions   Perceptual Disturbances: no auditory hallucinations, no visual hallucinations   Risk Potential: Suicidal ideation - None  Homicidal ideation - None   Sensorium:  oriented to person, place, and time/date   Memory:  recent and remote memory grossly intact   Consciousness:  alert and awake   Attention: attention span and concentration appear shorter than expected for age   Insight:  limited   Judgment: limited   Gait/Station: normal gait/station   Motor Activity: no abnormal movements     Vital signs in last 24 hours:    Temp:  [97.5 °F (36.4 °C)-98.6 °F (37 °C)] 97.5 °F (36.4 °C)  HR:  [75-77] 77  BP: (96-98)/(56) 98/56  Resp:  [18] 18  SpO2:  [98 %-100 %] 100 %    Laboratory results: I have personally reviewed all pertinent laboratory/tests results.    All current active medications have been reviewed  Encourage group therapy, milieu therapy and occupational therapy  Behavioral Health checks for safety monitoring  Continue treatment with group therapy, milieu therapy and occupational therapy  Discharge planning  Current Facility-Administered Medications   Medication Dose Route Frequency Provider Last Rate    acetaminophen  975 mg Oral Q6H PRN Sarah Davis PA-C      aluminum-magnesium hydroxide-simethicone  30 mL Oral Q4H PRN Jessica Ibarra MD      haloperidol lactate  2.5 mg Intramuscular Q4H PRN Max 4/day Jessica Ibarra MD      And    LORazepam  1 mg Intramuscular Q4H PRN Max 4/day Jessica Ibarra MD      And    benztropine  0.5 mg Intramuscular Q4H PRN Max 4/day Jessica Ibarra MD      haloperidol lactate  5 mg Intramuscular Q4H PRN Max 4/day Jessica Ibarra MD      And    LORazepam  2 mg Intramuscular Q4H PRN Max 4/day Jessica Ibarra MD      And    benztropine  1 mg Intramuscular Q4H PRN Max 4/day Jessica Ibarra MD      benztropine  1 mg Intramuscular Q4H PRN Max 6/day Jessica Ibarra MD      benztropine  1 mg Oral Q4H PRN Max 6/day Jessica Ibarra MD       bisacodyl  10 mg Rectal Daily PRN Jessica Ibarra MD      buPROPion  300 mg Oral Daily Alex Freeman MD      clonazePAM  1 mg Oral BID Joe Joseph MD      hydrOXYzine HCL  50 mg Oral Q6H PRN Max 4/day Jessica Ibarra MD      Or    diphenhydrAMINE  50 mg Intramuscular Q6H PRN Jessica Ibarra MD      escitalopram  10 mg Oral Daily [facility], FIONA      estradiol  1 patch Transdermal Weekly Sarah Davis PA-C      famotidine  20 mg Oral BID Sarah Davis PA-C      gabapentin  800 mg Oral TID Sarah Davis PA-C      haloperidol  1 mg Oral Q6H PRN Jessica Ibarra MD      haloperidol  2.5 mg Oral Q4H PRN Max 4/day Jessica Ibarra MD      haloperidol  5 mg Oral Q4H PRN Max 4/day Jessica Ibarra MD      HYDROcodone-acetaminophen  1 tablet Oral BID PRN Sarah Davis PA-C      hydrOXYzine HCL  100 mg Oral Q6H PRN Max 4/day Jessica Ibarra MD      hydrOXYzine HCL  25 mg Oral Q6H PRN Max 4/day Jessica Ibarra MD      hydrOXYzine HCL  25 mg Oral BID [facility]      hydrOXYzine HCL  50 mg Oral HS [facility], [facility]      hyoscyamine  0.125 mg Sublingual Q4H PRN Sarah Davis PA-C      lamoTRIgine  250 mg Oral Daily Alex Freeman MD      levothyroxine  50 mcg Oral Early Morning Sarah Davis PA-C      lidocaine   Topical 4x Daily PRN Sarah Davis PA-C      loperamide  2 mg Oral TID PRN Sarah Davis PA-C      lurasidone  40 mg Oral Daily With Dinner Tiffanie [facility] [place], FIONA      ondansetron  4 mg Oral Q6H PRN Sarah Davis PA-C      pantoprazole  40 mg Oral BID AC Sarah Davis PA-C      polyethylene glycol  17 g Oral Daily PRN Jessica Ibarra MD      propranolol  10 mg Oral Q8H PRN Jessica Ibarra MD      saccharomyces boulardii  250 mg Oral BID Sarah Davis PA-C      senna-docusate sodium  1 tablet Oral Daily PRN Jessica Ibarra MD       simethicone  80 mg Oral Q6H PRN Ericka Parekh PA-C      tiZANidine  4 mg Oral TID PRN Joe Joseph MD      zolpidem  10 mg Oral HS PRN Alex Freeman MD         Counseling / Coordination of Care:    Medication changes reviewed with nursing staff.  Patient's progress reviewed with nursing staff.  Medication education provided to patient.  Supportive therapy provided to patient.    Martha Caicedo MD 06/21/25

## 2025-06-21 NOTE — NURSING NOTE
"Pt calm and cooperative with assessment. Denies SI/HI/AH/VH and pain. Compliant with meds. Socializes with peers and staff. Pt tearful during assessment and states she \"no longer wants to be on the earth\", but she doesn't want to hurt herself. Pt upset over her mood swings. She says one minute she'll be so sad that she's not with her children then later she could care less. Asks \"what's the matter with me\". Pt was calmed and redirected to interact with peers whom she called friends. Safety checks ongoing.  "

## 2025-06-21 NOTE — PLAN OF CARE
Problem: Risk for Self Injury/Neglect  Goal: Verbalize thoughts and feelings  Description: Interventions:  - Assess and re-assess patient's lethality and potential for self-injury  - Engage patient in 1:1 interactions, daily, for a minimum of 15 minutes  - Encourage patient to express feelings, fears, frustrations, hopes  - Establish rapport/trust with patient   Outcome: Progressing     Problem: Depression  Goal: Refrain from isolation  Description: Interventions:  - Develop a trusting relationship   - Encourage socialization   Outcome: Progressing

## 2025-06-21 NOTE — PROGRESS NOTES
"Progress Note - Ericka Castillo 49 y.o. female MRN: [id number]    Unit/Bed#: [facility] 247-01 Encounter: [id number]        Subjective:   Patient seen and examined at bedside after reviewing the chart and discussing the case with the caring staff.      Patient examined at bedside.  Patient denies any acute symptoms.    Patient is a possible discharge on Monday, 6/23/2025.  Patient is requesting prescriptions.  I reviewed and reconciled patient's problem list and medications.    Physical Exam   Vitals: Blood pressure 98/56, pulse 77, temperature 97.5 °F (36.4 °C), temperature source Temporal, resp. rate 18, height 5' 5\" (1.651 m), weight 54.5 kg (120 lb 3.2 oz), SpO2 100%, not currently breastfeeding.,Body mass index is 20 kg/m².  Constitutional: Patient in no acute distress.  HEENT: PERR, EOMI, MMM.  Cardiovascular: Normal rate and regular rhythm.    Pulmonary/Chest: Effort normal and breath sounds normal.   Abdomen: Soft, + BS, NT.    Assessment/Plan:  Ericka Castillo is a(n) 49 y.o. female with MDD.     Medical clearance.  Patient is medically cleared for discharge.  All scripts will be sent out for the patient.     Hypertension.  D/c lisinopril 5 mg 6/14 due to low BP.  Hypothyroidism.  Continue Synthroid 50 mcg daily. TSH WNL 6/13.   GERD.  Continue Protonix 40 mg daily (Prevacid nonform) and Pepcid 20 mg twice daily.   CKD.  Avoid nephrotoxins.  Patient takes NSAID as needed outpatient for pain.  Celebrex as needed here with close monitoring of kidneys.   HRT.  Continue estradoil 0.06 mg/24 hr patch weekly (Sat at 4 pm).   Chronic pain syndrome w/ neck pain, cervical spondylosis, cervical radiculopathy, myofascial pain syndrome, and continuous opioid dependence.  Patient follows with pain management.  Continue gabapentin 800 mg TID, Norco 5-325 mg twice daily as needed for moderate pain.  Home Parafon forte 500 mg 3 times daily prn and nabumetone 500 mg twice daily prn non formulary.   Celebrex 100 mg twice " daily as needed for mild pain, Tylenol 975 mg q6h as needed for severe pain, Flexeril 10 mg TID as needed for muscle spasms.  Lidocaine cream as needed -declines lidocaine patch, Voltaren, Bengay.   Diarrhea.  Continue probiotic daily.  Imodium as needed.

## 2025-06-21 NOTE — TREATMENT TEAM
06/21/25 1247   Hope Anxiety Scale   Anxious Mood 4   Tension 4   Fears 4   Insomnia 0   Intellectual 2   Depressed Mood 3   Somatic Complaints: Muscular 1   Somatic Complaints: Sensory 1   Cardiovascular Symptoms 1   Respiratory Symptoms 0   Gastrointestinal Symptoms 0   Genitourinary Symptoms 0   Autonomic Symptoms 3   Behavior at Interview 3   Hope Anxiety Score 26     Patient c/o high anxiety noted hand tremors and patient appeared restless. Will monitor for effectiveness

## 2025-06-22 PROCEDURE — 99232 SBSQ HOSP IP/OBS MODERATE 35: CPT | Performed by: HOSPITALIST

## 2025-06-22 RX ADMIN — LAMOTRIGINE 250 MG: 100 TABLET ORAL at 08:49

## 2025-06-22 RX ADMIN — BUPROPION HYDROCHLORIDE 300 MG: 150 TABLET, EXTENDED RELEASE ORAL at 08:50

## 2025-06-22 RX ADMIN — HYDROCODONE BITARTRATE AND ACETAMINOPHEN 1 TABLET: 5; 325 TABLET ORAL at 05:17

## 2025-06-22 RX ADMIN — TIZANIDINE 4 MG: 4 TABLET ORAL at 08:50

## 2025-06-22 RX ADMIN — HALOPERIDOL 2.5 MG: 0.5 TABLET ORAL at 13:03

## 2025-06-22 RX ADMIN — GABAPENTIN 800 MG: 400 CAPSULE ORAL at 21:19

## 2025-06-22 RX ADMIN — LIDOCAINE: 40 CREAM TOPICAL at 04:27

## 2025-06-22 RX ADMIN — LURASIDONE HYDROCHLORIDE 40 MG: 40 TABLET, FILM COATED ORAL at 16:29

## 2025-06-22 RX ADMIN — SIMETHICONE 80 MG: 80 TABLET, CHEWABLE ORAL at 14:45

## 2025-06-22 RX ADMIN — Medication 250 MG: at 18:16

## 2025-06-22 RX ADMIN — HYDROXYZINE HYDROCHLORIDE 50 MG: 50 TABLET ORAL at 21:19

## 2025-06-22 RX ADMIN — GABAPENTIN 800 MG: 400 CAPSULE ORAL at 08:50

## 2025-06-22 RX ADMIN — ESCITALOPRAM OXALATE 10 MG: 10 TABLET ORAL at 08:49

## 2025-06-22 RX ADMIN — Medication 250 MG: at 08:50

## 2025-06-22 RX ADMIN — HYDROXYZINE HYDROCHLORIDE 25 MG: 25 TABLET, FILM COATED ORAL at 08:49

## 2025-06-22 RX ADMIN — HYDROCODONE BITARTRATE AND ACETAMINOPHEN 1 TABLET: 5; 325 TABLET ORAL at 16:29

## 2025-06-22 RX ADMIN — LEVOTHYROXINE SODIUM 50 MCG: 0.05 TABLET ORAL at 05:11

## 2025-06-22 RX ADMIN — GABAPENTIN 800 MG: 400 CAPSULE ORAL at 16:29

## 2025-06-22 RX ADMIN — CLONAZEPAM 1 MG: 1 TABLET ORAL at 08:50

## 2025-06-22 RX ADMIN — ACETAMINOPHEN 975 MG: 325 TABLET ORAL at 04:23

## 2025-06-22 RX ADMIN — PANTOPRAZOLE SODIUM 40 MG: 40 TABLET, DELAYED RELEASE ORAL at 07:28

## 2025-06-22 RX ADMIN — ZOLPIDEM TARTRATE 10 MG: 5 TABLET, FILM COATED ORAL at 21:20

## 2025-06-22 RX ADMIN — TIZANIDINE 4 MG: 4 TABLET ORAL at 16:29

## 2025-06-22 RX ADMIN — CLONAZEPAM 1 MG: 1 TABLET ORAL at 21:19

## 2025-06-22 RX ADMIN — PANTOPRAZOLE SODIUM 40 MG: 40 TABLET, DELAYED RELEASE ORAL at 16:29

## 2025-06-22 RX ADMIN — HYDROXYZINE HYDROCHLORIDE 25 MG: 25 TABLET, FILM COATED ORAL at 16:29

## 2025-06-22 RX ADMIN — SIMETHICONE 80 MG: 80 TABLET, CHEWABLE ORAL at 22:14

## 2025-06-22 RX ADMIN — FAMOTIDINE 20 MG: 20 TABLET, FILM COATED ORAL at 08:49

## 2025-06-22 RX ADMIN — HYDROXYZINE HYDROCHLORIDE 100 MG: 50 TABLET ORAL at 11:04

## 2025-06-22 NOTE — TREATMENT TEAM
06/22/25 1303   Broset Violence Checklist   Assessment type Reassessment   Irritability 1   Confusion 0   Boisterousness 0   Threatening physical violence 0   Verbal threats 0   Violence 0   Broset score 1     Patient cont to have racing thoughts r/t her situation, is restless and irritable administered 2.5mg haldol po will monitor for effectiveness

## 2025-06-22 NOTE — NURSING NOTE
Patient compliant with meds and meals. Patient denies states however she is anxious for discharge. Cont to fixate on discharge and about planning reassured patient cw would help with that on discharge day. Patient cont to seek multiple medications through out day. Social and visible, attends groups. Low frustration tolerance at times. Pleasant and cooperative. Q 15 min behavioral and safety checks in place.

## 2025-06-22 NOTE — NURSING NOTE
Patient pleasant and social with select peers. Endorses mild depression and high anxiety r/t the unknown of attending [facility] crisis residential.  Denies SI/HI or AV/H. Attended snack. HS medication compliant.  Plus, PRN medications. Q 15 minute monitoring maintained.

## 2025-06-22 NOTE — NURSING NOTE
Prior leonela given effective patient states no longer feeling anxious or having racing thoughts.

## 2025-06-22 NOTE — NURSING NOTE
Patient compliant with meds and meals. Patient having anxiety and fear r/t discharge, states she feels she will fail and doesn't feel like she will ever get better. Spoke with patient about building coping skills and explained how these take to time to have. Patient verbalized understanding , gave patient changing negative into positive activity to do and go over next day. Patient social and visible attends groups. Q 15 min behavioral and safety checks in place.

## 2025-06-22 NOTE — PROGRESS NOTES
"Progress Note - Ericka Castillo 49 y.o. female MRN: [id number]    Unit/Bed#: [facility] 247-01 Encounter: [id number]        Subjective:   Patient seen and examined at bedside after reviewing the chart and discussing the case with the caring staff.      Patient examined at bedside.  Patient denies any acute symptoms.    Patient is a possible discharge on Monday, 6/23/2025.  Patient is requesting prescriptions.  I reviewed and reconciled patient's problem list and medications.    Physical Exam   Vitals: Blood pressure 91/58, pulse 73, temperature 97.5 °F (36.4 °C), temperature source Temporal, resp. rate 18, height 5' 5\" (1.651 m), weight 55.8 kg (123 lb), SpO2 98%, not currently breastfeeding.,Body mass index is 20.47 kg/m².  Constitutional: Patient in no acute distress.  HEENT: PERR, EOMI, MMM.  Cardiovascular: Normal rate and regular rhythm.    Pulmonary/Chest: Effort normal and breath sounds normal.   Abdomen: Soft, + BS, NT.    Assessment/Plan:  Ericka Castillo is a(n) 49 y.o. female with MDD.     Medical clearance.  Patient is medically cleared for discharge.  All scripts will be sent out for the patient.     Hypertension.  D/c lisinopril 5 mg 6/14 due to low BP.  Hypothyroidism.  Continue Synthroid 50 mcg daily. TSH WNL 6/13.   GERD.  Continue Protonix 40 mg daily (Prevacid nonform) and Pepcid 20 mg twice daily.   CKD.  Avoid nephrotoxins.  Patient takes NSAID as needed outpatient for pain.  Celebrex as needed here with close monitoring of kidneys.   HRT.  Continue estradoil 0.06 mg/24 hr patch weekly (Sat at 4 pm).   Chronic pain syndrome w/ neck pain, cervical spondylosis, cervical radiculopathy, myofascial pain syndrome, and continuous opioid dependence.  Patient follows with pain management.  Continue gabapentin 800 mg TID, Norco 5-325 mg twice daily as needed for moderate pain.  Home Parafon forte 500 mg 3 times daily prn and nabumetone 500 mg twice daily prn non formulary.   Celebrex 100 mg twice daily " as needed for mild pain, Tylenol 975 mg q6h as needed for severe pain, Flexeril 10 mg TID as needed for muscle spasms.  Lidocaine cream as needed -declines lidocaine patch, Voltaren, Bengay.   Diarrhea.  Continue probiotic daily.  Imodium as needed.

## 2025-06-22 NOTE — TREATMENT TEAM
06/22/25 1100   Hope Anxiety Scale   Anxious Mood 4   Tension 4   Fears 4   Insomnia 0   Intellectual 1   Depressed Mood 3   Somatic Complaints: Muscular 2   Somatic Complaints: Sensory 2   Cardiovascular Symptoms 2   Respiratory Symptoms 2   Gastrointestinal Symptoms 0   Genitourinary Symptoms 0   Autonomic Symptoms 1   Behavior at Interview 3   Hope Anxiety Score 28     Patient having high anxiety, crying and shaking r/t discharge and unknown of the future. Administered 100mg atarax po will monitor for effectiveness

## 2025-06-22 NOTE — PROGRESS NOTES
Progress Note - Behavioral Health     Ericka Castillo 49 y.o. female MRN: [id number]   Unit/Bed#: [facility] 247-01 Encounter: [id number]    Assessment & Plan  Bipolar 2 disorder (HCC)  Continue Klonopin to 1 mg twice daily for generalized anxiety and mood stability  Continue Lexapro 10 mg daily to reduce mood instability in context of bipolar disorder; decreased 6/17/2025  Continue Wellbutrin  mg daily for symptoms of depression  Continue gabapentin 800 mg 3 times daily for chronic pain as well as mood stability  Continue Lamictal 250 mg daily for mood stability  Continue Latuda 40 mg daily with dinner for ongoing mood stabilization and residual depressive symptoms; increased 6/17/2025  Continue Atarax 25 mg PO BID and 50 mg PO QHS for anxiety management; increased 6/17/2025         Risks / Benefits of Treatment:    Risks, benefits, and possible side effects of medications explained to patient and patient verbalizes understanding and agreement for treatment.  Behavior over the last 24 hours: unchanged.     Ericka ANDREW seen today, per staff report has been emotional regarding discharge on the unit.  On exam patient reports feeling highly anxious regarding discharge.  She continues to have perseverative thoughts in terms of feeling unprepared for discharge, states she gets lonely at home.  Feels depressed in context of being discharged, denies active thoughts of self-harm.        Mental Status Evaluation:    Appearance:  casually dressed, adequate grooming, looks stated age   Behavior:  cooperative   Speech:  normal rate, normal volume   Mood:  anxious   Affect:  constricted, tearful   Thought Process:  perseverative, negative thinking   Associations: circumstantial associations   Thought Content:  no overt delusions   Perceptual Disturbances: no auditory hallucinations, no visual hallucinations   Risk Potential: Suicidal ideation - None  Homicidal ideation - None   Sensorium:  oriented to person, place, and  time/date   Memory:  recent and remote memory grossly intact   Consciousness:  alert and awake   Attention: decreased concentration and decreased attention span   Insight:  limited   Judgment: limited   Gait/Station: normal gait/station   Motor Activity: no abnormal movements     Vital signs in last 24 hours:    Temp:  [98.2 °F (36.8 °C)-99.1 °F (37.3 °C)] 98.2 °F (36.8 °C)  HR:  [76-84] 76  BP: (95-98)/(52-56) 98/56  Resp:  [18] 18  SpO2:  [95 %-99 %] 99 %    Laboratory results: I have personally reviewed all pertinent laboratory/tests results.    All current active medications have been reviewed  Encourage group therapy, milieu therapy and occupational therapy  Behavioral Health checks for safety monitoring  Continue treatment with group therapy, milieu therapy and occupational therapy  Current Facility-Administered Medications   Medication Dose Route Frequency Provider Last Rate    acetaminophen  975 mg Oral Q6H PRN Sarah Davis PA-C      aluminum-magnesium hydroxide-simethicone  30 mL Oral Q4H PRN Jessica Ibarra MD      haloperidol lactate  2.5 mg Intramuscular Q4H PRN Max 4/day Jessica Ibarra MD      And    LORazepam  1 mg Intramuscular Q4H PRN Max 4/day Jessica Ibarra MD      And    benztropine  0.5 mg Intramuscular Q4H PRN Max 4/day Jessica Ibarra MD      haloperidol lactate  5 mg Intramuscular Q4H PRN Max 4/day Jessica Ibarra MD      And    LORazepam  2 mg Intramuscular Q4H PRN Max 4/day Jessica Ibarra MD      And    benztropine  1 mg Intramuscular Q4H PRN Max 4/day Jessica Ibarra MD      benztropine  1 mg Intramuscular Q4H PRN Max 6/day Jessica Ibarra MD      benztropine  1 mg Oral Q4H PRN Max 6/day Jessica Ibarra MD      bisacodyl  10 mg Rectal Daily PRN Jessica Ibarra MD      buPROPion  300 mg Oral Daily Alex Freeman MD      clonazePAM  1 mg Oral BID Joe Joseph MD      hydrOXYzine HCL  50 mg Oral Q6H PRN Max 4/day Jessica Ibarra  MD      Or    diphenhydrAMINE  50 mg Intramuscular Q6H PRN Jessica Ibarra MD      escitalopram  10 mg Oral Daily Tiffanie Wheeler, FIONA      estradiol  1 patch Transdermal Weekly Sarah Davis PA-C      famotidine  20 mg Oral BID Sarah Davis PA-C      gabapentin  800 mg Oral TID Sarah Davis PA-C      haloperidol  1 mg Oral Q6H PRN Jessica Ibarra MD      haloperidol  2.5 mg Oral Q4H PRN Max 4/day Jessica Ibarra MD      haloperidol  5 mg Oral Q4H PRN Max 4/day Jessica Ibarra MD      HYDROcodone-acetaminophen  1 tablet Oral BID PRN Sarah Davis PA-C      hydrOXYzine HCL  100 mg Oral Q6H PRN Max 4/day Jessica Ibarra MD      hydrOXYzine HCL  25 mg Oral Q6H PRN Max 4/day Jessica Ibarra MD      hydrOXYzine HCL  25 mg Oral BID Tiffanie Wheeler, FIONA      hydrOXYzine HCL  50 mg Oral HS Tiffanie Wheeler, FIONA      hyoscyamine  0.125 mg Sublingual Q4H PRN Sarah Davis PA-C      lamoTRIgine  250 mg Oral Daily Alex Freeman MD      levothyroxine  50 mcg Oral Early Morning Sarah Davis PA-C      lidocaine   Topical 4x Daily PRN Sarah Davis PA-C      loperamide  2 mg Oral TID PRN Sarah Davis PA-C      lurasidone  40 mg Oral Daily With Dinner Tiffanie Wheeler, FIONA      ondansetron  4 mg Oral Q6H PRN Sarah Davis PA-C      pantoprazole  40 mg Oral BID AC Sarah Davis PA-C      polyethylene glycol  17 g Oral Daily PRN Jessica Ibarra MD      propranolol  10 mg Oral Q8H PRN Jessica Ibarra MD      saccharomyces boulardii  250 mg Oral BID Sarah Davis PA-C      senna-docusate sodium  1 tablet Oral Daily PRN Jessica Ibarra MD      simethicone  80 mg Oral Q6H PRN Ericka Parekh PA-C      tiZANidine  4 mg Oral TID PRN Joe Joseph MD      zolpidem  10 mg Oral HS PRN Alex Freeman MD         Counseling / Coordination of Care:    Medication changes  reviewed with nursing staff.  Patient's progress reviewed with nursing staff.  Medication education provided to patient.  Supportive therapy provided to patient.    Martha Caicedo MD 06/22/25

## 2025-06-23 PROCEDURE — 99232 SBSQ HOSP IP/OBS MODERATE 35: CPT | Performed by: PSYCHIATRY & NEUROLOGY

## 2025-06-23 RX ORDER — ESCITALOPRAM OXALATE 10 MG/1
10 TABLET ORAL DAILY
Qty: 7 TABLET | Refills: 0 | Status: SHIPPED | OUTPATIENT
Start: 2025-06-24 | End: 2025-06-23

## 2025-06-23 RX ORDER — CLONAZEPAM 1 MG/1
1 TABLET ORAL 2 TIMES DAILY
Qty: 14 TABLET | Refills: 0 | Status: SHIPPED | OUTPATIENT
Start: 2025-06-23 | End: 2025-06-23

## 2025-06-23 RX ORDER — LURASIDONE HYDROCHLORIDE 40 MG/1
40 TABLET, FILM COATED ORAL
Qty: 14 TABLET | Refills: 0 | Status: SHIPPED | OUTPATIENT
Start: 2025-06-23 | End: 2025-07-07

## 2025-06-23 RX ORDER — SIMETHICONE 80 MG
80 TABLET,CHEWABLE ORAL EVERY 6 HOURS PRN
Qty: 30 TABLET | Refills: 0 | Status: SHIPPED | OUTPATIENT
Start: 2025-06-23 | End: 2025-06-23

## 2025-06-23 RX ORDER — LOPERAMIDE HYDROCHLORIDE 2 MG/1
2 CAPSULE ORAL 3 TIMES DAILY PRN
Qty: 30 CAPSULE | Refills: 0 | Status: SHIPPED | OUTPATIENT
Start: 2025-06-23 | End: 2025-06-23

## 2025-06-23 RX ORDER — ONDANSETRON 4 MG/1
4 TABLET, ORALLY DISINTEGRATING ORAL EVERY 6 HOURS PRN
Qty: 20 TABLET | Refills: 0 | Status: SHIPPED | OUTPATIENT
Start: 2025-06-23

## 2025-06-23 RX ORDER — HYOSCYAMINE SULFATE 0.12 MG/1
0.12 TABLET SUBLINGUAL EVERY 4 HOURS PRN
Qty: 60 TABLET | Refills: 0 | Status: SHIPPED | OUTPATIENT
Start: 2025-06-23

## 2025-06-23 RX ORDER — ESTRADIOL 0.06 MG/D
1 PATCH TRANSDERMAL WEEKLY
Qty: 4 PATCH | Refills: 0 | Status: SHIPPED | OUTPATIENT
Start: 2025-06-28 | End: 2025-06-25 | Stop reason: SDUPTHER

## 2025-06-23 RX ORDER — GABAPENTIN 400 MG/1
800 CAPSULE ORAL 3 TIMES DAILY
Qty: 90 CAPSULE | Refills: 0 | Status: SHIPPED | OUTPATIENT
Start: 2025-06-23 | End: 2025-06-23

## 2025-06-23 RX ORDER — HYDROXYZINE HYDROCHLORIDE 50 MG/1
50 TABLET, FILM COATED ORAL
Qty: 14 TABLET | Refills: 0 | Status: SHIPPED | OUTPATIENT
Start: 2025-06-23 | End: 2025-07-07

## 2025-06-23 RX ORDER — ESTRADIOL 0.06 MG/D
1 PATCH TRANSDERMAL WEEKLY
Qty: 4 PATCH | Refills: 0 | Status: SHIPPED | OUTPATIENT
Start: 2025-06-28 | End: 2025-06-23

## 2025-06-23 RX ORDER — HYOSCYAMINE SULFATE 0.12 MG/1
0.12 TABLET SUBLINGUAL EVERY 4 HOURS PRN
Qty: 60 TABLET | Refills: 0 | Status: SHIPPED | OUTPATIENT
Start: 2025-06-23 | End: 2025-06-23

## 2025-06-23 RX ORDER — LIDOCAINE 40 MG/G
CREAM TOPICAL 4 TIMES DAILY PRN
Qty: 28 G | Refills: 0 | Status: SHIPPED | OUTPATIENT
Start: 2025-06-23

## 2025-06-23 RX ORDER — LOPERAMIDE HYDROCHLORIDE 2 MG/1
2 CAPSULE ORAL 3 TIMES DAILY PRN
Qty: 30 CAPSULE | Refills: 0 | Status: SHIPPED | OUTPATIENT
Start: 2025-06-23

## 2025-06-23 RX ORDER — ONDANSETRON 4 MG/1
4 TABLET, ORALLY DISINTEGRATING ORAL EVERY 6 HOURS PRN
Qty: 20 TABLET | Refills: 0 | Status: SHIPPED | OUTPATIENT
Start: 2025-06-23 | End: 2025-06-23

## 2025-06-23 RX ORDER — HYDROXYZINE HYDROCHLORIDE 25 MG/1
25 TABLET, FILM COATED ORAL 2 TIMES DAILY
Qty: 14 TABLET | Refills: 0 | Status: SHIPPED | OUTPATIENT
Start: 2025-06-23 | End: 2025-06-23

## 2025-06-23 RX ORDER — BUPROPION HYDROCHLORIDE 300 MG/1
300 TABLET ORAL DAILY
Qty: 14 TABLET | Refills: 0 | Status: SHIPPED | OUTPATIENT
Start: 2025-06-24 | End: 2025-07-08

## 2025-06-23 RX ORDER — SACCHAROMYCES BOULARDII 250 MG
250 CAPSULE ORAL 2 TIMES DAILY
Qty: 60 CAPSULE | Refills: 0 | Status: SHIPPED | OUTPATIENT
Start: 2025-06-23 | End: 2025-06-23

## 2025-06-23 RX ORDER — BUPROPION HYDROCHLORIDE 300 MG/1
300 TABLET ORAL DAILY
Qty: 7 TABLET | Refills: 0 | Status: SHIPPED | OUTPATIENT
Start: 2025-06-24 | End: 2025-06-23

## 2025-06-23 RX ORDER — HYDROXYZINE HYDROCHLORIDE 25 MG/1
25 TABLET, FILM COATED ORAL 2 TIMES DAILY
Qty: 28 TABLET | Refills: 0 | Status: SHIPPED | OUTPATIENT
Start: 2025-06-23 | End: 2025-06-26

## 2025-06-23 RX ORDER — LEVOTHYROXINE SODIUM 50 UG/1
50 TABLET ORAL
Qty: 30 TABLET | Refills: 0 | Status: SHIPPED | OUTPATIENT
Start: 2025-06-23 | End: 2025-06-23

## 2025-06-23 RX ORDER — HYDROXYZINE HYDROCHLORIDE 50 MG/1
50 TABLET, FILM COATED ORAL
Qty: 7 TABLET | Refills: 0 | Status: SHIPPED | OUTPATIENT
Start: 2025-06-23 | End: 2025-06-23

## 2025-06-23 RX ORDER — GABAPENTIN 400 MG/1
800 CAPSULE ORAL 3 TIMES DAILY
Qty: 84 CAPSULE | Refills: 0 | Status: SHIPPED | OUTPATIENT
Start: 2025-06-23 | End: 2025-07-07

## 2025-06-23 RX ORDER — LANSOPRAZOLE 30 MG/1
30 CAPSULE, DELAYED RELEASE ORAL 2 TIMES DAILY
Qty: 60 CAPSULE | Refills: 0 | Status: SHIPPED | OUTPATIENT
Start: 2025-06-23 | End: 2025-06-23

## 2025-06-23 RX ORDER — ESCITALOPRAM OXALATE 10 MG/1
10 TABLET ORAL DAILY
Qty: 14 TABLET | Refills: 0 | Status: SHIPPED | OUTPATIENT
Start: 2025-06-24 | End: 2025-07-08

## 2025-06-23 RX ORDER — LURASIDONE HYDROCHLORIDE 40 MG/1
40 TABLET, FILM COATED ORAL
Qty: 7 TABLET | Refills: 0 | Status: SHIPPED | OUTPATIENT
Start: 2025-06-23 | End: 2025-06-23

## 2025-06-23 RX ORDER — LANSOPRAZOLE 30 MG/1
30 CAPSULE, DELAYED RELEASE ORAL 2 TIMES DAILY
Qty: 60 CAPSULE | Refills: 0 | Status: SHIPPED | OUTPATIENT
Start: 2025-06-23

## 2025-06-23 RX ORDER — SIMETHICONE 80 MG
80 TABLET,CHEWABLE ORAL EVERY 6 HOURS PRN
Qty: 30 TABLET | Refills: 0 | Status: SHIPPED | OUTPATIENT
Start: 2025-06-23

## 2025-06-23 RX ORDER — LEVOTHYROXINE SODIUM 50 UG/1
50 TABLET ORAL
Qty: 30 TABLET | Refills: 0 | Status: SHIPPED | OUTPATIENT
Start: 2025-06-23

## 2025-06-23 RX ORDER — CLONAZEPAM 1 MG/1
1 TABLET ORAL 2 TIMES DAILY
Qty: 28 TABLET | Refills: 0 | Status: SHIPPED | OUTPATIENT
Start: 2025-06-23 | End: 2025-07-07

## 2025-06-23 RX ORDER — LAMOTRIGINE 25 MG/1
250 TABLET ORAL DAILY
Qty: 140 TABLET | Refills: 0 | Status: SHIPPED | OUTPATIENT
Start: 2025-06-24 | End: 2025-07-08

## 2025-06-23 RX ORDER — LAMOTRIGINE 25 MG/1
250 TABLET ORAL DAILY
Qty: 70 TABLET | Refills: 0 | Status: SHIPPED | OUTPATIENT
Start: 2025-06-24 | End: 2025-06-23

## 2025-06-23 RX ORDER — LIDOCAINE 40 MG/G
CREAM TOPICAL 4 TIMES DAILY PRN
Qty: 28 G | Refills: 0 | Status: SHIPPED | OUTPATIENT
Start: 2025-06-23 | End: 2025-06-23

## 2025-06-23 RX ORDER — SACCHAROMYCES BOULARDII 250 MG
250 CAPSULE ORAL 2 TIMES DAILY
Qty: 60 CAPSULE | Refills: 0 | Status: SHIPPED | OUTPATIENT
Start: 2025-06-23

## 2025-06-23 RX ADMIN — ZOLPIDEM TARTRATE 10 MG: 5 TABLET, FILM COATED ORAL at 21:14

## 2025-06-23 RX ADMIN — PANTOPRAZOLE SODIUM 40 MG: 40 TABLET, DELAYED RELEASE ORAL at 05:57

## 2025-06-23 RX ADMIN — HYDROXYZINE HYDROCHLORIDE 25 MG: 25 TABLET, FILM COATED ORAL at 16:20

## 2025-06-23 RX ADMIN — LAMOTRIGINE 250 MG: 100 TABLET ORAL at 08:03

## 2025-06-23 RX ADMIN — Medication 250 MG: at 08:03

## 2025-06-23 RX ADMIN — ESCITALOPRAM OXALATE 10 MG: 10 TABLET ORAL at 08:03

## 2025-06-23 RX ADMIN — SIMETHICONE 80 MG: 80 TABLET, CHEWABLE ORAL at 13:22

## 2025-06-23 RX ADMIN — CLONAZEPAM 1 MG: 1 TABLET ORAL at 08:03

## 2025-06-23 RX ADMIN — GABAPENTIN 800 MG: 400 CAPSULE ORAL at 21:14

## 2025-06-23 RX ADMIN — TIZANIDINE 4 MG: 4 TABLET ORAL at 08:03

## 2025-06-23 RX ADMIN — HYDROCODONE BITARTRATE AND ACETAMINOPHEN 1 TABLET: 5; 325 TABLET ORAL at 08:03

## 2025-06-23 RX ADMIN — PANTOPRAZOLE SODIUM 40 MG: 40 TABLET, DELAYED RELEASE ORAL at 16:20

## 2025-06-23 RX ADMIN — HYDROXYZINE HYDROCHLORIDE 100 MG: 50 TABLET ORAL at 11:56

## 2025-06-23 RX ADMIN — CLONAZEPAM 1 MG: 1 TABLET ORAL at 21:14

## 2025-06-23 RX ADMIN — GABAPENTIN 800 MG: 400 CAPSULE ORAL at 08:03

## 2025-06-23 RX ADMIN — SIMETHICONE 80 MG: 80 TABLET, CHEWABLE ORAL at 19:15

## 2025-06-23 RX ADMIN — TIZANIDINE 4 MG: 4 TABLET ORAL at 21:14

## 2025-06-23 RX ADMIN — FAMOTIDINE 20 MG: 20 TABLET, FILM COATED ORAL at 08:04

## 2025-06-23 RX ADMIN — LEVOTHYROXINE SODIUM 50 MCG: 0.05 TABLET ORAL at 05:58

## 2025-06-23 RX ADMIN — HYDROXYZINE HYDROCHLORIDE 25 MG: 25 TABLET, FILM COATED ORAL at 08:03

## 2025-06-23 RX ADMIN — HYDROCODONE BITARTRATE AND ACETAMINOPHEN 1 TABLET: 5; 325 TABLET ORAL at 16:20

## 2025-06-23 RX ADMIN — LURASIDONE HYDROCHLORIDE 40 MG: 40 TABLET, FILM COATED ORAL at 16:20

## 2025-06-23 RX ADMIN — TIZANIDINE 4 MG: 4 TABLET ORAL at 16:20

## 2025-06-23 RX ADMIN — GABAPENTIN 800 MG: 400 CAPSULE ORAL at 16:20

## 2025-06-23 RX ADMIN — HYDROXYZINE HYDROCHLORIDE 50 MG: 50 TABLET ORAL at 21:15

## 2025-06-23 RX ADMIN — BUPROPION HYDROCHLORIDE 300 MG: 150 TABLET, EXTENDED RELEASE ORAL at 08:03

## 2025-06-23 NOTE — DISCHARGE INSTR - AVS FIRST PAGE
A 14-day supply of psychotropic medications (klonopin, Wellbutrin, Lexapro, Atarax, Lamictal, Latuda, gabapentin) was submitted to [facility].    Please follow with PHP for refill of medications while in partial program, otherwise, your established psychiatrist will manage your medications upon completion of partial program.

## 2025-06-23 NOTE — SOCIAL WORK
JORGE LUIS spoke with Luis Antonio at [facility] [phone]  and told them that pt was not going to be coming to them tomorrow and would discharge home instead.

## 2025-06-23 NOTE — PROGRESS NOTES
"Progress Note - Ericka Castillo 49 y.o. female MRN: [id number]    Unit/Bed#: [facility] 247-01 Encounter: [id number]        Subjective:   Patient seen and examined at bedside after reviewing the chart and discussing the case with the caring staff.      Patient examined at bedside.  Patient denies any acute symptoms.    Patient is a possible discharge today, 6/23/25.    Physical Exam   Vitals: Blood pressure 95/61, pulse 80, temperature 97.6 °F (36.4 °C), temperature source Temporal, resp. rate 16, height 5' 5\" (1.651 m), weight 55.8 kg (123 lb), SpO2 98%, not currently breastfeeding.,Body mass index is 20.47 kg/m².  Constitutional: Patient in no acute distress.  HEENT: PERR, EOMI, MMM.  Cardiovascular: Normal rate and regular rhythm.    Pulmonary/Chest: Effort normal and breath sounds normal.   Abdomen: Soft, + BS, NT.    Assessment/Plan:  Ericka Castillo is a(n) 49 y.o. female with MDD.     Medical clearance.  Patient is medically cleared for discharge.  All scripts will be sent out for the patient.     Hypertension.  D/c lisinopril 5 mg 6/14 due to low BP.  Hypothyroidism.  Continue Synthroid 50 mcg daily. TSH WNL 6/13.   GERD.  Continue Protonix 40 mg daily (Prevacid nonform) and Pepcid 20 mg twice daily.   CKD.  Avoid nephrotoxins.  Patient takes NSAID as needed outpatient for pain.  Celebrex as needed here with close monitoring of kidneys.   HRT.  Continue estradoil 0.06 mg/24 hr patch weekly (Sat at 4 pm).   Chronic pain syndrome w/ neck pain, cervical spondylosis, cervical radiculopathy, myofascial pain syndrome, and continuous opioid dependence.  Patient follows with pain management.  Continue gabapentin 800 mg TID, Norco 5-325 mg twice daily as needed for moderate pain.  Home Parafon forte 500 mg 3 times daily prn and nabumetone 500 mg twice daily prn non formulary.   Celebrex 100 mg twice daily as needed for mild pain, Tylenol 975 mg q6h as needed for severe pain, Flexeril 10 mg TID as needed for muscle " spasms.  Lidocaine cream as needed -declines lidocaine patch, Voltaren, [place].   Diarrhea.  Continue probiotic daily.  Imodium as needed.     The patient was discussed with Dr. Gabriel and he is in agreement with the above note.

## 2025-06-23 NOTE — SOCIAL WORK
JORGE LUIS  spoke with Sammie at [facility] [phone] regarding pt's medications after that were sent to them by the provider. JORGE LUIS was informed that some of her medications had no copays and a few were only a $1.00 and they could knock that off for patient. JORGE LUIS was informed that some of her medications were too soon to be filled through insurance and therefore she would have to pay more to get out of her pocket. Sammie stated that she would see if [facility] could cancel some of those other prescriptions so they could fill them.

## 2025-06-23 NOTE — NURSING NOTE
Patient pleasant and social with select peers. Endorses feeling nervous, anxious, and depressed due to not feeling any better since being admitted.  Feels overwhelmed and hopeless. Reassured perfectly natural to be feeling this way. Denies SI/HI or AV/H. Attended snack. HS medication compliant.    Q 15 minute monitoring maintained.

## 2025-06-23 NOTE — NURSING NOTE
Patient compliant with meds and meals. Patient social an visible attends groups. Patient cont to feel hopeless about getting better and feels she would rather go home because she does not feel she would learn anything. Patient cont to endorse high anxiety and depression.

## 2025-06-23 NOTE — SOCIAL WORK
JORGE LUIS spoke with pt's daughter Nadia [phone] regarding her mother's medications. She reported that she had some of them that she just picked up. JORGE LUIS informed Nadia that in order for pt to go to [facility] she would need all of the medications in hand when she goes. JORGE LUIS asked Nadia if she could take the medications to [facility] tomorrow so that they would have them all. Nadia asked if she could bring them to the hospital instead because they could not get them to [facility] to them tomorrow. JORGE LUIS stated that she would be in touch with Nadia when she knew when pt was going.

## 2025-06-23 NOTE — PLAN OF CARE
Problem: Depression  Goal: Verbalize thoughts and feelings  Description: Interventions:  - Assess and re-assess patient's level of risk   - Engage patient in 1:1 interactions, daily, for a minimum of 15 minutes   - Encourage patient to express feelings, fears, frustrations, hopes   Outcome: Progressing  Goal: Refrain from harming self  Description: Interventions:  - Monitor patient closely, per order   - Supervise medication ingestion, monitor effects and side effects   Outcome: Progressing  Goal: Refrain from isolation  Description: Interventions:  - Develop a trusting relationship   - Encourage socialization   Outcome: Progressing     Problem: Anxiety  Goal: Anxiety is at manageable level  Description: Interventions:  - Assess and monitor patient's anxiety level.   - Monitor for signs and symptoms (heart palpitations, chest pain, shortness of breath, headaches, nausea, feeling jumpy, restlessness, irritable, apprehensive).   - Collaborate with interdisciplinary team and initiate plan and interventions as ordered.  - [place] patient to unit/surroundings  - Explain treatment plan  - Encourage participation in care  - Encourage verbalization of concerns/fears  - Identify coping mechanisms  - Assist in developing anxiety-reducing skills  - Administer/offer alternative therapies  - Limit or eliminate stimulants  Outcome: Not Progressing

## 2025-06-23 NOTE — PLAN OF CARE
Problem: Ineffective Coping  Goal: Participates in unit activities  Description: Interventions:  - Provide therapeutic environment   - Provide required programming   - Redirect inappropriate behaviors   Outcome: Adequate for Discharge     Problem: Risk for Self Injury/Neglect  Goal: Attend and participate in unit activities, including therapeutic, recreational, and educational groups  Description: Interventions:  - Provide therapeutic and educational activities daily, encourage attendance and participation, and document same in the medical record  - Obtain collateral information, encourage visitation and family involvement in care   Outcome: Adequate for Discharge     Problem: Depression  Goal: Attend and participate in unit activities, including therapeutic, recreational, and educational groups  Description: Interventions:  - Provide therapeutic and educational activities daily, encourage attendance and participation, and document same in the medical record   Outcome: Adequate for Discharge

## 2025-06-23 NOTE — SOCIAL WORK
JORGE LUIS spoke with pt's daughter [phone] about her mother's discharge. Nadia stated that she does not have any safety concerns at this time and that she can pick her up tomorrow around 11/1130. JORGE LUIS informed Nadia that all of the information would be on the pt AVS.

## 2025-06-23 NOTE — PROGRESS NOTES
Progress Note - Behavioral Health   Name: Ericka Castillo 49 y.o. female I MRN: [id number]  Unit/Bed#: -01 I Date of Admission: 6/12/2025   Date of Service: 6/23/2025 I Hospital Day: 11        Assessment & Plan  Bipolar 2 disorder (HCC)  Continue Klonopin to 1 mg twice daily for generalized anxiety and mood stability  Continue Lexapro 10 mg daily to reduce mood instability in context of bipolar disorder; decreased 6/17/2025  Continue Wellbutrin  mg daily for symptoms of depression  Continue gabapentin 800 mg 3 times daily for chronic pain as well as mood stability  Continue Lamictal 250 mg daily for mood stability  Continue Latuda 40 mg daily with dinner for ongoing mood stabilization and residual depressive symptoms; increased 6/17/2025  Continue Atarax 25 mg PO BID and 50 mg PO QHS for anxiety management; increased 6/17/2025      Risks / Benefits of Treatment:  Risks, benefits, and possible side effects of medications explained to patient and patient verbalizes understanding and agreement for treatment.      Progress Toward Goals: Maintaining safety with no return of SI.  Anxious, ruminating, and perseverative on discharge planning.  Responds well to firm redirection.  Utilizes multiple PRN medications often.  No medication changes indicated at this time.  At this time, patient appears to be at her psychiatric baseline and stable for discharge.  Awaiting contact with crisis residence for temporary stepdown prior to returning home, otherwise if bed is unavailable, will discharge home 6/24/2025.  Will begin PHP 6/26/2025.      Subjective:    Ericka is visible in the milieu and social with peers.  Attends group.  Maintaining ADLs and adequate sleep.  Anxious in the context of disposition planning.  Utilizes PRNs frequently for multiple somatic complaints.  Denies return of SI and identifies her children as strong protective factors.      Behavior over the last 24 hours: Unchanged  Sleep:  "Normal  Appetite: normal  Medication side effects: No   ROS: no complaints, all other systems are negative    Objective :  Temp:  [97.5 °F (36.4 °C)-97.6 °F (36.4 °C)] 97.6 °F (36.4 °C)  HR:  [73-80] 80  BP: (91-95)/(58-61) 95/61  Resp:  [16-18] 16  SpO2:  [98 %] 98 %  O2 Device: None (Room air)    Mental Status Evaluation:  Appearance:  Older than stated age, blond hair, casually dressed   Behavior:  Cooperative   Speech:  Normal rate, pitch, volume   Mood:  \"Anxious\"   Affect:  Mood congruent, redirectable   Thought Process:  Perseverative   Associations: Circumstantial   Thought Content:  Some rumination   Perceptual Disturbances: Denied AVH, did not appear internally preoccupied   Risk Potential: Suicidal ideation - None  Homicidal ideation - None  Potential for aggression - No   Sensorium:  oriented to person, place, and time/date   Memory:  recent and remote memory grossly intact   Consciousness:  alert and awake   Attention/Concentration: attention span and concentration appear shorter than expected for age   Insight:  limited   Judgment: limited   Gait/Station: normal gait/station, normal balance   Motor Activity: no abnormal movements       Lab Results: I have reviewed the following results:  CMP:   Lab Results   Component Value Date    SODIUM 141 06/13/2025    K 4.0 06/13/2025     06/13/2025    CO2 31 06/13/2025    AGAP 4 06/13/2025    BUN 16 06/13/2025    CREATININE 1.13 06/13/2025    GLUC 87 06/13/2025    GLUF 87 06/13/2025    CALCIUM 8.6 06/13/2025    AST 13 06/13/2025    ALT 16 06/13/2025    ALKPHOS 47 06/13/2025    TP 5.5 (L) 06/13/2025    ALB 3.8 06/13/2025    TBILI 0.24 06/13/2025    EGFR 56 06/13/2025     Drug Screen:   Lab Results   Component Value Date    AMPMETHUR Negative 06/12/2025    BARBTUR Negative 06/12/2025    BDZUR Positive (A) 06/12/2025    THCUR Negative 06/12/2025    COCAINEUR Negative 06/12/2025    METHADONEUR Negative 06/12/2025    OPIATEUR Negative 06/12/2025    PCPUR " Negative 06/12/2025     Counseling / Coordination of Care:    Patient's progress discussed with staff in treatment team meeting.  Medication changes reviewed with staff in treatment team meeting.  Patient's progress reviewed with nursing staff.  Medication changes discussed with patient.  Medication education provided to patient.  Patient's progress reviewed with case management staff.  Patient's diagnosis and treatment indicated reviewed with patient.  Importance of medication and treatment compliance reviewed with patient.  Educated on importance of medication and treatment compliance.  Discussed with patient acceptance of mental illness diagnosis and need for ongoing treatment after discharge.  Importance of follow up for substance abuse issues discussed with patient.  Cognitive techniques utilized during the session.  Reassurance and supportive therapy provided.  Reoriented to reality and reassured.  Group attendance encouraged.  Encouraged participation in milieu and group therapy on the unit.  Crisis/safety plan discussed with patient.  Discharge plan discussed with patient.      FIONA Walker 06/23/25

## 2025-06-23 NOTE — TREATMENT TEAM
06/23/25 1156   Hope Anxiety Scale   Anxious Mood 4   Tension 4   Fears 3   Insomnia 0   Intellectual 2   Depressed Mood 3   Somatic Complaints: Muscular 2   Somatic Complaints: Sensory 2   Cardiovascular Symptoms 0   Respiratory Symptoms 0   Gastrointestinal Symptoms 0   Genitourinary Symptoms 0   Autonomic Symptoms 2   Behavior at Interview 3   Hope Anxiety Score 25     Patient c/o anxiety r/t discharge and the unknown of when it will happen and how the places with be. Administered 100mg atarax will monitor for effectiveness

## 2025-06-23 NOTE — SOCIAL WORK
Cm spoke with [facility] pharmacy to cancel the order of the medications. JORGE LUIS was informed that they would do that right now.

## 2025-06-23 NOTE — SOCIAL WORK
JORGE LUIS met with pt regarding the medication situation. JORGE LUIS informed pt that some of her medications were already filled on 6/6 and some were just filled 6-21 and therefore those would be too soon to be filled. Pt stated that her daughter did pick some of the medications up. CM  asked pt to sign a consent for her daughter to see what medications that she has.

## 2025-06-23 NOTE — SOCIAL WORK
JORGE LUIS called [facility]  [phone] and spoke with Terry regarding pt. JORGE LUIS information Terry that had sent over the referral on Friday and spoke with Deanna but had not heard anything back as to if they had reached out to the pt. Terry stated that she would get back to her when he spoke with the rest of the team.

## 2025-06-23 NOTE — SOCIAL WORK
CM spoke with pt about her medications and stated that she was still working on the medications and that there were medications that she would have to pay out of pocket to get filled as they were to soon to be filled. Pt asked what she would really learn at [facility] and stated that she would just rather go home instead.

## 2025-06-24 ENCOUNTER — TELEPHONE (OUTPATIENT)
Age: 50
End: 2025-06-24

## 2025-06-24 VITALS
DIASTOLIC BLOOD PRESSURE: 60 MMHG | RESPIRATION RATE: 18 BRPM | BODY MASS INDEX: 20.49 KG/M2 | HEIGHT: 65 IN | SYSTOLIC BLOOD PRESSURE: 112 MMHG | WEIGHT: 123 LBS | OXYGEN SATURATION: 100 % | TEMPERATURE: 98 F | HEART RATE: 70 BPM

## 2025-06-24 PROCEDURE — 99238 HOSP IP/OBS DSCHRG MGMT 30/<: CPT | Performed by: NURSE PRACTITIONER

## 2025-06-24 RX ADMIN — TIZANIDINE 4 MG: 4 TABLET ORAL at 08:13

## 2025-06-24 RX ADMIN — BUPROPION HYDROCHLORIDE 300 MG: 150 TABLET, EXTENDED RELEASE ORAL at 08:13

## 2025-06-24 RX ADMIN — HYDROCODONE BITARTRATE AND ACETAMINOPHEN 1 TABLET: 5; 325 TABLET ORAL at 08:13

## 2025-06-24 RX ADMIN — LEVOTHYROXINE SODIUM 50 MCG: 0.05 TABLET ORAL at 04:57

## 2025-06-24 RX ADMIN — LAMOTRIGINE 250 MG: 100 TABLET ORAL at 08:13

## 2025-06-24 RX ADMIN — FAMOTIDINE 20 MG: 20 TABLET, FILM COATED ORAL at 08:13

## 2025-06-24 RX ADMIN — CLONAZEPAM 1 MG: 1 TABLET ORAL at 08:13

## 2025-06-24 RX ADMIN — HYDROXYZINE HYDROCHLORIDE 25 MG: 25 TABLET, FILM COATED ORAL at 08:13

## 2025-06-24 RX ADMIN — HYDROXYZINE HYDROCHLORIDE 100 MG: 50 TABLET ORAL at 11:47

## 2025-06-24 RX ADMIN — ESCITALOPRAM OXALATE 10 MG: 10 TABLET ORAL at 08:13

## 2025-06-24 RX ADMIN — Medication 250 MG: at 08:13

## 2025-06-24 RX ADMIN — GABAPENTIN 800 MG: 400 CAPSULE ORAL at 08:13

## 2025-06-24 RX ADMIN — PANTOPRAZOLE SODIUM 40 MG: 40 TABLET, DELAYED RELEASE ORAL at 08:13

## 2025-06-24 NOTE — PLAN OF CARE
Problem: Ineffective Coping  Goal: Identifies ineffective coping skills  Outcome: Adequate for Discharge  Goal: Identifies healthy coping skills  Outcome: Adequate for Discharge  Goal: Demonstrates healthy coping skills  Outcome: Adequate for Discharge  Goal: Participates in unit activities  Description: Interventions:  - Provide therapeutic environment   - Provide required programming   - Redirect inappropriate behaviors   Outcome: Adequate for Discharge  Goal: Patient/Family participate in treatment and DC plans  Description: Interventions:  - Provide therapeutic environment  Outcome: Adequate for Discharge  Goal: Patient/Family verbalizes awareness of resources  Outcome: Adequate for Discharge  Goal: Understands least restrictive measures  Description: Interventions:  - Utilize least restrictive behavior  Outcome: Adequate for Discharge  Goal: Free from restraint events  Description: - Utilize least restrictive measures   - Provide behavioral interventions   - Redirect inappropriate behaviors   Outcome: Adequate for Discharge     Problem: Risk for Self Injury/Neglect  Goal: Treatment Goal: Remain safe during length of stay, learn and adopt new coping skills, and be free of self-injurious ideation, impulses and acts at the time of discharge  Outcome: Adequate for Discharge  Goal: Verbalize thoughts and feelings  Description: Interventions:  - Assess and re-assess patient's lethality and potential for self-injury  - Engage patient in 1:1 interactions, daily, for a minimum of 15 minutes  - Encourage patient to express feelings, fears, frustrations, hopes  - Establish rapport/trust with patient   Outcome: Adequate for Discharge  Goal: Refrain from harming self  Description: Interventions:  - Monitor patient closely, per order  - Develop a trusting relationship  - Supervise medication ingestion, monitor effects and side effects   Outcome: Adequate for Discharge  Goal: Attend and participate in unit activities,  including therapeutic, recreational, and educational groups  Description: Interventions:  - Provide therapeutic and educational activities daily, encourage attendance and participation, and document same in the medical record  - Obtain collateral information, encourage visitation and family involvement in care   Outcome: Adequate for Discharge  Goal: Recognize maladaptive responses and adopt new coping mechanisms  Outcome: Adequate for Discharge  Goal: Complete daily ADLs, including personal hygiene independently, as able  Description: Interventions:  - Observe, teach, and assist patient with ADLS  - Monitor and promote a balance of rest/activity, with adequate nutrition and elimination  Outcome: Adequate for Discharge     Problem: Depression  Goal: Treatment Goal: Demonstrate behavioral control of depressive symptoms, verbalize feelings of improved mood/affect, and adopt new coping skills prior to discharge  Outcome: Adequate for Discharge  Goal: Verbalize thoughts and feelings  Description: Interventions:  - Assess and re-assess patient's level of risk   - Engage patient in 1:1 interactions, daily, for a minimum of 15 minutes   - Encourage patient to express feelings, fears, frustrations, hopes   Outcome: Adequate for Discharge  Goal: Refrain from harming self  Description: Interventions:  - Monitor patient closely, per order   - Supervise medication ingestion, monitor effects and side effects   Outcome: Adequate for Discharge  Goal: Refrain from isolation  Description: Interventions:  - Develop a trusting relationship   - Encourage socialization   Outcome: Adequate for Discharge  Goal: Refrain from self-neglect  Outcome: Adequate for Discharge  Goal: Attend and participate in unit activities, including therapeutic, recreational, and educational groups  Description: Interventions:  - Provide therapeutic and educational activities daily, encourage attendance and participation, and document same in the medical  record   Outcome: Adequate for Discharge  Goal: Complete daily ADLs, including personal hygiene independently, as able  Description: Interventions:  - Observe, teach, and assist patient with ADLS  -  Monitor and promote a balance of rest/activity, with adequate nutrition and elimination   Outcome: Adequate for Discharge     Problem: Anxiety  Goal: Anxiety is at manageable level  Description: Interventions:  - Assess and monitor patient's anxiety level.   - Monitor for signs and symptoms (heart palpitations, chest pain, shortness of breath, headaches, nausea, feeling jumpy, restlessness, irritable, apprehensive).   - Collaborate with interdisciplinary team and initiate plan and interventions as ordered.  - [place] patient to unit/surroundings  - Explain treatment plan  - Encourage participation in care  - Encourage verbalization of concerns/fears  - Identify coping mechanisms  - Assist in developing anxiety-reducing skills  - Administer/offer alternative therapies  - Limit or eliminate stimulants  Outcome: Adequate for Discharge     Problem: DISCHARGE PLANNING - CARE MANAGEMENT  Goal: Discharge to post-acute care or home with appropriate resources  Description: INTERVENTIONS:  - Conduct assessment to determine patient/family and health care team treatment goals, and need for post-acute services based on payer coverage, community resources, and patient preferences, and barriers to discharge  - Address psychosocial, clinical, and financial barriers to discharge as identified in assessment in conjunction with the patient/family and health care team  - Arrange appropriate level of post-acute services according to patient’s   needs and preference and payer coverage in collaboration with the physician and health care team  - Communicate with and update the patient/family, physician, and health care team regarding progress on the discharge plan  - Arrange appropriate transportation to post-acute venues  Outcome: Adequate for  Discharge

## 2025-06-24 NOTE — PLAN OF CARE
Problem: Ineffective Coping  Goal: Identifies ineffective coping skills  Outcome: Adequate for Discharge  Goal: Identifies healthy coping skills  Outcome: Adequate for Discharge  Goal: Demonstrates healthy coping skills  Outcome: Adequate for Discharge  Goal: Participates in unit activities  Description: Interventions:  - Provide therapeutic environment   - Provide required programming   - Redirect inappropriate behaviors   Outcome: Adequate for Discharge  Goal: Patient/Family participate in treatment and DC plans  Description: Interventions:  - Provide therapeutic environment  Outcome: Adequate for Discharge  Goal: Patient/Family verbalizes awareness of resources  Outcome: Adequate for Discharge  Goal: Understands least restrictive measures  Description: Interventions:  - Utilize least restrictive behavior  Outcome: Adequate for Discharge  Goal: Free from restraint events  Description: - Utilize least restrictive measures   - Provide behavioral interventions   - Redirect inappropriate behaviors   Outcome: Adequate for Discharge     Problem: Risk for Self Injury/Neglect  Goal: Treatment Goal: Remain safe during length of stay, learn and adopt new coping skills, and be free of self-injurious ideation, impulses and acts at the time of discharge  Outcome: Adequate for Discharge  Goal: Verbalize thoughts and feelings  Description: Interventions:  - Assess and re-assess patient's lethality and potential for self-injury  - Engage patient in 1:1 interactions, daily, for a minimum of 15 minutes  - Encourage patient to express feelings, fears, frustrations, hopes  - Establish rapport/trust with patient   Outcome: Adequate for Discharge  Goal: Refrain from harming self  Description: Interventions:  - Monitor patient closely, per order  - Develop a trusting relationship  - Supervise medication ingestion, monitor effects and side effects   Outcome: Adequate for Discharge  Goal: Attend and participate in unit activities,  including therapeutic, recreational, and educational groups  Description: Interventions:  - Provide therapeutic and educational activities daily, encourage attendance and participation, and document same in the medical record  - Obtain collateral information, encourage visitation and family involvement in care   Outcome: Adequate for Discharge  Goal: Recognize maladaptive responses and adopt new coping mechanisms  Outcome: Adequate for Discharge  Goal: Complete daily ADLs, including personal hygiene independently, as able  Description: Interventions:  - Observe, teach, and assist patient with ADLS  - Monitor and promote a balance of rest/activity, with adequate nutrition and elimination  Outcome: Adequate for Discharge     Problem: Depression  Goal: Treatment Goal: Demonstrate behavioral control of depressive symptoms, verbalize feelings of improved mood/affect, and adopt new coping skills prior to discharge  Outcome: Adequate for Discharge  Goal: Verbalize thoughts and feelings  Description: Interventions:  - Assess and re-assess patient's level of risk   - Engage patient in 1:1 interactions, daily, for a minimum of 15 minutes   - Encourage patient to express feelings, fears, frustrations, hopes   Outcome: Adequate for Discharge  Goal: Refrain from harming self  Description: Interventions:  - Monitor patient closely, per order   - Supervise medication ingestion, monitor effects and side effects   Outcome: Adequate for Discharge  Goal: Refrain from isolation  Description: Interventions:  - Develop a trusting relationship   - Encourage socialization   Outcome: Adequate for Discharge  Goal: Refrain from self-neglect  Outcome: Adequate for Discharge  Goal: Attend and participate in unit activities, including therapeutic, recreational, and educational groups  Description: Interventions:  - Provide therapeutic and educational activities daily, encourage attendance and participation, and document same in the medical  record   Outcome: Adequate for Discharge  Goal: Complete daily ADLs, including personal hygiene independently, as able  Description: Interventions:  - Observe, teach, and assist patient with ADLS  -  Monitor and promote a balance of rest/activity, with adequate nutrition and elimination   Outcome: Adequate for Discharge     Problem: Anxiety  Goal: Anxiety is at manageable level  Description: Interventions:  - Assess and monitor patient's anxiety level.   - Monitor for signs and symptoms (heart palpitations, chest pain, shortness of breath, headaches, nausea, feeling jumpy, restlessness, irritable, apprehensive).   - Collaborate with interdisciplinary team and initiate plan and interventions as ordered.  - [place] patient to unit/surroundings  - Explain treatment plan  - Encourage participation in care  - Encourage verbalization of concerns/fears  - Identify coping mechanisms  - Assist in developing anxiety-reducing skills  - Administer/offer alternative therapies  - Limit or eliminate stimulants  Outcome: Adequate for Discharge     Problem: DISCHARGE PLANNING - CARE MANAGEMENT  Goal: Discharge to post-acute care or home with appropriate resources  Description: INTERVENTIONS:  - Conduct assessment to determine patient/family and health care team treatment goals, and need for post-acute services based on payer coverage, community resources, and patient preferences, and barriers to discharge  - Address psychosocial, clinical, and financial barriers to discharge as identified in assessment in conjunction with the patient/family and health care team  - Arrange appropriate level of post-acute services according to patient’s   needs and preference and payer coverage in collaboration with the physician and health care team  - Communicate with and update the patient/family, physician, and health care team regarding progress on the discharge plan  - Arrange appropriate transportation to post-acute venues  Outcome: Adequate for  Discharge
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
## 2025-06-24 NOTE — DISCHARGE SUMMARY
"Discharge Summary - Behavioral Health   Name: Ericka Castillo 49 y.o. female I MRN: [id number]  Unit/Bed#: -01 I Date of Admission: 6/12/2025   Date of Service: 6/24/2025 I Hospital Day: 12      Assessment & Plan  Bipolar 2 disorder (HCC)  Continue Klonopin 1 mg PO BID upon discharge  Continue Lexapro 10 mg PO QD upon discharge  Continue Wellbutrin  mg PO QD upon discharge  Continue gabapentin 800 mg PO TID upon discharge  Continue Lamictal 250 mg PO QD upon discharge  Continue Latuda 40 mg PO QD upon discharge  Continue Atarax 25 mg PO BID and 50 mg PO QHS upon discharge      Admission Date: 6/12/2025         Discharge Date: 6/24/2025    Attending Psychiatrist: Alex Fernandez*    Reason for Admission/HPI: Major depressive disorder, single episode, unspecified [F32.9]    According to H&P by Dr. Freeman 6/13/25:    History of Present Illness  Ericka Castillo is a 49 y.o. female with a history of Bipolar Disorder type II who was admitted to the inpatient psychiatric unit on a voluntary 201 commitment basis due to mixed symptoms of bipolar disorder and unstable mood.     Symptoms prior to admission included worsening depression. Onset of symptoms was gradual starting several weeks ago with progressively worsening course since that time. Stressors preceding admission included recent divorce.         ED Crisis reported: \"Pt is a 49 year old female presenting to the ED following a recommendation from the partial hospitalization program due to increased depression and SI without a plan. Pt is diagnosed with depression. Pt is calm and cooperative. Pt is alert and oriented x4. Patients daughter present in room during assessment. Pt lives with her mother, her three daughters, and her nephew. Pt also has a son that lives with his father.      Pt reports that this past Monday, 6/9, she started the PHP program in order to treat her depression and receive medication management support. Pt reports " "that over the past year she has become increasingly overwhelmed due to a divorce and financial issues. Pt states that the PHP was not intensive enough and she believes she requires more intensive care at this time. Pt denies SI however she states that she does occasionally have thoughts that she would be better off dead. Pt states \"I would never kill myself because I have my kids, but I have these thoughts that I am afraid will get worse\".      Pt reports that she has been on various different anti-depressants throughout her life. Pt states that her outpatient psychiatrist briefly took her off of anti-depressants and instead prescribed only mood stabilizers. Pt reported that after a couple of months she was put back on the anti-depressants. Pt reports that she believes she may need a combination of the two but she in unsure.      On initial evaluation after admission to the inpatient psychiatric unit the patient reported worsening depression, inability to concentrate because of her increased anxiety, hopelessness, helplessness, difficult just with activities of daily living, and suicidal ideations.  The patient states that she has been under the care of some psychiatry since her teenage years at times having some mood swings but most of the time feeling depressed and increased anxiety associated with recent social stressors, divorce, need to keep her job, dealing with legal stress associated with son, and financial stressors.  The patient stated her current medications do not help her.    Hospital Course: The patient was admitted to the inpatient psychiatric unit and started on every 15 minutes precautions. During the hospitalization the patient was attending individual therapy, group therapy, milieu therapy and occupational therapy.    Psychiatric medications were titrated over the hospital stay to address mixed symptoms of bipolar disorder, mood instability, worsening depression, anxiety, and suicidal ideation. " "Treatment plan as outlined according to Dr. Freeman in H&P 6/13/25: \"Change patient's medications, we reviewed with the patient that several medications can be adjusted.   Latuda will be started instead of Abilify to address patient's depression more associated with bipolar 2 disorder rather than MDD, we will increase Lamictal, the patient never had any rash, and has been taking Lamictal for extended period of time.  Will decrease Wellbutrin to 300 mg to treat depression but higher dose may lead to anxiety, and at the same time we will lower Lexapro because this medication may lead to rapid cycling and is not indicated to treat depression and mood disorder however will not stop with abruptly at 2 avoid discontinuation syndrome.  Xanax will be changed to clonazepam because the patient stated that although the next helped to decrease anxiety and its effect is short-lived.  We will augment clonazepam with Atarax.  We will continue the patient Ambien 10 mg as prescribed before the patient has been taking for many years without sleep disorders, denied sleepwalking sleep and eating.  The patient was in agreement with just medication changes.\"    Prior to beginning of treatment medications risks and benefits and possible side effects including risk of rash related to treatment with Lamictal, risk of parkinsonian symptoms, Tardive Dyskinesia and metabolic syndrome related to treatment with antipsychotic medications, risk of cardiovascular events in elderly related to treatment with antipsychotic medications, risk of suicidality and serotonin syndrome related to treatment with antidepressants, risks of misuse, abuse or dependence, sedation and respiratory depression related to treatment with benzodiazepine medications, and risk of impaired next-day mental alertness, complex sleep-related behavior and dependence related to treatment with hypnotic medications were reviewed with the patient. Ericka verbalized understanding " "and agreement for treatment.  Medication education remained ongoing throughout inpatient stay.    Throughout Ericka's inpatient stay, she was able to maintain safety with no return of suicidal ideation.  Anxiety, ruminations, and mood instability persisted.  Perseverative on medications and required extensive medication education.  Lexapro was tapered to 10 mg daily to reduce mood instability in the context of bipolar disorder.  Latuda was also titrated to 40 mg daily for treatment of depression, ruminations, and ongoing mood stabilization.  Intermittent anxiety persisted and patient reported favorable effect from Atarax at which point medication was increased to 25 mg PO BID and 50 mg PO QHS.  Ericka did endorse intermittent difficulty with initiating and maintaining sleep, however reported improving sleep with improved depression.  Patient did report decrease in depression and presented with congruent affect.  Intermittently anxious in the context of psychosocial stressors and disposition planning.  Despite this, patient was able to maintain safety with no return of SI, self-care, improved sleep, and mood control.      During her inpatient stay, Ericka was visible, social, and engaged in all milieu activities.  It appeared patient was approaching her psychiatric baseline at which point decision was made to begin discharge preparations.  Patient was reluctant to return home due to \"being lonely\" and \"in a negative environment\" and was offered option to transition to crisis residential.  She was agreeable to transition to crisis residential until she learned she was unable to acquire all of her medications needed to take to crisis residential stating, \"it would just be easier if I were to go home where I have on my medications.  I can put up with it for a few days until I start partial.\"  Exhibited linear and forward thought process with no delusional content verbalized.  Ericka was also able to identify an " "adequate safety plan and protective factors against suicide should she become a danger to herself, others, or experience of mental health crisis.  Her safety plan consisted of contacting her outpatient provider, utilizing crisis hotline, or returning to the hospital.  She also identified her children and desire to \"get better\" as strong protective factors against suicide.    We felt that Ericka ANDREW achieved the maximum benefit of inpatient stay at that point and could now follow up with outpatient treatment. Prior to discharge  spoke with Ericka ANDREW's daughter to address support and Ericka ANDREW readiness for discharge. Ericka ANDREW also felt stable and ready for discharge at the end of the hospital stay.    The outpatient follow up with [facility] starting 6/26/2025 and established psychiatrist  Dr. Guardado 7/10/25 @ 1:45 PM was arranged by the unit  upon discharge.  A 14-day supply of psychotropic medication was submitted to patient's pharmacy prior to discharge.    Ericka was stabilized and discharged on the following psychotropic regimen: Wellbutrin  mg PO QD, Klonopin 1 mg PO BID, Lexapro 10 mg PO QD, gabapentin 800 mg PO TID, Atarax 25 mg PO BID and 50 mg PO QHS, Lamictal 250 mg PO QD, and latuda 40 mg PO QD. She was tolerating medications well and denied any side effects on day of discharge.      Recommend taper of Lexapro to discontinuation on outpatient basis to reduce polypharmacy and duplicate antidepressant therapy.  Klonopin taper also recommended to reduce risk for dependence and side effects in combination with Ambien and [place].    Consider further titration of Latuda for mood stabilization, treatment of depression, and ruminations in the outpatient setting.      Mental Status at time of Discharge:   Appearance:  casually dressed, older than stated age, and blonde hair, holding notebook   Behavior:  Cooperative   Speech:  " "normal pitch and normal volume   Mood:  \"Good\"   Affect:  Mildly anxious, redirectable   Thought Process:  Goal directed, linear, forward thinking   Thought Content:  No overt delusions or paranoia verbalized, less ruminating, able to reality test   Perceptual Disturbances: Denied AVH, did not appear internally preoccupied   Risk Potential: Suicidal Ideations none, Homicidal Ideations none, and Potential for Aggression No   Sensorium:  person, place, time/date, and situation   Cognition:  recent and remote memory grossly intact   Consciousness:  alert and awake    Attention: attention span and concentration were age appropriate   Insight:  limited   Judgment: limited   Gait/Station: normal gait/station and normal balance   Motor Activity: no abnormal movements     Admission Diagnosis:Major depressive disorder, single episode, unspecified [F32.9]    Discharge Diagnosis:   Principal Problem:    Bipolar 2 disorder (HCC)  Resolved Problems:    * No resolved hospital problems. *    Lab results:  Admission on 06/12/2025   Component Date Value    Sodium 06/13/2025 141     Potassium 06/13/2025 4.0     Chloride 06/13/2025 106     CO2 06/13/2025 31     ANION GAP 06/13/2025 4     BUN 06/13/2025 16     Creatinine 06/13/2025 1.13     Glucose 06/13/2025 87     Glucose, Fasting 06/13/2025 87     Calcium 06/13/2025 8.6     AST 06/13/2025 13     ALT 06/13/2025 16     Alkaline Phosphatase 06/13/2025 47     Total Protein 06/13/2025 5.5 (L)     Albumin 06/13/2025 3.8     Total Bilirubin 06/13/2025 0.24     eGFR 06/13/2025 56     WBC 06/13/2025 5.19     RBC 06/13/2025 3.32 (L)     Hemoglobin 06/13/2025 11.3 (L)     Hematocrit 06/13/2025 34.9     MCV 06/13/2025 105 (H)     MCH 06/13/2025 34.0     MCHC 06/13/2025 32.4     RDW 06/13/2025 12.0     MPV 06/13/2025 10.4     Platelets 06/13/2025 222     nRBC 06/13/2025 0     Segmented % 06/13/2025 51     Immature Grans % 06/13/2025 0     Lymphocytes % 06/13/2025 37     Monocytes % 06/13/2025 8  "    Eosinophils Relative 06/13/2025 3     Basophils Relative 06/13/2025 1     Absolute Neutrophils 06/13/2025 2.68     Absolute Immature Grans 06/13/2025 0.01     Absolute Lymphocytes 06/13/2025 1.90     Absolute Monocytes 06/13/2025 0.39     Eosinophils Absolute 06/13/2025 0.17     Basophils Absolute 06/13/2025 0.04     Preg, Serum 06/13/2025 Negative     TSH 3RD GENERATION 06/13/2025 1.775     Vitamin B-12 06/13/2025 364     Folate 06/13/2025 >22.3     Vit D, 25-Hydroxy 06/13/2025 56.2     Cholesterol 06/13/2025 189     Triglycerides 06/13/2025 152 (H)     HDL, Direct 06/13/2025 55     LDL Calculated 06/13/2025 104 (H)     Non-HDL-Chol (CHOL-HDL) 06/13/2025 134     Treponema Pallidium Tota* 06/13/2025 Non-reactive        Discharge Medications:  Current Discharge Medication List        START taking these medications    Details   clonazePAM (KlonoPIN) 1 mg tablet Take 1 tablet (1 mg total) by mouth 2 (two) times a day for 14 days  Qty: 28 tablet, Refills: 0    Associated Diagnoses: Anxiety; Bipolar 2 disorder (HCC)      gabapentin (NEURONTIN) 400 mg capsule Take 2 capsules (800 mg total) by mouth 3 (three) times a day for 14 days  Qty: 84 capsule, Refills: 0    Associated Diagnoses: Chronic pain syndrome      hyoscyamine (LEVSIN/SL) 0.125 mg SL tablet Take 1 tablet (0.125 mg total) by mouth every 4 (four) hours as needed for cramping  Qty: 60 tablet, Refills: 0    Associated Diagnoses: Abdominal cramps      lidocaine (LMX) 4 % cream Apply topically 4 (four) times a day as needed (neck pain)  Qty: 28 g, Refills: 0    Associated Diagnoses: Muscle spasm      loperamide (IMODIUM) 2 mg capsule Take 1 capsule (2 mg total) by mouth 3 (three) times a day as needed for diarrhea  Qty: 30 capsule, Refills: 0    Associated Diagnoses: Diarrhea      lurasidone (LATUDA) 40 mg tablet Take 1 tablet (40 mg total) by mouth daily with dinner for 14 days  Qty: 14 tablet, Refills: 0    Associated Diagnoses: Bipolar 2 disorder (HCC);  Severe episode of recurrent major depressive disorder, without psychotic features (HCC)      ondansetron (ZOFRAN-ODT) 4 mg disintegrating tablet Take 1 tablet (4 mg total) by mouth every 6 (six) hours as needed for nausea or vomiting  Qty: 20 tablet, Refills: 0    Associated Diagnoses: Nausea & vomiting      saccharomyces boulardii (FLORASTOR) 250 mg capsule Take 1 capsule (250 mg total) by mouth 2 (two) times a day  Qty: 60 capsule, Refills: 0    Associated Diagnoses: Dyspepsia      simethicone (MYLICON) 80 mg chewable tablet Chew 1 tablet (80 mg total) every 6 (six) hours as needed for flatulence  Qty: 30 tablet, Refills: 0    Associated Diagnoses: Flatus      tiZANidine (ZANAFLEX) 4 mg tablet Take 1 tablet (4 mg total) by mouth 3 (three) times a day as needed for muscle spasms  Qty: 60 tablet, Refills: 0    Associated Diagnoses: Muscle spasm            CONTINUE these medications which have CHANGED    Details   buPROPion (WELLBUTRIN XL) 300 mg 24 hr tablet Take 1 tablet (300 mg total) by mouth in the morning for 14 days. Do not start before June 24, 2025.  Qty: 14 tablet, Refills: 0    Associated Diagnoses: Anxiety; Bipolar 2 disorder (HCC)      escitalopram (LEXAPRO) 10 mg tablet Take 1 tablet (10 mg total) by mouth daily for 14 days Do not start before June 24, 2025.  Qty: 14 tablet, Refills: 0    Associated Diagnoses: Severe episode of recurrent major depressive disorder, without psychotic features (HCC); PTSD (post-traumatic stress disorder)      estradiol (Climara) 0.06 MG/24HR Place 1 patch on the skin once a week over 7 days Do not start before June 28, 2025.  Qty: 4 patch, Refills: 0    Associated Diagnoses: Menopause      HYDROcodone-acetaminophen (NORCO) 5-325 mg per tablet Take 1 tablet by mouth 2 (two) times a day as needed for pain for up to 15 days Max Daily Amount: 2 tablets  Qty: 30 tablet, Refills: 0    Associated Diagnoses: Chronic pain syndrome      !! hydrOXYzine HCL (ATARAX) 25 mg tablet Take  1 tablet (25 mg total) by mouth in the morning and 1 tablet (25 mg total) in the evening. Do all this for 14 days.  Qty: 28 tablet, Refills: 0    Associated Diagnoses: Anxiety      !! hydrOXYzine HCL (ATARAX) 50 mg tablet Take 1 tablet (50 mg total) by mouth daily at bedtime for 14 days  Qty: 14 tablet, Refills: 0    Associated Diagnoses: Anxiety      lamoTRIgine (LaMICtal) 25 mg tablet Take 10 tablets (250 mg total) by mouth daily for 14 days Do not start before June 24, 2025.  Qty: 140 tablet, Refills: 0    Associated Diagnoses: Bipolar 2 disorder (HCC); Severe episode of recurrent major depressive disorder, without psychotic features (HCC)      lansoprazole (PREVACID) 30 mg capsule Take 1 capsule (30 mg total) by mouth in the morning and 1 capsule (30 mg total) in the evening.  Qty: 60 capsule, Refills: 0    Associated Diagnoses: GERD (gastroesophageal reflux disease)      levothyroxine 50 mcg tablet Take 1 tablet (50 mcg total) by mouth daily in the early morning  Qty: 30 tablet, Refills: 0    Associated Diagnoses: Hypothyroidism       !! - Potential duplicate medications found. Please discuss with provider.         CONTINUE these medications which have NOT CHANGED    Details   naloxone (NARCAN) 4 mg/0.1 mL nasal spray Administer 1 spray into a nostril. If no response after 2-3 minutes, give another dose in the other nostril using a new spray.  Qty: 1 each, Refills: 1    Associated Diagnoses: Chronic pain syndrome; Neck pain; History of fusion of cervical spine; Cervical spondylosis; Bilateral occipital neuralgia; Myofascial pain syndrome            Discharge instructions/Information to patient and family:   See after visit summary for information provided to patient and family.      Provisions for Follow-Up Care:  See after visit summary for information related to follow-up care and any pertinent home health orders.      Discharge Statement:    I spent 25 minutes discharging the patient. This time was spent on  the day of discharge. I had direct contact with the patient on the day of discharge.     I reviewed with Ericka ANDREW importance of compliance with medications and outpatient treatment after discharge.  I discussed the medication regimen and possible side effects of the medications with Ericka ANDREW prior to discharge. At the time of discharge she was tolerating psychiatric medications.  I discussed outpatient follow up with Ericka ANDREW.  I reviewed with Ericka ANDREW crisis plan and safety plan upon discharge.  Ericka ANDREW agreed to abstain from drug and alcohol use after discharge.  Ericka ANDREW has been filing controlled prescriptions on time as prescribed according to [place] Prescription Drug Monitoring Program.  I discussed with Ericka plan of restarting PHP 6/26/2025.  Also discussed that a 14-day supply of psychotropic medication was submitted to her pharmacy prior to day of discharge.  Recommended further taper of Lexapro to discontinuation to reduce polypharmacy and duplicate antidepressant therapy.  Consider further titration of Latuda for ongoing mood stabilization and reports of intermittent ruminations.    FIONA Walker 06/24/25

## 2025-06-24 NOTE — TELEPHONE ENCOUNTER
Pt calling and stated she is discharged and needs medication refilled until up coming visit scheduled with Marcelina Najera CNM 9/4.

## 2025-06-24 NOTE — NURSING NOTE
Pt calm and cooperative with assessment. Denies SI/HI/AH/VH and pain. Compliant with meds. Socializes with peers and staff. Endorses mild anxiety over discharging tomorrow. Safety checks ongoing.

## 2025-06-24 NOTE — BH TRANSITION RECORD
Contact Information: If you have any questions, concerns, pended studies, tests and/or procedures, or emergencies regarding your inpatient behavioral health visit. Please contact [facility] # [phone] and ask to speak to a , nurse or physician. A contact is available 24 hours/ 7 days a week at this number.     Summary of Procedures Performed During your Stay:  Below is a list of major procedures performed during your hospital stay and a summary of results:  - No major procedures performed.    Pending Studies (From admission, onward)      None          Please follow up on the above pending studies with your PCP and/or referring provider.

## 2025-06-24 NOTE — PROGRESS NOTES
"Progress Note - Ericka Castillo 49 y.o. female MRN: [id number]    Unit/Bed#: [facility] 247-01 Encounter: [id number]        Subjective:   Patient seen and examined at bedside after reviewing the chart and discussing the case with the caring staff.      Patient examined at bedside.  Patient denies any acute symptoms.    Patient is being discharged today.  Patient is requesting her prescriptions.  I reviewed and reconciled patient's problem list and medications.    Physical Exam   Vitals: Blood pressure 112/60, pulse 70, temperature 98 °F (36.7 °C), temperature source Temporal, resp. rate 18, height 5' 5\" (1.651 m), weight 55.8 kg (123 lb), SpO2 100%, not currently breastfeeding.,Body mass index is 20.47 kg/m².  Constitutional: Patient in no acute distress.  HEENT: PERR, EOMI, MMM.  Cardiovascular: Normal rate and regular rhythm.    Pulmonary/Chest: Effort normal and breath sounds normal.   Abdomen: Soft, + BS, NT.    Assessment/Plan:  Ericka Castillo is a(n) 49 y.o. female with MDD.     Medical clearance.  Patient is medically cleared for discharge.  All scripts will be sent out for the patient.     Hypertension.  D/c lisinopril 5 mg 6/14 due to low BP.  Hypothyroidism.  Continue Synthroid 50 mcg daily. TSH WNL 6/13.   GERD.  Continue Protonix 40 mg daily (Prevacid nonform) and Pepcid 20 mg twice daily.   CKD.  Avoid nephrotoxins.  Patient takes NSAID as needed outpatient for pain.  Celebrex as needed here with close monitoring of kidneys.   HRT.  Continue estradoil 0.06 mg/24 hr patch weekly (Sat at 4 pm).   Chronic pain syndrome w/ neck pain, cervical spondylosis, cervical radiculopathy, myofascial pain syndrome, and continuous opioid dependence.  Patient follows with pain management.  Continue gabapentin 800 mg TID, Norco 5-325 mg twice daily as needed for moderate pain.  Home Parafon forte 500 mg 3 times daily prn and nabumetone 500 mg twice daily prn non formulary.   Celebrex 100 mg twice daily as needed for " mild pain, Tylenol 975 mg q6h as needed for severe pain, Flexeril 10 mg TID as needed for muscle spasms.  Lidocaine cream as needed -declines lidocaine patch, Voltaren, [place].   Diarrhea.  Continue probiotic daily.  Imodium as needed.

## 2025-06-24 NOTE — NURSING NOTE
Patient appropriate in mood and affect. All discharge paperwork reviewed with patient. Patient escorted to the main lobby with all belongings. Patient denied SI/HI.

## 2025-06-24 NOTE — PROGRESS NOTES
06/24/25 1700   Team Meeting   Meeting Type Daily Rounds   Team Members Present   Team Members Present Physician;Nurse;;Other (Discipline and Name)   Physician Team Member Shreya/Pete   Nursing Team Member Nelly   Care Management Team Member Ilya   Social Work Team Member /Surjit   Other (Discipline and Name) Medei   Patient/Family Present   Patient Present No   Patient's Family Present No     DC today  Less anxious  Feels good to go

## 2025-06-24 NOTE — TREATMENT TEAM
06/24/25 1148   Hope Anxiety Scale   Anxious Mood 4   Tension 4   Fears 3   Insomnia 0   Intellectual 2   Depressed Mood 3   Somatic Complaints: Muscular 2   Somatic Complaints: Sensory 2   Cardiovascular Symptoms 0   Respiratory Symptoms 0   Gastrointestinal Symptoms 0   Genitourinary Symptoms 0   Autonomic Symptoms 2   Behavior at Interview 3   Hope Anxiety Score 25     Patient reported increase in anxiety related to discharge. Atarax 100mg PO given.

## 2025-06-25 ENCOUNTER — TELEPHONE (OUTPATIENT)
Dept: PSYCHOLOGY | Facility: CLINIC | Age: 50
End: 2025-06-25

## 2025-06-25 DIAGNOSIS — Z78.0 MENOPAUSE: ICD-10-CM

## 2025-06-25 RX ORDER — ESTRADIOL 0.06 MG/D
1 PATCH TRANSDERMAL WEEKLY
Qty: 4 PATCH | Refills: 3 | Status: SHIPPED | OUTPATIENT
Start: 2025-06-28

## 2025-06-25 NOTE — TELEPHONE ENCOUNTER
LVM for patient 6/25/25 to confirm appointments & details with patient before they start our program on tomorrow, 6/26/25. Patient was provided with instructions of 8:00am arrival, to bring insurance card & photo ID, as well as their own lunch for the first day. Patient was provided with our address and phone number.

## 2025-06-26 ENCOUNTER — OFFICE VISIT (OUTPATIENT)
Dept: PSYCHOLOGY | Facility: CLINIC | Age: 50
End: 2025-06-26
Payer: COMMERCIAL

## 2025-06-26 ENCOUNTER — TELEPHONE (OUTPATIENT)
Age: 50
End: 2025-06-26

## 2025-06-26 VITALS
WEIGHT: 140 LBS | SYSTOLIC BLOOD PRESSURE: 104 MMHG | HEIGHT: 65 IN | DIASTOLIC BLOOD PRESSURE: 65 MMHG | HEART RATE: 80 BPM | BODY MASS INDEX: 23.32 KG/M2

## 2025-06-26 DIAGNOSIS — F31.81 BIPOLAR II DISORDER (HCC): Primary | ICD-10-CM

## 2025-06-26 DIAGNOSIS — F41.1 GAD (GENERALIZED ANXIETY DISORDER): ICD-10-CM

## 2025-06-26 DIAGNOSIS — F31.81 BIPOLAR 2 DISORDER (HCC): Primary | Chronic | ICD-10-CM

## 2025-06-26 PROCEDURE — 90791 PSYCH DIAGNOSTIC EVALUATION: CPT

## 2025-06-26 PROCEDURE — 90792 PSYCH DIAG EVAL W/MED SRVCS: CPT | Performed by: STUDENT IN AN ORGANIZED HEALTH CARE EDUCATION/TRAINING PROGRAM

## 2025-06-26 PROCEDURE — G0410 GRP PSYCH PARTIAL HOSP 45-50: HCPCS

## 2025-06-26 RX ORDER — ZOLPIDEM TARTRATE 10 MG/1
10 TABLET ORAL
COMMUNITY

## 2025-06-26 RX ORDER — HALOPERIDOL 2 MG/1
2 TABLET ORAL DAILY PRN
Qty: 30 TABLET | Refills: 0 | Status: SHIPPED | OUTPATIENT
Start: 2025-06-26

## 2025-06-26 RX ORDER — LAMOTRIGINE 200 MG/1
200 TABLET ORAL DAILY
COMMUNITY

## 2025-06-26 RX ORDER — HYDROXYZINE HYDROCHLORIDE 25 MG/1
25 TABLET, FILM COATED ORAL 2 TIMES DAILY PRN
Qty: 28 TABLET | Refills: 0 | Status: SHIPPED | OUTPATIENT
Start: 2025-06-26 | End: 2025-07-10

## 2025-06-26 NOTE — TELEPHONE ENCOUNTER
Patient called stating [facility] stated they never received the paperwork for this patient.    It is in Media date 6/5/25 Document Type Forms. There are 27 pages.     Asking to refax those pages.
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
## 2025-06-26 NOTE — GROUP NOTE
"Visit Time    Visit Start Time: 0930  Visit Stop Time: 1030  Total Visit Duration: 40 minutes    Subjective:     Patient ID: Ericka Castillo is a 49 y.o. female.    Specialized Services Documentation  Therapist must complete separate progress note for each specific clinical activity in which the individual participated during the day.     [facility] Clinical Progress Note    Group member participated in music therapy group regarding time management. The group listened to and discussed the song \"Time\" and how it relates to their philosophy of time management. The group participated in a discussion about time management in there lives and where they could use improvement. The group read and discussed handouts about tips for time management, time wasters, and daily/weekly schedules. The group then discussed the pomodoro method of time management and created a music playlist to as a method of tracking the 25 minute module.  Continue AT to encourage the development and proactive use of time management skills.      Ericka ANDREW arrive late to music therapy due to attending intake. She shared minimally but appeared attentive throughout. Some progress toward treatment objective.   Tx Plan Objective: 1.1,1.2,1.4, Therapist:  Bradly Chavez, [facility], [place]    "

## 2025-06-26 NOTE — PSYCH
Subjective:     Patient ID: Ericka Castillo is a 49 y.o. female and uses she/her pronouns     [facility] Clinical Progress Notes      Specialized Services Documentation  Therapist must complete separate progress note for each specific clinical activity in which the individual participated during the day.     Case Management Note    Ashley Costenbader MA LMT    Current suicide risk : Low    [id number] Met with Ericka Castilol at [facility]. Reviewed program, initial paperwork reviewed: Consent for Treatment, PHP handbook, HIPPA, General Consent, Client Bill of Rights, and Smoking/Drug and Alcohol Policy. Release of Information obtained for emergency contact - Cecilia (mother) and PCP and Health Care Coordination Form. Ericka Castillo denied copies of all paperwork.  PCP notified of admission and health care coordination form sent. Completed initial psycho-social evaluation and initial treatment goals discussed.    Medications changes/added/denied? Yes  - See Dr. Jersey Miramontes's Note     Treatment session number: 1    Individual Case Management Visit provided today? yes    [facility] follow up physician's orders: Admit to PHP - See Dr. Jersey Miramontes's note

## 2025-06-26 NOTE — GROUP NOTE
"    Subjective:     Patient ID: Ericka Castillo is a 49 y.o. female.    [facility] Clinical Progress Notes      Specialized Services Documentation  Therapist must complete separate progress note for each specific clinical activity in which the individual participated during the day.     Visit Time    Visit Start Time: 1045  Visit Stop Time: 1145  Total Visit Duration: 60 minutes    Group Psychotherapy Life Skills  ([id number])  Ericka Castillo minimally engaged in group focused on anxiety and stress solutions. Group members practiced CBT and Mindfulness strategies to manage stress by completing short-activities drawn from \"The Anxiety and Stress Solution Deck\".Group members got to choose a card from one of the four focused areas of CBT: challenge your thoughts, change your behaviors, clarify your feelings, and create calmness. Ericka ANDREW did not chose a card from the pile but did practice the tools listed on the cards. Group members were encouraged to provide feedback on the exercise. Ericka ANDREW made minimal progress towards goals through verbal participation in group; to accomplish long term goals continue to utilize skills learned in programming. Continue with psychotherapy to educate and encourage use of wellness tools. Tx Plan Objective: 1.1,1.2 Therapist: SAGRARIO Hess , POWER       EDUCATION THERAPY    Visit Time    Visit Start Time: 0830  Visit Stop Time: 0930  Total Visit Duration: 0 minutes-excused due to intake      [id number]    Ericka Castillo was not present in morning assessment due to intake    Tx Plan Objective: 1.1,1.2, Therapist: POWER Hess     "

## 2025-06-26 NOTE — ASSESSMENT & PLAN NOTE
Continue with hydroxyzine 25 mg twice daily, but will change to as needed for anxiety.  Continue with gabapentin 800 mg 3 times daily.  Continue with Klonopin 1 mg twice daily.  I explained to patient that medication is dosed twice a day as it has a longer half-life compared to Xanax.  Continue with ambien 10mg daily at bedtime as needed for insomnia.   Continue with hydroxyzine 50mg daily at bedtime as needed for anxiety.   Orders:    hydrOXYzine HCL (ATARAX) 25 mg tablet; Take 1 tablet (25 mg total) by mouth 2 (two) times a day as needed for itching for up to 14 days    haloperidol (HALDOL) 2 mg tablet; Take 1 tablet (2 mg total) by mouth daily as needed for agitation
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
## 2025-06-26 NOTE — GROUP NOTE
Visit Time    Visit Start Time: 1215  Visit Stop Time: 1315  Total Visit Duration: 60 minutes    Subjective:     Patient ID: Ericka Castillo is a 49 y.o. female.     [facility] Clinical Progress Notes      Specialized Services Documentation  Therapist must complete separate progress note for each specific clinical activity in which the individual participated during the day.      Group Psychotherapy - Habit Stacking   Ericka Castillo participated actively in a psychotherapy group focused on habit stacking. This writer led a discussion on healthily and unhealthy habits and which group member use most. Group members discussed their personal use of habits and how it correlates to their current routines. This writer shared a video detailing how to practice the habit stacking skill. At the conclusion of the video group members were encouraged to reflect on their takeaway. This writer reviewed the 9 rules of habit stacking while encouraging group members to read along, take notes, and share their experience. This writer encouraged the group members to partake in group discussion and utilize supplemental materials inside and outside of the program.  Ericka Castillo made good efforts towards progress goals which were displayed through participation in the discussion, note taking, and engagement in topic. Continue to run daily group psychotherapy to meet treatment needs and encourage Ericka Castillo to practice skills outside of program.    Tx Plan Objective: 1.1,1.2,1.4 Therapist: Ashley Costenbader MA LMT    Visit Time    Visit Start Time: 1330  Visit Stop Time: 1430  Total Visit Duration: 60 minutes    Subjective:     Patient ID: Ericka Castillo is a 49 y.o. female.    [facility] Clinical Progress Notes      Specialized Services Documentation  Therapist must complete separate progress note for each specific clinical activity in which the individual participated during the day.       GROUP  PSYCHOTHERAPY    Ericka Castillo participated actively in psychotherapy group.  This was observed Consistent throughout the treatment day. They were engaged in learning related to Wellness Tools. Staff utilized Verbal and Written teaching methods.  Ericka Castillo shared area of learning and set a goal for outside of program to enjoy time with daughter at dance rehearsal.  Ericka Castillo was able to share items explored during the day and shared in adding to their WRAP.  Ended session with staff led exercise outdoors 5-4-3-2-1 exercise.  Ericka Castillo demonstrated good progress toward goal.  Continue group psychotherapy to actively practice learned skills and encourage transfer of knowledge to unstructured time.    Tx Plan Objective: 1.1,1.2, Therapist: Ashley Costenbader MA LMT

## 2025-06-26 NOTE — ASSESSMENT & PLAN NOTE
Continue with Latuda 40 mg daily for treatment of her bipolar depression.  Continue with Wellbutrin  mg daily, and Lexapro 10 mg daily.  I discussed with patient possibly discontinuing her antidepressants or reducing them further, but she declined.  Will start Haldol 2 mg daily as needed for agitation/anxiety.  Continue with Lamictal 250 mg daily.  Orders:    haloperidol (HALDOL) 2 mg tablet; Take 1 tablet (2 mg total) by mouth daily as needed for agitation

## 2025-06-27 ENCOUNTER — OFFICE VISIT (OUTPATIENT)
Dept: PSYCHOLOGY | Facility: CLINIC | Age: 50
End: 2025-06-27
Payer: COMMERCIAL

## 2025-06-27 DIAGNOSIS — F41.1 GAD (GENERALIZED ANXIETY DISORDER): ICD-10-CM

## 2025-06-27 DIAGNOSIS — F31.81 BIPOLAR 2 DISORDER (HCC): Primary | ICD-10-CM

## 2025-06-27 PROCEDURE — G0410 GRP PSYCH PARTIAL HOSP 45-50: HCPCS

## 2025-06-27 NOTE — GROUP NOTE
Patient ID: Ericka Castillo is a 49 y.o. female.     [facility] Clinical Progress Notes      Specialized Services Documentation  Therapist must complete separate progress note for each specific clinical activity in which the individual participated during the day.      Group Psychotherapy  Visit Start Time: 1045  Visit Stop Time: 1145  Total visit duration:  60 minutes    Ericka Castillo participated actively in a psychotherapy group focused on grounding. This group was facilitated virtually in a private office using HIPAA compliant and approved VOZ teams. Ericka Castillo consented to the use of tele-video modality of treatment.  Participants were provided with a definition of grounding and how/why the skill can be useful. The group practiced many different types of grounding techniques and exercises to get hands-on experience. Participants were provided with note-cards to create a personal grounding plan, including when to use the skill (indications) and list grounding exercises they found most useful. Group members were encouraged to engage in open discussion, share, and ask questions throughout. Continue to note progress towards goals. Continue with psychotherapy to encourage further development of grounding skills.    Tx Plan Objective 1.1, 1.2, 1.4 Therapist: Ashley Costenbader MA LMT

## 2025-06-27 NOTE — PSYCH
Subjective:     Patient ID: Ericka Castillo 49 y.o. Female    [facility] Clinical Progress Notes      Specialized Services Documentation  Therapist must complete separate progress note for each specific clinical activity in which the individual participated during the day.     Case Management    ([id number]) Spoke with Ericka Castillo for case management. Treatment plan reviewed. Reviewed with Ericka Castillo FMLA concerns should be handled during lunch or when program is over due to her not being in groups and on the phone with FMLA. Ericka Castillo wants to work on handle negativity that is in her home life. Educated her about family sessions and offered her that service. . Ericka Castillo is aware of next scheduled 1:1 meeting.     Current suicide risk : Low      Medications changes/added/denied? None at this time     Treatment session number: 2     Individual Case Management Visit provided today? Yes      [facility] follow up physician's orders: Continue to follow orders.     Therapist: Ashley Costenbader MA LMT  Tx Plan Objective: 1.0

## 2025-06-27 NOTE — GROUP NOTE
Subjective:     Patient ID: Ericka Castillo is a 49 y.o. female.    [facility] Clinical Progress Notes      Specialized Services Documentation  Therapist must complete separate progress note for each specific clinical activity in which the individual participated during the day.     Visit Time    Visit Start Time: 1215  Visit Stop Time: 1315  Total Visit Duration: 60 minutes    Group Psychotherapy Life Skills ([id number]) - Ericka Castillo actively engaged in group focused on gratitude.Group members discussed why it is important to think about what we are grateful for. Group members worked on a gratefulness worksheet . Group members learned about the G.L.A.D. technique and gratitude journaling for practicing gratefulness. Ericka ANDREW stated that she is grateful for having the strength to keep going. Group watched a short video about the 365 grateful project with speaker Clemencia Brock. We discussed the benefits of thinking, reflecting and talking about what they are grateful for.  Ericka ANDREW continues to make progress towards goals through verbal participation in group; to accomplish long term goals continue to utilize skills learned in programming. Continue with psychotherapy to educate and encourage use of wellness tools. Tx Plan Objective: 1.1,1.2 Therapist: SAGRARIO Hess, POWER    GROUP PSYCHOTHERAPY    Visit Time    Visit Start Time: 1330  Visit Stop Time: 1430  Total Visit Duration: 60 minutes      [id number]    Ericka Castillo participated actively in psychotherapy group.  This was observedConsistent throughout the treatment day. They were engaged in learning related to Illness, Medication, Aftercare, and Wellness Tools. Staff utilized Verbal, Written, A/V, and Demonstration teaching methods.  Ericka Castillo shared area of learning and set a goal for outside of program to journal and go for a walk.  Ericka Castillo was able to share items explored during the day.  Ended session with  staff led exercise mindfulness.  Ericka Castillo demonstrated good progress toward goal.  Continue group psychotherapy to actively practice learned skills and encourage transfer of knowledge to unstructured time.    Tx Plan Objective: 1.1,1.2,1.3,1.4 Therapist: POWER Hess

## 2025-06-27 NOTE — GROUP NOTE
"Visit Time    Visit Start Time: 0930  Visit Stop Time: 1030  Total Visit Duration: 45 minutes    Subjective:     Patient ID: Ericka Castillo is a 49 y.o. female.    Specialized Services Documentation  Therapist must complete separate progress note for each specific clinical activity in which the individual participated during the day.     [facility] Clinical Progress Note    Group member participated in music therapy group regarding the locus of control. The group listened to and discussed the song \"Simple Song\" focusing on the perspective of the pantoja. The group discussed the concept of control and where they feel they struggle with control in their life. The group then read and discussed the \"locus of control\" handout. The group then worked on the \"what can I control\" worksheet, identifying what in their life is/is not in their control.  Continue AT to encourage the development of an internal locus of control.       Ericka ANDREW actively participated. She shared what was/was not in her control regarding the relationship with her children. Some progress toward treatment objective.   Tx Plan Objective: 1.1,1.2,1.4, Therapist:  Bradly Chavez [facility], [facility]    Visit Time    Visit Start Time: 0830  Visit Stop Time: 0930  Total Visit Duration: 60 minutes    Subjective:     Patient ID: Ericka Castillo is a 49 y.o. female.     [facility] Clinical Progress Notes      Specialized Services Documentation  Therapist must complete separate progress note for each specific clinical activity in which the individual participated during the day.       EDUCATION THERAPY    Morning group focused on establishing routine and goal review.  Members assessed related to readiness for learning and potential barriers.  Transfer of skills outside of program explored through goal sharing and connection back to the Recovery Model 5 key facets of recovery.  Throughout session, skills introduced, practiced reflecting mindfulness, meditation, " movement, and connecting peers in this community.      Ericka Castillo actively shared.  Identified that they did complete goal from last treatment day identifying gaining responsibility and support.  Presented as Receptive related to readiness to learn and did not present with learning barriers. Ericka Castillo participated in mindfulness exercise. Ericka Castillo made some progress toward goal. Continue education group to assess willingness to engage, assess transfer of knowledge, and participate in skill development.  Tx Plan Objective: 1.0, Therapist:  PATO Gooden, MT-BC

## 2025-06-27 NOTE — TELEPHONE ENCOUNTER
Patient called to follow up on the request to updated  the start date of June 2nd on [place] paperwork. Also requested that when updated a copy is sent by mail to her.     Address confirmed: [address] [place], [place] [address] [id number]     Stated this is time sensitive since she has not been pain for the days out.     Please advise, thank you.

## 2025-06-27 NOTE — TELEPHONE ENCOUNTER
Patient called stating the FMLA needs to be updated with the start date of June 2nd, not June 9th.  She's asking that this be updated and refaxed to Ranjit to [phone].  Patient would like a call letting her know this is updated and refaxed.

## 2025-06-30 ENCOUNTER — TELEPHONE (OUTPATIENT)
Age: 50
End: 2025-06-30

## 2025-06-30 ENCOUNTER — OFFICE VISIT (OUTPATIENT)
Dept: PSYCHOLOGY | Facility: CLINIC | Age: 50
End: 2025-06-30
Payer: COMMERCIAL

## 2025-06-30 DIAGNOSIS — G89.4 CHRONIC PAIN SYNDROME: ICD-10-CM

## 2025-06-30 DIAGNOSIS — F41.1 GAD (GENERALIZED ANXIETY DISORDER): ICD-10-CM

## 2025-06-30 DIAGNOSIS — M54.2 NECK PAIN: ICD-10-CM

## 2025-06-30 DIAGNOSIS — M54.81 BILATERAL OCCIPITAL NEURALGIA: ICD-10-CM

## 2025-06-30 DIAGNOSIS — F31.81 BIPOLAR 2 DISORDER (HCC): Primary | Chronic | ICD-10-CM

## 2025-06-30 DIAGNOSIS — M47.812 CERVICAL SPONDYLOSIS: ICD-10-CM

## 2025-06-30 DIAGNOSIS — M47.812 CERVICAL SPONDYLOSIS: Primary | ICD-10-CM

## 2025-06-30 DIAGNOSIS — M62.838 MUSCLE SPASM: ICD-10-CM

## 2025-06-30 DIAGNOSIS — M54.12 CERVICAL RADICULOPATHY: ICD-10-CM

## 2025-06-30 PROCEDURE — G0177 OPPS/PHP; TRAIN & EDUC SERV: HCPCS

## 2025-06-30 PROCEDURE — G0176 OPPS/PHP;ACTIVITY THERAPY: HCPCS

## 2025-06-30 PROCEDURE — G0410 GRP PSYCH PARTIAL HOSP 45-50: HCPCS

## 2025-06-30 RX ORDER — HYDROCODONE BITARTRATE AND ACETAMINOPHEN 5; 325 MG/1; MG/1
1 TABLET ORAL 2 TIMES DAILY PRN
Qty: 60 TABLET | Refills: 0 | Status: SHIPPED | OUTPATIENT
Start: 2025-06-30 | End: 2025-07-30

## 2025-06-30 RX ORDER — HYDROCODONE BITARTRATE AND ACETAMINOPHEN 5; 325 MG/1; MG/1
1 TABLET ORAL 2 TIMES DAILY PRN
Qty: 60 TABLET | Refills: 0 | Status: SHIPPED | OUTPATIENT
Start: 2025-07-28

## 2025-06-30 NOTE — TELEPHONE ENCOUNTER
S/W pt, she did not fill the 2 week rx as her regular rx was for fill 6/29. She is asking for you to d/c the 2 week rx and just re enter her current fill date Rx ans will also need Tizanidine refilled

## 2025-06-30 NOTE — PSYCH
[facility] Insurance Authorization for Treatment       Call Start Time: 0811  Call Stop Time: 0818  Total Visit Duration: 7 minutes     Subjective:      Patient ID: Ericka Castillo is a 49 y.o. female.     Phone call placed [facility]  Phone number: [phone]  Tax ID and/or NPI used NPI [id number] ([place])  Location: [address] [place], [place] [address]  Spoke to Ruby MCKEON  Code Used for Authorization: none requested  30 Days Approved 6/26/25 through 8/7/25    Level of Care PHP  Review on 8/7/25  Authorization # [id number]    Therapist: Ashley Costenbader MA LMT  Tx Plan Objective: 1.0

## 2025-06-30 NOTE — TELEPHONE ENCOUNTER
Caller: Ericka     Doctor/Office: Dr Cosby     Call regarding :  Nurse      Call was transferred to: Warm transfer to nurse

## 2025-06-30 NOTE — GROUP NOTE
Visit Time    Visit Start Time: 1045  Visit Stop Time: 1145  Total Visit Duration: 60 minutes  Subjective:     Patient ID: Ericka Castillo is a 49 y.o. female.     [facility] Clinical Progress Notes      Specialized Services Documentation  Therapist must complete separate progress note for each specific clinical activity in which the individual participated during the day.      Group Psychotherapy - Vision Board     Ericka Castillo participated actively in a psychotherapy group focused on  self care and creating a self-care vision board. This writer led a discussion on what group members use as motivators or encouragers when they are struggling to meet their goals or make progress. Group members reflected on their motivation levels. This writer discussed the use of vision boards for setting goals and led group members through the process of creating their own. Group members were encouraged to create vision boards using their goals and utilize the tool outside of program. Ericka Castillo made good efforts towards treatment goals as displayed through participation in the group discussion and the creation of their vision board. Continue to run daily group psychotherapy to meet treatment needs and encourage Ericka Castillo to practice skills outside of program.      Tx Plan Objective: 1.1,1.2,1.4 Therapist: Ashley Costenbader MA LMT

## 2025-06-30 NOTE — TELEPHONE ENCOUNTER
Patient called back stating she spoke to her   and the correct date is 6/9. No other information needs to be changed or corrected, patient is asking to please send the forms as soon as possible

## 2025-06-30 NOTE — GROUP NOTE
"Visit Time    Visit Start Time: 1215  Visit Stop Time: 1315  Total Visit Duration: 60 minutes    Subjective:     Patient ID: Ericka Castillo is a 49 y.o. female.    Specialized Services Documentation  Therapist must complete separate progress note for each specific clinical activity in which the individual participated during the day.     [facility] Clinical Progress Note    Group member participated in music therapy group regarding healthy music listening. The group discussed their current use of music for self care. The group read and discussed a handout on healthy music listening. The group learned about the Iso Principle and how it can be applied to music listening for self care. The group then created individualized playlists for mood modulation using the above skills. Continue AT to encourage the development and proactive use of mood modulating techniques.      Ericka ANDREW actively participated. She shared her playlist going from \"anxious to calm\" and engaged in group discussion throughout. Some progress toward treatment objective.   Tx Plan Objective: 1.1,1.2,1.4, Therapist:  PATO Gooden, MT-BC    "

## 2025-06-30 NOTE — PSYCH
Subjective:     Patient ID: Ericka Castillo 49 y.o. Female    [facility] Clinical Progress Notes      Specialized Services Documentation  Therapist must complete separate progress note for each specific clinical activity in which the individual participated during the day.     Case Management    ([id number]) Ericka Castillo requested to meet with CM today. Ericka Castillo has medication question. Reported her medications are making her tired. CM advised Ericka Castillo that she speak with provider tomorrow about medication concerns at her appointment tomorrow. Ericka Castillo had FMLA questions. CM was able to provide  effective answers. Ericka Castillo has been missing groups due to making phone calls about her FMLA and medical medications. CM educated Ericka Castillo the importance of being in groups. CM advised if Ericka Castillo is late to groups more then 15 mintues she id not allowed into group due to it being a disruptions. Ericka Castillo reported she is struggling to implement coping skills. CM educated her  focus needs to be in groups and learning coping skills and leave other matters to after group time or at lunch time.  Ericka Castillo is aware of next scheduled 1:1 meeting.     Current suicide risk : Low      Medications changes/added/denied? None at this time     Treatment session number: 3     Individual Case Management Visit provided today? Yes      [facility] follow up physician's orders: Continue to follow orders.     Therapist: Ashley Costenbader MA LMT  Tx Plan Objective: 1.0

## 2025-06-30 NOTE — TELEPHONE ENCOUNTER
Prescription for hydrocodone sent to patient's pharmacy, 1 dated for today and second dated for 7/28/2025.  Prescription for tizanidine sent to patient's pharmacy.

## 2025-06-30 NOTE — GROUP NOTE
Partial Hospitalization - [facility] Clinical Progress Note    Subjective:     Patient ID: Ericka Castillo is a 49 y.o. female.    Specialized Services Documentation  Therapist must complete separate progress note for each specific clinical activity in which the individual participated during the day.     GROUP PSYCHOTHERAPY    Visit Start Time: 1330  Visit Stop Time: 1430  Total Visit Duration: 60 minutes    Ericka Castillo participated in psychotherapy group. Group explored treatment day learning and staff utilized verbal, demonstration, A/V, and active listening teaching methods throughout the day. Members shared areas of learning and set a goal for outside of program. Time spent encouraging use of WRAP, skill review, and exploring resources in the community.     Ericka Castillo actively shared. She engaged in learning related to signs and symptoms of illness, wellness tools, medication, and resources. Ericka Castillo identified asking her kids what she can do to be a better mom as goal for the evening and the need to implement coping skills as a take away from program.   Positive progress demonstrated toward treatment objectives. Continue group psychotherapy to actively practice learned skills and encourage transfer of knowledge to unstructured time.     Tx Plan Objective: 1.1,1.2 Therapist:  POWER Hess

## 2025-06-30 NOTE — TELEPHONE ENCOUNTER
Did she filled the 2-week supply of medication that was sent to [facility] pharmacy?  I do not see it in the PDMP

## 2025-06-30 NOTE — GROUP NOTE
Partial Hospitalization - [facility] Clinical Progress Note      Specialized Services Documentation  Therapist must complete separate progress note for each specific clinical activity in which the individual participated during the day.     Subjective:     Patient ID: Ericka Castillo is a 49 y.o. female.      EDUCATION THERAPY    Visit Start Time: 0830  Visit Stop Time: 0930  Total Visit Duration: 60 minutes      Morning group focused on establishing routine and goal review.  Members were assessed related to readiness for learning and potential barriers.  Transfer of skills outside of program explored through goal sharing and connection back to the Recovery Model 5 key facets of recovery.  Throughout session, skills introduced, practiced reflecting mindfulness and meditation through an object meditation, movement through stretching, journaling through GLAD, and connecting peers in this community.     Ericak Castillo actively shared.  Identified that they did complete their goal from last treatment day.  They identified gaining advocacy and support.  Presented as Receptive related to readiness to learn and did not present with learning barriers. Ericka Castillo participated in mindfulness exercise, gratitude practice, and movement experience. Ericka Castillo made some beginning progress toward goal. Continue education group to assess willingness to engage, assess transfer of knowledge, and participate in skill development.    Tx Plan Objective Addressed: 1.1, Therapist:  Cecilia LUNA

## 2025-06-30 NOTE — TELEPHONE ENCOUNTER
Patient is calling again regarding the start date of her leave.  This needs to be done as soon as possible.  She did not get paid and has no money.  If Ruth agrees with 6/2 start date, she must make the change on the form and re-fax it.  Patient says to leave a detailed message if she does not answer.  Thank you.

## 2025-06-30 NOTE — GROUP NOTE
Visit Time:    Visit Start Time: 930  Visit Stop Time: 1030  Total Visit Duration: 40 minutes    Subjective:     Patient ID: Ericka Castillo is a 49 y.o. female    Specialized Services Documentation  Therapist must complete separate progress note for each specific clinical activity in which the individual participated during the day      [facility] Clinical Progress Note      Group Psychotherapy        Group Therapy    Subjective:        This group was facilitated in a private office.  During this session, they,  actively  engaged in psychoeducational group about online support applications to enhance and enrich the group therapy skills. The group came up with strategies that helped them to use the apps and incorporate these apps into self improvement.  The group talked about understanding the purpose,  type, and the timing of when to use these apps. Teaching on emergency situations and who to go to for help was brought up as well. Group was encouraged to ask questions in an open forum at the end of group. Measurable  progress displayed through engagement in topic. Processing during the course of treatment  will continue to engage in psychotherapy to encourage positive self realization.     Ericka ANDREW  participated in a limited capacity due to missing 20 minutes of this session.  .Limited progress toward treatment objective due to the limited time constraint.  Tx Plan Objective 1.1, 1.2, 1.4 Therapist Arun ALFORD Ed

## 2025-06-30 NOTE — TELEPHONE ENCOUNTER
Caller: Patient     Doctor: Barbara Kocher     Reason for call: Hydrocodone is the same but when she left the hospital they sent 2 week supply which messed up the scripts sent by the provider and needs a refill of this medication.    Added tizanidine 4mg and wanting to see what the provider thinks of this? She sometimes breaks it in half due to how strong it is? Also asking for this to be refilled as well.     Please advise       Pharmacy: [facility] [address] [place] [address] - [facility] [address]     Call back#: [phone] ( ok to leave detailed msg)

## 2025-07-01 ENCOUNTER — OFFICE VISIT (OUTPATIENT)
Dept: PSYCHOLOGY | Facility: CLINIC | Age: 50
End: 2025-07-01
Payer: COMMERCIAL

## 2025-07-01 DIAGNOSIS — F31.81 BIPOLAR 2 DISORDER (HCC): Primary | Chronic | ICD-10-CM

## 2025-07-01 DIAGNOSIS — F41.1 GAD (GENERALIZED ANXIETY DISORDER): ICD-10-CM

## 2025-07-01 PROCEDURE — G0177 OPPS/PHP; TRAIN & EDUC SERV: HCPCS

## 2025-07-01 PROCEDURE — G0176 OPPS/PHP;ACTIVITY THERAPY: HCPCS

## 2025-07-01 PROCEDURE — 90833 PSYTX W PT W E/M 30 MIN: CPT | Performed by: PHYSICIAN ASSISTANT

## 2025-07-01 PROCEDURE — G0410 GRP PSYCH PARTIAL HOSP 45-50: HCPCS

## 2025-07-01 PROCEDURE — 99214 OFFICE O/P EST MOD 30 MIN: CPT | Performed by: PHYSICIAN ASSISTANT

## 2025-07-01 RX ORDER — NABUMETONE 500 MG/1
500 TABLET, FILM COATED ORAL 2 TIMES DAILY
Qty: 60 TABLET | Refills: 0 | Status: SHIPPED | OUTPATIENT
Start: 2025-07-01

## 2025-07-01 NOTE — PSYCH
Subjective:     Patient ID: Ericka Castillo 49 y.o. Female    [facility] Clinical Progress Notes      Specialized Services Documentation  Therapist must complete separate progress note for each specific clinical activity in which the individual participated during the day.     Case Management Note    Ashley Costenbader MA LMT    Current suicide risk : Low     A case management session is not scheduled today with Ericka Castillo ; additionally, they did not request a CM meeting.  Ericka Castillo is aware of next scheduled 1:1.    Medications changes/added/denied? None at this time     Treatment session number: 4    Individual Case Management Visit provided today? No    [facility] follow up physician's orders: None at this time

## 2025-07-01 NOTE — GROUP NOTE
"Partial Hospitalization - [facility] Clinical Progress Note    Specialized Services Documentation  Therapist must complete separate progress note for each specific clinical activity in which the individual participated during the day.     Subjective:     Patient ID: Ericka Castillo is a 49 y.o. female.    ALLIED THERAPY    Visit Start Time: 1045  Visit Stop Time: 1145  Total Visit Duration: 60 minutes    Group member participated in music therapy group regarding writing letters to one's past self. The group discussed past events/points of concern in their lives they felt the need to address. The group listened to and discussed the song \"Letter to Me\". The group then discussed and worked on letter to self worksheet. The group then listened to and discussed the song \"Starting Over\", and discussed an area they hoped to grow following writing their letter to self.  Continue AT to encourage healthy self reflection.      Ericka ANDREW actively participated. She shared throughout and appeared attentive throughout. She supported other group members in sharing. Some progress toward treatment objective.    Tx Plan Objective Addressed: 1.1,1.2,1.4, Therapist:  PATO Gooden, MT-BC  "

## 2025-07-01 NOTE — GROUP NOTE
[facility] Clinical Progress Note      Specialized Services Documentation  Therapist must complete separate progress note for each specific clinical activity in which the individual participated during the day.     Subjective:     Patient ID: Ericka Castillo is a 49 y.o. female.      EDUCATION THERAPY    Visit Start Time: 0830  Visit Stop Time: 0930  Total Visit Duration: 60 minutes      Morning group focused on establishing routine and goal review.  Members were assessed related to readiness for learning and potential barriers.  Transfer of skills outside of program explored through goal sharing and connection back to the Recovery Model 5 key facets of recovery.  Throughout session, skills introduced, practiced reflecting mindfulness, meditation, movement, and connecting peers in this community.     Ericka Castillo actively shared.  Identified that they did complete their goal from last treatment day, which was talking to her family about her mental health and expectations. They identified gaining advocacy and responsibility.  Presented as Receptive related to readiness to learn and did not present with learning barriers. Ericka Castillo participated in mindfulness exercise and gratitude practice. Ericka Castillo made positive progress toward goal. Continue education group to assess willingness to engage, assess transfer of knowledge, and participate in skill development.    Tx Plan Objective Addressed: 1.1 1.2 1.4, Therapist:  Arun Santos   [facility] Clinical Progress Note    Subjective:     Patient ID: Ericka Castillo is a 49 y.o. female.    Specialized Services Documentation  Therapist must complete separate progress note for each specific clinical activity in which the individual participated during the day.     GROUP PSYCHOTHERAPY    Visit Start Time: 1330  Visit Stop Time: 1430  Total Visit Duration: 60 minutes    Ericka Castillo  "participated in psychotherapy group. Group explored treatment day learning and staff utilized demonstration and interactive teaching methods throughout the day. Members shared areas of learning and set a goal for outside of program. Time spent encouraging use of WRAP, skill review, and exploring resources in the community.     Ericka Castillo actively shared. They engaged in learning related to wellness tools. Ericka Castillo identified reviewing list of fun activities do do with her children as goal for the evening and using wise mind  as a take away from program.   Positive progress demonstrated toward treatment objectives. Continue group psychotherapy to actively practice learned skills and encourage transfer of knowledge to unstructured time.     Tx Plan Objective: 1.1 1.2 1.4, Therapist:  Arun Santos   [facility] Clinical Progress Note    Specialized Services Documentation  Therapist must complete separate progress note for each specific clinical activity in which the individual participated during the day.     Subjective:     Patient ID: Ericka Castillo is a 49 y.o. female.    GROUP PSYCHOTHERAPY    Visit Start Time: 930  Visit Stop Time: 1030  Total Visit Duration: 60 minutes    Group Psychotherapy      Group Therapy    Subjective:      This group was facilitated in a private office   During this session, they, engaged in psychoeducational group about radical acceptance. The skill helps an individual to learn to accept situations that are out of ones control. Reducing denial of situations and reducing distress..Group discovered strategies that help them to manage emotional well being on a short term and long term basis. The introduction of the concept of \"wise mid\" is used in this process.The group talked about understanding the purpose and meaning radical acceptance with  \"wise mind\" ,regulation , recognition, and how it affects themselves and others..Teaching on the " emphasis of radical acceptance, who the group can go to for help was brought up as well. Group was encouraged to ask questions in an open forum at the end of group. Measurable progress displayed through engagement in topic. Processing in the course of treament will continue to engage in psychotherapy to encourage positive self realization.   Ericka stated that she found the meditation portion of this session confusing to her she acknowledged that she consistently worry about how others think of her and can not identify her own thoughts and feelings.     Tx Plan Objective Addressed: 1.1 1.2 1.4, Therapist:  Arun Santos
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
## 2025-07-01 NOTE — GROUP NOTE
[facility] Clinical Progress Note    Specialized Services Documentation  Therapist must complete separate progress note for each specific clinical activity in which the individual participated during the day.     Subjective:     Patient ID: Ericka Castillo is a 49 y.o. female.    GROUP PSYCHOTHERAPY    Visit Start Time: 1215  Visit Stop Time: 1315  Total Visit Duration: 60 minutes    Life Skills ([id number]) Ericka Castillo actively engaged in an open processing group. The group discussed the challenges and successes with implementing coping skills and how they can try to implement the coping skills more consistently. Ericka ANDREW did participate in the conversations related to the topics. Ericka ANDREW continues to make progress towards goals through verbal participation in group; to accomplish long term goals continue to utilize skills learned in programming. Continue with psychotherapy to educate and encourage use of wellness tools.       Tx Plan Objective Addressed: 1.1,1.2,1.4, Therapist:  POWER Hess

## 2025-07-01 NOTE — ASSESSMENT & PLAN NOTE
Cont PHP .  No med change.   Current Outpatient Medications   Medication Sig Dispense Refill    buPROPion (WELLBUTRIN XL) 300 mg 24 hr tablet Take 1 tablet (300 mg total) by mouth in the morning for 14 days. Do not start before June 24, 2025. 14 tablet 0    clonazePAM (KlonoPIN) 1 mg tablet Take 1 tablet (1 mg total) by mouth 2 (two) times a day for 14 days 28 tablet 0    escitalopram (LEXAPRO) 10 mg tablet Take 1 tablet (10 mg total) by mouth daily for 14 days Do not start before June 24, 2025. 14 tablet 0    estradiol (Climara) 0.06 MG/24HR Place 1 patch on the skin once a week over 7 days Do not start before June 28, 2025. 4 patch 3    gabapentin (NEURONTIN) 400 mg capsule Take 2 capsules (800 mg total) by mouth 3 (three) times a day for 14 days 84 capsule 0    haloperidol (HALDOL) 2 mg tablet Take 1 tablet (2 mg total) by mouth daily as needed for agitation 30 tablet 0    HYDROcodone-acetaminophen (NORCO) 5-325 mg per tablet Take 1 tablet by mouth 2 (two) times a day as needed for pain (Do not take within 3 to 4 hours of taking clonazepam) Max Daily Amount: 2 tablets 60 tablet 0    [START ON 7/28/2025] HYDROcodone-acetaminophen (NORCO) 5-325 mg per tablet Take 1 tablet by mouth 2 (two) times a day as needed for pain (Do not take within 3 to 4 hours of taking clonazepam) Max Daily Amount: 2 tablets Do not start before July 28, 2025. 60 tablet 0    hydrOXYzine HCL (ATARAX) 25 mg tablet Take 1 tablet (25 mg total) by mouth 2 (two) times a day as needed for itching for up to 14 days 28 tablet 0    hydrOXYzine HCL (ATARAX) 50 mg tablet Take 1 tablet (50 mg total) by mouth daily at bedtime for 14 days 14 tablet 0    hyoscyamine (LEVSIN/SL) 0.125 mg SL tablet Take 1 tablet (0.125 mg total) by mouth every 4 (four) hours as needed for cramping 60 tablet 0    lamoTRIgine (LaMICtal) 200 MG tablet Take 200 mg by mouth daily      lamoTRIgine (LaMICtal) 25 mg tablet Take 10 tablets (250 mg total) by mouth daily for 14 days Do  not start before June 24, 2025. 140 tablet 0    lansoprazole (PREVACID) 30 mg capsule Take 1 capsule (30 mg total) by mouth in the morning and 1 capsule (30 mg total) in the evening. 60 capsule 0    levothyroxine 50 mcg tablet Take 1 tablet (50 mcg total) by mouth daily in the early morning 30 tablet 0    lidocaine (LMX) 4 % cream Apply topically 4 (four) times a day as needed (neck pain) 28 g 0    loperamide (IMODIUM) 2 mg capsule Take 1 capsule (2 mg total) by mouth 3 (three) times a day as needed for diarrhea 30 capsule 0    lurasidone (LATUDA) 40 mg tablet Take 1 tablet (40 mg total) by mouth daily with dinner for 14 days 14 tablet 0    nabumetone (RELAFEN) 500 mg tablet Take 1 tablet by mouth twice daily 60 tablet 0    naloxone (NARCAN) 4 mg/0.1 mL nasal spray Administer 1 spray into a nostril. If no response after 2-3 minutes, give another dose in the other nostril using a new spray. 1 each 1    ondansetron (ZOFRAN-ODT) 4 mg disintegrating tablet Take 1 tablet (4 mg total) by mouth every 6 (six) hours as needed for nausea or vomiting 20 tablet 0    saccharomyces boulardii (FLORASTOR) 250 mg capsule Take 1 capsule (250 mg total) by mouth 2 (two) times a day 60 capsule 0    simethicone (MYLICON) 80 mg chewable tablet Chew 1 tablet (80 mg total) every 6 (six) hours as needed for flatulence 30 tablet 0    tiZANidine (ZANAFLEX) 4 mg tablet Take 1 tablet (4 mg total) by mouth 3 (three) times a day as needed for muscle spasms (Do not take within 3 to 4 hours of clonazepam and hydrocodone) 90 tablet 0    zolpidem (AMBIEN) 10 mg tablet Take 10 mg by mouth daily at bedtime as needed for sleep       No current facility-administered medications for this visit.

## 2025-07-02 ENCOUNTER — OFFICE VISIT (OUTPATIENT)
Dept: PSYCHOLOGY | Facility: CLINIC | Age: 50
End: 2025-07-02
Payer: COMMERCIAL

## 2025-07-02 DIAGNOSIS — F41.1 GAD (GENERALIZED ANXIETY DISORDER): ICD-10-CM

## 2025-07-02 DIAGNOSIS — F31.81 BIPOLAR 2 DISORDER (HCC): Primary | Chronic | ICD-10-CM

## 2025-07-02 DIAGNOSIS — F43.10 PTSD (POST-TRAUMATIC STRESS DISORDER): ICD-10-CM

## 2025-07-02 PROCEDURE — G0176 OPPS/PHP;ACTIVITY THERAPY: HCPCS

## 2025-07-02 PROCEDURE — G0177 OPPS/PHP; TRAIN & EDUC SERV: HCPCS

## 2025-07-02 PROCEDURE — G0410 GRP PSYCH PARTIAL HOSP 45-50: HCPCS

## 2025-07-02 PROCEDURE — 99213 OFFICE O/P EST LOW 20 MIN: CPT | Performed by: PHYSICIAN ASSISTANT

## 2025-07-02 NOTE — GROUP NOTE
[facility] Clinical Progress Note    Specialized Services Documentation  Therapist must complete separate progress note for each specific clinical activity in which the individual participated during the day.     Subjective:     Patient ID: Ericka Castillo is a 49 y.o. female.    ALLIED THERAPY    Visit Start Time: 1215  Visit Stop Time: 1315  Total Visit Duration: 60 minutes    Group member participated in music therapy group regarding open processing through improvisation. The group started with a progressive muscle relaxation. The group was then asked to select instruments to partake in an open improvisation. The improvisation was then followed by an open discussion on the improvisation and any other relevant processing. The group was then asked to partake in improvising on a series of prompts and accompanying discussion.  Continue AT to encourage the use of creative modalities for processing.      Ericka ANDREW actively participated. She shared throughout and engaged in each improv. Some progress toward treatment objective.    Tx Plan Objective Addressed: 1.1,1.2,1.4, Therapist:  PATO Gooden, JEREMY

## 2025-07-02 NOTE — ASSESSMENT & PLAN NOTE
Cont PHP.  No med change  Current Outpatient Medications   Medication Sig Dispense Refill    buPROPion (WELLBUTRIN XL) 300 mg 24 hr tablet Take 1 tablet (300 mg total) by mouth in the morning for 14 days. Do not start before June 24, 2025. 14 tablet 0    clonazePAM (KlonoPIN) 1 mg tablet Take 1 tablet (1 mg total) by mouth 2 (two) times a day for 14 days 28 tablet 0    escitalopram (LEXAPRO) 10 mg tablet Take 1 tablet (10 mg total) by mouth daily for 14 days Do not start before June 24, 2025. 14 tablet 0    estradiol (Climara) 0.06 MG/24HR Place 1 patch on the skin once a week over 7 days Do not start before June 28, 2025. 4 patch 3    gabapentin (NEURONTIN) 400 mg capsule Take 2 capsules (800 mg total) by mouth 3 (three) times a day for 14 days 84 capsule 0    haloperidol (HALDOL) 2 mg tablet Take 1 tablet (2 mg total) by mouth daily as needed for agitation 30 tablet 0    HYDROcodone-acetaminophen (NORCO) 5-325 mg per tablet Take 1 tablet by mouth 2 (two) times a day as needed for pain (Do not take within 3 to 4 hours of taking clonazepam) Max Daily Amount: 2 tablets 60 tablet 0    [START ON 7/28/2025] HYDROcodone-acetaminophen (NORCO) 5-325 mg per tablet Take 1 tablet by mouth 2 (two) times a day as needed for pain (Do not take within 3 to 4 hours of taking clonazepam) Max Daily Amount: 2 tablets Do not start before July 28, 2025. 60 tablet 0    hydrOXYzine HCL (ATARAX) 25 mg tablet Take 1 tablet (25 mg total) by mouth 2 (two) times a day as needed for itching for up to 14 days 28 tablet 0    hydrOXYzine HCL (ATARAX) 50 mg tablet Take 1 tablet (50 mg total) by mouth daily at bedtime for 14 days 14 tablet 0    hyoscyamine (LEVSIN/SL) 0.125 mg SL tablet Take 1 tablet (0.125 mg total) by mouth every 4 (four) hours as needed for cramping 60 tablet 0    lamoTRIgine (LaMICtal) 200 MG tablet Take 200 mg by mouth daily      lamoTRIgine (LaMICtal) 25 mg tablet Take 10 tablets (250 mg total) by mouth daily for 14 days Do  not start before June 24, 2025. 140 tablet 0    lansoprazole (PREVACID) 30 mg capsule Take 1 capsule (30 mg total) by mouth in the morning and 1 capsule (30 mg total) in the evening. 60 capsule 0    levothyroxine 50 mcg tablet Take 1 tablet (50 mcg total) by mouth daily in the early morning 30 tablet 0    lidocaine (LMX) 4 % cream Apply topically 4 (four) times a day as needed (neck pain) 28 g 0    loperamide (IMODIUM) 2 mg capsule Take 1 capsule (2 mg total) by mouth 3 (three) times a day as needed for diarrhea 30 capsule 0    lurasidone (LATUDA) 40 mg tablet Take 1 tablet (40 mg total) by mouth daily with dinner for 14 days 14 tablet 0    nabumetone (RELAFEN) 500 mg tablet Take 1 tablet by mouth twice daily 60 tablet 0    naloxone (NARCAN) 4 mg/0.1 mL nasal spray Administer 1 spray into a nostril. If no response after 2-3 minutes, give another dose in the other nostril using a new spray. 1 each 1    ondansetron (ZOFRAN-ODT) 4 mg disintegrating tablet Take 1 tablet (4 mg total) by mouth every 6 (six) hours as needed for nausea or vomiting 20 tablet 0    saccharomyces boulardii (FLORASTOR) 250 mg capsule Take 1 capsule (250 mg total) by mouth 2 (two) times a day 60 capsule 0    simethicone (MYLICON) 80 mg chewable tablet Chew 1 tablet (80 mg total) every 6 (six) hours as needed for flatulence 30 tablet 0    tiZANidine (ZANAFLEX) 4 mg tablet Take 1 tablet (4 mg total) by mouth 3 (three) times a day as needed for muscle spasms (Do not take within 3 to 4 hours of clonazepam and hydrocodone) 90 tablet 0    zolpidem (AMBIEN) 10 mg tablet Take 10 mg by mouth daily at bedtime as needed for sleep       No current facility-administered medications for this visit.
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
## 2025-07-02 NOTE — PSYCH
Partial [facility] - [facility] Clinical Progress Note    Specialized Services Documentation  Therapist must complete separate progress note for each specific clinical activity in which the individual participated during the day.     Subjective:  Patient ID: Ericka Castillo is a 49 y.o. female.    Visit Start Time: 1330  Visit Stop Time: 1400  Total Visit Duration: 30 minutes      INDIVIDUAL PSYCHOTHERAPY     Ericka Castillo actively shared in individual therapy addressing family stressors. Ericka Castillo reported she has been struggling with boundary issues with her 20 yo son. Reported she is fearful of him. Reported she did have strong boundaries with him but he is slowly starting to cross them again. CM explored the importance of sticking to her boundaries with him for her mental health as well as the safety for everyone  in the home. CM presented the option of PFA on son Ericka Castillo reported she has the paper printed out but doesn't have the guts to fill it out yet. Ericka Castillo expressed fearful she is not going to know how to apply skills. CM educated it take small steps each day to achieve bettering her mood. Ericka Castillo reported at home her children are chaos. CM explored with.name how her children a seeking attention but in a negative way. CM encouraged Ericka Castillo to participate in enjoyable activities with her children more often to help with her mood as well as her children's.    Next session follow up to include check-in boundaries and practicing and applying skills.   Continue individual psychotherapy in order to decrease symptoms of depression and anxiety, and to increase implementation of coping skills. No additional questions or concerns addressed at this time.    During this session, this clinician used the following therapeutic modalities: Client-centered Therapy    Treatment Plan Objectives addressed: 1.1,1.2,1.4     CASE MANAGEMENT    Current  suicide risk : Low  Medications changes/added/denied?no if yes, see Physician/FIONA/HENRY note  Treatment session number: 5  Individual Case Management Visit provided today? no  Titration: NA   Upcoming Appointments: NA   Family Involvement: Family session with mother  Weekend Planning: personal supports and activity options  Utilization Review Due: 8/7  Discharge Plan: TBD     [facility] follow up physician's orders: N/A    Therapist: Ashley Costenbader MA LMT

## 2025-07-02 NOTE — GROUP NOTE
[facility] Clinical Progress Note      Specialized Services Documentation  Therapist must complete separate progress note for each specific clinical activity in which the individual participated during the day.     Subjective:     Patient ID: Ericka Castillo is a 49 y.o. female.      EDUCATION THERAPY    Visit Start Time: 0830  Visit Stop Time: 0930  Total Visit Duration: 50 minutes-excused for a medication check      Morning group focused on establishing routine and goal review.  Members were assessed related to readiness for learning and potential barriers.  Transfer of skills outside of program explored through goal sharing and connection back to the Recovery Model 5 key facets of recovery.  Throughout session, skills introduced, practiced reflecting mindfulness, meditation, movement, and connecting peers in this community.     Ericka Castillo actively shared.  Identified that they did complete their goal from last treatment day.  They identified gaining hope.  Presented as Receptive related to readiness to learn and did not present with learning barriers. Ericka Castillo participated in journaling prompt. Ericka Castillo made good progress toward goal. Continue education group to assess willingness to engage, assess transfer of knowledge, and participate in skill development.    Tx Plan Objective Addressed: 1.1,1.2,1.3,1.4 Therapist:  POWER Hess         GROUP PSYCHOTHERAPY    Visit Start Time: 1330  Visit Stop Time: 1430  Total Visit Duration: 30 minutes - excused for CM    Ericka Castillo participated in psychotherapy group. Group explored treatment day learning and staff utilized verbal, demonstration, A/V, and active listening teaching methods throughout the day. Members shared areas of learning and set a goal for outside of program. Time spent encouraging use of WRAP, skill review, and exploring resources in the community.     Ericka ANDREW Jonathan  actively shared. She engaged in learning related to signs and symptoms of illness, wellness tools, medication, and resources. Ericka ANDREW Jonathan identified practicing a coping skill as goal for the evening and strengths as a take away from program.   Moderate progress demonstrated toward treatment objectives. Continue group psychotherapy to actively practice learned skills and encourage transfer of knowledge to unstructured time.     Tx Plan Objective: 1.1,1.2,1.3,1.4 Therapist:  POWER Hess

## 2025-07-02 NOTE — GROUP NOTE
[facility] Clinical Progress Note    Specialized Services Documentation  Therapist must complete separate progress note for each specific clinical activity in which the individual participated during the day.     Subjective:     Patient ID: Ericka Castillo is a 49 y.o. female.    GROUP PSYCHOTHERAPY - Strengths    Visit Start Time: 1045  Visit Stop Time: 1145  Total Visit Duration: 60 minutes    Ericka Castillo participated with prompts in a psychotherapy group focused on strengths.  This writer led an open discussion on what the meaning of strengths means to the group member. Next,  group members work on worksheets that focus on relationship, professional, and personal  fulfillment strengths. Then, Group members actively shared their personal strengths.   This writer encouraged the group members to partake in group discussion and utilize supplemental materials inside and outside of the program.  Ericka Castillo made good efforts towards progress goals which were displayed through participation in the discussion, note taking, and engagement in topic.  Continue to run daily group psychotherapy to meet treatment needs and encourage Ericka Castillo to practice skills outside of program.      Tx Plan Objective Addressed: 1.1, 1.2, , Ashley Costenbader MA LMT

## 2025-07-02 NOTE — GROUP NOTE
[facility] Clinical Progress Note    Specialized Services Documentation  Therapist must complete separate progress note for each specific clinical activity in which the individual participated during the day.     Subjective:     Patient ID: Ericka Castillo is a 49 y.o. female.    GROUP PSYCHOTHERAPY    Visit Start Time: 930  Visit Stop Time: 1030  Total Visit Duration: 60 minutes    GROUP CONTENT:  Group Psychotherapy            Group Therapy    Subjective:      This group was facilitated in a private office.  During this session, they, actively engaged in psychoeducational group about self affirmations. The skill helps to maintain and regulate positive self affirmations and positive self image. Group discovered strategies and statements that help them to manage positive well being on a short term and long term basis. The group talked about understanding the purpose, and meaning of self affirmation in intellectual and emotional expression, regulation , and recognition, and how it affects themselves and others. Teaching on the emphasis of positive self affirmation and self-care, who the group can go to for help was brought up as well. Group was encouraged to ask questions in an open forum at the end of group. Measurable  progress displayed through engagement in topic. Processing in the course of treatment will continue to engage in psychotherapy to encourage positive self realization.     Ericka was able to develop a self-affirmations and able to apply techniques on how to use the affirmation on a daily basis.  Tx Plan Objective Addressed: 1.1,1.2,1.4, Therapist:  Arun Santos

## 2025-07-03 ENCOUNTER — OFFICE VISIT (OUTPATIENT)
Dept: PSYCHOLOGY | Facility: CLINIC | Age: 50
End: 2025-07-03
Payer: COMMERCIAL

## 2025-07-03 DIAGNOSIS — F41.1 GAD (GENERALIZED ANXIETY DISORDER): ICD-10-CM

## 2025-07-03 DIAGNOSIS — F31.81 BIPOLAR 2 DISORDER (HCC): Primary | Chronic | ICD-10-CM

## 2025-07-03 PROCEDURE — G0177 OPPS/PHP; TRAIN & EDUC SERV: HCPCS

## 2025-07-03 PROCEDURE — G0410 GRP PSYCH PARTIAL HOSP 45-50: HCPCS

## 2025-07-03 NOTE — GROUP NOTE
[facility] Progress Note    Specialized Services Documentation  Therapist must complete separate progress note for each specific clinical activity in which the individual participated during the day.     Subjective:     Patient ID: Ericka Castillo is a 49 y.o. female.    GROUP PSYCHOTHERAPY    Visit Start Time: 0930  Visit Stop Time: 1030  Total Visit Duration: 60 minutes    Life Skills ([id number]) Ericka Castillo actively engaged in an open processing group. The group discussed returning to work after discharge and incorporating coping skills,relationships ending and how to make/find friends. Ericka ANDREW did participate in the conversations related to the topics. Ericka ANDREW continues to make progress towards goals through verbal participation in group; to accomplish long term goals continue to utilize skills learned in programming. Continue with psychotherapy to educate and encourage use of wellness tools. Tx Plan Objective: 1.1,1.2 Therapist: SAGRARIO Hess LSW    Tx Plan Objective Addressed: 1.1,1.2,1.4 Therapist:  POWER Hess

## 2025-07-03 NOTE — GROUP NOTE
EDUCATION THERAPY        [facility] Progress Note      Specialized Services Documentation  Therapist must complete separate progress note for each specific clinical activity in which the individual participated during the day.     Subjective:     Patient ID: Ericak Castillo is a 49 y.o. female.      EDUCATION THERAPY    Visit Start Time: 0830  Visit Stop Time: 0930  Total Visit Duration: 60 minutes      Morning group focused on establishing routine and goal review.  Members were assessed related to readiness for learning and potential barriers.  Transfer of skills outside of program explored through goal sharing and connection back to the Recovery Model 5 key facets of recovery.  Throughout session, skills introduced, practiced reflecting mindfulness, meditation, movement, and connecting peers in this community.     Ericka Castillo actively shared.  Identified that they did complete their goal from last treatment day.  They identified they reviewed their healthy activities list gaining hope and responsibility.  Presented as Receptive related to readiness to learn and did not present with learning barriers. Ericka Castillo participated in mindfulness exercise and gratitude practice. Ericka Castillo made positive progress toward goal. Continue education group to assess willingness to engage, assess transfer of knowledge, and participate in skill development.    Tx Plan Objective Addressed: 1.1,1.2,1.4, Therapist:  Arun Mercedes PSYCHOTHERAPY        [facility] Progress Note    Subjective:     Patient ID: Ericka Castillo is a 49 y.o. female.    Specialized Services Documentation  Therapist must complete separate progress note for each specific clinical activity in which the individual participated during the day.     GROUP PSYCHOTHERAPY    Visit Start Time: 1330  Visit Stop Time: 1430  Total Visit Duration: 60  minutes    Ericka Castillo participated in psychotherapy group. Group explored treatment day learning and staff utilized active listening, role play, and interactive teaching methods throughout the day. Members shared areas of learning and set a goal for outside of program. Time spent encouraging use of WRAP, skill review, and exploring resources in the community.     Ericka Castillo actively shared. They engaged in learning related to wellness tools. Ericka Castillo identified practicing coping skills  as goal for the evening and work on coping skills for work  as a take away from program.   Positive progress demonstrated toward treatment objectives. Continue group psychotherapy to actively practice learned skills and encourage transfer of knowledge to unstructured time.     Tx Plan Objective: 1.1,1.2,1.4, [facility]    Specialized Services Documentation  Therapist must complete separate progress note for each specific clinical activity in which the individual participated during the day.     Subjective:     Patient ID: Ericka Castillo is a 49 y.o. female.    GROUP PSYCHOTHERAPY    Visit Start Time: 1045  Visit Stop Time: 1145  Total Visit Duration: 60 minutes    GROUP CONTENT:    Group Psychotherapy          Subjective:        This group was facilitated in a private office.   During this session, they participated actively in a psychotherapy group focused on setting boundaries. The group focused on the interpersonal effectiveness pillar of DBT; specifically, looking at the KESHAWN acronym. Participants followed a step by step breakdown of the KESHAWN process and were encouraged to engage in open discussion throughout. Group members were given a KESHAWN practice worksheet and time to practice. Processing during the course of treatment, they took notes and filled out the practice sheet. Continue to note progress towards goals.  Continue with psychotherapy to encourage further development of interpersonal effectiveness skills.     Tx Plan Objective Addressed: 1.1,1.2,1.4, Therapist:  Arun Santos

## 2025-07-03 NOTE — PSYCH
[facility] Progress Note    Specialized Services Documentation  Therapist must complete separate progress note for each specific clinical activity in which the individual participated during the day.     Subjective:  Patient ID: Ericka Castillo is a 49 y.o. female.    Visit Start Time: NA  Visit Stop Time: NA  Total Visit Duration: NA      INDIVIDUAL PSYCHOTHERAPY     An individual psychotherapy session is not scheduled today with Ericka Castillo ; additionally, they did not request a meeting.  They are aware of the next scheduled 1:1 session.     Treatment Plan Objectives addressed: NA     CASE MANAGEMENT    Current suicide risk : Low  Medications changes/added/denied?no if yes, see Physician/CRNP/HENRY note  Treatment session number: 6  Individual Case Management Visit provided today? no  Titration: NA   Upcoming Appointments: NA   Family Involvement: NA  Weekend Planning: not discussed  Utilization Review Due: 8/7  Discharge Plan: TBD     [facility] follow up physician's orders: N/A    Therapist: Ariela Negro

## 2025-07-03 NOTE — GROUP NOTE
[facility] Progress Note    Specialized Services Documentation  Therapist must complete separate progress note for each specific clinical activity in which the individual participated during the day.     Subjective:     Patient ID: Ericka Castillo is a 49 y.o. female.    GROUP PSYCHOTHERAPY    Visit Start Time: 1245  Visit Stop Time: 1315  Total Visit Duration: 60 minutes    Anxiety     Visit Time    Visit Start Time: 0930  Visit Stop Time: 1030  Total Visit Duration: 60 minutes     Subjective:     Patient ID: Ericka Castillo is a 49 y.o. y.o. female.     Specialized Services Documentation  Therapist must complete separate progress note for each specific clinical activity in which the individual participated during the day.        Group Psychotherapy - Anxiety     Ericka Castillo was present in psychoeducational group about anxiety. The group followed a slide show presentation on Anxiety and the Brain. Group was educated on:  Signs and symptoms of anxiety   Causes and treatments of anxiety  How the brain processes anxiety  Group members then discussed anxiety causing situations and positive ways to cope with these stressors.   Coping skills discussed were breathing paired with sitting/noticing with our thoughts and emotions exercise, challenging irrational thoughts, progressive muscle relaxation, Dropping anchor, deep breathing, and imagery.    Question and answer time allowed. Some positive progress towards goal noted through participation in group. Continue psychoeducational groups on anxiety and teach the value of learning about diagnosis and treatments available.             Tx Plan Objective Addressed: 1.1, 1.2, 1.3, 1.4 , Therapist:  Laura HERNANDEZ RN

## 2025-07-07 ENCOUNTER — DOCUMENTATION (OUTPATIENT)
Dept: PSYCHOLOGY | Facility: CLINIC | Age: 50
End: 2025-07-07

## 2025-07-07 ENCOUNTER — APPOINTMENT (OUTPATIENT)
Dept: PSYCHOLOGY | Facility: CLINIC | Age: 50
End: 2025-07-07
Payer: COMMERCIAL

## 2025-07-07 NOTE — PROGRESS NOTES
[facility] Progress Note    Specialized Services Documentation  Therapist must complete separate progress note for each specific clinical activity in which the individual participated during the day.     Subjective:  Patient ID: Ericka Castillo is a 49 y.o. female.      INDIVIDUAL PSYCHOTHERAPY     Ericka Castillo was absent from [facility] today due to a prior appointment. Excused absence        CASE MANAGEMENT    Current suicide risk : Low  Medications changes/added/denied?no if yes, see Physician/MARYAMNP/HENRY note  Treatment session number: n/a  Individual Case Management Visit provided today? no  Titration: n/a  Upcoming Appointments: n/a   Family Involvement: n/a  Weekend Planning: not discussed  Utilization Review Due: n/a  Discharge Plan: n/a     [facility] follow up physician's orders: N/A    Therapist: PATO Gooden, MT-BC

## 2025-07-08 ENCOUNTER — OFFICE VISIT (OUTPATIENT)
Dept: PSYCHOLOGY | Facility: CLINIC | Age: 50
End: 2025-07-08
Payer: COMMERCIAL

## 2025-07-08 DIAGNOSIS — F41.1 GAD (GENERALIZED ANXIETY DISORDER): ICD-10-CM

## 2025-07-08 DIAGNOSIS — F31.81 BIPOLAR 2 DISORDER (HCC): Primary | Chronic | ICD-10-CM

## 2025-07-08 PROCEDURE — G0410 GRP PSYCH PARTIAL HOSP 45-50: HCPCS

## 2025-07-08 PROCEDURE — G0176 OPPS/PHP;ACTIVITY THERAPY: HCPCS

## 2025-07-08 NOTE — GROUP NOTE
[facility] Progress Note    Specialized Services Documentation  Therapist must complete separate progress note for each specific clinical activity in which the individual participated during the day.     Subjective:     Patient ID: Ericka Castillo is a 49 y.o. female.    GROUP PSYCHOTHERAPY    Visit Start Time: 1215  Visit Stop Time: 1315  Total Visit Duration: 60 minutes    Ericka Castillo actively engaged in an open processing group.  The group discussed setting boundaries with family members and ending long term relationships . Ericka Castillo did participate in the conversations related to the topics. Ericka Castillo continues to make progress towards goals through verbal participation in group; to accomplish long term goals continue to utilize skills learned in programming. Continue with psychotherapy to educate and encourage use of wellness tools. Tx Plan Objective: 1.1,1.2 Therapist: Ashley Costenbader MA LMT    Tx Plan Objective Addressed:1.1,1.2 Therapist: Ashley Costenbader MA LMT

## 2025-07-08 NOTE — GROUP NOTE
"[facility] Progress Note    Specialized Services Documentation  Therapist must complete separate progress note for each specific clinical activity in which the individual participated during the day.     Subjective:     Patient ID: Ericka Castillo is a 49 y.o. female.    ALLIED THERAPY    Visit Start Time: 1045  Visit Stop Time: 1145  Total Visit Duration: 60 minutes    Group member actively participated in music therapy group regarding grief and loss. The group participated in a discussion about their own experiences with grief and loss. The group then participated in a jacquelin discussion on the songs \"[employer]\" and \"Because of You\" and discussed how each song approaches the topic of grief. The group read and discussed handouts regarding the stages of grief and general facts about grief.  Group members were encouraged to share methods of coping with grief that have been successful for them in the past.  Continue AT to encourage the development and proactive use of coping techniques.       Ericka ANDREW actively participated. She shared how she never considered the loss she experienced of the relationship with her son was grief and stated she wanted to find a support group that could help her work through these emotions. Some progress toward treatment objective.    Tx Plan Objective Addressed: 1.1,1.2,1.4, Therapist:  PATO Gooden, MT-BC  "

## 2025-07-08 NOTE — GROUP NOTE
EDUCATION THERAPY        [facility] Clinical Progress Note      Specialized Services Documentation  Therapist must complete separate progress note for each specific clinical activity in which the individual participated during the day.     Subjective:     Patient ID: Ericka Castillo is a 49 y.o. female.      EDUCATION THERAPY    Visit Start Time: 0830  Visit Stop Time: 0930  Total Visit Duration: 45 minutes      Morning group focused on establishing routine and goal review.  Members were assessed related to readiness for learning and potential barriers.  Transfer of skills outside of program explored through goal sharing and connection back to the Recovery Model 5 key facets of recovery.  Throughout session, skills introduced, practiced reflecting mindfulness, meditation, movement, and connecting peers in this community.     Ericka Castillo was in case management for a portion of this session. Ericka  actively shared.  Identified that they did complete their goal from last treatment day.  They identified she made effort to enjoy 4th of July with her family, gaining responsibility.  Presented as Receptive related to readiness to learn and did not present with learning barriers. Ericka Castillo participated in mindfulness exercise and gratitude practice. Ericka Castillo made positive progress toward goal. Continue education group to assess willingness to engage, assess transfer of knowledge, and participate in skill development.    Tx Plan Objective Addressed: 1.1,1.2,1.4, Therapist:  Arun Santos    GROUP PSYCHOTHERAPY        [facility] Clinical Progress Note    Subjective:     Patient ID: Ericka Castillo is a 49 y.o. female.    Specialized Services Documentation  Therapist must complete separate progress note for each specific clinical activity in which the individual participated during the day.     GROUP PSYCHOTHERAPY    Visit Start  Time: 1330  Visit Stop Time: 1430  Total Visit Duration: 60 minutes    Ericka Castillo participated in psychotherapy group. Group explored treatment day learning and staff utilized verbal, demonstration, and active listening teaching methods throughout the day. Members shared areas of learning and set a goal for outside of program. Time spent encouraging use of WRAP, skill review, and exploring resources in the community.     Ericka Castillo actively shared. They engaged in learning related to wellness tools. Ericka Castillo identified doing her laundry  as goal for the evening and realizing that she is making progress in program and personal growth  as a take away from program.   Moderate progress demonstrated toward treatment objectives. Continue group psychotherapy to actively practice learned skills and encourage transfer of knowledge to unstructured time.     Tx Plan Objective: 1.1,1.2,1.4, Therapist- Arun Santos    [facility] Clinical Progress Note    Specialized Services Documentation  Therapist must complete separate progress note for each specific clinical activity in which the individual participated during the day.     Subjective:     Patient ID: Ericka Castillo is a 49 y.o. female.    GROUP PSYCHOTHERAPY    Visit Start Time: 930  Visit Stop Time: 1030  Total Visit Duration: 60 minutes    GROUP CONTENT:      This group was facilitated virtually in a private office using HIPAA Compliant and Approved Microsoft Teams.  Ericka Castillo consented to the use of tele-video modality of treatment.       Group Psychotherapy          This group was facilitated virtually in a private office using HIPAA Compliant and Approved Microsoft Teams.  During this session, they, actively engaged in psychoeducational group about coping with loneliness. The skill helps to identify way to decrease feeling of loneliness and behavior change focused toward a specified goal. Group  explored the identifying factors that create loneliness, this process can help them to take care of emotional and intellectual moments of loneliness on a short term and long term basis. The group talked about understanding the meaning of loneliness in regards to physical, intellectual, and emotional expression, regulation , and recognition, and how it affects themselves and others. Teaching on the emphasis of self monitoring and relapse, the group explored who they can go to for help was brought up as well. Group was encouraged to ask questions in an open forum at the end of group.Measurable progress displayed through engagement in topic.Processing in the course of treatment will continue to engage in psychotherapy to encourage positive self realization.  Ericka spoke of comparing oneself to others and how it is a difficult behavior to break. Several options and ideas were presented to Ericka to consider.     Tx Plan Objective Addressed: 1.1,1.2,1.4, Therapist:  Arun Santos
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
## 2025-07-09 ENCOUNTER — OFFICE VISIT (OUTPATIENT)
Dept: PSYCHOLOGY | Facility: CLINIC | Age: 50
End: 2025-07-09
Payer: COMMERCIAL

## 2025-07-09 DIAGNOSIS — F41.1 GAD (GENERALIZED ANXIETY DISORDER): ICD-10-CM

## 2025-07-09 DIAGNOSIS — F31.81 BIPOLAR 2 DISORDER (HCC): Primary | Chronic | ICD-10-CM

## 2025-07-09 PROCEDURE — G0176 OPPS/PHP;ACTIVITY THERAPY: HCPCS

## 2025-07-09 PROCEDURE — G0410 GRP PSYCH PARTIAL HOSP 45-50: HCPCS

## 2025-07-09 PROCEDURE — G0177 OPPS/PHP; TRAIN & EDUC SERV: HCPCS

## 2025-07-09 NOTE — GROUP NOTE
[facility] Clinical Progress Note    Specialized Services Documentation  Therapist must complete separate progress note for each specific clinical activity in which the individual participated during the day.     Subjective:     Patient ID: Ericka Castillo 49 y.o.    Group Psychotherapy    Visit Start Time: 1045  Visit Stop Time: 1145  Total Visit Duration: 60 minutes    Ericka Castillo actively  participated in group on Wellness Recovery Action Plan. Today, members focused on a quick review of WRAP's intent, use, toolbox  and daily plan. Then reviewed the following sections with group members  and encouraged to fill in coping tools from their toolbox for planned responses:     Identification of triggers and stressors  Early warning signs  When things are breaking down and or getting worse    Group members shared pieces of information from these sections and identified their importance. Writer encouraged the members of group to continue utilizing the packet to develop plans inside and outside of program.    Tx Objective Addressed: 1.1, 1.2, 1.3, 1.4    Group Facilitator: Laura HERNANDEZ RN

## 2025-07-09 NOTE — GROUP NOTE
EDUCATION THERAPY        [facility] Clinical Progress Note      Specialized Services Documentation  Therapist must complete separate progress note for each specific clinical activity in which the individual participated during the day.     Subjective:     Patient ID: Ericka Castillo is a 49 y.o. female.      EDUCATION THERAPY    Visit Start Time: 0830  Visit Stop Time: 0930  Total Visit Duration: 60 minutes      Morning group focused on establishing routine and goal review.  Members were assessed related to readiness for learning and potential barriers.  Transfer of skills outside of program explored through goal sharing and connection back to the Recovery Model 5 key facets of recovery.  Throughout session, skills introduced, practiced reflecting mindfulness, meditation, movement, and connecting peers in this community.     Ericka Castillo actively shared.  Identified that they did complete their goal from last treatment day.  They identified they did laundry  gaining responsibility.  Presented as Receptive related to readiness to learn and did not present with learning barriers. Ericka Castillo participated in mindfulness exercise and gratitude practice. Ericka Castillo made positive progress toward goal. Continue education group to assess willingness to engage, assess transfer of knowledge, and participate in skill development.    Tx Plan Objective Addressed: 1.1,1.2,1.4, Therapist:  Arun Mercedes PSYCHOTHERAPY        [facility] Clinical Progress Note    Subjective:     Patient ID: Ericka Castillo is a 49 y.o. female.    Specialized Services Documentation  Therapist must complete separate progress note for each specific clinical activity in which the individual participated during the day.     GROUP PSYCHOTHERAPY    Visit Start Time: 1330  Visit Stop Time: 1430  Total Visit Duration: 60 minutes    Ericka Castillo participated  in psychotherapy group. Group explored treatment day learning and staff utilized verbal, active listening, and interactive teaching methods throughout the day. Members shared areas of learning and set a goal for outside of program. Time spent encouraging use of WRAP, skill review, and exploring resources in the community.     Ericka Castillo actively shared. They engaged in learning related to wellness tools. Ericka Castillo identified text her employment about a return date to work as goal for the evening and the group on the cognitive triad as a take away from program.   Positive progress demonstrated toward treatment objectives. Continue group psychotherapy to actively practice learned skills and encourage transfer of knowledge to unstructured time.     Tx Plan Objective: 1.1,1.2,1.4, Therapist- Arun Santos    [facility] Clinical Progress Note    Specialized Services Documentation  Therapist must complete separate progress note for each specific clinical activity in which the individual participated during the day.     Subjective:     Patient ID: Ericka Castillo is a 49 y.o. female.    GROUP PSYCHOTHERAPY    Visit Start Time: 930  Visit Stop Time: 1030  Total Visit Duration: 60 minutes    GROUP CONTENT:    Group Psychotherapy      Subjective:        This group was facilitated in a private office.  During this session, they,  actively engaged in psychoeducational group about the cognitive triad that involves cognitive bias and limiting thoughts that are based on a negative perception of ones self.The skill helps to identify negative thoughts and cognitive biases. The outcome being that negative thoughts are challenged in the thought process and regulation of emotion. Group discovered strategies that help them to manage negative thoughts and rethink the negative thoughts when faced with a negative challenge wether the thought is perceived in the outside environment, or  internally as negative self-talk. The group talked about understanding the purpose of challenging negative thoughts on a personal and social level and how it affects themselves and others..Teaching on the emphasis of self monitoring and self-care, the group focus is geared how to go for help when the negative thoughts become overly intrusive. Group was encouraged to ask questions in an open forum at the end of session. Measurable  progress displayed through engagement in topic. Processing in the course of treatment  will continue to engage in psychotherapy to encourage positive self realization.     Ericka was active participant in the group activities and group discussion. Ericka was shown several strategies on how to challenge negative thoughts and  negative concept of self.    Tx Plan Objective Addressed: 1.1,1.2,1.4, Therapist:  Arun Santos

## 2025-07-09 NOTE — PSYCH
[facility] Clinical Progress Note    Specialized Services Documentation  Therapist must complete separate progress note for each specific clinical activity in which the individual participated during the day.     Subjective:  Patient ID: Ericka Castillo is a 49 y.o. female.          INDIVIDUAL PSYCHOTHERAPY     A case management session is not scheduled today with Ericka Castillo ; additionally, they did not request a CM meeting.  Ericka Castillo is aware of next scheduled 1:1.        CASE MANAGEMENT    Current suicide risk : Low  Medications changes/added/denied?no if yes, see Physician/FIONA/HENRY note  Treatment session number: 8  Individual Case Management Visit provided today? no  Titration: NA   Upcoming Appointments: NA   Family Involvement: None at this time  Weekend Planning: not discussed  Utilization Review Due: 8/7/25  Discharge Plan: 7/18/25     [facility] follow up physician's orders: N/A    Therapist: Ashley Costenbader MA LMT

## 2025-07-09 NOTE — GROUP NOTE
[facility] Clinical Progress Note    Specialized Services Documentation  Therapist must complete separate progress note for each specific clinical activity in which the individual participated during the day.     Subjective:     Patient ID: Ericka Castillo is a 49 y.o. female.    ALLIED THERAPY    Visit Start Time: 1215  Visit Stop Time: 1315  Total Visit Duration: 60 minutes    Group members participated in music therapy group regarding healthy and unhealthy relationships. The group participated in a conversation about the health of the relationships in their life. The group then participated in a series of jacquelin discussions on songs demonstrating healthy and unhealthy relationships. The group read and discussed two handouts regarding relationship green flags and destructive/interfering relationships. Continue AT to encourage the development and maintenance of healthy relationships.       Ericka ANDREW actively participated. She shared throughout and engaged in group discussion. She engaged in discussion with other group members about healthy conflict resolution. Some progress toward treatment objective.    Tx Plan Objective Addressed: 1.1,1.2,1.4, Therapist:  PATO Gooden, MT-BC

## 2025-07-10 ENCOUNTER — OFFICE VISIT (OUTPATIENT)
Dept: PSYCHOLOGY | Facility: CLINIC | Age: 50
End: 2025-07-10
Payer: COMMERCIAL

## 2025-07-10 ENCOUNTER — TELEPHONE (OUTPATIENT)
Dept: FAMILY MEDICINE CLINIC | Facility: CLINIC | Age: 50
End: 2025-07-10

## 2025-07-10 DIAGNOSIS — F31.81 BIPOLAR 2 DISORDER (HCC): Primary | Chronic | ICD-10-CM

## 2025-07-10 DIAGNOSIS — F41.1 GAD (GENERALIZED ANXIETY DISORDER): ICD-10-CM

## 2025-07-10 PROCEDURE — G0176 OPPS/PHP;ACTIVITY THERAPY: HCPCS

## 2025-07-10 PROCEDURE — G0177 OPPS/PHP; TRAIN & EDUC SERV: HCPCS

## 2025-07-10 PROCEDURE — G0410 GRP PSYCH PARTIAL HOSP 45-50: HCPCS

## 2025-07-10 NOTE — TELEPHONE ENCOUNTER
Patient calling back stating she was supposed to receive a phone call back, but never heard anything. I did apologize as they're gone for the day. Patient is asking if they can call her back first thing in the morning

## 2025-07-10 NOTE — TELEPHONE ENCOUNTER
Voicemail left on patient's cell to give the office a call regarding paperwork we received from [employer] regarding disability.  Is this something that we still need to fill out or not.  Nothing was discussed with provider regarding any forms.  If patient calls please send to clinical line.

## 2025-07-10 NOTE — GROUP NOTE
[facility] Clinical Progress Note      Specialized Services Documentation  Therapist must complete separate progress note for each specific clinical activity in which the individual participated during the day.     Subjective:     Patient ID: Ericka Castillo is a 49 y.o. female.      EDUCATION THERAPY    Visit Start Time: 0830  Visit Stop Time: 0930  Total Visit Duration: 60 minutes      Morning group focused on establishing routine and goal review.  Members were assessed related to readiness for learning and potential barriers.  Transfer of skills outside of program explored through goal sharing and connection back to the Recovery Model 5 key facets of recovery.  Throughout session, skills introduced, practiced reflecting mindfulness, meditation, movement, and connecting peers in this community.     Ericka Castillo actively shared.  Identified that they did not complete their goal from last treatment day.  They identified gaining hope, advocacy, and responsibility.  Presented as Receptive related to readiness to learn and did not present with learning barriers. Ericka Castillo participated in movement experience and journal prompt. Ericka Castillo made good progress toward goal. Continue education group to assess willingness to engage, assess transfer of knowledge, and participate in skill development.      Tx Plan Objective: 1.1,1.2, Therapist: Ashley Costenbader MA LMT    [facility] Clinical Progress Note    Subjective:     Patient ID: Ericka Castillo is a 49 y.o. female.    Specialized Services Documentation  Therapist must complete separate progress note for each specific clinical activity in which the individual participated during the day.     GROUP PSYCHOTHERAPY    Visit Start Time: 1330  Visit Stop Time: 1430  Total Visit Duration: 60 minutes    Ericka Castillo participated in psychotherapy group. Group explored treatment day  learning and staff utilized active listening and interactive teaching methods throughout the day. Members shared areas of learning and set a goal for outside of program. Time spent encouraging use of WRAP, skill review, and exploring resources in the community.     Ericka J Jonathan actively shared. She engaged in learning related to wellness tools. Ericka J Jonathan identified going for a walk with children and setting one small boundary with children as goal for the evening and being tough with boundaries as a take away from program.   Moderate progress demonstrated toward treatment objectives. Continue group psychotherapy to actively practice learned skills and encourage transfer of knowledge to unstructured time.         Tx Plan Objective: 1.1,1.2, Therapist: Ashley Costenbader MA LMT

## 2025-07-10 NOTE — GROUP NOTE
[facility] Clinical Progress Note    Specialized Services Documentation  Therapist must complete separate progress note for each specific clinical activity in which the individual participated during the day.     Subjective:     Patient ID: Ericka Castillo is a 49 y.o. female.    GROUP PSYCHOTHERAPY    Visit Start Time: 1215  Visit Stop Time: 1315  Total Visit Duration: 30 minutes    GROUP CONTENT:    Group Psychotherapy        Subjective:    Ericka was in case management for a portion of this session.  This group was facilitated in a private office.  During this session, they, actively engaged in psychoeducational group about emotional regulation. The skill helps to maintain and regulate emotional expression/regulation. Group discovered strategies that help them to manage emotional well being on a short term and long term basis. The group talked about understanding the purpose, and meaning of emotional regulation in intellectual and emotional expression, regulation , and recognition, and how it affects themselves and others.. Teaching on the emphasis of self monitoring and self-care, who the group can go to for help was brought up as well. Group was encouraged to ask questions in an open forum at the end of group. Measurable  progress displayed through engagement in topic. Processing in the course of treatment will continue to engage in psychotherapy to encourage positive self realization.     Erin was an engaged listener during this session.    Tx Plan Objective Addressed: 1.1,1.2,1.4, Therapist:  Arun Santos

## 2025-07-10 NOTE — GROUP NOTE
[facility] Clinical Progress Note    Specialized Services Documentation  Therapist must complete separate progress note for each specific clinical activity in which the individual participated during the day.     Subjective:     Patient ID: Ericka Castillo is a 49 y.o. female.    ALLIED THERAPY    Visit Start Time: 1045  Visit Stop Time: 1145  Total Visit Duration: 60 minutes    Group member participated in music therapy group regarding progressive muscle relaxation and making art to music. The group participated in a progressive muscle relaxation. The group then participated in art making to music. The group then shared and discussed their art with the group relating it to their experience and the music. Continue AT to encourage the development and proactive use of relaxation and coping skills.      Ericka ANDREW actively participated. She shared her art throughout and engaged in a conversation about expectations and not second guessing her contributions. Some progress toward treatment objective.    Tx Plan Objective Addressed: 1.1,1.2,1.4, Therapist:  PATO Gooden, MT-BC

## 2025-07-10 NOTE — PSYCH
[facility] Clinical Progress Note    Specialized Services Documentation  Therapist must complete separate progress note for each specific clinical activity in which the individual participated during the day.     Subjective:  Patient ID: Ericka Castillo is a 49 y.o. female.    Visit Start Time: 1230  Visit Stop Time: 1305  Total Visit Duration: 35 minutes      INDIVIDUAL PSYCHOTHERAPY     Ericka Castillo actively shared in individual therapy addressing family stressors and discussing stressors. Ericka Castillo identified she really want to work on setting boundaries with her children. Reported the only way she can set boundaries with her oldest son is going to have to put a PFA on him but doesn't know if she is strong enough to do that yet. Reported she wants to work on setting small ones with her children. CM and Ericka Castillo explored several options she could set boundaries in a small way that could benefit her and the children.  Ericka Castillo reported she would like to set a boundary with the children electronic use. Ericka Castillo identified she would like to have the kids not use electronics in the evening when she is home from work.   Next session follow up to include check-in on anxiety .   Continue individual psychotherapy in order to decrease symptoms of depression and anxiety, and to increase implementation of coping skills. No additional questions or concerns addressed at this time.    During this session, this clinician used the following therapeutic modalities: Cognitive Behavioral Therapy    Treatment Plan Objectives addressed: 1.1,1.2     CASE MANAGEMENT    Current suicide risk : Low  Medications changes/added/denied?no if yes, see Physician/FIONA/HENRY note  Treatment session number: 9  Individual Case Management Visit provided today? no  Titration: NA   Upcoming Appointments: None at this time   Family Involvement: NA  Weekend Planning: not  discussed  Utilization Review Due: 7/31/25  Discharge Plan: 7/18/25     [facility] follow [facility] physician's orders: N/A    Therapist: Ashley Costenbader MA LMT

## 2025-07-10 NOTE — GROUP NOTE
[facility] Clinical Progress Note    Specialized Services Documentation  Therapist must complete separate progress note for each specific clinical activity in which the individual participated during the day.     Subjective:     Patient ID: Ericka Castillo is a 49 y.o. female.    GROUP PSYCHOTHERAPY    Visit Start Time: 930am  Visit Stop Time: 1030am  Total Visit Duration: 60 minutes      Ericka participated verbally in the group session and volunteered to speak about her feelings.  She gave feedback to group members when appropriate.Ericka worked on the exercise on boundaries.    Ericka explained her frustration and at times inaction with setting boundaries with her adult son whom she fears.  The group's focus was on setting appropriate boundaries for oneself and learning how to communicate these to important people in their lives.  Ericka explained she feels stuck in a rut with her son and strategies were provided to her on how to seek outside supports for him when she chooses.  Continue group psychotherapy to explore her stressors and motivate her into action.      Tx Plan Objective Addressed: 1.1 and 1.4, Therapist:  Maia Turner M.Ed.

## 2025-07-11 ENCOUNTER — OFFICE VISIT (OUTPATIENT)
Dept: PSYCHOLOGY | Facility: CLINIC | Age: 50
End: 2025-07-11
Payer: COMMERCIAL

## 2025-07-11 DIAGNOSIS — F41.1 GAD (GENERALIZED ANXIETY DISORDER): ICD-10-CM

## 2025-07-11 DIAGNOSIS — F31.81 BIPOLAR 2 DISORDER (HCC): Primary | Chronic | ICD-10-CM

## 2025-07-11 DIAGNOSIS — F43.10 PTSD (POST-TRAUMATIC STRESS DISORDER): ICD-10-CM

## 2025-07-11 PROCEDURE — G0176 OPPS/PHP;ACTIVITY THERAPY: HCPCS

## 2025-07-11 PROCEDURE — G0177 OPPS/PHP; TRAIN & EDUC SERV: HCPCS

## 2025-07-11 PROCEDURE — G0410 GRP PSYCH PARTIAL HOSP 45-50: HCPCS

## 2025-07-11 PROCEDURE — 99213 OFFICE O/P EST LOW 20 MIN: CPT | Performed by: PHYSICIAN ASSISTANT

## 2025-07-11 RX ORDER — CHLORPROMAZINE HYDROCHLORIDE 50 MG/1
TABLET, FILM COATED ORAL
COMMUNITY
Start: 2025-07-07

## 2025-07-11 NOTE — PSYCH
[facility] Clinical Progress Note    Specialized Services Documentation  Therapist must complete separate progress note for each specific clinical activity in which the individual participated during the day.     Subjective:  Patient ID: Ericka Castillo is a 49 y.o. female.          INDIVIDUAL PSYCHOTHERAPY     A case management session is not scheduled today with Ericka Castillo ; additionally, they did not request a CM meeting.  Ericka Castillo is aware of next scheduled 1:1.        CASE MANAGEMENT    Current suicide risk : Low  Medications changes/added/denied?no if yes, see Physician/FIONA/HENRY note  Treatment session number: 10  Individual Case Management Visit provided today? no  Titration: NA   Upcoming Appointments: NA   Family Involvement: Offered family meeting with mother  Weekend Plannin and activity options  Utilization Review Due: 25  Discharge Plan: 25     [facility] follow up physician's orders: N/A    Therapist: Ashley Costenbader MA LMT

## 2025-07-11 NOTE — GROUP NOTE
[facility] Clinical Progress Note    Specialized Services Documentation  Therapist must complete separate progress note for each specific clinical activity in which the individual participated during the day.     Subjective:     Patient ID: Ericka Castillo is a 49 y.o. female.    GROUP PSYCHOTHERAPY    Visit Start Time: 1045   Visit Stop Time: 1145  Total Visit Duration: 60 minutes  Group was facilitated virtually in a private office using HIPAA Compliant and Approved [employer] Teams. Group member consented to the use of tele-video modality of treatment and was virtually present for group psychotherapy today.  They attentively listened to GUZMAN Kincaid share her life story as she co-led this session.  Group encouraged power of learning about self, accepting illness and personal responsibility in recovery.  Community resources reviewed in addition to personal resources like the affirmations.  Progress toward goals noted.  Continue psychotherapy to encourage self -awareness and healthy engagement of supports.    TX Plan Objectives: 1.1, 1.2, 1.4   Therapist: Ashley Costenbader MA LMT

## 2025-07-11 NOTE — GROUP NOTE
[facility] Clinical Progress Note    Specialized Services Documentation  Therapist must complete separate progress note for each specific clinical activity in which the individual participated during the day.     Subjective:     Patient ID: Ericka Castillo is a 49 y.o. female.    GROUP PSYCHOTHERAPY    Visit Start Time: 930  Visit Stop Time: 1030  Total Visit Duration: 60 minutes    GROUP CONTENT:    Group Psychotherapy      Subjective:  This group was facilitated in a private office.  During this session, they,  actively engaged in psychoeducational group about employment stressors. The skill helps to create effective work relationships. The group discovered strategies that help them to manage work relationships on a short term and long term basis. The group talked about understanding the purpose, and meaning of work stressors  in intellectual and emotional expression, regulation , and recognition, and how it affects themselves and others.. Teaching on the emphasis of self monitoring , suggestive solutions, verbal and non-verbal communication,and keeping commitments, strategies were discusses to reduce work stressors.  Group was encouraged to ask questions in an open forum at the end of group. Measurable progress displayed through engagement in topic. Processing in the course of treatment will continue to engage in psychotherapy to encourage positive self realization.     Ericka contributed to the group discussion occasionally, but was more engaged in the other group members discussion this session.    Tx Plan Objective Addressed: 1.1,1.2,1.4, Therapist:  Arun Santos

## 2025-07-11 NOTE — ASSESSMENT & PLAN NOTE
Cont PHP cont meds per OP psychiatrist.   Current Outpatient Medications   Medication Sig Dispense Refill    chlorproMAZINE (THORAZINE) 50 mg tablet TAKE 1 TABLET BY MOUTH TWICE DAILY AS NEEDED FOR AGITATION      buPROPion (WELLBUTRIN XL) 300 mg 24 hr tablet Take 1 tablet (300 mg total) by mouth in the morning for 14 days. Do not start before June 24, 2025. 14 tablet 0    clonazePAM (KlonoPIN) 1 mg tablet Take 1 tablet (1 mg total) by mouth 2 (two) times a day for 14 days 28 tablet 0    escitalopram (LEXAPRO) 10 mg tablet Take 1 tablet (10 mg total) by mouth daily for 14 days Do not start before June 24, 2025. 14 tablet 0    estradiol (Climara) 0.06 MG/24HR Place 1 patch on the skin once a week over 7 days Do not start before June 28, 2025. 4 patch 3    gabapentin (NEURONTIN) 400 mg capsule Take 2 capsules (800 mg total) by mouth 3 (three) times a day for 14 days 84 capsule 0    haloperidol (HALDOL) 2 mg tablet Take 1 tablet (2 mg total) by mouth daily as needed for agitation 30 tablet 0    HYDROcodone-acetaminophen (NORCO) 5-325 mg per tablet Take 1 tablet by mouth 2 (two) times a day as needed for pain (Do not take within 3 to 4 hours of taking clonazepam) Max Daily Amount: 2 tablets 60 tablet 0    [START ON 7/28/2025] HYDROcodone-acetaminophen (NORCO) 5-325 mg per tablet Take 1 tablet by mouth 2 (two) times a day as needed for pain (Do not take within 3 to 4 hours of taking clonazepam) Max Daily Amount: 2 tablets Do not start before July 28, 2025. 60 tablet 0    hyoscyamine (LEVSIN/SL) 0.125 mg SL tablet Take 1 tablet (0.125 mg total) by mouth every 4 (four) hours as needed for cramping 60 tablet 0    lamoTRIgine (LaMICtal) 200 MG tablet Take 200 mg by mouth daily      lamoTRIgine (LaMICtal) 25 mg tablet Take 10 tablets (250 mg total) by mouth daily for 14 days Do not start before June 24, 2025. 140 tablet 0    lansoprazole (PREVACID) 30 mg capsule Take 1 capsule (30 mg total) by mouth in the morning and 1  capsule (30 mg total) in the evening. 60 capsule 0    levothyroxine 50 mcg tablet Take 1 tablet (50 mcg total) by mouth daily in the early morning 30 tablet 0    lidocaine (LMX) 4 % cream Apply topically 4 (four) times a day as needed (neck pain) 28 g 0    loperamide (IMODIUM) 2 mg capsule Take 1 capsule (2 mg total) by mouth 3 (three) times a day as needed for diarrhea 30 capsule 0    nabumetone (RELAFEN) 500 mg tablet Take 1 tablet by mouth twice daily 60 tablet 0    naloxone (NARCAN) 4 mg/0.1 mL nasal spray Administer 1 spray into a nostril. If no response after 2-3 minutes, give another dose in the other nostril using a new spray. 1 each 1    ondansetron (ZOFRAN-ODT) 4 mg disintegrating tablet Take 1 tablet (4 mg total) by mouth every 6 (six) hours as needed for nausea or vomiting 20 tablet 0    saccharomyces boulardii (FLORASTOR) 250 mg capsule Take 1 capsule (250 mg total) by mouth 2 (two) times a day 60 capsule 0    simethicone (MYLICON) 80 mg chewable tablet Chew 1 tablet (80 mg total) every 6 (six) hours as needed for flatulence 30 tablet 0    tiZANidine (ZANAFLEX) 4 mg tablet Take 1 tablet (4 mg total) by mouth 3 (three) times a day as needed for muscle spasms (Do not take within 3 to 4 hours of clonazepam and hydrocodone) 90 tablet 0    zolpidem (AMBIEN) 10 mg tablet Take 10 mg by mouth daily at bedtime as needed for sleep       No current facility-administered medications for this visit.

## 2025-07-11 NOTE — GROUP NOTE
[facility] Clinical Progress Note    Specialized Services Documentation  Therapist must complete separate progress note for each specific clinical activity in which the individual participated during the day.     Subjective:     Patient ID: Ericka Castillo is a 49 y.o. female.    ALLIED THERAPY    Visit Start Time: 1215  Visit Stop Time: 1315  Total Visit Duration: 60 minutes    Group member participated in music therapy group regarding mindfulness. The group participated in a name that tune exercise as an example of mindfulness. The group then read and discussed materials on mindfulness as well as areas they could use mindfulness in their current lives. The group then read and discussed a handout on a music mindfulness exercise.  Continue AT to encourage the development and proactive use of mindfulness coping tools.      Ericka ANDREW actively participated. She shared minimally but appeared attentive throughout. Some progress toward treatment objective.    Tx Plan Objective Addressed: 1.1,1.2,1.4, Therapist:  PATO Gooden, MT-BC
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
## 2025-07-11 NOTE — GROUP NOTE
Partial Hospitalization - [facility] Clinical Progress Note      Specialized Services Documentation  Therapist must complete separate progress note for each specific clinical activity in which the individual participated during the day.     Subjective:     Patient ID: Ericka Castillo is a 49 y.o. female.      EDUCATION THERAPY    Visit Start Time: 0830  Visit Stop Time: 0930  Total Visit Duration: 60 Minutes       Morning group focused on establishing routine and goal review.  Members were assessed related to readiness for learning and potential barriers.  Transfer of skills outside of program explored through goal sharing and connection back to the Recovery Model 5 key facets of recovery.  Throughout session, skills introduced, practiced reflecting mindfulness, meditation, movement, and connecting peers in this community.     Ericka Castillo actively shared.  Identified that they did not complete their goal from last treatment day.  They identified gaining hope.  Presented as Receptive related to readiness to learn and did not present with learning barriers. Ericka Castillo participated in movement experience. Ericka Castillo made positive progress toward goal. Continue education group to assess willingness to engage, assess transfer of knowledge, and participate in skill development.    Tx Plan Objective Addressed: 1.1, 1.2, 1.3, 1.4 , Therapist:  Laura HERNANDEZ RN      GROUP PSYCHOTHERAPY    Visit Start Time: 1330  Visit Stop Time: 1430  Total Visit Duration: 60 Minutes     Ericka Castillo participated in psychotherapy group. Group explored treatment day learning and staff utilized verbal, demonstration, A/V, active listening, role play, and interactive teaching methods throughout the day. Members shared areas of learning and set a goal for outside of program. Time spent encouraging use of WRAP, skill review, and exploring resources in the community.     Ericka Castillo  actively shared. She engaged in learning related to wellness tools. Ericka J Jonathan identified going through group material as goal for the evening and MADDIE as a take away from program.   Some positive progress demonstrated toward treatment objectives. Continue group psychotherapy to actively practice learned skills and encourage transfer of knowledge to unstructured time.     Tx Plan Objective Addressed: 1.1, 1.2, 1.3, 1.4 , Therapist:  Laura HERNANDEZ RN

## 2025-07-14 ENCOUNTER — TELEPHONE (OUTPATIENT)
Age: 50
End: 2025-07-14

## 2025-07-14 ENCOUNTER — OFFICE VISIT (OUTPATIENT)
Dept: PSYCHOLOGY | Facility: CLINIC | Age: 50
End: 2025-07-14
Payer: COMMERCIAL

## 2025-07-14 DIAGNOSIS — F41.1 GAD (GENERALIZED ANXIETY DISORDER): ICD-10-CM

## 2025-07-14 DIAGNOSIS — F31.81 BIPOLAR 2 DISORDER (HCC): Primary | Chronic | ICD-10-CM

## 2025-07-14 PROCEDURE — G0410 GRP PSYCH PARTIAL HOSP 45-50: HCPCS

## 2025-07-14 PROCEDURE — G0177 OPPS/PHP; TRAIN & EDUC SERV: HCPCS

## 2025-07-14 NOTE — TELEPHONE ENCOUNTER
Patient has been added to the Medication Management wait list with a referral.    Insurance: Aetna  Insurance Type:    Commercial [x]   Medicaid []   [facility] (if applicable)   Medicare []  Location Preference: None  Provider Preference: None  Virtual: Yes [x] No []  Were outside resources sent: Yes [x] No []

## 2025-07-14 NOTE — GROUP NOTE
[facility] Hospitalization [facility] Clinical Progress Note    Specialized Services Documentation  Therapist must complete separate progress note for each specific clinical activity in which the individual participated during the day.     Subjective:     Patient ID: Ericka Castillo is a 49 y.o. female.    Group Psychotherapy    Visit Start Time: 1045   Visit Stop Time: 1145  Total Visit Duration: 40 Minutes     Group Psychotherapy    Ericka Castillo actively shared in psychoeducational group which focused on Tapping skills- Emotional Freedom Technique (EFT) as a coping skill. How Tapping can help, where Tapping comes from, why Tapping works and how to Tap were discussed with the group. The group discussed benefits of Tapping. The group was then guided through a Tapping scenario. The group wrapped up by making a goal to be more positive. Little progress towards goal noted. Ericka Castillo did not follow along in Tapping exercise and was out of room taking phone calls x 2 during this group, CM aware. Continue psychoeducation to educate on the use of Tapping as a healthy coping skill to improve mental wellbeing.  Tx. Plan Objectives: 1.1.1.2   Therapist: Laura HERNANDEZ RN         Tx Plan Objective Addressed: 1.1, 1.2, 1.4 Therapist:  Laura HERNANDEZ RN

## 2025-07-14 NOTE — GROUP NOTE
EDUCATION THERAPY        [facility] Hospitalization [facility] Clinical Progress Note      Specialized Services Documentation  Therapist must complete separate progress note for each specific clinical activity in which the individual participated during the day.     Subjective:     Patient ID: Ericka Castillo is a 49 y.o. female.      EDUCATION THERAPY    Visit Start Time: 0830  Visit Stop Time: 0930  Total Visit Duration: 60 minutes      Morning group focused on establishing routine and goal review.  Members were assessed related to readiness for learning and potential barriers.  Transfer of skills outside of program explored through goal sharing and connection back to the Recovery Model 5 key facets of recovery.  Throughout session, skills introduced, practiced reflecting mindfulness, meditation, movement, and connecting peers in this community.     Ericka Castillo with prompts shared.  Identified that they did complete their goal from last treatment day.  They identified she continued to work on coping skills gaining responsibility.  Presented as Other tearful through the morning group related to readiness to learn and did present with learning barriers, stating she felt like no skills were helping her through the weekend and presented as tearful and anxious Ericka Castillo participated in mindfulness exercise and gratitude practice. Ericka Castillo made limited progress toward goal. Continue education group to assess willingness to engage, assess transfer of knowledge, and participate in skill development.    Tx Plan Objective Addressed: 1.1,1.2,1.4, Therapist:  Arun Santos

## 2025-07-14 NOTE — PSYCH
[facility] Hospitalization [facility] Clinical Progress Note    Specialized Services Documentation  Therapist must complete separate progress note for each specific clinical activity in which the individual participated during the day.     Subjective:  Patient ID: Ericka Castillo is a 49 y.o. female.    Visit Start Time: 1120  Visit Stop Time: 1150  Total Visit Duration: 30 minutes      INDIVIDUAL PSYCHOTHERAPY     Ericka Castillo actively shared in individual therapy addressing treatment goals, medication education, and discussing stressors. Ericka Castillo reported she was very depressed this weekend. Reported she feels she lost all her coping skills that she learned in program. Reported she feels she needs a medication change.  Reported she tried to GLAD journal and use positive affirmation but they did not work. CM reinforced Ericka Castillo is going to start feeling her emotions since she cannot numb he emotions any more. Reminded her that it is natural to have good days and bad days but it does not mean she is failing in her treatment progress. Educated Ericka Castillo she needs to take make choices in her mental health, is she going to control her emotions or are the emotions going to control her.   Next session follow up to include check-in step towards change.   Continue individual psychotherapy in order to decrease symptoms of depression and anxiety, and to increase implementation of coping skills. No additional questions or concerns addressed at this time.    During this session, this clinician used the following therapeutic modalities: Cognitive Behavioral Therapy    Treatment Plan Objectives addressed: 1.1,1.2,1.5     CASE MANAGEMENT    Current suicide risk : Low  Medications changes/added/denied?no if yes, see Physician/FIONA/HENRY note  Treatment session number: 11  Individual Case Management Visit provided today? no  Titration: NA   Upcoming Appointments: None at this time   Family  Involvement: Offered but declined by Ericka Castillo    Weekend Planning: not discussed  Utilization Review Due: 8/7  Discharge Plan: 7/18/25     [facility] follow up physician's orders: N/A    Therapist: Ashley Costenbader MA

## 2025-07-14 NOTE — GROUP NOTE
GROUP PSYCHOTHERAPY        [facility] Clinical Progress Note    Subjective:     Patient ID: Ericka Castillo is a 49 y.o. female.    Specialized Services Documentation  Therapist must complete separate progress note for each specific clinical activity in which the individual participated during the day.     GROUP PSYCHOTHERAPY    Visit Start Time: 1330  Visit Stop Time: 1430  Total Visit Duration: 60 minutes    Ericka Castillo participated in psychotherapy group. Group explored treatment day learning and staff utilized verbal, active listening, and interactive teaching methods throughout the day. Members shared areas of learning and set a goal for outside of program. Time spent encouraging use of WRAP, skill review, and exploring resources in the community.     Ericka Castillo quietly shared. They engaged in learning related to wellness tools. Ericka Castillo identified taking her daughters to gymnastics as goal for the evening and working on self-compassion as a take away from program.   Limited progress demonstrated toward treatment objectives. Continue group psychotherapy to actively practice learned skills and encourage transfer of knowledge to unstructured time.     Tx Plan Objective: 1.1,1.2,1.4, The[facility]    Specialized Services Documentation  Therapist must complete separate progress note for each specific clinical activity in which the individual participated during the day.     Subjective:     Patient ID: Ericka Castillo is a 49 y.o. female.    GROUP PSYCHOTHERAPY    Visit Start Time: 930  Visit Stop Time: 1030  Total Visit Duration: 50 minutes    GROUP CONTENT:    Group Psychotherapy        Subjective:      This group was facilitated in a private office.  During this session, they,  actively engaged in psychoeducational group about conflict resolution. The skill helps to develop skills to  create resolution with self and others with whom one may interact with. Group discovered strategies that help them to develop positive assertiveness skills on a short term and long term basis. The group talked about understanding the purpose, and meaning of conflict resolution in intellectual and emotional expression, regulation , and recognition, and how it affects themselves and others.Teaching on the emphasis of self monitoring and resolution techniques, discussions on how to create positive resolutions through effective assertive tools were discussed. Group was encouraged to ask questions in an open forum at the end of group. Measurable progress displayed through engagement in topic. Processing in the course of treatment will continue to engage in psychotherapy to encourage positive conflict resolution.     Ericka was in case management for a portion of this session. Ericka did not actively engage in the group discussion, but was a passive listener during this session.    Tx Plan Objective Addressed: 1.1,1.2,1.4, Therapist:  Arun Santos

## 2025-07-14 NOTE — GROUP NOTE
"[facility] Progress Note    Specialized Services Documentation  Therapist must complete separate progress note for each specific clinical activity in which the individual participated during the day.     Subjective:     Patient ID: Ericka Castillo is a 49 y.o. female.    GROUP PSYCHOTHERAPY    Visit Start Time: 1215  Visit Stop Time: 1315  Total Visit Duration: 60 minutes    Life Skills  ([id number])  Ericka Castillo actively engaged in group focused on developing self-compassion. Group members discussed what is self compassion. During the group we discussed the benefits of self-compassion and how it helps improve well-being, connection to others, increases selflessness, regulates emotions, reduces depression and anxiety. Group members practiced self-compassion by completing self-compassion exercises drawn from \"The Self-Compassion Deck\". Ericka ANDREW discussed how they could bring self-compassion into their lives by spending time with her kids. Ericka ANDREW continues to make progress towards goals through verbal participation in group; to accomplish long term goals continue to utilize skills learned in programming. Continue with psychotherapy to educate and encourage use of wellness tools.     Tx Plan Objective Addressed: 1.1, 1.2 Therapist:  POWER Hess    "

## 2025-07-15 ENCOUNTER — OFFICE VISIT (OUTPATIENT)
Dept: PSYCHOLOGY | Facility: CLINIC | Age: 50
End: 2025-07-15
Payer: COMMERCIAL

## 2025-07-15 DIAGNOSIS — F41.1 GAD (GENERALIZED ANXIETY DISORDER): ICD-10-CM

## 2025-07-15 DIAGNOSIS — F31.81 BIPOLAR 2 DISORDER (HCC): Primary | Chronic | ICD-10-CM

## 2025-07-15 PROCEDURE — G0410 GRP PSYCH PARTIAL HOSP 45-50: HCPCS

## 2025-07-15 PROCEDURE — G0177 OPPS/PHP; TRAIN & EDUC SERV: HCPCS

## 2025-07-16 ENCOUNTER — OFFICE VISIT (OUTPATIENT)
Dept: PSYCHOLOGY | Facility: CLINIC | Age: 50
End: 2025-07-16
Payer: COMMERCIAL

## 2025-07-16 DIAGNOSIS — F31.81 BIPOLAR 2 DISORDER (HCC): Primary | Chronic | ICD-10-CM

## 2025-07-16 DIAGNOSIS — F41.1 GAD (GENERALIZED ANXIETY DISORDER): ICD-10-CM

## 2025-07-16 PROCEDURE — G0410 GRP PSYCH PARTIAL HOSP 45-50: HCPCS

## 2025-07-16 PROCEDURE — G0177 OPPS/PHP; TRAIN & EDUC SERV: HCPCS

## 2025-07-16 NOTE — GROUP NOTE
EDUCATION THERAPY        [facility] Progress Note      Specialized Services Documentation  Therapist must complete separate progress note for each specific clinical activity in which the individual participated during the day.     Subjective:     Patient ID: Ericka Castillo is a 49 y.o. female.      EDUCATION THERAPY    Visit Start Time: 0830  Visit Stop Time: 0930  Total Visit Duration: 60 minutes      Morning group focused on establishing routine and goal review.  Members were assessed related to readiness for learning and potential barriers.  Transfer of skills outside of program explored through goal sharing and connection back to the Recovery Model 5 key facets of recovery.  Throughout session, skills introduced, practiced reflecting mindfulness, meditation, movement, and connecting peers in this community.     Ericka Castillo actively shared.  Identified that they did complete their goal from last treatment day.  They identified she did laundry gaining responsibility.  Presented as Receptive related to readiness to learn and did not present with learning barriers. Ericka Castillo participated in mindfulness exercise and gratitude practice. Ericka Castillo made positive progress toward goal. Continue education group to assess willingness to engage, assess transfer of knowledge, and participate in skill development.    Tx Plan Objective Addressed: 1.1,1.2,1.4, Therapist:  Arun Santos

## 2025-07-16 NOTE — GROUP NOTE
[facility] Progress Note    Specialized Services Documentation  Therapist must complete separate progress note for each specific clinical activity in which the individual participated during the day.     Subjective:     Patient ID: Ericka Castillo is a 49 y.o. female.    GROUP PSYCHOTHERAPY    Visit Start Time: 1045  Visit Stop Time: 1145  Total Visit Duration: 45 minutes      Group Psychotherapy     Ericka Castillo actively engaged in psychoeducational group about medication management, types of antidepressants/side effects, and medication tips. Group came up with strategies that helped them remember to take their medications and developed ideas to make it easier in the future. The group talked about understanding the purpose, dose, type, timing and side effects of their medications. Teaching on emergency situations and who to go to for help was brought up as well. Group was encouraged to ask questions in an open forum at the end of group. Some progress displayed through engagement in topic.    Tx Plan Objective Addressed:  1.1, 1.2, 1.3, 1.4 Therapist: Laura HERNANDEZ RN

## 2025-07-16 NOTE — GROUP NOTE
GROUP PSYCHOTHERAPY        [facility] Progress Note    Subjective:     Patient ID: Ericka Castillo is a 49 y.o. female.    Specialized Services Documentation  Therapist must complete separate progress note for each specific clinical activity in which the individual participated during the day.     GROUP PSYCHOTHERAPY    Visit Start Time: 1330  Visit Stop Time: 1430  Total Visit Duration: 60 minutes    Ericka Castillo participated in psychotherapy group. Group explored treatment day learning and staff utilized verbal, active listening, and interactive teaching methods throughout the day. Members shared areas of learning and set a goal for outside of program. Time spent encouraging use of WRAP, skill review, and exploring resources in the community.     Ericka Castillo actively shared. They engaged in learning related to wellness tools. Ericka Castillo identified work on her positive affirmations as goal for the evening and learning to manage her time schedule between home and work balance as a take away from program.   Moderate progress demonstrated toward treatment objectives. Continue group psychotherapy to actively practice learned skills and encourage transfer of knowledge to unstructured time.     Tx Plan Objective: 1.1,1.2,1.4, Therapist- Arun Santos    [facility] Progress Note    Specialized Services Documentation  Therapist must complete separate progress note for each specific clinical activity in which the individual participated during the day.     Subjective:     Patient ID: Ericka Castillo is a 49 y.o. female.    GROUP PSYCHOTHERAPY    Visit Start Time: 930  Visit Stop Time: 1030  Total Visit Duration: 60 minutes    GROUP CONTENT:    Group Psychotherapy      This group was facilitated in a private office.  During this session, they, actively engaged in psychoeducational group about challenging automatic negative  thoughts that are perceived globally, external factors that influence an individuals thoughts and behaviors.(ANTs). The skill helps to identify negative thoughts and cognitive distortions. The outcome being that negative thoughts are challenged in the thought process and regulation of emotion. Group discovered strategies that help them to manage negative thoughts and rethink the negative thoughts when faced with a negative challenge. The group talked about understanding the purpose of challenging negative thoughts on a personal and social level and how it affects themselves and others.. Teaching on the emphasis of self monitoring and self-care, the group focus is geared how togo to for help when the negative thoughts become overly intrusive. Group was encouraged to ask questions in an open forum at the end of session.  They will continue to engage in psychotherapy to encourage positive self realization.     Ericka did not actively participate in the group discussions but was supportive of other group members during the group discussion and was an engaged listener.    Tx Plan Objective Addressed: 1.1,1.2,1.4, Therapist:  Arun Santos

## 2025-07-16 NOTE — GROUP NOTE
[facility] Progress Note    Specialized Services Documentation  Therapist must complete separate progress note for each specific clinical activity in which the individual participated during the day.     Subjective:     Patient ID: Ericka Castillo is a 49 y.o. female.    GROUP PSYCHOTHERAPY    Visit Start Time: 1215  Visit Stop Time: 1315  Total Visit Duration: 60 minutes    Life Skills ([id number]) Ericka Castillo actively engaged in an open processing group. The group discussed returning to work, family stressors and trouble with obtaining medications. Ericka ANDREW did participate in the conversations related to the topics. Ericka ANDREW continues to make progress towards goals through verbal participation in group; to accomplish long term goals continue to utilize skills learned in programming. Continue with psychotherapy to educate and encourage use of wellness tools. Tx Plan Objective: 1.1,1.2 Therapist: SAGRARIO Hess,POWER      Tx Plan Objective Addressed: 1.1,1.2,1.3,1.4, Therapist:  POWER Hess

## 2025-07-16 NOTE — PSYCH
[facility] Progress Note    Specialized Services Documentation  Therapist must complete separate progress note for each specific clinical activity in which the individual participated during the day.     Subjective:  Patient ID: Ericka Castillo is a 49 y.o. female.    Visit Start Time: 1045  Visit Stop Time: 1100  Total Visit Duration: 15 minutes      INDIVIDUAL PSYCHOTHERAPY     Ericka Castillo actively shared in individual therapy addressing discharge planning. Ericka Castillo reported she is anxious with going back to work. Reported she contemplated going to the ED for inpatient care. Reported she feels that her medication is not working right due to crying spells and having some nightmares. Reported she made an psych eval with [facility]. Ericka Castillo did not report any SI.    Next session follow up to include check-in discharge planning.   Continue individual psychotherapy in order to decrease symptoms of depression and anxiety, and to increase implementation of coping skills. No additional questions or concerns addressed at this time.    During this session, this clinician used the following therapeutic modalities: Client-centered Therapy    Treatment Plan Objectives addressed: 1.1,1.2     CASE MANAGEMENT    Current suicide risk : Low  Medications changes/added/denied?no if yes, see Physician/FIONA/HENRY note  Treatment session number: 13  Individual Case Management Visit provided today? yes  Titration: NA   Upcoming Appointments: TBD   Family Involvement: NA  Weekend Plannin  Utilization Review Due: 25  Discharge Plan: 25     [facility] follow up physician's orders: N/A    Therapist: Ashley Costenbader MA LMT

## 2025-07-17 ENCOUNTER — OFFICE VISIT (OUTPATIENT)
Dept: PSYCHOLOGY | Facility: CLINIC | Age: 50
End: 2025-07-17
Payer: COMMERCIAL

## 2025-07-17 DIAGNOSIS — F31.81 BIPOLAR 2 DISORDER (HCC): Primary | Chronic | ICD-10-CM

## 2025-07-17 DIAGNOSIS — F41.1 GAD (GENERALIZED ANXIETY DISORDER): ICD-10-CM

## 2025-07-17 PROCEDURE — G0177 OPPS/PHP; TRAIN & EDUC SERV: HCPCS

## 2025-07-17 PROCEDURE — G0410 GRP PSYCH PARTIAL HOSP 45-50: HCPCS

## 2025-07-17 NOTE — GROUP NOTE
[facility] Progress Note    Specialized Services Documentation  Therapist must complete separate progress note for each specific clinical activity in which the individual participated during the day.     Subjective:     Patient ID: Ericka Castillo is a 49 y.o. female.    GROUP PSYCHOTHERAPY    Visit Start Time: 1215  Visit Stop Time: 1315  Total Visit Duration: 60 minutes     Ericka Castillo participated quietly in a psychotherapy group focused on Worry Time. This group was facilitated virtually in a private office using HIPAA compliant and approved gopogo teams. Ericka Castillo consented to the use of tele-video modality of treatment.   This writer utilized time to openly discus the effects of worrying on mental, emotional, and physical health. Group member then reviewed provided documents on the use of worry time and the various steps required to successfully complete a scheduled worry time. This writer encouraged open dialogue throughout the discussion to facilitate learning through the use of personal experience and applying said experience directly to the skill. Group members were encouraged to complete their very own worry time plan to utilize on a daily basis. Ericka Castillo made good efforts towards progress goals which were displayed through good participation, notetaking, or engagement in topic. Continue to run daily group psychotherapy to meet treatment needs and encourage Ericka Castillo to practice skills outside of program.      Tx Plan Objective: 1.1,1.2,1.4  Therapist: Ashley Costenbader MA LMT

## 2025-07-17 NOTE — GROUP NOTE
[facility] Progress Note    Specialized Services Documentation  Therapist must complete separate progress note for each specific clinical activity in which the individual participated during the day.     Subjective:     Patient ID: Ericka Castillo is a 49 y.o. female.    GROUP PSYCHOTHERAPY    Visit Start Time: 930  Visit Stop Time: 1030  Total Visit Duration: 60 minutes    GROUP CONTENT:    Group Psychotherapy                This group was facilitated in a private office.  During this session they, actively engaged in psychoeducational group about positive self soothing activities. These activities help to self-soothe an individual and decrease decrease feelings of loneliness,anxiety, and boredom. Positive behavior change is focused toward a specified goal. Group explored the identifying factors that create loneliness,anxiety, and boredom. This process can help them to take care of emotional and intellectual moments of anxiety on a short term and long term basis. The group talked about understanding the meaning of self-soothing in regards to physical, intellectual, and emotional expression, regulation , and recognition, and how it affects themselves and others. Teaching on the emphasis of self monitoring and relapse, the group explored who they can go to for help was brought up as well. Group was encouraged to ask questions in an open forum at the end of group.  Measurable progress was displayed through engagement in topic. Processing in the course of treatment will continue in psychotherapy to encourage positive self realization.     Ericka was eager to share during the group activity, but had a difficult time in processing answers with certain sensations, such as tough and hearing.     Tx Plan Objective Addressed: 1.1,1.2,1.4, Therapist:  Arun Santos

## 2025-07-17 NOTE — GROUP NOTE
EDUCATION THERAPY        [facility] Progress Note      Specialized Services Documentation  Therapist must complete separate progress note for each specific clinical activity in which the individual participated during the day.     Subjective:     Patient ID: Ericka Castillo is a 49 y.o. female.      EDUCATION THERAPY    Visit Start Time: 0830  Visit Stop Time: 0930  Total Visit Duration: 60 minutes      Morning group focused on establishing routine and goal review.  Members were assessed related to readiness for learning and potential barriers.  Transfer of skills outside of program explored through goal sharing and connection back to the Recovery Model 5 key facets of recovery.  Throughout session, skills introduced, practiced reflecting mindfulness, meditation, movement, and connecting peers in this community.     Ericka Castillo actively shared.  Identified that they did complete their goal from last treatment day.  They identified changing the cards of affirmation at home  as a goal and felt they were  gaining hope.  Presented as Receptive related to readiness to learn and did not present with learning barriers. Ericka Castillo participated in mindfulness exercise and gratitude practice. Ericka Castillo made positive progress toward goal. Continue education group to assess willingness to engage, assess transfer of knowledge, and participate in skill development.    Tx Plan Objective Addressed: 1.1,1.2,1.4, Therapist:  Maia Turner   Called patient to inform her of appt 7/8/19 with Dr. Rosaila Gardner  Patient said she received a text from [facility] on Saturday for appt 7/1/19 she thought was with Dr. Rosalia Gardner and when she went it was with Dr. Tata Colbert   Patient was very disappointed.   Will cancel appt for

## 2025-07-17 NOTE — PSYCH
[facility] Progress Note    Specialized Services Documentation  Therapist must complete separate progress note for each specific clinical activity in which the individual participated during the day.     Subjective:  Patient ID: Ericka Castillo is a 49 y.o. female.    Visit Start Time: 1330  Visit Stop Time: 1400  Total Visit Duration: 30 minutes      INDIVIDUAL PSYCHOTHERAPY     Ericka Castillo actively shared in individual therapy addressing discharge planning. Ericka Castillo provided CM with established outpatient appointments. Reported she feels and thinks so negative. Reported she can't get out of this thinking. CM educated about using her re framing skills and making positive choices in her life. Treatment plan was updated  Next session follow up to include check-in discharge.   Continue individual psychotherapy in order to decrease symptoms of depression and anxiety, and to increase implementation of coping skills. No additional questions or concerns addressed at this time.    During this session, this clinician used the following therapeutic modalities: Supportive Psychotherapy    Treatment Plan Objectives addressed: 1.1,1.2     CASE MANAGEMENT    Current suicide risk : Low  Medications changes/added/denied?no if yes, see Physician/FIONA/HENRY note  Treatment session number: 14  Individual Case Management Visit provided today? no  Titration: NA   Upcoming Appointments:  &  with outpatient provider   Family Involvement: NA  Weekend Plannin  Utilization Review Due:   Discharge Plan: 25     [facility] follow up physician's orders: N/A    Therapist: Ashley Costenbader MA     Pt. Report called to 3rd floor.      Divya Valenzuela RN  01/16/21 [id number]

## 2025-07-17 NOTE — GROUP NOTE
GROUP PSYCHOTHERAPY        [facility] Progress Note    Subjective:     Patient ID: Ericka Castillo is a 49 y.o. female.    Specialized Services Documentation  Therapist must complete separate progress note for each specific clinical activity in which the individual participated during the day.     GROUP PSYCHOTHERAPY    Visit Start Time: 1330  Visit Stop Time: 1430  Total Visit Duration: 60 minutes    Ericka Castillo participated in psychotherapy group. Group explored treatment day learning and staff utilized verbal, active listening, and interactive teaching methods throughout the day. Members shared areas of learning and set a goal for outside of program. Time spent encouraging use of WRAP, skill review, and exploring resources in the community.     Ericka Castillo actively shared. They engaged in learning related to wellness tools. Ericka Castillo identified going to the pool with her children tonight as goal for the evening and felt that information on massage and reflexology was helpful and a take away from program.   Positive progress demonstrated toward treatment objectives. Continue group psychotherapy to actively practice learned skills and encourage transfer of knowledge to unstructured time.     Tx Plan Objective: 1.1,1.2,1.4, Therapist- Maia Turner  [facility] Progress Note    Specialized Services Documentation  Therapist must complete separate progress note for each specific clinical activity in which the individual participated during the day.     Subjective:     Patient ID: Ericka Castillo is a 49 y.o. female.    GROUP PSYCHOTHERAPY    Visit Start Time: 1045  Visit Stop Time: 1145  Total Visit Duration: 45 minutes    GROUP CONTENT:     This group was facilitated virtually in a private office using HIPAA Compliant and Approved Microsoft Teams.  Ericka Castillo consented to the use of tele-video modality of  treatment.         This group was facilitated virtually in a private office using HIPAA Compliant and Approved [employer] Teams.  Ericka Castillo consented to the use of tele-video modality of treatment.       Group Psychotherapy          This group was facilitated virtually in a private office using HIPAA Compliant and Approved [employer] Teams.  Ericka Castillo actively engaged in psychoeducational group about setting boundaries with friends and family members. Boundaries were defined as invisible barriers we set between a patient and other people.  The skill helps to identify when we need to set boundaries. The group discussed how to communicate their boundaries to others. The group talked about understanding the purpose of boundaries and how they may fail to set appropriate boundaries.  Group was encouraged to ask questions in an open forum at the end of session. They will continue to engage in psychotherapy to encourage positive self realization.   Ericka was talkative and looking for strategies to use with the stressors she is facing.  Ericka also expressed that she was upset that PHP was ending tomorrow for her.      Tx Plan Objective Addressed: 1.1,1.2,1.4, Therapist:  Maia Turner
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
## 2025-07-18 ENCOUNTER — OFFICE VISIT (OUTPATIENT)
Dept: PSYCHOLOGY | Facility: CLINIC | Age: 50
End: 2025-07-18
Payer: COMMERCIAL

## 2025-07-18 DIAGNOSIS — F43.10 PTSD (POST-TRAUMATIC STRESS DISORDER): ICD-10-CM

## 2025-07-18 DIAGNOSIS — F31.81 BIPOLAR II DISORDER (HCC): Primary | ICD-10-CM

## 2025-07-18 DIAGNOSIS — F41.9 ANXIETY: ICD-10-CM

## 2025-07-18 DIAGNOSIS — F41.1 GAD (GENERALIZED ANXIETY DISORDER): ICD-10-CM

## 2025-07-18 PROCEDURE — G0177 OPPS/PHP; TRAIN & EDUC SERV: HCPCS

## 2025-07-18 PROCEDURE — G0410 GRP PSYCH PARTIAL HOSP 45-50: HCPCS

## 2025-07-18 PROCEDURE — 99213 OFFICE O/P EST LOW 20 MIN: CPT | Performed by: PHYSICIAN ASSISTANT

## 2025-07-18 NOTE — ASSESSMENT & PLAN NOTE
Completed PHP 7/18/25.,  Meds per OP psychiatrist.  Current Outpatient Medications   Medication Sig Dispense Refill    buPROPion (WELLBUTRIN XL) 300 mg 24 hr tablet Take 1 tablet (300 mg total) by mouth in the morning for 14 days. Do not start before June 24, 2025. 14 tablet 0    chlorproMAZINE (THORAZINE) 50 mg tablet TAKE 1 TABLET BY MOUTH TWICE DAILY AS NEEDED FOR AGITATION      clonazePAM (KlonoPIN) 1 mg tablet Take 1 tablet (1 mg total) by mouth 2 (two) times a day for 14 days 28 tablet 0    escitalopram (LEXAPRO) 10 mg tablet Take 1 tablet (10 mg total) by mouth daily for 14 days Do not start before June 24, 2025. 14 tablet 0    estradiol (Climara) 0.06 MG/24HR Place 1 patch on the skin once a week over 7 days Do not start before June 28, 2025. 4 patch 3    gabapentin (NEURONTIN) 400 mg capsule Take 2 capsules (800 mg total) by mouth 3 (three) times a day for 14 days 84 capsule 0    haloperidol (HALDOL) 2 mg tablet Take 1 tablet (2 mg total) by mouth daily as needed for agitation 30 tablet 0    HYDROcodone-acetaminophen (NORCO) 5-325 mg per tablet Take 1 tablet by mouth 2 (two) times a day as needed for pain (Do not take within 3 to 4 hours of taking clonazepam) Max Daily Amount: 2 tablets 60 tablet 0    [START ON 7/28/2025] HYDROcodone-acetaminophen (NORCO) 5-325 mg per tablet Take 1 tablet by mouth 2 (two) times a day as needed for pain (Do not take within 3 to 4 hours of taking clonazepam) Max Daily Amount: 2 tablets Do not start before July 28, 2025. 60 tablet 0    hyoscyamine (LEVSIN/SL) 0.125 mg SL tablet Take 1 tablet (0.125 mg total) by mouth every 4 (four) hours as needed for cramping 60 tablet 0    lamoTRIgine (LaMICtal) 200 MG tablet Take 200 mg by mouth daily      lamoTRIgine (LaMICtal) 25 mg tablet Take 10 tablets (250 mg total) by mouth daily for 14 days Do not start before June 24, 2025. 140 tablet 0    lansoprazole (PREVACID) 30 mg capsule Take 1 capsule (30 mg total) by mouth in the morning  and 1 capsule (30 mg total) in the evening. 60 capsule 0    levothyroxine 50 mcg tablet Take 1 tablet (50 mcg total) by mouth daily in the early morning 30 tablet 0    lidocaine (LMX) 4 % cream Apply topically 4 (four) times a day as needed (neck pain) 28 g 0    loperamide (IMODIUM) 2 mg capsule Take 1 capsule (2 mg total) by mouth 3 (three) times a day as needed for diarrhea 30 capsule 0    nabumetone (RELAFEN) 500 mg tablet Take 1 tablet by mouth twice daily 60 tablet 0    naloxone (NARCAN) 4 mg/0.1 mL nasal spray Administer 1 spray into a nostril. If no response after 2-3 minutes, give another dose in the other nostril using a new spray. 1 each 1    ondansetron (ZOFRAN-ODT) 4 mg disintegrating tablet Take 1 tablet (4 mg total) by mouth every 6 (six) hours as needed for nausea or vomiting 20 tablet 0    saccharomyces boulardii (FLORASTOR) 250 mg capsule Take 1 capsule (250 mg total) by mouth 2 (two) times a day 60 capsule 0    simethicone (MYLICON) 80 mg chewable tablet Chew 1 tablet (80 mg total) every 6 (six) hours as needed for flatulence 30 tablet 0    tiZANidine (ZANAFLEX) 4 mg tablet Take 1 tablet (4 mg total) by mouth 3 (three) times a day as needed for muscle spasms (Do not take within 3 to 4 hours of clonazepam and hydrocodone) 90 tablet 0    zolpidem (AMBIEN) 10 mg tablet Take 10 mg by mouth daily at bedtime as needed for sleep       No current facility-administered medications for this visit.

## 2025-07-18 NOTE — BH CRISIS PLAN
Client Name: Ericka Castillo       Client YOB: 1975    Amarjit Safety Plan      Creation Date: 7/18/25 Update Date: 3/18/26   Created By: Ashley Costenbader       Step 1: Warning Signs:   Warning Signs   dont get out of bed   dont eat well   dont talk to people   irritable            Step 2: Internal Coping Strategies:   Internal Coping Strategies   deep breathing   journaling   spending time with children            Step 3: People and social settings that provide distraction:   Name Contact Information   Therapist number in cell   [phone]    Children number in cell            Step 4: People whom I can ask for help during a crisis:      Step 5: Professionals or agencies I can contact during a crisis:      Clinican/Agency Name Phone Emergency Contact    988      Sania [phone]     Dr. Dave [phone]       Local Emergency Department Emergency Department Phone Emergency Department Address    [facility] [phone] [address] , [place] [address]        Crisis Phone Numbers:   Suicide Prevention Lifeline: Call or Text  988 Crisis Text Line: Text HOME to 681-079   Please note: Some [facility] do not have a separate number for Child/Adolescent specific crisis. If your county is not listed under Child/Adolescent, please call the adult number for your county      Adult Crisis Numbers: Child/Adolescent Crisis Numbers   [facility]: [phone] [facility]: [phone]   [facility]: [phone] [facility]: [phone]   [facility]: [phone] [place]: [phone]   [facility]: [phone] [place]: [phone]   [place]: [phone]   [facility]: [phone]   [facility]: [phone]   [facility]: [phone] (daytime) [phone] (after hours, weekends, holidays)      Step 6: Making the environment safer (plan for lethal means safety):   Patient did not identify any  lethal methods: Yes     Optional: What is most important to me and worth living for?   My Kids     Amarjit Safety Plan. Ghazala Tom and Pollo Nichols. Used with permission of the authors.
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
## 2025-07-18 NOTE — GROUP NOTE
Partial Hospitalization - [facility] Clinical Progress Note    Specialized Services Documentation  Therapist must complete separate progress note for each specific clinical activity in which the individual participated during the day.     Subjective:     Patient ID: Ericka Castillo is a 49 y.o. female.    GROUP PSYCHOTHERAPY    Visit Start Time: 1215  Visit Stop Time: 1315  Total Visit Duration: 30 minutes      Group Psychotherapy     Ericka Castillo actively participated along with other members learning about urges and how to integrate techniques within their daily lives to minimize or eliminate urges. Members practiced specific techniques and discussed:   What is an urge  Facts about urges  What is urge surfing  Purpose of urge surfing  Using metaphors of water to understand concept  How to urge surf  Additional wellness tips  Supplemental materials/worksheets     Group then participated in urge surfing mediation.Group members shared pieces of information from these sections and identified way they could practice/integrate these skills in their life. Writer encouraged the members of group to utilize the supplemental materials both inside and outside of program. Some positive efforts towards progress goals which were displayed through participation and engagement in topic.      Tx Plan Objectives Addressed: 1.1, 1.2   Laura HERNANDEZ RN

## 2025-07-18 NOTE — GROUP NOTE
GROUP PSYCHOTHERAPY        [facility] Clinical Progress Note    Subjective:     Patient ID: Ericka Castillo is a 49 y.o. female.    Specialized Services Documentation  Therapist must complete separate progress note for each specific clinical activity in which the individual participated during the day.     GROUP PSYCHOTHERAPY    Visit Start Time: 1330  Visit Stop Time: 1430  Total Visit Duration: 60 minutes    Ericka Castillo participated in psychotherapy group. Group explored treatment day learning and staff utilized verbal, active listening, and interactive teaching methods throughout the day. Members shared areas of learning and set a goal for outside of program. Time spent encouraging use of WRAP, skill review, and exploring resources in the community.     Ericka Castillo actively shared. They engaged in learning related to wellness tools. Ericka Castillo identified journaling as goal for the evening and realized that she can utilize supports from her peers as a take away from program.   Moderate progress demonstrated toward treatment objectives. Continue group psychotherapy to actively practice learned skills and encourage transfer of knowledge to unstructured time.     Tx Plan Objective: 1.1,1.2,1.4, Therapist- Arun Santos    [facility] Clinical Progress Note    Specialized Services Documentation  Therapist must complete separate progress note for each specific clinical activity in which the individual participated during the day.     Subjective:     Patient ID: Ericka Castillo is a 49 y.o. female.    GROUP PSYCHOTHERAPY    Visit Start Time: 1045  Visit Stop Time: 1145  Total Visit Duration: 60 minutes    GROUP CONTENT:    Group Psychotherapy      Subjective:         This group was facilitated in a private office.  During this session, they, actively engaged in psychoeducational group about the Cycle of Change. The skill helps to mange  the cycle of recovery and behavior change focused toward a specified goal. Group explored the cycle of change that can help them to take care of physical and emotional well being on a short term and long term basis. The group talked about understanding the meaning of relapse in regards to physical, intellectual, and emotional expression, regulation , and recognition, and how it affects themselves and others. Teaching on the emphasis of self monitoring and relapse,  the group explored who they can go to for help was brought up as well. Group was encouraged to ask questions in an open forum at the end of group. Measurable progress displayed through engagement in topic.Processing in the course of treatment, they,  will continue to engage in psychotherapy to encourage positive self realization.     Ericka was present for the entire session, but present as distressed and upset. Consistently stating that she was concerned about her return to work. Coping skills were reviewed on how to manage during this session.    Tx Plan Objective Addressed: 1.1,1.2,1.4, Therapist:  Arun Santos
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
## 2025-07-18 NOTE — PSYCH
"[facility] Clinical Progress Note    Specialized Services Documentation  Therapist must complete separate progress note for each specific clinical activity in which the individual participated during the day.     Subjective:  Patient ID: Ericka Castillo is a 49 y.o. female.    Visit Start Time: 1145  Visit Stop Time: 1245  Total Visit Duration: 60 minutes    INDIVIDUAL PSYCHOTHERAPY  Met with Ericka Castillo. Reviewed relapse prevention plan, coping card completed, aftercare plan, and medication list (copies provided). Ericka Castillo is frustrated she is being discharged from [facility]. Stated she feels she needs more \"time\" to practice the skills. CM educated Ericka Castillo she is ready to be step down to OP care to work on more individual concerns she has in therapy. CM educated skills need to be practiced consistent on a daily basis and not just tried one day and be given up because she did not see effects of the skills. CM provided Ericka Castillo with free [facility] support groups on Thursdays that are virtual as well information to sign up for Fall Peer to Peer 12 week program.  Denied SI, HI, and psychosis. Aftercare providers to receive summary.     CASE MANAGEMENT    Current suicide risk : Low  Medications changes/added/denied?no if yes, see Physician/CRNP/HENRY note  Treatment session number: 15  Individual Case Management Visit provided today? yes  Attendance Titration: NA   Upcoming Appointments: PSYCHIATRIST 7/22/25  THERAPIST: 7/20/25   Family Involvement: NA  Weekend Planning: professional supports and 988  Utilization Review Due: 8/7/25  Discharge Plan: 7/18/25     [facility] follow up physician's orders: Discharge to Outpatient Level of Care per Dr. Jersey Miramontes DO    Therapist: Ashley Costenbader MA LMT     "

## 2025-07-18 NOTE — GROUP NOTE
EDUCATION THERAPY        [facility] Clinical Progress Note      Specialized Services Documentation  Therapist must complete separate progress note for each specific clinical activity in which the individual participated during the day.     Subjective:     Patient ID: Ericka Castillo is a 49 y.o. female.      EDUCATION THERAPY    Visit Start Time: 0830  Visit Stop Time: 0930  Total Visit Duration: 60 minutes      Morning group focused on establishing routine and goal review.  Members were assessed related to readiness for learning and potential barriers.  Transfer of skills outside of program explored through goal sharing and connection back to the Recovery Model 5 key facets of recovery.  Throughout session, skills introduced, practiced reflecting mindfulness, meditation, movement, and connecting peers in this community.     Ericka Castillo actively shared.  Identified that they did complete their goal from last treatment day.  They identified spending time with the kids gaining support.  Presented as Receptive related to readiness to learn and did not present with learning barriers. Ericka Castillo participated in mindfulness exercise and gratitude practice. Ericka Castillo made adequate progress toward goal. Continue education group to assess willingness to engage, assess transfer of knowledge, and participate in skill development.    Tx Plan Objective Addressed: 1.1,1.2,1.4, Therapist:  Arun Santos

## 2025-07-18 NOTE — GROUP NOTE
[facility] Clinical Progress Note    Specialized Services Documentation  Therapist must complete separate progress note for each specific clinical activity in which the individual participated during the day.     Subjective:     Patient ID: Ericka Castillo is a 49 y.o. female.    GROUP PSYCHOTHERAPY - Coping Ahead    Visit Start Time: 0930  Visit Stop Time: 1030  Total Visit Duration: 60 minutes      Ericka Castillo participated minimally in a psychotherapy group focused on coping ahead.  This writer detailed the coping ahead skill with group members and developed a coping ahead plan together. Following this, members were encouraged to work on their own coping ahead plans. Consistent discussion was encouraged throughout the group duration. Ericka Castillo made poor efforts towards progress goals which were displayed through participation, notetaking, and engagement in topic.  Continue to run daily group psychotherapy to meet treatment needs and encourage Ericka Castillo to practice skills outside of program.      Tx Plan Objective:1.1,1.2,1.4  Therapist: Ashley Costenbader MA LMT

## 2025-07-18 NOTE — PSYCH
[facility] Discharge Instructions    [facility]   [address] [facility] [place] [address]  [phone]    Ericka Castillo  Address: [address] [place] [id number]  .     Diagnosis:  1. Bipolar 2 disorder (HCC)          2. CHOLO (generalized anxiety disorder)     .     Allergies (Drug/Food):   No Known Allergies     Activity: No Recommendations    Diet:no recommendations    Smoking Cessation:not a smoker     Diagnostic/Laboratory Orders: NA    Vaccines: If you received a vaccine, please notify your family physician on your next visit. For more information, please call [phone].     FOLLOW-UP APPOINTMENTS/REFERRALS    PSYCHIATRIST 7/22/25   Dr. Dave  [address] [place] [id number]  [phone]     THERAPIST: 7/20/25  Sania Vaz  [address] [place], [place] [address],  [place] [address]  [phone]       Primary Care Physician  FIONA Hawkins  [address] [address]  [phone]        ICM/CTT/Additional Community Resources: [facility] hosts our Peer Connection Support Group weekly on Thursdays from 6:00 to 7:30 PM online via [employer].  Go to [email or web address] to join online or call [phone] and use meeting [phone].     Keep Follow Up Appointments, Take Medication As Prescribed, Utilize WRAP,   Continue to review and revise Crisis Plan, Consider Support Groups and/or Volunteer Opportunities    DISCHARGE MEDICATIONS  Current Medications[1]      CRISIS INTERVENTION (EMERGENCY) [facility] SERVICE:   C/M/P [facility] Crisis [phone]    National Suicide Crisis Hotline: 084        I, the undersigned, have received and understand the above instructions.        Patient/Rep Signature: __________________________________       Date/Time: ______________       Relationship: __________________________________________       Date/Time: ______________       Physician Signature:  ____________________________________      Date/Time: ______________             Signature: ________________________________   Your  was:Ashley Costenbader MA      Date/Time: ______________          [1]   Current Outpatient Medications:     buPROPion (WELLBUTRIN XL) 300 mg 24 hr tablet, Take 1 tablet (300 mg total) by mouth in the morning for 14 days. Do not start before June 24, 2025., Disp: 14 tablet, Rfl: 0    chlorproMAZINE (THORAZINE) 50 mg tablet, TAKE 1 TABLET BY MOUTH TWICE DAILY AS NEEDED FOR AGITATION, Disp: , Rfl:     clonazePAM (KlonoPIN) 1 mg tablet, Take 1 tablet (1 mg total) by mouth 2 (two) times a day for 14 days, Disp: 28 tablet, Rfl: 0    escitalopram (LEXAPRO) 10 mg tablet, Take 1 tablet (10 mg total) by mouth daily for 14 days Do not start before June 24, 2025., Disp: 14 tablet, Rfl: 0    estradiol (Climara) 0.06 MG/24HR, Place 1 patch on the skin once a week over 7 days Do not start before June 28, 2025., Disp: 4 patch, Rfl: 3    gabapentin (NEURONTIN) 400 mg capsule, Take 2 capsules (800 mg total) by mouth 3 (three) times a day for 14 days, Disp: 84 capsule, Rfl: 0    haloperidol (HALDOL) 2 mg tablet, Take 1 tablet (2 mg total) by mouth daily as needed for agitation, Disp: 30 tablet, Rfl: 0    HYDROcodone-acetaminophen (NORCO) 5-325 mg per tablet, Take 1 tablet by mouth 2 (two) times a day as needed for pain (Do not take within 3 to 4 hours of taking clonazepam) Max Daily Amount: 2 tablets, Disp: 60 tablet, Rfl: 0    [START ON 7/28/2025] HYDROcodone-acetaminophen (NORCO) 5-325 mg per tablet, Take 1 tablet by mouth 2 (two) times a day as needed for pain (Do not take within 3 to 4 hours of taking clonazepam) Max Daily Amount: 2 tablets Do not start before July 28, 2025., Disp: 60 tablet, Rfl: 0    hyoscyamine (LEVSIN/SL) 0.125 mg SL tablet, Take 1 tablet (0.125 mg total) by mouth every 4 (four) hours as needed for cramping, Disp: 60 tablet, Rfl: 0    lamoTRIgine (LaMICtal)  200 MG tablet, Take 200 mg by mouth daily, Disp: , Rfl:     lamoTRIgine (LaMICtal) 25 mg tablet, Take 10 tablets (250 mg total) by mouth daily for 14 days Do not start before June 24, 2025., Disp: 140 tablet, Rfl: 0    lansoprazole (PREVACID) 30 mg capsule, Take 1 capsule (30 mg total) by mouth in the morning and 1 capsule (30 mg total) in the evening., Disp: 60 capsule, Rfl: 0    levothyroxine 50 mcg tablet, Take 1 tablet (50 mcg total) by mouth daily in the early morning, Disp: 30 tablet, Rfl: 0    lidocaine (LMX) 4 % cream, Apply topically 4 (four) times a day as needed (neck pain), Disp: 28 g, Rfl: 0    loperamide (IMODIUM) 2 mg capsule, Take 1 capsule (2 mg total) by mouth 3 (three) times a day as needed for diarrhea, Disp: 30 capsule, Rfl: 0    nabumetone (RELAFEN) 500 mg tablet, Take 1 tablet by mouth twice daily, Disp: 60 tablet, Rfl: 0    naloxone (NARCAN) 4 mg/0.1 mL nasal spray, Administer 1 spray into a nostril. If no response after 2-3 minutes, give another dose in the other nostril using a new spray., Disp: 1 each, Rfl: 1    ondansetron (ZOFRAN-ODT) 4 mg disintegrating tablet, Take 1 tablet (4 mg total) by mouth every 6 (six) hours as needed for nausea or vomiting, Disp: 20 tablet, Rfl: 0    saccharomyces boulardii (FLORASTOR) 250 mg capsule, Take 1 capsule (250 mg total) by mouth 2 (two) times a day, Disp: 60 capsule, Rfl: 0    simethicone (MYLICON) 80 mg chewable tablet, Chew 1 tablet (80 mg total) every 6 (six) hours as needed for flatulence, Disp: 30 tablet, Rfl: 0    tiZANidine (ZANAFLEX) 4 mg tablet, Take 1 tablet (4 mg total) by mouth 3 (three) times a day as needed for muscle spasms (Do not take within 3 to 4 hours of clonazepam and hydrocodone), Disp: 90 tablet, Rfl: 0    zolpidem (AMBIEN) 10 mg tablet, Take 10 mg by mouth daily at bedtime as needed for sleep, Disp: , Rfl:

## 2025-07-21 ENCOUNTER — DOCUMENTATION (OUTPATIENT)
Dept: PSYCHOLOGY | Facility: CLINIC | Age: 50
End: 2025-07-21

## 2025-07-21 RX ORDER — HYDROXYZINE HYDROCHLORIDE 25 MG/1
25 TABLET, FILM COATED ORAL EVERY 6 HOURS PRN
Qty: 60 TABLET | Refills: 0 | OUTPATIENT
Start: 2025-07-21

## 2025-07-21 NOTE — TELEPHONE ENCOUNTER
Spoke to patient she stated it may have been cancelled when in hospital but only takes it as needed she will discuss this with Ruth at 7/31/25 apt.

## 2025-07-21 NOTE — PROGRESS NOTES
"Partial Hospitalization - [facility] Discharge Summary    Subjective:     Patient ID: Ericka Castillo is a 49 y.o. female.    Admission Date: 6/26/25    Patient was referred by [facility]    Discharge Date: 7/18/25    Was this a routine discharge? Yes    Diagnosis: Axis I:    Bipolar 2 disorder (HCC)           CHOLO (generalized anxiety disorder)         Treating Physician: Dr. Jersey Miramontes DO    Treatment Complications: Applying skills, Motivation to change    Presenting Need:   Per Dr. Jersey Miramontes, DO: Ericka Castillo is a 49 y.o. female with past psychiatric history of depression and anxiety is admitted to [facility] referred by [facility] in patient unit, admitted from 6/12-6/24.    Per the discharge summary from Dr. Freeman: \"Psychiatric medications were titrated over the hospital stay to address mixed symptoms of bipolar disorder, mood instability, worsening depression, anxiety, and suicidal ideation. Treatment plan as outlined according to Dr. Freeman in H&P 6/13/25: \"Change patient's medications, we reviewed with the patient that several medications can be adjusted.   Latuda will be started instead of Abilify to address patient's depression more associated with bipolar 2 disorder rather than MDD, we will increase Lamictal, the patient never had any rash, and has been taking Lamictal for extended period of time.  Will decrease Wellbutrin to 300 mg to treat depression but higher dose may lead to anxiety, and at the same time we will lower Lexapro because this medication may lead to rapid cycling and is not indicated to treat depression and mood disorder however will not stop with abruptly at 2 avoid discontinuation syndrome.  Xanax will be changed to clonazepam because the patient stated that although the next helped to decrease anxiety and its effect is short-lived.  We will augment clonazepam with Atarax.  We will continue the patient Ambien 10 mg as prescribed " "before the patient has been taking for many years without sleep disorders, denied sleepwalking sleep and eating.  The patient was in agreement with just medication changes.\"     Prior to beginning of treatment medications risks and benefits and possible side effects including risk of rash related to treatment with Lamictal, risk of parkinsonian symptoms, Tardive Dyskinesia and metabolic syndrome related to treatment with antipsychotic medications, risk of cardiovascular events in elderly related to treatment with antipsychotic medications, risk of suicidality and serotonin syndrome related to treatment with antidepressants, risks of misuse, abuse or dependence, sedation and respiratory depression related to treatment with benzodiazepine medications, and risk of impaired next-day mental alertness, complex sleep-related behavior and dependence related to treatment with hypnotic medications were reviewed with the patient. Ericka verbalized understanding and agreement for treatment.  Medication education remained ongoing throughout inpatient stay.     Throughout Ericka's inpatient stay, she was able to maintain safety with no return of suicidal ideation.  Anxiety, ruminations, and mood instability persisted.  Perseverative on medications and required extensive medication education.  Lexapro was tapered to 10 mg daily to reduce mood instability in the context of bipolar disorder.  Latuda was also titrated to 40 mg daily for treatment of depression, ruminations, and ongoing mood stabilization.  Intermittent anxiety persisted and patient reported favorable effect from Atarax at which point medication was increased to 25 mg PO BID and 50 mg PO QHS.  Ericka did endorse intermittent difficulty with initiating and maintaining sleep, however reported improving sleep with improved depression.  Patient did report decrease in depression and presented with congruent affect.  Intermittently anxious in the context of psychosocial " "stressors and disposition planning.  Despite this, patient was able to maintain safety with no return of SI, self-care, improved sleep, and mood control.       During her inpatient stay, Ericka was visible, social, and engaged in all milieu activities.  It appeared patient was approaching her psychiatric baseline at which point decision was made to begin discharge preparations.  Patient was reluctant to return home due to \"being lonely\" and \"in a negative environment\" and was offered option to transition to crisis residential.  She was agreeable to transition to crisis residential until she learned she was unable to acquire all of her medications needed to take to crisis residential stating, \"it would just be easier if I were to go home where I have on my medications.  I can put up with it for a few days until I start partial.\"  Exhibited linear and forward thought process with no delusional content verbalized.  Ericka was also able to identify an adequate safety plan and protective factors against suicide should she become a danger to herself, others, or experience of mental health crisis.  Her safety plan consisted of contacting her outpatient provider, utilizing crisis hotline, or returning to the hospital.  She also identified her children and desire to \"get better\" as strong protective factors against suicide.     We felt that Ericka ANDREW achieved the maximum benefit of inpatient stay at that point and could now follow up with outpatient treatment. Prior to discharge  spoke with Ericka ANDREW's daughter to address support and Ericka ANDREW readiness for discharge. Ericka ANDREW also felt stable and ready for discharge at the end of the hospital stay.     The outpatient follow up with [facility] starting 6/26/2025 and established psychiatrist  Dr. Guardado 7/10/25 @ 1:45 PM was arranged by the unit  upon discharge.  A 14-day supply of psychotropic " "medication was submitted to patient's pharmacy prior to discharge.     Ericka was stabilized and discharged on the following psychotropic regimen: Wellbutrin  mg PO QD, Klonopin 1 mg PO BID, Lexapro 10 mg PO QD, gabapentin 800 mg PO TID, Atarax 25 mg PO BID and 50 mg PO QHS, Lamictal 250 mg PO QD, and latuda 40 mg PO QD. She was tolerating medications well and denied any side effects on day of discharge.        Recommend taper of Lexapro to discontinuation on outpatient basis to reduce polypharmacy and duplicate antidepressant therapy.  Klonopin taper also recommended to reduce risk for dependence and side effects in combination with Ambien and Marcell.     Consider further titration of Latuda for mood stabilization, treatment of depression, and ruminations in the outpatient setting.\"     TodayEricka Castillo reports struggling with worsening anxiety.  States that while her mood seems improved, she is still having \"racing thoughts \".  She states that she felt Haldol was the only thing that seemed to help with her racing thoughts inpatient unit, and would like to try this as a as needed.  States that she only feels capable of being able to manage her racing thoughts with therapy if she has the right medication, she states \"I need to be in the right state of mind \".  She states that she was a little irritated yesterday with her children, as they were very active.  Admits that she would be interested in family-based therapy.  States that she continues to worry about \"everything \".  States that it has caused her difficulty with falling asleep and staying asleep.  States she has been using Ambien and hydroxyzine with some benefit.  Denies any active thoughts to hurt herself or anyone else.  Denies any side effects from her current medications.        Per this writer: Ericka Castillo is a 49 y.o. female referred by [facility].  Ericka Castillo reported she still has been " "struggling with racing thoughts, poor sleep, and increase irritability. Reported she feels the only thing that will help with her racing thoughts is medication.  Reported yesterday her children were arguing a lot with each other and she admitted she snapped and was very irritable with them. Reported last night she took 3 different type of medications to sleep and woke up drowsy this morning. Reported she tends to only take 2 medications tonight. Reported since being inpatient, she does feel some improvements but would like to start gaining coping skills. Ericka Castillo denied SI, AVH, and paranoia.          Ericka Castillo's psychosocial stressors: family problems, job stress, and chronic anxiety     Per Ericka Castillo: \" I want to learn coping skill and control my racing thoughts.\"        Course of treatment includes:    group counseling, medication management, individual case management, allied therapy, psychoeducation, psychiatric evaluation, individual therapy ,     Treatment Progress:   Ericka Castillo attended 15 sessions while in [facility]. Treatment plan objectives focused on grounding techniques and coping skills, developing social skills not to isolate, setting boundaries, and re framing negative thoughts.   She fairly participated in groups, individual case management., engaged in session as well as goal setting and skill practice outside of program. Ericka Castillo was provided with several coping skills, resources, and methods to apply skills but was highly focused her mental health would get better with several medication adjustments. Ericka Castillo lacked practicing the skills while in program.   Areas for continued work include setting boundaries with son, practicing and utilizing coping skills learned, and attending [facility] support groups.   Outpatient providers were secured .  [facility] Crisis Plan completed and on My Chart. Admission PHQ-9 10 and CHOLO-7 16; Discharge " PHQ-9 14 and CHOLO-7 15. Upon discharge, denied SI, HI, and psychosis. OP providers and PCP to receive summary.      Aftercare recommendations include:   PSYCHIATRIST 7/22/25   Dr. Dave  [address] [place] [address]  [phone]     THERAPIST: 7/20/25  Sania Vaz  [address] [facility] [address],  [place] [address] [phone]      Group weekly on Thursdays from 6:00 to 7:30 PM online via [employer].  Go to [email or web address] to join online or call [phone] and use meeting [phone].     Fall Registration for [facility] 12 week Peer to Peer program  [email or web address]    Discharge Medications include:Current Medications[1]                   [1]   Current Outpatient Medications:     buPROPion (WELLBUTRIN XL) 300 mg 24 hr tablet, Take 1 tablet (300 mg total) by mouth in the morning for 14 days. Do not start before June 24, 2025., Disp: 14 tablet, Rfl: 0    chlorproMAZINE (THORAZINE) 50 mg tablet, TAKE 1 TABLET BY MOUTH TWICE DAILY AS NEEDED FOR AGITATION, Disp: , Rfl:     clonazePAM (KlonoPIN) 1 mg tablet, Take 1 tablet (1 mg total) by mouth 2 (two) times a day for 14 days, Disp: 28 tablet, Rfl: 0    escitalopram (LEXAPRO) 10 mg tablet, Take 1 tablet (10 mg total) by mouth daily for 14 days Do not start before June 24, 2025., Disp: 14 tablet, Rfl: 0    estradiol (Climara) 0.06 MG/24HR, Place 1 patch on the skin once a week over 7 days Do not start before June 28, 2025., Disp: 4 patch, Rfl: 3    gabapentin (NEURONTIN) 400 mg capsule, Take 2 capsules (800 mg total) by mouth 3 (three) times a day for 14 days, Disp: 84 capsule, Rfl: 0    haloperidol (HALDOL) 2 mg tablet, Take 1 tablet (2 mg total) by mouth daily as needed for agitation, Disp: 30 tablet, Rfl: 0    HYDROcodone-acetaminophen (NORCO) 5-325 mg per tablet, Take 1 tablet by mouth 2 (two) times a day as needed for pain (Do not take within 3 to 4 hours of taking clonazepam) Max Daily Amount: 2 tablets, Disp: 60 tablet,  Rfl: 0    [START ON 7/28/2025] HYDROcodone-acetaminophen (NORCO) 5-325 mg per tablet, Take 1 tablet by mouth 2 (two) times a day as needed for pain (Do not take within 3 to 4 hours of taking clonazepam) Max Daily Amount: 2 tablets Do not start before July 28, 2025., Disp: 60 tablet, Rfl: 0    hyoscyamine (LEVSIN/SL) 0.125 mg SL tablet, Take 1 tablet (0.125 mg total) by mouth every 4 (four) hours as needed for cramping, Disp: 60 tablet, Rfl: 0    lamoTRIgine (LaMICtal) 200 MG tablet, Take 200 mg by mouth daily, Disp: , Rfl:     lamoTRIgine (LaMICtal) 25 mg tablet, Take 10 tablets (250 mg total) by mouth daily for 14 days Do not start before June 24, 2025., Disp: 140 tablet, Rfl: 0    lansoprazole (PREVACID) 30 mg capsule, Take 1 capsule (30 mg total) by mouth in the morning and 1 capsule (30 mg total) in the evening., Disp: 60 capsule, Rfl: 0    levothyroxine 50 mcg tablet, Take 1 tablet (50 mcg total) by mouth daily in the early morning, Disp: 30 tablet, Rfl: 0    lidocaine (LMX) 4 % cream, Apply topically 4 (four) times a day as needed (neck pain), Disp: 28 g, Rfl: 0    loperamide (IMODIUM) 2 mg capsule, Take 1 capsule (2 mg total) by mouth 3 (three) times a day as needed for diarrhea, Disp: 30 capsule, Rfl: 0    nabumetone (RELAFEN) 500 mg tablet, Take 1 tablet by mouth twice daily, Disp: 60 tablet, Rfl: 0    naloxone (NARCAN) 4 mg/0.1 mL nasal spray, Administer 1 spray into a nostril. If no response after 2-3 minutes, give another dose in the other nostril using a new spray., Disp: 1 each, Rfl: 1    ondansetron (ZOFRAN-ODT) 4 mg disintegrating tablet, Take 1 tablet (4 mg total) by mouth every 6 (six) hours as needed for nausea or vomiting, Disp: 20 tablet, Rfl: 0    saccharomyces boulardii (FLORASTOR) 250 mg capsule, Take 1 capsule (250 mg total) by mouth 2 (two) times a day, Disp: 60 capsule, Rfl: 0    simethicone (MYLICON) 80 mg chewable tablet, Chew 1 tablet (80 mg total) every 6 (six) hours as needed for  flatulence, Disp: 30 tablet, Rfl: 0    tiZANidine (ZANAFLEX) 4 mg tablet, Take 1 tablet (4 mg total) by mouth 3 (three) times a day as needed for muscle spasms (Do not take within 3 to 4 hours of clonazepam and hydrocodone), Disp: 90 tablet, Rfl: 0    zolpidem (AMBIEN) 10 mg tablet, Take 10 mg by mouth daily at bedtime as needed for sleep, Disp: , Rfl:

## 2025-07-24 ENCOUNTER — PROCEDURE VISIT (OUTPATIENT)
Age: 50
End: 2025-07-24
Payer: COMMERCIAL

## 2025-07-24 VITALS — BODY MASS INDEX: 21.26 KG/M2 | WEIGHT: 127.6 LBS | HEIGHT: 65 IN

## 2025-07-24 DIAGNOSIS — M79.18 MYOFASCIAL PAIN SYNDROME: ICD-10-CM

## 2025-07-24 DIAGNOSIS — M54.2 NECK PAIN: Primary | ICD-10-CM

## 2025-07-24 DIAGNOSIS — G89.4 CHRONIC PAIN SYNDROME: ICD-10-CM

## 2025-07-24 PROCEDURE — 76942 ECHO GUIDE FOR BIOPSY: CPT | Performed by: ANESTHESIOLOGY

## 2025-07-24 PROCEDURE — 20553 NJX 1/MLT TRIGGER POINTS 3/>: CPT | Performed by: ANESTHESIOLOGY

## 2025-07-24 RX ORDER — METHYLPREDNISOLONE ACETATE 40 MG/ML
80 INJECTION, SUSPENSION INTRA-ARTICULAR; INTRALESIONAL; INTRAMUSCULAR; SOFT TISSUE
Status: COMPLETED | OUTPATIENT
Start: 2025-07-24 | End: 2025-07-24

## 2025-07-24 RX ORDER — BUPIVACAINE HYDROCHLORIDE 2.5 MG/ML
10 INJECTION, SOLUTION EPIDURAL; INFILTRATION; INTRACAUDAL; PERINEURAL
Status: COMPLETED | OUTPATIENT
Start: 2025-07-24 | End: 2025-07-24

## 2025-07-24 RX ADMIN — METHYLPREDNISOLONE ACETATE 80 MG: 40 INJECTION, SUSPENSION INTRA-ARTICULAR; INTRALESIONAL; INTRAMUSCULAR; SOFT TISSUE at 16:40

## 2025-07-24 RX ADMIN — BUPIVACAINE HYDROCHLORIDE 10 ML: 2.5 INJECTION, SOLUTION EPIDURAL; INFILTRATION; INTRACAUDAL; PERINEURAL at 16:40

## 2025-07-24 NOTE — PROGRESS NOTES
"   Universal Protocol:  procedure performed by consultantConsent: Verbal consent obtained  Risks and benefits: risks, benefits and alternatives were discussed  Consent given by: patient  Time out: Immediately prior to procedure a \"time out\" was called to verify the correct patient, procedure, equipment, support staff and site/side marked as required.  Timeout called at: 7/24/2025 5:00 PM.  Patient understanding: patient states understanding of the procedure being performed  Patient consent: the patient's understanding of the procedure matches consent given  Procedure consent: procedure consent matches procedure scheduled  Relevant documents: relevant documents present and verified  Test results: test results available and properly labeled  Site marked: the operative site was marked  Radiology Images displayed and confirmed. If images not available, report reviewed: imaging studies not available  Required items: required blood products, implants, devices, and special equipment available  Patient identity confirmed: verbally with patient  Supporting Documentation  Indications: pain   Trigger Point Injections: multiple trigger points: 3 or more muscle groups    Injection site identified by: ultrasound  Procedure Details  Location(s):    Neck/Upper Back: L upper trapezius, R upper trapezius, R levator scapulae, L levator scapulae, L rhomboid, R rhomboid, R cervical paraspinals and L cervical paraspinals     Middle Back: L thoracic paraspinals and R thoracic paraspinals     Prep: patient was prepped and draped in usual sterile fashion  Needle size: 22 G  Medications: 10 mL bupivacaine (PF) 0.25 %; 80 mg methylPREDNISolone acetate 40 mg/mL  Patient tolerance: patient tolerated the procedure well with no immediate complications          "

## 2025-07-24 NOTE — PATIENT INSTRUCTIONS
Do not apply heat to any area that is numb. If you have discomfort or soreness at the injection site, you may apply ice today, 20 minutes on and 20 minutes off. Tomorrow you may use ice or warm, moist heat. Do not apply ice or heat directly to the skin.  If you experience severe shortness of breath, go to the Emergency Room.  You may have numbness for several hours from the local anesthetic. Please use caution and common sense, especially with weight-bearing activities.  You may have an increase or change in the discomfort for 36-48 hours after your treatment. Apply ice and continue with any pain medicine you have been prescribed.  Do not do anything strenuous today. You may shower, but no tub baths or hot tubs today. You may resume your normal activities tomorrow, but do not “overdo it”. Resume normal activities slowly when you are feeling better.  If you experience redness, drainage or swelling at the injection site, or if you develop a fever above 100 degrees, please call [facility] at [phone] or go to the Emergency Room.  Continue to take all routine medicines prescribed by your primary care physician unless otherwise instructed by our staff. Most blood thinners should be started again according to your regularly scheduled dosing. If you have any questions, please give our office a call.    As no general anesthesia was used in today's procedure, you should not experience any side effects related to anesthesia.       If you have a problem specifically related to your procedure, please call our office at [phone].  Problems not related to your procedure should be directed to your primary care physician.

## 2025-07-29 DIAGNOSIS — M47.812 CERVICAL SPONDYLOSIS: ICD-10-CM

## 2025-07-29 DIAGNOSIS — G89.4 CHRONIC PAIN SYNDROME: ICD-10-CM

## 2025-07-29 RX ORDER — NABUMETONE 500 MG/1
500 TABLET, FILM COATED ORAL 2 TIMES DAILY
Qty: 60 TABLET | Refills: 0 | Status: SHIPPED | OUTPATIENT
Start: 2025-07-29

## 2025-07-31 ENCOUNTER — OFFICE VISIT (OUTPATIENT)
Dept: FAMILY MEDICINE CLINIC | Facility: CLINIC | Age: 50
End: 2025-07-31
Payer: COMMERCIAL

## 2025-07-31 VITALS
TEMPERATURE: 98.1 F | DIASTOLIC BLOOD PRESSURE: 82 MMHG | WEIGHT: 127 LBS | HEIGHT: 65 IN | HEART RATE: 75 BPM | OXYGEN SATURATION: 98 % | SYSTOLIC BLOOD PRESSURE: 128 MMHG | BODY MASS INDEX: 21.16 KG/M2

## 2025-07-31 DIAGNOSIS — F41.9 ANXIETY: ICD-10-CM

## 2025-07-31 DIAGNOSIS — F31.81 BIPOLAR II DISORDER (HCC): Primary | ICD-10-CM

## 2025-07-31 PROCEDURE — 99214 OFFICE O/P EST MOD 30 MIN: CPT | Performed by: NURSE PRACTITIONER

## 2025-07-31 RX ORDER — HYDROXYZINE HYDROCHLORIDE 25 MG/1
25 TABLET, FILM COATED ORAL
COMMUNITY
Start: 2025-07-22

## 2025-07-31 RX ORDER — LACTOBACIL 2/BIFIDO 1/S.THERMO 450B CELL
1 PACKET (EA) ORAL 2 TIMES DAILY
COMMUNITY
Start: 2025-07-14

## 2025-07-31 RX ORDER — CLONIDINE HYDROCHLORIDE 0.1 MG/1
0.1 TABLET ORAL ONCE
COMMUNITY
Start: 2025-07-11

## 2025-08-01 ENCOUNTER — TELEPHONE (OUTPATIENT)
Age: 50
End: 2025-08-01

## 2025-08-01 DIAGNOSIS — M47.812 CERVICAL SPONDYLOSIS: ICD-10-CM

## 2025-08-01 DIAGNOSIS — M54.12 CERVICAL RADICULOPATHY: ICD-10-CM

## 2025-08-01 DIAGNOSIS — M54.81 BILATERAL OCCIPITAL NEURALGIA: ICD-10-CM

## 2025-08-01 DIAGNOSIS — M62.838 MUSCLE SPASM: ICD-10-CM

## 2025-08-01 DIAGNOSIS — M54.2 NECK PAIN: ICD-10-CM

## 2025-08-01 DIAGNOSIS — G89.4 CHRONIC PAIN SYNDROME: ICD-10-CM

## 2025-08-01 RX ORDER — HYDROCODONE BITARTRATE AND ACETAMINOPHEN 5; 325 MG/1; MG/1
1 TABLET ORAL 2 TIMES DAILY PRN
Qty: 60 TABLET | Refills: 0 | Status: SHIPPED | OUTPATIENT
Start: 2025-08-01 | End: 2025-08-06 | Stop reason: SDUPTHER

## 2025-08-02 ENCOUNTER — APPOINTMENT (OUTPATIENT)
Dept: LAB | Facility: HOSPITAL | Age: 50
End: 2025-08-02
Payer: COMMERCIAL

## 2025-08-02 DIAGNOSIS — Z79.899 ON LONG TERM DRUG THERAPY: ICD-10-CM

## 2025-08-03 LAB
ATRIAL RATE: 69 BPM
P AXIS: 41 DEGREES
PR INTERVAL: 134 MS
QRS AXIS: 28 DEGREES
QRSD INTERVAL: 72 MS
QT INTERVAL: 380 MS
QTC INTERVAL: 408 MS
T WAVE AXIS: 44 DEGREES
VENTRICULAR RATE: 69 BPM

## 2025-08-03 PROCEDURE — 93010 ELECTROCARDIOGRAM REPORT: CPT | Performed by: INTERNAL MEDICINE

## 2025-08-06 ENCOUNTER — OFFICE VISIT (OUTPATIENT)
Age: 50
End: 2025-08-06
Payer: COMMERCIAL

## 2025-08-06 VITALS — WEIGHT: 127 LBS | BODY MASS INDEX: 21.16 KG/M2 | HEIGHT: 65 IN

## 2025-08-06 DIAGNOSIS — M54.12 CERVICAL RADICULOPATHY: ICD-10-CM

## 2025-08-06 DIAGNOSIS — Z98.1 HISTORY OF FUSION OF CERVICAL SPINE: ICD-10-CM

## 2025-08-06 DIAGNOSIS — M79.18 MYOFASCIAL PAIN SYNDROME: ICD-10-CM

## 2025-08-06 DIAGNOSIS — M54.2 NECK PAIN: ICD-10-CM

## 2025-08-06 DIAGNOSIS — M47.812 CERVICAL SPONDYLOSIS: ICD-10-CM

## 2025-08-06 DIAGNOSIS — M54.81 BILATERAL OCCIPITAL NEURALGIA: ICD-10-CM

## 2025-08-06 DIAGNOSIS — G89.4 CHRONIC PAIN SYNDROME: Primary | ICD-10-CM

## 2025-08-06 PROCEDURE — 99214 OFFICE O/P EST MOD 30 MIN: CPT | Performed by: NURSE PRACTITIONER

## 2025-08-06 RX ORDER — CHLORZOXAZONE 500 MG/1
500 TABLET ORAL 3 TIMES DAILY PRN
Qty: 90 TABLET | Refills: 2 | Status: SHIPPED | OUTPATIENT
Start: 2025-08-06 | End: 2025-09-05

## 2025-08-06 RX ORDER — HYDROCODONE BITARTRATE AND ACETAMINOPHEN 5; 325 MG/1; MG/1
1 TABLET ORAL 2 TIMES DAILY PRN
Qty: 60 TABLET | Refills: 0 | Status: SHIPPED | OUTPATIENT
Start: 2025-09-29

## 2025-08-06 RX ORDER — HYDROCODONE BITARTRATE AND ACETAMINOPHEN 5; 325 MG/1; MG/1
1 TABLET ORAL 2 TIMES DAILY PRN
Qty: 60 TABLET | Refills: 0 | Status: SHIPPED | OUTPATIENT
Start: 2025-09-01

## 2025-08-06 RX ORDER — GABAPENTIN 800 MG/1
800 TABLET ORAL 3 TIMES DAILY
Qty: 90 TABLET | Refills: 2 | Status: SHIPPED | OUTPATIENT
Start: 2025-08-06

## 2025-08-07 ENCOUNTER — TELEPHONE (OUTPATIENT)
Dept: PAIN MEDICINE | Facility: CLINIC | Age: 50
End: 2025-08-07

## 2025-08-18 ENCOUNTER — TELEPHONE (OUTPATIENT)
Age: 50
End: 2025-08-18

## 2025-08-20 ENCOUNTER — TELEPHONE (OUTPATIENT)
Age: 50
End: 2025-08-20

## (undated) DEVICE — MICROPUNCTURE 501

## (undated) DEVICE — SYRINGE 20ML LL

## (undated) DEVICE — FLEXIBLE ADHESIVE BANDAGE,X-LARGE: Brand: CURITY

## (undated) DEVICE — GAUZE SPONGES,USP TYPE VII GAUZE, 12 PLY: Brand: CURITY

## (undated) DEVICE — BANDAID SHEER SPOT

## (undated) DEVICE — PINNACLE R/O II INTRODUCER SHEATH WITH RADIOPAQUE MARKER: Brand: PINNACLE

## (undated) DEVICE — SYRINGE 3ML LL

## (undated) DEVICE — CATH ANGIO 4FR 70CM  2CM SEGMENT ACCU-VU

## (undated) DEVICE — SYRINGE 5ML LL

## (undated) DEVICE — STOPCOCK 4 WAY LL HIGH PRESSURE

## (undated) DEVICE — NEEDLE 18 G X 1 1/2

## (undated) DEVICE — CHLORAPREP HI-LITE 10.5ML ORANGE

## (undated) DEVICE — GLOVE SRG BIOGEL 8

## (undated) DEVICE — 3M™ TEGADERM™ TRANSPARENT FILM DRESSING FRAME STYLE, 1626W, 4 IN X 4-3/4 IN (10 CM X 12 CM), 50/CT 4CT/CASE: Brand: 3M™ TEGADERM™

## (undated) DEVICE — NEEDLE 25G X 1 1/2

## (undated) DEVICE — PERCLOSE™ PROSTYLE™ SUTURE-MEDIATED CLOSURE AND REPAIR SYSTEM
Type: IMPLANTABLE DEVICE | Status: NON-FUNCTIONAL
Brand: PERCLOSE™ PROSTYLE™

## (undated) DEVICE — Device

## (undated) DEVICE — 3000CC GUARDIAN II: Brand: GUARDIAN

## (undated) DEVICE — FLEXCIL HIGH PRESSURE CONTRAST INJECTION LINE: Brand: NAMIC

## (undated) DEVICE — NON-DEHP HIGH FLOW RATE EXTENSION SET, MALE LUER LOCK ADAPTER

## (undated) DEVICE — INFUSER BAG 500ML

## (undated) DEVICE — NEEDLE SPINAL 22G X 3.5IN  QUINCKE

## (undated) DEVICE — SYRINGE KIT,PACKAGED,,150FT,MK 7(ANGIO-ARTERION, 150ML SYR KIT W/QFT,MC)(60729385): Brand: MEDRAD® MARK 7 ARTERION DISPOSABLE SYRINGE 150 ML WITH QUICK FILL TUBE

## (undated) DEVICE — HP FLOW SWITCH

## (undated) DEVICE — IV SET 15 DROP STERILE 0/Y GRAVITY